# Patient Record
Sex: MALE | Race: WHITE | NOT HISPANIC OR LATINO | Employment: OTHER | URBAN - METROPOLITAN AREA
[De-identification: names, ages, dates, MRNs, and addresses within clinical notes are randomized per-mention and may not be internally consistent; named-entity substitution may affect disease eponyms.]

---

## 2017-02-06 ENCOUNTER — HOSPITAL ENCOUNTER (OUTPATIENT)
Dept: CT IMAGING | Facility: HOSPITAL | Age: 72
Discharge: HOME | End: 2017-02-06
Admitting: NURSE PRACTITIONER

## 2017-02-06 DIAGNOSIS — Z85.79 HISTORY OF PLASMACYTOMA: ICD-10-CM

## 2017-02-06 LAB — CREAT BLDA-MCNC: 1.6 MG/DL (ref 0.6–1.3)

## 2017-02-06 PROCEDURE — 82565 ASSAY OF CREATININE: CPT

## 2017-02-06 PROCEDURE — 0 IOPAMIDOL 61 % SOLUTION: Performed by: NURSE PRACTITIONER

## 2017-02-06 PROCEDURE — 71260 CT THORAX DX C+: CPT

## 2017-02-06 RX ADMIN — IOPAMIDOL 75 ML: 612 INJECTION, SOLUTION INTRAVENOUS at 11:45

## 2017-02-27 ENCOUNTER — OFFICE VISIT (OUTPATIENT)
Dept: SURGERY | Facility: CLINIC | Age: 72
End: 2017-02-27

## 2017-02-27 VITALS
WEIGHT: 259.4 LBS | HEIGHT: 71 IN | DIASTOLIC BLOOD PRESSURE: 70 MMHG | BODY MASS INDEX: 36.31 KG/M2 | HEART RATE: 64 BPM | OXYGEN SATURATION: 94 % | SYSTOLIC BLOOD PRESSURE: 142 MMHG

## 2017-02-27 DIAGNOSIS — R91.1 LUNG NODULE, SOLITARY: ICD-10-CM

## 2017-02-27 DIAGNOSIS — R06.2 WHEEZE: Primary | ICD-10-CM

## 2017-02-27 DIAGNOSIS — R05.9 COUGH: ICD-10-CM

## 2017-02-27 DIAGNOSIS — C90.30 PLASMACYTOMA (HCC): ICD-10-CM

## 2017-02-27 PROCEDURE — 99213 OFFICE O/P EST LOW 20 MIN: CPT | Performed by: THORACIC SURGERY (CARDIOTHORACIC VASCULAR SURGERY)

## 2017-02-27 RX ORDER — INFLUENZA A VIRUS A/SINGAPORE/GP1908/2015 IVR-180A (H1N1) ANTIGEN (PROPIOLACTONE INACTIVATED), INFLUENZA A VIRUS A/SINGAPORE/INFIMH-16-0019/2016 IVR-186 (H3N2) ANTIGEN (PROPIOLACTONE INACTIVATED) AND INFLUENZA B VIRUS B/MARYLAND/15/2016 ANTIGEN (PROPIOLACTONE INACTIVATED) 15; 15; 15 UG/.5ML; UG/.5ML; UG/.5ML
INJECTION, SUSPENSION INTRAMUSCULAR
Refills: 0 | COMMUNITY
Start: 2017-01-24 | End: 2017-03-24

## 2017-02-27 NOTE — PROGRESS NOTES
Subjective   Patient ID: Rafael Gomez is a 71 y.o. male who presents to the office today for a 3 month follow up visit ct chest with contrast.    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  He continues to have difficulty with shortness of breath on physical activity.  He recovers quickly with rest but he shortness of air is made worse with any type of activity of daily living.  He denies any complaints of fever, chills, cough, hemoptysis, pleuritic chest pain, night sweats, hoarseness, or unintentional weight loss. Underlying medical conditions including hypertension, arthritis and reflux remain stable.  He has no other somatic complaints or alleviating or exacerbating factors aside from those mentioned above.    The following portions of the patient's history were reviewed and updated as appropriate: allergies, current medications, past family history, past medical history, past social history, past surgical history and problem list.  Review of Systems   Constitution: Negative.   HENT: Negative.    Eyes: Negative.  Double vision: w/ exertion.   Cardiovascular: Negative.    Respiratory: Positive for cough (chronic non productive cough) and shortness of breath.    Endocrine: Negative.    Hematologic/Lymphatic: Negative.    Skin: Negative.    Musculoskeletal: Negative.    Gastrointestinal: Negative.    Genitourinary: Negative.    Neurological: Negative.    Psychiatric/Behavioral: Negative.      Patient Active Problem List   Diagnosis   • Lung nodule, solitary   • Cough   • Wheeze   • Lung mass   • Follow-up examination, following other surgery   • Renal mass   • Plasmacytoma   • Macrocytic anemia   • Leukopenia   • Monoclonal gammopathy   • Thrombocytopenia     Past Medical History   Diagnosis Date   • Acid reflux     • Arthritis    • Coronary artery disease    • ED (erectile dysfunction)    • Gout    • History of PAT (paroxysmal atrial tachycardia)    • Hypertension    • Neuropathy      HANDS AND FEET   • PONV (postoperative nausea and vomiting)    • PTSD (post-traumatic stress disorder)    • PVD (peripheral vascular disease)    • Stroke      Past Surgical History   Procedure Laterality Date   • Appendectomy     • Coronary artery bypass graft     • Tonsillectomy     • Total hip arthroplasty Right    • Carotid endarterectomy Bilateral    • Thoracoscopy Left 6/2/2016     Procedure: LEFT VIDEO ASSISTED THORACOTOMY W/ LEFT UPPER LOBE WEDGE RESECTION X 2; EXPLORATORY BRONCHOSCOPY; PAIN PUMP PLACEMENT X 2;  Surgeon: Larry Hernandez MD;  Location: [facility] OR;  Service:    • Replacement total knee Left 09/08/2015     Family History   Problem Relation Age of Onset   • Heart disease Mother    • Multiple myeloma Mother    • Heart disease Father    • Prostate cancer Father    • Leukemia Brother      CLL     Social History     Social History   • Marital status:      Spouse name: N/A   • Number of children: N/A   • Years of education: N/A     Occupational History   • Not on file.     Social History Main Topics   • Smoking status: Former Smoker     Packs/day: 1.00     Years: 30.00     Quit date: 1997   • Smokeless tobacco: Current User     Types: Chew      Comment: ONE HALF CAN DAILY    • Alcohol use 18.0 oz/week     30 Cans of beer per week   • Drug use: No   • Sexual activity: Defer     Other Topics Concern   • Not on file     Social History Narrative       Current Outpatient Prescriptions:   •  acetaminophen (TYLENOL) 325 MG tablet, Take 650 mg by mouth 2 (two) times a day. 1-2 tablets morning and afternoon, Disp: , Rfl:   •  AFLURIA injection, INJECT AS DIRECTED, Disp: , Rfl: 0  •  allopurinol (ZYLOPRIM) 300 MG tablet, Take 300 mg by mouth daily., Disp: , Rfl:   •  amLODIPine (NORVASC) 2.5 MG tablet, Take 2.5 mg by mouth  every morning., Disp: , Rfl:   •  aspirin 81 MG chewable tablet, Chew 81 mg 2 (two) times a day. HOLD PRIOR TO SURGERY, Disp: , Rfl:   •  atorvastatin (LIPITOR) 40 MG tablet, Take 40 mg by mouth daily., Disp: , Rfl:   •  cilostazol (PLETAL) 100 MG tablet, Take 100 mg by mouth 2 (two) times a day. 0.5 TAB DAILY, Disp: , Rfl:   •  citalopram (CeleXA) 20 MG tablet, Take 20 mg by mouth every evening., Disp: , Rfl:   •  gabapentin (NEURONTIN) 300 MG capsule, Take 300 mg by mouth 2 (two) times a day., Disp: , Rfl:   •  glucosamine sulfate 500 MG capsule capsule, Take 2 capsules by mouth daily., Disp: , Rfl:   •  hydrochlorothiazide (HYDRODIURIL) 25 MG tablet, Take 12.5 mg by mouth daily., Disp: , Rfl:   •  l-methylfolate-B6-B12 (METANX) 3-35-2 MG tablet tablet, Take 1 tablet by mouth 2 (two) times a day., Disp: , Rfl:   •  LORazepam (ATIVAN) 0.5 MG tablet, Take 0.5 mg by mouth as needed., Disp: , Rfl:   •  metoprolol tartrate (LOPRESSOR) 50 MG tablet, Take 50 mg by mouth 2 (two) times a day., Disp: , Rfl:   •  Omega-3 Fatty Acids (FISH OIL) 1000 MG capsule capsule, Take 1,000 mg by mouth daily with breakfast. HOLD PRIOR TO SURGERY, Disp: , Rfl:   •  omeprazole (PriLOSEC) 20 MG capsule, Take 20 mg by mouth every morning., Disp: , Rfl:   •  sildenafil (VIAGRA) 100 MG tablet, Take 100 mg by mouth as needed for erectile dysfunction., Disp: , Rfl:   •  temazepam (RESTORIL) 15 MG capsule, Take 15 mg by mouth at night as needed., Disp: , Rfl:   •  terazosin (HYTRIN) 2 MG capsule, Take 2 mg by mouth every evening. 2 TABS , Disp: , Rfl:   •  rivaroxaban (XARELTO) 20 MG tablet, Take 1 tablet by mouth daily., Disp: 30 tablet, Rfl: 2  No Known Allergies     Objective   Vitals:    02/27/17 1021   BP: 142/70   Pulse: 64   SpO2: 94%     Physical Exam   Constitutional: He is oriented to person, place, and time. Vital signs are normal. He appears well-developed and well-nourished. No distress.   HENT:   Head: Normocephalic and atraumatic.    Eyes: EOM are normal. Pupils are equal, round, and reactive to light. Right eye exhibits no discharge. Left eye exhibits no discharge.   Neck: Normal range of motion. Neck supple.   Cardiovascular: Normal rate, regular rhythm, normal heart sounds and intact distal pulses.    No murmur heard.  Pulmonary/Chest: Effort normal and breath sounds normal. He has no wheezes. He has no rhonchi. He has no rales. He exhibits no tenderness.   Abdominal: Soft. There is no tenderness.   Musculoskeletal: Normal range of motion. He exhibits no edema or tenderness.   Neurological: He is alert and oriented to person, place, and time. He has normal strength.   Skin: Skin is warm and dry. No rash noted. No cyanosis or erythema.   Psychiatric: He has a normal mood and affect. His behavior is normal.     Independent Review of Radiographic Studies:    I have independently reviewed his CAT scan images from February 6, 2017 [facility].  CT CHEST WITH CONTRAST      HISTORY: 71-year-old male with history of resected plasmacytoma, pleural  nodules      TECHNIQUE: Spiral axial CT imaging of the chest was performed at 3 mm  intervals throughout. Intravenous contrast was administered.      COMPARISON: Correlation is made with noncontrasted CT chest 09/08/2016  and a preoperative contrasted CT chest 05/25/2016.      FINDINGS: Heart and great vessels are stable. There has been prior CABG.  No new mass or adenopathy is identified within the mediastinum or at the  hilar levels. The chest wall appears unremarkable. No bone lesions are  apparent. Degenerative changes are present at the spine.      Lung windows demonstrate postoperative changes in the left upper lobe  consistent with wedge resection. A tiny pulmonary nodule within the left  upper lobe near the resection margin is unchanged, measuring  approximately 6 mm long axis. A nodular density along the anterior  pleural surface adjacent to the left upper lobe appears  stable,  measuring approximately 1.7 cm long axis but no more than 6 mm in  thickness. There is an additional small pulmonary nodule along the  anterior aspect of the inferior left lower lobe adjacent to the major  fissure. This lesion is seen on image 67 and measures no more than 7 mm  in diameter, stable compared with prior study. There is a stable tiny  subpleural nodule along the anterolateral right upper lobe on image 43.  No new suspicious pulmonary nodule or mass is identified within either  lung. There is no active infiltrate or effusion. The visualized portion  of the upper abdomen demonstrates an unremarkable contrasted appearance.  Small renal cortical cysts are noted.      IMPRESSION:  Postoperative imaging is stable compared with 09/08/2016. A  pleural-based nodular density along the anterior left upper lobe is  stable. Additional small pulmonary parenchymal nodules are unchanged.  Continued CT follow-up is recommended.      Assessment/Plan   Assessment:  Stable postoperative course.  Stable pulmonary nodules.    Plan:  Plan for continued follow up and surveillance of his pulmonary nodules with a follow-up CAT scan in 3 months.  Stable pulmonary nodules consistent with known history of plasmacytoma. Continue current medical management as tolerated.  Increase physical activity as tolerated. Should you have any questions or concerns regarding your patient's care, please do not hesitate to contact me.  Sincerely, Larry Hernandez M.D.  There are no diagnoses linked to this encounter.

## 2017-03-17 ENCOUNTER — LAB (OUTPATIENT)
Dept: LAB | Facility: HOSPITAL | Age: 72
End: 2017-03-17

## 2017-03-17 DIAGNOSIS — D47.2 MONOCLONAL GAMMOPATHY: ICD-10-CM

## 2017-03-17 DIAGNOSIS — C90.30 PLASMACYTOMA (HCC): ICD-10-CM

## 2017-03-17 DIAGNOSIS — I82.403 DVT OF LOWER EXTREMITY, BILATERAL (HCC): ICD-10-CM

## 2017-03-17 DIAGNOSIS — D69.6 THROMBOCYTOPENIA (HCC): ICD-10-CM

## 2017-03-17 DIAGNOSIS — D53.9 MACROCYTIC ANEMIA: ICD-10-CM

## 2017-03-17 LAB
ALBUMIN SERPL-MCNC: 4.2 G/DL (ref 3.5–5.2)
ALBUMIN/GLOB SERPL: 1.5 G/DL (ref 1.1–2.4)
ALP SERPL-CCNC: 76 U/L (ref 38–116)
ALT SERPL W P-5'-P-CCNC: 20 U/L (ref 0–41)
ANION GAP SERPL CALCULATED.3IONS-SCNC: 13.2 MMOL/L
AST SERPL-CCNC: 22 U/L (ref 0–40)
BASOPHILS # BLD AUTO: 0.05 10*3/MM3 (ref 0–0.1)
BASOPHILS NFR BLD AUTO: 1 % (ref 0–1.1)
BILIRUB SERPL-MCNC: 0.6 MG/DL (ref 0.1–1.2)
BUN BLD-MCNC: 21 MG/DL (ref 6–20)
BUN/CREAT SERPL: 14 (ref 7.3–30)
CALCIUM SPEC-SCNC: 9.7 MG/DL (ref 8.5–10.2)
CHLORIDE SERPL-SCNC: 101 MMOL/L (ref 98–107)
CO2 SERPL-SCNC: 25.8 MMOL/L (ref 22–29)
CREAT BLD-MCNC: 1.5 MG/DL (ref 0.7–1.3)
DEPRECATED RDW RBC AUTO: 49.6 FL (ref 37–49)
EOSINOPHIL # BLD AUTO: 0.17 10*3/MM3 (ref 0–0.36)
EOSINOPHIL NFR BLD AUTO: 3.4 % (ref 1–5)
ERYTHROCYTE [DISTWIDTH] IN BLOOD BY AUTOMATED COUNT: 14.1 % (ref 11.7–14.5)
GFR SERPL CREATININE-BSD FRML MDRD: 46 ML/MIN/1.73
GLOBULIN UR ELPH-MCNC: 2.8 GM/DL (ref 1.8–3.5)
GLUCOSE BLD-MCNC: 112 MG/DL (ref 74–124)
HCT VFR BLD AUTO: 34.2 % (ref 40–49)
HGB BLD-MCNC: 11.9 G/DL (ref 13.5–16.5)
IMM GRANULOCYTES # BLD: 0.01 10*3/MM3 (ref 0–0.03)
IMM GRANULOCYTES NFR BLD: 0.2 % (ref 0–0.5)
LYMPHOCYTES # BLD AUTO: 1.02 10*3/MM3 (ref 1–3.5)
LYMPHOCYTES NFR BLD AUTO: 20.6 % (ref 20–49)
MCH RBC QN AUTO: 34.1 PG (ref 27–33)
MCHC RBC AUTO-ENTMCNC: 34.8 G/DL (ref 32–35)
MCV RBC AUTO: 98 FL (ref 83–97)
MONOCYTES # BLD AUTO: 0.57 10*3/MM3 (ref 0.25–0.8)
MONOCYTES NFR BLD AUTO: 11.5 % (ref 4–12)
NEUTROPHILS # BLD AUTO: 3.12 10*3/MM3 (ref 1.5–7)
NEUTROPHILS NFR BLD AUTO: 63.3 % (ref 39–75)
NRBC BLD MANUAL-RTO: 0 /100 WBC (ref 0–0)
PLATELET # BLD AUTO: 135 10*3/MM3 (ref 150–375)
PMV BLD AUTO: 9.5 FL (ref 8.9–12.1)
POTASSIUM BLD-SCNC: 4.4 MMOL/L (ref 3.5–4.7)
PROT SERPL-MCNC: 7 G/DL (ref 6.3–8)
RBC # BLD AUTO: 3.49 10*6/MM3 (ref 4.3–5.5)
SODIUM BLD-SCNC: 140 MMOL/L (ref 134–145)
WBC NRBC COR # BLD: 4.94 10*3/MM3 (ref 4–10)

## 2017-03-17 PROCEDURE — 36415 COLL VENOUS BLD VENIPUNCTURE: CPT | Performed by: INTERNAL MEDICINE

## 2017-03-17 PROCEDURE — 80053 COMPREHEN METABOLIC PANEL: CPT | Performed by: INTERNAL MEDICINE

## 2017-03-17 PROCEDURE — 85025 COMPLETE CBC W/AUTO DIFF WBC: CPT | Performed by: INTERNAL MEDICINE

## 2017-03-19 LAB
KAPPA LC SERPL-MCNC: 32.54 MG/L (ref 3.3–19.4)
KAPPA LC/LAMBDA SER: 1.25 {RATIO} (ref 0.26–1.65)
LAMBDA LC FREE SERPL-MCNC: 26.03 MG/L (ref 5.71–26.3)

## 2017-03-20 LAB
ALBUMIN SERPL-MCNC: 3.8 G/DL (ref 2.9–4.4)
ALBUMIN/GLOB SERPL: 1.4 {RATIO} (ref 0.7–1.7)
ALPHA1 GLOB FLD ELPH-MCNC: 0.2 G/DL (ref 0–0.4)
ALPHA2 GLOB SERPL ELPH-MCNC: 0.7 G/DL (ref 0.4–1)
B-GLOBULIN SERPL ELPH-MCNC: 1 G/DL (ref 0.7–1.3)
GAMMA GLOB SERPL ELPH-MCNC: 0.8 G/DL (ref 0.4–1.8)
GLOBULIN SER CALC-MCNC: 2.7 G/DL (ref 2.2–3.9)
IGA SERPL-MCNC: 170 MG/DL (ref 61–437)
IGG SERPL-MCNC: 738 MG/DL (ref 700–1600)
IGM SERPL-MCNC: 318 MG/DL (ref 20–172)
Lab: ABNORMAL
M-SPIKE: 0.1 G/DL
PROT PATTERN SERPL IFE-IMP: ABNORMAL
PROT SERPL-MCNC: 6.5 G/DL (ref 6–8.5)

## 2017-03-24 ENCOUNTER — APPOINTMENT (OUTPATIENT)
Dept: LAB | Facility: HOSPITAL | Age: 72
End: 2017-03-24

## 2017-03-24 ENCOUNTER — APPOINTMENT (OUTPATIENT)
Dept: ONCOLOGY | Facility: CLINIC | Age: 72
End: 2017-03-24

## 2017-03-24 ENCOUNTER — OFFICE VISIT (OUTPATIENT)
Dept: ONCOLOGY | Facility: CLINIC | Age: 72
End: 2017-03-24

## 2017-03-24 VITALS
DIASTOLIC BLOOD PRESSURE: 68 MMHG | HEIGHT: 68 IN | BODY MASS INDEX: 39.95 KG/M2 | TEMPERATURE: 98.9 F | WEIGHT: 263.6 LBS | HEART RATE: 69 BPM | RESPIRATION RATE: 16 BRPM | SYSTOLIC BLOOD PRESSURE: 126 MMHG | OXYGEN SATURATION: 97 %

## 2017-03-24 DIAGNOSIS — D47.2 MONOCLONAL GAMMOPATHY: Primary | ICD-10-CM

## 2017-03-24 PROCEDURE — 99213 OFFICE O/P EST LOW 20 MIN: CPT | Performed by: INTERNAL MEDICINE

## 2017-03-24 PROCEDURE — G0463 HOSPITAL OUTPT CLINIC VISIT: HCPCS | Performed by: INTERNAL MEDICINE

## 2017-03-24 RX ORDER — ASPIRIN 81 MG/1
TABLET ORAL
COMMUNITY
Start: 2017-03-13 | End: 2017-03-24

## 2017-03-24 RX ORDER — TIOTROPIUM BROMIDE INHALATION SPRAY 3.12 UG/1
2 SPRAY, METERED RESPIRATORY (INHALATION)
COMMUNITY
Start: 2017-02-02 | End: 2021-02-08 | Stop reason: SDDI

## 2017-03-24 RX ORDER — TELMISARTAN 80 MG/1
80 TABLET ORAL DAILY
COMMUNITY
Start: 2017-01-30 | End: 2021-09-07

## 2017-03-24 NOTE — PROGRESS NOTES
[facility] GROUP OUTPATIENT FOLLOW UP CLINIC VISIT    REASON FOR FOLLOW-UP:    1. Plasmacytoma involving the left upper lobe  2.  Macrocytic anemia  3.  Mild leukopenia    HISTORY OF PRESENT ILLNESS:  Rafael Gomez is a 71 y.o. male who returns today for follow up of the above issues.  He continues to do about the same.  He does have worsening low back pain and hasn't MRI scheduled in the next couple of weeks.  He does tell me that he received a prescription for Xarelto and is not sure why he received this.  I do not see any reason why he should be anticoagulated.  He did see Dr. Hernandez and had a CT scan performed that showed a stable lung nodule.    HEMATOLOGIC HISTORY:  The patient had complained of some dyspnea on exertion. He has some left knee pain following a left knee replacement. He eventually was referred for further care to Dr. Aba Freeman with vascular surgery. He was found to have evidence of peripheral vascular disease and is scheduled for a procedure in her future. He was also found to have a complex irregular cystic mass measuring 5.7 cm in the right kidney. He has been referred to urology for this. There was a 12 mm nodule in the lingula. He was referred to Dr. Larry Hernandez for this.  He had a CT scan of the chest with contrast on 5/25/2016. This showed 3 pleural-based nodules at the periphery of the left upper lobe. One measured 15 mm and the other measured 16 mm. The other was 6 mm. There is also a noncalcified 6 mm nodule.  He had a wedge resection on 6/2/2016. Two samples were sent to pathology. One showed plasma cells with evidence for amyloid. Further testing at [facility] showed IgG kappa restriction. The other wedge resection showed tiny nonnecrotizing granulomas.    Serum protein electrophoresis showed a 0.1 g M spike with immunofixation showing IgM lambda monoclonality.  Serum free light chain ratio was normal.  Ferritin 226.  Iron profile normal.  Vitamin B12 greater  "than 2000.    Serum protein levels in March 2017 are very stable with 0.1 g M spike.  He will follow-up in one year.  He continues to follow-up with Dr. Hernandez for CT scans.    PAST MEDICAL, SURGICAL, FAMILY, AND SOCIAL HISTORIES were reviewed with the patient and in the electronic medical record, and were updated if indicated.    ALLERGIES:  No Known Allergies    MEDICATIONS:  The medication list has been reviewed with the patient by the medical assistant, and the list has been updated in the electronic medical record, which I reviewed.  Medication dosages and frequencies were confirmed to be accurate.    REVIEW OF SYSTEMS:  PAIN:  See Vital Signs below.  GENERAL:  See history of present illness  SKIN:  No rashes or non-healing lesions.  HEME/LYMPH:  No abnormal bleeding.  No palpable lymphadenopathy.  EYES:  No vision changes or diplopia.  ENT:  No sore throat or difficulty swallowing.  RESPIRATORY:  See history of present illness  CARDIOVASCULAR:  No chest pain, palpitations, orthopnea, or dyspnea on exertion.  GASTROINTESTINAL:  No abdominal pain, nausea, vomiting, constipation, diarrhea, melena, or hematochezia.  GENITOURINARY:  No dysuria or hematuria.  MUSCULOSKELETAL:  Worsening low back pain though he does have chronic low back pain.  NEUROLOGIC:  No dizziness, loss of consciousness, or seizures.  PSYCHIATRIC:  No depression, anxiety, or mood changes.    Vitals:    03/24/17 1117   BP: 126/68   Pulse: 69   Resp: 16   Temp: 98.9 °F (37.2 °C)   TempSrc: Oral   SpO2: 97%   Weight: 263 lb 9.6 oz (120 kg)   Height: 68.31\" (173.5 cm)  Comment: new height   PainSc: 7  Comment: back and knee pain       PHYSICAL EXAMINATION:  GENERAL:  Obese.  Well-developed well-nourished male; awake, alert and oriented, in no acute distress.    SKIN:  Warm and dry, without rashes, purpura, or petechiae.  HEAD:  Normocephalic, atraumatic.  EYES:  Pupils equal, round and reactive to light.  Extraocular movements intact.  Conjunctivae " normal.  EARS:  Hearing intact.  NOSE:  Septum midline.  No excoriations or nasal discharge.  MOUTH:  No stomatitis or ulcers.  Lips are normal.  THROAT:  Oropharynx without lesions or exudates.  NECK:  Supple with good range of motion; no thyromegaly or masses; no JVD or bruits.  LYMPHATICS:  No cervical, supraclavicular, axillary, or inguinal lymphadenopathy.  CHEST:  Lungs are clear to auscultation bilaterally.  No wheezes, rales, or rhonchi.  Tenderness over the left anterior chest.  HEART:  Regular rate; normal rhythm.  No murmurs, gallops or rubs.  ABDOMEN:  Soft, non-tender, non-distended.  Normal active bowel sounds.  No organomegaly.  EXTREMITIES:  No clubbing, cyanosis, or edema.  NEUROLOGICAL:  No focal neurologic deficits.    DIAGNOSTIC DATA:  Lab Results   Component Value Date    WBC 4.94 03/17/2017    HGB 11.9 (L) 03/17/2017    HCT 34.2 (L) 03/17/2017    MCV 98.0 (H) 03/17/2017     (L) 03/17/2017     Serum protein electrophoresis continues to show a 0.1 g M spike with immunofixation showing IgM lambda monoclonality.    Imaging: none reviewed    ASSESSMENT:  This is a 71 y.o. male with:  1.  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  He had a wedge resection.  2 tiny pulmonary nodules in the left upper lobe remain and are being followed by Dr. Hernandez.  2.  IgM monoclonal gammopathy: Tiny 0.1 g M spike.  We will follow labs in 12 months.  Labs are stable.  3.  Macrocytic anemia: He has mild chronic kidney disease.  This could potentially be secondary to alcohol use as well.  4.  Thrombocytopenia.:  This is mild and we will continue to monitor this.  Stable.  5.  Right renal mass which is chronic.  He is followed by urology.  6.  Claudication with peripheral vascular disease followed by Dr. Freeman.    7.  Worsening low back pain with an MRI scheduled.    PLAN:  1.  He'll continue to follow-up with Dr. Hernandez regarding his history of pulmonary  plasmacytoma.  2.  I will see him back in 12 months for follow-up with labs done one week prior.

## 2017-05-18 ENCOUNTER — OFFICE VISIT (OUTPATIENT)
Dept: ORTHOPEDIC SURGERY | Facility: CLINIC | Age: 72
End: 2017-05-18

## 2017-05-18 VITALS — BODY MASS INDEX: 41.22 KG/M2 | HEIGHT: 68 IN | TEMPERATURE: 98.3 F | WEIGHT: 272 LBS

## 2017-05-18 DIAGNOSIS — M25.552 HIP PAIN, LEFT: Primary | ICD-10-CM

## 2017-05-18 PROCEDURE — 99203 OFFICE O/P NEW LOW 30 MIN: CPT | Performed by: ORTHOPAEDIC SURGERY

## 2017-05-18 PROCEDURE — 73502 X-RAY EXAM HIP UNI 2-3 VIEWS: CPT | Performed by: ORTHOPAEDIC SURGERY

## 2017-05-18 RX ORDER — GABAPENTIN 600 MG/1
TABLET ORAL
COMMUNITY
Start: 2017-05-16 | End: 2018-08-27

## 2017-05-18 RX ORDER — PRAMIPEXOLE DIHYDROCHLORIDE 0.25 MG/1
0.25 TABLET ORAL 3 TIMES DAILY
COMMUNITY
Start: 2017-05-02 | End: 2021-09-07

## 2017-05-23 ENCOUNTER — HOSPITAL ENCOUNTER (OUTPATIENT)
Dept: CT IMAGING | Facility: HOSPITAL | Age: 72
Discharge: HOME | End: 2017-05-23
Attending: THORACIC SURGERY (CARDIOTHORACIC VASCULAR SURGERY) | Admitting: THORACIC SURGERY (CARDIOTHORACIC VASCULAR SURGERY)

## 2017-05-23 DIAGNOSIS — R05.9 COUGH: ICD-10-CM

## 2017-05-23 DIAGNOSIS — C90.30 PLASMACYTOMA (HCC): ICD-10-CM

## 2017-05-23 DIAGNOSIS — R06.2 WHEEZE: ICD-10-CM

## 2017-05-23 DIAGNOSIS — R91.1 LUNG NODULE, SOLITARY: ICD-10-CM

## 2017-05-23 PROCEDURE — 71250 CT THORAX DX C-: CPT

## 2017-05-31 ENCOUNTER — OFFICE VISIT (OUTPATIENT)
Dept: SURGERY | Facility: CLINIC | Age: 72
End: 2017-05-31

## 2017-05-31 VITALS
WEIGHT: 273 LBS | OXYGEN SATURATION: 94 % | DIASTOLIC BLOOD PRESSURE: 60 MMHG | HEIGHT: 71 IN | HEART RATE: 88 BPM | BODY MASS INDEX: 38.22 KG/M2 | SYSTOLIC BLOOD PRESSURE: 142 MMHG | RESPIRATION RATE: 18 BRPM

## 2017-05-31 DIAGNOSIS — R91.8 PULMONARY NODULES/LESIONS, MULTIPLE: Primary | ICD-10-CM

## 2017-05-31 PROCEDURE — 99213 OFFICE O/P EST LOW 20 MIN: CPT | Performed by: NURSE PRACTITIONER

## 2017-06-01 ENCOUNTER — OFFICE VISIT (OUTPATIENT)
Dept: ONCOLOGY | Facility: CLINIC | Age: 72
End: 2017-06-01

## 2017-06-01 ENCOUNTER — LAB (OUTPATIENT)
Dept: LAB | Facility: HOSPITAL | Age: 72
End: 2017-06-01

## 2017-06-01 VITALS
BODY MASS INDEX: 40.8 KG/M2 | WEIGHT: 269.2 LBS | HEIGHT: 68 IN | HEART RATE: 72 BPM | DIASTOLIC BLOOD PRESSURE: 86 MMHG | OXYGEN SATURATION: 96 % | SYSTOLIC BLOOD PRESSURE: 146 MMHG | TEMPERATURE: 98.3 F | RESPIRATION RATE: 16 BRPM

## 2017-06-01 DIAGNOSIS — R91.8 MULTIPLE LUNG NODULES ON CT: ICD-10-CM

## 2017-06-01 DIAGNOSIS — D47.2 MONOCLONAL GAMMOPATHY: Primary | ICD-10-CM

## 2017-06-01 DIAGNOSIS — D69.6 THROMBOCYTOPENIA (HCC): ICD-10-CM

## 2017-06-01 DIAGNOSIS — R91.1 LUNG NODULE, SOLITARY: ICD-10-CM

## 2017-06-01 DIAGNOSIS — R91.8 LUNG MASS: Primary | ICD-10-CM

## 2017-06-01 DIAGNOSIS — N28.89 RENAL MASS: ICD-10-CM

## 2017-06-01 DIAGNOSIS — C90.30 PLASMACYTOMA (HCC): ICD-10-CM

## 2017-06-01 LAB
ALBUMIN SERPL-MCNC: 4.5 G/DL (ref 3.5–5.2)
ALBUMIN/GLOB SERPL: 1.6 G/DL (ref 1.1–2.4)
ALP SERPL-CCNC: 71 U/L (ref 38–116)
ALT SERPL W P-5'-P-CCNC: 21 U/L (ref 0–41)
ANION GAP SERPL CALCULATED.3IONS-SCNC: 14.3 MMOL/L
AST SERPL-CCNC: 22 U/L (ref 0–40)
BASOPHILS # BLD AUTO: 0.05 10*3/MM3 (ref 0–0.1)
BASOPHILS NFR BLD AUTO: 1 % (ref 0–1.1)
BILIRUB SERPL-MCNC: 0.7 MG/DL (ref 0.1–1.2)
BUN BLD-MCNC: 21 MG/DL (ref 6–20)
BUN/CREAT SERPL: 12.1 (ref 7.3–30)
CALCIUM SPEC-SCNC: 9.9 MG/DL (ref 8.5–10.2)
CHLORIDE SERPL-SCNC: 99 MMOL/L (ref 98–107)
CO2 SERPL-SCNC: 24.7 MMOL/L (ref 22–29)
CREAT BLD-MCNC: 1.73 MG/DL (ref 0.7–1.3)
DEPRECATED RDW RBC AUTO: 58.8 FL (ref 37–49)
EOSINOPHIL # BLD AUTO: 0.17 10*3/MM3 (ref 0–0.36)
EOSINOPHIL NFR BLD AUTO: 3.5 % (ref 1–5)
ERYTHROCYTE [DISTWIDTH] IN BLOOD BY AUTOMATED COUNT: 15.8 % (ref 11.7–14.5)
GFR SERPL CREATININE-BSD FRML MDRD: 39 ML/MIN/1.73
GLOBULIN UR ELPH-MCNC: 2.9 GM/DL (ref 1.8–3.5)
GLUCOSE BLD-MCNC: 110 MG/DL (ref 74–124)
HCT VFR BLD AUTO: 37.1 % (ref 40–49)
HGB BLD-MCNC: 12.9 G/DL (ref 13.5–16.5)
IMM GRANULOCYTES # BLD: 0.02 10*3/MM3 (ref 0–0.03)
IMM GRANULOCYTES NFR BLD: 0.4 % (ref 0–0.5)
LYMPHOCYTES # BLD AUTO: 1.04 10*3/MM3 (ref 1–3.5)
LYMPHOCYTES NFR BLD AUTO: 21.7 % (ref 20–49)
MCH RBC QN AUTO: 35.2 PG (ref 27–33)
MCHC RBC AUTO-ENTMCNC: 34.8 G/DL (ref 32–35)
MCV RBC AUTO: 101.4 FL (ref 83–97)
MONOCYTES # BLD AUTO: 0.7 10*3/MM3 (ref 0.25–0.8)
MONOCYTES NFR BLD AUTO: 14.6 % (ref 4–12)
NEUTROPHILS # BLD AUTO: 2.82 10*3/MM3 (ref 1.5–7)
NEUTROPHILS NFR BLD AUTO: 58.8 % (ref 39–75)
NRBC BLD MANUAL-RTO: 0 /100 WBC (ref 0–0)
PLATELET # BLD AUTO: 152 10*3/MM3 (ref 150–375)
PMV BLD AUTO: 10.9 FL (ref 8.9–12.1)
POTASSIUM BLD-SCNC: 4.7 MMOL/L (ref 3.5–4.7)
PROT SERPL-MCNC: 7.4 G/DL (ref 6.3–8)
RBC # BLD AUTO: 3.66 10*6/MM3 (ref 4.3–5.5)
SODIUM BLD-SCNC: 138 MMOL/L (ref 134–145)
WBC NRBC COR # BLD: 4.8 10*3/MM3 (ref 4–10)

## 2017-06-01 PROCEDURE — 80053 COMPREHEN METABOLIC PANEL: CPT | Performed by: INTERNAL MEDICINE

## 2017-06-01 PROCEDURE — 99214 OFFICE O/P EST MOD 30 MIN: CPT | Performed by: INTERNAL MEDICINE

## 2017-06-01 PROCEDURE — 85025 COMPLETE CBC W/AUTO DIFF WBC: CPT

## 2017-06-01 PROCEDURE — 36416 COLLJ CAPILLARY BLOOD SPEC: CPT

## 2017-06-01 PROCEDURE — 36415 COLL VENOUS BLD VENIPUNCTURE: CPT | Performed by: INTERNAL MEDICINE

## 2017-06-01 NOTE — PROGRESS NOTES
[facility] GROUP OUTPATIENT FOLLOW UP CLINIC VISIT    REASON FOR FOLLOW-UP:    1. Plasmacytoma involving the left upper lobe  2.  Macrocytic anemia  3.  Mild leukopenia  4.  Enlarging left pulmonary nodules    HISTORY OF PRESENT ILLNESS:  Rafael Gomez is a 72 y.o. male who returns today for follow up of the above issues.      I last saw him back in March.  At that time, I planned to see him back in one year with serum protein studies.  He had repeat CT imaging of his chest without contrast on 5/23/2017 and was seen in the thoracic surgery office yesterday and referred back here for enlarging left lung nodules.    He continues to have a mild occasionally productive cough.  He denies any chest pain.  He has some dyspnea on exertion and fatigue.  No fevers or chills.        HEMATOLOGIC HISTORY:  The patient had complained of some dyspnea on exertion. He has some left knee pain following a left knee replacement. He eventually was referred for further care to Dr. Aba Freeman with vascular surgery. He was found to have evidence of peripheral vascular disease and is scheduled for a procedure in her future. He was also found to have a complex irregular cystic mass measuring 5.7 cm in the right kidney. He has been referred to urology for this. There was a 12 mm nodule in the lingula. He was referred to Dr. Larry Hernandez for this.  He had a CT scan of the chest with contrast on 5/25/2016. This showed 3 pleural-based nodules at the periphery of the left upper lobe. One measured 15 mm and the other measured 16 mm. The other was 6 mm. There is also a noncalcified 6 mm nodule.  He had a wedge resection on 6/2/2016. Two samples were sent to pathology. One showed plasma cells with evidence for amyloid. Further testing at [facility] showed IgG kappa restriction. The other wedge resection showed tiny nonnecrotizing granulomas.    Serum protein electrophoresis showed a 0.1 g M spike with immunofixation showing  "IgM lambda monoclonality.  Serum free light chain ratio was normal.  Ferritin 226.  Iron profile normal.  Vitamin B12 greater than 2000.    Serum protein levels in March 2017 are very stable with 0.1 g M spike.  He will follow-up in one year.  He continues to follow-up with Dr. Hernandez for CT scans.    CT imaging on 5/23/2017 showed an increase in size of 2 lingular lesions now measuring 2.2 cm and 1.0 cm.  There is an 8 mm left lower lobe pulmonary nodule also slightly increased in size.  Several other tiny pulmonary nodules bilaterally which are stable.    PAST MEDICAL, SURGICAL, FAMILY, AND SOCIAL HISTORIES were reviewed with the patient and in the electronic medical record, and were updated if indicated.    ALLERGIES:  No Known Allergies    MEDICATIONS:  The medication list has been reviewed with the patient by the medical assistant, and the list has been updated in the electronic medical record, which I reviewed.  Medication dosages and frequencies were confirmed to be accurate.    REVIEW OF SYSTEMS:  PAIN:  See Vital Signs below.  GENERAL:  See history of present illness  SKIN:  No rashes or non-healing lesions.  HEME/LYMPH:  No abnormal bleeding.  No palpable lymphadenopathy.  EYES:  No vision changes or diplopia.  ENT:  No sore throat or difficulty swallowing.  RESPIRATORY:  See history of present illness  CARDIOVASCULAR:  No chest pain, palpitations, orthopnea, or dyspnea on exertion.  GASTROINTESTINAL:  No abdominal pain, nausea, vomiting, constipation, diarrhea, melena, or hematochezia.  GENITOURINARY:  No dysuria or hematuria.  MUSCULOSKELETAL:  Chronic back pain  NEUROLOGIC:  No dizziness, loss of consciousness, or seizures.  PSYCHIATRIC:  No depression, anxiety, or mood changes.    Vitals:    06/01/17 0759   BP: 146/86   Pulse: 72   Resp: 16   Temp: 98.3 °F (36.8 °C)   TempSrc: Oral   SpO2: 96%   Weight: 269 lb 3.2 oz (122 kg)   Height: 68.31\" (173.5 cm)   PainSc: 0-No pain  Comment: no pain today but " has experienced pain in the chest region       PHYSICAL EXAMINATION:  GENERAL:  Obese.  Well-developed well-nourished male; awake, alert and oriented, in no acute distress.    SKIN:  Warm and dry, without rashes, purpura, or petechiae.  HEAD:  Normocephalic, atraumatic.  EYES:  Pupils equal, round and reactive to light.  Extraocular movements intact.  Conjunctivae normal.  EARS:  Hearing intact.  NOSE:  Septum midline.  No excoriations or nasal discharge.  MOUTH:  No stomatitis or ulcers.  Lips are normal.  THROAT:  Oropharynx without lesions or exudates.  NECK:  Supple with good range of motion; no thyromegaly or masses; no JVD or bruits.  LYMPHATICS:  No cervical, supraclavicular, axillary, or inguinal lymphadenopathy.  CHEST:  Lungs are clear to auscultation bilaterally.  No wheezes, rales, or rhonchi.  Tenderness over the left anterior chest.  HEART:  Regular rate; normal rhythm.  No murmurs, gallops or rubs.  ABDOMEN:  Soft, non-tender, non-distended.  Normal active bowel sounds.  No organomegaly.  EXTREMITIES:  No clubbing, cyanosis, or edema.  NEUROLOGICAL:  No focal neurologic deficits.    DIAGNOSTIC DATA:  Lab Results   Component Value Date    WBC 4.80 06/01/2017    HGB 12.9 (L) 06/01/2017    HCT 37.1 (L) 06/01/2017    .4 (H) 06/01/2017     06/01/2017     Serum protein electrophoresis continues to show a 0.1 g M spike with immunofixation showing IgM lambda monoclonality.  Pending today.    Imaging: CT imaging of the chest by contrast from 5/23/2017 personally reviewed and reviewed with the patient and his wife today.  Increase in size of 2 lingular lesions now measuring 2.2 cm and 1.0 cm.  Several tiny pulmonary nodules bilaterally which are stable.  Slight increase in size of an 8 mm left lower lobe pulmonary nodule.    ASSESSMENT:  This is a 72 y.o. male with:  1.  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  He had a wedge resection.    David has been following a couple of other small pulmonary nodules on the left which has now increased in size.  His M spike back in March was stable.  We will repeat studies today.  I will obtain a PET scan.  He will likely need a biopsy of one of these nodules if possible.  If these are plasmacytoma, I might typically recommend radiation but there may be too many nodules to radiate at this point.  Further recommendations based on his PET scan and serum protein studies.  2.  IgM monoclonal gammopathy: Tiny 0.1 g M spike.  Repeat labs today to see if his M spike has increased.  A bone marrow aspiration and biopsy may be indicated as well.  3.  Macrocytic anemia: He has mild chronic kidney disease.  This could potentially be secondary to alcohol use as well.  4.  Thrombocytopenia.:  This is mild and we will continue to monitor this.  He is not thrombocytopenia today.  5.  Right renal mass which is chronic.  He is followed by urology.  6.  Claudication with peripheral vascular disease followed by Dr. Freeman.    7.  Low back pain with an MRI on 5/18/2017 showing degenerative changes at multiple levels    PLAN:  1.  PET scan  2.  Labs today including a CMP, serum protein electrophoresis, immunofixation, and serum free light chains  3.  I'll present his case at the lung conference in 2 weeks and see him back after that to make further plans regarding a potential biopsy if possible.

## 2017-06-02 LAB
ALBUMIN SERPL-MCNC: 4.2 G/DL (ref 2.9–4.4)
ALBUMIN/GLOB SERPL: 1.6 {RATIO} (ref 0.7–1.7)
ALPHA1 GLOB FLD ELPH-MCNC: 0.2 G/DL (ref 0–0.4)
ALPHA2 GLOB SERPL ELPH-MCNC: 0.7 G/DL (ref 0.4–1)
B-GLOBULIN SERPL ELPH-MCNC: 1 G/DL (ref 0.7–1.3)
GAMMA GLOB SERPL ELPH-MCNC: 0.8 G/DL (ref 0.4–1.8)
GLOBULIN SER CALC-MCNC: 2.7 G/DL (ref 2.2–3.9)
IGA SERPL-MCNC: 169 MG/DL (ref 61–437)
IGG SERPL-MCNC: 808 MG/DL (ref 700–1600)
IGM SERPL-MCNC: 329 MG/DL (ref 15–143)
INTERPRETATION SERPL IEP-IMP: ABNORMAL
KAPPA LC SERPL-MCNC: 33.31 MG/L (ref 3.3–19.4)
KAPPA LC/LAMBDA SER: 1.08 {RATIO} (ref 0.26–1.65)
LAMBDA LC FREE SERPL-MCNC: 30.74 MG/L (ref 5.71–26.3)
Lab: ABNORMAL
M-SPIKE: 0.2 G/DL
PROT SERPL-MCNC: 6.9 G/DL (ref 6–8.5)

## 2017-06-08 ENCOUNTER — HOSPITAL ENCOUNTER (OUTPATIENT)
Dept: PET IMAGING | Facility: HOSPITAL | Age: 72
Discharge: HOME | End: 2017-06-08
Attending: INTERNAL MEDICINE

## 2017-06-08 ENCOUNTER — HOSPITAL ENCOUNTER (OUTPATIENT)
Dept: PET IMAGING | Facility: HOSPITAL | Age: 72
Discharge: HOME | End: 2017-06-08
Attending: INTERNAL MEDICINE | Admitting: INTERNAL MEDICINE

## 2017-06-08 DIAGNOSIS — C90.30 PLASMACYTOMA (HCC): ICD-10-CM

## 2017-06-08 DIAGNOSIS — D47.2 MONOCLONAL GAMMOPATHY: ICD-10-CM

## 2017-06-08 DIAGNOSIS — R91.8 MULTIPLE LUNG NODULES ON CT: ICD-10-CM

## 2017-06-08 PROCEDURE — A9552 F18 FDG: HCPCS | Performed by: INTERNAL MEDICINE

## 2017-06-08 PROCEDURE — 0 FLUDEOXYGLUCOSE F18 SOLUTION: Performed by: INTERNAL MEDICINE

## 2017-06-08 PROCEDURE — 78815 PET IMAGE W/CT SKULL-THIGH: CPT

## 2017-06-08 RX ADMIN — FLUDEOXYGLUCOSE F18 1 DOSE: 300 INJECTION INTRAVENOUS at 07:29

## 2017-06-20 ENCOUNTER — APPOINTMENT (OUTPATIENT)
Dept: LAB | Facility: HOSPITAL | Age: 72
End: 2017-06-20

## 2017-06-20 ENCOUNTER — OFFICE VISIT (OUTPATIENT)
Dept: ONCOLOGY | Facility: CLINIC | Age: 72
End: 2017-06-20

## 2017-06-20 VITALS
OXYGEN SATURATION: 95 % | SYSTOLIC BLOOD PRESSURE: 112 MMHG | TEMPERATURE: 98.1 F | BODY MASS INDEX: 41.56 KG/M2 | DIASTOLIC BLOOD PRESSURE: 60 MMHG | HEART RATE: 80 BPM | RESPIRATION RATE: 18 BRPM | WEIGHT: 274.2 LBS | HEIGHT: 68 IN

## 2017-06-20 DIAGNOSIS — D47.2 MONOCLONAL GAMMOPATHY: ICD-10-CM

## 2017-06-20 DIAGNOSIS — R91.8 MULTIPLE LUNG NODULES ON CT: Primary | ICD-10-CM

## 2017-06-20 PROCEDURE — G0463 HOSPITAL OUTPT CLINIC VISIT: HCPCS | Performed by: INTERNAL MEDICINE

## 2017-06-20 PROCEDURE — 99214 OFFICE O/P EST MOD 30 MIN: CPT | Performed by: INTERNAL MEDICINE

## 2017-06-20 NOTE — PROGRESS NOTES
[facility] GROUP OUTPATIENT FOLLOW UP CLINIC VISIT    REASON FOR FOLLOW-UP:    1. Plasmacytoma involving the left upper lobe  2.  Macrocytic anemia  3.  Mild leukopenia  4.  Enlarging left pulmonary nodules    HISTORY OF PRESENT ILLNESS:  Rafael Gomez is a 72 y.o. male who returns today for follow up of the above issues to review his PET scan.  He continues to have some postnasal drip and congestion.  He is not really using any antihistamines regularly and is not using and nasal steroid regularly as well.  He denies any shortness of breath but does have a cough related to his postnasal drip.        HEMATOLOGIC HISTORY:  The patient had complained of some dyspnea on exertion. He has some left knee pain following a left knee replacement. He eventually was referred for further care to Dr. Aba Freeman with vascular surgery. He was found to have evidence of peripheral vascular disease and is scheduled for a procedure in her future. He was also found to have a complex irregular cystic mass measuring 5.7 cm in the right kidney. He has been referred to urology for this. There was a 12 mm nodule in the lingula. He was referred to Dr. Larry Hernandez for this.  He had a CT scan of the chest with contrast on 5/25/2016. This showed 3 pleural-based nodules at the periphery of the left upper lobe. One measured 15 mm and the other measured 16 mm. The other was 6 mm. There is also a noncalcified 6 mm nodule.  He had a wedge resection on 6/2/2016. Two samples were sent to pathology. One showed plasma cells with evidence for amyloid. Further testing at [facility] showed IgG kappa restriction. The other wedge resection showed tiny nonnecrotizing granulomas.    Serum protein electrophoresis showed a 0.1 g M spike with immunofixation showing IgM lambda monoclonality.  Serum free light chain ratio was normal.  Ferritin 226.  Iron profile normal.  Vitamin B12 greater than 2000.    Serum protein levels in March 2017  are very stable with 0.1 g M spike.  He will follow-up in one year.  He continues to follow-up with Dr. Hernandez for CT scans.    CT imaging on 5/23/2017 showed an increase in size of 2 lingular lesions now measuring 2.2 cm and 1.0 cm.  There is an 8 mm left lower lobe pulmonary nodule also slightly increased in size.  Several other tiny pulmonary nodules bilaterally which are stable.    His M spike increased to 0.2 g.    He had a PET scan on 6/8/2017 showing several hypermetabolic left lung pulmonary nodules with a 2.2 cm pleural-based left upper lobe nodule.    His case was presented at the multidisciplinary thoracic conference.  It was elected to follow him with serial CT scans with the biopsy if the lung nodules continued increase in size.  It was felt that it was too risky to attempt a biopsy at this point.    PAST MEDICAL, SURGICAL, FAMILY, AND SOCIAL HISTORIES were reviewed with the patient and in the electronic medical record, and were updated if indicated.    ALLERGIES:  No Known Allergies    MEDICATIONS:  The medication list has been reviewed with the patient by the medical assistant, and the list has been updated in the electronic medical record, which I reviewed.  Medication dosages and frequencies were confirmed to be accurate.    REVIEW OF SYSTEMS:  PAIN:  See Vital Signs below.  GENERAL:  See history of present illness  SKIN:  No rashes or non-healing lesions.  HEME/LYMPH:  No abnormal bleeding.  No palpable lymphadenopathy.  EYES:  No vision changes or diplopia.  ENT:  No sore throat or difficulty swallowing.  See history of present illness  RESPIRATORY:  See history of present illness  CARDIOVASCULAR:  No chest pain, palpitations, orthopnea, or dyspnea on exertion.  GASTROINTESTINAL:  No abdominal pain, nausea, vomiting, constipation, diarrhea, melena, or hematochezia.  GENITOURINARY:  No dysuria or hematuria.  MUSCULOSKELETAL:  Chronic back pain  NEUROLOGIC:  No dizziness, loss of consciousness, or  "seizures.  PSYCHIATRIC:  No depression, anxiety, or mood changes.    Vitals:    06/20/17 1505   BP: 112/60   Pulse: 80   Resp: 18   Temp: 98.1 °F (36.7 °C)   TempSrc: Oral   SpO2: 95%   Weight: 274 lb 3.2 oz (124 kg)   Height: 68.31\" (173.5 cm)   PainSc: 5  Comment: rt hip, rt leg, chest       PHYSICAL EXAMINATION:  GENERAL:  Obese.  Well-developed well-nourished male; awake, alert and oriented, in no acute distress.    SKIN:  Warm and dry, without rashes, purpura, or petechiae.  HEAD:  Normocephalic, atraumatic.  EYES:  Pupils equal, round and reactive to light.  Extraocular movements intact.  Conjunctivae normal.  EARS:  Hearing intact.  NOSE:  Septum midline.  No excoriations or nasal discharge.  MOUTH:  No stomatitis or ulcers.  Lips are normal.  THROAT:  Oropharynx without lesions or exudates.  NECK:  Supple with good range of motion; no thyromegaly or masses; no JVD or bruits.  LYMPHATICS:  No cervical, supraclavicular, axillary, or inguinal lymphadenopathy.  CHEST:  Lungs are clear to auscultation bilaterally.  No wheezes, rales, or rhonchi.  Tenderness over the left anterior chest.  HEART:  Regular rate; normal rhythm.  No murmurs, gallops or rubs.  ABDOMEN:  Soft, non-tender, non-distended.  Normal active bowel sounds.  No organomegaly.  EXTREMITIES:  No clubbing, cyanosis, or edema.  NEUROLOGICAL:  No focal neurologic deficits.    DIAGNOSTIC DATA:  Lab Results   Component Value Date    WBC 4.80 06/01/2017    HGB 12.9 (L) 06/01/2017    HCT 37.1 (L) 06/01/2017    .4 (H) 06/01/2017     06/01/2017     0.2 g M spike    PET scan images from 6/8/2017 were personally reviewed.  Hypermetabolic 2.27 m pleural-based left upper lobe lung nodule with several other smaller hypermetabolic areas as well.    ASSESSMENT:  This is a 72 y.o. male with:  1.  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  He had a wedge resection.  Dr. Hernandez has been following a " couple of other small pulmonary nodules on the left which has now increased in size.  His M spike back in March was stable.  His M spike continues to be stable.  The PET scan did not show anything we didn't expect from the CT scan.  The left upper lobe lung nodule is too risky to biopsy base of its location.  His case was presented at the multidisciplinary thoracic conference and recommendations included to follow him with serial CT scans with biopsy if it appears to be safer.  2.  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  We will continue to monitor.  3.  Macrocytic anemia: He has mild chronic kidney disease.  This could potentially be secondary to alcohol use as well.  4.  Thrombocytopenia.:  This is mild and we will continue to monitor this.  5.  Right renal mass which is chronic.  He is followed by urology.  6.  Claudication with peripheral vascular disease followed by Dr. Freeman.    7.  Low back pain with an MRI on 5/18/2017 showing degenerative changes at multiple levels  8.  Nasal congestion with postnasal drip and cough: I recommended daily antihistamines and a nasal steroid.    PLAN:  1.  I'll see him back in 3 months for follow-up with a CT scan of the chest with contrast and labs including serum protein studies done one week prior.

## 2017-09-11 ENCOUNTER — LAB (OUTPATIENT)
Dept: LAB | Facility: HOSPITAL | Age: 72
End: 2017-09-11

## 2017-09-11 ENCOUNTER — HOSPITAL ENCOUNTER (OUTPATIENT)
Dept: PET IMAGING | Facility: HOSPITAL | Age: 72
Discharge: HOME | End: 2017-09-11
Attending: INTERNAL MEDICINE | Admitting: INTERNAL MEDICINE

## 2017-09-11 DIAGNOSIS — R91.8 MULTIPLE LUNG NODULES ON CT: ICD-10-CM

## 2017-09-11 DIAGNOSIS — D47.2 MONOCLONAL GAMMOPATHY: ICD-10-CM

## 2017-09-11 LAB
ALBUMIN SERPL-MCNC: 4.1 G/DL (ref 3.5–5.2)
ALBUMIN/GLOB SERPL: 1.5 G/DL (ref 1.1–2.4)
ALP SERPL-CCNC: 66 U/L (ref 38–116)
ALT SERPL W P-5'-P-CCNC: 20 U/L (ref 0–41)
ANION GAP SERPL CALCULATED.3IONS-SCNC: 13.5 MMOL/L
AST SERPL-CCNC: 25 U/L (ref 0–40)
BASOPHILS # BLD AUTO: 0.03 10*3/MM3 (ref 0–0.1)
BASOPHILS NFR BLD AUTO: 0.6 % (ref 0–1.1)
BILIRUB SERPL-MCNC: 0.7 MG/DL (ref 0.1–1.2)
BUN BLD-MCNC: 25 MG/DL (ref 6–20)
BUN/CREAT SERPL: 17.9 (ref 7.3–30)
CALCIUM SPEC-SCNC: 9.5 MG/DL (ref 8.5–10.2)
CHLORIDE SERPL-SCNC: 101 MMOL/L (ref 98–107)
CO2 SERPL-SCNC: 26.5 MMOL/L (ref 22–29)
CREAT BLD-MCNC: 1.4 MG/DL (ref 0.7–1.3)
CREAT BLDA-MCNC: 1.5 MG/DL (ref 0.6–1.3)
DEPRECATED RDW RBC AUTO: 55.1 FL (ref 37–49)
EOSINOPHIL # BLD AUTO: 0.14 10*3/MM3 (ref 0–0.36)
EOSINOPHIL NFR BLD AUTO: 2.8 % (ref 1–5)
ERYTHROCYTE [DISTWIDTH] IN BLOOD BY AUTOMATED COUNT: 15 % (ref 11.7–14.5)
GFR SERPL CREATININE-BSD FRML MDRD: 50 ML/MIN/1.73
GLOBULIN UR ELPH-MCNC: 2.8 GM/DL (ref 1.8–3.5)
GLUCOSE BLD-MCNC: 71 MG/DL (ref 74–124)
HCT VFR BLD AUTO: 39.3 % (ref 40–49)
HGB BLD-MCNC: 12.7 G/DL (ref 13.5–16.5)
IMM GRANULOCYTES # BLD: 0.02 10*3/MM3 (ref 0–0.03)
IMM GRANULOCYTES NFR BLD: 0.4 % (ref 0–0.5)
LYMPHOCYTES # BLD AUTO: 0.89 10*3/MM3 (ref 1–3.5)
LYMPHOCYTES NFR BLD AUTO: 17.7 % (ref 20–49)
MCH RBC QN AUTO: 32.9 PG (ref 27–33)
MCHC RBC AUTO-ENTMCNC: 32.3 G/DL (ref 32–35)
MCV RBC AUTO: 101.8 FL (ref 83–97)
MONOCYTES # BLD AUTO: 0.49 10*3/MM3 (ref 0.25–0.8)
MONOCYTES NFR BLD AUTO: 9.8 % (ref 4–12)
NEUTROPHILS # BLD AUTO: 3.45 10*3/MM3 (ref 1.5–7)
NEUTROPHILS NFR BLD AUTO: 68.7 % (ref 39–75)
NRBC BLD MANUAL-RTO: 0 /100 WBC (ref 0–0)
PLATELET # BLD AUTO: 149 10*3/MM3 (ref 150–375)
PMV BLD AUTO: 11.1 FL (ref 8.9–12.1)
POTASSIUM BLD-SCNC: 4.7 MMOL/L (ref 3.5–4.7)
PROT SERPL-MCNC: 6.9 G/DL (ref 6.3–8)
RBC # BLD AUTO: 3.86 10*6/MM3 (ref 4.3–5.5)
SODIUM BLD-SCNC: 141 MMOL/L (ref 134–145)
WBC NRBC COR # BLD: 5.02 10*3/MM3 (ref 4–10)

## 2017-09-11 PROCEDURE — 36415 COLL VENOUS BLD VENIPUNCTURE: CPT | Performed by: INTERNAL MEDICINE

## 2017-09-11 PROCEDURE — 71260 CT THORAX DX C+: CPT

## 2017-09-11 PROCEDURE — 85025 COMPLETE CBC W/AUTO DIFF WBC: CPT | Performed by: INTERNAL MEDICINE

## 2017-09-11 PROCEDURE — 82565 ASSAY OF CREATININE: CPT

## 2017-09-11 PROCEDURE — 80053 COMPREHEN METABOLIC PANEL: CPT | Performed by: INTERNAL MEDICINE

## 2017-09-11 PROCEDURE — 0 IOPAMIDOL 61 % SOLUTION: Performed by: INTERNAL MEDICINE

## 2017-09-11 RX ADMIN — IOPAMIDOL 75 ML: 612 INJECTION, SOLUTION INTRAVENOUS at 08:46

## 2017-09-12 LAB
ALBUMIN SERPL-MCNC: 3.6 G/DL (ref 2.9–4.4)
ALBUMIN/GLOB SERPL: 1.2 {RATIO} (ref 0.7–1.7)
ALPHA1 GLOB FLD ELPH-MCNC: 0.2 G/DL (ref 0–0.4)
ALPHA2 GLOB SERPL ELPH-MCNC: 0.7 G/DL (ref 0.4–1)
B-GLOBULIN SERPL ELPH-MCNC: 0.9 G/DL (ref 0.7–1.3)
GAMMA GLOB SERPL ELPH-MCNC: 1 G/DL (ref 0.4–1.8)
GLOBULIN SER CALC-MCNC: 2.9 G/DL (ref 2.2–3.9)
IGA SERPL-MCNC: 181 MG/DL (ref 61–437)
IGG SERPL-MCNC: 818 MG/DL (ref 700–1600)
IGM SERPL-MCNC: 305 MG/DL (ref 15–143)
Lab: ABNORMAL
M-SPIKE: 0.1 G/DL
PROT PATTERN SERPL IFE-IMP: ABNORMAL
PROT SERPL-MCNC: 6.5 G/DL (ref 6–8.5)

## 2017-09-18 ENCOUNTER — OFFICE VISIT (OUTPATIENT)
Dept: ONCOLOGY | Facility: CLINIC | Age: 72
End: 2017-09-18

## 2017-09-18 ENCOUNTER — APPOINTMENT (OUTPATIENT)
Dept: LAB | Facility: HOSPITAL | Age: 72
End: 2017-09-18

## 2017-09-18 ENCOUNTER — OFFICE VISIT (OUTPATIENT)
Dept: SURGERY | Facility: CLINIC | Age: 72
End: 2017-09-18

## 2017-09-18 VITALS
DIASTOLIC BLOOD PRESSURE: 64 MMHG | SYSTOLIC BLOOD PRESSURE: 118 MMHG | WEIGHT: 273.4 LBS | RESPIRATION RATE: 18 BRPM | HEART RATE: 66 BPM | TEMPERATURE: 98.1 F | OXYGEN SATURATION: 94 % | HEIGHT: 68 IN | BODY MASS INDEX: 41.43 KG/M2

## 2017-09-18 VITALS
HEIGHT: 68 IN | WEIGHT: 274.4 LBS | SYSTOLIC BLOOD PRESSURE: 134 MMHG | HEART RATE: 77 BPM | OXYGEN SATURATION: 94 % | BODY MASS INDEX: 41.59 KG/M2 | DIASTOLIC BLOOD PRESSURE: 70 MMHG

## 2017-09-18 DIAGNOSIS — R91.1 LUNG NODULE, SOLITARY: ICD-10-CM

## 2017-09-18 DIAGNOSIS — R05.9 COUGH: ICD-10-CM

## 2017-09-18 DIAGNOSIS — D47.2 MONOCLONAL GAMMOPATHY: ICD-10-CM

## 2017-09-18 DIAGNOSIS — D47.2 MONOCLONAL GAMMOPATHY: Primary | ICD-10-CM

## 2017-09-18 DIAGNOSIS — R91.8 MULTIPLE LUNG NODULES ON CT: Primary | ICD-10-CM

## 2017-09-18 LAB — SPECIMEN STATUS: NORMAL

## 2017-09-18 PROCEDURE — 99213 OFFICE O/P EST LOW 20 MIN: CPT | Performed by: THORACIC SURGERY (CARDIOTHORACIC VASCULAR SURGERY)

## 2017-09-18 PROCEDURE — 99214 OFFICE O/P EST MOD 30 MIN: CPT | Performed by: INTERNAL MEDICINE

## 2017-09-18 PROCEDURE — G0463 HOSPITAL OUTPT CLINIC VISIT: HCPCS | Performed by: INTERNAL MEDICINE

## 2017-09-18 RX ORDER — ALBUTEROL SULFATE 90 UG/1
2 AEROSOL, METERED RESPIRATORY (INHALATION) EVERY 4 HOURS PRN
COMMUNITY

## 2017-09-18 NOTE — PROGRESS NOTES
Subjective   Patient ID: Rafael Gomez is a 72 y.o. male is here today for follow-up after his diagnosis of pulmonary plasmacytoma of the left lung..    History of Present Illness  Dear Colleague,  Rafael Gomez was seen in our office today for continued follow up and surveillance for left upper lobe pulmonary plasmacytoma and several smaller pulmonary nodules.  He continues to suffer from bouts of anxiety associated with shortness of breath.  His shortness of breath is made worse with physical activity such as climbing stairs or walking on an incline.  Activities of daily living are also associated with his shortness of breath.  He has a cough which is nonproductive usually worse in the morning and improves throughout the day.  It increases with taking a deep breath.  He reports no alleviating or aggravating factors associated with this.  He denies any complaints of fever, chills, hemoptysis, pleuritic chest pain, night sweats, hoarseness, or unintentional weight loss. Underlying medical conditions including hypertension, arthritis and coronary artery disease remain stable.  He has a small ulcer located in the anterior aspect of his right shin that has been present for 6 months.  He denies any recent bout or trauma to this area.  He has no other somatic complaints or alleviating or exacerbating factors aside from those mentioned above.    The following portions of the patient's history were reviewed and updated as appropriate: allergies, current medications, past family history, past medical history, past social history, past surgical history and problem list.  Review of Systems   Constitution: Negative.   HENT: Negative.    Eyes: Negative.    Cardiovascular: Negative.    Respiratory: Positive for cough and shortness of breath.    Endocrine: Negative.    Hematologic/Lymphatic: Negative.    Skin: Negative.    Musculoskeletal: Negative.    Gastrointestinal: Positive for dysphagia.        Pt states is Due to  anxiety    Genitourinary: Negative.    Neurological: Negative.    Psychiatric/Behavioral: Negative.      Patient Active Problem List   Diagnosis   • Lung nodule, solitary   • Cough   • Wheeze   • Multiple lung nodules on CT   • Follow-up examination, following other surgery   • Renal mass   • Plasmacytoma   • Macrocytic anemia   • Leukopenia   • Monoclonal gammopathy   • Thrombocytopenia     Past Medical History:   Diagnosis Date   • Acid reflux    • Anxiety    • Arthritis    • Coronary artery disease    • ED (erectile dysfunction)    • Gout    • History of PAT (paroxysmal atrial tachycardia)    • Hypertension    • Neuropathy     HANDS AND FEET   • PONV (postoperative nausea and vomiting)    • PTSD (post-traumatic stress disorder)    • PVD (peripheral vascular disease)    • Stroke      Past Surgical History:   Procedure Laterality Date   • APPENDECTOMY     • CAROTID ENDARTERECTOMY Bilateral    • CORONARY ARTERY BYPASS GRAFT     • REPLACEMENT TOTAL KNEE Left 09/08/2015   • THORACOSCOPY Left 6/2/2016    Procedure: LEFT VIDEO ASSISTED THORACOTOMY W/ LEFT UPPER LOBE WEDGE RESECTION X 2; EXPLORATORY BRONCHOSCOPY; PAIN PUMP PLACEMENT X 2;  Surgeon: Larry Hernandez MD;  Location: [facility] OR;  Service:    • TONSILLECTOMY     • TOTAL HIP ARTHROPLASTY Right      Family History   Problem Relation Age of Onset   • Heart disease Mother    • Multiple myeloma Mother    • Heart disease Father    • Prostate cancer Father    • Leukemia Brother      CLL     Social History     Social History   • Marital status:      Spouse name: Liliya   • Number of children: N/A   • Years of education: High school     Occupational History   • 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 1-2 tablets morning and afternoon, Disp: , Rfl:   •  albuterol (PROVENTIL HFA;VENTOLIN HFA) 108 (90 Base) MCG/ACT inhaler, Inhale 2 puffs Every 4 (Four) Hours As Needed for Wheezing., Disp: , Rfl:   •  allopurinol (ZYLOPRIM) 300 MG tablet, Take 300 mg by mouth daily., Disp: , Rfl:   •  amLODIPine (NORVASC) 2.5 MG tablet, Take 2.5 mg by mouth every morning., Disp: , Rfl:   •  aspirin 81 MG chewable tablet, Chew 81 mg 2 (two) times a day. HOLD PRIOR TO SURGERY, Disp: , Rfl:   •  atorvastatin (LIPITOR) 40 MG tablet, Take 40 mg by mouth daily., Disp: , Rfl:   •  cilostazol (PLETAL) 100 MG tablet, Take 100 mg by mouth 2 (two) times a day. 0.5 TAB DAILY, Disp: , Rfl:   •  citalopram (CeleXA) 20 MG tablet, Take 20 mg by mouth every evening., Disp: , Rfl:   •  gabapentin (NEURONTIN) 600 MG tablet, , Disp: , Rfl:   •  glucosamine sulfate 500 MG capsule capsule, Take 2 capsules by mouth daily., Disp: , Rfl:   •  hydrochlorothiazide (HYDRODIURIL) 25 MG tablet, Take 12.5 mg by mouth daily., Disp: , Rfl:   •  l-methylfolate-B6-B12 (METANX) 3-35-2 MG tablet tablet, Take 1 tablet by mouth 2 (two) times a day., Disp: , Rfl:   •  LORazepam (ATIVAN) 0.5 MG tablet, Take 0.5 mg by mouth as needed., Disp: , Rfl:   •  metoprolol tartrate (LOPRESSOR) 50 MG tablet, Take 50 mg by mouth 2 (two) times a day., Disp: , Rfl:   •  MICARDIS 80 MG tablet, , Disp: , Rfl:   •  MIRAPEX 0.25 MG tablet, , Disp: , Rfl:   •  Omega-3 Fatty Acids (FISH OIL) 1000 MG capsule capsule, Take 1,000 mg by mouth daily with breakfast. HOLD PRIOR TO SURGERY, Disp: , Rfl:   •  omeprazole (PriLOSEC) 20 MG capsule, Take 20 mg by mouth every morning., Disp: , Rfl:   •  SEREVENT DISKUS 50 MCG/DOSE diskus inhaler, ,  Disp: , Rfl:   •  sildenafil (VIAGRA) 100 MG tablet, Take 100 mg by mouth as needed for erectile dysfunction., Disp: , Rfl:   •  SPIRIVA RESPIMAT 2.5 MCG/ACT aerosol solution, , Disp: , Rfl:   •  temazepam (RESTORIL) 15 MG capsule, Take 15 mg by mouth at night as needed., Disp: , Rfl:   •  terazosin (HYTRIN) 2 MG capsule, Take 2 mg by mouth every evening. 2 TABS , Disp: , Rfl:   No Known Allergies     Objective   Vitals:    09/18/17 0902   BP: 134/70   Pulse: 77   SpO2: 94%     Physical Exam   Constitutional: He is oriented to person, place, and time. Vital signs are normal. He appears well-developed.   HENT:   Head: Normocephalic and atraumatic.   Eyes: EOM are normal. Pupils are equal, round, and reactive to light.   Neck: Normal range of motion. Neck supple.   Cardiovascular: Normal rate, regular rhythm, normal heart sounds and intact distal pulses.    No murmur heard.  Pulmonary/Chest: Effort normal. He has decreased breath sounds in the right upper field, the right middle field, the left upper field and the left middle field. He has no wheezes. He has no rhonchi. He has no rales. He exhibits no tenderness.   Abdominal: Soft. There is no tenderness.   Musculoskeletal: Normal range of motion. He exhibits no edema or tenderness.   Neurological: He is alert and oriented to person, place, and time. He has normal strength.   Skin: Skin is warm and dry. No rash noted. No cyanosis or erythema.   Small 8 mm ulcerated area of the anterior and medial right shin.   Psychiatric: He has a normal mood and affect. His behavior is normal.     Independent Review of Radiographic Studies:    CT CHEST WITH IV CONTRAST      HISTORY: IGG Kappa plasmacytoma with multiple lung nodules. Follow-up  exam.      TECHNIQUE:  Radiation dose reduction techniques were utilized, including  automated exposure control and exposure modulation based on body size.   CT chest with intravenous contrast.      COMPARISON: PET CT 06/08/2017, CT chest  without contrast 05/23/2017,  02/06/2017, 09/08/2016, 05/25/2016.      FINDINGS: Pleural-based anterior left upper lobe nodule measures 21 x 8  mm which is without change. A 10 mm left upper lobe nodule is without  change. An 8 mm and 7 mm left lower lobe nodules are without change.  There is also a 6 mm pleural-based posteromedial left lower lobe nodule  without change. A 5 mm nodule along the minor fissure is stable. Trace  pleural fluid layers dependently. No new nodules have developed. There  is a 14 mm right hilar lymph node which is mildly enlarged. Mediastinal  left david enlargement is evident and there is no axillary david  enlargement. Imaging through the upper abdomen is within normal limits.  There has been previous median sternotomy.      IMPRESSION:  Overall, stable findings compared to previous PET CT  06/08/2017. Pulmonary nodules are stable including a 21 mm pleural-based  nodule anterior left upper lobe that was previously FDG avid. Mildly  enlarged right hilar lymph node is without definite change when compared  to previous noncontrast exams. Trace pleural fluid layers dependently.  No new nodule or new abnormality. Continued surveillance recommended.      This report was finalized on 9/13/2017 10:27 AM by Dr. Amado Francis MD.      Assessment/Plan   Assessment:  Stable pulmonary nodules within the left lung as well as right hilar lymph nodes.  No new lung nodules, effusions or masses.  Plasmacytoma of the left lung.  COPD.    Plan:  Our plan is for continued surveillance with repeat CAT scan of the chest in 3 months.  He is currently being followed by the oncology and pulmonary services.  I have encouraged him to follow-up with his primary care doctor regarding the small ulcerated area of his right anterior medial shin.  I will update you after his next visit and make further recommendations if necessary.  Should you have any questions or concerns regarding your patient's care, please do not  hesitate to contact me.  Sincerely, Larry Hernandez M.D.  There are no diagnoses linked to this encounter.

## 2017-09-18 NOTE — PROGRESS NOTES
[facility] GROUP OUTPATIENT FOLLOW UP CLINIC VISIT    REASON FOR FOLLOW-UP:    1. Plasmacytoma involving the left upper lobe  2.  Macrocytic anemia  3.  Mild leukopenia  4.  Enlarging left pulmonary nodules    HISTORY OF PRESENT ILLNESS:  Rafael Gomez is a 72 y.o. male who returns today for follow up of the above issues to review CT and laboratory findings.    He is overall doing about the same.  He gained quite a bit of weight.  He he is not very active.  He does note dyspnea on exertion with minimal activity but the same time recently walked 3 miles with his great-grandchildren.  He feels a little bit unsteady on his feet.  He has a small skin ulcer on the distal right lower extremity.  He complains of some mild erythema on his neck that is not pruritic.      HEMATOLOGIC HISTORY:  The patient had complained of some dyspnea on exertion. He has some left knee pain following a left knee replacement. He eventually was referred for further care to Dr. Aba Freeman with vascular surgery. He was found to have evidence of peripheral vascular disease and is scheduled for a procedure in her future. He was also found to have a complex irregular cystic mass measuring 5.7 cm in the right kidney. He has been referred to urology for this. There was a 12 mm nodule in the lingula. He was referred to Dr. Larry Hernandez for this.  He had a CT scan of the chest with contrast on 5/25/2016. This showed 3 pleural-based nodules at the periphery of the left upper lobe. One measured 15 mm and the other measured 16 mm. The other was 6 mm. There is also a noncalcified 6 mm nodule.  He had a wedge resection on 6/2/2016. Two samples were sent to pathology. One showed plasma cells with evidence for amyloid. Further testing at [facility] showed IgG kappa restriction. The other wedge resection showed tiny nonnecrotizing granulomas.    Serum protein electrophoresis showed a 0.1 g M spike with immunofixation showing IgM lambda  monoclonality.  Serum free light chain ratio was normal.  Ferritin 226.  Iron profile normal.  Vitamin B12 greater than 2000.    Serum protein levels in March 2017 are very stable with 0.1 g M spike.  He will follow-up in one year.  He continues to follow-up with Dr. Hernandez for CT scans.    CT imaging on 5/23/2017 showed an increase in size of 2 lingular lesions now measuring 2.2 cm and 1.0 cm.  There is an 8 mm left lower lobe pulmonary nodule also slightly increased in size.  Several other tiny pulmonary nodules bilaterally which are stable.    His M spike increased to 0.2 g.    He had a PET scan on 6/8/2017 showing several hypermetabolic left lung pulmonary nodules with a 2.2 cm pleural-based left upper lobe nodule.    His case was presented at the multidisciplinary thoracic conference.  It was elected to follow him with serial CT scans with the biopsy if the lung nodules continued increase in size.  It was felt that it was too risky to attempt a biopsy at this point.    Repeat CT imaging on 9/11/2017 shows stable findings.  M spike 0.1 g.  Dr. Hernandez plans for repeat CT imaging in 3 months.  Repeat labs in 6 months.    PAST MEDICAL, SURGICAL, FAMILY, AND SOCIAL HISTORIES were reviewed with the patient and in the electronic medical record, and were updated if indicated.    ALLERGIES:  No Known Allergies    MEDICATIONS:  The medication list has been reviewed with the patient by the medical assistant, and the list has been updated in the electronic medical record, which I reviewed.  Medication dosages and frequencies were confirmed to be accurate.    REVIEW OF SYSTEMS:  PAIN:  See Vital Signs below.  GENERAL:  See history of present illness  SKIN:  See history of present illness  HEME/LYMPH:  No abnormal bleeding.  No palpable lymphadenopathy.  EYES:  No vision changes or diplopia.  ENT:  No sore throat or difficulty swallowing.  See history of present illness  RESPIRATORY:  See history of present  "illness  CARDIOVASCULAR:  No chest pain, palpitations, orthopnea, or dyspnea on exertion.  GASTROINTESTINAL:  No abdominal pain, nausea, vomiting, constipation, diarrhea, melena, or hematochezia.  GENITOURINARY:  No dysuria or hematuria.  MUSCULOSKELETAL:  Chronic back pain  NEUROLOGIC:  No dizziness, loss of consciousness, or seizures.  PSYCHIATRIC:  No depression, anxiety, or mood changes.    Vitals:    09/18/17 1007   BP: 118/64   Pulse: 66   Resp: 18   Temp: 98.1 °F (36.7 °C)   TempSrc: Oral   SpO2: 94%   Weight: 273 lb 6.4 oz (124 kg)   Height: 68.31\" (173.5 cm)   PainSc: 0-No pain       PHYSICAL EXAMINATION:  GENERAL:  Obese.  Well-developed well-nourished male; awake, alert and oriented, in no acute distress.    SKIN:  There is a small erythematous patch on his neck measuring about 2 cm.  There is small ulcer on the distal right lower extremity.  HEAD:  Normocephalic, atraumatic.  EYES:  Pupils equal, round and reactive to light.  Extraocular movements intact.  Conjunctivae normal.  EARS:  Hearing intact.  NOSE:  Septum midline.  No excoriations or nasal discharge.  MOUTH:  No stomatitis or ulcers.  Lips are normal.  THROAT:  Oropharynx without lesions or exudates.  NECK:  Supple with good range of motion; no thyromegaly or masses; no JVD or bruits.  LYMPHATICS:  No cervical, supraclavicular, axillary, or inguinal lymphadenopathy.  CHEST:  Lungs are clear to auscultation bilaterally.  No wheezes, rales, or rhonchi.  Tenderness over the left anterior chest.  HEART:  Regular rate; normal rhythm.  No murmurs, gallops or rubs.  ABDOMEN:  Soft, non-tender, non-distended.  Normal active bowel sounds.  No organomegaly.  EXTREMITIES:  No clubbing, cyanosis, or edema.  NEUROLOGICAL:  No focal neurologic deficits.    DIAGNOSTIC DATA:  Lab Results   Component Value Date    WBC 5.02 09/11/2017    HGB 12.7 (L) 09/11/2017    HCT 39.3 (L) 09/11/2017    .8 (H) 09/11/2017     (L) 09/11/2017     0.1 g M " spike    CT chest from 9/11/2017 images personally reviewed.  Stable findings.    ASSESSMENT:  This is a 72 y.o. male with:  1.  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  He had a wedge resection.  Dr. Hernandez has been following a couple of other small pulmonary nodules on the left which has now increased in size.  His M spike back in March was stable.  His M spike continues to be stable.  The PET scan did not show anything we didn't expect from the CT scan.  The left upper lobe lung nodule is too risky to biopsy base of its location.  His case was presented at the multidisciplinary thoracic conference and recommendations included to follow him with serial CT scans with biopsy if it appears to be safer.  CT imaging on 9/11/2017 is stable.  Dr. Hernandez plans to repeat imaging in 3 months.  2.  IgM monoclonal gammopathy: 0.1 g M spike at this point.  We will continue to monitor with plans for six-month follow-up at this point..  3.  Macrocytic anemia: He has mild chronic kidney disease.  This could potentially be secondary to alcohol use as well.  4.  Thrombocytopenia.:  This is mild and we will continue to monitor this.  5.  Right renal mass which is chronic.  He is followed by urology.  6.  Claudication with peripheral vascular disease followed by Dr. Freeman.    7.  Low back pain with an MRI on 5/18/2017 showing degenerative changes at multiple levels  8.  He will follow up with primary care regarding his skin changes    PLAN:  1.  I will see him back in 6 months for follow-up with serum protein studies done one week prior.  2.  He will see Dr. Hernandez in 3 months after CT imaging  3.  Follow-up with primary care regarding his skin changes and other symptoms/concerns

## 2017-09-19 LAB
KAPPA LC SERPL-MCNC: 28.5 MG/L (ref 3.3–19.4)
KAPPA LC/LAMBDA SER: 0.82 {RATIO} (ref 0.26–1.65)
LAMBDA LC FREE SERPL-MCNC: 34.6 MG/L (ref 5.7–26.3)

## 2017-12-18 ENCOUNTER — HOSPITAL ENCOUNTER (OUTPATIENT)
Dept: CT IMAGING | Facility: HOSPITAL | Age: 72
Discharge: HOME | End: 2017-12-18
Attending: THORACIC SURGERY (CARDIOTHORACIC VASCULAR SURGERY) | Admitting: THORACIC SURGERY (CARDIOTHORACIC VASCULAR SURGERY)

## 2017-12-18 DIAGNOSIS — R91.1 LUNG NODULE, SOLITARY: ICD-10-CM

## 2017-12-18 DIAGNOSIS — R05.9 COUGH: ICD-10-CM

## 2017-12-18 DIAGNOSIS — R91.8 MULTIPLE LUNG NODULES ON CT: ICD-10-CM

## 2017-12-18 DIAGNOSIS — D47.2 MONOCLONAL GAMMOPATHY: ICD-10-CM

## 2017-12-18 LAB — CREAT BLDA-MCNC: 1.8 MG/DL (ref 0.6–1.3)

## 2017-12-18 PROCEDURE — 82565 ASSAY OF CREATININE: CPT

## 2017-12-18 PROCEDURE — 0 IOPAMIDOL 61 % SOLUTION: Performed by: THORACIC SURGERY (CARDIOTHORACIC VASCULAR SURGERY)

## 2017-12-18 PROCEDURE — 71260 CT THORAX DX C+: CPT

## 2017-12-18 RX ADMIN — IOPAMIDOL 75 ML: 612 INJECTION, SOLUTION INTRAVENOUS at 11:43

## 2018-01-24 ENCOUNTER — OFFICE VISIT (OUTPATIENT)
Dept: SURGERY | Facility: CLINIC | Age: 73
End: 2018-01-24

## 2018-01-24 VITALS
HEART RATE: 75 BPM | WEIGHT: 265 LBS | OXYGEN SATURATION: 94 % | DIASTOLIC BLOOD PRESSURE: 76 MMHG | HEIGHT: 68 IN | BODY MASS INDEX: 40.16 KG/M2 | SYSTOLIC BLOOD PRESSURE: 131 MMHG

## 2018-01-24 DIAGNOSIS — R91.8 PULMONARY NODULES/LESIONS, MULTIPLE: Primary | ICD-10-CM

## 2018-01-24 PROCEDURE — 99213 OFFICE O/P EST LOW 20 MIN: CPT | Performed by: NURSE PRACTITIONER

## 2018-01-24 RX ORDER — GUAIFENESIN 600 MG/1
600 TABLET, EXTENDED RELEASE ORAL EVERY 12 HOURS SCHEDULED
Qty: 180 TABLET | Refills: 1 | Status: SHIPPED | OUTPATIENT
Start: 2018-01-24 | End: 2018-04-24

## 2018-01-24 NOTE — PROGRESS NOTES
Patient ID: Rafael Gomez is a 72 y.o. male is here today for follow-up.    Subjective     Chief Complaint   Patient presents with   • Follow-up     f/u with ct chest       History of Present Illness    Rafael Gomez presents to the office today for continued follow-up and surveillance concerning prior history of left upper lobe pulmonary plasmacytoma and several smaller pulmonary nodules. Since he was last seen, he complains of continued fatigue, chest congestion, shortness of breath.  His cough is nonproductive.  These symptoms have been chronic for some time.  He denies any complaints of fever, chills, hemoptysis, pleuritic chest pain, night sweats, hoarseness, or unintentional weight loss.      Patient Active Problem List   Diagnosis   • Lung nodule, solitary   • Cough   • Wheeze   • Multiple lung nodules on CT   • Follow-up examination, following other surgery   • Renal mass   • Plasmacytoma   • Macrocytic anemia   • Leukopenia   • Monoclonal gammopathy   • Thrombocytopenia     Past Medical History:   Diagnosis Date   • Acid reflux    • Anxiety    • Arthritis    • Coronary artery disease    • ED (erectile dysfunction)    • Gout    • History of PAT (paroxysmal atrial tachycardia)    • Hypertension    • Neuropathy     HANDS AND FEET   • PONV (postoperative nausea and vomiting)    • PTSD (post-traumatic stress disorder)    • PVD (peripheral vascular disease)    • Stroke      Past Surgical History:   Procedure Laterality Date   • APPENDECTOMY     • CAROTID ENDARTERECTOMY Bilateral    • CORONARY ARTERY BYPASS GRAFT     • REPLACEMENT TOTAL KNEE Left 09/08/2015   • THORACOSCOPY Left 6/2/2016    Procedure: LEFT VIDEO ASSISTED THORACOTOMY W/ LEFT UPPER LOBE WEDGE RESECTION X 2; EXPLORATORY BRONCHOSCOPY; PAIN PUMP PLACEMENT X 2;  Surgeon: Larry Hernandez MD;  Location: [facility];  Service:    • TONSILLECTOMY     • TOTAL HIP ARTHROPLASTY Right      Family History   Problem Relation Age of Onset   • Heart  disease Mother    • Multiple myeloma Mother    • Heart disease Father    • Prostate cancer Father    • Leukemia Brother      CLL     Social History     Social History   • Marital status:      Spouse name: Liliya   • Number of children: N/A   • Years of education: High school     Occupational History   • 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 1-2 tablets morning and afternoon, Disp: , Rfl:   •  albuterol (PROVENTIL HFA;VENTOLIN HFA) 108 (90 Base) MCG/ACT inhaler, Inhale 2 puffs Every 4 (Four) Hours As Needed for Wheezing., Disp: , Rfl:   •  allopurinol (ZYLOPRIM) 300 MG tablet, Take 300 mg by mouth daily., Disp: , Rfl:   •  amLODIPine (NORVASC) 2.5 MG tablet, Take 2.5 mg by mouth every morning., Disp: , Rfl:   •  aspirin 81 MG chewable tablet, Chew 81 mg 2 (two) times a day. HOLD PRIOR TO SURGERY, Disp: , Rfl:   •  atorvastatin (LIPITOR) 40 MG tablet, Take 40 mg by mouth daily., Disp: , Rfl:   •  cilostazol (PLETAL) 100 MG tablet, Take 100 mg by mouth 2 (two) times a day. 0.5 TAB DAILY, Disp: , Rfl:   •  citalopram (CeleXA) 20 MG tablet, Take 20 mg by mouth every evening., Disp: , Rfl:   •  gabapentin (NEURONTIN) 600 MG tablet, , Disp: , Rfl:   •  glucosamine sulfate 500 MG capsule capsule, Take 2 capsules by mouth daily., Disp: , Rfl:   •  hydrochlorothiazide (HYDRODIURIL) 25 MG tablet, Take 12.5 mg by mouth daily., Disp: , Rfl:   •  l-methylfolate-B6-B12 (METANX) 3-35-2 MG tablet tablet, Take 1  tablet by mouth 2 (two) times a day., Disp: , Rfl:   •  LORazepam (ATIVAN) 0.5 MG tablet, Take 0.5 mg by mouth as needed., Disp: , Rfl:   •  metoprolol tartrate (LOPRESSOR) 50 MG tablet, Take 50 mg by mouth 2 (two) times a day., Disp: , Rfl:   •  MICARDIS 80 MG tablet, , Disp: , Rfl:   •  MIRAPEX 0.25 MG tablet, , Disp: , Rfl:   •  Omega-3 Fatty Acids (FISH OIL) 1000 MG capsule capsule, Take 1,000 mg by mouth daily with breakfast. HOLD PRIOR TO SURGERY, Disp: , Rfl:   •  omeprazole (PriLOSEC) 20 MG capsule, Take 20 mg by mouth every morning., Disp: , Rfl:   •  SEREVENT DISKUS 50 MCG/DOSE diskus inhaler, , Disp: , Rfl:   •  sildenafil (VIAGRA) 100 MG tablet, Take 100 mg by mouth as needed for erectile dysfunction., Disp: , Rfl:   •  SPIRIVA RESPIMAT 2.5 MCG/ACT aerosol solution, , Disp: , Rfl:   •  temazepam (RESTORIL) 15 MG capsule, Take 15 mg by mouth at night as needed., Disp: , Rfl:   •  terazosin (HYTRIN) 2 MG capsule, Take 2 mg by mouth every evening. 2 TABS , Disp: , Rfl:   •  guaiFENesin (MUCINEX) 600 MG 12 hr tablet, Take 1 tablet by mouth Every 12 (Twelve) Hours for 90 days., Disp: 180 tablet, Rfl: 1  No Known Allergies      Review of Systems   Constitution: Positive for weakness and malaise/fatigue.   HENT: Negative.    Eyes: Negative.    Cardiovascular: Negative.    Respiratory: Positive for cough and shortness of breath.         Chest congestion   Endocrine: Negative.    Hematologic/Lymphatic: Negative.    Skin: Negative.    Musculoskeletal: Negative.    Gastrointestinal: Negative.    Genitourinary: Negative.    Psychiatric/Behavioral: Negative.        Vitals:    01/24/18 1328   BP: 131/76   Pulse: 75   SpO2: 94%       Objective     Physical Exam   Constitutional: He is oriented to person, place, and time. Vital signs are normal. He appears well-developed.   Obese, fatigued   HENT:   Head: Normocephalic and atraumatic.   Neck: Normal range of motion. Neck supple.   Cardiovascular: Normal rate, regular  rhythm, normal heart sounds and intact distal pulses.    No murmur heard.  Pulmonary/Chest: Effort normal. He has decreased breath sounds. He has no wheezes. He has no rhonchi (coarse rhonchi in upper lobes). He has no rales. He exhibits no tenderness.   Abdominal: Soft. There is no tenderness.   Musculoskeletal: Normal range of motion. He exhibits no edema or tenderness.   Neurological: He is alert and oriented to person, place, and time.   Skin: Skin is warm and dry. No rash noted. No cyanosis or erythema.   Psychiatric: He has a normal mood and affect. His behavior is normal.       Review of Radiographic Studies:    Study Result   CT CHEST WITH CONTRAST      CLINICAL INFORMATION: History of resected left upper lobe plasmacytoma,  follow-up pulmonary nodules.      COMPARISON:  09/11/2017 and PET/CT 06/08/2017.      FINDINGS: There are again demonstrated stable pulmonary parenchymal and  subpleural nodules, the largest of these is located along the periphery  of the left upper lobe measuring 2 .1 x 0.8 cm. No pleural effusion,  acute airspace disease or new pulmonary nodule has developed. There is  scarring demonstrated along a left apical suture line. Stable cardiac  enlargement.  No pericardial or esophageal abnormality. No mediastinal  or hilar mass/adenopathy. Caliber of the thoracic aorta is normal. There  are median sternotomy wires in position.      CONCLUSION: Stable CT scan of the chest similar to 09/11/2017. Unchanged  pulmonary, parenchymal and subpleural nodules with special attention to  the largest along the left upper lobe which remains 2.1 x 0.8 cm.      Radiation dose reduction techniques were utilized, including automated  exposure control and exposure modulation based on body size.      This report was finalized on 12/19/2017 3:36 PM by Dr. Fabricio Green MD.         Assessment/Plan   Diagnoses and all orders for this visit:    Pulmonary nodules/lesions, multiple  -     CT Chest With Contrast;  Future    Other orders  -     guaiFENesin (MUCINEX) 600 MG 12 hr tablet; Take 1 tablet by mouth Every 12 (Twelve) Hours for 90 days.      SUMMARY  Rafael Gomez has been doing well since he was last seen.  He denies any new complaints or concerns.  His CT scan of chest remains stable.  There is no evidence of new nodules, effusion, infiltrate.  The left upper lobe nodule remains the same size at 2.1 x 0.8 cm.  I have ordered Mucinex for him to take twice a day due to his increased chest congestion and nonproductive cough to see if that helps.  Instructed to follow-up with his PCP if his symptoms do not improve.  He is also being followed by Dr. Heredia with oncology due to his history of plasmacytoma.  We will continue surveillance concerning his lung nodules with a repeat CT scan of chest with contrast in 4 months and follow-up in the office.  In the meantime, instructed to contact us sooner for any problems or concerns.        Meg Ruiz, APRN  01/26/18  3:41 PM

## 2018-02-26 ENCOUNTER — LAB (OUTPATIENT)
Dept: LAB | Facility: HOSPITAL | Age: 73
End: 2018-02-26

## 2018-02-26 DIAGNOSIS — D47.2 MONOCLONAL GAMMOPATHY: ICD-10-CM

## 2018-02-26 LAB
ALBUMIN SERPL-MCNC: 4 G/DL (ref 3.5–5.2)
ALBUMIN/GLOB SERPL: 1.3 G/DL (ref 1.1–2.4)
ALP SERPL-CCNC: 69 U/L (ref 38–116)
ALT SERPL W P-5'-P-CCNC: 27 U/L (ref 0–41)
ANION GAP SERPL CALCULATED.3IONS-SCNC: 11.5 MMOL/L
AST SERPL-CCNC: 32 U/L (ref 0–40)
BASOPHILS # BLD AUTO: 0.05 10*3/MM3 (ref 0–0.1)
BASOPHILS NFR BLD AUTO: 0.9 % (ref 0–1.1)
BILIRUB SERPL-MCNC: 1 MG/DL (ref 0.1–1.2)
BUN BLD-MCNC: 19 MG/DL (ref 6–20)
BUN/CREAT SERPL: 13.9 (ref 7.3–30)
CALCIUM SPEC-SCNC: 9.4 MG/DL (ref 8.5–10.2)
CHLORIDE SERPL-SCNC: 99 MMOL/L (ref 98–107)
CO2 SERPL-SCNC: 25.5 MMOL/L (ref 22–29)
CREAT BLD-MCNC: 1.37 MG/DL (ref 0.7–1.3)
DEPRECATED RDW RBC AUTO: 62.9 FL (ref 37–49)
EOSINOPHIL # BLD AUTO: 0.17 10*3/MM3 (ref 0–0.36)
EOSINOPHIL NFR BLD AUTO: 3.1 % (ref 1–5)
ERYTHROCYTE [DISTWIDTH] IN BLOOD BY AUTOMATED COUNT: 17.5 % (ref 11.7–14.5)
GFR SERPL CREATININE-BSD FRML MDRD: 51 ML/MIN/1.73
GLOBULIN UR ELPH-MCNC: 3.1 GM/DL (ref 1.8–3.5)
GLUCOSE BLD-MCNC: 95 MG/DL (ref 74–124)
HCT VFR BLD AUTO: 40.1 % (ref 40–49)
HGB BLD-MCNC: 13.6 G/DL (ref 13.5–16.5)
IMM GRANULOCYTES # BLD: 0.03 10*3/MM3 (ref 0–0.03)
IMM GRANULOCYTES NFR BLD: 0.5 % (ref 0–0.5)
LYMPHOCYTES # BLD AUTO: 1.08 10*3/MM3 (ref 1–3.5)
LYMPHOCYTES NFR BLD AUTO: 19.5 % (ref 20–49)
MCH RBC QN AUTO: 33.3 PG (ref 27–33)
MCHC RBC AUTO-ENTMCNC: 33.9 G/DL (ref 32–35)
MCV RBC AUTO: 98 FL (ref 83–97)
MONOCYTES # BLD AUTO: 0.7 10*3/MM3 (ref 0.25–0.8)
MONOCYTES NFR BLD AUTO: 12.6 % (ref 4–12)
NEUTROPHILS # BLD AUTO: 3.52 10*3/MM3 (ref 1.5–7)
NEUTROPHILS NFR BLD AUTO: 63.4 % (ref 39–75)
NRBC BLD MANUAL-RTO: 0 /100 WBC (ref 0–0)
PLATELET # BLD AUTO: 144 10*3/MM3 (ref 150–375)
PMV BLD AUTO: 10.7 FL (ref 8.9–12.1)
POTASSIUM BLD-SCNC: 4.9 MMOL/L (ref 3.5–4.7)
PROT SERPL-MCNC: 7.1 G/DL (ref 6.3–8)
RBC # BLD AUTO: 4.09 10*6/MM3 (ref 4.3–5.5)
SODIUM BLD-SCNC: 136 MMOL/L (ref 134–145)
WBC NRBC COR # BLD: 5.55 10*3/MM3 (ref 4–10)

## 2018-02-26 PROCEDURE — 80053 COMPREHEN METABOLIC PANEL: CPT

## 2018-02-26 PROCEDURE — 36415 COLL VENOUS BLD VENIPUNCTURE: CPT | Performed by: INTERNAL MEDICINE

## 2018-02-26 PROCEDURE — 85025 COMPLETE CBC W/AUTO DIFF WBC: CPT | Performed by: INTERNAL MEDICINE

## 2018-02-27 LAB
ALBUMIN SERPL-MCNC: 3.7 G/DL (ref 2.9–4.4)
ALBUMIN/GLOB SERPL: 1.3 {RATIO} (ref 0.7–1.7)
ALPHA1 GLOB FLD ELPH-MCNC: 0.2 G/DL (ref 0–0.4)
ALPHA2 GLOB SERPL ELPH-MCNC: 0.7 G/DL (ref 0.4–1)
B-GLOBULIN SERPL ELPH-MCNC: 0.9 G/DL (ref 0.7–1.3)
GAMMA GLOB SERPL ELPH-MCNC: 1.1 G/DL (ref 0.4–1.8)
GLOBULIN SER CALC-MCNC: 2.9 G/DL (ref 2.2–3.9)
IGA SERPL-MCNC: 198 MG/DL (ref 61–437)
IGG SERPL-MCNC: 792 MG/DL (ref 700–1600)
IGM SERPL-MCNC: 325 MG/DL (ref 15–143)
INTERPRETATION SERPL IEP-IMP: ABNORMAL
KAPPA LC SERPL-MCNC: 28.4 MG/L (ref 3.3–19.4)
KAPPA LC/LAMBDA SER: 0.84 {RATIO} (ref 0.26–1.65)
LAMBDA LC FREE SERPL-MCNC: 33.7 MG/L (ref 5.7–26.3)
Lab: ABNORMAL
M-SPIKE: ABNORMAL G/DL
PROT SERPL-MCNC: 6.6 G/DL (ref 6–8.5)

## 2018-03-11 NOTE — PROGRESS NOTES
[facility] GROUP OUTPATIENT FOLLOW UP CLINIC VISIT    REASON FOR FOLLOW-UP:    1. Plasmacytoma involving the left upper lobe  2.  Macrocytic anemia  3.  Mild leukopenia  4.  Left lung pulmonary nodules, stable on recent imaging.    HISTORY OF PRESENT ILLNESS:  Rafael Gomez is a 72 y.o. male who returns today for follow up of the above issues.      He states that he was diagnosed with COPD and is being treated with inhalers and oxygen with some improvement in his symptoms.  Otherwise no new issues today.      HEMATOLOGIC HISTORY:  The patient had complained of some dyspnea on exertion. He has some left knee pain following a left knee replacement. He eventually was referred for further care to Dr. Aba Freeman with vascular surgery. He was found to have evidence of peripheral vascular disease and is scheduled for a procedure in her future. He was also found to have a complex irregular cystic mass measuring 5.7 cm in the right kidney. He has been referred to urology for this. There was a 12 mm nodule in the lingula. He was referred to Dr. Larry Hernandez for this.  He had a CT scan of the chest with contrast on 5/25/2016. This showed 3 pleural-based nodules at the periphery of the left upper lobe. One measured 15 mm and the other measured 16 mm. The other was 6 mm. There is also a noncalcified 6 mm nodule.  He had a wedge resection on 6/2/2016. Two samples were sent to pathology. One showed plasma cells with evidence for amyloid. Further testing at [facility] showed IgG kappa restriction. The other wedge resection showed tiny nonnecrotizing granulomas.    Serum protein electrophoresis showed a 0.1 g M spike with immunofixation showing IgM lambda monoclonality.  Serum free light chain ratio was normal.  Ferritin 226.  Iron profile normal.  Vitamin B12 greater than 2000.    Serum protein levels in March 2017 are very stable with 0.1 g M spike.  He will follow-up in one year.  He continues to follow-up  with Dr. Hernandez for CT scans.    CT imaging on 5/23/2017 showed an increase in size of 2 lingular lesions now measuring 2.2 cm and 1.0 cm.  There is an 8 mm left lower lobe pulmonary nodule also slightly increased in size.  Several other tiny pulmonary nodules bilaterally which are stable.    His M spike increased to 0.2 g.    He had a PET scan on 6/8/2017 showing several hypermetabolic left lung pulmonary nodules with a 2.2 cm pleural-based left upper lobe nodule.    His case was presented at the multidisciplinary thoracic conference.  It was elected to follow him with serial CT scans with the biopsy if the lung nodules continued increase in size.  It was felt that it was too risky to attempt a biopsy at this point.    Repeat CT imaging on 9/11/2017 shows stable findings.  M spike 0.1 g.  Dr. Hernandez plans for repeat CT imaging in 3 months.  Repeat labs in 6 months.    PAST MEDICAL, SURGICAL, FAMILY, AND SOCIAL HISTORIES were reviewed with the patient and in the electronic medical record, and were updated if indicated.    ALLERGIES:  No Known Allergies    MEDICATIONS:  The medication list has been reviewed with the patient by the medical assistant, and the list has been updated in the electronic medical record, which I reviewed.  Medication dosages and frequencies were confirmed to be accurate.    REVIEW OF SYSTEMS:  PAIN:  See Vital Signs below.  GENERAL:  See history of present illness  SKIN:  Did not complain of rash today  HEME/LYMPH:  No abnormal bleeding.  No palpable lymphadenopathy.  EYES:  No vision changes or diplopia.  ENT:  No sore throat or difficulty swallowing.  See history of present illness  RESPIRATORY:  See history of present illness  CARDIOVASCULAR:  No chest pain, palpitations, orthopnea, or dyspnea on exertion.  GASTROINTESTINAL:  No abdominal pain, nausea, vomiting, constipation, diarrhea, melena, or hematochezia.  GENITOURINARY:  No dysuria or hematuria.  MUSCULOSKELETAL:  Chronic back  "pain  NEUROLOGIC:  No dizziness, loss of consciousness, or seizures.  PSYCHIATRIC:  No depression, anxiety, or mood changes.    Vitals:    03/12/18 1009   BP: 118/62   Pulse: 66   Resp: 16   Temp: 98.3 °F (36.8 °C)   TempSrc: Oral   SpO2: 94%   Weight: 124 kg (272 lb 6.4 oz)   Height: 173.5 cm (68.31\")   PainSc: 5  Comment: CHEST PAIN AND COUGH       PHYSICAL EXAMINATION:  GENERAL:  Obese.  Well-developed well-nourished male; awake, alert and oriented, in no acute distress.    HEAD:  Normocephalic, atraumatic.  EARS:  Hearing intact.  NOSE:  Septum midline.  No excoriations or nasal discharge.  MOUTH:  No stomatitis or ulcers.  Lips are normal.  THROAT:  Oropharynx without lesions or exudates.  CHEST:  Lungs are clear to auscultation bilaterally.  No wheezes, rales, or rhonchi.  Tenderness over the left anterior chest.  HEART:  Regular rate; normal rhythm.  No murmurs, gallops or rubs.  EXTREMITIES:  No clubbing, cyanosis, or edema.  NEUROLOGICAL:  No focal neurologic deficits.    DIAGNOSTIC DATA:  Lab Results   Component Value Date    WBC 5.55 02/26/2018    HGB 13.6 02/26/2018    HCT 40.1 02/26/2018    MCV 98.0 (H) 02/26/2018     (L) 02/26/2018     Labs from 2/26/2018:  Immunofixation IgM lambda monoclonal protein with a monomer.  Serum protein electrophoresis with a 0.2 g IgM lambda M spike with a second conformal protein unable to be quantitated.  Serum free light chain K/L ratio normal at 0.4.    IgM 325, IgA 193, IgG 792    IMAGING:  CT images from 12/18/2017: Stable.  Unchanged pulmonary nodules.  Followed by thoracic surgery.    ASSESSMENT:  This is a 72 y.o. male with:  1.  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  He had a wedge resection.  Dr. Hernandez was  following a couple of other small pulmonary nodules on the left which initially increased in size.  His M spike continues to be stable.  The PET scan did not show anything we didn't expect from " the CT scan.  The left upper lobe lung nodule is too risky to biopsy base of its location.  His case was presented at the multidisciplinary thoracic conference and recommendations included to follow him with serial CT scans with biopsy if it appears to be safer.  CT imaging on 12/18/2017 is stable.  Thoracic surgery has ordered repeat CT imaging in 4 months.  2.  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  We will continue to monitor with plans for six-month follow-up..  3.  Macrocytic anemia: He has mild chronic kidney disease.  This could potentially be secondary to alcohol use as well.  Resolved and hgb normal.  MCV slightly elevated.    4.  Thrombocytopenia.:  This is mild and we will continue to monitor this.  5.  Right renal mass which is chronic.  He is followed by urology.  6.  Claudication with peripheral vascular disease followed by Dr. Freeman.    7.  Low back pain with an MRI on 5/18/2017 showing degenerative changes at multiple levels  8.  COPD: He follows with Dr. Poole now and is on oxygen and inhalers.    PLAN:  1.  I will see him back in 6 months for follow-up with serum protein studies done one week prior.  2.  He will continue to follow up with thoracic surgery

## 2018-03-12 ENCOUNTER — OFFICE VISIT (OUTPATIENT)
Dept: ONCOLOGY | Facility: CLINIC | Age: 73
End: 2018-03-12

## 2018-03-12 ENCOUNTER — APPOINTMENT (OUTPATIENT)
Dept: LAB | Facility: HOSPITAL | Age: 73
End: 2018-03-12

## 2018-03-12 VITALS
HEIGHT: 68 IN | DIASTOLIC BLOOD PRESSURE: 62 MMHG | RESPIRATION RATE: 16 BRPM | WEIGHT: 272.4 LBS | SYSTOLIC BLOOD PRESSURE: 118 MMHG | HEART RATE: 66 BPM | BODY MASS INDEX: 41.28 KG/M2 | TEMPERATURE: 98.3 F | OXYGEN SATURATION: 94 %

## 2018-03-12 DIAGNOSIS — C90.30 PLASMACYTOMA (HCC): Primary | ICD-10-CM

## 2018-03-12 PROCEDURE — 99213 OFFICE O/P EST LOW 20 MIN: CPT | Performed by: INTERNAL MEDICINE

## 2018-03-12 PROCEDURE — G0463 HOSPITAL OUTPT CLINIC VISIT: HCPCS | Performed by: INTERNAL MEDICINE

## 2018-03-12 RX ORDER — TIOTROPIUM BROMIDE AND OLODATEROL 3.124; 2.736 UG/1; UG/1
SPRAY, METERED RESPIRATORY (INHALATION)
Refills: 3 | COMMUNITY
Start: 2018-03-07 | End: 2021-02-08 | Stop reason: SDDI

## 2018-05-25 ENCOUNTER — HOSPITAL ENCOUNTER (OUTPATIENT)
Dept: CT IMAGING | Facility: HOSPITAL | Age: 73
Discharge: HOME | End: 2018-05-25
Admitting: NURSE PRACTITIONER

## 2018-05-25 DIAGNOSIS — R91.8 PULMONARY NODULES/LESIONS, MULTIPLE: ICD-10-CM

## 2018-05-25 LAB — CREAT BLDA-MCNC: 1.7 MG/DL (ref 0.6–1.3)

## 2018-05-25 PROCEDURE — 25010000002 IOPAMIDOL 61 % SOLUTION: Performed by: NURSE PRACTITIONER

## 2018-05-25 PROCEDURE — 71260 CT THORAX DX C+: CPT

## 2018-05-25 PROCEDURE — 82565 ASSAY OF CREATININE: CPT

## 2018-05-25 RX ADMIN — IOPAMIDOL 75 ML: 612 INJECTION, SOLUTION INTRAVENOUS at 13:09

## 2018-06-05 ENCOUNTER — OFFICE VISIT CONVERTED (OUTPATIENT)
Dept: CARDIOLOGY | Facility: CLINIC | Age: 73
End: 2018-06-05
Attending: NURSE PRACTITIONER

## 2018-06-06 ENCOUNTER — TRANSCRIBE ORDERS (OUTPATIENT)
Dept: ADMINISTRATIVE | Facility: HOSPITAL | Age: 73
End: 2018-06-06

## 2018-06-06 DIAGNOSIS — R91.8 LUNG NODULES: Primary | ICD-10-CM

## 2018-06-20 DIAGNOSIS — R91.1 LUNG NODULE: Primary | ICD-10-CM

## 2018-08-13 DIAGNOSIS — R06.02 SHORTNESS OF BREATH: Primary | ICD-10-CM

## 2018-08-20 ENCOUNTER — LAB (OUTPATIENT)
Dept: LAB | Facility: HOSPITAL | Age: 73
End: 2018-08-20

## 2018-08-20 DIAGNOSIS — C90.30 PLASMACYTOMA (HCC): ICD-10-CM

## 2018-08-20 LAB
ALBUMIN SERPL-MCNC: 4.2 G/DL (ref 3.5–5.2)
ALBUMIN/GLOB SERPL: 1.4 G/DL (ref 1.1–2.4)
ALP SERPL-CCNC: 73 U/L (ref 38–116)
ALT SERPL W P-5'-P-CCNC: 27 U/L (ref 0–41)
ANION GAP SERPL CALCULATED.3IONS-SCNC: 10.7 MMOL/L
AST SERPL-CCNC: 25 U/L (ref 0–40)
BASOPHILS # BLD AUTO: 0.04 10*3/MM3 (ref 0–0.1)
BASOPHILS NFR BLD AUTO: 0.6 % (ref 0–1.1)
BILIRUB SERPL-MCNC: 1.1 MG/DL (ref 0.1–1.2)
BUN BLD-MCNC: 18 MG/DL (ref 6–20)
BUN/CREAT SERPL: 11.5 (ref 7.3–30)
CALCIUM SPEC-SCNC: 9.9 MG/DL (ref 8.5–10.2)
CHLORIDE SERPL-SCNC: 99 MMOL/L (ref 98–107)
CO2 SERPL-SCNC: 27.3 MMOL/L (ref 22–29)
CREAT BLD-MCNC: 1.56 MG/DL (ref 0.7–1.3)
DEPRECATED RDW RBC AUTO: 55.2 FL (ref 37–49)
EOSINOPHIL # BLD AUTO: 0.19 10*3/MM3 (ref 0–0.36)
EOSINOPHIL NFR BLD AUTO: 2.6 % (ref 1–5)
ERYTHROCYTE [DISTWIDTH] IN BLOOD BY AUTOMATED COUNT: 14.9 % (ref 11.7–14.5)
GFR SERPL CREATININE-BSD FRML MDRD: 44 ML/MIN/1.73
GLOBULIN UR ELPH-MCNC: 3 GM/DL (ref 1.8–3.5)
GLUCOSE BLD-MCNC: 94 MG/DL (ref 74–124)
HCT VFR BLD AUTO: 43.6 % (ref 40–49)
HGB BLD-MCNC: 14.4 G/DL (ref 13.5–16.5)
IMM GRANULOCYTES # BLD: 0.05 10*3/MM3 (ref 0–0.03)
IMM GRANULOCYTES NFR BLD: 0.7 % (ref 0–0.5)
LYMPHOCYTES # BLD AUTO: 1.04 10*3/MM3 (ref 1–3.5)
LYMPHOCYTES NFR BLD AUTO: 14.3 % (ref 20–49)
MCH RBC QN AUTO: 33.1 PG (ref 27–33)
MCHC RBC AUTO-ENTMCNC: 33 G/DL (ref 32–35)
MCV RBC AUTO: 100.2 FL (ref 83–97)
MONOCYTES # BLD AUTO: 0.81 10*3/MM3 (ref 0.25–0.8)
MONOCYTES NFR BLD AUTO: 11.2 % (ref 4–12)
NEUTROPHILS # BLD AUTO: 5.12 10*3/MM3 (ref 1.5–7)
NEUTROPHILS NFR BLD AUTO: 70.6 % (ref 39–75)
NRBC BLD MANUAL-RTO: 0.3 /100 WBC (ref 0–0)
PLATELET # BLD AUTO: 141 10*3/MM3 (ref 150–375)
PMV BLD AUTO: 11 FL (ref 8.9–12.1)
POTASSIUM BLD-SCNC: 4.8 MMOL/L (ref 3.5–4.7)
PROT SERPL-MCNC: 7.2 G/DL (ref 6.3–8)
RBC # BLD AUTO: 4.35 10*6/MM3 (ref 4.3–5.5)
SODIUM BLD-SCNC: 137 MMOL/L (ref 134–145)
WBC NRBC COR # BLD: 7.25 10*3/MM3 (ref 4–10)

## 2018-08-20 PROCEDURE — 36415 COLL VENOUS BLD VENIPUNCTURE: CPT | Performed by: INTERNAL MEDICINE

## 2018-08-20 PROCEDURE — 80053 COMPREHEN METABOLIC PANEL: CPT | Performed by: INTERNAL MEDICINE

## 2018-08-20 PROCEDURE — 85025 COMPLETE CBC W/AUTO DIFF WBC: CPT | Performed by: INTERNAL MEDICINE

## 2018-08-21 LAB
ALBUMIN SERPL-MCNC: 3.6 G/DL (ref 2.9–4.4)
ALBUMIN/GLOB SERPL: 1.1 {RATIO} (ref 0.7–1.7)
ALPHA1 GLOB FLD ELPH-MCNC: 0.3 G/DL (ref 0–0.4)
ALPHA2 GLOB SERPL ELPH-MCNC: 0.8 G/DL (ref 0.4–1)
B-GLOBULIN SERPL ELPH-MCNC: 1.2 G/DL (ref 0.7–1.3)
GAMMA GLOB SERPL ELPH-MCNC: 1 G/DL (ref 0.4–1.8)
GLOBULIN SER CALC-MCNC: 3.3 G/DL (ref 2.2–3.9)
IGA SERPL-MCNC: 208 MG/DL (ref 61–437)
IGG SERPL-MCNC: 946 MG/DL (ref 700–1600)
IGM SERPL-MCNC: 348 MG/DL (ref 15–143)
INTERPRETATION SERPL IEP-IMP: ABNORMAL
KAPPA LC SERPL-MCNC: 29.2 MG/L (ref 3.3–19.4)
KAPPA LC/LAMBDA SER: 0.82 {RATIO} (ref 0.26–1.65)
LAMBDA LC FREE SERPL-MCNC: 35.7 MG/L (ref 5.7–26.3)
Lab: ABNORMAL
M-SPIKE: 0.2 G/DL
PROT SERPL-MCNC: 6.9 G/DL (ref 6–8.5)

## 2018-08-22 ENCOUNTER — HOSPITAL ENCOUNTER (OUTPATIENT)
Dept: GENERAL RADIOLOGY | Facility: HOSPITAL | Age: 73
Discharge: HOME | End: 2018-08-22
Attending: THORACIC SURGERY (CARDIOTHORACIC VASCULAR SURGERY)

## 2018-08-22 ENCOUNTER — HOSPITAL ENCOUNTER (OUTPATIENT)
Dept: CT IMAGING | Facility: HOSPITAL | Age: 73
Discharge: HOME | End: 2018-08-22
Attending: THORACIC SURGERY (CARDIOTHORACIC VASCULAR SURGERY) | Admitting: THORACIC SURGERY (CARDIOTHORACIC VASCULAR SURGERY)

## 2018-08-22 DIAGNOSIS — R91.1 LUNG NODULE: ICD-10-CM

## 2018-08-22 DIAGNOSIS — R06.02 SHORTNESS OF BREATH: ICD-10-CM

## 2018-08-22 PROCEDURE — 71046 X-RAY EXAM CHEST 2 VIEWS: CPT

## 2018-08-22 PROCEDURE — 71250 CT THORAX DX C-: CPT

## 2018-08-27 ENCOUNTER — OFFICE VISIT (OUTPATIENT)
Dept: SURGERY | Facility: CLINIC | Age: 73
End: 2018-08-27

## 2018-08-27 ENCOUNTER — APPOINTMENT (OUTPATIENT)
Dept: LAB | Facility: HOSPITAL | Age: 73
End: 2018-08-27

## 2018-08-27 ENCOUNTER — OFFICE VISIT (OUTPATIENT)
Dept: ONCOLOGY | Facility: CLINIC | Age: 73
End: 2018-08-27

## 2018-08-27 VITALS
OXYGEN SATURATION: 92 % | WEIGHT: 268.6 LBS | HEART RATE: 78 BPM | SYSTOLIC BLOOD PRESSURE: 140 MMHG | BODY MASS INDEX: 40.71 KG/M2 | DIASTOLIC BLOOD PRESSURE: 74 MMHG | RESPIRATION RATE: 20 BRPM | HEIGHT: 68 IN | TEMPERATURE: 98 F

## 2018-08-27 VITALS
OXYGEN SATURATION: 98 % | DIASTOLIC BLOOD PRESSURE: 66 MMHG | BODY MASS INDEX: 40.62 KG/M2 | WEIGHT: 268 LBS | HEIGHT: 68 IN | HEART RATE: 86 BPM | SYSTOLIC BLOOD PRESSURE: 137 MMHG

## 2018-08-27 DIAGNOSIS — C90.30 PLASMACYTOMA (HCC): ICD-10-CM

## 2018-08-27 DIAGNOSIS — R91.8 MULTIPLE LUNG NODULES ON CT: Primary | ICD-10-CM

## 2018-08-27 DIAGNOSIS — D47.2 MONOCLONAL GAMMOPATHY: Primary | ICD-10-CM

## 2018-08-27 PROCEDURE — 99214 OFFICE O/P EST MOD 30 MIN: CPT | Performed by: THORACIC SURGERY (CARDIOTHORACIC VASCULAR SURGERY)

## 2018-08-27 PROCEDURE — G0463 HOSPITAL OUTPT CLINIC VISIT: HCPCS | Performed by: INTERNAL MEDICINE

## 2018-08-27 PROCEDURE — 99214 OFFICE O/P EST MOD 30 MIN: CPT | Performed by: INTERNAL MEDICINE

## 2018-08-27 NOTE — PROGRESS NOTES
[facility] GROUP OUTPATIENT FOLLOW UP CLINIC VISIT    REASON FOR FOLLOW-UP:    1. Plasmacytoma involving the left upper lobe  2.  Macrocytic anemia  3.  Mild leukopenia  4.  Left lung pulmonary nodules, stable on recent imaging.    HISTORY OF PRESENT ILLNESS:  Rafael Gomez is a 73 y.o. male who returns today for follow up of the above issues.      He requires oxygen continuously for COPD.  He reports dyspnea on exertion and chronic fatigue.  He is using CBD oil for neuropathy with improvement.  He reports some soft tissue swelling in the left lateral chest wall and has been bothering him and he is going to see thoracic surgery for this this afternoon.      HEMATOLOGIC HISTORY:  The patient had complained of some dyspnea on exertion. He has some left knee pain following a left knee replacement. He eventually was referred for further care to Dr. Aba Freeman with vascular surgery. He was found to have evidence of peripheral vascular disease and is scheduled for a procedure in her future. He was also found to have a complex irregular cystic mass measuring 5.7 cm in the right kidney. He has been referred to urology for this. There was a 12 mm nodule in the lingula. He was referred to Dr. Larry Hernandez for this.  He had a CT scan of the chest with contrast on 5/25/2016. This showed 3 pleural-based nodules at the periphery of the left upper lobe. One measured 15 mm and the other measured 16 mm. The other was 6 mm. There is also a noncalcified 6 mm nodule.  He had a wedge resection on 6/2/2016. Two samples were sent to pathology. One showed plasma cells with evidence for amyloid. Further testing at [facility] showed IgG kappa restriction. The other wedge resection showed tiny nonnecrotizing granulomas.    Serum protein electrophoresis showed a 0.1 g M spike with immunofixation showing IgM lambda monoclonality.  Serum free light chain ratio was normal.  Ferritin 226.  Iron profile normal.  Vitamin B12  greater than 2000.    Serum protein levels in March 2017 are very stable with 0.1 g M spike.  He will follow-up in one year.  He continues to follow-up with Dr. Hernandez for CT scans.    CT imaging on 5/23/2017 showed an increase in size of 2 lingular lesions now measuring 2.2 cm and 1.0 cm.  There is an 8 mm left lower lobe pulmonary nodule also slightly increased in size.  Several other tiny pulmonary nodules bilaterally which are stable.    His M spike increased to 0.2 g.    He had a PET scan on 6/8/2017 showing several hypermetabolic left lung pulmonary nodules with a 2.2 cm pleural-based left upper lobe nodule.    His case was presented at the multidisciplinary thoracic conference.  It was elected to follow him with serial CT scans with the biopsy if the lung nodules continued increase in size.  It was felt that it was too risky to attempt a biopsy at this point.    Repeat CT imaging on 9/11/2017 shows stable findings.  M spike 0.1 g.      CT imaging on 8/20/2018 with a slight increase in size a couple of pulmonary nodules.  M spike is 0.2 g.  Follow-up with thoracic surgery for imaging and we will see him back in 6 months for follow-up with labs in 1 week prior.      PAST MEDICAL, SURGICAL, FAMILY, AND SOCIAL HISTORIES were reviewed with the patient and in the electronic medical record, and were updated if indicated.    ALLERGIES:  No Known Allergies    MEDICATIONS:  The medication list has been reviewed with the patient by the medical assistant, and the list has been updated in the electronic medical record, which I reviewed.  Medication dosages and frequencies were confirmed to be accurate.    REVIEW OF SYSTEMS:  PAIN:  See Vital Signs below.  GENERAL:  Chronic fatigue  SKIN:  Did not complain of rash today  HEME/LYMPH:  No abnormal bleeding.  No palpable lymphadenopathy.  EYES:  No vision changes or diplopia.  ENT:  No sore throat or difficulty swallowing.  See history of present illness  RESPIRATORY:  Chronic  "shortness of breath and dyspnea on exertion  CARDIOVASCULAR:  No chest pain, palpitations, orthopnea  GASTROINTESTINAL:  No abdominal pain, nausea, vomiting, constipation, diarrhea, melena, or hematochezia.  GENITOURINARY:  No dysuria or hematuria.  MUSCULOSKELETAL:  Chronic back pain  NEUROLOGIC:  No dizziness, loss of consciousness, or seizures.  PSYCHIATRIC:  No depression, anxiety, or mood changes.    Vitals:    08/27/18 0931   BP: 140/74   Pulse: 78   Resp: 20   Temp: 98 °F (36.7 °C)   TempSrc: Oral   SpO2: 92%   Weight: 122 kg (268 lb 9.6 oz)   Height: 173.5 cm (68.31\")   PainSc: 6  Comment: arthritis pain       PHYSICAL EXAMINATION:  GENERAL:  Obese.  Well-developed well-nourished male; awake, alert and oriented, in no acute distress.    HEAD:  Normocephalic, atraumatic.  EARS:  Hearing intact.  NOSE:  Septum midline.  No excoriations or nasal discharge.  MOUTH:  No stomatitis or ulcers.  Lips are normal.  THROAT:  Oropharynx without lesions or exudates.  CHEST:  Lungs are clear to auscultation bilaterally.  No wheezes, rales, or rhonchi.  There is some soft tissue swelling in the left lateral chest in the axillary line  HEART:  Regular rate; normal rhythm.  No murmurs, gallops or rubs.  EXTREMITIES:  No clubbing, cyanosis, or edema.  NEUROLOGICAL:  No focal neurologic deficits.    DIAGNOSTIC DATA:  Results for orders placed or performed in visit on 08/20/18   BUCK,PE and FLC, Serum   Result Value Ref Range    IgG 946 700 - 1600 mg/dL    IgA 208 61 - 437 mg/dL    IgM 348 (H) 15 - 143 mg/dL    Total Protein 6.9 6.0 - 8.5 g/dL    Albumin 3.6 2.9 - 4.4 g/dL    Alpha-1-Globulin 0.3 0.0 - 0.4 g/dL    Alpha-2-Globulin 0.8 0.4 - 1.0 g/dL    Beta Globulin 1.2 0.7 - 1.3 g/dL    Gamma Globulin 1.0 0.4 - 1.8 g/dL    M-Gonzalez 0.2 (H) Not Observed g/dL    Globulin 3.3 2.2 - 3.9 g/dL    A/G Ratio 1.1 0.7 - 1.7    Immunofixation Reflex, Serum Comment     Please note Comment     Free Light Chain, Kappa 29.2 (H) 3.3 - 19.4 mg/L "    Free Lambda Light Chains 35.7 (H) 5.7 - 26.3 mg/L    Kappa/Lambda Ratio 0.82 0.26 - 1.65   Comprehensive Metabolic Panel   Result Value Ref Range    Glucose 94 74 - 124 mg/dL    BUN 18 6 - 20 mg/dL    Creatinine 1.56 (H) 0.70 - 1.30 mg/dL    Sodium 137 134 - 145 mmol/L    Potassium 4.8 (H) 3.5 - 4.7 mmol/L    Chloride 99 98 - 107 mmol/L    CO2 27.3 22.0 - 29.0 mmol/L    Calcium 9.9 8.5 - 10.2 mg/dL    Total Protein 7.2 6.3 - 8.0 g/dL    Albumin 4.20 3.50 - 5.20 g/dL    ALT (SGPT) 27 0 - 41 U/L    AST (SGOT) 25 0 - 40 U/L    Alkaline Phosphatase 73 38 - 116 U/L    Total Bilirubin 1.1 0.1 - 1.2 mg/dL    eGFR Non African Amer 44 (L) >60 mL/min/1.73    Globulin 3.0 1.8 - 3.5 gm/dL    A/G Ratio 1.4 1.1 - 2.4 g/dL    BUN/Creatinine Ratio 11.5 7.3 - 30.0    Anion Gap 10.7 mmol/L   CBC Auto Differential   Result Value Ref Range    WBC 7.25 4.00 - 10.00 10*3/mm3    RBC 4.35 4.30 - 5.50 10*6/mm3    Hemoglobin 14.4 13.5 - 16.5 g/dL    Hematocrit 43.6 40.0 - 49.0 %    .2 (H) 83.0 - 97.0 fL    MCH 33.1 (H) 27.0 - 33.0 pg    MCHC 33.0 32.0 - 35.0 g/dL    RDW 14.9 (H) 11.7 - 14.5 %    RDW-SD 55.2 (H) 37.0 - 49.0 fl    MPV 11.0 8.9 - 12.1 fL    Platelets 141 (L) 150 - 375 10*3/mm3    Neutrophil % 70.6 39.0 - 75.0 %    Lymphocyte % 14.3 (L) 20.0 - 49.0 %    Monocyte % 11.2 4.0 - 12.0 %    Eosinophil % 2.6 1.0 - 5.0 %    Basophil % 0.6 0.0 - 1.1 %    Immature Grans % 0.7 (H) 0.0 - 0.5 %    Neutrophils, Absolute 5.12 1.50 - 7.00 10*3/mm3    Lymphocytes, Absolute 1.04 1.00 - 3.50 10*3/mm3    Monocytes, Absolute 0.81 (H) 0.25 - 0.80 10*3/mm3    Eosinophils, Absolute 0.19 0.00 - 0.36 10*3/mm3    Basophils, Absolute 0.04 0.00 - 0.10 10*3/mm3    Immature Grans, Absolute 0.05 (H) 0.00 - 0.03 10*3/mm3    nRBC 0.3 (H) 0.0 - 0.0 /100 WBC     Labs from 2/26/2018:  Immunofixation IgM lambda monoclonal protein with a monomer.  Serum protein electrophoresis with a 0.2 g IgM lambda M spike with a second conformal protein unable to be  quantitated.  Serum free light chain K/L ratio normal at 0.4.    IgM 325, IgA 193, IgG 792    Labs from 8/20/2018  IgG 946, IgA 208, IgM 348  0.2 g M spike  Serum free light chain K/L ratio normal at 0.82    IMAGING:  CT images from 12/18/2017: Stable.  Unchanged pulmonary nodules.  Followed by thoracic surgery.    CT images from 8/22/2018 personally reviewed:    Multiple bilateral noncalcified pulmonary nodules, more  numerous on the left than the right that are unchanged since the most  recent chest CT dated 5/25/2018, but demonstrate slight, but definite  increase in size when compared to more remote CTs dating back to  9/11/2017. No new pulmonary nodules are identified.      ASSESSMENT:  This is a 73 y.o. male with:  1.  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  He had a wedge resection.  Dr. Hernandez was  following a couple of other small pulmonary nodules on the left which initially increased in size.  His M spike continues to be stable.  The PET scan did not show anything we didn't expect from the CT scan.  The left upper lobe lung nodule is too risky to biopsy base of its location.  His case was presented at the multidisciplinary thoracic conference and recommendations included to follow him with serial CT scans with biopsy if it appears to be safer.  CT imaging on 12/18/2017 was stable.  CT imaging on 5/25/2018 showed that one of the pleural based nodules and enlarged slightly measuring 2.3 cm.  CT imaging from 8/22/2018 shows some slight increase in size of the pulmonary nodules with the largest measuring now 2.0 cm 3 other tiny noncalcified nodules in the right lung are stable, only showing a slight increase since 2017.  I did reiterate today that I think most of his respiratory symptoms are secondary to his COPD and not from the small pulmonary nodules.  If any do grow significantly in size, we could consider radiation.  He would likely not be a  surgical candidate considering his oxygen dependence.  2.  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  We will continue to monitor with plans for six-month follow-up.  3.  Macrocytic anemia: He has mild chronic kidney disease.  This could potentially be secondary to alcohol use as well.  Resolved and hgb normal.  MCV slightly elevated.    4.  Thrombocytopenia.:  This is mild and we will continue to monitor this.  5.  Right renal mass which is chronic.  He is followed by urology.  6.  Claudication with peripheral vascular disease followed by Dr. Freeman.    7.  Low back pain with an MRI on 5/18/2017 showing degenerative changes at multiple levels  8.  COPD: He follows with Dr. Poole now and is on oxygen and inhalers.    PLAN:  1.  I will see him back in 6 months for follow-up with serum protein studies done one week prior.  2.  He will continue to follow up with thoracic surgery for imaging and for the soft tissue swelling in the left lateral chest, and he will see Dr. Peña for the first time later today.

## 2018-08-27 NOTE — PROGRESS NOTES
Subjective   Patient ID: Rafael Gomez is a 73 y.o. male is here today for follow-up.    History of Present Illness  Dear Colleague,  Rafael Gomez was seen in our office today for continued follow up and surveillance  postoperative visit after surgery for p.  He denies any complaints of fever, chills, cough, hemoptysis, pleuritic chest pain, shortness of air, dyspnea with exertion, night sweats, hoarseness, or unintentional weight loss. Underlying medical conditions including COPD remain stable.  He has no other somatic complaints or alleviating or exacerbating factors aside from those mentioned above.    The following portions of the patient's history were reviewed and updated as appropriate: allergies, current medications, past family history, past medical history, past social history, past surgical history and problem list.  Review of Systems   Constitution: Positive for malaise/fatigue.   HENT: Negative.    Eyes: Negative.    Cardiovascular: Negative.    Respiratory: Positive for cough, shortness of breath and wheezing.    Endocrine: Negative.    Hematologic/Lymphatic: Negative.    Skin: Negative.    Musculoskeletal: Negative.    Gastrointestinal: Negative.    Genitourinary: Negative.    Neurological: Positive for excessive daytime sleepiness.   Psychiatric/Behavioral: Negative.    Allergic/Immunologic: Negative.      Patient Active Problem List   Diagnosis   • Lung nodule, solitary   • Cough   • Wheeze   • Multiple lung nodules on CT   • Follow-up examination, following other surgery   • Renal mass   • Plasmacytoma (CMS/HCC)   • Macrocytic anemia   • Leukopenia   • Monoclonal gammopathy   • Thrombocytopenia (CMS/HCC)     Past Medical History:   Diagnosis Date   • Acid reflux    • Anxiety    • Arthritis    • Coronary artery disease    • ED (erectile dysfunction)    • Gout    • History of PAT (paroxysmal atrial tachycardia)    • Hypertension    • Neuropathy     HANDS AND FEET   • PONV (postoperative nausea  and vomiting)    • PTSD (post-traumatic stress disorder)    • PVD (peripheral vascular disease) (CMS/HCC)    • Stroke (CMS/HCC)      Past Surgical History:   Procedure Laterality Date   • APPENDECTOMY     • CAROTID ENDARTERECTOMY Bilateral    • CORONARY ARTERY BYPASS GRAFT     • REPLACEMENT TOTAL KNEE Left 09/08/2015   • THORACOSCOPY Left 6/2/2016    Procedure: LEFT VIDEO ASSISTED THORACOTOMY W/ LEFT UPPER LOBE WEDGE RESECTION X 2; EXPLORATORY BRONCHOSCOPY; PAIN PUMP PLACEMENT X 2;  Surgeon: Larry Hernandez MD;  Location: [facility] MAIN OR;  Service:    • TONSILLECTOMY     • TOTAL HIP ARTHROPLASTY Right      Family History   Problem Relation Age of Onset   • Heart disease Mother    • Multiple myeloma Mother    • Heart disease Father    • Prostate cancer Father    • Leukemia Brother         CLL     Social History     Social History   • Marital status:      Spouse name: Liliya   • Number of children: N/A   • Years of education: High school     Occupational History   • [employer]     Social History Main Topics   • Smoking status: Former Smoker     Packs/day: 1.00     Years: 30.00     Types: Cigarettes     Quit date: 1997   • Smokeless tobacco: Current User     Types: Chew      Comment: ONE HALF CAN DAILY    • Alcohol use 18.0 oz/week     30 Cans of beer per week   • Drug use: No   • Sexual activity: Defer     Other Topics Concern   • Not on file     Social History Narrative   • No narrative on file       Current Outpatient Prescriptions:   •  acetaminophen (TYLENOL) 325 MG tablet, Take 650 mg by mouth 2 (two) times a day. 1-2 tablets morning and afternoon, Disp: , Rfl:   •  albuterol (PROVENTIL HFA;VENTOLIN HFA) 108 (90 Base) MCG/ACT inhaler, Inhale 2 puffs Every 4 (Four) Hours As Needed for Wheezing., Disp: , Rfl:   •  allopurinol (ZYLOPRIM) 300 MG tablet, Take 300 mg by mouth daily., Disp: , Rfl:   •  amLODIPine (NORVASC) 2.5 MG tablet, Take 2.5 mg by mouth every morning., Disp: , Rfl:   •   aspirin 81 MG chewable tablet, Chew 81 mg 2 (two) times a day. HOLD PRIOR TO SURGERY, Disp: , Rfl:   •  atorvastatin (LIPITOR) 40 MG tablet, Take 40 mg by mouth daily., Disp: , Rfl:   •  CBD oil (cannabidiol) capsule, Take  by mouth., Disp: , Rfl:   •  cilostazol (PLETAL) 100 MG tablet, Take 100 mg by mouth As Needed. 0.5 TAB DAILY, Disp: , Rfl:   •  citalopram (CeleXA) 20 MG tablet, Take 20 mg by mouth every evening., Disp: , Rfl:   •  glucosamine sulfate 500 MG capsule capsule, Take 2 capsules by mouth daily., Disp: , Rfl:   •  hydrochlorothiazide (HYDRODIURIL) 25 MG tablet, Take 12.5 mg by mouth daily., Disp: , Rfl:   •  l-methylfolate-B6-B12 (METANX) 3-35-2 MG tablet tablet, Take 1 tablet by mouth 2 (two) times a day., Disp: , Rfl:   •  LORazepam (ATIVAN) 0.5 MG tablet, Take 0.5 mg by mouth as needed., Disp: , Rfl:   •  metoprolol tartrate (LOPRESSOR) 50 MG tablet, Take 50 mg by mouth 2 (two) times a day., Disp: , Rfl:   •  MICARDIS 80 MG tablet, , Disp: , Rfl:   •  MIRAPEX 0.25 MG tablet, , Disp: , Rfl:   •  Omega-3 Fatty Acids (FISH OIL) 1000 MG capsule capsule, Take 1,000 mg by mouth daily with breakfast. HOLD PRIOR TO SURGERY, Disp: , Rfl:   •  omeprazole (PriLOSEC) 20 MG capsule, Take 20 mg by mouth every morning., Disp: , Rfl:   •  SEREVENT DISKUS 50 MCG/DOSE diskus inhaler, , Disp: , Rfl:   •  sildenafil (VIAGRA) 100 MG tablet, Take 100 mg by mouth as needed for erectile dysfunction., Disp: , Rfl:   •  SPIRIVA RESPIMAT 2.5 MCG/ACT aerosol solution, , Disp: , Rfl:   •  STIOLTO RESPIMAT 2.5-2.5 MCG/ACT aerosol solution inhaler, INHALE TWO PUFFS BY MOUTH EVERY DAY, Disp: , Rfl: 3  •  temazepam (RESTORIL) 15 MG capsule, Take 15 mg by mouth at night as needed., Disp: , Rfl:   •  terazosin (HYTRIN) 2 MG capsule, Take 2 mg by mouth every evening. 2 TABS , Disp: , Rfl:   No Known Allergies     Objective   Vitals:    08/27/18 1339   BP: 137/66   Pulse: 86   SpO2: 98%     Physical Exam   Constitutional: He is  oriented to person, place, and time. He appears well-developed and well-nourished.   HENT:   Head: Normocephalic and atraumatic.   Nose: Nose normal.   Mouth/Throat: Oropharynx is clear and moist.   Eyes: Pupils are equal, round, and reactive to light. Conjunctivae and EOM are normal.   Neck: Normal range of motion. Neck supple.   Cardiovascular: Normal rate, regular rhythm, normal heart sounds and intact distal pulses.    Pulmonary/Chest: Effort normal and breath sounds normal.   Left axilla incision, well healed, with herniation of the lung with coughing.   Abdominal: Soft. Bowel sounds are normal.   Musculoskeletal: Normal range of motion.   Neurological: He is alert and oriented to person, place, and time.   Skin: Skin is warm and dry. Capillary refill takes less than 2 seconds.   Psychiatric: He has a normal mood and affect. His behavior is normal. Judgment and thought content normal.   Nursing note and vitals reviewed.    Independent Review of Radiographic Studies:    I have indepently reviewed the images from the CT 8/22/18 there are multiple nodules, the largest is 2cm and none of these have significantly grown in size.    Assessment/Plan   Mr. Gomez is a very pleasant 72 yo gentleman with plasmacytoma and pulmonary nodules that are stable.  He also is concerned about his chest wall hernia post op.  There is nothing to do at this point regarding the hernia and it should not cause him any trouble.     We will plan to see him in 6 months for continued nodule surveillance.   He also would like to be seen at the [facility] and is trying to get into their clinic for plasmacytoma and agent orange exposure, if he is unable to do so within the next few weeks, we will make a referral to the pulmonary clinic at the [facility].     I have also counseled Mr. Gomez on weight loss and offered him pulmonary rehab referral for his worsening shortness of breath.    Diagnoses and all orders for this visit:    Multiple lung  nodules on CT    Plasmacytoma (CMS/HCC)  -     CT Chest Without Contrast; Future  -     Ambulatory Referral to Pulmonary Rehab

## 2018-08-29 ENCOUNTER — TRANSCRIBE ORDERS (OUTPATIENT)
Dept: ADMINISTRATIVE | Facility: HOSPITAL | Age: 73
End: 2018-08-29

## 2018-09-04 DIAGNOSIS — C90.30 PLASMACYTOMA (HCC): Primary | ICD-10-CM

## 2018-09-06 ENCOUNTER — HOSPITAL ENCOUNTER (OUTPATIENT)
Dept: RESPIRATORY THERAPY | Facility: HOSPITAL | Age: 73
Discharge: HOME | End: 2018-09-06
Attending: THORACIC SURGERY (CARDIOTHORACIC VASCULAR SURGERY) | Admitting: THORACIC SURGERY (CARDIOTHORACIC VASCULAR SURGERY)

## 2018-09-06 DIAGNOSIS — C90.30 PLASMACYTOMA (HCC): ICD-10-CM

## 2018-09-06 LAB
ARTERIAL PATENCY WRIST A: POSITIVE
ATMOSPHERIC PRESS: 756 MMHG
BASE EXCESS BLDA CALC-SCNC: 1.5 MMOL/L (ref 0–2)
BDY SITE: ABNORMAL
GAS FLOW AIRWAY: 2 LPM
HCO3 BLDA-SCNC: 26.2 MMOL/L (ref 22–28)
MODALITY: ABNORMAL
PCO2 BLDA: 40.6 MM HG (ref 35–45)
PH BLDA: 7.42 PH UNITS (ref 7.35–7.45)
PO2 BLDA: 59 MM HG (ref 80–100)
SAO2 % BLDCOA: 90.5 % (ref 92–99)
SET MECH RESP RATE: 20

## 2018-09-06 PROCEDURE — 94726 PLETHYSMOGRAPHY LUNG VOLUMES: CPT

## 2018-09-06 PROCEDURE — 94729 DIFFUSING CAPACITY: CPT

## 2018-09-06 PROCEDURE — 36600 WITHDRAWAL OF ARTERIAL BLOOD: CPT

## 2018-09-06 PROCEDURE — 82803 BLOOD GASES ANY COMBINATION: CPT

## 2018-09-06 PROCEDURE — 94060 EVALUATION OF WHEEZING: CPT

## 2018-09-06 RX ORDER — ALBUTEROL SULFATE 2.5 MG/3ML
2.5 SOLUTION RESPIRATORY (INHALATION) ONCE
Status: COMPLETED | OUTPATIENT
Start: 2018-09-06 | End: 2018-09-06

## 2018-09-06 RX ADMIN — ALBUTEROL SULFATE 2.5 MG: 2.5 SOLUTION RESPIRATORY (INHALATION) at 16:18

## 2019-01-14 ENCOUNTER — HOSPITAL ENCOUNTER (OUTPATIENT)
Dept: URGENT CARE | Facility: CLINIC | Age: 74
Discharge: HOME | End: 2019-01-14

## 2019-01-16 ENCOUNTER — HOSPITAL ENCOUNTER (OUTPATIENT)
Dept: PULMONOLOGY | Facility: CLINIC | Age: 74
Setting detail: RECURRING SERIES
Discharge: HOME | End: 2019-04-16
Attending: NURSE PRACTITIONER

## 2019-02-04 ENCOUNTER — LAB (OUTPATIENT)
Dept: LAB | Facility: HOSPITAL | Age: 74
End: 2019-02-04

## 2019-02-04 DIAGNOSIS — D47.2 MONOCLONAL GAMMOPATHY: ICD-10-CM

## 2019-02-04 DIAGNOSIS — C90.30 PLASMACYTOMA (HCC): ICD-10-CM

## 2019-02-04 LAB
ALBUMIN SERPL-MCNC: 3.9 G/DL (ref 3.5–5.2)
ALBUMIN/GLOB SERPL: 1.5 G/DL (ref 1.1–2.4)
ALP SERPL-CCNC: 68 U/L (ref 38–116)
ALT SERPL W P-5'-P-CCNC: 26 U/L (ref 0–41)
ANION GAP SERPL CALCULATED.3IONS-SCNC: 11 MMOL/L
AST SERPL-CCNC: 27 U/L (ref 0–40)
BASOPHILS # BLD AUTO: 0.04 10*3/MM3 (ref 0–0.1)
BASOPHILS NFR BLD AUTO: 0.8 % (ref 0–1.1)
BILIRUB SERPL-MCNC: 0.9 MG/DL (ref 0.1–1.2)
BUN BLD-MCNC: 29 MG/DL (ref 6–20)
BUN/CREAT SERPL: 18.7 (ref 7.3–30)
CALCIUM SPEC-SCNC: 9.4 MG/DL (ref 8.5–10.2)
CHLORIDE SERPL-SCNC: 102 MMOL/L (ref 98–107)
CO2 SERPL-SCNC: 23 MMOL/L (ref 22–29)
CREAT BLD-MCNC: 1.55 MG/DL (ref 0.7–1.3)
DEPRECATED RDW RBC AUTO: 57.5 FL (ref 37–49)
EOSINOPHIL # BLD AUTO: 0.08 10*3/MM3 (ref 0–0.36)
EOSINOPHIL NFR BLD AUTO: 1.5 % (ref 1–5)
ERYTHROCYTE [DISTWIDTH] IN BLOOD BY AUTOMATED COUNT: 15.6 % (ref 11.7–14.5)
GFR SERPL CREATININE-BSD FRML MDRD: 44 ML/MIN/1.73
GLOBULIN UR ELPH-MCNC: 2.6 GM/DL (ref 1.8–3.5)
GLUCOSE BLD-MCNC: 125 MG/DL (ref 74–124)
HCT VFR BLD AUTO: 40.5 % (ref 40–49)
HGB BLD-MCNC: 14 G/DL (ref 13.5–16.5)
IMM GRANULOCYTES # BLD AUTO: 0.03 10*3/MM3 (ref 0–0.03)
IMM GRANULOCYTES NFR BLD AUTO: 0.6 % (ref 0–0.5)
LYMPHOCYTES # BLD AUTO: 0.66 10*3/MM3 (ref 1–3.5)
LYMPHOCYTES NFR BLD AUTO: 12.5 % (ref 20–49)
MCH RBC QN AUTO: 35.3 PG (ref 27–33)
MCHC RBC AUTO-ENTMCNC: 34.6 G/DL (ref 32–35)
MCV RBC AUTO: 102 FL (ref 83–97)
MONOCYTES # BLD AUTO: 0.57 10*3/MM3 (ref 0.25–0.8)
MONOCYTES NFR BLD AUTO: 10.8 % (ref 4–12)
NEUTROPHILS # BLD AUTO: 3.9 10*3/MM3 (ref 1.5–7)
NEUTROPHILS NFR BLD AUTO: 73.8 % (ref 39–75)
NRBC BLD AUTO-RTO: 0 /100 WBC (ref 0–0)
PLATELET # BLD AUTO: 107 10*3/MM3 (ref 150–375)
PMV BLD AUTO: 10.7 FL (ref 8.9–12.1)
POTASSIUM BLD-SCNC: 4.6 MMOL/L (ref 3.5–4.7)
PROT SERPL-MCNC: 6.5 G/DL (ref 6.3–8)
RBC # BLD AUTO: 3.97 10*6/MM3 (ref 4.3–5.5)
SODIUM BLD-SCNC: 136 MMOL/L (ref 134–145)
WBC NRBC COR # BLD: 5.28 10*3/MM3 (ref 4–10)

## 2019-02-04 PROCEDURE — 85025 COMPLETE CBC W/AUTO DIFF WBC: CPT | Performed by: INTERNAL MEDICINE

## 2019-02-04 PROCEDURE — 80053 COMPREHEN METABOLIC PANEL: CPT

## 2019-02-04 PROCEDURE — 36415 COLL VENOUS BLD VENIPUNCTURE: CPT | Performed by: INTERNAL MEDICINE

## 2019-02-05 LAB
ALBUMIN SERPL-MCNC: 3.4 G/DL (ref 2.9–4.4)
ALBUMIN/GLOB SERPL: 1.3 {RATIO} (ref 0.7–1.7)
ALPHA1 GLOB FLD ELPH-MCNC: 0.2 G/DL (ref 0–0.4)
ALPHA2 GLOB SERPL ELPH-MCNC: 0.8 G/DL (ref 0.4–1)
B-GLOBULIN SERPL ELPH-MCNC: 0.8 G/DL (ref 0.7–1.3)
GAMMA GLOB SERPL ELPH-MCNC: 1 G/DL (ref 0.4–1.8)
GLOBULIN SER CALC-MCNC: 2.8 G/DL (ref 2.2–3.9)
IGA SERPL-MCNC: 166 MG/DL (ref 61–437)
IGG SERPL-MCNC: 715 MG/DL (ref 700–1600)
IGM SERPL-MCNC: 307 MG/DL (ref 15–143)
INTERPRETATION SERPL IEP-IMP: ABNORMAL
KAPPA LC SERPL-MCNC: 24 MG/L (ref 3.3–19.4)
KAPPA LC/LAMBDA SER: 0.97 {RATIO} (ref 0.26–1.65)
LAMBDA LC FREE SERPL-MCNC: 24.8 MG/L (ref 5.7–26.3)
Lab: ABNORMAL
M-SPIKE: 0.2 G/DL
PROT SERPL-MCNC: 6.2 G/DL (ref 6–8.5)

## 2019-02-11 ENCOUNTER — APPOINTMENT (OUTPATIENT)
Dept: LAB | Facility: HOSPITAL | Age: 74
End: 2019-02-11

## 2019-02-11 ENCOUNTER — OFFICE VISIT (OUTPATIENT)
Dept: ONCOLOGY | Facility: CLINIC | Age: 74
End: 2019-02-11

## 2019-02-11 VITALS
OXYGEN SATURATION: 95 % | HEIGHT: 68 IN | BODY MASS INDEX: 40.15 KG/M2 | TEMPERATURE: 98.4 F | DIASTOLIC BLOOD PRESSURE: 58 MMHG | SYSTOLIC BLOOD PRESSURE: 108 MMHG | RESPIRATION RATE: 18 BRPM | HEART RATE: 86 BPM | WEIGHT: 264.9 LBS

## 2019-02-11 DIAGNOSIS — D47.2 MONOCLONAL GAMMOPATHY: Primary | ICD-10-CM

## 2019-02-11 PROCEDURE — 99215 OFFICE O/P EST HI 40 MIN: CPT | Performed by: INTERNAL MEDICINE

## 2019-02-11 PROCEDURE — G0463 HOSPITAL OUTPT CLINIC VISIT: HCPCS | Performed by: INTERNAL MEDICINE

## 2019-02-11 RX ORDER — BENZONATATE 100 MG/1
CAPSULE ORAL
COMMUNITY
Start: 2019-01-24 | End: 2019-02-11

## 2019-02-11 RX ORDER — METOPROLOL SUCCINATE 50 MG/1
50 TABLET, EXTENDED RELEASE ORAL DAILY
Status: ON HOLD | COMMUNITY
Start: 2019-01-11 | End: 2022-06-30 | Stop reason: SDUPTHER

## 2019-02-11 RX ORDER — SILDENAFIL CITRATE 25 MG
25 TABLET ORAL AS NEEDED
COMMUNITY
Start: 2018-11-29 | End: 2021-02-08 | Stop reason: DRUGHIGH

## 2019-02-11 RX ORDER — AZITHROMYCIN 250 MG/1
TABLET, FILM COATED ORAL
COMMUNITY
Start: 2019-01-24 | End: 2019-02-11

## 2019-02-11 RX ORDER — TAMSULOSIN HYDROCHLORIDE 0.4 MG/1
1 CAPSULE ORAL DAILY
COMMUNITY
Start: 2019-01-11 | End: 2019-09-09

## 2019-02-11 RX ORDER — ASPIRIN 81 MG/1
81 TABLET ORAL DAILY
COMMUNITY
Start: 2019-01-05 | End: 2019-06-04 | Stop reason: SDUPTHER

## 2019-02-11 RX ORDER — PREDNISONE 20 MG/1
TABLET ORAL
COMMUNITY
Start: 2019-01-24 | End: 2019-02-11

## 2019-02-11 NOTE — PROGRESS NOTES
[facility] OUTPATIENT FOLLOW UP CLINIC VISIT    REASON FOR FOLLOW-UP:    1. Plasmacytoma involving the left upper lobe  2.  Macrocytic anemia  3.  Mild leukopenia  4.  Left lung pulmonary nodules, stable on recent imaging.    HISTORY OF PRESENT ILLNESS:  Rafael Gomez is a 73 y.o. male who returns today for follow up of the above issues.     He continues to complain of fatigue and dyspnea on exertion.  He is sleeping a lot.  He wears CPAP at night.  He is supposed to wear oxygen continuously but does not wear it when he leaves the house.      He was seen at the [facility] here to see if he can get service-connected for plasmacytoma/amyloidosis.  [facility] [place] [facility] and saw Dr. Reed there.  They have requested his pathology for review.    He complains of peripheral neuropathy in his hands and feet.  This has been worsening.  He reports some numbness and tingling as well as occasional pain.  This has been ongoing for months or even more but I think this is the first time he has complained about it to me.      HEMATOLOGIC HISTORY:  The patient had complained of some dyspnea on exertion. He has some left knee pain following a left knee replacement. He eventually was referred for further care to Dr. Aba Freeman with vascular surgery. He was found to have evidence of peripheral vascular disease and is scheduled for a procedure in her future. He was also found to have a complex irregular cystic mass measuring 5.7 cm in the right kidney. He has been referred to urology for this. There was a 12 mm nodule in the lingula. He was referred to Dr. Larry Hernandez for this.  He had a CT scan of the chest with contrast on 5/25/2016. This showed 3 pleural-based nodules at the periphery of the left upper lobe. One measured 15 mm and the other measured 16 mm. The other was 6 mm. There is also a noncalcified 6 mm nodule.  He had a wedge resection on 6/2/2016. Two samples were sent  to pathology. One showed plasma cells with evidence for amyloid. Further testing at [facility] showed IgG kappa restriction. The other wedge resection showed tiny nonnecrotizing granulomas.    Serum protein electrophoresis showed a 0.1 g M spike with immunofixation showing IgM lambda monoclonality.  Serum free light chain ratio was normal.  Ferritin 226.  Iron profile normal.  Vitamin B12 greater than 2000.    Serum protein levels in March 2017 are very stable with 0.1 g M spike.  He will follow-up in one year.  He continues to follow-up with Dr. Hernandez for CT scans.    CT imaging on 5/23/2017 showed an increase in size of 2 lingular lesions now measuring 2.2 cm and 1.0 cm.  There is an 8 mm left lower lobe pulmonary nodule also slightly increased in size.  Several other tiny pulmonary nodules bilaterally which are stable.    His M spike increased to 0.2 g.    He had a PET scan on 6/8/2017 showing several hypermetabolic left lung pulmonary nodules with a 2.2 cm pleural-based left upper lobe nodule.    His case was presented at the multidisciplinary thoracic conference.  It was elected to follow him with serial CT scans with the biopsy if the lung nodules continued increase in size.  It was felt that it was too risky to attempt a biopsy at this point.    Repeat CT imaging on 9/11/2017 shows stable findings.  M spike 0.1 g.      CT imaging on 8/20/2018 with a slight increase in size a couple of pulmonary nodules.  M spike is 0.2 g.  Follow-up with thoracic surgery for imaging and we will see him back in 6 months for follow-up with labs in 1 week prior.    PET scan done at [place] on 10/23/2018 with similar findings.      PAST MEDICAL, SURGICAL, FAMILY, AND SOCIAL HISTORIES were reviewed with the patient and in the electronic medical record, and were updated if indicated.    ALLERGIES:  No Known Allergies    MEDICATIONS:  The medication list has been reviewed with the patient by the medical assistant, and the  "list has been updated in the electronic medical record, which I reviewed.  Medication dosages and frequencies were confirmed to be accurate.    REVIEW OF SYSTEMS:  PAIN:  See Vital Signs below.  GENERAL:  Chronic fatigue  SKIN:  Did not complain of rash today  HEME/LYMPH:  No abnormal bleeding.  No palpable lymphadenopathy.  EYES:  No vision changes or diplopia.  ENT:  No sore throat or difficulty swallowing.  See history of present illness  RESPIRATORY:  Chronic shortness of breath and dyspnea on exertion  CARDIOVASCULAR:  No chest pain, palpitations, orthopnea  GASTROINTESTINAL:  No abdominal pain, nausea, vomiting, constipation, diarrhea, melena, or hematochezia.  GENITOURINARY:  No dysuria or hematuria.  MUSCULOSKELETAL:  Chronic back pain  NEUROLOGIC:  No dizziness, loss of consciousness, or seizures.  Numbness and tingling in his fingers and toes as well as occasional painful peripheral neuropathy.  PSYCHIATRIC:  No depression, anxiety, or mood changes.    Vitals:    02/11/19 0943   BP: 108/58   Pulse: 86   Resp: 18   Temp: 98.4 °F (36.9 °C)   TempSrc: Oral   SpO2: 95%   Weight: 120 kg (264 lb 14.4 oz)   Height: 173.5 cm (68.31\")   PainSc: 0-No pain  Comment: monolo gammpathy       PHYSICAL EXAMINATION:  GENERAL:  Obese.  Well-developed well-nourished male; awake, alert and oriented, in no acute distress.    HEAD:  Normocephalic, atraumatic.  EARS:  Hearing intact.  NOSE:  Septum midline.  No excoriations or nasal discharge.  MOUTH:  No stomatitis or ulcers.  Lips are normal.  THROAT:  Oropharynx without lesions or exudates.  CHEST:  Lungs are clear to auscultation bilaterally.  No wheezes, rales, or rhonchi.  There is some soft tissue swelling in the left lateral chest in the axillary line  HEART:  Regular rate; normal rhythm.  No murmurs, gallops or rubs.  EXTREMITIES:  No clubbing, cyanosis, or edema.  NEUROLOGICAL:  Decreased sensation to light touch in his hands.      DIAGNOSTIC DATA:  Results for orders " placed or performed in visit on 02/04/19   BUCK,PE and FLC, Serum   Result Value Ref Range    IgG 715 700 - 1600 mg/dL    IgA 166 61 - 437 mg/dL    IgM 307 (H) 15 - 143 mg/dL    Total Protein 6.2 6.0 - 8.5 g/dL    Albumin 3.4 2.9 - 4.4 g/dL    Alpha-1-Globulin 0.2 0.0 - 0.4 g/dL    Alpha-2-Globulin 0.8 0.4 - 1.0 g/dL    Beta Globulin 0.8 0.7 - 1.3 g/dL    Gamma Globulin 1.0 0.4 - 1.8 g/dL    M-Gonzalez 0.2 (H) Not Observed g/dL    Globulin 2.8 2.2 - 3.9 g/dL    A/G Ratio 1.3 0.7 - 1.7    Immunofixation Reflex, Serum Comment     Please note Comment     Free Light Chain, [place] 24.0 (H) 3.3 - 19.4 mg/L    Free Lambda Light Chains 24.8 5.7 - 26.3 mg/L    Kappa/Lambda Ratio 0.97 0.26 - 1.65   Comprehensive Metabolic Panel   Result Value Ref Range    Glucose 125 (H) 74 - 124 mg/dL    BUN 29 (H) 6 - 20 mg/dL    Creatinine 1.55 (H) 0.70 - 1.30 mg/dL    Sodium 136 134 - 145 mmol/L    Potassium 4.6 3.5 - 4.7 mmol/L    Chloride 102 98 - 107 mmol/L    CO2 23.0 22.0 - 29.0 mmol/L    Calcium 9.4 8.5 - 10.2 mg/dL    Total Protein 6.5 6.3 - 8.0 g/dL    Albumin 3.90 3.50 - 5.20 g/dL    ALT (SGPT) 26 0 - 41 U/L    AST (SGOT) 27 0 - 40 U/L    Alkaline Phosphatase 68 38 - 116 U/L    Total Bilirubin 0.9 0.1 - 1.2 mg/dL    eGFR Non African Amer 44 (L) >60 mL/min/1.73    Globulin 2.6 1.8 - 3.5 gm/dL    A/G Ratio 1.5 1.1 - 2.4 g/dL    BUN/Creatinine Ratio 18.7 7.3 - 30.0    Anion Gap 11.0 mmol/L   CBC Auto Differential   Result Value Ref Range    WBC 5.28 4.00 - 10.00 10*3/mm3    RBC 3.97 (L) 4.30 - 5.50 10*6/mm3    Hemoglobin 14.0 13.5 - 16.5 g/dL    Hematocrit 40.5 40.0 - 49.0 %    .0 (H) 83.0 - 97.0 fL    MCH 35.3 (H) 27.0 - 33.0 pg    MCHC 34.6 32.0 - 35.0 g/dL    RDW 15.6 (H) 11.7 - 14.5 %    RDW-SD 57.5 (H) 37.0 - 49.0 fl    MPV 10.7 8.9 - 12.1 fL    Platelets 107 (L) 150 - 375 10*3/mm3    Neutrophil % 73.8 39.0 - 75.0 %    Lymphocyte % 12.5 (L) 20.0 - 49.0 %    Monocyte % 10.8 4.0 - 12.0 %    Eosinophil % 1.5 1.0 - 5.0 %     Basophil % 0.8 0.0 - 1.1 %    Immature Grans % 0.6 (H) 0.0 - 0.5 %    Neutrophils, Absolute 3.90 1.50 - 7.00 10*3/mm3    Lymphocytes, Absolute 0.66 (L) 1.00 - 3.50 10*3/mm3    Monocytes, Absolute 0.57 0.25 - 0.80 10*3/mm3    Eosinophils, Absolute 0.08 0.00 - 0.36 10*3/mm3    Basophils, Absolute 0.04 0.00 - 0.10 10*3/mm3    Immature Grans, Absolute 0.03 0.00 - 0.03 10*3/mm3    nRBC 0.0 0.0 - 0.0 /100 WBC     Labs from 2/26/2018:  Immunofixation IgM lambda monoclonal protein with a monomer.  Serum protein electrophoresis with a 0.2 g IgM lambda M spike with a second conformal protein unable to be quantitated.  Serum free light chain K/L ratio normal at 0.4.    IgM 325, IgA 193, IgG 792    Labs from 8/20/2018  IgG 946, IgA 208, IgM 348  0.2 g M spike  Serum free light chain K/L ratio normal at 0.82    IMAGING:  CT images from 12/18/2017: Stable.  Unchanged pulmonary nodules.  Followed by thoracic surgery.    CT images from 8/22/2018:    Multiple bilateral noncalcified pulmonary nodules, more  numerous on the left than the right that are unchanged since the most  recent chest CT dated 5/25/2018, but demonstrate slight, but definite  increase in size when compared to more remote CTs dating back to  9/11/2017. No new pulmonary nodules are identified.    PET scan performed at [place] on 10/23/2018: This was compared to imaging from 6/8/2017.    CHEST: No hypermetabolic thoracic lymphadenopathy is identified. There is a suture line and scarring superiorly at the left upper lobe. Medially in the left upper lobe there is a pleural-based solid pulmonary nodule which measures 1.6 cm and previously   measured 8 mm. The nodule exhibits low level tracer uptake with a maximum SUV of 1.5. Mediastinal background activity for reference is 0.7. Several areas of nodular pleural thickening are noted at the left upper lobe which have increased from the prior   examination. The largest is anteriorly in the left upper lobe on image #55 and  measures 2.0 cm. Maximum SUV is 1.6. There is a pleural-based nodule at the left lung base on image #66 which measures 1.3 cm and has a maximum SUV of 0.9. Pleural-based   nodule medially in the left lower lobe adjacent to the descending thoracic aorta measures 1.5 cm and previously measured 7 mm. The maximum SUV is 1.2. Low level tracer uptake is noted in the right hilum with maximum SUV of 1.3.      ABDOMEN AND PELVIS: There is normal tracer activity in the solid abdominal organs. There is a focus of asymmetric increased tracer uptake at the cecum adjacent to the ileocecal valve. No definite mass is seen on the CT images. The maximum SUV is 2.6.   There is similar increased tracer uptake at this location on the prior examination.. No hypermetabolic mesenteric or retroperitoneal lymphadenopathy is seen.    In the pelvis, there is normal tracer uptake without hypermetabolic mass or lymphadenopathy.    SKELETON: No focal increased skeletal uptake is seen.            ASSESSMENT:  This is a 73 y.o. male with:  1.  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  He had a wedge resection.  Dr. Hernandez was  following a couple of other small pulmonary nodules on the left which initially increased in size.  His M spike continues to be stable.  The PET scan did not show anything we didn't expect from the CT scan.  The left upper lobe lung nodule is too risky to biopsy base of its location.  His case was presented at the multidisciplinary thoracic conference and recommendations included to follow him with serial CT scans with biopsy if it appears to be safer.  CT imaging on 12/18/2017 was stable.  CT imaging on 5/25/2018 showed that one of the pleural based nodules and enlarged slightly measuring 2.3 cm.  CT imaging from 8/22/2018 shows some slight increase in size of the pulmonary nodules with the largest measuring now 2.0 cm 3 other tiny noncalcified nodules in the right lung  are stable, only showing a slight increase since 2017.  I did reiterate today that I think most of his respiratory symptoms are secondary to his COPD and not from the small pulmonary nodules.  If any do grow significantly in size, we could consider radiation.  He would likely not be a surgical candidate considering his oxygen dependence.  There is some question as to how much of this is amyloid.  There is some question as to whether this is primary or reactive.  [facility] [place] [facility] Reed, and they have requested his pathology for review.  2.  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  We will continue to monitor with plans for three-month follow-up.  3.  Macrocytic anemia: He has mild chronic kidney disease.  This could potentially be secondary to alcohol use as well.  Resolved and hgb normal.  MCV slightly elevated.    4.  Thrombocytopenia.:  This is mild and we will continue to monitor this.  5.  Right renal mass which is chronic.  He is followed by urology.  6.  Claudication with peripheral vascular disease followed by Dr. Freeman.    7.  Low back pain with an MRI on 5/18/2017 showing degenerative changes at multiple levels  8.  COPD: He follows with Dr. Poole now and is on oxygen and inhalers.  9.  Peripheral neuropathy: This is the first time that I have noted this.  This does raise the question as to whether he could have amyloidosis or systemically.  I will speak with Dr. Reed at [facility].  Question the need for a sural nerve bipsy.      PLAN:  1.  I will see him back in 3 months for follow-up with labs including serum protein studies done one week prior.  2.  I will communicate with Dr. Reed at [facility].

## 2019-02-26 ENCOUNTER — HOSPITAL ENCOUNTER (OUTPATIENT)
Dept: CT IMAGING | Facility: HOSPITAL | Age: 74
Discharge: HOME | End: 2019-02-26
Attending: THORACIC SURGERY (CARDIOTHORACIC VASCULAR SURGERY) | Admitting: THORACIC SURGERY (CARDIOTHORACIC VASCULAR SURGERY)

## 2019-02-26 DIAGNOSIS — C90.30 PLASMACYTOMA (HCC): ICD-10-CM

## 2019-02-26 PROCEDURE — 71250 CT THORAX DX C-: CPT

## 2019-03-04 ENCOUNTER — OFFICE VISIT (OUTPATIENT)
Dept: SURGERY | Facility: CLINIC | Age: 74
End: 2019-03-04

## 2019-03-04 VITALS
SYSTOLIC BLOOD PRESSURE: 120 MMHG | OXYGEN SATURATION: 97 % | HEART RATE: 89 BPM | WEIGHT: 266 LBS | BODY MASS INDEX: 40.32 KG/M2 | HEIGHT: 68 IN | DIASTOLIC BLOOD PRESSURE: 62 MMHG

## 2019-03-04 DIAGNOSIS — R06.02 SHORTNESS OF BREATH: ICD-10-CM

## 2019-03-04 DIAGNOSIS — R91.8 MULTIPLE LUNG NODULES ON CT: ICD-10-CM

## 2019-03-04 DIAGNOSIS — C90.30 PLASMACYTOMA (HCC): Primary | ICD-10-CM

## 2019-03-04 PROCEDURE — 99213 OFFICE O/P EST LOW 20 MIN: CPT | Performed by: THORACIC SURGERY (CARDIOTHORACIC VASCULAR SURGERY)

## 2019-03-04 RX ORDER — CETIRIZINE HYDROCHLORIDE 10 MG/1
10 TABLET ORAL DAILY
COMMUNITY
Start: 2019-02-25 | End: 2022-04-20 | Stop reason: SDUPTHER

## 2019-03-04 NOTE — PROGRESS NOTES
Subjective   Patient ID: Rafael Gomez is a 73 y.o. male is here today for follow-up.    History of Present Illness  Dear Colleague,  Rafael Gomez was seen in our office today for continued follow up and surveillance status post wedge resection of lung nodules for diagnosis of plasmacytoma.  He was seen and evaluated at the [facility] in [place] and has undergone bone marrow biopsy and been referred to specialist at [place] to discuss treatment for plasmacytoma/amyloidosis.  He is overall doing well without any new complaints although he continues to have significant shortness of breath and dyspnea on exertion.  The following portions of the patient's history were reviewed and updated as appropriate: allergies, current medications, past family history, past medical history, past social history, past surgical history and problem list.  Review of Systems   Constitution: Negative.   HENT: Negative.    Eyes: Negative.    Cardiovascular: Negative.    Respiratory: Positive for cough, shortness of breath and wheezing.    Endocrine: Negative.    Hematologic/Lymphatic: Negative.    Skin: Negative.    Musculoskeletal: Negative.    Gastrointestinal: Negative.    Genitourinary: Negative.    Neurological: Negative.    Psychiatric/Behavioral: Negative.    Allergic/Immunologic: Negative.      Patient Active Problem List   Diagnosis   • Lung nodule, solitary   • Cough   • Wheeze   • Multiple lung nodules on CT   • Follow-up examination, following other surgery   • Renal mass   • Plasmacytoma (CMS/HCC)   • Macrocytic anemia   • Leukopenia   • Monoclonal gammopathy   • Thrombocytopenia (CMS/HCC)     Past Medical History:   Diagnosis Date   • Acid reflux    • Anxiety    • Arthritis    • Coronary artery disease    • ED (erectile dysfunction)    • Gout    • History of PAT (paroxysmal atrial tachycardia)    • Hypertension    • Neuropathy     HANDS AND FEET   • PONV (postoperative nausea and vomiting)    • PTSD  (post-traumatic stress disorder)    • PVD (peripheral vascular disease) (CMS/HCC)    • Stroke (CMS/HCC)      Past Surgical History:   Procedure Laterality Date   • APPENDECTOMY     • CAROTID ENDARTERECTOMY Bilateral    • CORONARY ARTERY BYPASS GRAFT     • REPLACEMENT TOTAL KNEE Left 2015   • THORACOSCOPY Left 2016    Procedure: LEFT VIDEO ASSISTED THORACOTOMY W/ LEFT UPPER LOBE WEDGE RESECTION X 2; EXPLORATORY BRONCHOSCOPY; PAIN PUMP PLACEMENT X 2;  Surgeon: Larry Hernandez MD;  Location: [facility] OR;  Service:    • TONSILLECTOMY     • TOTAL HIP ARTHROPLASTY Right      Family History   Problem Relation Age of Onset   • Heart disease Mother    • Multiple myeloma Mother    • Heart disease Father    • Prostate cancer Father    • Leukemia Brother         CLL     Social History     Socioeconomic History   • Marital status:      Spouse name: Liliya   • Number of children: Not on file   • Years of education: High school   • Highest education level: Not on file   Social Needs   • Financial resource strain: Not on file   • Food insecurity - worry: Not on file   • Food insecurity - inability: Not on file   • Transportation needs - medical: Not on file   • Transportation needs - non-medical: Not on file   Occupational History   • 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[employer]     Employer: RETIRED   Tobacco Use   • Smoking status: Former Smoker     Packs/day: 1.00     Years: 30.00     Pack years: 30.00     Types: Cigarettes     Last attempt to quit:      Years since quittin.1   • Smokeless tobacco: Current User     Types: Chew   • Tobacco comment: ONE HALF CAN DAILY    Substance and Sexual Activity   • Alcohol use: Yes     Alcohol/week: 18.0 oz     Types: 30 Cans of beer per week   • Drug use: No   • Sexual activity: Defer   Other Topics Concern   • Not on file   Social History Narrative   • Not on file       Current Outpatient Medications:   •  acetaminophen (TYLENOL) 325 MG tablet, Take 650 mg by mouth 2  (two) times a day. 1-2 tablets morning and afternoon, Disp: , Rfl:   •  albuterol (PROVENTIL HFA;VENTOLIN HFA) 108 (90 Base) MCG/ACT inhaler, Inhale 2 puffs Every 4 (Four) Hours As Needed for Wheezing., Disp: , Rfl:   •  allopurinol (ZYLOPRIM) 300 MG tablet, Take 300 mg by mouth daily., Disp: , Rfl:   •  amLODIPine (NORVASC) 2.5 MG tablet, Take 2.5 mg by mouth every morning., Disp: , Rfl:   •  aspirin 81 MG chewable tablet, Chew 81 mg 2 (two) times a day. HOLD PRIOR TO SURGERY, Disp: , Rfl:   •  aspirin 81 MG EC tablet, , Disp: , Rfl:   •  atorvastatin (LIPITOR) 40 MG tablet, Take 40 mg by mouth daily., Disp: , Rfl:   •  CBD oil (cannabidiol) capsule, Take  by mouth., Disp: , Rfl:   •  cetirizine (zyrTEC) 10 MG tablet, , Disp: , Rfl:   •  citalopram (CeleXA) 20 MG tablet, Take 20 mg by mouth every evening., Disp: , Rfl:   •  glucosamine sulfate 500 MG capsule capsule, Take 2 capsules by mouth daily., Disp: , Rfl:   •  hydrochlorothiazide (HYDRODIURIL) 25 MG tablet, Take 12.5 mg by mouth daily., Disp: , Rfl:   •  LORazepam (ATIVAN) 0.5 MG tablet, Take 0.5 mg by mouth as needed., Disp: , Rfl:   •  metoprolol succinate XL (TOPROL-XL) 50 MG 24 hr tablet, , Disp: , Rfl:   •  metoprolol tartrate (LOPRESSOR) 50 MG tablet, Take 50 mg by mouth 2 (two) times a day., Disp: , Rfl:   •  MICARDIS 80 MG tablet, , Disp: , Rfl:   •  MIRAPEX 0.25 MG tablet, , Disp: , Rfl:   •  Omega-3 Fatty Acids (FISH OIL) 1000 MG capsule capsule, Take 1,000 mg by mouth daily with breakfast. HOLD PRIOR TO SURGERY, Disp: , Rfl:   •  omeprazole (PriLOSEC) 20 MG capsule, Take 20 mg by mouth every morning., Disp: , Rfl:   •  SEREVENT DISKUS 50 MCG/DOSE diskus inhaler, , Disp: , Rfl:   •  sildenafil (VIAGRA) 100 MG tablet, Take 100 mg by mouth as needed for erectile dysfunction., Disp: , Rfl:   •  SPIRIVA RESPIMAT 2.5 MCG/ACT aerosol solution, , Disp: , Rfl:   •  STIOLTO RESPIMAT 2.5-2.5 MCG/ACT aerosol solution inhaler, INHALE TWO PUFFS BY MOUTH EVERY  DAY, Disp: , Rfl: 3  •  tamsulosin (FLOMAX) 0.4 MG capsule 24 hr capsule, , Disp: , Rfl:   •  temazepam (RESTORIL) 15 MG capsule, Take 15 mg by mouth at night as needed., Disp: , Rfl:   •  terazosin (HYTRIN) 2 MG capsule, Take 2 mg by mouth every evening. 2 TABS , Disp: , Rfl:   •  VIAGRA 25 MG tablet, , Disp: , Rfl:   •  cilostazol (PLETAL) 100 MG tablet, Take 100 mg by mouth As Needed. 0.5 TAB DAILY, Disp: , Rfl:   •  l-methylfolate-B6-B12 (METANX) 3-35-2 MG tablet tablet, Take 1 tablet by mouth 2 (two) times a day., Disp: , Rfl:   No Known Allergies     Objective   Vitals:    03/04/19 1312   BP: 120/62   Pulse: 89   SpO2: 97%     Physical Exam   Constitutional: He is oriented to person, place, and time. He appears well-developed and well-nourished.   HENT:   Head: Normocephalic and atraumatic.   Nose: Nose normal.   Mouth/Throat: Oropharynx is clear and moist.   Eyes: Conjunctivae and EOM are normal. Pupils are equal, round, and reactive to light.   Neck: Normal range of motion. Neck supple.   Cardiovascular: Normal rate, regular rhythm, normal heart sounds and intact distal pulses.   Pulmonary/Chest: Effort normal and breath sounds normal.   Abdominal: Soft. Bowel sounds are normal.   Musculoskeletal: Normal range of motion.   Neurological: He is alert and oriented to person, place, and time.   Skin: Skin is warm and dry. Capillary refill takes less than 2 seconds.   Psychiatric: He has a normal mood and affect. His behavior is normal. Judgment and thought content normal.   Nursing note and vitals reviewed.    Independent Review of Radiographic Studies:    I have independently reviewed the CT scan performed on 2/26/2019 which demonstrates stable left upper lobe nodules measuring 1.8 and 2 cm.  There are no new nodules are no changes since his prior scan.  There is no pleural effusion, no pneumothorax, no mediastinal lymphadenopathy.  Assessment/Plan   Mr. luna is a very pleasant 73-year-old gentleman with  lung nodules which have been diagnosed as consistent with a plasmacytoma.  He is currently undergoing workup for this in conjunction with the  and Dr. Heredia.  The lesions in his lungs are unchanged and given his history even if these were to change he would not be a candidate for a lung surgery secondary to his severe COPD.  I will plan to follow him with a CT chest in 6 months to verify stability of these nodules and await treatment recommendations from the [facility] and Dr. Heredia.    Diagnoses and all orders for this visit:    Plasmacytoma (CMS/HCC)    Multiple lung nodules on CT    Shortness of breath    Other orders  -     cetirizine (zyrTEC) 10 MG tablet;

## 2019-03-19 ENCOUNTER — TELEPHONE (OUTPATIENT)
Dept: ONCOLOGY | Facility: CLINIC | Age: 74
End: 2019-03-19

## 2019-03-19 NOTE — TELEPHONE ENCOUNTER
----- Message from Rafael Heredia MD sent at 3/19/2019 11:12 AM EDT -----  Regarding: f/u in the next couple of weeks  I'd like to see him in the next couple of weeks before spring break if possible.  I have a couple of small openings it looks like in the next two weeks.  CBC, CMP, SPEP/BUCK/SFLC at f/u.  1 unit is fine.  I left him a message on his voicemail today as he didn't answer.  Please speak to him or his wife and confirm an appt if he wants to come in sooner than he was previously scheduled.  Thanks!  REMBERTO

## 2019-03-22 ENCOUNTER — LAB (OUTPATIENT)
Dept: OTHER | Facility: HOSPITAL | Age: 74
End: 2019-03-22

## 2019-03-22 ENCOUNTER — OFFICE VISIT (OUTPATIENT)
Dept: ONCOLOGY | Facility: CLINIC | Age: 74
End: 2019-03-22

## 2019-03-22 ENCOUNTER — EDUCATION (OUTPATIENT)
Dept: ONCOLOGY | Facility: HOSPITAL | Age: 74
End: 2019-03-22

## 2019-03-22 VITALS
RESPIRATION RATE: 16 BRPM | OXYGEN SATURATION: 95 % | DIASTOLIC BLOOD PRESSURE: 65 MMHG | TEMPERATURE: 98.3 F | SYSTOLIC BLOOD PRESSURE: 110 MMHG | HEART RATE: 79 BPM | HEIGHT: 68 IN | BODY MASS INDEX: 39.07 KG/M2 | WEIGHT: 257.8 LBS

## 2019-03-22 DIAGNOSIS — D47.2 MONOCLONAL GAMMOPATHY: ICD-10-CM

## 2019-03-22 DIAGNOSIS — E85.81 AL AMYLOIDOSIS (HCC): Primary | ICD-10-CM

## 2019-03-22 LAB
ALBUMIN SERPL-MCNC: 4.4 G/DL (ref 3.5–5.2)
ALBUMIN/GLOB SERPL: 1.5 G/DL
ALP SERPL-CCNC: 69 U/L (ref 39–117)
ALT SERPL W P-5'-P-CCNC: 40 U/L (ref 1–41)
ANION GAP SERPL CALCULATED.3IONS-SCNC: 11.7 MMOL/L
AST SERPL-CCNC: 40 U/L (ref 1–40)
BASOPHILS # BLD AUTO: 0.04 10*3/MM3 (ref 0–0.2)
BASOPHILS NFR BLD AUTO: 0.8 % (ref 0–1.5)
BILIRUB SERPL-MCNC: 0.9 MG/DL (ref 0.1–1.2)
BUN BLD-MCNC: 40 MG/DL (ref 8–23)
BUN/CREAT SERPL: 20.3 (ref 7–25)
CALCIUM SPEC-SCNC: 10 MG/DL (ref 8.6–10.5)
CHLORIDE SERPL-SCNC: 104 MMOL/L (ref 98–107)
CO2 SERPL-SCNC: 23.3 MMOL/L (ref 22–29)
CREAT BLD-MCNC: 1.97 MG/DL (ref 0.76–1.27)
DEPRECATED RDW RBC AUTO: 58 FL (ref 37–54)
EOSINOPHIL # BLD AUTO: 0.13 10*3/MM3 (ref 0–0.4)
EOSINOPHIL NFR BLD AUTO: 2.7 % (ref 0.3–6.2)
ERYTHROCYTE [DISTWIDTH] IN BLOOD BY AUTOMATED COUNT: 15.4 % (ref 12.3–15.4)
GFR SERPL CREATININE-BSD FRML MDRD: 34 ML/MIN/1.73
GLOBULIN UR ELPH-MCNC: 3 GM/DL
GLUCOSE BLD-MCNC: 100 MG/DL (ref 65–99)
HCT VFR BLD AUTO: 41.4 % (ref 37.5–51)
HGB BLD-MCNC: 14.5 G/DL (ref 13–17.7)
IMM GRANULOCYTES # BLD AUTO: 0.03 10*3/MM3 (ref 0–0.05)
IMM GRANULOCYTES NFR BLD AUTO: 0.6 % (ref 0–0.5)
LYMPHOCYTES # BLD AUTO: 1.13 10*3/MM3 (ref 0.7–3.1)
LYMPHOCYTES NFR BLD AUTO: 23.7 % (ref 19.6–45.3)
MCH RBC QN AUTO: 35.5 PG (ref 26.6–33)
MCHC RBC AUTO-ENTMCNC: 35 G/DL (ref 31.5–35.7)
MCV RBC AUTO: 101.5 FL (ref 79–97)
MONOCYTES # BLD AUTO: 0.5 10*3/MM3 (ref 0.1–0.9)
MONOCYTES NFR BLD AUTO: 10.5 % (ref 5–12)
NEUTROPHILS # BLD AUTO: 2.93 10*3/MM3 (ref 1.4–7)
NEUTROPHILS NFR BLD AUTO: 61.7 % (ref 42.7–76)
NRBC BLD AUTO-RTO: 0 /100 WBC (ref 0–0)
PLATELET # BLD AUTO: 140 10*3/MM3 (ref 140–450)
PMV BLD AUTO: 10.4 FL (ref 6–12)
POTASSIUM BLD-SCNC: 5.6 MMOL/L (ref 3.5–5.2)
PROT SERPL-MCNC: 7.4 G/DL (ref 6–8.5)
RBC # BLD AUTO: 4.08 10*6/MM3 (ref 4.14–5.8)
SODIUM BLD-SCNC: 139 MMOL/L (ref 136–145)
WBC NRBC COR # BLD: 4.76 10*3/MM3 (ref 3.4–10.8)

## 2019-03-22 PROCEDURE — 36415 COLL VENOUS BLD VENIPUNCTURE: CPT

## 2019-03-22 PROCEDURE — 86334 IMMUNOFIX E-PHORESIS SERUM: CPT | Performed by: INTERNAL MEDICINE

## 2019-03-22 PROCEDURE — 82784 ASSAY IGA/IGD/IGG/IGM EACH: CPT | Performed by: INTERNAL MEDICINE

## 2019-03-22 PROCEDURE — 83883 ASSAY NEPHELOMETRY NOT SPEC: CPT | Performed by: INTERNAL MEDICINE

## 2019-03-22 PROCEDURE — 80053 COMPREHEN METABOLIC PANEL: CPT | Performed by: INTERNAL MEDICINE

## 2019-03-22 PROCEDURE — 99215 OFFICE O/P EST HI 40 MIN: CPT | Performed by: INTERNAL MEDICINE

## 2019-03-22 PROCEDURE — 84165 PROTEIN E-PHORESIS SERUM: CPT | Performed by: INTERNAL MEDICINE

## 2019-03-22 PROCEDURE — 85025 COMPLETE CBC W/AUTO DIFF WBC: CPT | Performed by: INTERNAL MEDICINE

## 2019-03-22 RX ORDER — DOXYCYCLINE HYCLATE 100 MG/1
100 CAPSULE ORAL 2 TIMES DAILY
COMMUNITY
End: 2021-08-09

## 2019-03-22 NOTE — PROGRESS NOTES
Lab called a potassium level of 5.6 on Mr. Gomez.  I reviewed with Dr. Heredia.  Pt was at the scheduling desk and Dr. Heredia ask that I educate patient on Potassium rich foods and what to avoid and to also make sure he is not on a potassium supplement.    I reviewed potassium rich foods and what he should avoid.  I also gave him a print out of this.  Gave patient a copy of his labs and explained s/s of high potassium, what to watch for and when to call our office.  He v/u.  Pt is not currently taking a potassium supplement.  He states that he and his wife recently started a keto diet and he thinks this could be what is causing the high level.

## 2019-03-22 NOTE — PROGRESS NOTES
[facility] GROUP OUTPATIENT FOLLOW UP CLINIC VISIT    REASON FOR FOLLOW-UP:    1. 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  Macrocytic anemia  3.  Mild leukopenia  4.  Left lung pulmonary nodules, stable on recent imaging.    HISTORY OF PRESENT ILLNESS:  Rafael Gomez is a 73 y.o. male who returns today for follow up of the above issues.     [facility] to see if he can get service-connected for plasmacytoma/amyloidosis.  [facility] [place] [facility] and saw Dr. Reed there.  They have requested his pathology for review.    He complains of peripheral neuropathy in his hands and feet.  This has been worsening.  He reports some numbness and tingling as well as occasional pain.      He was discussed further in [place] after review of his pathology.  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  A fat pad biopsy and cardiac MRI were recommended.  These are [facility] in the near future.    He is completing pulmonary rehabilitation and his shortness of breath is improving although he remains very fatigued and sleeps a lot after his therapy.      HEMATOLOGIC HISTORY:  The patient had complained of some dyspnea on exertion. He has some left knee pain following a left knee replacement. He eventually was referred for further care to Dr. Aba Freeman with vascular surgery. He was found to have evidence of peripheral vascular disease and is scheduled for a procedure in her future. He was also found to have a complex irregular cystic mass measuring 5.7 cm in the right kidney. He has been referred to urology for this. There was a 12 mm nodule in the lingula. He was referred to Dr. Larry Hernandez for this.  He had a CT scan of the chest with contrast on 5/25/2016. This showed 3 pleural-based nodules at the periphery of the left upper lobe. One measured 15 mm and the other measured 16 mm. The other was 6  mm. There is also a noncalcified 6 mm nodule.  He had a wedge resection on 6/2/2016. Two samples were sent to pathology. One showed plasma cells with evidence for amyloid. Further testing at [facility] showed IgG kappa restriction. The other wedge resection showed tiny nonnecrotizing granulomas.    Serum protein electrophoresis showed a 0.1 g M spike with immunofixation showing IgM lambda monoclonality.  Serum free light chain ratio was normal.  Ferritin 226.  Iron profile normal.  Vitamin B12 greater than 2000.    Serum protein levels in March 2017 are very stable with 0.1 g M spike.  He will follow-up in one year.  He continues to follow-up with Dr. Hernandez for CT scans.    CT imaging on 5/23/2017 showed an increase in size of 2 lingular lesions now measuring 2.2 cm and 1.0 cm.  There is an 8 mm left lower lobe pulmonary nodule also slightly increased in size.  Several other tiny pulmonary nodules bilaterally which are stable.    His M spike increased to 0.2 g.    He had a PET scan on 6/8/2017 showing several hypermetabolic left lung pulmonary nodules with a 2.2 cm pleural-based left upper lobe nodule.    His case was presented at the multidisciplinary thoracic conference.  It was elected to follow him with serial CT scans with the biopsy if the lung nodules continued increase in size.  It was felt that it was too risky to attempt a biopsy at this point.    Repeat CT imaging on 9/11/2017 shows stable findings.  M spike 0.1 g.      CT imaging on 8/20/2018 with a slight increase in size a couple of pulmonary nodules.  M spike is 0.2 g.  Follow-up with thoracic surgery for imaging and we will see him back in 6 months for follow-up with labs in 1 week prior.    PET scan done at [place] on 10/23/2018 with similar findings.      PAST MEDICAL, SURGICAL, FAMILY, AND SOCIAL HISTORIES were reviewed with the patient and in the electronic medical record, and were updated if indicated.    ALLERGIES:  No Known  "Allergies    MEDICATIONS:  The medication list has been reviewed with the patient by the medical assistant, and the list has been updated in the electronic medical record, which I reviewed.  Medication dosages and frequencies were confirmed to be accurate.    REVIEW OF SYSTEMS:  PAIN:  See Vital Signs below.  GENERAL:  Chronic fatigue  SKIN:  Did not complain of rash today  HEME/LYMPH:  No abnormal bleeding.  No palpable lymphadenopathy.  EYES:  No vision changes or diplopia.  ENT:  No sore throat or difficulty swallowing.  See history of present illness  RESPIRATORY:  Chronic shortness of breath and dyspnea on exertion  CARDIOVASCULAR:  No chest pain, palpitations, orthopnea  GASTROINTESTINAL:  No abdominal pain, nausea, vomiting, constipation, diarrhea, melena, or hematochezia.  GENITOURINARY:  No dysuria or hematuria.  MUSCULOSKELETAL:  Chronic back pain  NEUROLOGIC:  No dizziness, loss of consciousness, or seizures.  Numbness and tingling in his fingers and toes as well as occasional painful peripheral neuropathy.  PSYCHIATRIC:  No depression, anxiety, or mood changes.    Vitals:    03/22/19 1055   BP: 110/65   Pulse: 79   Resp: 16   Temp: 98.3 °F (36.8 °C)   TempSrc: Oral   SpO2: 95%   Weight: 117 kg (257 lb 12.8 oz)   Height: 173.5 cm (68.31\")   PainSc: 0-No pain  Comment: monoclonal gammopathy       PHYSICAL EXAMINATION:  GENERAL:  Obese.  Well-developed well-nourished male; awake, alert and oriented, in no acute distress.    HEAD:  Normocephalic, atraumatic.  EARS:  Hearing intact.  NOSE:  Septum midline.  No excoriations or nasal discharge.  MOUTH:  No stomatitis or ulcers.  Lips are normal.  THROAT:  Oropharynx without lesions or exudates.  CHEST:  Lungs are clear to auscultation bilaterally.  No wheezes, rales, or rhonchi.  There is some soft tissue swelling in the left lateral chest in the axillary line  HEART:  Regular rate; normal rhythm.  Grade II/VI systolic murmur.    EXTREMITIES:  No clubbing, " cyanosis, or edema.  NEUROLOGICAL:  Decreased sensation to light touch in his hands.      DIAGNOSTIC DATA:  Results for orders placed or performed in visit on 03/22/19   CBC Auto Differential   Result Value Ref Range    WBC 4.76 3.40 - 10.80 10*3/mm3    RBC 4.08 (L) 4.14 - 5.80 10*6/mm3    Hemoglobin 14.5 13.0 - 17.7 g/dL    Hematocrit 41.4 37.5 - 51.0 %    .5 (H) 79.0 - 97.0 fL    MCH 35.5 (H) 26.6 - 33.0 pg    MCHC 35.0 31.5 - 35.7 g/dL    RDW 15.4 12.3 - 15.4 %    RDW-SD 58.0 (H) 37.0 - 54.0 fl    MPV 10.4 6.0 - 12.0 fL    Platelets 140 140 - 450 10*3/mm3    Neutrophil % 61.7 42.7 - 76.0 %    Lymphocyte % 23.7 19.6 - 45.3 %    Monocyte % 10.5 5.0 - 12.0 %    Eosinophil % 2.7 0.3 - 6.2 %    Basophil % 0.8 0.0 - 1.5 %    Immature Grans % 0.6 (H) 0.0 - 0.5 %    Neutrophils, Absolute 2.93 1.40 - 7.00 10*3/mm3    Lymphocytes, Absolute 1.13 0.70 - 3.10 10*3/mm3    Monocytes, Absolute 0.50 0.10 - 0.90 10*3/mm3    Eosinophils, Absolute 0.13 0.00 - 0.40 10*3/mm3    Basophils, Absolute 0.04 0.00 - 0.20 10*3/mm3    Immature Grans, Absolute 0.03 0.00 - 0.05 10*3/mm3    nRBC 0.0 0.0 - 0.0 /100 WBC     Labs from 2/26/2018:  Immunofixation IgM lambda monoclonal protein with a monomer.  Serum protein electrophoresis with a 0.2 g IgM lambda M spike with a second conformal protein unable to be quantitated.  Serum free light chain K/L ratio normal at 0.4.    IgM 325, IgA 193, IgG 792    Labs from 8/20/2018  IgG 946, IgA 208, IgM 348  0.2 g M spike  Serum free light chain K/L ratio normal at 0.82    IMAGING:  CT images from 12/18/2017: Stable.  Unchanged pulmonary nodules.  Followed by thoracic surgery.    CT images from 8/22/2018:    Multiple bilateral noncalcified pulmonary nodules, more  numerous on the left than the right that are unchanged since the most  recent chest CT dated 5/25/2018, but demonstrate slight, but definite  increase in size when compared to more remote CTs dating back to  9/11/2017. No new pulmonary  nodules are identified.    PET scan performed at [place] on 10/23/2018: This was compared to imaging from 6/8/2017.    CHEST: No hypermetabolic thoracic lymphadenopathy is identified. There is a suture line and scarring superiorly at the left upper lobe. Medially in the left upper lobe there is a pleural-based solid pulmonary nodule which measures 1.6 cm and previously   measured 8 mm. The nodule exhibits low level tracer uptake with a maximum SUV of 1.5. Mediastinal background activity for reference is 0.7. Several areas of nodular pleural thickening are noted at the left upper lobe which have increased from the prior   examination. The largest is anteriorly in the left upper lobe on image #55 and measures 2.0 cm. Maximum SUV is 1.6. There is a pleural-based nodule at the left lung base on image #66 which measures 1.3 cm and has a maximum SUV of 0.9. Pleural-based   nodule medially in the left lower lobe adjacent to the descending thoracic aorta measures 1.5 cm and previously measured 7 mm. The maximum SUV is 1.2. Low level tracer uptake is noted in the right hilum with maximum SUV of 1.3.      ABDOMEN AND PELVIS: There is normal tracer activity in the solid abdominal organs. There is a focus of asymmetric increased tracer uptake at the cecum adjacent to the ileocecal valve. No definite mass is seen on the CT images. The maximum SUV is 2.6.   There is similar increased tracer uptake at this location on the prior examination.. No hypermetabolic mesenteric or retroperitoneal lymphadenopathy is seen.    In the pelvis, there is normal tracer uptake without hypermetabolic mass or lymphadenopathy.    SKELETON: No focal increased skeletal uptake is seen.            ASSESSMENT:  This is a 73 y.o. male with:  1.  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  He had a wedge resection.  Dr. Hernandez was  following a couple of other small pulmonary nodules on the left which  initially increased in size.  His M spike continues to be stable.  The PET scan did not show anything we didn't expect from the CT scan.  The left upper lobe lung nodule is too risky to biopsy base of its location.  His case was presented at the multidisciplinary thoracic conference and recommendations included to follow him with serial CT scans with biopsy if it appears to be safer.  CT imaging on 12/18/2017 was stable.  CT imaging on 5/25/2018 showed that one of the pleural based nodules and enlarged slightly measuring 2.3 cm.  CT imaging from 8/22/2018 shows some slight increase in size of the pulmonary nodules with the largest measuring now 2.0 cm 3 other tiny noncalcified nodules in the right lung are stable, only showing a slight increase since 2017.  I did reiterate today that I think most of his respiratory symptoms are secondary to his COPD and not from the small pulmonary nodules.  If any do grow significantly in size, we could consider radiation.  He would likely not be a surgical candidate considering his oxygen dependence.  There is some question as to how much of this is amyloid.  There is some question as to whether this is primary or reactive.  [facility] [place] by [facility]. Reed, and they requested his pathology for review.  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    They have recommended treatment with FAM (inhaled fluticasone, azithromycin 250 mg on Mondays Wednesdays and Fridays, montelukast daily) plus doxycycline 100 mg twice daily for his lungs.  This is going to be [facility] through the [facility].  They have also recommended a fat pad biopsy and cardiac MRI.  These are planned according to him and his wife through the [facility] in the next few weeks.    Ultimately, [place] has recommended treatment with Velcade, Cytoxan, and dexamethasone (CyBorD).  I spoke with him and his wife regarding this treatment plan today.  We will not plan to initiate this until his evaluation  with a fat pad biopsy and cardiac MRI is complete.      2.  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  We will continue to monitor and we repeated labs today.  3.  Macrocytic anemia: He has mild chronic kidney disease.  This could potentially be secondary to alcohol use as well.  Resolved and hgb normal.  MCV slightly elevated.    4.  Thrombocytopenia.:  This is mild and we will continue to monitor this.  5.  Right renal mass which is chronic.  He is followed by urology.  6.  Claudication with peripheral vascular disease followed by Dr. Freeman.    7.  Low back pain with an MRI on 5/18/2017 showing degenerative changes at multiple levels  8.  COPD: He follows with Dr. Poole now and is on oxygen and inhalers.  9.  Peripheral neuropathy: He told me about this for the first time at his last visit.  Fat pad biopsy currently pending.    PLAN:  1.  At this point he will initiate FAM plus doxycycline through the [facility]  2.  He will have a cardiac MRI and fat pad biopsy also through the [facility] in the next few weeks  3.  I will see him back after that to review the results and likely to make plans to initiate therapy with CyBorD at that time.     4.  I will continue to communicate with Dr. Reed at [facility].

## 2019-03-25 LAB
ALBUMIN SERPL-MCNC: 3.7 G/DL (ref 2.9–4.4)
ALBUMIN/GLOB SERPL: 1.2 {RATIO} (ref 0.7–1.7)
ALPHA1 GLOB FLD ELPH-MCNC: 0.2 G/DL (ref 0–0.4)
ALPHA2 GLOB SERPL ELPH-MCNC: 0.7 G/DL (ref 0.4–1)
B-GLOBULIN SERPL ELPH-MCNC: 0.9 G/DL (ref 0.7–1.3)
GAMMA GLOB SERPL ELPH-MCNC: 1.3 G/DL (ref 0.4–1.8)
GLOBULIN SER CALC-MCNC: 3.2 G/DL (ref 2.2–3.9)
IGA SERPL-MCNC: 198 MG/DL (ref 61–437)
IGG SERPL-MCNC: 796 MG/DL (ref 700–1600)
IGM SERPL-MCNC: 325 MG/DL (ref 15–143)
INTERPRETATION SERPL IEP-IMP: ABNORMAL
KAPPA LC SERPL-MCNC: 36.1 MG/L (ref 3.3–19.4)
KAPPA LC/LAMBDA SER: 1 {RATIO} (ref 0.26–1.65)
LAMBDA LC FREE SERPL-MCNC: 36 MG/L (ref 5.7–26.3)
Lab: ABNORMAL
M-SPIKE: 0.2 G/DL
PROT SERPL-MCNC: 6.9 G/DL (ref 6–8.5)

## 2019-04-29 ENCOUNTER — LAB (OUTPATIENT)
Dept: LAB | Facility: HOSPITAL | Age: 74
End: 2019-04-29

## 2019-04-29 ENCOUNTER — OFFICE VISIT (OUTPATIENT)
Dept: ONCOLOGY | Facility: CLINIC | Age: 74
End: 2019-04-29

## 2019-04-29 VITALS
SYSTOLIC BLOOD PRESSURE: 129 MMHG | BODY MASS INDEX: 36.7 KG/M2 | HEIGHT: 69 IN | TEMPERATURE: 97.9 F | HEART RATE: 84 BPM | DIASTOLIC BLOOD PRESSURE: 79 MMHG | RESPIRATION RATE: 16 BRPM | WEIGHT: 247.8 LBS | OXYGEN SATURATION: 97 %

## 2019-04-29 DIAGNOSIS — E85.81 AL AMYLOIDOSIS (HCC): ICD-10-CM

## 2019-04-29 DIAGNOSIS — D47.2 MONOCLONAL GAMMOPATHY: Primary | ICD-10-CM

## 2019-04-29 LAB
BASOPHILS # BLD AUTO: 0.04 10*3/MM3 (ref 0–0.2)
BASOPHILS NFR BLD AUTO: 0.8 % (ref 0–1.5)
DEPRECATED RDW RBC AUTO: 51.8 FL (ref 37–54)
EOSINOPHIL # BLD AUTO: 0.13 10*3/MM3 (ref 0–0.4)
EOSINOPHIL NFR BLD AUTO: 2.7 % (ref 0.3–6.2)
ERYTHROCYTE [DISTWIDTH] IN BLOOD BY AUTOMATED COUNT: 14.2 % (ref 12.3–15.4)
HCT VFR BLD AUTO: 40.3 % (ref 37.5–51)
HGB BLD-MCNC: 14.4 G/DL (ref 13–17.7)
IMM GRANULOCYTES # BLD AUTO: 0.07 10*3/MM3 (ref 0–0.05)
IMM GRANULOCYTES NFR BLD AUTO: 1.5 % (ref 0–0.5)
LYMPHOCYTES # BLD AUTO: 1.21 10*3/MM3 (ref 0.7–3.1)
LYMPHOCYTES NFR BLD AUTO: 25.4 % (ref 19.6–45.3)
MCH RBC QN AUTO: 35.7 PG (ref 26.6–33)
MCHC RBC AUTO-ENTMCNC: 35.7 G/DL (ref 31.5–35.7)
MCV RBC AUTO: 100 FL (ref 79–97)
MONOCYTES # BLD AUTO: 0.61 10*3/MM3 (ref 0.1–0.9)
MONOCYTES NFR BLD AUTO: 12.8 % (ref 5–12)
NEUTROPHILS # BLD AUTO: 2.7 10*3/MM3 (ref 1.7–7)
NEUTROPHILS NFR BLD AUTO: 56.8 % (ref 42.7–76)
NRBC BLD AUTO-RTO: 0 /100 WBC (ref 0–0.2)
PLATELET # BLD AUTO: 126 10*3/MM3 (ref 140–450)
PMV BLD AUTO: 11.4 FL (ref 6–12)
RBC # BLD AUTO: 4.03 10*6/MM3 (ref 4.14–5.8)
WBC NRBC COR # BLD: 4.76 10*3/MM3 (ref 3.4–10.8)

## 2019-04-29 PROCEDURE — 36415 COLL VENOUS BLD VENIPUNCTURE: CPT | Performed by: INTERNAL MEDICINE

## 2019-04-29 PROCEDURE — 85025 COMPLETE CBC W/AUTO DIFF WBC: CPT | Performed by: INTERNAL MEDICINE

## 2019-04-29 PROCEDURE — 99215 OFFICE O/P EST HI 40 MIN: CPT | Performed by: INTERNAL MEDICINE

## 2019-04-29 RX ORDER — MONTELUKAST SODIUM 10 MG/1
10 TABLET ORAL NIGHTLY
Status: ON HOLD | COMMUNITY
Start: 2019-03-18 | End: 2022-06-12

## 2019-04-29 NOTE — PROGRESS NOTES
[facility] GROUP OUTPATIENT FOLLOW UP CLINIC VISIT    REASON FOR FOLLOW-UP:    1. 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  Macrocytic anemia  3.  Mild leukopenia  4.  Left lung pulmonary nodules, stable on recent imaging.    HISTORY OF PRESENT ILLNESS:  Rafael Gomez is a 73 y.o. male who returns today for follow up of the above issues.     [facility] can get [facility]-connected for plasmacytoma/amyloidosis.  [facility] [place] [facility] and saw Dr. Reed there.  They have requested his pathology for review.    He complains of peripheral neuropathy in his hands and feet.  This has been worsening.  He reports some numbness and tingling as well as occasional pain.      He was discussed further in [place] after review of his pathology.  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  A fat pad biopsy and cardiac MRI were recommended.  Treatment with CyBorD was recommended.      Respiratory status has improved after participating in pulmonary rehabilitation.    [facility] that showed no evidence for amyloidosis.    He [facility] an [facility] the [facility] that was concerning for cervical myelopathy rather than a neuropathic process consistent with amyloidosis.    He did not have a fat pad biopsy.      HEMATOLOGIC HISTORY:  The patient had complained of some dyspnea on exertion. He has some left knee pain following a left knee replacement. He eventually was referred for further care to Dr. Aba Freeman with vascular surgery. He was found to have evidence of peripheral vascular disease and is scheduled for a procedure in her future. He was also found to have a complex irregular cystic mass measuring 5.7 cm in the right kidney. He has been referred to urology for this. There was a 12 mm nodule in the lingula. He was referred to Dr. Larry Hernandez for this.  He had a CT scan of the chest with contrast  on 5/25/2016. This showed 3 pleural-based nodules at the periphery of the left upper lobe. One measured 15 mm and the other measured 16 mm. The other was 6 mm. There is also a noncalcified 6 mm nodule.  He had a wedge resection on 6/2/2016. Two samples were sent to pathology. One showed plasma cells with evidence for amyloid. Further testing at [facility] showed IgG kappa restriction. The other wedge resection showed tiny nonnecrotizing granulomas.    Serum protein electrophoresis showed a 0.1 g M spike with immunofixation showing IgM lambda monoclonality.  Serum free light chain ratio was normal.  Ferritin 226.  Iron profile normal.  Vitamin B12 greater than 2000.    Serum protein levels in March 2017 are very stable with 0.1 g M spike.  He will follow-up in one year.  He continues to follow-up with Dr. Hernandez for CT scans.    CT imaging on 5/23/2017 showed an increase in size of 2 lingular lesions now measuring 2.2 cm and 1.0 cm.  There is an 8 mm left lower lobe pulmonary nodule also slightly increased in size.  Several other tiny pulmonary nodules bilaterally which are stable.    His M spike increased to 0.2 g.    He had a PET scan on 6/8/2017 showing several hypermetabolic left lung pulmonary nodules with a 2.2 cm pleural-based left upper lobe nodule.    His case was presented at the multidisciplinary thoracic conference.  It was elected to follow him with serial CT scans with the biopsy if the lung nodules continued increase in size.  It was felt that it was too risky to attempt a biopsy at this point.    Repeat CT imaging on 9/11/2017 shows stable findings.  M spike 0.1 g.      CT imaging on 8/20/2018 with a slight increase in size a couple of pulmonary nodules.  M spike is 0.2 g.  Follow-up with thoracic surgery for imaging and we will see him back in 6 months for follow-up with labs in 1 week prior.    PET scan done at [place] on 10/23/2018 with similar findings.    [facility] [place]  [facility] and saw Dr. Reed there.  They have requested his pathology for review.    He complains of peripheral neuropathy in his hands and feet.  This has been worsening.  He reports some numbness and tingling as well as occasional pain.      He was discussed further in [place] after review of his pathology.  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  A fat pad biopsy and cardiac MRI were recommended.  Treatment with CyBorD was recommended.      Respiratory status has improved after participating in pulmonary rehabilitation.    [facility] an [facility] that showed no evidence for amyloidosis.    He had an EMG at the [facility] that was concerning for cervical myelopathy rather than a neuropathic process consistent with amyloidosis.    He did not have a fat pad biopsy.        PAST MEDICAL, SURGICAL, FAMILY, AND SOCIAL HISTORIES were reviewed with the patient and in the electronic medical record, and were updated if indicated.    ALLERGIES:  No Known Allergies    MEDICATIONS:  The medication list has been reviewed with the patient by the medical assistant, and the list has been updated in the electronic medical record, which I reviewed.  Medication dosages and frequencies were confirmed to be accurate.    REVIEW OF SYSTEMS:  PAIN:  See Vital Signs below.  GENERAL:  Chronic fatigue  SKIN:  Did not complain of rash today  HEME/LYMPH:  No abnormal bleeding.  No palpable lymphadenopathy.  EYES:  No vision changes or diplopia.  ENT:  No sore throat or difficulty swallowing.  See history of present illness  RESPIRATORY:  Chronic shortness of breath and dyspnea on exertion  CARDIOVASCULAR:  No chest pain, palpitations, orthopnea  GASTROINTESTINAL:  No abdominal pain, nausea, vomiting, constipation, diarrhea, melena, or hematochezia.  GENITOURINARY:  No dysuria or hematuria.  MUSCULOSKELETAL:  Chronic back pain  NEUROLOGIC:  No dizziness, loss of consciousness, or seizures.  Numbness and tingling in his fingers and toes as  "well as occasional painful peripheral neuropathy.  PSYCHIATRIC:  No depression, anxiety, or mood changes.    Vitals:    04/29/19 1455   BP: 129/79   Pulse: 84   Resp: 16   Temp: 97.9 °F (36.6 °C)   TempSrc: Oral   SpO2: 97%   Weight: 112 kg (247 lb 12.8 oz)   Height: 175 cm (68.9\")   PainSc: 0-No pain  Comment: monoclonal gammopathy       PHYSICAL EXAMINATION:  GENERAL:  Obese.  Well-developed well-nourished male; awake, alert and oriented, in no acute distress.    HEAD:  Normocephalic, atraumatic.  EARS:  Hearing intact.  NOSE:  Septum midline.  No excoriations or nasal discharge.  MOUTH:  No stomatitis or ulcers.  Lips are normal.  THROAT:  Oropharynx without lesions or exudates.  CHEST:  Lungs are clear to auscultation bilaterally.  No wheezes, rales, or rhonchi.  There is some soft tissue swelling in the left lateral chest in the axillary line  HEART:  Regular rate; normal rhythm.  Grade II/VI systolic murmur.    EXTREMITIES:  No clubbing, cyanosis, or edema.  NEUROLOGICAL:  Decreased sensation to light touch in his hands.      DIAGNOSTIC DATA:  Results for orders placed or performed in visit on 04/29/19   CBC Auto Differential   Result Value Ref Range    WBC 4.76 3.40 - 10.80 10*3/mm3    RBC 4.03 (L) 4.14 - 5.80 10*6/mm3    Hemoglobin 14.4 13.0 - 17.7 g/dL    Hematocrit 40.3 37.5 - 51.0 %    .0 (H) 79.0 - 97.0 fL    MCH 35.7 (H) 26.6 - 33.0 pg    MCHC 35.7 31.5 - 35.7 g/dL    RDW 14.2 12.3 - 15.4 %    RDW-SD 51.8 37.0 - 54.0 fl    MPV 11.4 6.0 - 12.0 fL    Platelets 126 (L) 140 - 450 10*3/mm3    Neutrophil % 56.8 42.7 - 76.0 %    Lymphocyte % 25.4 19.6 - 45.3 %    Monocyte % 12.8 (H) 5.0 - 12.0 %    Eosinophil % 2.7 0.3 - 6.2 %    Basophil % 0.8 0.0 - 1.5 %    Immature Grans % 1.5 (H) 0.0 - 0.5 %    Neutrophils, Absolute 2.70 1.70 - 7.00 10*3/mm3    Lymphocytes, Absolute 1.21 0.70 - 3.10 10*3/mm3    Monocytes, Absolute 0.61 0.10 - 0.90 10*3/mm3    Eosinophils, Absolute 0.13 0.00 - 0.40 10*3/mm3    " Basophils, Absolute 0.04 0.00 - 0.20 10*3/mm3    Immature Grans, Absolute 0.07 (H) 0.00 - 0.05 10*3/mm3    nRBC 0.0 0.0 - 0.2 /100 WBC     Labs from 2/26/2018:  Immunofixation IgM lambda monoclonal protein with a monomer.  Serum protein electrophoresis with a 0.2 g IgM lambda M spike with a second conformal protein unable to be quantitated.  Serum free light chain K/L ratio normal at 0.4.    IgM 325, IgA 193, IgG 792    Labs from 8/20/2018  IgG 946, IgA 208, IgM 348  0.2 g M spike  Serum free light chain K/L ratio normal at 0.82    IMAGING:  CT images from 12/18/2017: Stable.  Unchanged pulmonary nodules.  Followed by thoracic surgery.    CT images from 8/22/2018:    Multiple bilateral noncalcified pulmonary nodules, more  numerous on the left than the right that are unchanged since the most  recent chest CT dated 5/25/2018, but demonstrate slight, but definite  increase in size when compared to more remote CTs dating back to  9/11/2017. No new pulmonary nodules are identified.    PET scan performed at [place] on 10/23/2018: This was compared to imaging from 6/8/2017.    CHEST: No hypermetabolic thoracic lymphadenopathy is identified. There is a suture line and scarring superiorly at the left upper lobe. Medially in the left upper lobe there is a pleural-based solid pulmonary nodule which measures 1.6 cm and previously   measured 8 mm. The nodule exhibits low level tracer uptake with a maximum SUV of 1.5. Mediastinal background activity for reference is 0.7. Several areas of nodular pleural thickening are noted at the left upper lobe which have increased from the prior   examination. The largest is anteriorly in the left upper lobe on image #55 and measures 2.0 cm. Maximum SUV is 1.6. There is a pleural-based nodule at the left lung base on image #66 which measures 1.3 cm and has a maximum SUV of 0.9. Pleural-based   nodule medially in the left lower lobe adjacent to the descending thoracic aorta measures 1.5 cm and  previously measured 7 mm. The maximum SUV is 1.2. Low level tracer uptake is noted in the right hilum with maximum SUV of 1.3.      ABDOMEN AND PELVIS: There is normal tracer activity in the solid abdominal organs. There is a focus of asymmetric increased tracer uptake at the cecum adjacent to the ileocecal valve. No definite mass is seen on the CT images. The maximum SUV is 2.6.   There is similar increased tracer uptake at this location on the prior examination.. No hypermetabolic mesenteric or retroperitoneal lymphadenopathy is seen.    In the pelvis, there is normal tracer uptake without hypermetabolic mass or lymphadenopathy.    SKELETON: No focal increased skeletal uptake is seen.            ASSESSMENT:  This is a 73 y.o. male with:  1.  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  He had a wedge resection.  Dr. Hernandez was  following a couple of other small pulmonary nodules on the left which initially increased in size.  His M spike continues to be stable.  The PET scan did not show anything we didn't expect from the CT scan.  The left upper lobe lung nodule is too risky to biopsy base of its location.  His case was presented at the multidisciplinary thoracic conference and recommendations included to follow him with serial CT scans with biopsy if it appears to be safer.  CT imaging on 12/18/2017 was stable.  CT imaging on 5/25/2018 showed that one of the pleural based nodules and enlarged slightly measuring 2.3 cm.  CT imaging from 8/22/2018 shows some slight increase in size of the pulmonary nodules with the largest measuring now 2.0 cm 3 other tiny noncalcified nodules in the right lung are stable, only showing a slight increase since 2017.  I did reiterate today that I think most of his respiratory symptoms are secondary to his COPD and not from the small pulmonary nodules.  If any do grow significantly in size, we could consider radiation.  He would  likely not be a surgical candidate considering his oxygen dependence.  There is some question as to how much of this is amyloid.  There is some question as to whether this is primary or reactive.  [facility] [place] [facility] Reed, and they requested his pathology for review.  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    They recommended treatment with FAM (inhaled fluticasone, azithromycin 250 mg on Mondays Wednesdays and Fridays, montelukast daily) plus doxycycline 100 mg twice daily for his lungs.  He did initiate this through the [facility] in February 2019.    A cardiac MRI was performed at the [facility].  This was not consistent with involvement by amyloidosis.  He had an EMG also at the [facility] that was concerning for the possibility of cervical myelopathy rather than a process consistent with amyloidosis.  He had an MRI of the cervical spine at the [facility] but he did not bring this result with him today.    He did not have a fat pad biopsy    It has been recommended from [facility] that he initiate therapy with Velcade, Cytoxan, and dexamethasone (CyBorD).  I spoke with him and his wife regarding this treatment plan again today.      I will communicate with Dr. Reed to make sure she has been able to review the MRI and EMG and to see how strongly she feels he needs a fat pad biopsy.        2.  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  We will continue to monitor.  Stable.  3.  Macrocytic anemia: He has mild chronic kidney disease.  This could potentially be secondary to alcohol use as well.  Resolved and hgb normal.  MCV slightly elevated.    4.  Thrombocytopenia.:  This is mild and we will continue to monitor this.  5.  Right renal mass which is chronic.  He is followed by urology.  6.  Claudication with peripheral vascular disease followed by Dr. Freeman.    7.  Low back pain with an MRI on 5/18/2017 showing degenerative changes at multiple levels  8.  COPD: He follows with Dr. Poole now  and is on oxygen and inhalers.  9.  Peripheral neuropathy: EMG completed.  MRI cervical spine complete.  He will bring us the results.    PLAN:  1.  Continue FAM plus doxycycline through the [facility]  2.  He and his wife will bring the cervical spine MRI result.  3.  I will communicate with Dr. Reed at [place] and the [facility]  4.  Consider starting therapy with CyBorD depending on the current opinion from [facility] following review of his most recent imaging and EMG results.  5.  I will follow-up with him by phone in the next few days

## 2019-05-07 ENCOUNTER — TELEPHONE (OUTPATIENT)
Dept: ONCOLOGY | Facility: CLINIC | Age: 74
End: 2019-05-07

## 2019-05-07 NOTE — TELEPHONE ENCOUNTER
Discussed with Dr. Reed.  Fat pad bx recommended.  Discussed by phone with patient's wife.  She will try to get this done at the [facility].  Dr. Reed was going to try to reach out to [facility] here.  If not, we can do here at [facility].  MRI cervical spine results reviewed.  They are going to discuss this with the [facility] doctor next week at his appointment to make sure nothing needs to be done about this surgically.

## 2019-05-14 ENCOUNTER — TELEPHONE (OUTPATIENT)
Dept: ONCOLOGY | Facility: HOSPITAL | Age: 74
End: 2019-05-14

## 2019-05-14 DIAGNOSIS — D47.2 MONOCLONAL GAMMOPATHY: ICD-10-CM

## 2019-05-14 DIAGNOSIS — E85.81 AL AMYLOIDOSIS (HCC): Primary | ICD-10-CM

## 2019-05-14 NOTE — TELEPHONE ENCOUNTER
----- Message from Beverly Mayo sent at 5/14/2019  3:29 PM EDT -----  [phone]  [facility] left [facility] here [facility] [place]  And we need to resolve the biopsy issue and [facility] said Dr. Heredia would do it.

## 2019-05-14 NOTE — PROGRESS NOTES
Referral placed for general surgery per VO Dr Heredia for pt to have a fat pad biopsy.  Pt did not have a surgical MD preference  Message to appt desk to arrange

## 2019-05-14 NOTE — TELEPHONE ENCOUNTER
PT'S WIFE CALLING. [facility] TO [facility] AND THEY WERE TOLD THAT THEY COULD NOT PERFORM FAT PAD BX THAT WAS NEEDED. WIFE WANTS US TO PLACE ORDERS AND PT HAVE DONE AT [facility]. PER DR. CASAS PT NEEDS TO BE REFERRED TO A GENERAL SURGEON 1ST. PT DOESN'T HAVE ANYONE IN PARTICULAR THEY WANT, THEY SAID PICK WHOEVER. SKYE TO PLACE ORDERS FOR REFERRAL. WIFE AWARE.

## 2019-06-04 ENCOUNTER — TELEPHONE (OUTPATIENT)
Dept: SURGERY | Facility: CLINIC | Age: 74
End: 2019-06-04

## 2019-06-04 ENCOUNTER — OFFICE VISIT (OUTPATIENT)
Dept: SURGERY | Facility: CLINIC | Age: 74
End: 2019-06-04

## 2019-06-04 VITALS — WEIGHT: 246 LBS | HEART RATE: 83 BPM | BODY MASS INDEX: 33.32 KG/M2 | OXYGEN SATURATION: 98 % | HEIGHT: 72 IN

## 2019-06-04 DIAGNOSIS — K42.9 UMBILICAL HERNIA WITHOUT OBSTRUCTION AND WITHOUT GANGRENE: ICD-10-CM

## 2019-06-04 DIAGNOSIS — E85.9 AMYLOIDOSIS, UNSPECIFIED TYPE (HCC): Primary | ICD-10-CM

## 2019-06-04 DIAGNOSIS — C90.30 PLASMACYTOMA (HCC): ICD-10-CM

## 2019-06-04 PROCEDURE — 88304 TISSUE EXAM BY PATHOLOGIST: CPT | Performed by: SURGERY

## 2019-06-04 PROCEDURE — 11106 INCAL BX SKN SINGLE LES: CPT | Performed by: SURGERY

## 2019-06-04 PROCEDURE — 88313 SPECIAL STAINS GROUP 2: CPT | Performed by: SURGERY

## 2019-06-04 PROCEDURE — 12031 INTMD RPR S/A/T/EXT 2.5 CM/<: CPT | Performed by: SURGERY

## 2019-06-04 NOTE — PROGRESS NOTES
General Surgery  Initial Office Visit    CC: Amyloidosis, need for fat pad biopsy    HPI: The patient is a pleasant 74 y.o. year-old gentleman who presents today for discussion of a possible fat pad biopsy.  He was diagnosed with plasmacytoma via a left lung wedge resection by Dr. Hernandez in 2016.  The tissue was sent off to the [facility] where stains were positive for amyloid.  He then underwent a CT-guided lung nodule biopsy in 2018 and the tissue also demonstrated amyloid deposition.  He also had a bone marrow biopsy recently that showed normal trilineage hematopoiesis.  He has been evaluated at the [facility] and it was recommended that he undergo fat pad biopsy to definitively determine what type of amyloidosis he has to help guide chemotherapy plans.  He was referred to see me to discuss this possible fat pad biopsy.    Past Medical History:   Hypertension  Hyperlipidemia  Peripheral vascular disease  Post manic stress disorder  Restless leg syndrome  Obstructive sleep apnea  History of stroke  GERD  Gout  Coronary artery disease  COPD  Depression  Anemia  Anxiety  Plasmacytoma with possible amyloidosis    Past Surgical History:   Left thoracoscopy with left upper lobe wedge resection x2 (2016, Dr. Hernandez)  Colonoscopy (1995 in [place])  Hiatal hernia repair  Carotid endarterectomy  Tonsillectomy  Appendectomy  Left knee replacement  Right hip replacement    Medications:   Tylenol 650 mg twice daily  Albuterol inhaler 2 puffs every 4 hours as needed for wheezing  Allopurinol 300 mg daily  Amlodipine 2.5 mg daily  Aspirin 81 mg twice daily  Atorvastatin 40 mg daily  CBD oil as needed for pain  Cetirizine 10 mg daily  Cilostazol 100 mg daily  Citalopram 20 mg daily  Glucosamine 1000 mg daily  Hydrochlorothiazide 12.5 mg daily  L-methylfolate-B6-B12 1 tablet twice daily  Lorazepam 0.5 mg as needed for anxiety  Metoprolol 50 mg twice daily  Omega-3 fatty acids 1000 mg daily  Omeprazole 20  mg daily  Pramipexole 0.25 mg daily  Salmeterol inhaler once daily  Viagra 100 mg as needed before sexual intercourse  Flomax 0.4 mg nightly  Telmisartan 80 mg daily  Temazepam 15 mg nightly as needed for sleep  Terra Zosyn 2 mg every evening  Spiriva inhaler as needed    Allergies: No known drug allergies    Family History: Brother with history of leukemia, father with history of prostate cancer, mother with history of multiple myeloma    Social History: , 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, quit smoking in 1997 after smoking 1 pack/day for about 20 years duration, drinks 4-5 beers daily    ROS:   Constitutional: Positive for fatigue; negative for fevers or chills  HENT: Positive for congestion, drooling, hearing loss, sneezing, and sore throat; negative for hearing loss or runny nose  Eyes: Negative for vision changes or scleral icterus  Respiratory: Positive for sleep apnea; negative for cough or shortness of breath  Cardiovascular: Positive for chest pain and leg swelling; negative for heart palpitations  Gastrointestinal: Positive for rectal bleeding, rectal pain, and reflux; negative for abdominal pain, nausea, vomiting, constipation, or melena  Genitourinary: Positive for increased urinary frequency; negative for hematuria or dysuria  Musculoskeletal: Positive for joint pain, back pain, gait problem, joint swelling, muscle pain, neck pain, and neck stiffness  Neurologic: Positive for dizziness, lightheadedness, tremors, and weakness; negative for seizures  Psychiatric: Positive for anxiety, sleep disturbance, and nervousness; negative for suicidal ideations or depression  All other systems reviewed and negative    Physical Exam:  Vitals:    06/04/19 0805   Pulse: 83   SpO2: 98%     General: No acute distress, well-nourished & well-developed  HEAD: normocephalic, atraumatic  EYES: normal conjunctiva, sclera anicteric  EARS: grossly normal hearing  NECK: supple, no thyromegaly  CARDIOVASCULAR: regular  rate and rhythm  RESPIRATORY: clear to auscultation bilaterally  GASTROINTESTINAL: soft, nontender, non-distended, upper midline rectus diastases noted with no fascial defect appreciated there, small fascial defect at the base of the umbilicus that is reducible consistent with a moderate size umbilical hernia  MUSCULOSKELETAL: normal gait and station. No gross extremity abnormalities  PSYCHIATRIC: oriented x3, normal mood and affect    Procedure Note:  Pre-Operative Diagnosis: Possible amyloidosis  Post-Operative Diagnosis: Same  Procedure performed: Excisional fat pad biopsy of the abdominal wall  Surgeon: Leonor Mehta MD  Assistant: None  Anesthesia: Local (1% Lidocaine with epinephrine)  Complications: None  EBL: Minimal  Specimens: Abdominal wall fat pad biopsy  Description of Procedure: The patient was brought to the minor procedure room and ming supine on the examining table.  His anterior abdominal wall was shaved, prepped, and draped in sterile fashion.  A surgical timeout was completed.  A location along the left upper quadrant abdominal wall was selected and anesthetized using 1% lidocaine with epinephrine.  A 1 cm x 1 cm cube was drawn and excised via an elliptical skin incision measuring 5 cm x 1.5 cm through the subcutaneous fat down to the muscular fascia to a depth of 2 cm.  This was passed off to pathology in formalin as an abdominal wall fat pad biopsy.  The subcutaneous wound was inspected and hemostasis achieved using electrocautery.  The incision was then closed primarily using interrupted 3-0 Vicryl deep dermal sutures, a running 4-0 Vicryl subcuticular stitch, and skin affix.      ASSESSMENT & PLAN  Mr. Gomez is a 74-year-old gentleman with possible amyloidosis who I performed a fat pad biopsy on today here in the office under local anesthesia.  He tolerated the procedure well and can follow-up with me as needed.  I will follow-up on the pathology results and forward them on to   Rafael Heredia.  If he is in need of a PowerPort for initiation of systemic chemotherapy, I would be happy to place this for him.  I also discussed his umbilical hernia and the possibility of fixing this later down the road.  He said that he will come back to speak with me in the future once he has tackled to the amyloidosis issue if he feels strong enough to undergo an umbilical hernia repair.    Leonor Mehta MD  General and Endoscopic Surgery  [facility]    [address], [address]  [place], [place], [address]  P: [phone]  F: [phone]

## 2019-06-04 NOTE — TELEPHONE ENCOUNTER
Patient's wife called the office with concern that the patient's incision is quite swollen s/p abd wall fat bx done today. The area around the incision is okay, no skin color change. Advised Liliya that since the procedure was about 3 hrs ago, it is likely the patient has developed a hematoma at the incision site. The patient takes 81 mg ASA BID so he will bleed a little more than the average person and the bx site was quite large so there is a larger cavity to fill with blood. Instructed the wife to have Mr. Gomez apply an ice pack to the area. Instructed her to apply a bandage if the incision begins to ooze out from underneath the Skin Affix. She will call back if the area continues to increase in size or if he develops any new symptoms.

## 2019-06-06 LAB
CYTO UR: NORMAL
LAB AP CASE REPORT: NORMAL
LAB AP CLINICAL INFORMATION: NORMAL
LAB AP SPECIAL STAINS: NORMAL
PATH REPORT.FINAL DX SPEC: NORMAL
PATH REPORT.GROSS SPEC: NORMAL

## 2019-06-20 ENCOUNTER — HOSPITAL ENCOUNTER (OUTPATIENT)
Dept: SURGERY | Facility: HOSPITAL | Age: 74
Setting detail: HOSPITAL OUTPATIENT SURGERY
Discharge: HOME | End: 2019-06-20
Attending: OPHTHALMOLOGY

## 2019-06-24 ENCOUNTER — OFFICE VISIT (OUTPATIENT)
Dept: ONCOLOGY | Facility: CLINIC | Age: 74
End: 2019-06-24

## 2019-06-24 ENCOUNTER — APPOINTMENT (OUTPATIENT)
Dept: LAB | Facility: HOSPITAL | Age: 74
End: 2019-06-24

## 2019-06-24 VITALS
TEMPERATURE: 98.4 F | BODY MASS INDEX: 35.89 KG/M2 | HEART RATE: 91 BPM | DIASTOLIC BLOOD PRESSURE: 72 MMHG | SYSTOLIC BLOOD PRESSURE: 132 MMHG | WEIGHT: 242.3 LBS | OXYGEN SATURATION: 95 % | RESPIRATION RATE: 16 BRPM | HEIGHT: 69 IN

## 2019-06-24 DIAGNOSIS — D47.2 MONOCLONAL GAMMOPATHY: Primary | ICD-10-CM

## 2019-06-24 DIAGNOSIS — E85.81 AL AMYLOIDOSIS (HCC): Primary | ICD-10-CM

## 2019-06-24 DIAGNOSIS — G95.9 CERVICAL MYELOPATHY (HCC): ICD-10-CM

## 2019-06-24 DIAGNOSIS — R06.09 DYSPNEA ON EXERTION: ICD-10-CM

## 2019-06-24 LAB
BASOPHILS # BLD AUTO: 0.06 10*3/MM3 (ref 0–0.2)
BASOPHILS NFR BLD AUTO: 0.9 % (ref 0–1.5)
DEPRECATED RDW RBC AUTO: 53 FL (ref 37–54)
EOSINOPHIL # BLD AUTO: 0.17 10*3/MM3 (ref 0–0.4)
EOSINOPHIL NFR BLD AUTO: 2.6 % (ref 0.3–6.2)
ERYTHROCYTE [DISTWIDTH] IN BLOOD BY AUTOMATED COUNT: 14.4 % (ref 12.3–15.4)
HCT VFR BLD AUTO: 36.5 % (ref 37.5–51)
HGB BLD-MCNC: 12.9 G/DL (ref 13–17.7)
IMM GRANULOCYTES # BLD AUTO: 0.04 10*3/MM3 (ref 0–0.05)
IMM GRANULOCYTES NFR BLD AUTO: 0.6 % (ref 0–0.5)
LYMPHOCYTES # BLD AUTO: 1.24 10*3/MM3 (ref 0.7–3.1)
LYMPHOCYTES NFR BLD AUTO: 19 % (ref 19.6–45.3)
MCH RBC QN AUTO: 35.6 PG (ref 26.6–33)
MCHC RBC AUTO-ENTMCNC: 35.3 G/DL (ref 31.5–35.7)
MCV RBC AUTO: 100.8 FL (ref 79–97)
MONOCYTES # BLD AUTO: 0.63 10*3/MM3 (ref 0.1–0.9)
MONOCYTES NFR BLD AUTO: 9.6 % (ref 5–12)
NEUTROPHILS # BLD AUTO: 4.4 10*3/MM3 (ref 1.7–7)
NEUTROPHILS NFR BLD AUTO: 67.3 % (ref 42.7–76)
NRBC BLD AUTO-RTO: 0.3 /100 WBC (ref 0–0.2)
PLATELET # BLD AUTO: 140 10*3/MM3 (ref 140–450)
PMV BLD AUTO: 10.5 FL (ref 6–12)
RBC # BLD AUTO: 3.62 10*6/MM3 (ref 4.14–5.8)
WBC NRBC COR # BLD: 6.54 10*3/MM3 (ref 3.4–10.8)

## 2019-06-24 PROCEDURE — 85025 COMPLETE CBC W/AUTO DIFF WBC: CPT | Performed by: INTERNAL MEDICINE

## 2019-06-24 PROCEDURE — 99215 OFFICE O/P EST HI 40 MIN: CPT | Performed by: INTERNAL MEDICINE

## 2019-06-24 PROCEDURE — 36416 COLLJ CAPILLARY BLOOD SPEC: CPT | Performed by: INTERNAL MEDICINE

## 2019-06-24 NOTE — PROGRESS NOTES
[facility] GROUP OUTPATIENT FOLLOW UP CLINIC VISIT    REASON FOR FOLLOW-UP:    1. 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  Macrocytic anemia  3.  Mild leukopenia  4.  Left lung pulmonary nodules, stable on follow up imaging.  5.  [facility] he can get service-connected for plasmacytoma/amyloidosis.  [facility] [place] [facility] further evaluation and saw Dr. Reed there. His case was discussed further in [place] after review of his pathology.  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  A fat pad biopsy and cardiac MRI were recommended.  Treatment with CyBorD was recommended.    6.  Peripheral neuropathy in his hands and feet.  [facility] that was concerning for cervical myelopathy rather than a neuropathic process consistent with amyloidosis.  MRI of the cervical spine performed [facility] the [facility] on 4/2/2019 shows multilevel degenerative change with moderate spinal canal stenosis at C4-C5 and C5-C6 and mild spinal canal stenosis at C6-C7.  Severe foraminal narrowing at C4-C5 and C5-C6 with moderate neuroforaminal narrowing at C6-C7.  7.  Cardiac MRI performed on 4/16/2019 at the [facility] with normal left ventricle wall thickness with right ventricular cavity enlargement.  No aneurysm.  No fibrosis or scarring to suggest an infiltrative process such as amyloidosis.  Left ventricular ejection fraction 47%.  8.  Negative fat pad biopsy on 6/4/2019      HISTORY OF PRESENT ILLNESS:  Rafael Gomez is a 74 y.o. male who returns today for follow up of the above issues.     [facility] and brings the images in today.  We have already received the report.  He continues to have dyspnea on exertion.  He remains profoundly fatigued.  He sleeps a lot.  He complains of numbness in his hands and feet.  He has numbness and tingling also down the left side of his neck that is positional.  I thought he was going  to see a neurosurgeon at the [facility] but he did not.    He did have a fat pad biopsy done by Dr. Mehta.  He had significant bruising on the abdomen following the procedure.  This continues to evolve.    HEMATOLOGIC HISTORY:  The patient had complained of some dyspnea on exertion. He has some left knee pain following a left knee replacement. He eventually was referred for further care to Dr. Aba Freeman with vascular surgery. He was found to have evidence of peripheral vascular disease and is scheduled for a procedure in her future. He was also found to have a complex irregular cystic mass measuring 5.7 cm in the right kidney. He has been referred to urology for this. There was a 12 mm nodule in the lingula. He was referred to Dr. Larry Hernandez for this.  He had a CT scan of the chest with contrast on 5/25/2016. This showed 3 pleural-based nodules at the periphery of the left upper lobe. One measured 15 mm and the other measured 16 mm. The other was 6 mm. There is also a noncalcified 6 mm nodule.  He had a wedge resection on 6/2/2016. Two samples were sent to pathology. One showed plasma cells with evidence for amyloid. Further testing at [facility] showed IgG kappa restriction. The other wedge resection showed tiny nonnecrotizing granulomas.    Serum protein electrophoresis showed a 0.1 g M spike with immunofixation showing IgM lambda monoclonality.  Serum free light chain ratio was normal.  Ferritin 226.  Iron profile normal.  Vitamin B12 greater than 2000.    Serum protein levels in March 2017 are very stable with 0.1 g M spike.  He will follow-up in one year.  He continues to follow-up with Dr. Hernandez for CT scans.    CT imaging on 5/23/2017 showed an increase in size of 2 lingular lesions now measuring 2.2 cm and 1.0 cm.  There is an 8 mm left lower lobe pulmonary nodule also slightly increased in size.  Several other tiny pulmonary nodules bilaterally which are stable.    His M spike increased to 0.2  g.    He had a PET scan on 6/8/2017 showing several hypermetabolic left lung pulmonary nodules with a 2.2 cm pleural-based left upper lobe nodule.    His case was presented at the multidisciplinary thoracic conference.  It was elected to follow him with serial CT scans with the biopsy if the lung nodules continued increase in size.  It was felt that it was too risky to attempt a biopsy at this point.    Repeat CT imaging on 9/11/2017 shows stable findings.  M spike 0.1 g.      CT imaging on 8/20/2018 with a slight increase in size a couple of pulmonary nodules.  M spike is 0.2 g.  Follow-up with thoracic surgery for imaging and we will see him back in 6 months for follow-up with labs in 1 week prior.    PET scan done at [place] on 10/23/2018 with similar findings.    [facility] in [place] [facility] and saw Dr. Reed there.  They have requested his pathology for review.    He complains of peripheral neuropathy in his hands and feet.  This has been worsening.  He reports some numbness and tingling as well as occasional pain.      He was discussed further in [place] after review of his pathology.  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  A fat pad biopsy and cardiac MRI were recommended.  Treatment with CyBorD was recommended.      Respiratory status has improved after participating in pulmonary rehabilitation.    He had an [facility] at the [facility] that showed no evidence for amyloidosis.    He had an EMG at the [facility] that was concerning for cervical myelopathy rather than a neuropathic process consistent with amyloidosis.    MRI of the cervical spine performed at the [facility] on 4/2/2019 shows multilevel degenerative change with moderate spinal canal stenosis at C4-C5 and C5-C6 and mild spinal canal stenosis at C6-C7.  Severe foraminal narrowing at C4-C5 and C5-C6 with moderate neuroforaminal narrowing at C6-C7.    Cardiac MRI performed on 4/16/2019 at the [facility] with normal left ventricle wall thickness with right  "ventricular cavity enlargement.  No aneurysm.  No fibrosis or scarring to suggest an infiltrative process such as amyloidosis.  Left ventricular ejection fraction 47%.    Negative fat pad biopsy on 6/4/2019    PAST MEDICAL, SURGICAL, FAMILY, AND SOCIAL HISTORIES were reviewed with the patient and in the electronic medical record, and were updated if indicated.    ALLERGIES:  No Known Allergies    MEDICATIONS:  The medication list has been reviewed with the patient by the medical assistant, and the list has been updated in the electronic medical record, which I reviewed.  Medication dosages and frequencies were confirmed to be accurate.    REVIEW OF SYSTEMS:  PAIN:  See Vital Signs below.  GENERAL:  Chronic fatigue  SKIN: Significant bruising on the abdomen, evolving.  HEME/LYMPH:  No abnormal bleeding.  No palpable lymphadenopathy.  EYES:  No vision changes or diplopia.  ENT:  No sore throat or difficulty swallowing.  See history of present illness  RESPIRATORY:  Chronic shortness of breath and dyspnea on exertion  CARDIOVASCULAR:  No chest pain, palpitations, orthopnea  GASTROINTESTINAL:  No abdominal pain, nausea, vomiting, constipation, diarrhea, melena, or hematochezia.  GENITOURINARY:  No dysuria or hematuria.  MUSCULOSKELETAL:  Chronic back pain  NEUROLOGIC:  No dizziness, loss of consciousness, or seizures.  Numbness and tingling in his fingers and toes as well as occasional painful peripheral neuropathy.  Tingling down the left side of his neck.  PSYCHIATRIC:  No depression, anxiety, or mood changes.    Vitals:    06/24/19 0742   BP: 132/72   Pulse: 91   Resp: 16   Temp: 98.4 °F (36.9 °C)   TempSrc: Oral   SpO2: 95%   Weight: 110 kg (242 lb 4.8 oz)   Height: 174.5 cm (68.7\")   PainSc: 5  Comment: monoclonal gammopathy, right shoulder       PHYSICAL EXAMINATION:  GENERAL:  Obese.  Well-developed well-nourished male; awake, alert and oriented, in no acute distress.    HEAD:  Normocephalic, atraumatic.  EARS:  " Hearing intact.  NOSE:  Septum midline.  No excoriations or nasal discharge.  MOUTH:  No stomatitis or ulcers.  Lips are normal.  THROAT:  Oropharynx without lesions or exudates.  CHEST:  Lungs are clear to auscultation bilaterally.  No wheezes, rales, or rhonchi.  There is some soft tissue swelling in the left lateral chest in the axillary line  HEART:  Regular rate; normal rhythm.  Grade II/VI systolic murmur.    Abdomen: Soft.  There is a large hematoma at the incision site from the fat pad biopsy.  There is ecchymosis surrounding this extending to the left flank.  EXTREMITIES:  No clubbing, cyanosis, or edema.  NEUROLOGICAL:  Decreased sensation to light touch in his hands.      DIAGNOSTIC DATA:  Results for orders placed or performed in visit on 06/24/19   CBC Auto Differential   Result Value Ref Range    WBC 6.54 3.40 - 10.80 10*3/mm3    RBC 3.62 (L) 4.14 - 5.80 10*6/mm3    Hemoglobin 12.9 (L) 13.0 - 17.7 g/dL    Hematocrit 36.5 (L) 37.5 - 51.0 %    .8 (H) 79.0 - 97.0 fL    MCH 35.6 (H) 26.6 - 33.0 pg    MCHC 35.3 31.5 - 35.7 g/dL    RDW 14.4 12.3 - 15.4 %    RDW-SD 53.0 37.0 - 54.0 fl    MPV 10.5 6.0 - 12.0 fL    Platelets 140 140 - 450 10*3/mm3    Neutrophil % 67.3 42.7 - 76.0 %    Lymphocyte % 19.0 (L) 19.6 - 45.3 %    Monocyte % 9.6 5.0 - 12.0 %    Eosinophil % 2.6 0.3 - 6.2 %    Basophil % 0.9 0.0 - 1.5 %    Immature Grans % 0.6 (H) 0.0 - 0.5 %    Neutrophils, Absolute 4.40 1.70 - 7.00 10*3/mm3    Lymphocytes, Absolute 1.24 0.70 - 3.10 10*3/mm3    Monocytes, Absolute 0.63 0.10 - 0.90 10*3/mm3    Eosinophils, Absolute 0.17 0.00 - 0.40 10*3/mm3    Basophils, Absolute 0.06 0.00 - 0.20 10*3/mm3    Immature Grans, Absolute 0.04 0.00 - 0.05 10*3/mm3    nRBC 0.3 (H) 0.0 - 0.2 /100 WBC     ASSESSMENT:  This is a 74 y.o. male with:  1.  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  He had a wedge resection.  Dr. Hernandez was  following a couple of other small  pulmonary nodules on the left which initially increased in size.  His M spike continues to be stable.  The PET scan did not show anything we didn't expect from the CT scan.  The left upper lobe lung nodule is too risky to biopsy base of its location.  His case was presented at the multidisciplinary thoracic conference and recommendations included to follow him with serial CT scans with biopsy if it appears to be safer.  CT imaging on 12/18/2017 was stable.  CT imaging on 5/25/2018 showed that one of the pleural based nodules and enlarged slightly measuring 2.3 cm.  CT imaging from 8/22/2018 shows some slight increase in size of the pulmonary nodules with the largest measuring now 2.0 cm 3 other tiny noncalcified nodules in the right lung are stable, only showing a slight increase since 2017.  I did reiterate today that I think most of his respiratory symptoms are secondary to his COPD and not from the small pulmonary nodules.  If any do grow significantly in size, we could consider radiation.  He would likely not be a surgical candidate considering his oxygen dependence.  There is some question as to how much of this is amyloid.  There is some question as to whether this is primary or reactive.  [facility] [place] by Dr. Reed, and they requested his pathology for review.  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    They recommended treatment with FAM (inhaled fluticasone, azithromycin 250 mg on Mondays Wednesdays and Fridays, montelukast daily) plus doxycycline 100 mg twice daily for his lungs.  He did initiate this through the [facility] in February 2019.    A cardiac MRI was performed at the [facility].  This was not consistent with involvement by amyloidosis.  He had an EMG also at the [facility] that was concerning for the possibility of cervical myelopathy rather than a process consistent with amyloidosis.  He had an MRI of the cervical spine at the [facility] but he did not bring this result with him  today.    He did have a negative fat pad biopsy on 6/4/2019.    It has been recommended from [facility] that he initiate therapy with Velcade, Cytoxan, and dexamethasone (CyBorD).  I spoke with him and his wife regarding this treatment plan again today.  I gave him some written information about the medication regimen.  We will proceed with an education session and we will work to obtain the oral medication for him.    2.  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  We will continue to monitor.  Repeat labs in the near future with cycle #1 of therapy.    3.  Macrocytic anemia: He has mild chronic kidney disease.  This could potentially be secondary to alcohol use as well.  This had resolved.  He continues to have a mild macrocytosis.  His anemia is worse I think because of bleeding following his fat pad biopsy.    4.  Thrombocytopenia.:  This is mild and we will continue to monitor this.  Platelets actually normal today.    5.  Right renal mass which is chronic.  He is followed by urology.    6.  Claudication with peripheral vascular disease followed by Dr. Freeman.      7.  Low back pain with an MRI on 5/18/2017 showing degenerative changes at multiple levels    8.  COPD: He follows with Dr. Poole now and is on oxygen and inhalers.    9.  Peripheral neuropathy: EMG completed.  MRI cervical spine complete.  There are some abnormalities in the cervical spine.  I thought he was going to see a [facility]surgeon at the [facility] but he did not.  I will refer him to Dr. Robins here at [facility].  We will make sure we get images scanned in as well as copy of the report.    PLAN:  1.  Continue FAM plus doxycycline through the [facility]. he does however state that he did not tolerate the azithromycin due to nausea.  2.  We will upload the MRI results to our imaging system.  3.  Refer to Dr. Robins with neurosurgery due to the abnormalities on the cervical spine MRI.  4.  I will communicate with Dr. Reed at [facility] and the [facility]  5.  At this  point we will plan to start therapy with CyBorD.  Cytoxan orally at 300 mg per metered squared weekly, rounding to 600 mg.  Velcade at 1.5 mg per metered squared weekly.  Dexamethasone 40 mg weekly.  Acyclovir and Bactrim for prophylaxis per the treatment protocol.  We will need to clarify which pharmacy to use for prescriptions.  He will have an education session.  We will obtain some baseline labs that day including serum protein studies, CMP, BNP, troponin, CK and CK-MB.  I did explain to him and his wife today that it is unclear to me if his symptoms will improve on this regimen and he certainly could experience more fatigue.  6.  We will schedule weekly Velcade injections with nurse practitioner appointments until he can follow-up with me in the next couple months.

## 2019-06-25 ENCOUNTER — DOCUMENTATION (OUTPATIENT)
Dept: ONCOLOGY | Facility: CLINIC | Age: 74
End: 2019-06-25

## 2019-06-25 RX ORDER — CYCLOPHOSPHAMIDE 50 MG/1
600 CAPSULE ORAL WEEKLY
Qty: 48 CAPSULE | Refills: 6 | Status: SHIPPED | OUTPATIENT
Start: 2019-06-25 | End: 2019-12-05

## 2019-06-25 NOTE — PROGRESS NOTES
Staff message rec from Dr Heredia about Cytoxan for pt. See below.    Rafael Heredia MD sent to [facility]             Planning to start CyBorD for him.  Cytoxan at 300 mg/m2.  Round to 600 mg please.  Thanks!  AJMALIK     Thanks!  AJH      Clarification rec from Dr Heredia that this is weekly with no breaks.     Pt has Medicare with [employer]. [facility] is able to obtain the medication. I have sent the rx to  [facility] for processing.

## 2019-06-28 ENCOUNTER — SPECIALTY PHARMACY (OUTPATIENT)
Dept: PHARMACY | Facility: HOSPITAL | Age: 74
End: 2019-06-28

## 2019-07-01 ENCOUNTER — LAB (OUTPATIENT)
Dept: LAB | Facility: HOSPITAL | Age: 74
End: 2019-07-01

## 2019-07-01 ENCOUNTER — OFFICE VISIT (OUTPATIENT)
Dept: ONCOLOGY | Facility: CLINIC | Age: 74
End: 2019-07-01

## 2019-07-01 VITALS — WEIGHT: 252 LBS | BODY MASS INDEX: 37.54 KG/M2

## 2019-07-01 DIAGNOSIS — R06.09 DYSPNEA ON EXERTION: ICD-10-CM

## 2019-07-01 DIAGNOSIS — E85.81 AL AMYLOIDOSIS (HCC): ICD-10-CM

## 2019-07-01 DIAGNOSIS — G95.9 CERVICAL MYELOPATHY (HCC): ICD-10-CM

## 2019-07-01 DIAGNOSIS — E85.81 AL AMYLOIDOSIS (HCC): Primary | ICD-10-CM

## 2019-07-01 LAB
ALBUMIN SERPL-MCNC: 3.9 G/DL (ref 3.5–5.2)
ALBUMIN/GLOB SERPL: 1.6 G/DL (ref 1.1–2.4)
ALP SERPL-CCNC: 61 U/L (ref 38–116)
ALT SERPL W P-5'-P-CCNC: 22 U/L (ref 0–41)
ANION GAP SERPL CALCULATED.3IONS-SCNC: 12.7 MMOL/L (ref 5–15)
AST SERPL-CCNC: 26 U/L (ref 0–40)
BASOPHILS # BLD AUTO: 0.04 10*3/MM3 (ref 0–0.2)
BASOPHILS NFR BLD AUTO: 0.8 % (ref 0–1.5)
BILIRUB SERPL-MCNC: 0.9 MG/DL (ref 0.2–1.2)
BUN BLD-MCNC: 26 MG/DL (ref 6–20)
BUN/CREAT SERPL: 17.8 (ref 7.3–30)
CALCIUM SPEC-SCNC: 9.1 MG/DL (ref 8.5–10.2)
CHLORIDE SERPL-SCNC: 103 MMOL/L (ref 98–107)
CK MB SERPL-CCNC: 4.37 NG/ML
CK SERPL-CCNC: 108 U/L (ref 20–200)
CO2 SERPL-SCNC: 21.3 MMOL/L (ref 22–29)
CREAT BLD-MCNC: 1.46 MG/DL (ref 0.7–1.3)
DEPRECATED RDW RBC AUTO: 58.2 FL (ref 37–54)
EOSINOPHIL # BLD AUTO: 0.09 10*3/MM3 (ref 0–0.4)
EOSINOPHIL NFR BLD AUTO: 1.8 % (ref 0.3–6.2)
ERYTHROCYTE [DISTWIDTH] IN BLOOD BY AUTOMATED COUNT: 15.7 % (ref 12.3–15.4)
GFR SERPL CREATININE-BSD FRML MDRD: 47 ML/MIN/1.73
GLOBULIN UR ELPH-MCNC: 2.5 GM/DL (ref 1.8–3.5)
GLUCOSE BLD-MCNC: 100 MG/DL (ref 74–124)
HCT VFR BLD AUTO: 32.4 % (ref 37.5–51)
HGB BLD-MCNC: 11.2 G/DL (ref 13–17.7)
IMM GRANULOCYTES # BLD AUTO: 0.04 10*3/MM3 (ref 0–0.05)
IMM GRANULOCYTES NFR BLD AUTO: 0.8 % (ref 0–0.5)
LYMPHOCYTES # BLD AUTO: 0.73 10*3/MM3 (ref 0.7–3.1)
LYMPHOCYTES NFR BLD AUTO: 14.6 % (ref 19.6–45.3)
MCH RBC QN AUTO: 35.9 PG (ref 26.6–33)
MCHC RBC AUTO-ENTMCNC: 34.6 G/DL (ref 31.5–35.7)
MCV RBC AUTO: 103.8 FL (ref 79–97)
MONOCYTES # BLD AUTO: 0.5 10*3/MM3 (ref 0.1–0.9)
MONOCYTES NFR BLD AUTO: 10 % (ref 5–12)
NEUTROPHILS # BLD AUTO: 3.6 10*3/MM3 (ref 1.7–7)
NEUTROPHILS NFR BLD AUTO: 72 % (ref 42.7–76)
NRBC BLD AUTO-RTO: 0 /100 WBC (ref 0–0.2)
NT-PROBNP SERPL-MCNC: 291.6 PG/ML (ref 5–900)
PLATELET # BLD AUTO: 123 10*3/MM3 (ref 140–450)
PMV BLD AUTO: 9.4 FL (ref 6–12)
POTASSIUM BLD-SCNC: 4.1 MMOL/L (ref 3.5–4.7)
PROT SERPL-MCNC: 6.4 G/DL (ref 6.3–8)
RBC # BLD AUTO: 3.12 10*6/MM3 (ref 4.14–5.8)
SODIUM BLD-SCNC: 137 MMOL/L (ref 134–145)
TROPONIN T SERPL-MCNC: 0.05 NG/ML (ref 0–0.03)
WBC NRBC COR # BLD: 5 10*3/MM3 (ref 3.4–10.8)

## 2019-07-01 PROCEDURE — 83880 ASSAY OF NATRIURETIC PEPTIDE: CPT | Performed by: INTERNAL MEDICINE

## 2019-07-01 PROCEDURE — 36415 COLL VENOUS BLD VENIPUNCTURE: CPT | Performed by: NURSE PRACTITIONER

## 2019-07-01 PROCEDURE — 99215 OFFICE O/P EST HI 40 MIN: CPT | Performed by: NURSE PRACTITIONER

## 2019-07-01 PROCEDURE — 84484 ASSAY OF TROPONIN QUANT: CPT | Performed by: INTERNAL MEDICINE

## 2019-07-01 PROCEDURE — 82553 CREATINE MB FRACTION: CPT | Performed by: INTERNAL MEDICINE

## 2019-07-01 PROCEDURE — 82550 ASSAY OF CK (CPK): CPT | Performed by: INTERNAL MEDICINE

## 2019-07-01 PROCEDURE — 80053 COMPREHEN METABOLIC PANEL: CPT | Performed by: NURSE PRACTITIONER

## 2019-07-01 PROCEDURE — 85025 COMPLETE CBC W/AUTO DIFF WBC: CPT | Performed by: NURSE PRACTITIONER

## 2019-07-01 RX ORDER — ONDANSETRON HYDROCHLORIDE 8 MG/1
8 TABLET, FILM COATED ORAL 3 TIMES DAILY PRN
Qty: 30 TABLET | Refills: 5 | Status: SHIPPED | OUTPATIENT
Start: 2019-07-01 | End: 2021-02-08 | Stop reason: SDDI

## 2019-07-01 RX ORDER — SULFAMETHOXAZOLE AND TRIMETHOPRIM 800; 160 MG/1; MG/1
1 TABLET ORAL 3 TIMES WEEKLY
Qty: 12 TABLET | Refills: 7 | Status: SHIPPED | OUTPATIENT
Start: 2019-07-01 | End: 2019-12-05

## 2019-07-01 RX ORDER — ACYCLOVIR 400 MG/1
400 TABLET ORAL 2 TIMES DAILY
Qty: 60 TABLET | Refills: 7 | Status: SHIPPED | OUTPATIENT
Start: 2019-07-01 | End: 2019-12-05

## 2019-07-01 RX ORDER — DEXAMETHASONE 4 MG/1
40 TABLET ORAL
Qty: 40 TABLET | Refills: 3 | Status: SHIPPED | OUTPATIENT
Start: 2019-07-01 | End: 2019-12-05

## 2019-07-01 NOTE — PROGRESS NOTES
Subjective     PATIENT NAME:  Rafael Gomez  YOB: 1945  PATIENTS AGE:  74 y.o.  PATIENTS SEX:  male  DATE OF SERVICE:  07/01/2019  PROVIDER:  EMILIO Kilgore      ____________________PATIENT EDUCATION____________________    PATIENT EDUCATION:  Today I met with the patient to discuss the chemotherapy regimen recommended for treatment of his disease.  The patient was given explanation of treatment premed side effects including office policy that prohibits patients to drive if sedating medications are administered, MD explanation given regarding benefits, side effects, toxicities and goals of treatment.  The patient received a Chemotherapy/Biotherapy Plan Summary including diagnosis and specific treatment plan.    SIDE EFFECTS:  Common side effects were discussed with the patient and significant other.  Discussion included hair loss/discoloration, anemia/fatigue, infection/chills/fever, appetite, bleeding risk/precautions, constipation, diarrhea, mouth sores, taste alteration, loss of appetite,nausea/vomiting, peripheral neuropathy, skin/nail changes, rash, muscle aches/weakness, photosensitivity, weight gain/loss, hearing loss, dizziness, menopausal symptoms, menstrrual irregularity, sterility, high blood pressure, heart damage, liver damage, lung damage, kidney damage, DVT/PE risk, fluid retention, pleural/pericardial effusion, somnolence, electrolyte/LFT imbalance, vein exercises and/or the possible need for vascular access/port placement.  The patient was advice that although uncommon, leakage of an infused medication from the vein or venous access device (port) may lead to skin breakdown and/or other tissue damage.  The patient was advised that he/she may have pain, bleeding, and/or bruising from the insertion of a needle in their vein or venous access device (port).  The patient was further advised that, in spite of proper technique, infection with redness and irritation may rarely occur  at the site where the needle was inserted.  The patient was advised that if complications occur, additional medical treatment is available.    Discussion also included side effects specific to drugs in the treatment plan, specifically Cytoxan, Dexamethasone, and Velcade.      A total of 50 minutes were spent with the patient, with 100% of time spent in education and counseling. Prescriptions were also sent in for trunk, Bactrim, and acyclovir.

## 2019-07-02 ENCOUNTER — DOCUMENTATION (OUTPATIENT)
Dept: ONCOLOGY | Facility: CLINIC | Age: 74
End: 2019-07-02

## 2019-07-02 LAB
ALBUMIN SERPL-MCNC: 3.4 G/DL (ref 2.9–4.4)
ALBUMIN/GLOB SERPL: 1.3 {RATIO} (ref 0.7–1.7)
ALPHA1 GLOB FLD ELPH-MCNC: 0.2 G/DL (ref 0–0.4)
ALPHA2 GLOB SERPL ELPH-MCNC: 0.6 G/DL (ref 0.4–1)
B-GLOBULIN SERPL ELPH-MCNC: 0.9 G/DL (ref 0.7–1.3)
GAMMA GLOB SERPL ELPH-MCNC: 0.9 G/DL (ref 0.4–1.8)
GLOBULIN SER CALC-MCNC: 2.7 G/DL (ref 2.2–3.9)
IGA SERPL-MCNC: 172 MG/DL (ref 61–437)
IGG SERPL-MCNC: 762 MG/DL (ref 700–1600)
IGM SERPL-MCNC: 262 MG/DL (ref 15–143)
INTERPRETATION SERPL IEP-IMP: ABNORMAL
KAPPA LC SERPL-MCNC: 27.5 MG/L (ref 3.3–19.4)
KAPPA LC/LAMBDA SER: 0.93 {RATIO} (ref 0.26–1.65)
LAMBDA LC FREE SERPL-MCNC: 29.6 MG/L (ref 5.7–26.3)
Lab: ABNORMAL
M-SPIKE: 0.2 G/DL
PROT SERPL-MCNC: 6.1 G/DL (ref 6–8.5)

## 2019-07-03 ENCOUNTER — TELEPHONE (OUTPATIENT)
Dept: NEUROSURGERY | Facility: CLINIC | Age: 74
End: 2019-07-03

## 2019-07-03 NOTE — TELEPHONE ENCOUNTER
Pt had an NP appt with [facility] for Cerv Myleo.    Pt showed up without his disc.    Pt will call us back at a later date to resched.

## 2019-07-08 ENCOUNTER — LAB (OUTPATIENT)
Dept: LAB | Facility: HOSPITAL | Age: 74
End: 2019-07-08

## 2019-07-08 ENCOUNTER — APPOINTMENT (OUTPATIENT)
Dept: ONCOLOGY | Facility: HOSPITAL | Age: 74
End: 2019-07-08

## 2019-07-08 ENCOUNTER — INFUSION (OUTPATIENT)
Dept: ONCOLOGY | Facility: HOSPITAL | Age: 74
End: 2019-07-08

## 2019-07-08 ENCOUNTER — OFFICE VISIT (OUTPATIENT)
Dept: ONCOLOGY | Facility: CLINIC | Age: 74
End: 2019-07-08

## 2019-07-08 VITALS
WEIGHT: 247 LBS | TEMPERATURE: 98.2 F | DIASTOLIC BLOOD PRESSURE: 71 MMHG | RESPIRATION RATE: 18 BRPM | SYSTOLIC BLOOD PRESSURE: 123 MMHG | OXYGEN SATURATION: 94 % | BODY MASS INDEX: 36.58 KG/M2 | HEIGHT: 69 IN | HEART RATE: 97 BPM

## 2019-07-08 DIAGNOSIS — E85.81 AL AMYLOIDOSIS (HCC): Primary | ICD-10-CM

## 2019-07-08 DIAGNOSIS — C90.30 PLASMACYTOMA (HCC): Primary | ICD-10-CM

## 2019-07-08 DIAGNOSIS — D47.2 MONOCLONAL GAMMOPATHY: ICD-10-CM

## 2019-07-08 DIAGNOSIS — G95.9 CERVICAL MYELOPATHY (HCC): ICD-10-CM

## 2019-07-08 DIAGNOSIS — L30.9 DERMATITIS: ICD-10-CM

## 2019-07-08 DIAGNOSIS — E85.81 AL AMYLOIDOSIS (HCC): ICD-10-CM

## 2019-07-08 LAB
BASOPHILS # BLD AUTO: 0.04 10*3/MM3 (ref 0–0.2)
BASOPHILS NFR BLD AUTO: 0.7 % (ref 0–1.5)
DEPRECATED RDW RBC AUTO: 57.7 FL (ref 37–54)
EOSINOPHIL # BLD AUTO: 0.12 10*3/MM3 (ref 0–0.4)
EOSINOPHIL NFR BLD AUTO: 2.2 % (ref 0.3–6.2)
ERYTHROCYTE [DISTWIDTH] IN BLOOD BY AUTOMATED COUNT: 15.3 % (ref 12.3–15.4)
HCT VFR BLD AUTO: 34.4 % (ref 37.5–51)
HGB BLD-MCNC: 12.1 G/DL (ref 13–17.7)
IMM GRANULOCYTES # BLD AUTO: 0.03 10*3/MM3 (ref 0–0.05)
IMM GRANULOCYTES NFR BLD AUTO: 0.6 % (ref 0–0.5)
LYMPHOCYTES # BLD AUTO: 1.13 10*3/MM3 (ref 0.7–3.1)
LYMPHOCYTES NFR BLD AUTO: 21.2 % (ref 19.6–45.3)
MCH RBC QN AUTO: 36.2 PG (ref 26.6–33)
MCHC RBC AUTO-ENTMCNC: 35.2 G/DL (ref 31.5–35.7)
MCV RBC AUTO: 103 FL (ref 79–97)
MONOCYTES # BLD AUTO: 0.62 10*3/MM3 (ref 0.1–0.9)
MONOCYTES NFR BLD AUTO: 11.6 % (ref 5–12)
NEUTROPHILS # BLD AUTO: 3.4 10*3/MM3 (ref 1.7–7)
NEUTROPHILS NFR BLD AUTO: 63.7 % (ref 42.7–76)
NRBC BLD AUTO-RTO: 0 /100 WBC (ref 0–0.2)
PLATELET # BLD AUTO: 125 10*3/MM3 (ref 140–450)
PMV BLD AUTO: 10.1 FL (ref 6–12)
RBC # BLD AUTO: 3.34 10*6/MM3 (ref 4.14–5.8)
WBC NRBC COR # BLD: 5.34 10*3/MM3 (ref 3.4–10.8)

## 2019-07-08 PROCEDURE — 36415 COLL VENOUS BLD VENIPUNCTURE: CPT | Performed by: NURSE PRACTITIONER

## 2019-07-08 PROCEDURE — 96401 CHEMO ANTI-NEOPL SQ/IM: CPT | Performed by: NURSE PRACTITIONER

## 2019-07-08 PROCEDURE — 85025 COMPLETE CBC W/AUTO DIFF WBC: CPT | Performed by: NURSE PRACTITIONER

## 2019-07-08 PROCEDURE — 99214 OFFICE O/P EST MOD 30 MIN: CPT | Performed by: NURSE PRACTITIONER

## 2019-07-08 PROCEDURE — 25010000002 BORTEZOMIB PER 0.1 MG: Performed by: INTERNAL MEDICINE

## 2019-07-08 RX ORDER — BORTEZOMIB 3.5 MG/1
1.5 INJECTION, POWDER, LYOPHILIZED, FOR SOLUTION INTRAVENOUS; SUBCUTANEOUS ONCE
Status: CANCELLED | OUTPATIENT
Start: 2019-07-08

## 2019-07-08 RX ORDER — BORTEZOMIB 3.5 MG/1
1.5 INJECTION, POWDER, LYOPHILIZED, FOR SOLUTION INTRAVENOUS; SUBCUTANEOUS ONCE
Status: COMPLETED | OUTPATIENT
Start: 2019-07-08 | End: 2019-07-08

## 2019-07-08 RX ORDER — BORTEZOMIB 3.5 MG/1
1.5 INJECTION, POWDER, LYOPHILIZED, FOR SOLUTION INTRAVENOUS; SUBCUTANEOUS ONCE
Status: CANCELLED | OUTPATIENT
Start: 2019-07-15

## 2019-07-08 RX ORDER — BORTEZOMIB 3.5 MG/1
1.5 INJECTION, POWDER, LYOPHILIZED, FOR SOLUTION INTRAVENOUS; SUBCUTANEOUS ONCE
Status: CANCELLED | OUTPATIENT
Start: 2019-07-22

## 2019-07-08 RX ORDER — BORTEZOMIB 3.5 MG/1
1.5 INJECTION, POWDER, LYOPHILIZED, FOR SOLUTION INTRAVENOUS; SUBCUTANEOUS ONCE
Status: CANCELLED | OUTPATIENT
Start: 2019-07-29

## 2019-07-08 RX ADMIN — BORTEZOMIB 3.3 MG: 3.5 INJECTION, POWDER, LYOPHILIZED, FOR SOLUTION INTRAVENOUS; SUBCUTANEOUS at 11:40

## 2019-07-08 NOTE — PROGRESS NOTES
[facility] GROUP OUTPATIENT FOLLOW UP CLINIC VISIT    REASON FOR FOLLOW-UP:    1. 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  Macrocytic anemia  3.  Mild leukopenia  4.  Left lung pulmonary nodules, stable on follow up imaging.  5.  [facility] see if he can get service-connected for plasmacytoma/amyloidosis.  [facility] [place] [facility] and saw Dr. Reed there. His case was discussed further in [place] after review of his pathology.  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  A fat pad biopsy and cardiac MRI were recommended.  Treatment with CyBorD was recommended.    6.  Peripheral neuropathy in his hands and feet.  [facility] that was concerning for cervical myelopathy rather than a neuropathic process consistent with amyloidosis.  [facility] on 4[facility]2/2019 shows multilevel degenerative change with moderate spinal canal stenosis at C4-C5 and C5-C6 and mild spinal canal stenosis at C6-C7.  Severe foraminal narrowing at C4-C5 and C5-C6 with moderate neuroforaminal narrowing at C6-C7.  7.  Cardiac MRI performed on 4/16/2019 at [facility] with normal left ventricle wall thickness with right ventricular cavity enlargement.  No aneurysm.  No fibrosis or scarring to suggest an infiltrative process such as amyloidosis.  Left ventricular ejection fraction 47%.  8.  Negative fat pad biopsy on 6/4/2019      HISTORY OF PRESENT ILLNESS:  Rafael Gomez is a 74 y.o. male with the above-mentioned history, who returns the office today in anticipation of beginning treatment for his amyloidosis with CyborD.  He is undergone formal chemotherapy education, and is willing to proceed.    He does continue to have numbness in his hands and feet, particularly his left side of his neck with position changes.  He is undergone MRI.  He was referred to Dr. Robins, neurosurgery though  the MRI images were not available for review, therefore no treatment recommendations were made.  His symptoms remain unchanged today.  We have discussed neuropathy symptoms can worsen with the initiation of Velcade and will be closely monitored.    His shortness of breath remains unchanged, chronically short of breath on exertion.  His cough is unchanged with clear sputum.  He denies chest pain.  He denies fevers or chills.  He does continue on fluticasone, azithromycin plus doxycycline which he tolerates reasonably well with the exception of nausea related to azithromycin.    HEMATOLOGIC HISTORY:  The patient had complained of some dyspnea on exertion. He has some left knee pain following a left knee replacement. He eventually was referred for further care to Dr. Aba Freeman with vascular surgery. He was found to have evidence of peripheral vascular disease and is scheduled for a procedure in her future. He was also found to have a complex irregular cystic mass measuring 5.7 cm in the right kidney. He has been referred to urology for this. There was a 12 mm nodule in the lingula. He was referred to Dr. Larry Hernandez for this.  He had a CT scan of the chest with contrast on 5/25/2016. This showed 3 pleural-based nodules at the periphery of the left upper lobe. One measured 15 mm and the other measured 16 mm. The other was 6 mm. There is also a noncalcified 6 mm nodule.  He had a wedge resection on 6/2/2016. Two samples were sent to pathology. One showed plasma cells with evidence for amyloid. Further testing at [facility] showed IgG kappa restriction. The other wedge resection showed tiny nonnecrotizing granulomas.    Serum protein electrophoresis showed a 0.1 g M spike with immunofixation showing IgM lambda monoclonality.  Serum free light chain ratio was normal.  Ferritin 226.  Iron profile normal.  Vitamin B12 greater than 2000.    Serum protein levels in March 2017 are very stable with 0.1 g M spike.   He will follow-up in one year.  He continues to follow-up with Dr. Hernandez for CT scans.    CT imaging on 5/23/2017 showed an increase in size of 2 lingular lesions now measuring 2.2 cm and 1.0 cm.  There is an 8 mm left lower lobe pulmonary nodule also slightly increased in size.  Several other tiny pulmonary nodules bilaterally which are stable.    His M spike increased to 0.2 g.    He had a PET scan on 6/8/2017 showing several hypermetabolic left lung pulmonary nodules with a 2.2 cm pleural-based left upper lobe nodule.    His case was presented at the multidisciplinary thoracic conference.  It was elected to follow him with serial CT scans with the biopsy if the lung nodules continued increase in size.  It was felt that it was too risky to attempt a biopsy at this point.    Repeat CT imaging on 9/11/2017 shows stable findings.  M spike 0.1 g.      CT imaging on 8/20/2018 with a slight increase in size a couple of pulmonary nodules.  M spike is 0.2 g.  Follow-up with thoracic surgery for imaging and we will see him back in 6 months for follow-up with labs in 1 week prior.    PET scan done at [place] on 10/23/2018 with similar findings.    [facility] [place] [facility] and saw Dr. Reed there.  They have requested his pathology for review.    He complains of peripheral neuropathy in his hands and feet.  This has been worsening.  He reports some numbness and tingling as well as occasional pain.      He was discussed further in [place] after review of his pathology.  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  A fat pad biopsy and cardiac MRI were recommended.  Treatment with CyBorD was recommended.      Respiratory status has improved after participating in pulmonary rehabilitation.    He [facility] an [facility] that showed no evidence for amyloidosis.    He had an [facility] that was concerning for cervical myelopathy rather than a neuropathic process consistent with amyloidosis.    MRI of  the [facility] spine [facility] on 4/2/2019 shows multilevel degenerative change with moderate spinal canal stenosis at C4-C5 and C5-C6 and mild spinal canal stenosis at C6-C7.  Severe foraminal narrowing at C4-C5 and C5-C6 with moderate neuroforaminal narrowing at C6-C7.    Cardiac MRI performed on 4/16/2019 at the [facility] with normal left ventricle wall thickness with right ventricular cavity enlargement.  No aneurysm.  No fibrosis or scarring to suggest an infiltrative process such as amyloidosis.  Left ventricular ejection fraction 47%.    Negative fat pad biopsy on 6/4/2019    PAST MEDICAL, SURGICAL, FAMILY, AND SOCIAL HISTORIES were reviewed with the patient and in the electronic medical record, and were updated if indicated.    ALLERGIES:  No Known Allergies    MEDICATIONS:  The medication list has been reviewed with the patient by the medical assistant, and the list has been updated in the electronic medical record, which I reviewed.  Medication dosages and frequencies were confirmed to be accurate.    I have reviewed the patient's medical history in detail and updated the computerized patient record.    Review of Systems   Constitutional: Positive for fatigue. Negative for appetite change, chills and fever.   HENT:   Negative for trouble swallowing.    Respiratory: Negative for cough and shortness of breath.    Cardiovascular: Negative for chest pain and leg swelling.   Gastrointestinal: Positive for diarrhea and nausea. Negative for abdominal distention, blood in stool and constipation.   Musculoskeletal: Positive for neck pain. Negative for arthralgias and myalgias.   Skin: Negative for rash.   Neurological: Positive for extremity weakness and numbness. Negative for dizziness.   Hematological: Does not bruise/bleed easily.   Review of systems 7/8/2019 unchanged from previous office visit except      Vitals:    07/08/19 1039   BP: 123/71   Pulse: 97   Resp: 18   Temp: 98.2 °F (36.8 °C)   SpO2: 94%  "  Weight: 112 kg (247 lb)   Height: 174.5 cm (68.7\")   PainSc:   5   PainLoc: Comment: left shoulder, back of neck.       PHYSICAL EXAMINATION:  GENERAL:  Obese.  Well-developed well-nourished male; awake, alert and oriented, in no acute distress.    HEAD:  Normocephalic, atraumatic.  EARS:  Hearing intact.  NOSE:  Septum midline.  No excoriations or nasal discharge.  MOUTH:  No stomatitis or ulcers.  Lips are normal.  THROAT:  Oropharynx without lesions or exudates.  CHEST:  Lungs are clear to auscultation bilaterally.  No wheezes, rales, or rhonchi.  There is some soft tissue swelling in the left lateral chest in the axillary line  HEART:  Regular rate; normal rhythm.  Grade II/VI systolic murmur.    ABDOMEN: Soft.  Nontender, bowel sounds present  EXTREMITIES:  No clubbing, cyanosis, or edema.  NEUROLOGICAL:  Decreased sensation to light touch in his hands.    Physical exam 7/8/2019 unchanged from previous office visit except as updated    DIAGNOSTIC DATA:  Results from last 7 days   Lab Units 07/08/19  1032   WBC 10*3/mm3 5.34   NEUTROS ABS 10*3/mm3 3.40   HEMOGLOBIN g/dL 12.1*   HEMATOCRIT % 34.4*   PLATELETS 10*3/mm3 125*                 ASSESSMENT:  This is a 74 y.o. male with:  1.  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  He had a wedge resection.  Dr. Hernandez was  following a couple of other small pulmonary nodules on the left which initially increased in size.  His M spike continues to be stable.  The PET scan did not show anything we didn't expect from the CT scan.  The left upper lobe lung nodule is too risky to biopsy base of its location.  His case was presented at the multidisciplinary thoracic conference and recommendations included to follow him with serial CT scans with biopsy if it appears to be safer.  CT imaging on 12/18/2017 was stable.  CT imaging on 5/25/2018 showed that one of the pleural based nodules and enlarged slightly measuring 2.3 " cm.  CT imaging from 8/22/2018 shows some slight increase in size of the pulmonary nodules with the largest measuring now 2.0 cm 3 other tiny noncalcified nodules in the right lung are stable, only showing a slight increase since 2017.  If any do grow significantly in size, we could consider radiation.  He would likely not be a surgical candidate considering his oxygen dependence.  There is some question as to how much of this is amyloid.  There is also some question as to whether this is primary or reactive.  [facility] [place] by Dr. Reed, and they requested his pathology for review.  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    They recommended treatment with FAM (inhaled fluticasone, azithromycin 250 mg on Mondays Wednesdays and Fridays, montelukast daily) plus doxycycline 100 mg twice daily for his lungs.  He did initiate this through the [facility] in February 2019.    A cardiac [facility] was performed at the [facility].  This was not consistent with involvement by amyloidosis.  He had an EMG also at the [facility] that was concerning for the possibility of cervical myelopathy rather than a process consistent with amyloidosis.     He did have a negative fat pad biopsy on 6/4/2019.    It has been recommended from [place] that he initiate therapy with Velcade, Cytoxan, and dexamethasone (CyBorD).  Treatment initiated 7/8/2019    2.  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  We did repeat labs 7/1/2019 to service a baseline, and spike was stable at 0.2.    3.  Macrocytic anemia: He has mild chronic kidney disease.  This could potentially be secondary to alcohol use as well.  He continues to have a mild macrocytosis.  His hemoglobin has improved today at 12.1    4.  Thrombocytopenia.:  This is mild and we will continue to monitor this.  Lids are stable today at 125,000    5.  Right renal mass which is chronic.  He is followed by urology.    6.  Claudication with peripheral vascular disease followed by   [facility].      7.  Low back pain with an MRI on 5/18/2017 showing degenerative changes at multiple levels    8.  COPD: He follows with Dr. Poole now and is on oxygen and inhalers.    9.  Peripheral neuropathy: EMG completed.  MRI cervical spine complete.  There are some abnormalities in the cervical spine.  He was referred to Dr. Robins, neurosurgery.  While the report is available from the MRI performed at [place], these images are not available.  The patient is going to take a disc back to Dr. Robins at which time images will be reviewed and the plan of care will be determined.    10.  Contact dermatitis.  Patient has a faint erythematous pruritic rash on his left shoulder.  This is not consistent with zoster.  He was instructed to begin hydrocortisone cream and we will continue to monitor.    PLAN:  1. Proceed today with cycle 1 day 1 CyBorD.  Cytoxan 600 mg weekly, dexamethasone 40 mg weekly and Velcade 1.5 mg/m2.  2. Continue FAM plus [facility].  3. Continue triamcinolone cream twice daily.  4. Zofran as needed for nausea.  5. Continue Bactrim and acyclovir for prophylaxis  6. Follow-up with Dr. Robins, neurosurgery as scheduled.  7. Return weekly for CBC, nurse practitioner evaluation and continued weekly Velcade.  8. MD follow-up with Dr. Heredia in 6 weeks with CBC, CMP.    Total of 25 minutes spent with the patient and his wife, 90% of the time spent in face-to-face counseling and education, discussing expected side effects of treatment plan, home medications.  Patient and his wife are agreeable to the plan.    Fauzia Roche, EMILIO  07/08/2019

## 2019-07-15 ENCOUNTER — APPOINTMENT (OUTPATIENT)
Dept: ONCOLOGY | Facility: HOSPITAL | Age: 74
End: 2019-07-15

## 2019-07-15 ENCOUNTER — INFUSION (OUTPATIENT)
Dept: ONCOLOGY | Facility: HOSPITAL | Age: 74
End: 2019-07-15

## 2019-07-15 ENCOUNTER — LAB (OUTPATIENT)
Dept: LAB | Facility: HOSPITAL | Age: 74
End: 2019-07-15

## 2019-07-15 ENCOUNTER — OFFICE VISIT (OUTPATIENT)
Dept: ONCOLOGY | Facility: CLINIC | Age: 74
End: 2019-07-15

## 2019-07-15 VITALS
RESPIRATION RATE: 20 BRPM | TEMPERATURE: 98.3 F | WEIGHT: 243.1 LBS | SYSTOLIC BLOOD PRESSURE: 102 MMHG | OXYGEN SATURATION: 94 % | DIASTOLIC BLOOD PRESSURE: 54 MMHG | HEIGHT: 69 IN | HEART RATE: 90 BPM | BODY MASS INDEX: 36.01 KG/M2

## 2019-07-15 DIAGNOSIS — E85.81 AL AMYLOIDOSIS (HCC): Primary | ICD-10-CM

## 2019-07-15 DIAGNOSIS — G95.9 CERVICAL MYELOPATHY (HCC): ICD-10-CM

## 2019-07-15 DIAGNOSIS — L73.9 FOLLICULITIS: ICD-10-CM

## 2019-07-15 DIAGNOSIS — D53.9 MACROCYTIC ANEMIA: ICD-10-CM

## 2019-07-15 DIAGNOSIS — E85.81 AL AMYLOIDOSIS (HCC): ICD-10-CM

## 2019-07-15 LAB
ALBUMIN SERPL-MCNC: 4.1 G/DL (ref 3.5–5.2)
ALBUMIN/GLOB SERPL: 1.7 G/DL (ref 1.1–2.4)
ALP SERPL-CCNC: 77 U/L (ref 38–116)
ALT SERPL W P-5'-P-CCNC: 30 U/L (ref 0–41)
ANION GAP SERPL CALCULATED.3IONS-SCNC: 10.7 MMOL/L (ref 5–15)
AST SERPL-CCNC: 25 U/L (ref 0–40)
BASOPHILS # BLD AUTO: 0.03 10*3/MM3 (ref 0–0.2)
BASOPHILS NFR BLD AUTO: 0.8 % (ref 0–1.5)
BILIRUB SERPL-MCNC: 0.8 MG/DL (ref 0.2–1.2)
BUN BLD-MCNC: 33 MG/DL (ref 6–20)
BUN/CREAT SERPL: 17.2 (ref 7.3–30)
CALCIUM SPEC-SCNC: 9 MG/DL (ref 8.5–10.2)
CHLORIDE SERPL-SCNC: 105 MMOL/L (ref 98–107)
CO2 SERPL-SCNC: 23.3 MMOL/L (ref 22–29)
CREAT BLD-MCNC: 1.92 MG/DL (ref 0.7–1.3)
DEPRECATED RDW RBC AUTO: 59 FL (ref 37–54)
EOSINOPHIL # BLD AUTO: 0.08 10*3/MM3 (ref 0–0.4)
EOSINOPHIL NFR BLD AUTO: 2.2 % (ref 0.3–6.2)
ERYTHROCYTE [DISTWIDTH] IN BLOOD BY AUTOMATED COUNT: 14.6 % (ref 12.3–15.4)
GFR SERPL CREATININE-BSD FRML MDRD: 35 ML/MIN/1.73
GLOBULIN UR ELPH-MCNC: 2.4 GM/DL (ref 1.8–3.5)
GLUCOSE BLD-MCNC: 116 MG/DL (ref 74–124)
HCT VFR BLD AUTO: 34.5 % (ref 37.5–51)
HGB BLD-MCNC: 11.6 G/DL (ref 13–17.7)
IMM GRANULOCYTES # BLD AUTO: 0.01 10*3/MM3 (ref 0–0.05)
IMM GRANULOCYTES NFR BLD AUTO: 0.3 % (ref 0–0.5)
LYMPHOCYTES # BLD AUTO: 0.84 10*3/MM3 (ref 0.7–3.1)
LYMPHOCYTES NFR BLD AUTO: 23.4 % (ref 19.6–45.3)
MCH RBC QN AUTO: 36.6 PG (ref 26.6–33)
MCHC RBC AUTO-ENTMCNC: 33.6 G/DL (ref 31.5–35.7)
MCV RBC AUTO: 108.8 FL (ref 79–97)
MONOCYTES # BLD AUTO: 0.39 10*3/MM3 (ref 0.1–0.9)
MONOCYTES NFR BLD AUTO: 10.9 % (ref 5–12)
NEUTROPHILS # BLD AUTO: 2.24 10*3/MM3 (ref 1.7–7)
NEUTROPHILS NFR BLD AUTO: 62.4 % (ref 42.7–76)
NRBC BLD AUTO-RTO: 0 /100 WBC (ref 0–0.2)
PLATELET # BLD AUTO: 107 10*3/MM3 (ref 140–450)
PMV BLD AUTO: 9.8 FL (ref 6–12)
POTASSIUM BLD-SCNC: 4.5 MMOL/L (ref 3.5–4.7)
PROT SERPL-MCNC: 6.5 G/DL (ref 6.3–8)
RBC # BLD AUTO: 3.17 10*6/MM3 (ref 4.14–5.8)
SODIUM BLD-SCNC: 139 MMOL/L (ref 134–145)
WBC NRBC COR # BLD: 3.59 10*3/MM3 (ref 3.4–10.8)

## 2019-07-15 PROCEDURE — 96401 CHEMO ANTI-NEOPL SQ/IM: CPT | Performed by: NURSE PRACTITIONER

## 2019-07-15 PROCEDURE — 80053 COMPREHEN METABOLIC PANEL: CPT | Performed by: INTERNAL MEDICINE

## 2019-07-15 PROCEDURE — 85025 COMPLETE CBC W/AUTO DIFF WBC: CPT | Performed by: INTERNAL MEDICINE

## 2019-07-15 PROCEDURE — 25010000002 BORTEZOMIB PER 0.1 MG: Performed by: INTERNAL MEDICINE

## 2019-07-15 PROCEDURE — 99214 OFFICE O/P EST MOD 30 MIN: CPT | Performed by: NURSE PRACTITIONER

## 2019-07-15 PROCEDURE — 36415 COLL VENOUS BLD VENIPUNCTURE: CPT | Performed by: INTERNAL MEDICINE

## 2019-07-15 RX ORDER — CITALOPRAM 40 MG/1
40 TABLET ORAL EVERY MORNING
COMMUNITY
Start: 2019-07-09

## 2019-07-15 RX ORDER — BORTEZOMIB 3.5 MG/1
1.5 INJECTION, POWDER, LYOPHILIZED, FOR SOLUTION INTRAVENOUS; SUBCUTANEOUS ONCE
Status: COMPLETED | OUTPATIENT
Start: 2019-07-15 | End: 2019-07-15

## 2019-07-15 RX ADMIN — BORTEZOMIB 3.3 MG: 3.5 INJECTION, POWDER, LYOPHILIZED, FOR SOLUTION INTRAVENOUS; SUBCUTANEOUS at 11:34

## 2019-07-15 NOTE — PROGRESS NOTES
[facility] GROUP OUTPATIENT FOLLOW UP CLINIC VISIT    REASON FOR FOLLOW-UP:    1. 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  Macrocytic anemia  3.  Mild leukopenia  4.  Left lung pulmonary nodules, stable on follow up imaging.  5.  [facility] to see if he can get service-connected for plasmacytoma/amyloidosis.  [facility] [place] [facility] and saw Dr. Reed there. His case was discussed further in [place] after review of his pathology.  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  A fat pad biopsy and cardiac MRI were recommended.  Treatment with CyBorD was recommended.    6.  Peripheral neuropathy in his hands and feet.  [facility] that was concerning for cervical myelopathy rather than a neuropathic process consistent with amyloidosis.  [facility] on 4[facility]/2019 shows multilevel degenerative change with moderate spinal canal stenosis at C4-C5 and C5-C6 and mild spinal canal stenosis at C6-C7.  Severe foraminal narrowing at C4-C5 and C5-C6 with moderate neuroforaminal narrowing at C6-C7.  7.  Cardiac [facility] performed on 4/16/2019 [facility] with normal left ventricle wall thickness with right ventricular cavity enlargement.  No aneurysm.  No fibrosis or scarring to suggest an infiltrative process such as amyloidosis.  Left ventricular ejection fraction 47%.  8.  Negative fat pad biopsy on 6/4/2019      HISTORY OF PRESENT ILLNESS:  Rafael Gomez is a 74 y.o. male with the above-mentioned history, who returns today in anticipation of cycle 1 day 8 treatment for his amyloidosis with CyBorD.  The patient reports today, thankfully, he tolerated his first week of treatment remarkably well.  He reports he felt better this past week than he has in some time.  He notes improvement in his energy.  His appetite and intake are adequate.  He denies nausea.  He does  continue to have some struggles with his daily dose of azithromycin though is taking Phenergan as needed which is controlling his symptoms.  He reports he is slightly constipated though not utilizing anything over-the-counter currently.  He does continue to have neuropathic symptoms related to his degenerative disc disease in his neck.  He is going to follow-up with neurosurgery, Dr. Robins for evaluation.   When seen 1 week ago, he did have a pruritic rash on his shoulder and upper back.  He was prescribed triamcinolone cream which he did not find beneficial.  He continues to have intermittent itching of this rash.    HEMATOLOGIC HISTORY:  The patient had complained of some dyspnea on exertion. He has some left knee pain following a left knee replacement. He eventually was referred for further care to Dr. Aba Freeman with vascular surgery. He was found to have evidence of peripheral vascular disease and is scheduled for a procedure in her future. He was also found to have a complex irregular cystic mass measuring 5.7 cm in the right kidney. He has been referred to urology for this. There was a 12 mm nodule in the lingula. He was referred to Dr. Larry Hernandez for this.  He had a CT scan of the chest with contrast on 5/25/2016. This showed 3 pleural-based nodules at the periphery of the left upper lobe. One measured 15 mm and the other measured 16 mm. The other was 6 mm. There is also a noncalcified 6 mm nodule.  He had a wedge resection on 6/2/2016. Two samples were sent to pathology. One showed plasma cells with evidence for amyloid. Further testing at [facility] showed IgG kappa restriction. The other wedge resection showed tiny nonnecrotizing granulomas.    Serum protein electrophoresis showed a 0.1 g M spike with immunofixation showing IgM lambda monoclonality.  Serum free light chain ratio was normal.  Ferritin 226.  Iron profile normal.  Vitamin B12 greater than 2000.    Serum protein levels in March  2017 are very stable with 0.1 g M spike.  He will follow-up in one year.  He continues to follow-up with Dr. Hernandez for CT scans.    CT imaging on 5/23/2017 showed an increase in size of 2 lingular lesions now measuring 2.2 cm and 1.0 cm.  There is an 8 mm left lower lobe pulmonary nodule also slightly increased in size.  Several other tiny pulmonary nodules bilaterally which are stable.    His M spike increased to 0.2 g.    He had a PET scan on 6/8/2017 showing several hypermetabolic left lung pulmonary nodules with a 2.2 cm pleural-based left upper lobe nodule.    His case was presented at the multidisciplinary thoracic conference.  It was elected to follow him with serial CT scans with the biopsy if the lung nodules continued increase in size.  It was felt that it was too risky to attempt a biopsy at this point.    Repeat CT imaging on 9/11/2017 shows stable findings.  M spike 0.1 g.      CT imaging on 8/20/2018 with a slight increase in size a couple of pulmonary nodules.  M spike is 0.2 g.  Follow-up with thoracic surgery for imaging and we will see him back in 6 months for follow-up with labs in 1 week prior.    PET scan done at [place] on 10/23/2018 with similar findings.    [facility] [place] [facility] and saw Dr. Reed there.  They have requested his pathology for review.    He complains of peripheral neuropathy in his hands and feet.  This has been worsening.  He reports some numbness and tingling as well as occasional pain.      He was discussed further in [place] after review of his pathology.  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  A fat pad biopsy and cardiac MRI were recommended.  Treatment with CyBorD was recommended.      Respiratory status has improved after participating in pulmonary rehabilitation.    [facility] that showed no evidence for amyloidosis.    He had an [facility] that was concerning for cervical myelopathy rather than a neuropathic process  consistent with amyloidosis.    MRI of the cervical spine performed at the [facility] on 4/2/2019 shows multilevel degenerative change with moderate spinal canal stenosis at C4-C5 and C5-C6 and mild spinal canal stenosis at C6-C7.  Severe foraminal narrowing at C4-C5 and C5-C6 with moderate neuroforaminal narrowing at C6-C7.    Cardiac MRI performed on 4/16/2019 at the [facility] with normal left ventricle wall thickness with right ventricular cavity enlargement.  No aneurysm.  No fibrosis or scarring to suggest an infiltrative process such as amyloidosis.  Left ventricular ejection fraction 47%.    Negative fat pad biopsy on 6/4/2019    PAST MEDICAL, SURGICAL, FAMILY, AND SOCIAL HISTORIES were reviewed with the patient and in the electronic medical record, and were updated if indicated.    ALLERGIES:  No Known Allergies    MEDICATIONS:  The medication list has been reviewed with the patient by the medical assistant, and the list has been updated in the electronic medical record, which I reviewed.  Medication dosages and frequencies were confirmed to be accurate.    I have reviewed the patient's medical history in detail and updated the computerized patient record.    Review of Systems   Constitutional: Positive for fatigue (much improved). Negative for appetite change, chills and fever.   HENT:   Negative for trouble swallowing.    Respiratory: Negative for cough and shortness of breath.    Cardiovascular: Negative for chest pain and leg swelling.   Gastrointestinal: Negative for abdominal distention, blood in stool, constipation, diarrhea and nausea.   Musculoskeletal: Positive for neck pain. Negative for arthralgias and myalgias.   Skin: Positive for rash.   Neurological: Positive for extremity weakness (improved) and numbness. Negative for dizziness.   Hematological: Does not bruise/bleed easily.   Review of systems 07/15/2019 unchanged from previous office visit except      Vitals:    07/15/19 1015   BP: 102/54   Pulse: 90  "  Resp: 20   Temp: 98.3 °F (36.8 °C)   TempSrc: Oral   SpO2: 94%   Weight: 110 kg (243 lb 1.6 oz)   Height: 174.5 cm (68.7\")   PainSc:   7   PainLoc: Shoulder  Comment: Bilat shoulder pain       PHYSICAL EXAMINATION:  GENERAL:  Obese.  Well-developed well-nourished male; awake, alert and oriented, in no acute distress.    SKIN: Faint erythematous rash on the upper back which does cross the midline, this is improved compared to 1 week ago, most consistent with a folliculitis.  HEAD:  Normocephalic, atraumatic.  EARS:  Hearing intact.  NOSE:  Septum midline.  No excoriations or nasal discharge.  MOUTH:  No stomatitis or ulcers.  Lips are normal.  THROAT:  Oropharynx without lesions or exudates.  CHEST:  Lungs are clear to auscultation bilaterally.  No wheezes, rales, or rhonchi.    HEART:  Regular rate; normal rhythm.  Grade II/VI systolic murmur.    ABDOMEN: Soft.  Nontender, bowel sounds present.   EXTREMITIES:  No clubbing, cyanosis, or edema.  NEUROLOGICAL:  Decreased sensation to light touch in his hands.    Physical exam 07/15/2019 unchanged from previous office visit except as updated    DIAGNOSTIC DATA:  Results from last 7 days   Lab Units 07/15/19  1026   WBC 10*3/mm3 3.59   NEUTROS ABS 10*3/mm3 2.24   HEMOGLOBIN g/dL 11.6*   HEMATOCRIT % 34.5*   PLATELETS 10*3/mm3 107*     Results from last 7 days   Lab Units 07/15/19  1029   SODIUM mmol/L 139   POTASSIUM mmol/L 4.5   CHLORIDE mmol/L 105   CO2 mmol/L 23.3   BUN mg/dL 33*   CREATININE mg/dL 1.92*   CALCIUM mg/dL 9.0   ALBUMIN g/dL 4.10   BILIRUBIN mg/dL 0.8   ALK PHOS U/L 77   ALT (SGPT) U/L 30   AST (SGOT) U/L 25   GLUCOSE mg/dL 116         ASSESSMENT:  This is a 74 y.o. male with:  1.  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  He had a wedge resection.  Dr. Hernandez was  following a couple of other small pulmonary nodules on the left which initially increased in size.  His M spike continues to be stable.  " The PET scan did not show anything we didn't expect from the CT scan.  The left upper lobe lung nodule is too risky to biopsy base of its location.  His case was presented at the multidisciplinary thoracic conference and recommendations included to follow him with serial CT scans with biopsy if it appears to be safer.  CT imaging on 12/18/2017 was stable.  CT imaging on 5/25/2018 showed that one of the pleural based nodules and enlarged slightly measuring 2.3 cm.  CT imaging from 8/22/2018 shows some slight increase in size of the pulmonary nodules with the largest measuring now 2.0 cm 3 other tiny noncalcified nodules in the right lung are stable, only showing a slight increase since 2017.  If any do grow significantly in size, we could consider radiation.  He would likely not be a surgical candidate considering his oxygen dependence.  There is some question as to how much of this is amyloid.  There is also some question as to whether this is primary or reactive.  [facility] [place] by Dr. Reed, and they requested his pathology for review.  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    They recommended treatment with FAM (inhaled fluticasone, azithromycin 250 mg on Mondays Wednesdays and Fridays, montelukast daily) plus doxycycline 100 mg twice daily for his lungs.  He did [facility] through [facility] in February 2019.    [facility].  This was not consistent with involvement by amyloidosis.  He had an EMG also at the [facility] that was concerning for the possibility of cervical myelopathy rather than a process consistent with amyloidosis.     He did have a negative fat pad biopsy on 6/4/2019.    It has been recommended from [place] that he initiate therapy with Velcade, Cytoxan, and dexamethasone (CyBorD).  Treatment initiated 7/8/2019    2.  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  We did repeat labs 7/1/2019 to service a baseline, and spike was stable at  0.2.    3.  Macrocytic anemia: He has mild chronic kidney disease.  This could potentially be secondary to alcohol use as well.  He continues to have a mild macrocytosis.  Hemoglobin is relatively stable today at 11.6.    4.  Thrombocytopenia.:  This is mild and we will continue to monitor this.  Platelets slightly decreased today at 107,000, though adequate to continue current treatment.    5.  Right renal mass which is chronic.  He is followed by urology.    6.  Claudication with peripheral vascular disease followed by Dr. Freeman.      7.  Low back pain with an MRI on 5/18/2017 showing degenerative changes at multiple levels    8.  COPD: He follows with Dr. Poole now and is on oxygen and inhalers.    9.  Peripheral neuropathy: EMG completed.  MRI cervical spine complete.  There are some abnormalities in the cervical spine.  He was referred to Dr. Robins, neurosurgery.  While the report is available from the MRI performed at [place], these images are not available.  The patient is going to take a disc back to Dr. Robins at which time images will be reviewed and the plan of care will be determined.    10.  Dermatitis/folliculitis.  Rash did not improve with triamcinolone.  The patient will begin utilizing an antibiotic ointment.  Rash is not consistent with zoster.  I suspect the burning he is experiencing is more related to his cervical spine stenosis rather than the rash.    11.  Chronic kidney disease.  Patient's creatinine is 1.92 today, slightly elevated compared to last week though this is around his baseline.  We discussed adequate hydration.    PLAN:  1. Proceed today with cycle 1 day 8 CyBorD.  Cytoxan 600 mg weekly, dexamethasone 40 mg weekly and Velcade 1.5 mg/m2.  2. Continue FAM plus [facility]xycycline [facility] of [facility]  3. Begin antibiotic cream to upper back.  4. Continue Bactrim and acyclovir for prophylaxis  5. Follow-up with Dr. Robins, neurosurgery as scheduled.  6. Return weekly for CBC, nurse  practitioner evaluation and continued weekly Velcade.  7. MD follow-up with Dr. Heredia in 5 weeks with CBC, CMP.    Today's plan was discussed and reviewed with Dr. Heredia who is in agreement.  He did also briefly look at the patient's upper back, skin rash.    Fauzia Roche, APRN  07/15/2019

## 2019-07-18 ENCOUNTER — HOSPITAL ENCOUNTER (OUTPATIENT)
Dept: SURGERY | Facility: HOSPITAL | Age: 74
Setting detail: HOSPITAL OUTPATIENT SURGERY
Discharge: HOME | End: 2019-07-18
Attending: OPHTHALMOLOGY

## 2019-07-19 NOTE — PROGRESS NOTES
[facility] GROUP OUTPATIENT FOLLOW UP CLINIC VISIT    REASON FOR FOLLOW-UP:    1. 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  Macrocytic anemia  3.  Mild leukopenia  4.  Left lung pulmonary nodules, stable on follow up imaging.  5.  [facility] to see if he can get service-connected for plasmacytoma/amyloidosis.  [facility] [place] [facility] and saw Dr. Reed there. His case was discussed further in [place] after review of his pathology.  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  A fat pad biopsy and cardiac MRI were recommended.  Treatment with CyBorD was recommended.    6.  Peripheral neuropathy in his hands and feet.  [facility] that was concerning for cervical myelopathy rather than a neuropathic process consistent with amyloidosis.  [facility] on [facility]/2019 shows multilevel degenerative change with moderate spinal canal stenosis at C4-C5 and C5-C6 and mild spinal canal stenosis at C6-C7.  Severe foraminal narrowing at C4-C5 and C5-C6 with moderate neuroforaminal narrowing at C6-C7.  7.  Cardiac [facility] performed [facility]/16/2019 [facility] with normal left ventricle wall thickness with right ventricular cavity enlargement.  No aneurysm.  No fibrosis or scarring to suggest an infiltrative process such as amyloidosis.  Left ventricular ejection fraction 47%.  8.  Negative fat pad biopsy on 6/4/2019      HISTORY OF PRESENT ILLNESS:  Rafael Gomez is a 74 y.o. male with the above-mentioned history, who returns today in anticipation of cycle 1 day 15 treatment for his amyloidosis with CyBorD.  He continues to tolerate treatment quite well.  He did have decreased energy and shortness of breath with exertion yesterday, though reports today it has improved.  He has been experiencing rhinorrhea and congestion with associated mild cough over the last 3 days.  He takes  daily Zyrtec but has not tried any other over-the-counter medications.  He denies any accompanying fevers or chills.  He is maintaining adequate nutrition and hydration.  He has struggled with mild constipation, though has not required any medication for management. Neuropathic symptoms persist attributable to his degenerative disc disease in his neck. He is following up with Neurosurgery, Dr. Robins     Over the last several weeks he has been struggling with a pruritic rash on his shoulder and back that was unresponsive to triamcinolone cream. Dr. Heredia assessed his rash last week and felt that it may be a folliculitis. He was prescribed an antibiotic cream last week and has noted significant reduction in the rash.    His labs are all sufficient for treatment today.    He has no other concerns.     HEMATOLOGIC HISTORY:  The patient had complained of some dyspnea on exertion. He has some left knee pain following a left knee replacement. He eventually was referred for further care to Dr. Aba Freeman with vascular surgery. He was found to have evidence of peripheral vascular disease and is scheduled for a procedure in her future. He was also found to have a complex irregular cystic mass measuring 5.7 cm in the right kidney. He has been referred to urology for this. There was a 12 mm nodule in the lingula. He was referred to Dr. Larry Hernandez for this.  He had a CT scan of the chest with contrast on 5/25/2016. This showed 3 pleural-based nodules at the periphery of the left upper lobe. One measured 15 mm and the other measured 16 mm. The other was 6 mm. There is also a noncalcified 6 mm nodule.  He had a wedge resection on 6/2/2016. Two samples were sent to pathology. One showed plasma cells with evidence for amyloid. Further testing at [facility] showed IgG kappa restriction. The other wedge resection showed tiny nonnecrotizing granulomas.    Serum protein electrophoresis showed a 0.1 g M spike with  immunofixation showing IgM lambda monoclonality.  Serum free light chain ratio was normal.  Ferritin 226.  Iron profile normal.  Vitamin B12 greater than 2000.    Serum protein levels in March 2017 are very stable with 0.1 g M spike.  He will follow-up in one year.  He continues to follow-up with Dr. Hernandez for CT scans.    CT imaging on 5/23/2017 showed an increase in size of 2 lingular lesions now measuring 2.2 cm and 1.0 cm.  There is an 8 mm left lower lobe pulmonary nodule also slightly increased in size.  Several other tiny pulmonary nodules bilaterally which are stable.    His M spike increased to 0.2 g.    He had a PET scan on 6/8/2017 showing several hypermetabolic left lung pulmonary nodules with a 2.2 cm pleural-based left upper lobe nodule.    His case was presented at the multidisciplinary thoracic conference.  It was elected to follow him with serial CT scans with the biopsy if the lung nodules continued increase in size.  It was felt that it was too risky to attempt a biopsy at this point.    Repeat CT imaging on 9/11/2017 shows stable findings.  M spike 0.1 g.      CT imaging on 8/20/2018 with a slight increase in size a couple of pulmonary nodules.  M spike is 0.2 g.  Follow-up with thoracic surgery for imaging and we will see him back in 6 months for follow-up with labs in 1 week prior.    PET scan done at [facility] on 10/23/2018 with similar findings.    [facility] [place] [facility] and saw Dr. Reed there.  They have requested his pathology for review.    He complains of peripheral neuropathy in his hands and feet.  This has been worsening.  He reports some numbness and tingling as well as occasional pain.      He was discussed further in [place] after review of his pathology.  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  A fat pad biopsy and cardiac MRI were recommended.  Treatment with CyBorD was recommended.      Respiratory status has improved after participating in  pulmonary rehabilitation.    [facility] that showed no evidence for amyloidosis.    He had an EMG at the [facility] that was concerning for cervical myelopathy rather than a neuropathic process consistent with amyloidosis.    MRI of the cervical spine performed at the [facility] on 4/2/2019 shows multilevel degenerative change with moderate spinal canal stenosis at C4-C5 and C5-C6 and mild spinal canal stenosis at C6-C7.  Severe foraminal narrowing at C4-C5 and C5-C6 with moderate neuroforaminal narrowing at C6-C7.    Cardiac MRI performed on 4/16/2019 at the [facility] with normal left ventricle wall thickness with right ventricular cavity enlargement.  No aneurysm.  No fibrosis or scarring to suggest an infiltrative process such as amyloidosis.  Left ventricular ejection fraction 47%.    Negative fat pad biopsy on 6/4/2019    PAST MEDICAL, SURGICAL, FAMILY, AND SOCIAL HISTORIES were reviewed with the patient and in the electronic medical record, and were updated if indicated.    ALLERGIES:  No Known Allergies    MEDICATIONS:  The medication list has been reviewed with the patient by the medical assistant, and the list has been updated in the electronic medical record, which I reviewed.  Medication dosages and frequencies were confirmed to be accurate.    I have reviewed the patient's medical history in detail and updated the computerized patient record.    Review of Systems   Constitutional: Positive for fatigue (much improved). Negative for appetite change, chills and fever.   HENT:   Negative for trouble swallowing.         Rhinorrhea and sinus pressure    Respiratory: Positive for cough and shortness of breath (with exertion ). Negative for wheezing.         See HPI   Cardiovascular: Negative for chest pain and leg swelling.   Gastrointestinal: Negative for abdominal distention, blood in stool, constipation, diarrhea and nausea.   Musculoskeletal: Positive for neck pain. Negative for arthralgias and myalgias.   Skin:  Positive for rash.        Improved, see HPI   Neurological: Positive for extremity weakness (improved) and numbness (stable, unchanged ). Negative for dizziness.   Hematological: Does not bruise/bleed easily.   Review of systems 07/22/2019 unchanged from previous office visit except      There were no vitals filed for this visit.    PHYSICAL EXAMINATION:  GENERAL:  Obese.  Well-developed well-nourished male; awake, alert and oriented, in no acute distress.    SKIN: Faint erythematous rash on the upper back which does cross the midline, Patient reports this is quite improved from last week,  most consistent with a folliculitis.  HEAD:  Normocephalic, atraumatic.  EARS:  Hearing intact.  NOSE:  Septum midline.  No excoriations or nasal discharge.  MOUTH:  No stomatitis or ulcers.  Lips are normal.  THROAT:  Oropharynx without lesions or exudates.  CHEST:  Lungs are clear to auscultation bilaterally.  No wheezes, rales, or rhonchi.    HEART:  Regular rate; normal rhythm.  Grade II/VI systolic murmur.    ABDOMEN: Soft.  Nontender, bowel sounds present.   EXTREMITIES:  No clubbing, cyanosis, or edema.  NEUROLOGICAL:  Decreased sensation to light touch in his hands.  This is unchanged  Physical exam 07/22/2019 unchanged from previous office visit except as updated    DIAGNOSTIC DATA:  Results from last 7 days   Lab Units 07/15/19  1026   WBC 10*3/mm3 3.59   NEUTROS ABS 10*3/mm3 2.24   HEMOGLOBIN g/dL 11.6*   HEMATOCRIT % 34.5*   PLATELETS 10*3/mm3 107*     Results from last 7 days   Lab Units 07/15/19  1029   SODIUM mmol/L 139   POTASSIUM mmol/L 4.5   CHLORIDE mmol/L 105   CO2 mmol/L 23.3   BUN mg/dL 33*   CREATININE mg/dL 1.92*   CALCIUM mg/dL 9.0   ALBUMIN g/dL 4.10   BILIRUBIN mg/dL 0.8   ALK PHOS U/L 77   ALT (SGPT) U/L 30   AST (SGOT) U/L 25   GLUCOSE mg/dL 116         ASSESSMENT:  This is a 74 y.o. male with:  1.  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  He had  a wedge resection.  Dr. Hernandez was  following a couple of other small pulmonary nodules on the left which initially increased in size.  His M spike continues to be stable.  The PET scan did not show anything we didn't expect from the CT scan.  The left upper lobe lung nodule is too risky to biopsy base of its location.  His case was presented at the multidisciplinary thoracic conference and recommendations included to follow him with serial CT scans with biopsy if it appears to be safer.  CT imaging on 12/18/2017 was stable.  CT imaging on 5/25/2018 showed that one of the pleural based nodules and enlarged slightly measuring 2.3 cm.  CT imaging from 8/22/2018 shows some slight increase in size of the pulmonary nodules with the largest measuring now 2.0 cm 3 other tiny noncalcified nodules in the right lung are stable, only showing a slight increase since 2017.  If any do grow significantly in size, we could consider radiation.  He would likely not be a surgical candidate considering his oxygen dependence.  There is some question as to how much of this is amyloid.  There is also some question as to whether this is primary or reactive.  [facility] [place] by Dr. Reed, and they requested his pathology for review.  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    They recommended treatment with FAM (inhaled fluticasone, azithromycin 250 mg on Mondays Wednesdays and Fridays, montelukast daily) plus doxycycline 100 mg twice daily for his lungs.  He did [facility] this through [facility] in February 2019.    [facility] at [facility].  This was not consistent with involvement by amyloidosis.  He had an EMG also at the [facility] that was concerning for the possibility of cervical myelopathy rather than a process consistent with amyloidosis.     He did have a negative fat pad biopsy on 6/4/2019.    It has been recommended from [facility] that he initiate therapy with Velcade, Cytoxan, and dexamethasone  ([facility]).  Treatment initiated 7/8/2019    -He comes to the office today for cycle 1, day 15 of therapy and continues to tolerate treatment reasonably well.  He is experiencing sinusitis type symptoms today with associated rhinorrhea and facial pressure.  He is only tried Zyrtec for relief.  He is consuming at least 80 ounces of water a day.  I am encouraged him to try Mucinex, as well as Delsym cough syrup.  This will be further detailed below    2.  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  We did repeat labs 7/1/2019 to service a baseline, and spike was stable at 0.2.    3.  Macrocytic anemia: He has mild chronic kidney disease.  This could potentially be secondary to alcohol use as well.  He continues to have a mild macrocytosis.  Hemoglobin is relatively stable today at 11.7    4.  Thrombocytopenia.:  This is mild and we will continue to monitor this.  Platelets today are stable at 106,000    5.  Right renal mass which is chronic.  He is followed by urology.    6.  Claudication with peripheral vascular disease followed by Dr. Freeman.      7.  Low back pain with an MRI on 5/18/2017 showing degenerative changes at multiple levels    8.  COPD: He follows with Dr. Poole now and is on oxygen and inhalers.  He has noted a slight worsening in his exertional dyspnea.  We will continue to monitor this closely.  Likely attributable to sinus congestion    9.  Peripheral neuropathy: EMG completed.  MRI cervical spine complete.  There are some abnormalities in the cervical spine.  He was referred to Dr. Robins, neurosurgery.  While the report is available from the MRI performed at [facility], these images are not available.  The patient is going to take a disc back to Dr. Robins at which time images will be reviewed and the plan of care will be determined.  They did not call to make this appointment yet and I have encouraged him to do so    10.  Dermatitis/folliculitis. Did not improve with Triamcinolone. He is now utilizing a  topical antibiotic cream which has resulted in significant reduction    11.  Chronic kidney disease.  Creatinine last week was slightly elevated from his baseline.  This was not rechecked today but we will ensure that stat BMP be rechecked next week.  I have encouraged adequate hydration today.    12.  Congestion and sinus pressure: As noted above, patient has been experiencing the symptoms over the last 3 days.  He has not taken anything over-the-counter for relief.  He denies any associated fevers or chills.  I have encouraged Delsym as well as Mucinex.  Should symptoms persist over the next 4 days, I have asked the patient to call our office back as he will likely require antimicrobial therapy for management    PLAN:  1. Proceed today with cycle 1 day 15 CyBorD.  Cytoxan 600 mg weekly, dexamethasone 40 mg weekly and Velcade 1.5 mg/m2.  2. Continue FAM plus [facility]xycycline [facility] of [facility].  3. Continue antibiotic cream to upper back.  4. Continue Bactrim and acyclovir for prophylaxis  5. Follow-up with Dr. Robins, neurosurgery.  Patient will call to get this scheduled  6. Over-the-counter medication management for sinusitis symptoms.  Patient will call in the next 4 days if symptoms persist  7. Return weekly for CBC, nurse practitioner evaluation and continued weekly Velcade.  8. MD follow-up with Dr. Heredia in 4 weeks with CBC, CMP.    Patient is on drug therapy requiring extensive monitoring.  We did more than just discussed expected side effects of treatment.  We discussed how to manage his upper respiratory tract symptoms.    Janet Cortez, EMILIO  07/22/2019

## 2019-07-22 ENCOUNTER — INFUSION (OUTPATIENT)
Dept: ONCOLOGY | Facility: HOSPITAL | Age: 74
End: 2019-07-22

## 2019-07-22 ENCOUNTER — LAB (OUTPATIENT)
Dept: LAB | Facility: HOSPITAL | Age: 74
End: 2019-07-22

## 2019-07-22 ENCOUNTER — OFFICE VISIT (OUTPATIENT)
Dept: ONCOLOGY | Facility: CLINIC | Age: 74
End: 2019-07-22

## 2019-07-22 VITALS
HEIGHT: 69 IN | OXYGEN SATURATION: 99 % | TEMPERATURE: 98.6 F | HEART RATE: 103 BPM | SYSTOLIC BLOOD PRESSURE: 129 MMHG | BODY MASS INDEX: 34.33 KG/M2 | DIASTOLIC BLOOD PRESSURE: 75 MMHG | RESPIRATION RATE: 16 BRPM | WEIGHT: 231.8 LBS

## 2019-07-22 DIAGNOSIS — J34.89 RHINORRHEA: ICD-10-CM

## 2019-07-22 DIAGNOSIS — E85.81 AL AMYLOIDOSIS (HCC): ICD-10-CM

## 2019-07-22 DIAGNOSIS — R05.9 COUGH: ICD-10-CM

## 2019-07-22 DIAGNOSIS — E85.81 AL AMYLOIDOSIS (HCC): Primary | ICD-10-CM

## 2019-07-22 DIAGNOSIS — C90.30 PLASMACYTOMA (HCC): Primary | ICD-10-CM

## 2019-07-22 LAB
BASOPHILS # BLD AUTO: 0.02 10*3/MM3 (ref 0–0.2)
BASOPHILS NFR BLD AUTO: 0.5 % (ref 0–1.5)
DEPRECATED RDW RBC AUTO: 54.4 FL (ref 37–54)
EOSINOPHIL # BLD AUTO: 0.07 10*3/MM3 (ref 0–0.4)
EOSINOPHIL NFR BLD AUTO: 1.9 % (ref 0.3–6.2)
ERYTHROCYTE [DISTWIDTH] IN BLOOD BY AUTOMATED COUNT: 14.2 % (ref 12.3–15.4)
HCT VFR BLD AUTO: 33.8 % (ref 37.5–51)
HGB BLD-MCNC: 11.7 G/DL (ref 13–17.7)
IMM GRANULOCYTES # BLD AUTO: 0.02 10*3/MM3 (ref 0–0.05)
IMM GRANULOCYTES NFR BLD AUTO: 0.5 % (ref 0–0.5)
LYMPHOCYTES # BLD AUTO: 0.91 10*3/MM3 (ref 0.7–3.1)
LYMPHOCYTES NFR BLD AUTO: 24.5 % (ref 19.6–45.3)
MCH RBC QN AUTO: 36.6 PG (ref 26.6–33)
MCHC RBC AUTO-ENTMCNC: 34.6 G/DL (ref 31.5–35.7)
MCV RBC AUTO: 105.6 FL (ref 79–97)
MONOCYTES # BLD AUTO: 0.55 10*3/MM3 (ref 0.1–0.9)
MONOCYTES NFR BLD AUTO: 14.8 % (ref 5–12)
NEUTROPHILS # BLD AUTO: 2.14 10*3/MM3 (ref 1.7–7)
NEUTROPHILS NFR BLD AUTO: 57.8 % (ref 42.7–76)
NRBC BLD AUTO-RTO: 0 /100 WBC (ref 0–0.2)
PLATELET # BLD AUTO: 106 10*3/MM3 (ref 140–450)
PMV BLD AUTO: 11.9 FL (ref 6–12)
RBC # BLD AUTO: 3.2 10*6/MM3 (ref 4.14–5.8)
WBC NRBC COR # BLD: 3.71 10*3/MM3 (ref 3.4–10.8)

## 2019-07-22 PROCEDURE — 99213 OFFICE O/P EST LOW 20 MIN: CPT | Performed by: NURSE PRACTITIONER

## 2019-07-22 PROCEDURE — 85025 COMPLETE CBC W/AUTO DIFF WBC: CPT | Performed by: INTERNAL MEDICINE

## 2019-07-22 PROCEDURE — 25010000002 BORTEZOMIB PER 0.1 MG: Performed by: INTERNAL MEDICINE

## 2019-07-22 PROCEDURE — 36415 COLL VENOUS BLD VENIPUNCTURE: CPT | Performed by: INTERNAL MEDICINE

## 2019-07-22 PROCEDURE — 96401 CHEMO ANTI-NEOPL SQ/IM: CPT | Performed by: NURSE PRACTITIONER

## 2019-07-22 RX ORDER — BORTEZOMIB 3.5 MG/1
1.5 INJECTION, POWDER, LYOPHILIZED, FOR SOLUTION INTRAVENOUS; SUBCUTANEOUS ONCE
Status: COMPLETED | OUTPATIENT
Start: 2019-07-22 | End: 2019-07-22

## 2019-07-22 RX ADMIN — BORTEZOMIB 3.3 MG: 3.5 INJECTION, POWDER, LYOPHILIZED, FOR SOLUTION INTRAVENOUS; SUBCUTANEOUS at 10:37

## 2019-07-29 ENCOUNTER — APPOINTMENT (OUTPATIENT)
Dept: ONCOLOGY | Facility: HOSPITAL | Age: 74
End: 2019-07-29

## 2019-07-29 ENCOUNTER — OFFICE VISIT (OUTPATIENT)
Dept: ONCOLOGY | Facility: CLINIC | Age: 74
End: 2019-07-29

## 2019-07-29 ENCOUNTER — LAB (OUTPATIENT)
Dept: LAB | Facility: HOSPITAL | Age: 74
End: 2019-07-29

## 2019-07-29 ENCOUNTER — INFUSION (OUTPATIENT)
Dept: ONCOLOGY | Facility: HOSPITAL | Age: 74
End: 2019-07-29

## 2019-07-29 VITALS
DIASTOLIC BLOOD PRESSURE: 77 MMHG | OXYGEN SATURATION: 96 % | TEMPERATURE: 97.9 F | WEIGHT: 253.8 LBS | SYSTOLIC BLOOD PRESSURE: 158 MMHG | BODY MASS INDEX: 37.59 KG/M2 | HEART RATE: 81 BPM | RESPIRATION RATE: 20 BRPM | HEIGHT: 69 IN

## 2019-07-29 DIAGNOSIS — R21 RASH: ICD-10-CM

## 2019-07-29 DIAGNOSIS — J06.9 UPPER RESPIRATORY TRACT INFECTION, UNSPECIFIED TYPE: ICD-10-CM

## 2019-07-29 DIAGNOSIS — R05.9 COUGH: ICD-10-CM

## 2019-07-29 DIAGNOSIS — E85.81 AL AMYLOIDOSIS (HCC): Primary | ICD-10-CM

## 2019-07-29 DIAGNOSIS — C90.30 PLASMACYTOMA (HCC): ICD-10-CM

## 2019-07-29 DIAGNOSIS — E85.81 AL AMYLOIDOSIS (HCC): ICD-10-CM

## 2019-07-29 DIAGNOSIS — D69.6 THROMBOCYTOPENIA (HCC): ICD-10-CM

## 2019-07-29 LAB
ANION GAP SERPL CALCULATED.3IONS-SCNC: 10.7 MMOL/L (ref 5–15)
BASOPHILS # BLD AUTO: 0.01 10*3/MM3 (ref 0–0.2)
BASOPHILS NFR BLD AUTO: 0.3 % (ref 0–1.5)
BUN BLD-MCNC: 24 MG/DL (ref 6–20)
BUN/CREAT SERPL: 18.3 (ref 7.3–30)
CALCIUM SPEC-SCNC: 9.3 MG/DL (ref 8.5–10.2)
CHLORIDE SERPL-SCNC: 106 MMOL/L (ref 98–107)
CO2 SERPL-SCNC: 24.3 MMOL/L (ref 22–29)
CREAT BLD-MCNC: 1.31 MG/DL (ref 0.7–1.3)
DEPRECATED RDW RBC AUTO: 57.1 FL (ref 37–54)
EOSINOPHIL # BLD AUTO: 0.07 10*3/MM3 (ref 0–0.4)
EOSINOPHIL NFR BLD AUTO: 2.4 % (ref 0.3–6.2)
ERYTHROCYTE [DISTWIDTH] IN BLOOD BY AUTOMATED COUNT: 14.8 % (ref 12.3–15.4)
GFR SERPL CREATININE-BSD FRML MDRD: 54 ML/MIN/1.73
GLUCOSE BLD-MCNC: 100 MG/DL (ref 74–124)
HCT VFR BLD AUTO: 30.7 % (ref 37.5–51)
HGB BLD-MCNC: 10.7 G/DL (ref 13–17.7)
IMM GRANULOCYTES # BLD AUTO: 0.02 10*3/MM3 (ref 0–0.05)
IMM GRANULOCYTES NFR BLD AUTO: 0.7 % (ref 0–0.5)
LYMPHOCYTES # BLD AUTO: 0.58 10*3/MM3 (ref 0.7–3.1)
LYMPHOCYTES NFR BLD AUTO: 19.5 % (ref 19.6–45.3)
MCH RBC QN AUTO: 37.5 PG (ref 26.6–33)
MCHC RBC AUTO-ENTMCNC: 34.9 G/DL (ref 31.5–35.7)
MCV RBC AUTO: 107.7 FL (ref 79–97)
MONOCYTES # BLD AUTO: 0.36 10*3/MM3 (ref 0.1–0.9)
MONOCYTES NFR BLD AUTO: 12.1 % (ref 5–12)
NEUTROPHILS # BLD AUTO: 1.93 10*3/MM3 (ref 1.7–7)
NEUTROPHILS NFR BLD AUTO: 65 % (ref 42.7–76)
NRBC BLD AUTO-RTO: 0 /100 WBC (ref 0–0.2)
PLATELET # BLD AUTO: 81 10*3/MM3 (ref 140–450)
PMV BLD AUTO: 11.9 FL (ref 6–12)
POTASSIUM BLD-SCNC: 4.1 MMOL/L (ref 3.5–4.7)
RBC # BLD AUTO: 2.85 10*6/MM3 (ref 4.14–5.8)
SODIUM BLD-SCNC: 141 MMOL/L (ref 134–145)
WBC NRBC COR # BLD: 2.97 10*3/MM3 (ref 3.4–10.8)

## 2019-07-29 PROCEDURE — 80048 BASIC METABOLIC PNL TOTAL CA: CPT | Performed by: NURSE PRACTITIONER

## 2019-07-29 PROCEDURE — 25010000002 BORTEZOMIB PER 0.1 MG: Performed by: INTERNAL MEDICINE

## 2019-07-29 PROCEDURE — 85025 COMPLETE CBC W/AUTO DIFF WBC: CPT | Performed by: NURSE PRACTITIONER

## 2019-07-29 PROCEDURE — 36415 COLL VENOUS BLD VENIPUNCTURE: CPT | Performed by: NURSE PRACTITIONER

## 2019-07-29 PROCEDURE — 99215 OFFICE O/P EST HI 40 MIN: CPT | Performed by: NURSE PRACTITIONER

## 2019-07-29 PROCEDURE — 96401 CHEMO ANTI-NEOPL SQ/IM: CPT | Performed by: NURSE PRACTITIONER

## 2019-07-29 RX ORDER — AMOXICILLIN AND CLAVULANATE POTASSIUM 875; 125 MG/1; MG/1
1 TABLET, FILM COATED ORAL 2 TIMES DAILY
Qty: 14 TABLET | Refills: 0 | Status: SHIPPED | OUTPATIENT
Start: 2019-07-29 | End: 2019-12-05

## 2019-07-29 RX ORDER — BORTEZOMIB 3.5 MG/1
1.5 INJECTION, POWDER, LYOPHILIZED, FOR SOLUTION INTRAVENOUS; SUBCUTANEOUS ONCE
Status: COMPLETED | OUTPATIENT
Start: 2019-07-29 | End: 2019-07-29

## 2019-07-29 RX ADMIN — BORTEZOMIB 3.3 MG: 3.5 INJECTION, POWDER, LYOPHILIZED, FOR SOLUTION INTRAVENOUS; SUBCUTANEOUS at 12:48

## 2019-07-29 NOTE — PROGRESS NOTES
[facility] GROUP OUTPATIENT FOLLOW UP CLINIC VISIT    REASON FOR FOLLOW-UP:    1. 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  Macrocytic anemia  3.  Mild leukopenia  4.  Left lung pulmonary nodules, stable on follow up imaging.  5.  [facility] see if he can get service-connected for plasmacytoma/amyloidosis.  [facility] [place] [facility] and saw Dr. Reed there. His case was discussed further in [place] after review of his pathology.  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  A fat pad biopsy and cardiac MRI were recommended.  Treatment with CyBorD was recommended.    6.  Peripheral neuropathy in his hands and feet.  [facility] that was concerning for cervical myelopathy rather than a neuropathic process consistent with amyloidosis.  [facility] on [facility]/2019 shows multilevel degenerative change with moderate spinal canal stenosis at C4-C5 and C5-C6 and mild spinal canal stenosis at C6-C7.  Severe foraminal narrowing at C4-C5 and C5-C6 with moderate neuroforaminal narrowing at C6-C7.  7.  Cardiac [facility] performed [facility]/16/2019 [facility] with normal left ventricle wall thickness with right ventricular cavity enlargement.  No aneurysm.  No fibrosis or scarring to suggest an infiltrative process such as amyloidosis.  Left ventricular ejection fraction 47%.  8.  Negative fat pad biopsy on 6/4/2019      HISTORY OF PRESENT ILLNESS:  Rafael Gomez is a 74 y.o. male with the above-mentioned history, who returns today in anticipation of cycle 1 day 22 treatment for his amyloidosis with CyBorD.  When I saw him last week he was beginning to experience side effects related to his treatment including progressive fatigue and exertional dyspnea.  He was also struggling with rhinorrhea and an associated mild cough for about 3 days.  I encouraged over-the-counter  medications and asked him to call if the symptoms did not improve.  Additionally, he has had a rash on his shoulders and back for the last several weeks.  The rash was unresponsive to triamcinolone and did seem to improve on antibiotic cream.    Unfortunately, as he returns today the rash has recurred with a few macular lesions, though the pruritus has been quite severe.  He is more fatigued and has been unable to sleep.  He does have Restoril on hand and required a dose last night.  Rhinorrhea and cough have persisted despite the use of Mucinex, Zyrtec, Delsym cough syrup.  He denies any associated fevers or chills.    Neuropathic symptoms also remain persistent and unchanged; attributable to his degenerative disc disease within his neck.  He has an appointment with Dr. Robins on Wednesday of this week.    His labs today have declined with a total white blood cell count of 2.97, ANC 1.93, hemoglobin of 10.7, and platelets of 81,000.  His creatinine has significantly improved from 1.92 2 weeks ago, to 1.31 today.  He has been eating and drinking regularly and consuming at least 80 ounces of water a day.    He has no other concerns.    HEMATOLOGIC HISTORY:  The patient had complained of some dyspnea on exertion. He has some left knee pain following a left knee replacement. He eventually was referred for further care to Dr. Aba Freeman with vascular surgery. He was found to have evidence of peripheral vascular disease and is scheduled for a procedure in her future. He was also found to have a complex irregular cystic mass measuring 5.7 cm in the right kidney. He has been referred to urology for this. There was a 12 mm nodule in the lingula. He was referred to Dr. Larry Hernandez for this.  He had a CT scan of the chest with contrast on 5/25/2016. This showed 3 pleural-based nodules at the periphery of the left upper lobe. One measured 15 mm and the other measured 16 mm. The other was 6 mm. There is also a noncalcified 6  mm nodule.  He had a wedge resection on 6/2/2016. Two samples were sent to pathology. One showed plasma cells with evidence for amyloid. Further testing at [facility] showed IgG kappa restriction. The other wedge resection showed tiny nonnecrotizing granulomas.    Serum protein electrophoresis showed a 0.1 g M spike with immunofixation showing IgM lambda monoclonality.  Serum free light chain ratio was normal.  Ferritin 226.  Iron profile normal.  Vitamin B12 greater than 2000.    Serum protein levels in March 2017 are very stable with 0.1 g M spike.  He will follow-up in one year.  He continues to follow-up with Dr. Hernandez for CT scans.    CT imaging on 5/23/2017 showed an increase in size of 2 lingular lesions now measuring 2.2 cm and 1.0 cm.  There is an 8 mm left lower lobe pulmonary nodule also slightly increased in size.  Several other tiny pulmonary nodules bilaterally which are stable.    His M spike increased to 0.2 g.    He had a PET scan on 6/8/2017 showing several hypermetabolic left lung pulmonary nodules with a 2.2 cm pleural-based left upper lobe nodule.    His case was presented at the multidisciplinary thoracic conference.  It was elected to follow him with serial CT scans with the biopsy if the lung nodules continued increase in size.  It was felt that it was too risky to attempt a biopsy at this point.    Repeat CT imaging on 9/11/2017 shows stable findings.  M spike 0.1 g.      CT imaging on 8/20/2018 with a slight increase in size a couple of pulmonary nodules.  M spike is 0.2 g.  Follow-up with thoracic surgery for imaging and we will see him back in 6 months for follow-up with labs in 1 week prior.    PET scan done at [place] on 10/23/2018 with similar findings.    [facility] [place] [facility] and saw Dr. Reed there.  They have requested his pathology for review.    He complains of peripheral neuropathy in his hands and feet.  This has been worsening.  He  reports some numbness and tingling as well as occasional pain.      He was discussed further in [place] after review of his pathology.  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  A fat pad biopsy and cardiac MRI were recommended.  Treatment with CyBorD was recommended.      Respiratory status has improved after participating in pulmonary rehabilitation.    He had an [facility] at the [facility] that showed no evidence for amyloidosis.    He had an [facility] at the [facility] that was concerning for cervical myelopathy rather than a neuropathic process consistent with amyloidosis.    MRI of the cervical spine performed at the [facility] on 4/2/2019 shows multilevel degenerative change with moderate spinal canal stenosis at C4-C5 and C5-C6 and mild spinal canal stenosis at C6-C7.  Severe foraminal narrowing at C4-C5 and C5-C6 with moderate neuroforaminal narrowing at C6-C7.    Cardiac MRI performed on 4/16/2019 at the [facility] with normal left ventricle wall thickness with right ventricular cavity enlargement.  No aneurysm.  No fibrosis or scarring to suggest an infiltrative process such as amyloidosis.  Left ventricular ejection fraction 47%.    Negative fat pad biopsy on 6/4/2019    PAST MEDICAL, SURGICAL, FAMILY, AND SOCIAL HISTORIES were reviewed with the patient and in the electronic medical record, and were updated if indicated.    ALLERGIES:  No Known Allergies    MEDICATIONS:  The medication list has been reviewed with the patient by the medical assistant, and the list has been updated in the electronic medical record, which I reviewed.  Medication dosages and frequencies were confirmed to be accurate.    I have reviewed the patient's medical history in detail and updated the computerized patient record.    Review of Systems   Constitutional: Positive for fatigue (progressive ). Negative for appetite change, chills and fever.   HENT:   Negative for trouble swallowing.         Rhinorrhea and sinus pressure    Respiratory: Positive for cough (unchanged,  "mild ) and shortness of breath (with exertion ). Negative for wheezing.         See HPI   Cardiovascular: Negative for chest pain and leg swelling.   Gastrointestinal: Negative for abdominal distention, blood in stool, constipation, diarrhea and nausea.   Musculoskeletal: Positive for neck pain (persistent ). Negative for arthralgias and myalgias.   Skin: Positive for rash.        progressive, see HPI   Neurological: Positive for extremity weakness (unchanged ) and numbness (stable, unchanged ). Negative for dizziness.   Hematological: Does not bruise/bleed easily.   Review of systems 07/29/2019 unchanged from previous office visit except      Vitals:    07/29/19 1034   BP: 158/77   Pulse: 81   Resp: 20   Temp: 97.9 °F (36.6 °C)   TempSrc: Oral   SpO2: 96%   Weight: 115 kg (253 lb 12.8 oz)   Height: 175.5 cm (69.09\")   PainSc:   7   PainLoc: Shoulder  Comment: Lt shoulder and neck pain       PHYSICAL EXAMINATION:  GENERAL:  Obese.  Well-developed well-nourished male; awake, alert and oriented, in no acute distress.    SKIN: Faint erythematous rash on the upper back which does cross the midline, discoloration actually appears improved this week, however is extremely pruritic   HEAD:  Normocephalic, atraumatic.  EARS:  Hearing intact.  NOSE:  Septum midline.  No excoriations or nasal discharge.  MOUTH:  No stomatitis or ulcers.  Lips are normal.  THROAT:  Oropharynx without lesions or exudates.  CHEST:  Lungs are clear to auscultation bilaterally.  No wheezes, rales, or rhonchi.    HEART:  Regular rate; normal rhythm.  Grade II/VI systolic murmur.    ABDOMEN: Soft.  Nontender, bowel sounds present.   EXTREMITIES: 1-2+ pitting edema.  Chronic, though patient reports that this is slightly worsened at this week  NEUROLOGICAL:  Decreased sensation to light touch in his hands.  This is unchanged  Physical exam 07/29/2019 unchanged from previous office visit except as updated    DIAGNOSTIC DATA:  Results from last 7 days "   Lab Units 07/29/19  1013   WBC 10*3/mm3 2.97*   NEUTROS ABS 10*3/mm3 1.93   HEMOGLOBIN g/dL 10.7*   HEMATOCRIT % 30.7*   PLATELETS 10*3/mm3 81*     Results from last 7 days   Lab Units 07/29/19  1013   SODIUM mmol/L 141   POTASSIUM mmol/L 4.1   CHLORIDE mmol/L 106   CO2 mmol/L 24.3   BUN mg/dL 24*   CREATININE mg/dL 1.31*   CALCIUM mg/dL 9.3   GLUCOSE mg/dL 100         ASSESSMENT:  This is a 74 y.o. male with:  1.  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  He had a wedge resection.  Dr. Hernandez was  following a couple of other small pulmonary nodules on the left which initially increased in size.  His M spike continues to be stable.  The PET scan did not show anything we didn't expect from the CT scan.  The left upper lobe lung nodule is too risky to biopsy base of its location.  His case was presented at the multidisciplinary thoracic conference and recommendations included to follow him with serial CT scans with biopsy if it appears to be safer.  CT imaging on 12/18/2017 was stable.  CT imaging on 5/25/2018 showed that one of the pleural based nodules and enlarged slightly measuring 2.3 cm.  CT imaging from 8/22/2018 shows some slight increase in size of the pulmonary nodules with the largest measuring now 2.0 cm 3 other tiny noncalcified nodules in the right lung are stable, only showing a slight increase since 2017.  If any do grow significantly in size, we could consider radiation.  He would likely not be a surgical candidate considering his oxygen dependence.  There is some question as to how much of this is amyloid.  There is also some question as to whether this is primary or reactive.  [facility] [place] by Dr. Reed, and they requested his pathology for review.  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    They recommended treatment with FAM (inhaled fluticasone, azithromycin 250 mg on Mondays Wednesdays and Fridays,  montelukast daily) plus doxycycline 100 mg twice daily for his lungs.  He did initiate this through [facility] in February 2019.    [facility] at [facility].  This was not consistent with involvement by amyloidosis.  He had an EMG also at the [facility] that was concerning for the possibility of cervical myelopathy rather than a process consistent with amyloidosis.     He did have a negative fat pad biopsy on 6/4/2019.    It has been recommended from [place] that he initiate therapy with Velcade, Cytoxan, and dexamethasone (CyBorD).  Treatment initiated 7/8/2019    -He comes to the office today for cycle 1, day 22 with worsening fatigue, recurrent pruritic rash, and persistent neuropathic symptoms.  He is also still struggling with sinus pressure, rhinorrhea, and mild cough which is been unrelieved by over-the-counter medications.     His labs have declined as noted above.  He is now slightly leukopenic and has thrombocytopenia with a platelet count of 80,000.  He denies any infectious symptoms or excess bleeding and bruising.    His labs are adequate for treatment today.  We will proceed as already scheduled.    2.  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  We did repeat labs 7/1/2019 to service a baseline, and spike was stable at 0.2.    3.  Macrocytic anemia: He has mild chronic kidney disease.  This could potentially be secondary to alcohol use as well.  He continues to have a mild macrocytosis.  Hemoglobin has declined to 10.7.  We will continue to monitor    4.  Thrombocytopenia.:  Platelets have declined to 81,000 in the absence of excess bleeding and bruising.  We will continue to monitor this closely    5.  Right renal mass which is chronic.  He is followed by urology.    6.  Claudication with peripheral vascular disease followed by Dr. Freeman.  Chronic bilateral lower extremity edema    7.  Low back pain with an MRI on 5/18/2017 showing degenerative changes at multiple levels    8.  COPD: He follows with   [facility] now and is on oxygen and inhalers.  He has noted slight progression of exertional dyspnea.  This could be a result of his COPD and decline in hemoglobin at 10.7 today.  We will continue to monitor this closely    9.  Peripheral neuropathy: EMG completed.  MRI cervical spine complete.  There are some abnormalities in the cervical spine.  He was referred to Dr. Robins, neurosurgery.  While the report is available from the MRI performed at [place], these images are not available.  The patient is going to take a disc back to Dr. Robins at which time images will be reviewed and the plan of care will be determined.  He is scheduled for an appointment on Wednesday, July 31, 2019    10.  Skin Rash. Did not improve with Triamcinolone.  He has been on a topical antibiotic cream which initially did help, though patient has noted progression of pruritus.  He is quite visibly uncomfortable with this today.  After further review with Dr. Campbell, and Dr. Heredia's absence; we will urgently refer the patient to dermatology as this could be a dermal manifestation of the patient's amyloidosis.  He will likely require skin biopsy.  In the interim, I have encouraged the patient to take over-the-counter antihistamines for relief of symptoms    11.  Chronic kidney disease.  2 weeks ago, patient's creatinine was increased from his baseline.  With stat BMP today, his creatinine has now significantly improved at 1.31    12.  Congestion and sinus pressure: As noted above, the patient's symptoms have persisted despite over-the-counter medication recommendations.  He has now had the symptoms for 10 days.  We will proceed with a 7-day course of Augmentin for relief    PLAN:  1. Proceed today with cycle 1 day 22 CyBorD.  Cytoxan 600 mg weekly, dexamethasone 40 mg weekly and Velcade 1.5 mg/m2.  2. Continue FAM plus [facility]xycycline [facility] of [facility]  3. Proceed with oral antihistamines for relief of pruritus of rash.  Additionally, we will  urgently refer the patient to dermatology for skin biopsy  4. Continue Bactrim and acyclovir for prophylaxis  5. Follow-up with Dr. Robins, neurosurgery.  Appointment is this week  6. 7-day course of Augmentin sent to patient's pharmacy for acute upper respiratory infection  7. Return in 1 week for CBC, nurse practitioner evaluation and continued weekly Velcade.  8. MD follow-up with Dr. Heredia in 2 weeks with CBC, CMP.    Patient is on drug therapy requiring extensive monitoring.  We did more than just discuss expected side effects of treatment.  We discussed management of upper respiratory tract symptoms.  Additionally, we have referred the patient to dermatology for further evaluation of his skin rash    -This was all discussed with Dr. Campbell, who is in agreement with this plan    Janet Cortez, EMILIO  07/22/2019

## 2019-07-31 ENCOUNTER — OFFICE VISIT (OUTPATIENT)
Dept: NEUROSURGERY | Facility: CLINIC | Age: 74
End: 2019-07-31

## 2019-07-31 VITALS
DIASTOLIC BLOOD PRESSURE: 76 MMHG | WEIGHT: 247 LBS | HEART RATE: 86 BPM | BODY MASS INDEX: 34.58 KG/M2 | HEIGHT: 71 IN | RESPIRATION RATE: 16 BRPM | SYSTOLIC BLOOD PRESSURE: 118 MMHG

## 2019-07-31 DIAGNOSIS — G95.9 CERVICAL MYELOPATHY (HCC): ICD-10-CM

## 2019-07-31 DIAGNOSIS — D69.6 THROMBOCYTOPENIA (HCC): ICD-10-CM

## 2019-07-31 DIAGNOSIS — C90.30 PLASMACYTOMA (HCC): ICD-10-CM

## 2019-07-31 DIAGNOSIS — M47.12 CERVICAL SPONDYLOSIS WITH MYELOPATHY: Primary | ICD-10-CM

## 2019-07-31 PROCEDURE — 99204 OFFICE O/P NEW MOD 45 MIN: CPT | Performed by: NEUROLOGICAL SURGERY

## 2019-07-31 NOTE — PROGRESS NOTES
Subjective   Patient ID: Rafael Gomez is a 74 y.o. male is being seen for consultation today at the request of Rafael Heredia MD for neck pain. Patient presents accompanied by his wife.     History of Present Illness [age]yo male with histor of LUE pain in the deltoid region.  He has some limited ROM in the LUE.  He has known amyloidosis and was being evaluated for sequelae of this as an etiolgy of his LE tingling and fingertip numbness.  He has had diminsihed sensation in both hands and feet.  He has trouble with fine motor tasking.  He reports these have been present for 3-4 years.  These issues are progressive.  His LUE shoulder region pain began about 4 months ago.  He has urinary urgency.  He denies luis incontinence.  No bowel issues.  He was medically retired for ulcerative colitis from the . He reports his left leg has more sensory issues than the right.  He has 5 years of progressing hand and foot diminished sensation.  His spread in the arm and leg began about 1 year ago.      The following portions of the patient's history were reviewed and updated as appropriate: allergies, current medications, past family history, past medical history, past social history, past surgical history and problem list.    Review of Systems   Constitutional: Negative for chills and fever.   HENT: Negative for trouble swallowing.    Eyes: Positive for visual disturbance (double vision, floaters).   Respiratory: Positive for shortness of breath and wheezing. Negative for cough.    Cardiovascular: Negative for chest pain and palpitations.   Gastrointestinal: Positive for nausea. Negative for abdominal pain and vomiting.   Genitourinary: Positive for enuresis. Negative for difficulty urinating.   Musculoskeletal: Positive for arthralgias (LUE pain), neck pain and neck stiffness.   Skin: Positive for rash.   Neurological: Positive for weakness and numbness (LUE, right hand).   Psychiatric/Behavioral: Positive for sleep  disturbance.       Objective   Physical Exam   Neurological:   Reflex Scores:       Tricep reflexes are 2+ on the right side and 3+ on the left side.       Bicep reflexes are 2+ on the right side and 3+ on the left side.       Brachioradialis reflexes are 2+ on the right side and 3+ on the left side.       Patellar reflexes are 2+ on the right side and 3+ on the left side.       Achilles reflexes are 2+ on the right side and 2+ on the left side.    Neurologic Exam     Motor Exam     Strength   Strength 5/5 except as noted.   Left deltoid: 4/5  Left triceps: 4/5  Left iliopsoas: 4/5    Sensory Exam   Right arm light touch: decreased from wrist  Left arm light touch: decreased from wrist  Right leg light touch: decreased from ankle  Left leg light touch: decreased from ankle  Right arm pinprick: decreased from wrist  Left arm pinprick: decreased from wrist  Right leg pinprick: decreased from ankle  Left leg pinprick: decreased from ankle    Gait, Coordination, and Reflexes     Reflexes   Right brachioradialis: 2+  Left brachioradialis: 3+  Right biceps: 2+  Left biceps: 3+  Right triceps: 2+  Left triceps: 3+  Right patellar: 2+  Left patellar: 3+  Right achilles: 2+  Left achilles: 2+  Right Flynn: absent  Left Flynn: absent  Right ankle clonus: absent  Left ankle clonus: absent     2+ pitting edema of LE  Plethoric neck.    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  Cord signal is normal.    Medical Decision Making:  He has [facility] competing interests: plasmacytoma/amyloidosis/myelopathy/cardiac issues (edema).  Patient likely has osspification of the posteior longitudinal ligament as etiology of his myelopathy.  His issues are progressive and to some degree there is overlap of issues (known peripheral neuropathy and now central etiology) so  proecidtion of surgery is difficult.  Best approach to be determined.  We will obtain a CT scan to better allow us to predict best approach.  Timing will be optimized based on discussion of chemo regimen with Dr. Heredia.  Rafael was seen today for neck pain.    Diagnoses and all orders for this visit:    Cervical spondylosis with myelopathy  -     CT Cervical Spine Without Contrast; Future    Thrombocytopenia (CMS/HCC)    Plasmacytoma (CMS/HCC)    Cervical myelopathy (CMS/HCC)      No Follow-up on file.

## 2019-08-01 DIAGNOSIS — E85.81 AL AMYLOIDOSIS (HCC): ICD-10-CM

## 2019-08-04 ENCOUNTER — HOSPITAL ENCOUNTER (OUTPATIENT)
Dept: CT IMAGING | Facility: HOSPITAL | Age: 74
Discharge: HOME | End: 2019-08-04
Admitting: NEUROLOGICAL SURGERY

## 2019-08-04 DIAGNOSIS — M47.12 CERVICAL SPONDYLOSIS WITH MYELOPATHY: ICD-10-CM

## 2019-08-04 PROCEDURE — 72125 CT NECK SPINE W/O DYE: CPT

## 2019-08-05 ENCOUNTER — OFFICE VISIT (OUTPATIENT)
Dept: ONCOLOGY | Facility: CLINIC | Age: 74
End: 2019-08-05

## 2019-08-05 ENCOUNTER — INFUSION (OUTPATIENT)
Dept: ONCOLOGY | Facility: HOSPITAL | Age: 74
End: 2019-08-05

## 2019-08-05 ENCOUNTER — APPOINTMENT (OUTPATIENT)
Dept: ONCOLOGY | Facility: HOSPITAL | Age: 74
End: 2019-08-05

## 2019-08-05 ENCOUNTER — LAB (OUTPATIENT)
Dept: LAB | Facility: HOSPITAL | Age: 74
End: 2019-08-05

## 2019-08-05 VITALS
BODY MASS INDEX: 34.61 KG/M2 | HEART RATE: 77 BPM | TEMPERATURE: 98.8 F | SYSTOLIC BLOOD PRESSURE: 145 MMHG | HEIGHT: 71 IN | OXYGEN SATURATION: 95 % | RESPIRATION RATE: 18 BRPM | DIASTOLIC BLOOD PRESSURE: 67 MMHG | WEIGHT: 247.2 LBS

## 2019-08-05 DIAGNOSIS — E85.81 AL AMYLOIDOSIS (HCC): Primary | ICD-10-CM

## 2019-08-05 DIAGNOSIS — D69.6 THROMBOCYTOPENIA (HCC): ICD-10-CM

## 2019-08-05 DIAGNOSIS — L30.9 DERMATITIS: ICD-10-CM

## 2019-08-05 DIAGNOSIS — E85.81 AL AMYLOIDOSIS (HCC): ICD-10-CM

## 2019-08-05 LAB
ALBUMIN SERPL-MCNC: 4.2 G/DL (ref 3.5–5.2)
ALBUMIN/GLOB SERPL: 1.8 G/DL (ref 1.1–2.4)
ALP SERPL-CCNC: 67 U/L (ref 38–116)
ALT SERPL W P-5'-P-CCNC: 34 U/L (ref 0–41)
ANION GAP SERPL CALCULATED.3IONS-SCNC: 9.9 MMOL/L (ref 5–15)
AST SERPL-CCNC: 31 U/L (ref 0–40)
BASOPHILS # BLD AUTO: 0.02 10*3/MM3 (ref 0–0.2)
BASOPHILS NFR BLD AUTO: 0.8 % (ref 0–1.5)
BILIRUB SERPL-MCNC: 1 MG/DL (ref 0.2–1.2)
BUN BLD-MCNC: 23 MG/DL (ref 6–20)
BUN/CREAT SERPL: 15.9 (ref 7.3–30)
CALCIUM SPEC-SCNC: 8.8 MG/DL (ref 8.5–10.2)
CHLORIDE SERPL-SCNC: 102 MMOL/L (ref 98–107)
CO2 SERPL-SCNC: 25.1 MMOL/L (ref 22–29)
CREAT BLD-MCNC: 1.45 MG/DL (ref 0.7–1.3)
DEPRECATED RDW RBC AUTO: 57 FL (ref 37–54)
EOSINOPHIL # BLD AUTO: 0.06 10*3/MM3 (ref 0–0.4)
EOSINOPHIL NFR BLD AUTO: 2.3 % (ref 0.3–6.2)
ERYTHROCYTE [DISTWIDTH] IN BLOOD BY AUTOMATED COUNT: 14.6 % (ref 12.3–15.4)
GFR SERPL CREATININE-BSD FRML MDRD: 48 ML/MIN/1.73
GLOBULIN UR ELPH-MCNC: 2.3 GM/DL (ref 1.8–3.5)
GLUCOSE BLD-MCNC: 102 MG/DL (ref 74–124)
HCT VFR BLD AUTO: 30.8 % (ref 37.5–51)
HGB BLD-MCNC: 10.6 G/DL (ref 13–17.7)
IMM GRANULOCYTES # BLD AUTO: 0.02 10*3/MM3 (ref 0–0.05)
IMM GRANULOCYTES NFR BLD AUTO: 0.8 % (ref 0–0.5)
LYMPHOCYTES # BLD AUTO: 0.4 10*3/MM3 (ref 0.7–3.1)
LYMPHOCYTES NFR BLD AUTO: 15.6 % (ref 19.6–45.3)
MCH RBC QN AUTO: 36.8 PG (ref 26.6–33)
MCHC RBC AUTO-ENTMCNC: 34.4 G/DL (ref 31.5–35.7)
MCV RBC AUTO: 106.9 FL (ref 79–97)
MONOCYTES # BLD AUTO: 0.39 10*3/MM3 (ref 0.1–0.9)
MONOCYTES NFR BLD AUTO: 15.2 % (ref 5–12)
NEUTROPHILS # BLD AUTO: 1.68 10*3/MM3 (ref 1.7–7)
NEUTROPHILS NFR BLD AUTO: 65.3 % (ref 42.7–76)
NRBC BLD AUTO-RTO: 0 /100 WBC (ref 0–0.2)
PLATELET # BLD AUTO: 73 10*3/MM3 (ref 140–450)
PMV BLD AUTO: 12 FL (ref 6–12)
POTASSIUM BLD-SCNC: 4.6 MMOL/L (ref 3.5–4.7)
PROT SERPL-MCNC: 6.5 G/DL (ref 6.3–8)
RBC # BLD AUTO: 2.88 10*6/MM3 (ref 4.14–5.8)
SODIUM BLD-SCNC: 137 MMOL/L (ref 134–145)
WBC NRBC COR # BLD: 2.57 10*3/MM3 (ref 3.4–10.8)

## 2019-08-05 PROCEDURE — 96401 CHEMO ANTI-NEOPL SQ/IM: CPT | Performed by: NURSE PRACTITIONER

## 2019-08-05 PROCEDURE — 25010000002 BORTEZOMIB PER 0.1 MG: Performed by: NURSE PRACTITIONER

## 2019-08-05 PROCEDURE — 99214 OFFICE O/P EST MOD 30 MIN: CPT | Performed by: NURSE PRACTITIONER

## 2019-08-05 PROCEDURE — 36415 COLL VENOUS BLD VENIPUNCTURE: CPT

## 2019-08-05 PROCEDURE — 85025 COMPLETE CBC W/AUTO DIFF WBC: CPT

## 2019-08-05 PROCEDURE — 80053 COMPREHEN METABOLIC PANEL: CPT

## 2019-08-05 RX ORDER — BORTEZOMIB 3.5 MG/1
1.5 INJECTION, POWDER, LYOPHILIZED, FOR SOLUTION INTRAVENOUS; SUBCUTANEOUS ONCE
Status: COMPLETED | OUTPATIENT
Start: 2019-08-05 | End: 2019-08-05

## 2019-08-05 RX ORDER — BORTEZOMIB 3.5 MG/1
1.5 INJECTION, POWDER, LYOPHILIZED, FOR SOLUTION INTRAVENOUS; SUBCUTANEOUS ONCE
Status: CANCELLED | OUTPATIENT
Start: 2019-08-05

## 2019-08-05 RX ADMIN — BORTEZOMIB 3.3 MG: 3.5 INJECTION, POWDER, LYOPHILIZED, FOR SOLUTION INTRAVENOUS; SUBCUTANEOUS at 11:53

## 2019-08-05 NOTE — PROGRESS NOTES
[facility] GROUP OUTPATIENT FOLLOW UP CLINIC VISIT    REASON FOR FOLLOW-UP:    1. 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  Macrocytic anemia  3.  Mild leukopenia  4.  Left lung pulmonary nodules, stable on follow up imaging.  5.  [facility] see if he can get service-connected for plasmacytoma/amyloidosis.  [facility] [place] [facility] evaluation and saw Dr. Reed there. His case was discussed further in [place] after review of his pathology.  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  A fat pad biopsy and cardiac MRI were recommended.  Treatment with CyBorD was recommended.    6.  Peripheral neuropathy in his hands and feet.  [facility] that was concerning for cervical myelopathy rather than a neuropathic process consistent with amyloidosis.  [facility] on [facility]/2019 shows multilevel degenerative change with moderate spinal canal stenosis at C4-C5 and C5-C6 and mild spinal canal stenosis at C6-C7.  Severe foraminal narrowing at C4-C5 and C5-C6 with moderate neuroforaminal narrowing at C6-C7.  7.  Cardiac [facility] performed [facility] 4/16/2019 [facility] with normal left ventricle wall thickness with right ventricular cavity enlargement.  No aneurysm.  No fibrosis or scarring to suggest an infiltrative process such as amyloidosis.  Left ventricular ejection fraction 47%.  8.  Negative fat pad biopsy on 6/4/2019    HISTORY OF PRESENT ILLNESS:  Rafael Gomez is a 74 y.o. male with the above-mentioned history, who returns the office today in anticipation of cycle 2-day 1 Velcade, Cytoxan, dexamethasone.  He continues to tolerate treatment well.  He did have sinus congestion and worsening fatigue when seen 1 week ago.  He completed a course of Augmentin with improvement in his symptoms.  He reports his energy is overall improved since the initiation of treatment.  He does  continue to have neuropathy which is being evaluated by Dr. Robins, neurosurgery.  He did undergo CT scan of his neck which will be reviewed with Dr. Robins later this month.  He does not feel his neuropathic symptoms have worsened with the initiation of treatment.   He has occasional nausea though this is controlled with home medications as needed.  He denies any changes in his bowel or bladder habits.  His appetite remains adequate.   In regards to his rash, he was referred to dermatology last week, though has not yet been seen.  The rash has essentially resolved.  He is utilizing Benadryl for itching.  He has no skin lesions.  He is noted to be thrombocytopenic today with a platelet count of 73,000.  He denies signs or symptoms of bleeding.  He denies bruising easily.  He is not on anticoagulation.    HEMATOLOGIC HISTORY:  The patient had complained of some dyspnea on exertion. He has some left knee pain following a left knee replacement. He eventually was referred for further care to Dr. Aba Freeman with vascular surgery. He was found to have evidence of peripheral vascular disease and is scheduled for a procedure in her future. He was also found to have a complex irregular cystic mass measuring 5.7 cm in the right kidney. He has been referred to urology for this. There was a 12 mm nodule in the lingula. He was referred to Dr. Larry Hernandez for this.  He had a CT scan of the chest with contrast on 5/25/2016. This showed 3 pleural-based nodules at the periphery of the left upper lobe. One measured 15 mm and the other measured 16 mm. The other was 6 mm. There is also a noncalcified 6 mm nodule.  He had a wedge resection on 6/2/2016. Two samples were sent to pathology. One showed plasma cells with evidence for amyloid. Further testing at [facility] showed IgG kappa restriction. The other wedge resection showed tiny nonnecrotizing granulomas.    Serum protein electrophoresis showed a 0.1 g M spike with  immunofixation showing IgM lambda monoclonality.  Serum free light chain ratio was normal.  Ferritin 226.  Iron profile normal.  Vitamin B12 greater than 2000.    Serum protein levels in March 2017 are very stable with 0.1 g M spike.  He will follow-up in one year.  He continues to follow-up with Dr. Hernandez for CT scans.    CT imaging on 5/23/2017 showed an increase in size of 2 lingular lesions now measuring 2.2 cm and 1.0 cm.  There is an 8 mm left lower lobe pulmonary nodule also slightly increased in size.  Several other tiny pulmonary nodules bilaterally which are stable.    His M spike increased to 0.2 g.    He had a PET scan on 6/8/2017 showing several hypermetabolic left lung pulmonary nodules with a 2.2 cm pleural-based left upper lobe nodule.    His case was presented at the multidisciplinary thoracic conference.  It was elected to follow him with serial CT scans with the biopsy if the lung nodules continued increase in size.  It was felt that it was too risky to attempt a biopsy at this point.    Repeat CT imaging on 9/11/2017 shows stable findings.  M spike 0.1 g.      CT imaging on 8/20/2018 with a slight increase in size a couple of pulmonary nodules.  M spike is 0.2 g.  Follow-up with thoracic surgery for imaging and we will see him back in 6 months for follow-up with labs in 1 week prior.    PET scan done at [place] on 10/23/2018 with similar findings.    [facility] [place] [facility] and saw Dr. Reed there.  They have requested his pathology for review.    He complains of peripheral neuropathy in his hands and feet.  This has been worsening.  He reports some numbness and tingling as well as occasional pain.      He was discussed further in [place] after review of his pathology.  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  A fat pad biopsy and cardiac MRI were recommended.  Treatment with CyBorD was recommended.      Respiratory status has improved after participating in  pulmonary rehabilitation.    He had an [facility] at the [facility] that showed no evidence for amyloidosis.    He had an [facility] at the [facility] that was concerning for cervical myelopathy rather than a neuropathic process consistent with amyloidosis.    MRI of the cervical spine performed at the [facility] on 4/2/2019 shows multilevel degenerative change with moderate spinal canal stenosis at C4-C5 and C5-C6 and mild spinal canal stenosis at C6-C7.  Severe foraminal narrowing at C4-C5 and C5-C6 with moderate neuroforaminal narrowing at C6-C7.    Cardiac MRI performed on 4/16/2019 at the [facility] with normal left ventricle wall thickness with right ventricular cavity enlargement.  No aneurysm.  No fibrosis or scarring to suggest an infiltrative process such as amyloidosis.  Left ventricular ejection fraction 47%.    Negative fat pad biopsy on 6/4/2019    PAST MEDICAL, SURGICAL, FAMILY, AND SOCIAL HISTORIES were reviewed with the patient and in the electronic medical record, and were updated if indicated.    ALLERGIES:  No Known Allergies    MEDICATIONS:  The medication list has been reviewed with the patient by the medical assistant, and the list has been updated in the electronic medical record, which I reviewed.  Medication dosages and frequencies were confirmed to be accurate.    I have reviewed the patient's medical history in detail and updated the computerized patient record.    Review of Systems   Constitutional: Positive for fatigue (unchanged). Negative for appetite change, chills and fever.   HENT:   Negative for trouble swallowing.    Respiratory: Negative for cough, shortness of breath and wheezing.    Cardiovascular: Negative for chest pain and leg swelling.   Gastrointestinal: Negative for abdominal distention, blood in stool, constipation, diarrhea and nausea.   Musculoskeletal: Positive for neck pain (unchanged). Negative for arthralgias and myalgias.   Skin: Positive for rash (improved, nearly resolved.).   Neurological: Positive for  "numbness (stable, chronic). Negative for dizziness and extremity weakness.   Hematological: Does not bruise/bleed easily.   Review of systems 08/05/2019 unchanged from previous office visit except      Vitals:    08/05/19 1104   BP: 145/67   Pulse: 77   Resp: 18   Temp: 98.8 °F (37.1 °C)   TempSrc: Oral   SpO2: 95%   Weight: 112 kg (247 lb 3.2 oz)   Height: 180.3 cm (70.98\")   PainSc: 0-No pain       PHYSICAL EXAMINATION:  GENERAL:  Obese.  Well-developed well-nourished male; awake, alert and oriented, in no acute distress.    SKIN: Near resolution of pruritic rash on the upper back with only one small scab remaining.  HEAD:  Normocephalic, atraumatic.  EARS:  Hearing intact.  NOSE:  Septum midline.  No excoriations or nasal discharge.  MOUTH:  No stomatitis or ulcers.  Lips are normal.  THROAT:  Oropharynx without lesions or exudates.  CHEST:  Lungs are clear to auscultation bilaterally.  No wheezes, rales, or rhonchi.    HEART:  Regular rate; normal rhythm.     ABDOMEN: Soft.  Nontender, bowel sounds present.   EXTREMITIES: 1+ pitting edema in the right lower extremity, left lower extremity without edema.  NEUROLOGICAL:  Decreased sensation to light touch in his hands.  This is unchanged  Physical exam 08/05/2019 unchanged from previous office visit except as updated    DIAGNOSTIC DATA:  Results from last 7 days   Lab Units 08/05/19  1031   WBC 10*3/mm3 2.57*   NEUTROS ABS 10*3/mm3 1.68*   HEMOGLOBIN g/dL 10.6*   HEMATOCRIT % 30.8*   PLATELETS 10*3/mm3 73*             ASSESSMENT:  This is a 74 y.o. male with:  1.  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  He had a wedge resection.  Dr. Hernandez was  following a couple of other small pulmonary nodules on the left which initially increased in size.  His M spike continues to be stable.  The PET scan did not show anything we didn't expect from the CT scan.  The left upper lobe lung nodule is too risky to biopsy base " of its location.  His case was presented at the multidisciplinary thoracic conference and recommendations included to follow him with serial CT scans with biopsy if it appears to be safer.  CT imaging on 12/18/2017 was stable.  CT imaging on 5/25/2018 showed that one of the pleural based nodules and enlarged slightly measuring 2.3 cm.  CT imaging from 8/22/2018 shows some slight increase in size of the pulmonary nodules with the largest measuring now 2.0 cm 3 other tiny noncalcified nodules in the right lung are stable, only showing a slight increase since 2017.  If any do grow significantly in size, we could consider radiation.  He would likely not be a surgical candidate considering his oxygen dependence.  There is some question as to how much of this is amyloid.  There is also some question as to whether this is primary or reactive.  [facility] [place] by Dr. Reed, and they requested his pathology for review.  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    They recommended treatment with FAM (inhaled fluticasone, azithromycin 250 mg on Mondays Wednesdays and Fridays, montelukast daily) plus doxycycline 100 mg twice daily for his lungs.  He did initiate this through [facility] in February 2019.    [facility] at [facility].  This was not consistent with involvement by amyloidosis.  He had an EMG also at the [facility] that was concerning for the possibility of cervical myelopathy rather than a process consistent with amyloidosis.     He did have a negative fat pad biopsy on 6/4/2019.    It has been recommended from [place] that he initiate therapy with Velcade, Cytoxan, and dexamethasone (CyBorD).  Treatment initiated 7/8/2019    Proceed today with cycle 2-day 1 without dose adjustments.  The patient's platelet count has declined to 73,000, we will continue to monitor.    2.  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  We did repeat labs 7/1/2019 to service a baseline, and spike was  stable at 0.2.    3.  Macrocytic anemia: He has mild chronic kidney disease.  This could potentially be secondary to alcohol use as well.  He continues to have a mild macrocytosis.  Hemoglobin has declined to 10.6.  We will continue to monitor    4.  Thrombocytopenia: Platelet count 73,000 today.  Without signs or symptoms of bleeding.  We will continue current dosing is reviewed with Dr. Nayak.  He is not on anticoagulation.    5.  Right renal mass which is chronic.  He is followed by urology.    6.  Claudication with peripheral vascular disease followed by Dr. Freeman.  Chronic bilateral lower extremity edema, this is unchanged.    7.  Low back pain with an MRI on 5/18/2017 showing degenerative changes at multiple levels    8.  COPD: He follows with Dr. Poole now and is on oxygen and inhalers.      9.  Peripheral neuropathy: EMG completed.  MRI cervical spine complete.  There are some abnormalities in the cervical spine.  He was referred to Dr. Rboins, neurosurgery.  While the report is available from the MRI performed at [place], these images are not available.  The patient was seen by Dr. Robins with CT of the neck performed.  His neuropathic symptoms remain unchanged with the initiation of Velcade.    10.  Skin Rash.  Previously treated with triamcinolone as well added antibiotic ointment.  The rash was felt to have worsened 1 week ago and the patient was referred to dermatology for possible biopsy.  Today, the rash has essentially resolved.  We will still asked the patient to see dermatology.  He will continue antihistamines for pruritus as needed    11.  Chronic kidney disease.  Creatinine is stable at 1.45 today.    12.  Congestion and sinus pressure: Symptoms resolved with completion of Augmentin    PLAN:  1. Proceed today with cycle 2 day 1 CyBorD.  Cytoxan 600 mg weekly, dexamethasone 40 mg weekly and Velcade 1.5 mg/m2.  2. Continue FAM plus [facility]xycycl[facility] of [facility].  3. Continue oral  antihistamines for pruritus.    4. Continue topical cortisone to rash.  5. Follow-up with dermatology as scheduled regarding rash.   6. MD follow-up with Dr. Heredia in 1 weeks with CBC, CMP, cycle 2-day 8 CyBorD.    Plan to proceed with treatment with platelet count of 73,000 was discussed reviewed with Dr. Nayak (#2) who is in agreement.    Fauzia Roche, APRN  08/05/2019

## 2019-08-07 DIAGNOSIS — E85.81 AL AMYLOIDOSIS (HCC): ICD-10-CM

## 2019-08-12 ENCOUNTER — OFFICE VISIT (OUTPATIENT)
Dept: ONCOLOGY | Facility: CLINIC | Age: 74
End: 2019-08-12

## 2019-08-12 ENCOUNTER — INFUSION (OUTPATIENT)
Dept: ONCOLOGY | Facility: HOSPITAL | Age: 74
End: 2019-08-12

## 2019-08-12 ENCOUNTER — LAB (OUTPATIENT)
Dept: LAB | Facility: HOSPITAL | Age: 74
End: 2019-08-12

## 2019-08-12 VITALS
HEART RATE: 75 BPM | RESPIRATION RATE: 16 BRPM | SYSTOLIC BLOOD PRESSURE: 149 MMHG | HEIGHT: 71 IN | WEIGHT: 249.3 LBS | OXYGEN SATURATION: 95 % | TEMPERATURE: 98 F | DIASTOLIC BLOOD PRESSURE: 70 MMHG | BODY MASS INDEX: 34.9 KG/M2

## 2019-08-12 DIAGNOSIS — E85.81 AL AMYLOIDOSIS (HCC): ICD-10-CM

## 2019-08-12 DIAGNOSIS — E85.81 AL AMYLOIDOSIS (HCC): Primary | ICD-10-CM

## 2019-08-12 LAB
BASOPHILS # BLD AUTO: 0.01 10*3/MM3 (ref 0–0.2)
BASOPHILS NFR BLD AUTO: 0.3 % (ref 0–1.5)
DEPRECATED RDW RBC AUTO: 54.3 FL (ref 37–54)
EOSINOPHIL # BLD AUTO: 0.11 10*3/MM3 (ref 0–0.4)
EOSINOPHIL NFR BLD AUTO: 3.8 % (ref 0.3–6.2)
ERYTHROCYTE [DISTWIDTH] IN BLOOD BY AUTOMATED COUNT: 14.2 % (ref 12.3–15.4)
HCT VFR BLD AUTO: 32.7 % (ref 37.5–51)
HGB BLD-MCNC: 11.5 G/DL (ref 13–17.7)
IMM GRANULOCYTES # BLD AUTO: 0.03 10*3/MM3 (ref 0–0.05)
IMM GRANULOCYTES NFR BLD AUTO: 1 % (ref 0–0.5)
LYMPHOCYTES # BLD AUTO: 0.67 10*3/MM3 (ref 0.7–3.1)
LYMPHOCYTES NFR BLD AUTO: 23.4 % (ref 19.6–45.3)
MCH RBC QN AUTO: 37.2 PG (ref 26.6–33)
MCHC RBC AUTO-ENTMCNC: 35.2 G/DL (ref 31.5–35.7)
MCV RBC AUTO: 105.8 FL (ref 79–97)
MONOCYTES # BLD AUTO: 0.51 10*3/MM3 (ref 0.1–0.9)
MONOCYTES NFR BLD AUTO: 17.8 % (ref 5–12)
NEUTROPHILS # BLD AUTO: 1.53 10*3/MM3 (ref 1.7–7)
NEUTROPHILS NFR BLD AUTO: 53.7 % (ref 42.7–76)
NRBC BLD AUTO-RTO: 0 /100 WBC (ref 0–0.2)
PLATELET # BLD AUTO: 79 10*3/MM3 (ref 140–450)
PMV BLD AUTO: 11.7 FL (ref 6–12)
RBC # BLD AUTO: 3.09 10*6/MM3 (ref 4.14–5.8)
WBC NRBC COR # BLD: 2.86 10*3/MM3 (ref 3.4–10.8)

## 2019-08-12 PROCEDURE — 99215 OFFICE O/P EST HI 40 MIN: CPT | Performed by: INTERNAL MEDICINE

## 2019-08-12 PROCEDURE — 96401 CHEMO ANTI-NEOPL SQ/IM: CPT | Performed by: INTERNAL MEDICINE

## 2019-08-12 PROCEDURE — 36416 COLLJ CAPILLARY BLOOD SPEC: CPT | Performed by: INTERNAL MEDICINE

## 2019-08-12 PROCEDURE — 25010000002 BORTEZOMIB PER 0.1 MG: Performed by: INTERNAL MEDICINE

## 2019-08-12 PROCEDURE — 85025 COMPLETE CBC W/AUTO DIFF WBC: CPT | Performed by: INTERNAL MEDICINE

## 2019-08-12 RX ORDER — BORTEZOMIB 3.5 MG/1
1.5 INJECTION, POWDER, LYOPHILIZED, FOR SOLUTION INTRAVENOUS; SUBCUTANEOUS ONCE
Status: CANCELLED | OUTPATIENT
Start: 2019-09-09

## 2019-08-12 RX ORDER — BORTEZOMIB 3.5 MG/1
1.5 INJECTION, POWDER, LYOPHILIZED, FOR SOLUTION INTRAVENOUS; SUBCUTANEOUS ONCE
Status: CANCELLED | OUTPATIENT
Start: 2019-08-26

## 2019-08-12 RX ORDER — BORTEZOMIB 3.5 MG/1
1.5 INJECTION, POWDER, LYOPHILIZED, FOR SOLUTION INTRAVENOUS; SUBCUTANEOUS ONCE
Status: CANCELLED | OUTPATIENT
Start: 2019-08-12

## 2019-08-12 RX ORDER — BORTEZOMIB 3.5 MG/1
1.5 INJECTION, POWDER, LYOPHILIZED, FOR SOLUTION INTRAVENOUS; SUBCUTANEOUS ONCE
Status: CANCELLED | OUTPATIENT
Start: 2019-09-03

## 2019-08-12 RX ORDER — BORTEZOMIB 3.5 MG/1
1.5 INJECTION, POWDER, LYOPHILIZED, FOR SOLUTION INTRAVENOUS; SUBCUTANEOUS ONCE
Status: CANCELLED | OUTPATIENT
Start: 2019-08-19

## 2019-08-12 RX ORDER — BORTEZOMIB 3.5 MG/1
1.5 INJECTION, POWDER, LYOPHILIZED, FOR SOLUTION INTRAVENOUS; SUBCUTANEOUS ONCE
Status: CANCELLED | OUTPATIENT
Start: 2019-09-16

## 2019-08-12 RX ORDER — BORTEZOMIB 3.5 MG/1
1.5 INJECTION, POWDER, LYOPHILIZED, FOR SOLUTION INTRAVENOUS; SUBCUTANEOUS ONCE
Status: COMPLETED | OUTPATIENT
Start: 2019-08-12 | End: 2019-08-12

## 2019-08-12 RX ORDER — BORTEZOMIB 3.5 MG/1
1.5 INJECTION, POWDER, LYOPHILIZED, FOR SOLUTION INTRAVENOUS; SUBCUTANEOUS ONCE
Status: CANCELLED | OUTPATIENT
Start: 2019-09-23

## 2019-08-12 RX ADMIN — BORTEZOMIB 3.3 MG: 3.5 INJECTION, POWDER, LYOPHILIZED, FOR SOLUTION INTRAVENOUS; SUBCUTANEOUS at 10:07

## 2019-08-12 NOTE — PROGRESS NOTES
[facility] OUTPATIENT FOLLOW UP CLINIC VISIT    REASON FOR FOLLOW-UP:    1. 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  Macrocytic anemia  3.  Mild leukopenia  4.  Left lung pulmonary nodules, stable on follow up imaging.  5.  [facility] to see if he can get service-connected for plasmacytoma/amyloidosis.  [facility] [place] [facility] and saw Dr. Reed there. His case was discussed further in [place] after review of his pathology.  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  A fat pad biopsy and cardiac MRI were recommended.  Treatment with CyBorD was recommended.    6.  Peripheral neuropathy in his hands and feet.  [facility] that was concerning for cervical myelopathy rather than a neuropathic process consistent with amyloidosis.  [facility] of [facility] on 4[facility]2/2019 shows multilevel degenerative change with moderate spinal canal stenosis at C4-C5 and C5-C6 and mild spinal canal stenosis at C6-C7.  Severe foraminal narrowing at C4-C5 and C5-C6 with moderate neuroforaminal narrowing at C6-C7.  7.  Cardiac [facility] performed on 4/16/2019 at [facility] with normal left ventricle wall thickness with right ventricular cavity enlargement.  No aneurysm.  No fibrosis or scarring to suggest an infiltrative process such as amyloidosis.  Left ventricular ejection fraction 47%.  8.  Negative fat pad biopsy on 6/4/2019    HISTORY OF PRESENT ILLNESS:  Rafael Gomez is a 74 y.o. male with the above-mentioned history, who returns the office today in anticipation of cycle 2-day 8 Velcade, Cytoxan, dexamethasone.     He tolerates therapy well.  He reports moderate fatigue but he is less fatigued than he was a few months ago.  He continues to have some neuropathic left shoulder pain.  He did see Dr. Robins with neurosurgery for this.  He did not complain of a rash today.  No bleeding.  No  fevers.    HEMATOLOGIC HISTORY:  The patient had complained of some dyspnea on exertion. He has some left knee pain following a left knee replacement. He eventually was referred for further care to Dr. Aba Freeman with vascular surgery. He was found to have evidence of peripheral vascular disease and is scheduled for a procedure in her future. He was also found to have a complex irregular cystic mass measuring 5.7 cm in the right kidney. He has been referred to urology for this. There was a 12 mm nodule in the lingula. He was referred to Dr. Larry Hernandez for this.  He had a CT scan of the chest with contrast on 5/25/2016. This showed 3 pleural-based nodules at the periphery of the left upper lobe. One measured 15 mm and the other measured 16 mm. The other was 6 mm. There is also a noncalcified 6 mm nodule.  He had a wedge resection on 6/2/2016. Two samples were sent to pathology. One showed plasma cells with evidence for amyloid. Further testing at [facility] [place] showed IgG kappa restriction. The other wedge resection showed tiny nonnecrotizing granulomas.    Serum protein electrophoresis showed a 0.1 g M spike with immunofixation showing IgM lambda monoclonality.  Serum free light chain ratio was normal.  Ferritin 226.  Iron profile normal.  Vitamin B12 greater than 2000.    Serum protein levels in March 2017 are very stable with 0.1 g M spike.  He will follow-up in one year.  He continues to follow-up with Dr. Hernandez for CT scans.    CT imaging on 5/23/2017 showed an increase in size of 2 lingular lesions now measuring 2.2 cm and 1.0 cm.  There is an 8 mm left lower lobe pulmonary nodule also slightly increased in size.  Several other tiny pulmonary nodules bilaterally which are stable.    His M spike increased to 0.2 g.    He had a PET scan on 6/8/2017 showing several hypermetabolic left lung pulmonary nodules with a 2.2 cm pleural-based left upper lobe nodule.    His case was presented at the  multidisciplinary thoracic conference.  It was elected to follow him with serial CT scans with the biopsy if the lung nodules continued increase in size.  It was felt that it was too risky to attempt a biopsy at this point.    Repeat CT imaging on 9/11/2017 shows stable findings.  M spike 0.1 g.      CT imaging on 8/20/2018 with a slight increase in size a couple of pulmonary nodules.  M spike is 0.2 g.  Follow-up with thoracic surgery for imaging and we will see him back in 6 months for follow-up with labs in 1 week prior.    PET scan done at [place] on 10/23/2018 with similar findings.    [facility] [place] [facility] and saw Dr. Reed there.  They have requested his pathology for review.    He complains of peripheral neuropathy in his hands and feet.  This has been worsening.  He reports some numbness and tingling as well as occasional pain.      He was discussed further in [place] after review of his pathology.  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  A fat pad biopsy and cardiac MRI were recommended.  Treatment with CyBorD was recommended.      Respiratory status has improved after participating in pulmonary rehabilitation.    He had an [facility] at [facility] that showed no evidence for amyloidosis.    He had an [facility] at [facility] that was concerning for cervical myelopathy rather than a neuropathic process consistent with amyloidosis.    MRI of the cervical spine performed at the [facility] on 4/2/2019 shows multilevel degenerative change with moderate spinal canal stenosis at C4-C5 and C5-C6 and mild spinal canal stenosis at C6-C7.  Severe foraminal narrowing at C4-C5 and C5-C6 with moderate neuroforaminal narrowing at C6-C7.    Cardiac MRI performed on 4/16/2019 at the [facility] with normal left ventricle wall thickness with right ventricular cavity enlargement.  No aneurysm.  No fibrosis or scarring to suggest an infiltrative process such as amyloidosis.  Left ventricular ejection fraction 47%.    Negative  "fat pad biopsy on 6/4/2019    PAST MEDICAL, SURGICAL, FAMILY, AND SOCIAL HISTORIES were reviewed with the patient and in the electronic medical record, and were updated if indicated.    ALLERGIES:  No Known Allergies    MEDICATIONS:  The medication list has been reviewed with the patient by the medical assistant, and the list has been updated in the electronic medical record, which I reviewed.  Medication dosages and frequencies were confirmed to be accurate.    I have reviewed the patient's medical history in detail and updated the computerized patient record.    Review of Systems   Constitutional: Positive for fatigue. Negative for appetite change, chills and fever.   HENT:   Negative for trouble swallowing.    Respiratory: Negative for cough, shortness of breath and wheezing.    Cardiovascular: Negative for chest pain and leg swelling.   Gastrointestinal: Negative for abdominal distention, blood in stool, constipation, diarrhea and nausea.   Musculoskeletal: Positive for neck pain (unchanged). Negative for arthralgias and myalgias.   Skin: Negative for rash.   Neurological: Positive for numbness (stable, chronic). Negative for dizziness and extremity weakness.        Left shoulder pain   Hematological: Does not bruise/bleed easily.   Review of systems unchanged from previous office visit except as noted      Vitals:    08/12/19 0926   BP: 149/70   Pulse: 75   Resp: 16   Temp: 98 °F (36.7 °C)   TempSrc: Oral   SpO2: 95%   Weight: 113 kg (249 lb 4.8 oz)   Height: 180.3 cm (70.98\")   PainSc: 6  Comment: Left shoulder       PHYSICAL EXAMINATION:  GENERAL:  Obese.  Well-developed well-nourished male; awake, alert and oriented, in no acute distress.    SKIN: No rash or nonhealing lesions  HEAD:  Normocephalic, atraumatic.  EARS:  Hearing intact.  NOSE:  Septum midline.  No excoriations or nasal discharge.  MOUTH:  No stomatitis or ulcers.  Lips are normal.  THROAT:  Oropharynx without lesions or exudates.  CHEST:  " Lungs are clear to auscultation bilaterally.  No wheezes, rales, or rhonchi.    HEART:  Regular rate; normal rhythm.     ABDOMEN: Soft.  Nontender, bowel sounds present.   EXTREMITIES: 1+ pitting edema in the right lower extremity, left lower extremity with trace edema    DIAGNOSTIC DATA:  Results from last 7 days   Lab Units 08/12/19  0834 08/05/19  1031   WBC 10*3/mm3 2.86* 2.57*   NEUTROS ABS 10*3/mm3 1.53* 1.68*   HEMOGLOBIN g/dL 11.5* 10.6*   HEMATOCRIT % 32.7* 30.8*   PLATELETS 10*3/mm3 79* 73*     Results from last 7 days   Lab Units 08/05/19  1031   SODIUM mmol/L 137   POTASSIUM mmol/L 4.6   CHLORIDE mmol/L 102   CO2 mmol/L 25.1   BUN mg/dL 23*   CREATININE mg/dL 1.45*   CALCIUM mg/dL 8.8   ALBUMIN g/dL 4.20   BILIRUBIN mg/dL 1.0   ALK PHOS U/L 67   ALT (SGPT) U/L 34   AST (SGOT) U/L 31   GLUCOSE mg/dL 102         ASSESSMENT:  This is a 74 y.o. male with:  1.  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  He had a wedge resection.  Dr. Hernandez was  following a couple of other small pulmonary nodules on the left which initially increased in size.  His M spike continues to be stable.  The PET scan did not show anything we didn't expect from the CT scan.  The left upper lobe lung nodule is too risky to biopsy base of its location.  His case was presented at the multidisciplinary thoracic conference and recommendations included to follow him with serial CT scans with biopsy if it appears to be safer.  CT imaging on 12/18/2017 was stable.  CT imaging on 5/25/2018 showed that one of the pleural based nodules and enlarged slightly measuring 2.3 cm.  CT imaging from 8/22/2018 shows some slight increase in size of the pulmonary nodules with the largest measuring now 2.0 cm 3 other tiny noncalcified nodules in the right lung are stable, only showing a slight increase since 2017.  If any do grow significantly in size, we could consider radiation.  He would likely not be  a surgical candidate considering his oxygen dependence.  There is some question as to how much of this is amyloid.  There is also some question as to whether this is primary or reactive.  [facility] [place] by Dr. Reed, and they requested his pathology for review.  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    They recommended treatment with FAM (inhaled fluticasone, azithromycin 250 mg on Mondays Wednesdays and Fridays, montelukast daily) plus doxycycline 100 mg twice daily for his lungs.  He did [facility] through [facility] in February 2019.    [facility] at [facility].  This was not consistent with involvement by amyloidosis.  He had an EMG also at the [facility] that was concerning for the possibility of cervical myelopathy rather than a process consistent with amyloidosis.     He did have a negative fat pad biopsy on 6/4/2019.    It has been recommended from [facility] that he initiate therapy with Velcade, Cytoxan, and dexamethasone (CyBorD).  Treatment initiated 7/8/2019    Proceed today with cycle 2-day 8 without dose adjustments.  Cytopenias are stable.    2.  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  We did repeat labs 7/1/2019 to service a baseline, and spike was stable at 0.2 gm.    3.  Macrocytic anemia: He has mild chronic kidney disease.  This could potentially be secondary to alcohol use as well.  He continues to have a mild macrocytosis.  Hemoglobin a little improved today.  Overall stable.    4.  Thrombocytopenia: Platelet count stable today.  Without signs or symptoms of bleeding.      5.  Right renal mass which is chronic.  He is followed by urology.    6.  Claudication with peripheral vascular disease followed by Dr. Freeman.  Chronic bilateral lower extremity edema, this is unchanged.    7.  Low back pain with an MRI on 5/18/2017 showing degenerative changes at multiple levels    8.  COPD: He follows with Dr. Poole now and is on oxygen and inhalers.      9.   Peripheral neuropathy: EMG completed.  MRI cervical spine complete.  There are some abnormalities in the cervical spine.  He was referred to Dr. Robins, neurosurgery.  While the report is available from the MRI performed at [facility], these images are not available.  The patient was seen by Dr. Robins with CT of the neck performed.  His neuropathic symptoms remain unchanged with the initiation of Velcade.    10.  Skin Rash.  Previously treated with triamcinolone as well added antibiotic ointment.  Seems to have resolved at this point.    11.  Chronic kidney disease.  Creatinine is stable at 1.45 today.    12.  Congestion and sinus pressure: Symptoms resolved with completion of Augmentin    PLAN:  1. Proceed today with cycle 2 day 8 CyBorD.  Cytoxan 600 mg weekly, dexamethasone 40 mg weekly and Velcade 1.5 mg/m2.  Continue therapy weekly as scheduled.  2. Continue FAM plus doxycycline [facility] of [facility].  3. Continue oral antihistamines for pruritus.    4. Continue topical cortisone to rash.  5. NP 2 weeks.  I will see him back in 4 weeks.    High risk medication requiring intensive monitoring.    Rafael Heredia MD

## 2019-08-19 ENCOUNTER — INFUSION (OUTPATIENT)
Dept: ONCOLOGY | Facility: HOSPITAL | Age: 74
End: 2019-08-19

## 2019-08-19 ENCOUNTER — LAB (OUTPATIENT)
Dept: LAB | Facility: HOSPITAL | Age: 74
End: 2019-08-19

## 2019-08-19 VITALS
BODY MASS INDEX: 34.6 KG/M2 | SYSTOLIC BLOOD PRESSURE: 117 MMHG | WEIGHT: 248 LBS | DIASTOLIC BLOOD PRESSURE: 66 MMHG | HEART RATE: 76 BPM

## 2019-08-19 DIAGNOSIS — E85.81 AL AMYLOIDOSIS (HCC): Primary | ICD-10-CM

## 2019-08-19 DIAGNOSIS — E85.81 AL AMYLOIDOSIS (HCC): ICD-10-CM

## 2019-08-19 LAB
BASOPHILS # BLD AUTO: 0.02 10*3/MM3 (ref 0–0.2)
BASOPHILS NFR BLD AUTO: 0.5 % (ref 0–1.5)
DEPRECATED RDW RBC AUTO: 54.5 FL (ref 37–54)
EOSINOPHIL # BLD AUTO: 0.02 10*3/MM3 (ref 0–0.4)
EOSINOPHIL NFR BLD AUTO: 0.5 % (ref 0.3–6.2)
ERYTHROCYTE [DISTWIDTH] IN BLOOD BY AUTOMATED COUNT: 14.1 % (ref 12.3–15.4)
HCT VFR BLD AUTO: 33.3 % (ref 37.5–51)
HGB BLD-MCNC: 11.5 G/DL (ref 13–17.7)
IMM GRANULOCYTES # BLD AUTO: 0.05 10*3/MM3 (ref 0–0.05)
IMM GRANULOCYTES NFR BLD AUTO: 1.1 % (ref 0–0.5)
LYMPHOCYTES # BLD AUTO: 0.61 10*3/MM3 (ref 0.7–3.1)
LYMPHOCYTES NFR BLD AUTO: 13.8 % (ref 19.6–45.3)
MCH RBC QN AUTO: 37.1 PG (ref 26.6–33)
MCHC RBC AUTO-ENTMCNC: 34.5 G/DL (ref 31.5–35.7)
MCV RBC AUTO: 107.4 FL (ref 79–97)
MONOCYTES # BLD AUTO: 0.24 10*3/MM3 (ref 0.1–0.9)
MONOCYTES NFR BLD AUTO: 5.4 % (ref 5–12)
NEUTROPHILS # BLD AUTO: 3.47 10*3/MM3 (ref 1.7–7)
NEUTROPHILS NFR BLD AUTO: 78.7 % (ref 42.7–76)
NRBC BLD AUTO-RTO: 0 /100 WBC (ref 0–0.2)
PLATELET # BLD AUTO: 87 10*3/MM3 (ref 140–450)
PMV BLD AUTO: 12.2 FL (ref 6–12)
RBC # BLD AUTO: 3.1 10*6/MM3 (ref 4.14–5.8)
WBC NRBC COR # BLD: 4.41 10*3/MM3 (ref 3.4–10.8)

## 2019-08-19 PROCEDURE — 36416 COLLJ CAPILLARY BLOOD SPEC: CPT | Performed by: INTERNAL MEDICINE

## 2019-08-19 PROCEDURE — 25010000002 BORTEZOMIB PER 0.1 MG: Performed by: INTERNAL MEDICINE

## 2019-08-19 PROCEDURE — 96401 CHEMO ANTI-NEOPL SQ/IM: CPT | Performed by: INTERNAL MEDICINE

## 2019-08-19 PROCEDURE — 85025 COMPLETE CBC W/AUTO DIFF WBC: CPT | Performed by: INTERNAL MEDICINE

## 2019-08-19 RX ORDER — BORTEZOMIB 3.5 MG/1
1.5 INJECTION, POWDER, LYOPHILIZED, FOR SOLUTION INTRAVENOUS; SUBCUTANEOUS ONCE
Status: COMPLETED | OUTPATIENT
Start: 2019-08-19 | End: 2019-08-19

## 2019-08-19 RX ADMIN — BORTEZOMIB 3.3 MG: 3.5 INJECTION, POWDER, LYOPHILIZED, FOR SOLUTION INTRAVENOUS; SUBCUTANEOUS at 12:57

## 2019-08-23 NOTE — PROGRESS NOTES
[facility] GROUP OUTPATIENT FOLLOW UP CLINIC VISIT    REASON FOR FOLLOW-UP:    1. 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  Macrocytic anemia  3.  Mild leukopenia  4.  Left lung pulmonary nodules, stable on follow up imaging.  5.  [facility] he can get service-connected for plasmacytoma/amyloidosis.  [facility] [place] [facility] and saw Dr. Reed there. His case was discussed further in [place] after review of his pathology.  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  A fat pad biopsy and cardiac MRI were recommended.  Treatment with CyBorD was recommended.    6.  Peripheral neuropathy in his hands and feet.  [facility] that was concerning for cervical myelopathy rather than a neuropathic process consistent with amyloidosis.  [facility] of the [facility] on 4[facility]2/2019 shows multilevel degenerative change with moderate spinal canal stenosis at C4-C5 and C5-C6 and mild spinal canal stenosis at C6-C7.  Severe foraminal narrowing at C4-C5 and C5-C6 with moderate neuroforaminal narrowing at C6-C7.  7.  Cardiac MRI performed on 4/16/2019 at the [facility] with normal left ventricle wall thickness with right ventricular cavity enlargement.  No aneurysm.  No fibrosis or scarring to suggest an infiltrative process such as amyloidosis.  Left ventricular ejection fraction 47%.  8.  Negative fat pad biopsy on 6/4/2019    HISTORY OF PRESENT ILLNESS:  Rafael Gomez is a 74 y.o. male with the above-mentioned history, who returns the office today in anticipation of cycle 2-day 22 Velcade, Cytoxan, dexamethasone.     He is tolerating treatment reasonably well with the exception of fatigue which is actually improved from a few months ago.  He is eating and drinking well.  Bowels and urination are regular.  He denies any new pain.  He denies any infectious symptoms including fevers or  chills.  No excess bleeding or bruising noted.    Rash that he was previously experiencing has since resolved, though he continues to struggle with pruritis. He was seen by Dr. Syed of Dermatology with a biopsy taken. He has also sent in an anti-itch cream for relief. He will follow up with him within the next few weeks.      Neuropathic, left shoulder pain persists.  He has been seen by Dr. Robins of neurosurgery regarding this issue. Additional CT imaging was obtained and patient will have scheduled follow up with their office for review hopefully next week.    He has no other concerns today.    HEMATOLOGIC HISTORY:  The patient had complained of some dyspnea on exertion. He has some left knee pain following a left knee replacement. He eventually was referred for further care to Dr. Aba Freeman with vascular surgery. He was found to have evidence of peripheral vascular disease and is scheduled for a procedure in her future. He was also found to have a complex irregular cystic mass measuring 5.7 cm in the right kidney. He has been referred to urology for this. There was a 12 mm nodule in the lingula. He was referred to Dr. Larry Hernandez for this.  He had a CT scan of the chest with contrast on 5/25/2016. This showed 3 pleural-based nodules at the periphery of the left upper lobe. One measured 15 mm and the other measured 16 mm. The other was 6 mm. There is also a noncalcified 6 mm nodule.  He had a wedge resection on 6/2/2016. Two samples were sent to pathology. One showed plasma cells with evidence for amyloid. Further testing at [facility] showed IgG kappa restriction. The other wedge resection showed tiny nonnecrotizing granulomas.    Serum protein electrophoresis showed a 0.1 g M spike with immunofixation showing IgM lambda monoclonality.  Serum free light chain ratio was normal.  Ferritin 226.  Iron profile normal.  Vitamin B12 greater than 2000.    Serum protein levels in March 2017 are very stable  with 0.1 g M spike.  He will follow-up in one year.  He continues to follow-up with Dr. Hernandez for CT scans.    CT imaging on 5/23/2017 showed an increase in size of 2 lingular lesions now measuring 2.2 cm and 1.0 cm.  There is an 8 mm left lower lobe pulmonary nodule also slightly increased in size.  Several other tiny pulmonary nodules bilaterally which are stable.    His M spike increased to 0.2 g.    He had a PET scan on 6/8/2017 showing several hypermetabolic left lung pulmonary nodules with a 2.2 cm pleural-based left upper lobe nodule.    His case was presented at the multidisciplinary thoracic conference.  It was elected to follow him with serial CT scans with the biopsy if the lung nodules continued increase in size.  It was felt that it was too risky to attempt a biopsy at this point.    Repeat CT imaging on 9/11/2017 shows stable findings.  M spike 0.1 g.      CT imaging on 8/20/2018 with a slight increase in size a couple of pulmonary nodules.  M spike is 0.2 g.  Follow-up with thoracic surgery for imaging and we will see him back in 6 months for follow-up with labs in 1 week prior.    PET scan done at [place] on 10/23/2018 with similar findings.    [facility] [place] [facility] and saw Dr. Reed there.  They have requested his pathology for review.    He complains of peripheral neuropathy in his hands and feet.  This has been worsening.  He reports some numbness and tingling as well as occasional pain.      He was discussed further in [place] after review of his pathology.  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  A fat pad biopsy and cardiac MRI were recommended.  Treatment with CyBorD was recommended.      Respiratory status has improved after participating in pulmonary rehabilitation.    He [facility] an [facility] at the [facility] that showed no evidence for amyloidosis.    He had an [facility] at the [facility] that was concerning for cervical myelopathy rather than a neuropathic process consistent with  amyloidosis.    MRI of the cervical spine performed at the [facility] on 4/2/2019 shows multilevel degenerative change with moderate spinal canal stenosis at C4-C5 and C5-C6 and mild spinal canal stenosis at C6-C7.  Severe foraminal narrowing at C4-C5 and C5-C6 with moderate neuroforaminal narrowing at C6-C7.    Cardiac MRI performed on 4/16/2019 at the [facility] with normal left ventricle wall thickness with right ventricular cavity enlargement.  No aneurysm.  No fibrosis or scarring to suggest an infiltrative process such as amyloidosis.  Left ventricular ejection fraction 47%.    Negative fat pad biopsy on 6/4/2019    PAST MEDICAL, SURGICAL, FAMILY, AND SOCIAL HISTORIES were reviewed with the patient and in the electronic medical record, and were updated if indicated.    ALLERGIES:  No Known Allergies    MEDICATIONS:  The medication list has been reviewed with the patient by the medical assistant, and the list has been updated in the electronic medical record, which I reviewed.  Medication dosages and frequencies were confirmed to be accurate.    I have reviewed the patient's medical history in detail and updated the computerized patient record.    Review of Systems   Constitutional: Positive for fatigue. Negative for appetite change, chills and fever.   HENT:   Negative for trouble swallowing.    Respiratory: Negative for cough, shortness of breath and wheezing.    Cardiovascular: Negative for chest pain and leg swelling.   Gastrointestinal: Negative for abdominal distention, blood in stool, constipation, diarrhea and nausea.   Musculoskeletal: Positive for neck pain (unchanged). Negative for arthralgias and myalgias.   Skin: Negative for rash.   Neurological: Positive for numbness (stable, chronic). Negative for dizziness and extremity weakness.        Left shoulder pain   Hematological: Does not bruise/bleed easily.   Review of systems 8/26/2019 unchanged from previous office visit except as noted      There were no vitals  filed for this visit.    PHYSICAL EXAMINATION:  GENERAL:  Obese.  Well-developed well-nourished male; awake, alert and oriented, in no acute distress.    SKIN: No rash or nonhealing lesions, pruritis of left chest extending to his upper back   HEAD:  Normocephalic, atraumatic.  EARS:  Hearing intact.  NOSE:  Septum midline.  No excoriations or nasal discharge.  MOUTH:  No stomatitis or ulcers.  Lips are normal.  THROAT:  Oropharynx without lesions or exudates.  CHEST:  Lungs are clear to auscultation bilaterally.  No wheezes, rales, or rhonchi.    HEART:  Regular rate; normal rhythm.     ABDOMEN: Soft.  Nontender, bowel sounds present.   EXTREMITIES: 1+ pitting edema in the right lower extremity, left lower extremity with trace edema (unchanged today)    DIAGNOSTIC DATA:  Results from last 7 days   Lab Units 08/19/19  1146   WBC 10*3/mm3 4.41   NEUTROS ABS 10*3/mm3 3.47   HEMOGLOBIN g/dL 11.5*   HEMATOCRIT % 33.3*   PLATELETS 10*3/mm3 87*             ASSESSMENT:  This is a 74 y.o. male with:  1.  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  He had a wedge resection.  Dr. Hernandez was  following a couple of other small pulmonary nodules on the left which initially increased in size.  His M spike continues to be stable.  The PET scan did not show anything we didn't expect from the CT scan.  The left upper lobe lung nodule is too risky to biopsy base of its location.  His case was presented at the multidisciplinary thoracic conference and recommendations included to follow him with serial CT scans with biopsy if it appears to be safer.  CT imaging on 12/18/2017 was stable.  CT imaging on 5/25/2018 showed that one of the pleural based nodules and enlarged slightly measuring 2.3 cm.  CT imaging from 8/22/2018 shows some slight increase in size of the pulmonary nodules with the largest measuring now 2.0 cm 3 other tiny noncalcified nodules in the right lung are stable, only  showing a slight increase since 2017.  If any do grow significantly in size, we could consider radiation.  He would likely not be a surgical candidate considering his oxygen dependence.  There is some question as to how much of this is amyloid.  There is also some question as to whether this is primary or reactive.  [facility] [place] by Dr. Reed, and they requested his pathology for review.  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    They recommended treatment with FAM (inhaled fluticasone, azithromycin 250 mg on Mondays Wednesdays and Fridays, montelukast daily) plus doxycycline 100 mg twice daily for his lungs.  He did [facility] this through [facility] in February 2019.    [facility] at [facility].  This was not consistent with involvement by amyloidosis.  He had an EMG also at the [facility] that was concerning for the possibility of cervical myelopathy rather than a process consistent with amyloidosis.     He did have a negative fat pad biopsy on 6/4/2019.    It has been recommended from [place] that he initiate therapy with Velcade, Cytoxan, and dexamethasone (CyBorD).  Treatment initiated 7/8/2019    Proceed today with cycle 2-day 22  without dose adjustments.  Cytopenias are stable.  He is tolerating treatment quite well thus far.    2.  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  We did repeat labs 7/1/2019 to service a baseline, and spike was stable at 0.2 gm.    3.  Macrocytic anemia: He has mild chronic kidney disease.  This could potentially be secondary to alcohol use as well.  He continues to have a mild macrocytosis.  Hemoglobin today is decreased to 10.2. This is lower than It has been. He denies any significant bleeding or recent alcohol use. We will monitor this closely.     4.  Thrombocytopenia: Platelet count today is 74,000.   No signs or symptoms of bleeding.    5.  Right renal mass which is chronic.  He is followed by urology.    6.  Claudication with peripheral  vascular disease followed by Dr. Freeman.  Chronic bilateral lower extremity edema, this is unchanged.    7.  Low back pain with an MRI on 5/18/2017 showing degenerative changes at multiple levels    8.  COPD: He follows with Dr. Poole now and is on oxygen and inhalers.      9.  Peripheral neuropathy: EMG completed.  MRI cervical spine complete.  There are some abnormalities in the cervical spine.  He was referred to Dr. Robins, neurosurgery.  While the report is available from the MRI performed at [facility], these images are not available.  The patient was seen by Dr. Robins with CT of the neck performed. He had additional imaging performed. Dr. Robins is no longer practicing with [facility] so the patient is working with his office to establish new care.     His neuropathic symptoms are stable and unchanged with the initiation of Velcade    10.  Skin Rash.  Previously treated with triamcinolone as well added antibiotic ointment.  Resolved. He is still experiencing pruritis. Recently seen by Dr. Syed of Dermatology. BX taken. Patient will follow up within the next month.     11.  Chronic kidney disease.  Most recent creatinine test obtained was stable on August 5, 2019.  Continue to monitor.    12.  Congestion and sinus pressure: Symptoms resolved with completion of Augmentin    PLAN:  1. Proceed today with cycle 2 day 22 CyBorD.  Cytoxan 600 mg weekly, dexamethasone 40 mg weekly and Velcade 1.5 mg/m2.  Continue therapy weekly as scheduled.  2. Continue FAM plus [facility]xycycline [facility] of [facility]  3. Continue oral antihistamines for pruritus.  Additional topical medication prescribed by Dr. Syed.  4. Continue topical cortisone to rash  5. MD visit in 2 weeks with Dr. Heredia in conjunction with cycle #3, day 8 of therapy  6. Follow-up with neurology for CT imaging review    High risk medication requiring intensive monitoring.    EMILIO Sanchez

## 2019-08-26 ENCOUNTER — OFFICE VISIT (OUTPATIENT)
Dept: ONCOLOGY | Facility: CLINIC | Age: 74
End: 2019-08-26

## 2019-08-26 ENCOUNTER — INFUSION (OUTPATIENT)
Dept: ONCOLOGY | Facility: HOSPITAL | Age: 74
End: 2019-08-26

## 2019-08-26 ENCOUNTER — LAB (OUTPATIENT)
Dept: LAB | Facility: HOSPITAL | Age: 74
End: 2019-08-26

## 2019-08-26 ENCOUNTER — HOSPITAL ENCOUNTER (OUTPATIENT)
Dept: CT IMAGING | Facility: HOSPITAL | Age: 74
Discharge: HOME | End: 2019-08-26
Admitting: THORACIC SURGERY (CARDIOTHORACIC VASCULAR SURGERY)

## 2019-08-26 VITALS
HEIGHT: 69 IN | DIASTOLIC BLOOD PRESSURE: 57 MMHG | HEART RATE: 90 BPM | RESPIRATION RATE: 16 BRPM | WEIGHT: 251.9 LBS | SYSTOLIC BLOOD PRESSURE: 97 MMHG | TEMPERATURE: 98.5 F | OXYGEN SATURATION: 94 % | BODY MASS INDEX: 37.31 KG/M2

## 2019-08-26 DIAGNOSIS — D53.9 MACROCYTIC ANEMIA: ICD-10-CM

## 2019-08-26 DIAGNOSIS — D69.6 THROMBOCYTOPENIA (HCC): Primary | ICD-10-CM

## 2019-08-26 DIAGNOSIS — C90.30 PLASMACYTOMA (HCC): ICD-10-CM

## 2019-08-26 DIAGNOSIS — R91.8 MULTIPLE LUNG NODULES ON CT: ICD-10-CM

## 2019-08-26 DIAGNOSIS — E85.81 AL AMYLOIDOSIS (HCC): Primary | ICD-10-CM

## 2019-08-26 DIAGNOSIS — E85.81 AL AMYLOIDOSIS (HCC): ICD-10-CM

## 2019-08-26 LAB
BASOPHILS # BLD AUTO: 0.01 10*3/MM3 (ref 0–0.2)
BASOPHILS NFR BLD AUTO: 0.3 % (ref 0–1.5)
DEPRECATED RDW RBC AUTO: 53.3 FL (ref 37–54)
EOSINOPHIL # BLD AUTO: 0.05 10*3/MM3 (ref 0–0.4)
EOSINOPHIL NFR BLD AUTO: 1.6 % (ref 0.3–6.2)
ERYTHROCYTE [DISTWIDTH] IN BLOOD BY AUTOMATED COUNT: 14.1 % (ref 12.3–15.4)
HCT VFR BLD AUTO: 29 % (ref 37.5–51)
HGB BLD-MCNC: 10.2 G/DL (ref 13–17.7)
IMM GRANULOCYTES # BLD AUTO: 0.14 10*3/MM3 (ref 0–0.05)
IMM GRANULOCYTES NFR BLD AUTO: 4.5 % (ref 0–0.5)
LYMPHOCYTES # BLD AUTO: 0.59 10*3/MM3 (ref 0.7–3.1)
LYMPHOCYTES NFR BLD AUTO: 19.1 % (ref 19.6–45.3)
MCH RBC QN AUTO: 37.5 PG (ref 26.6–33)
MCHC RBC AUTO-ENTMCNC: 35.2 G/DL (ref 31.5–35.7)
MCV RBC AUTO: 106.6 FL (ref 79–97)
MONOCYTES # BLD AUTO: 0.23 10*3/MM3 (ref 0.1–0.9)
MONOCYTES NFR BLD AUTO: 7.4 % (ref 5–12)
NEUTROPHILS # BLD AUTO: 2.07 10*3/MM3 (ref 1.7–7)
NEUTROPHILS NFR BLD AUTO: 67.1 % (ref 42.7–76)
NRBC BLD AUTO-RTO: 0 /100 WBC (ref 0–0.2)
PLATELET # BLD AUTO: 74 10*3/MM3 (ref 140–450)
PMV BLD AUTO: 10.9 FL (ref 6–12)
RBC # BLD AUTO: 2.72 10*6/MM3 (ref 4.14–5.8)
WBC NRBC COR # BLD: 3.09 10*3/MM3 (ref 3.4–10.8)

## 2019-08-26 PROCEDURE — 36415 COLL VENOUS BLD VENIPUNCTURE: CPT | Performed by: NURSE PRACTITIONER

## 2019-08-26 PROCEDURE — 25010000002 BORTEZOMIB PER 0.1 MG: Performed by: INTERNAL MEDICINE

## 2019-08-26 PROCEDURE — 85025 COMPLETE CBC W/AUTO DIFF WBC: CPT | Performed by: NURSE PRACTITIONER

## 2019-08-26 PROCEDURE — 99213 OFFICE O/P EST LOW 20 MIN: CPT | Performed by: NURSE PRACTITIONER

## 2019-08-26 PROCEDURE — 71250 CT THORAX DX C-: CPT

## 2019-08-26 PROCEDURE — 96401 CHEMO ANTI-NEOPL SQ/IM: CPT | Performed by: NURSE PRACTITIONER

## 2019-08-26 RX ORDER — BETAMETHASONE DIPROPIONATE 0.5 MG/G
CREAM TOPICAL
COMMUNITY
Start: 2019-08-23 | End: 2019-09-09

## 2019-08-26 RX ORDER — BORTEZOMIB 3.5 MG/1
1.5 INJECTION, POWDER, LYOPHILIZED, FOR SOLUTION INTRAVENOUS; SUBCUTANEOUS ONCE
Status: COMPLETED | OUTPATIENT
Start: 2019-08-26 | End: 2019-08-26

## 2019-08-26 RX ORDER — FINASTERIDE 5 MG/1
5 TABLET, FILM COATED ORAL DAILY
COMMUNITY
Start: 2019-08-01 | End: 2021-09-07

## 2019-08-26 RX ADMIN — BORTEZOMIB 3.3 MG: 3.5 INJECTION, POWDER, LYOPHILIZED, FOR SOLUTION INTRAVENOUS; SUBCUTANEOUS at 09:49

## 2019-08-28 ENCOUNTER — TELEPHONE (OUTPATIENT)
Dept: GENERAL RADIOLOGY | Facility: HOSPITAL | Age: 74
End: 2019-08-28

## 2019-08-28 ENCOUNTER — HOSPITAL ENCOUNTER (OUTPATIENT)
Dept: CARDIOLOGY | Facility: HOSPITAL | Age: 74
Discharge: HOME | End: 2019-08-28
Admitting: NURSE PRACTITIONER

## 2019-08-28 ENCOUNTER — TELEPHONE (OUTPATIENT)
Dept: ONCOLOGY | Facility: CLINIC | Age: 74
End: 2019-08-28

## 2019-08-28 DIAGNOSIS — M79.89 SWELLING OF LOWER EXTREMITY: ICD-10-CM

## 2019-08-28 DIAGNOSIS — E85.81 AL AMYLOIDOSIS (HCC): Primary | ICD-10-CM

## 2019-08-28 DIAGNOSIS — E85.81 AL AMYLOIDOSIS (HCC): ICD-10-CM

## 2019-08-28 LAB

## 2019-08-28 PROCEDURE — 93970 EXTREMITY STUDY: CPT

## 2019-08-28 NOTE — PROGRESS NOTES
Duplex venous scan of bilateral lower extremities re: bilateral lower extremity edema per in-basket message GERMAIN Cortez APRN

## 2019-08-28 NOTE — TELEPHONE ENCOUNTER
Pt. States his feet is more swollen than when he saw Janet on Monday.  States the rt is worse than the left.  Pt. Does have a hx of PAD.  States both of his legs are discolored. Denies heat or severe pain.  Is able to get his shoes on without problem.  Does express he is worried about a blood clot.  All d/w Janet Cortez np  Will set pt. Up to get a bilat. Doppler of lower ext today.  Pt. Needs to stay at facility until they call Janet the report.  understanding noted.  Message sent to the appt desk and to the informatics nurse.

## 2019-08-28 NOTE — TELEPHONE ENCOUNTER
----- Message from Genevieve Escobar sent at 8/28/2019  9:20 AM EDT -----  Contact: [phone]  Pt's wife is calling because his feet are swollen

## 2019-08-28 NOTE — TELEPHONE ENCOUNTER
----- Message from Lynette Lynn RN sent at 2019  9:40 AM EDT -----  Regarding: dopplar today  Contact: [phone]  -46--pt. With bilat lower ext edema.  Per JULIET Gonzales pt. Needs to go for a bilat. Venous doppler lower ext. Today.  Pt. To stay there until they call the report to Janet.  Thanks, Lynette

## 2019-09-03 ENCOUNTER — INFUSION (OUTPATIENT)
Dept: ONCOLOGY | Facility: HOSPITAL | Age: 74
End: 2019-09-03

## 2019-09-03 ENCOUNTER — HOSPITAL ENCOUNTER (OUTPATIENT)
Dept: GENERAL RADIOLOGY | Facility: HOSPITAL | Age: 74
Discharge: HOME | End: 2019-09-03
Admitting: INTERNAL MEDICINE

## 2019-09-03 ENCOUNTER — LAB (OUTPATIENT)
Dept: LAB | Facility: HOSPITAL | Age: 74
End: 2019-09-03

## 2019-09-03 VITALS
HEART RATE: 94 BPM | RESPIRATION RATE: 16 BRPM | SYSTOLIC BLOOD PRESSURE: 123 MMHG | OXYGEN SATURATION: 95 % | BODY MASS INDEX: 37.62 KG/M2 | TEMPERATURE: 99 F | DIASTOLIC BLOOD PRESSURE: 61 MMHG | WEIGHT: 254 LBS

## 2019-09-03 DIAGNOSIS — E85.81 AL AMYLOIDOSIS (HCC): ICD-10-CM

## 2019-09-03 DIAGNOSIS — M79.89 SWELLING OF LOWER EXTREMITY: ICD-10-CM

## 2019-09-03 DIAGNOSIS — M79.89 SWELLING OF LOWER EXTREMITY: Primary | ICD-10-CM

## 2019-09-03 DIAGNOSIS — E85.81 AL AMYLOIDOSIS (HCC): Primary | ICD-10-CM

## 2019-09-03 LAB
ALBUMIN SERPL-MCNC: 4.1 G/DL (ref 3.5–5.2)
ALBUMIN/GLOB SERPL: 2 G/DL (ref 1.1–2.4)
ALP SERPL-CCNC: 71 U/L (ref 38–116)
ALT SERPL W P-5'-P-CCNC: 24 U/L (ref 0–41)
ANION GAP SERPL CALCULATED.3IONS-SCNC: 11.9 MMOL/L (ref 5–15)
AST SERPL-CCNC: 22 U/L (ref 0–40)
B PARAPERT DNA SPEC QL NAA+PROBE: NOT DETECTED
B PERT DNA SPEC QL NAA+PROBE: NOT DETECTED
BASOPHILS # BLD AUTO: 0.01 10*3/MM3 (ref 0–0.2)
BASOPHILS NFR BLD AUTO: 0.3 % (ref 0–1.5)
BILIRUB SERPL-MCNC: 0.7 MG/DL (ref 0.2–1.2)
BUN BLD-MCNC: 35 MG/DL (ref 6–20)
BUN/CREAT SERPL: 21.2 (ref 7.3–30)
C PNEUM DNA NPH QL NAA+NON-PROBE: NOT DETECTED
CALCIUM SPEC-SCNC: 8.8 MG/DL (ref 8.5–10.2)
CHLORIDE SERPL-SCNC: 105 MMOL/L (ref 98–107)
CO2 SERPL-SCNC: 22.1 MMOL/L (ref 22–29)
CREAT BLD-MCNC: 1.65 MG/DL (ref 0.7–1.3)
DEPRECATED RDW RBC AUTO: 56.5 FL (ref 37–54)
EOSINOPHIL # BLD AUTO: 0.01 10*3/MM3 (ref 0–0.4)
EOSINOPHIL NFR BLD AUTO: 0.3 % (ref 0.3–6.2)
ERYTHROCYTE [DISTWIDTH] IN BLOOD BY AUTOMATED COUNT: 14.6 % (ref 12.3–15.4)
FLUAV H1 2009 PAND RNA NPH QL NAA+PROBE: NOT DETECTED
FLUAV H1 HA GENE NPH QL NAA+PROBE: NOT DETECTED
FLUAV H3 RNA NPH QL NAA+PROBE: NOT DETECTED
FLUAV SUBTYP SPEC NAA+PROBE: NOT DETECTED
FLUBV RNA ISLT QL NAA+PROBE: NOT DETECTED
GFR SERPL CREATININE-BSD FRML MDRD: 41 ML/MIN/1.73
GLOBULIN UR ELPH-MCNC: 2.1 GM/DL (ref 1.8–3.5)
GLUCOSE BLD-MCNC: 136 MG/DL (ref 74–124)
HADV DNA SPEC NAA+PROBE: NOT DETECTED
HCOV 229E RNA SPEC QL NAA+PROBE: NOT DETECTED
HCOV HKU1 RNA SPEC QL NAA+PROBE: NOT DETECTED
HCOV NL63 RNA SPEC QL NAA+PROBE: NOT DETECTED
HCOV OC43 RNA SPEC QL NAA+PROBE: NOT DETECTED
HCT VFR BLD AUTO: 29.9 % (ref 37.5–51)
HGB BLD-MCNC: 10.4 G/DL (ref 13–17.7)
HMPV RNA NPH QL NAA+NON-PROBE: NOT DETECTED
HPIV1 RNA SPEC QL NAA+PROBE: NOT DETECTED
HPIV2 RNA SPEC QL NAA+PROBE: NOT DETECTED
HPIV3 RNA NPH QL NAA+PROBE: NOT DETECTED
HPIV4 P GENE NPH QL NAA+PROBE: NOT DETECTED
IMM GRANULOCYTES # BLD AUTO: 0.02 10*3/MM3 (ref 0–0.05)
IMM GRANULOCYTES NFR BLD AUTO: 0.6 % (ref 0–0.5)
LYMPHOCYTES # BLD AUTO: 0.27 10*3/MM3 (ref 0.7–3.1)
LYMPHOCYTES NFR BLD AUTO: 7.7 % (ref 19.6–45.3)
M PNEUMO IGG SER IA-ACNC: NOT DETECTED
MCH RBC QN AUTO: 37 PG (ref 26.6–33)
MCHC RBC AUTO-ENTMCNC: 34.8 G/DL (ref 31.5–35.7)
MCV RBC AUTO: 106.4 FL (ref 79–97)
MONOCYTES # BLD AUTO: 0.09 10*3/MM3 (ref 0.1–0.9)
MONOCYTES NFR BLD AUTO: 2.6 % (ref 5–12)
NEUTROPHILS # BLD AUTO: 3.12 10*3/MM3 (ref 1.7–7)
NEUTROPHILS NFR BLD AUTO: 88.5 % (ref 42.7–76)
NRBC BLD AUTO-RTO: 0 /100 WBC (ref 0–0.2)
PLATELET # BLD AUTO: 83 10*3/MM3 (ref 140–450)
PMV BLD AUTO: 11.4 FL (ref 6–12)
POTASSIUM BLD-SCNC: 5.1 MMOL/L (ref 3.5–4.7)
PROT SERPL-MCNC: 6.2 G/DL (ref 6.3–8)
RBC # BLD AUTO: 2.81 10*6/MM3 (ref 4.14–5.8)
RHINOVIRUS RNA SPEC NAA+PROBE: DETECTED
RSV RNA NPH QL NAA+NON-PROBE: NOT DETECTED
SODIUM BLD-SCNC: 139 MMOL/L (ref 134–145)
WBC NRBC COR # BLD: 3.52 10*3/MM3 (ref 3.4–10.8)

## 2019-09-03 PROCEDURE — 25010000002 BORTEZOMIB PER 0.1 MG: Performed by: INTERNAL MEDICINE

## 2019-09-03 PROCEDURE — 85025 COMPLETE CBC W/AUTO DIFF WBC: CPT | Performed by: INTERNAL MEDICINE

## 2019-09-03 PROCEDURE — 73590 X-RAY EXAM OF LOWER LEG: CPT

## 2019-09-03 PROCEDURE — 80053 COMPREHEN METABOLIC PANEL: CPT | Performed by: INTERNAL MEDICINE

## 2019-09-03 PROCEDURE — 36415 COLL VENOUS BLD VENIPUNCTURE: CPT | Performed by: INTERNAL MEDICINE

## 2019-09-03 PROCEDURE — 0100U HC BIOFIRE FILMARRAY RESP PANEL 2: CPT | Performed by: INTERNAL MEDICINE

## 2019-09-03 PROCEDURE — 96401 CHEMO ANTI-NEOPL SQ/IM: CPT | Performed by: INTERNAL MEDICINE

## 2019-09-03 RX ORDER — BORTEZOMIB 3.5 MG/1
1.5 INJECTION, POWDER, LYOPHILIZED, FOR SOLUTION INTRAVENOUS; SUBCUTANEOUS ONCE
Status: COMPLETED | OUTPATIENT
Start: 2019-09-03 | End: 2019-09-03

## 2019-09-03 RX ADMIN — BORTEZOMIB 3.3 MG: 3.5 INJECTION, POWDER, LYOPHILIZED, FOR SOLUTION INTRAVENOUS; SUBCUTANEOUS at 14:43

## 2019-09-03 NOTE — PROGRESS NOTES
Pt with complaint of pain in his RLE, from his calf to his toes.  Pictures taken and shown to Dr. Heredia.  Reports discomfort in both legs, but right greater than left.  Support stockings on both.  Both lower extremities with edema, right greater than left.  Reviewed with Dr. Heredia.  X-ray of right lower extremity ordered.  Pt with complaint of upper respiratory congestion with a great deal of yellow sputum.  Harsh cough noted, with pt's face turning red.  Discussed with Dr. Heredia and prescription for Hycodan given to pt to fill at [facility] pharmacy.    [facility] pharmacy will not fill electronic prescriptions like Hycodan.  Velcade given as ordered at [facility] of abdomen.  Pt discharged with use of cane, ambulating.

## 2019-09-04 ENCOUNTER — TELEPHONE (OUTPATIENT)
Dept: ONCOLOGY | Facility: HOSPITAL | Age: 74
End: 2019-09-04

## 2019-09-04 ENCOUNTER — TELEPHONE (OUTPATIENT)
Dept: ONCOLOGY | Facility: CLINIC | Age: 74
End: 2019-09-04

## 2019-09-04 NOTE — TELEPHONE ENCOUNTER
Message forwarded to MA      ----- Message from Rafael Heredia MD sent at 9/3/2019  8:59 PM EDT -----  Please call the patient regarding his abnormal result.  Please let him know that he has a common respiratory virus.  Also let him know that the xray of his leg is normal.  Thanks, REMBERTO

## 2019-09-04 NOTE — TELEPHONE ENCOUNTER
----- Message from Alexa Marcos RN sent at 9/4/2019  7:58 AM EDT -----  Regarding: Dr. Heredia  Per Dr. Heredia    Please let him know that he has a common respiratory virus. Also let him know that the xray of his leg is normal.  Thanks, [facility]     Respiratory virus will go away on its own with time.  No need for antibiotic.  Can take over the counter meds, if he asks.

## 2019-09-05 ENCOUNTER — OFFICE VISIT (OUTPATIENT)
Dept: SURGERY | Facility: CLINIC | Age: 74
End: 2019-09-05

## 2019-09-05 VITALS
SYSTOLIC BLOOD PRESSURE: 141 MMHG | HEART RATE: 69 BPM | OXYGEN SATURATION: 98 % | WEIGHT: 247 LBS | DIASTOLIC BLOOD PRESSURE: 67 MMHG | HEIGHT: 69 IN | BODY MASS INDEX: 36.58 KG/M2

## 2019-09-05 DIAGNOSIS — C90.30 PLASMACYTOMA (HCC): ICD-10-CM

## 2019-09-05 DIAGNOSIS — R91.8 MULTIPLE LUNG NODULES ON CT: Primary | ICD-10-CM

## 2019-09-05 PROCEDURE — 99213 OFFICE O/P EST LOW 20 MIN: CPT | Performed by: NURSE PRACTITIONER

## 2019-09-05 NOTE — PROGRESS NOTES
Subjective   Patient ID: Rafael Gomez is a 74 y.o. male is here today for follow-up.    History of Present Illness  Dear Colleague,  Rafael Gomez was seen in our office today for continued follow up and surveillance after undergoing a wedge resection of lung nodules for diagnosis of plasmacytoma.  He was seen and evaluated at the [facility] in [place] and has undergone bone marrow biopsy and then been referred to a specialist at [place] to discuss treatment of his plasmacytoma/amyloidosis.  He is presently under the care of Dr. Rafael Heredia with a [facility] medical oncology group here at [facility].  He is here today to discuss the results of his most recent CT of the chest for surveillance of his pulmonary nodules.  He started a new treatment and is reporting to be doing fairly well overall although he continues to have some shortness of breath and intermittent cough.  He denies hemoptysis.   He denies any complaints of fever, chills, cough, hemoptysis, pleuritic chest pain, shortness of air, dyspnea with exertion, night sweats, hoarseness, or unintentional weight loss.     The following portions of the patient's history were reviewed and updated as appropriate: allergies, current medications, past family history, past medical history, past social history, past surgical history and problem list.  Review of Systems   Constitution: Negative.   HENT: Negative.    Eyes: Negative.    Cardiovascular: Negative.    Respiratory: Positive for cough and shortness of breath.    Endocrine: Negative.    Hematologic/Lymphatic: Negative.    Skin: Negative.    Musculoskeletal: Negative.    Gastrointestinal: Negative.    Genitourinary: Negative.    Neurological: Negative.    Psychiatric/Behavioral: Negative.    Allergic/Immunologic: Negative.      Patient Active Problem List   Diagnosis   • Lung nodule, solitary   • Cough   • Wheeze   • Multiple lung nodules on CT   • Follow-up examination, following other  surgery   • Renal mass   • Plasmacytoma (CMS/HCC)   • Macrocytic anemia   • Leukopenia   • Monoclonal gammopathy   • Thrombocytopenia (CMS/HCC)   • AL amyloidosis (CMS/HCC)   • Cervical myelopathy (CMS/HCC)     Past Medical History:   Diagnosis Date   • Anemia    • Anxiety    • Aortic aneurysm (CMS/HCC)    • Arthritis    • COPD (chronic obstructive pulmonary disease) (CMS/HCC)    • Coronary artery disease    • Depression    • ED (erectile dysfunction)    • GERD (gastroesophageal reflux disease)    • Gout    • History of PAT (paroxysmal atrial tachycardia)    • Hyperlipidemia    • Hypertension    • Leukopenia    • Macrocytic anemia    • Multiple renal cysts    • Myocardial infarction (CMS/HCC)    • Neuropathy     HANDS AND FEET   • PONV (postoperative nausea and vomiting)    • Poor circulation    • PTSD (post-traumatic stress disorder)    • PVD (peripheral vascular disease) (CMS/HCC)    • Rheumatoid arthritis (CMS/HCC)    • RLS (restless legs syndrome)    • Sleep apnea     uses CPAP   • Stroke (CMS/HCC)      Past Surgical History:   Procedure Laterality Date   • APPENDECTOMY N/A 2005   • CAROTID ENDARTERECTOMY Bilateral    • COLONOSCOPY N/A 1995    done in [facility]   • CORONARY ARTERY BYPASS GRAFT  1997   • HIATAL HERNIA REPAIR N/A    • REPLACEMENT TOTAL KNEE Left 09/08/2015   • THORACOSCOPY Left 6/2/2016    Procedure: LEFT VIDEO ASSISTED THORACOTOMY W/ LEFT UPPER LOBE WEDGE RESECTION X 2; EXPLORATORY BRONCHOSCOPY; PAIN PUMP PLACEMENT X 2;  Surgeon: Larry Hernandez MD;  [facility];  Service:    • TONSILLECTOMY Bilateral    • TOTAL HIP ARTHROPLASTY Right 1997     Family History   Problem Relation Age of Onset   • Heart disease Mother    • Multiple myeloma Mother    • Heart disease Father    • Cancer Father         Gallbladder Cancer   • Leukemia Brother         CLL   • Other Brother         Black Lung      Social History     Socioeconomic History   • Marital status:      Spouse name: Liliya   •  Number of children: Not on file   • Years of education: High school   • Highest education level: Not on file   Occupational History   • 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 1-2 tablets morning and afternoon, Disp: , Rfl:   •  acyclovir (ZOVIRAX) 400 MG tablet, Take 1 tablet by mouth 2 (Two) Times a Day., Disp: 60 tablet, Rfl: 7  •  albuterol (PROVENTIL HFA;VENTOLIN HFA) 108 (90 Base) MCG/ACT inhaler, Inhale 2 puffs Every 4 (Four) Hours As Needed for Wheezing., Disp: , Rfl:   •  allopurinol (ZYLOPRIM) 300 MG tablet, Take 300 mg by mouth daily., Disp: , Rfl:   •  amLODIPine (NORVASC) 2.5 MG tablet, Take 2.5 mg by mouth every morning., Disp: , Rfl:   •  amoxicillin-clavulanate (AUGMENTIN) 875-125 MG per tablet, Take 1 tablet by mouth 2 (Two) Times a Day., Disp: 14 tablet, Rfl: 0  •  aspirin 81 MG chewable tablet, Chew 81 mg 2 (Two) Times a Day., Disp: , Rfl:   •  atorvastatin (LIPITOR) 40 MG tablet, Take 40 mg by mouth daily., Disp: , Rfl:   •  betamethasone dipropionate (DIPROLENE) 0.05 % cream, , Disp: , Rfl:   •  CBD oil (cannabidiol) capsule, Take 1 each by mouth Daily., Disp: , Rfl:   •  cetirizine (zyrTEC) 10 MG tablet, Take 10 mg by mouth Daily., Disp: , Rfl:   •  cilostazol (PLETAL) 100 MG tablet, Take 100 mg by mouth As Needed. 0.5 TAB DAILY, Disp: , Rfl:   •  citalopram (CeleXA) 20 MG tablet, Take 20 mg by mouth every  evening., Disp: , Rfl:   •  citalopram (CeleXA) 40 MG tablet, , Disp: , Rfl:   •  cyclophosphamide 50 MG capsule, Take 12 capsules by mouth 1 (One) Time Per Week., Disp: 48 capsule, Rfl: 6  •  dexamethasone (DECADRON) 4 MG tablet, Take 10 tablets by mouth Daily With Breakfast. Take with food on Days 1, 8, 15 and 22., Disp: 40 tablet, Rfl: 3  •  doxycycline (VIBRAMYCIN) 100 MG capsule, Take 100 mg by mouth 2 (Two) Times a Day. Took last pill., Disp: , Rfl:   •  finasteride (PROSCAR) 5 MG tablet, , Disp: , Rfl:   •  glucosamine sulfate 500 MG capsule capsule, Take 2 capsules by mouth daily., Disp: , Rfl:   •  hydrochlorothiazide (HYDRODIURIL) 25 MG tablet, Take 12.5 mg by mouth daily., Disp: , Rfl:   •  HYDROcodone-homatropine (HYCODAN) 5-1.5 MG/5ML syrup, Take 5 mL by mouth Every 6 (Six) Hours As Needed for Cough., Disp: 120 mL, Rfl: 0  •  l-methylfolate-B6-B12 (METANX) 3-35-2 MG tablet tablet, Take 1 tablet by mouth 2 (two) times a day., Disp: , Rfl:   •  LORazepam (ATIVAN) 0.5 MG tablet, Take 0.5 mg by mouth as needed., Disp: , Rfl:   •  metoprolol succinate XL (TOPROL-XL) 50 MG 24 hr tablet, Take 50 mg by mouth Daily., Disp: , Rfl:   •  metoprolol tartrate (LOPRESSOR) 50 MG tablet, Take 50 mg by mouth 2 (two) times a day., Disp: , Rfl:   •  MICARDIS 80 MG tablet, Take 80 mg by mouth Daily., Disp: , Rfl:   •  MIRAPEX 0.25 MG tablet, Take 0.25 mg by mouth 3 (Three) Times a Day., Disp: , Rfl:   •  mometasone (ASMANEX TWISTHALER) inhaler 220 mcg/inhalation, Inhale 2 puffs Daily., Disp: , Rfl:   •  montelukast (SINGULAIR) 10 MG tablet, Take 10 mg by mouth Every Night., Disp: , Rfl:   •  Omega-3 Fatty Acids (FISH OIL) 1000 MG capsule capsule, Take 1,000 mg by mouth daily with breakfast. HOLD PRIOR TO SURGERY, Disp: , Rfl:   •  omeprazole (PriLOSEC) 20 MG capsule, Take 20 mg by mouth every morning., Disp: , Rfl:   •  ondansetron (ZOFRAN) 8 MG tablet, Take 1 tablet by mouth 3 (Three) Times a Day As Needed for Nausea or  Vomiting., Disp: 30 tablet, Rfl: 5  •  SEREVENT DISKUS 50 MCG/DOSE diskus inhaler, Inhale 1 puff 2 (Two) Times a Day., Disp: , Rfl:   •  sildenafil (VIAGRA) 100 MG tablet, Take 100 mg by mouth as needed for erectile dysfunction., Disp: , Rfl:   •  SPIRIVA RESPIMAT 2.5 MCG/ACT aerosol solution, Inhale 2 puffs Daily., Disp: , Rfl:   •  STIOLTO RESPIMAT 2.5-2.5 MCG/ACT aerosol solution inhaler, INHALE TWO PUFFS BY MOUTH EVERY DAY, Disp: , Rfl: 3  •  sulfamethoxazole-trimethoprim (BACTRIM DS,SEPTRA DS) 800-160 MG per tablet, Take 1 tablet by mouth 3 (Three) Times a Week. Take 1 tablet on Mondays, Wednesdays and Fridays., Disp: 12 tablet, Rfl: 7  •  tamsulosin (FLOMAX) 0.4 MG capsule 24 hr capsule, Take 1 capsule by mouth Daily., Disp: , Rfl:   •  temazepam (RESTORIL) 15 MG capsule, Take 15 mg by mouth at night as needed., Disp: , Rfl:   •  terazosin (HYTRIN) 2 MG capsule, Take 2 mg by mouth every evening. 2 TABS , Disp: , Rfl:   •  triamcinolone (KENALOG) 0.1 % ointment, Apply  topically to the appropriate area as directed 3 (Three) Times a Day., Disp: 30 g, Rfl: 2  •  VIAGRA 25 MG tablet, Take 25 mg by mouth As Needed., Disp: , Rfl:   No Known Allergies     Objective   Vitals:    09/05/19 1332   BP: 141/67   Pulse: 69   SpO2: 98%     Physical Exam   Constitutional: He is oriented to person, place, and time. He appears well-developed and well-nourished. No distress.   HENT:   Head: Normocephalic and atraumatic.   Eyes: Conjunctivae and EOM are normal. No scleral icterus.   Neck: Normal range of motion. Neck supple. No JVD present. No tracheal deviation present.   Cardiovascular: Normal rate, regular rhythm, normal heart sounds and intact distal pulses.   No murmur heard.  Pulmonary/Chest: Effort normal and breath sounds normal. No stridor. No respiratory distress. He has no wheezes. He has no rales. He exhibits no tenderness.   Abdominal: Soft. Bowel sounds are normal. He exhibits no mass.   Musculoskeletal: Normal  range of motion. He exhibits no edema.   Lymphadenopathy:     He has no cervical adenopathy.   Neurological: He is alert and oriented to person, place, and time.   Skin: Skin is warm and dry. Capillary refill takes less than 2 seconds. He is not diaphoretic.   Psychiatric: He has a normal mood and affect. His behavior is normal. Judgment and thought content normal.   Nursing note and vitals reviewed.    Independent Review of Radiographic Studies: I personally reviewed the CT of the chest performed without contrast on 8/26/2019.  Multiple pulmonary nodules are demonstrated and are overall stable in comparison to previous imaging.  There is no pleural effusion, pneumothorax or other acute pulmonary process.  Splenomegaly is present.    Assessment/Plan     Assessment: Mr. Gomez is stable today and reports some improvement in his shortness of air since and he underwent pulmonary rehab.  His CT of the chest once again demonstrates multiple pulmonary nodules which appear stable in comparison to previous imaging.    Plan: Continue follow-up with Dr. Heredia with medical oncology for treatment of his plasmacytoma/amyloidosis.  We recommend a follow-up CT of the chest to continue surveillance of his pulmonary nodules and we will set him up to see Dr. Peña in 6 months.    Thank you for allowing us to participate in the care of Rafael Gomez.  We will continue to keep you informed of his progress.    Diagnoses and all orders for this visit:    Multiple lung nodules on CT  -     CT Chest Without Contrast; Future    Plasmacytoma (CMS/HCC)  -     CT Chest Without Contrast; Future

## 2019-09-09 ENCOUNTER — LAB (OUTPATIENT)
Dept: LAB | Facility: HOSPITAL | Age: 74
End: 2019-09-09

## 2019-09-09 ENCOUNTER — OFFICE VISIT (OUTPATIENT)
Dept: ONCOLOGY | Facility: CLINIC | Age: 74
End: 2019-09-09

## 2019-09-09 ENCOUNTER — INFUSION (OUTPATIENT)
Dept: ONCOLOGY | Facility: HOSPITAL | Age: 74
End: 2019-09-09

## 2019-09-09 VITALS
HEIGHT: 69 IN | RESPIRATION RATE: 16 BRPM | SYSTOLIC BLOOD PRESSURE: 103 MMHG | WEIGHT: 246.6 LBS | HEART RATE: 78 BPM | TEMPERATURE: 98.5 F | DIASTOLIC BLOOD PRESSURE: 60 MMHG | OXYGEN SATURATION: 96 % | BODY MASS INDEX: 36.53 KG/M2

## 2019-09-09 DIAGNOSIS — E85.81 AL AMYLOIDOSIS (HCC): Primary | ICD-10-CM

## 2019-09-09 DIAGNOSIS — E85.81 AL AMYLOIDOSIS (HCC): ICD-10-CM

## 2019-09-09 LAB
BASOPHILS # BLD AUTO: 0.01 10*3/MM3 (ref 0–0.2)
BASOPHILS NFR BLD AUTO: 0.3 % (ref 0–1.5)
DEPRECATED RDW RBC AUTO: 52.7 FL (ref 37–54)
EOSINOPHIL # BLD AUTO: 0.08 10*3/MM3 (ref 0–0.4)
EOSINOPHIL NFR BLD AUTO: 2.6 % (ref 0.3–6.2)
ERYTHROCYTE [DISTWIDTH] IN BLOOD BY AUTOMATED COUNT: 14 % (ref 12.3–15.4)
HCT VFR BLD AUTO: 30.9 % (ref 37.5–51)
HGB BLD-MCNC: 10.8 G/DL (ref 13–17.7)
IMM GRANULOCYTES # BLD AUTO: 0.04 10*3/MM3 (ref 0–0.05)
IMM GRANULOCYTES NFR BLD AUTO: 1.3 % (ref 0–0.5)
LYMPHOCYTES # BLD AUTO: 0.69 10*3/MM3 (ref 0.7–3.1)
LYMPHOCYTES NFR BLD AUTO: 22.5 % (ref 19.6–45.3)
MCH RBC QN AUTO: 37 PG (ref 26.6–33)
MCHC RBC AUTO-ENTMCNC: 35 G/DL (ref 31.5–35.7)
MCV RBC AUTO: 105.8 FL (ref 79–97)
MONOCYTES # BLD AUTO: 0.41 10*3/MM3 (ref 0.1–0.9)
MONOCYTES NFR BLD AUTO: 13.4 % (ref 5–12)
NEUTROPHILS # BLD AUTO: 1.84 10*3/MM3 (ref 1.7–7)
NEUTROPHILS NFR BLD AUTO: 59.9 % (ref 42.7–76)
NRBC BLD AUTO-RTO: 0 /100 WBC (ref 0–0.2)
PLATELET # BLD AUTO: 74 10*3/MM3 (ref 140–450)
PMV BLD AUTO: 12.5 FL (ref 6–12)
RBC # BLD AUTO: 2.92 10*6/MM3 (ref 4.14–5.8)
WBC NRBC COR # BLD: 3.07 10*3/MM3 (ref 3.4–10.8)

## 2019-09-09 PROCEDURE — 96401 CHEMO ANTI-NEOPL SQ/IM: CPT | Performed by: INTERNAL MEDICINE

## 2019-09-09 PROCEDURE — 99215 OFFICE O/P EST HI 40 MIN: CPT | Performed by: INTERNAL MEDICINE

## 2019-09-09 PROCEDURE — 85025 COMPLETE CBC W/AUTO DIFF WBC: CPT | Performed by: INTERNAL MEDICINE

## 2019-09-09 PROCEDURE — 36415 COLL VENOUS BLD VENIPUNCTURE: CPT | Performed by: INTERNAL MEDICINE

## 2019-09-09 PROCEDURE — 25010000002 BORTEZOMIB PER 0.1 MG: Performed by: INTERNAL MEDICINE

## 2019-09-09 RX ORDER — BORTEZOMIB 3.5 MG/1
1.5 INJECTION, POWDER, LYOPHILIZED, FOR SOLUTION INTRAVENOUS; SUBCUTANEOUS ONCE
Status: COMPLETED | OUTPATIENT
Start: 2019-09-09 | End: 2019-09-09

## 2019-09-09 RX ORDER — BORTEZOMIB 3.5 MG/1
1.5 INJECTION, POWDER, LYOPHILIZED, FOR SOLUTION INTRAVENOUS; SUBCUTANEOUS ONCE
Status: CANCELLED | OUTPATIENT
Start: 2019-10-07

## 2019-09-09 RX ORDER — BORTEZOMIB 3.5 MG/1
1.5 INJECTION, POWDER, LYOPHILIZED, FOR SOLUTION INTRAVENOUS; SUBCUTANEOUS ONCE
Status: CANCELLED | OUTPATIENT
Start: 2019-10-28

## 2019-09-09 RX ORDER — BORTEZOMIB 3.5 MG/1
1.5 INJECTION, POWDER, LYOPHILIZED, FOR SOLUTION INTRAVENOUS; SUBCUTANEOUS ONCE
Status: CANCELLED | OUTPATIENT
Start: 2019-10-21

## 2019-09-09 RX ORDER — BORTEZOMIB 3.5 MG/1
1.5 INJECTION, POWDER, LYOPHILIZED, FOR SOLUTION INTRAVENOUS; SUBCUTANEOUS ONCE
Status: CANCELLED | OUTPATIENT
Start: 2019-10-14

## 2019-09-09 RX ADMIN — BORTEZOMIB 3.3 MG: 3.5 INJECTION, POWDER, LYOPHILIZED, FOR SOLUTION INTRAVENOUS; SUBCUTANEOUS at 09:47

## 2019-09-09 NOTE — PROGRESS NOTES
[facility] GROUP OUTPATIENT FOLLOW UP CLINIC VISIT    REASON FOR FOLLOW-UP:    1. 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  Macrocytic anemia  3.  Mild leukopenia  4.  Left lung pulmonary nodules, stable on follow up imaging.  5.  [facility] see if he can get service-connected for plasmacytoma/amyloidosis.  [facility] [place] [facility] and saw Dr. Reed there. His case was discussed further in [place] after review of his pathology.  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  A fat pad biopsy and cardiac MRI were recommended.  Treatment with CyBorD was recommended.    6.  Peripheral neuropathy in his hands and feet.  [facility] that was concerning for cervical myelopathy rather than a neuropathic process consistent with amyloidosis.  [facility] on [facility]/2019 shows multilevel degenerative change with moderate spinal canal stenosis at C4-C5 and C5-C6 and mild spinal canal stenosis at C6-C7.  Severe foraminal narrowing at C4-C5 and C5-C6 with moderate neuroforaminal narrowing at C6-C7.  7.  Cardiac [facility] performed [facility] 4/16/2019 [facility] with normal left ventricle wall thickness with right ventricular cavity enlargement.  No aneurysm.  No fibrosis or scarring to suggest an infiltrative process such as amyloidosis.  Left ventricular ejection fraction 47%.  8.  Negative fat pad biopsy on 6/4/2019    HISTORY OF PRESENT ILLNESS:  Rafael Gomez is a 74 y.o. male with the above-mentioned history, who returns the office today in anticipation of cycle #3 day 15 Velcade, Cytoxan, dexamethasone.    He has tolerated therapy well.  He does have a viral upper respiratory infection and last week we did document human rhinovirus/enterovirus on his respiratory viral panel.  He continues to have a cough but his symptoms are improving some.  He continues to complain of some  "right calf pain.  This is been ongoing for a couple of weeks.  He has bilateral lower extremity edema right greater than left which has overall improved significantly over the past couple weeks.  He did hear a \"pop\" a couple of weeks ago before his pain started.  We have previously documented a normal bilateral lower extremity venous duplex on 8/28/2019.  He is ambulatory on his right leg but continues to complain of pain around the right ankle.  He is wearing a right lower extremity zip compression stocking.    In addition he complains of some pruritus across the left shoulder.  He has a mild erythematous rash in this area.  No pain.  He has used some topical Benadryl and topical over-the-counter steroid for this without much improvement.  He was seen by Dr. Syed of Dermatology with a biopsy taken.  Of note, he was previously seen by Dr. Robins with neurosurgery for neuropathic left shoulder pain.  He will follow-up with a different neurologist/neurosurgeon in the near future.    HEMATOLOGIC HISTORY:  The patient had complained of some dyspnea on exertion. He has some left knee pain following a left knee replacement. He eventually was referred for further care to Dr. Aba Freeman with vascular surgery. He was found to have evidence of peripheral vascular disease and is scheduled for a procedure in her future. He was also found to have a complex irregular cystic mass measuring 5.7 cm in the right kidney. He has been referred to urology for this. There was a 12 mm nodule in the lingula. He was referred to Dr. Larry Hernandez for this.  He had a CT scan of the chest with contrast on 5/25/2016. This showed 3 pleural-based nodules at the periphery of the left upper lobe. One measured 15 mm and the other measured 16 mm. The other was 6 mm. There is also a noncalcified 6 mm nodule.  He had a wedge resection on 6/2/2016. Two samples were sent to pathology. One showed plasma cells with evidence for amyloid. Further testing at [facility] showed IgG kappa restriction. The other wedge resection showed tiny nonnecrotizing granulomas.    Serum protein electrophoresis showed a 0.1 g M spike with immunofixation showing IgM lambda monoclonality.  Serum free light chain ratio was normal.  Ferritin 226.  Iron profile normal.  Vitamin B12 greater than 2000.    Serum protein levels in March 2017 are very stable with 0.1 g M spike.  He will follow-up in one year.  He continues to follow-up with Dr. Hernandez for CT scans.    CT imaging on 5/23/2017 showed an increase in size of 2 lingular lesions now measuring 2.2 cm and 1.0 cm.  There is an 8 mm left lower lobe pulmonary nodule also slightly increased in size.  Several other tiny pulmonary nodules bilaterally which are stable.    His M spike increased to 0.2 g.    He had a PET scan on 6/8/2017 showing several hypermetabolic left lung pulmonary nodules with a 2.2 cm pleural-based left upper lobe nodule.    His case was presented at the multidisciplinary thoracic conference.  It was elected to follow him with serial CT scans with the biopsy if the lung nodules continued increase in size.  It was felt that it was too risky to attempt a biopsy at this point.    Repeat CT imaging on 9/11/2017 shows stable findings.  M spike 0.1 g.      CT imaging on 8/20/2018 with a slight increase in size a couple of pulmonary nodules.  M spike is 0.2 g.  Follow-up with thoracic surgery for imaging and we will see him back in 6 months for follow-up with labs in 1 week prior.    PET scan done at [facility] on 10/23/2018 with similar findings.    [facility] [place] [facility] and saw Dr. Reed there.  They have requested his pathology for review.    He complains of peripheral neuropathy in his hands and feet.  This has been worsening.  He reports some numbness and tingling as well as occasional pain.      He was discussed further in [place] after review of his pathology.  It is felt that he  has AL amyloidosis.  A fat pad biopsy and cardiac MRI were recommended.  Treatment with CyBorD was recommended.      Respiratory status has improved after participating in pulmonary rehabilitation.    He [facility] an [facility] at the [facility] that showed no evidence for amyloidosis.    He had an [facility]G at the [facility] that was concerning for cervical myelopathy rather than a neuropathic process consistent with amyloidosis.    MRI of the cervical spine performed at the [facility] on 4/2/2019 shows multilevel degenerative change with moderate spinal canal stenosis at C4-C5 and C5-C6 and mild spinal canal stenosis at C6-C7.  Severe foraminal narrowing at C4-C5 and C5-C6 with moderate neuroforaminal narrowing at C6-C7.    Cardiac MRI performed on 4/16/2019 at the [facility] with normal left ventricle wall thickness with right ventricular cavity enlargement.  No aneurysm.  No fibrosis or scarring to suggest an infiltrative process such as amyloidosis.  Left ventricular ejection fraction 47%.    Negative fat pad biopsy on 6/4/2019    PAST MEDICAL, SURGICAL, FAMILY, AND SOCIAL HISTORIES were reviewed with the patient and in the electronic medical record, and were updated if indicated.    ALLERGIES:  No Known Allergies    MEDICATIONS:  The medication list has been reviewed with the patient by the medical assistant, and the list has been updated in the electronic medical record, which I reviewed.  Medication dosages and frequencies were confirmed to be accurate.    I have reviewed the patient's medical history in detail and updated the computerized patient record.    Review of Systems   Constitutional: Positive for fatigue. Negative for appetite change, chills and fever.   HENT:   Negative for trouble swallowing.    Respiratory: Negative for cough, shortness of breath and wheezing.    Cardiovascular: Negative for chest pain and leg swelling.   Gastrointestinal: Negative for abdominal distention, blood in stool, constipation, diarrhea and nausea.   Musculoskeletal:  "Positive for neck pain (unchanged). Negative for arthralgias and myalgias.        Right lower leg and ankle pain and swelling   Skin: Positive for rash (Very mild small areas of erythema across the left shoulder.).   Neurological: Positive for numbness (stable, chronic). Negative for dizziness and extremity weakness.        Left shoulder pain   Hematological: Does not bruise/bleed easily.         Vitals:    09/09/19 0819   BP: 103/60   Pulse: 78   Resp: 16   Temp: 98.5 °F (36.9 °C)   TempSrc: Oral   SpO2: 96%   Weight: 112 kg (246 lb 9.6 oz)   Height: 175 cm (68.9\")   PainSc: 0-No pain  Comment: AL amyloidosis       PHYSICAL EXAMINATION:  GENERAL:  Obese.  Well-developed well-nourished male; awake, alert and oriented, in no acute distress.    SKIN: No rash or nonhealing lesions, pruritis of left chest extending to his upper back   HEAD:  Normocephalic, atraumatic.  EARS:  Hearing intact.  NOSE:  Septum midline.  No excoriations or nasal discharge.  MOUTH:  No stomatitis or ulcers.  Lips are normal.  THROAT:  Oropharynx without lesions or exudates.  CHEST: Scattered wheezes  HEART:  Regular rate; normal rhythm.     ABDOMEN: Soft.  Nontender, bowel sounds present.   EXTREMITIES: 1+ pitting edema in the right lower extremity, left lower extremity with trace edema.  Right lower extremity compression stocking.    DIAGNOSTIC DATA:  Results from last 7 days   Lab Units 09/09/19  0825 09/03/19  1301   WBC 10*3/mm3 3.07* 3.52   NEUTROS ABS 10*3/mm3 1.84 3.12   HEMOGLOBIN g/dL 10.8* 10.4*   HEMATOCRIT % 30.9* 29.9*   PLATELETS 10*3/mm3 74* 83*     Results from last 7 days   Lab Units 09/03/19  1301   SODIUM mmol/L 139   POTASSIUM mmol/L 5.1*   CHLORIDE mmol/L 105   CO2 mmol/L 22.1   BUN mg/dL 35*   CREATININE mg/dL 1.65*   CALCIUM mg/dL 8.8   ALBUMIN g/dL 4.10   BILIRUBIN mg/dL 0.7   ALK PHOS U/L 71   ALT (SGPT) U/L 24   AST (SGOT) U/L 22   GLUCOSE mg/dL 136*         ASSESSMENT:  This is a 74 y.o. male with:  1.  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  He had a wedge resection.  Dr. Hernandez was  following a couple of other small pulmonary nodules on the left which initially increased in size.  His M spike continues to be stable.  The PET scan did not show anything we didn't expect from the CT scan.  The left upper lobe lung nodule is too risky to biopsy base of its location.  His case was presented at the multidisciplinary thoracic conference and recommendations included to follow him with serial CT scans with biopsy if it appears to be safer.  CT imaging on 12/18/2017 was stable.  CT imaging on 5/25/2018 showed that one of the pleural based nodules and enlarged slightly measuring 2.3 cm.  CT imaging from 8/22/2018 shows some slight increase in size of the pulmonary nodules with the largest measuring now 2.0 cm 3 other tiny noncalcified nodules in the right lung are stable, only showing a slight increase since 2017.  If any do grow significantly in size, we could consider radiation.  He would likely not be a surgical candidate considering his oxygen dependence.  There is some question as to how much of this is amyloid.  There is also some question as to whether this is primary or reactive.  [facility] [place] by Dr[facility] Rede, and they requested his pathology for review.  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    They recommended treatment with FAM (inhaled fluticasone, azithromycin 250 mg on Mondays Wednesdays and Fridays, montelukast daily) plus doxycycline 100 mg twice daily for his lungs.  He did [facility] through [facility] in February 2019.    [facility].  This was not consistent with involvement by amyloidosis.  He had an EMG also at the [facility] that was concerning for the possibility of cervical myelopathy rather than a process consistent with amyloidosis.     He did have a negative fat pad biopsy on 6/4/2019.    It has been  recommended from [facility] that he initiate therapy with Velcade, Cytoxan, and dexamethasone (CyBorD).  Treatment initiated 7/8/2019    Proceed today with cycle #3 day 15  without dose adjustments.  Cytopenias are stable.  He is tolerating treatment quite well thus far.    2.  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  We did repeat labs 7/1/2019 to service a baseline, and spike was stable at 0.2 gm.      Repeat with cycle 4 day 1.    3.  Macrocytic anemia: He has mild chronic kidney disease.  This could potentially be secondary to alcohol use as well.  He continues to have a mild macrocytosis.  Hemoglobin today is decreased to 10.2. This is lower than It has been. He denies any significant bleeding or recent alcohol use. We will monitor this closely.     4.  Thrombocytopenia: Platelet count today is stable.   No signs or symptoms of bleeding.    5.  Right renal mass which is chronic.  He is followed by urology.    6.  Claudication with peripheral vascular disease followed by Dr. Freeman.  Chronic bilateral lower extremity edema, this is unchanged.    7.  Low back pain with an MRI on 5/18/2017 showing degenerative changes at multiple levels    8.  COPD: He follows with Dr. Poole now and is on oxygen and inhalers.      9.  Peripheral neuropathy: EMG completed.  MRI cervical spine complete.  There are some abnormalities in the cervical spine.  He was referred to Dr. Robins, neurosurgery.  While the report is available from the MRI performed at [facility], these images are not available.  The patient was seen by Dr. Robins with CT of the neck performed. He had additional imaging performed. Dr. Robins is no longer practicing with [facility] so the patient is working with his office to establish new care.     His neuropathic symptoms are stable and unchanged with the initiation of Velcade    10.  Skin Rash.  Previously treated with triamcinolone as well added antibiotic ointment.  Resolved. He is still experiencing  pruritis. Recently seen by Dr. Syed of Dermatology. BX taken. Patient will follow up within the next month.  Some pruritus on his left shoulder.  Rx for 2.5% hydrocortisone cream today.      11.  Chronic kidney disease.  Most recent creatinine test obtained was stable on August 5, 2019.  Continue to monitor.    12.  Viral URI    13.  Right leg pain: X-ray was normal.  Images personally reviewed.  Bilateral lower extremity venous duplex was normal.  He is ambulatory on his right leg and therefore I do not think he has an Achilles tendon rupture.  He could have strained muscle.  He continues to wear his compression stocking.  Hopefully will improve with time.  If not, he will need to see orthopedics.    PLAN:  1. Proceed today with cycle 3 day 15 CyBorD.  Cytoxan 600 mg weekly, dexamethasone 40 mg weekly and Velcade 1.5 mg/m2.  Continue therapy weekly as scheduled.  2. Continue FAM plus [facility]xycycline [facility] of [facility].  3. Continue oral antihistamines for pruritus.  Additional topical medication prescribed by Dr. Syed.  4. Continue topical cortisone to rash, prescribed 2-1/2% hydrocortisone today.  Rash not that impressive on his examination.  5. Continue to monitor his right leg pain.  Consider repeating his venous duplex if it does not improve.  Consider referral to orthopedics.  6. Weekly treatment.  NP in 2 weeks.  I will see him in 4 weeks.  Repeat serum protein studies with cycle number 4-day 1.    7. I will communicate with the [facility] in [place] to see if they plan to see him there any further.    High risk medication requiring intensive monitoring.    Rafael Heredia MD

## 2019-09-16 ENCOUNTER — LAB (OUTPATIENT)
Dept: LAB | Facility: HOSPITAL | Age: 74
End: 2019-09-16

## 2019-09-16 ENCOUNTER — INFUSION (OUTPATIENT)
Dept: ONCOLOGY | Facility: HOSPITAL | Age: 74
End: 2019-09-16

## 2019-09-16 VITALS
BODY MASS INDEX: 36.94 KG/M2 | HEART RATE: 82 BPM | SYSTOLIC BLOOD PRESSURE: 123 MMHG | WEIGHT: 249.4 LBS | RESPIRATION RATE: 18 BRPM | DIASTOLIC BLOOD PRESSURE: 56 MMHG

## 2019-09-16 DIAGNOSIS — E85.81 AL AMYLOIDOSIS (HCC): ICD-10-CM

## 2019-09-16 DIAGNOSIS — E85.81 AL AMYLOIDOSIS (HCC): Primary | ICD-10-CM

## 2019-09-16 LAB
BASOPHILS # BLD AUTO: 0.01 10*3/MM3 (ref 0–0.2)
BASOPHILS NFR BLD AUTO: 0.3 % (ref 0–1.5)
DEPRECATED RDW RBC AUTO: 54.6 FL (ref 37–54)
EOSINOPHIL # BLD AUTO: 0.01 10*3/MM3 (ref 0–0.4)
EOSINOPHIL NFR BLD AUTO: 0.3 % (ref 0.3–6.2)
ERYTHROCYTE [DISTWIDTH] IN BLOOD BY AUTOMATED COUNT: 14.4 % (ref 12.3–15.4)
HCT VFR BLD AUTO: 30.5 % (ref 37.5–51)
HGB BLD-MCNC: 10.7 G/DL (ref 13–17.7)
IMM GRANULOCYTES # BLD AUTO: 0.11 10*3/MM3 (ref 0–0.05)
IMM GRANULOCYTES NFR BLD AUTO: 3 % (ref 0–0.5)
LYMPHOCYTES # BLD AUTO: 0.54 10*3/MM3 (ref 0.7–3.1)
LYMPHOCYTES NFR BLD AUTO: 14.7 % (ref 19.6–45.3)
MCH RBC QN AUTO: 37 PG (ref 26.6–33)
MCHC RBC AUTO-ENTMCNC: 35.1 G/DL (ref 31.5–35.7)
MCV RBC AUTO: 105.5 FL (ref 79–97)
MONOCYTES # BLD AUTO: 0.19 10*3/MM3 (ref 0.1–0.9)
MONOCYTES NFR BLD AUTO: 5.2 % (ref 5–12)
NEUTROPHILS # BLD AUTO: 2.82 10*3/MM3 (ref 1.7–7)
NEUTROPHILS NFR BLD AUTO: 76.5 % (ref 42.7–76)
NRBC BLD AUTO-RTO: 0 /100 WBC (ref 0–0.2)
PLATELET # BLD AUTO: 72 10*3/MM3 (ref 140–450)
PMV BLD AUTO: 12.6 FL (ref 6–12)
RBC # BLD AUTO: 2.89 10*6/MM3 (ref 4.14–5.8)
WBC NRBC COR # BLD: 3.68 10*3/MM3 (ref 3.4–10.8)

## 2019-09-16 PROCEDURE — 25010000002 BORTEZOMIB PER 0.1 MG: Performed by: INTERNAL MEDICINE

## 2019-09-16 PROCEDURE — 85025 COMPLETE CBC W/AUTO DIFF WBC: CPT | Performed by: INTERNAL MEDICINE

## 2019-09-16 PROCEDURE — 96401 CHEMO ANTI-NEOPL SQ/IM: CPT | Performed by: INTERNAL MEDICINE

## 2019-09-16 PROCEDURE — 36415 COLL VENOUS BLD VENIPUNCTURE: CPT | Performed by: INTERNAL MEDICINE

## 2019-09-16 RX ORDER — BORTEZOMIB 3.5 MG/1
1.5 INJECTION, POWDER, LYOPHILIZED, FOR SOLUTION INTRAVENOUS; SUBCUTANEOUS ONCE
Status: COMPLETED | OUTPATIENT
Start: 2019-09-16 | End: 2019-09-16

## 2019-09-16 RX ADMIN — BORTEZOMIB 3.3 MG: 3.5 INJECTION, POWDER, LYOPHILIZED, FOR SOLUTION INTRAVENOUS; SUBCUTANEOUS at 14:01

## 2019-09-19 DIAGNOSIS — E85.81 AL AMYLOIDOSIS (HCC): Primary | ICD-10-CM

## 2019-09-22 NOTE — PROGRESS NOTES
"[facility] OUTPATIENT FOLLOW UP CLINIC VISIT    REASON FOR FOLLOW-UP:    1. 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  Macrocytic anemia  3.  Mild leukopenia  4.  Left lung pulmonary nodules, stable on follow up imaging.  5.  [facility] if he can get service-connected for plasmacytoma/amyloidosis.  [facility] [place] [facility] and saw Dr. Reed there. His case was discussed further in [place] after review of his pathology.  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  A fat pad biopsy and cardiac MRI were recommended.  Treatment with CyBorD was recommended.    6.  Peripheral neuropathy in his hands and feet.  [facility] that was concerning for cervical myelopathy rather than a neuropathic process consistent with amyloidosis.  [facility] on [facility]/2019 shows multilevel degenerative change with moderate spinal canal stenosis at C4-C5 and C5-C6 and mild spinal canal stenosis at C6-C7.  Severe foraminal narrowing at C4-C5 and C5-C6 with moderate neuroforaminal narrowing at C6-C7.  7.  Cardiac [facility] performed [facility]/16/2019 [facility] with normal left ventricle wall thickness with right ventricular cavity enlargement.  No aneurysm.  No fibrosis or scarring to suggest an infiltrative process such as amyloidosis.  Left ventricular ejection fraction 47%.  8.  Negative fat pad biopsy on 6/4/2019    HISTORY OF PRESENT ILLNESS:  Rafael Gomez is a 74 y.o. male with the above-mentioned history, who returns the office today in anticipation of cycle #3 day 22 Velcade, Cytoxan, dexamethasone.    He reports that he has more fatigued today and has had having ongoing issues with skin pruritus related to a very mild macular rash.  This will be further detailed below.  In addition, he feels \"off balance\" and actually fell through a first-floor window at his house " "yesterday. As a result, he has several abrasions on his left arm.  Of note, patient is hypotensive today with a blood pressure of 93/53.  He is on several blood pressure medications per primary care and has already taken them this morning.  He notes that the disorientation is typically following quick position changes, though does occur sometimes even at rest.  He denies any associated headaches or blurred vision.    At his last office visit with Dr. Heredia on September 9, 2019 he was struggling with a viral upper respiratory infection with rhinovirus.  He does continue with a lingering cough, though this has improved significantly over the last 3 days.    His greatest issue at that time was right calf pain.  This has been a chronic issue with associated bilateral lower extremity edema.  Patient admits that he felt a \"pop\" but denies any specific trauma to the area.  He did undergo a lower extremity Doppler on August 20, 2019 that was unremarkable.  He has been wearing compression stockings.  Thankfully, the pain does seem to have lessened and therefore the at this time we will not repeat another ultrasound.    Pruritus of his left shoulder persists, though patient is treating this with a topical cream provided by Dr. Syed of dermatology.  He is also utilizing hydrocortisone, 2-1/2% for further management. Very mild erythematous rash persists.    Left shoulder pain is stable.  He will be following up with a different neurologist within the next several weeks.     Labs today reveal worsening anemia with a hemoglobin of 9.8 and stable thrombocytopenia with a platelet count of 74,000.  His renal function is also increased slightly from his baseline at 1.81.  Patient drinks at least 64 ounces of water daily.  He is urinating regularly.    He has no other concerns.  .     HEMATOLOGIC HISTORY:  The patient had complained of some dyspnea on exertion. He has some left knee pain following a left knee replacement. He " eventually was referred for further care to Dr. Aba Freeman with vascular surgery. He was found to have evidence of peripheral vascular disease and is scheduled for a procedure in her future. He was also found to have a complex irregular cystic mass measuring 5.7 cm in the right kidney. He has been referred to urology for this. There was a 12 mm nodule in the lingula. He was referred to Dr. Larry Hernandez for this.  He had a CT scan of the chest with contrast on 5/25/2016. This showed 3 pleural-based nodules at the periphery of the left upper lobe. One measured 15 mm and the other measured 16 mm. The other was 6 mm. There is also a noncalcified 6 mm nodule.  He had a wedge resection on 6/2/2016. Two samples were sent to pathology. One showed plasma cells with evidence for amyloid. Further testing at [facility] showed IgG kappa restriction. The other wedge resection showed tiny nonnecrotizing granulomas.    Serum protein electrophoresis showed a 0.1 g M spike with immunofixation showing IgM lambda monoclonality.  Serum free light chain ratio was normal.  Ferritin 226.  Iron profile normal.  Vitamin B12 greater than 2000.    Serum protein levels in March 2017 are very stable with 0.1 g M spike.  He will follow-up in one year.  He continues to follow-up with Dr. Hernandez for CT scans.    CT imaging on 5/23/2017 showed an increase in size of 2 lingular lesions now measuring 2.2 cm and 1.0 cm.  There is an 8 mm left lower lobe pulmonary nodule also slightly increased in size.  Several other tiny pulmonary nodules bilaterally which are stable.    His M spike increased to 0.2 g.    He had a PET scan on 6/8/2017 showing several hypermetabolic left lung pulmonary nodules with a 2.2 cm pleural-based left upper lobe nodule.    His case was presented at the multidisciplinary thoracic conference.  It was elected to follow him with serial CT scans with the biopsy if the lung nodules continued increase in size.  It was felt  that it was too risky to attempt a biopsy at this point.    Repeat CT imaging on 9/11/2017 shows stable findings.  M spike 0.1 g.      CT imaging on 8/20/2018 with a slight increase in size a couple of pulmonary nodules.  M spike is 0.2 g.  Follow-up with thoracic surgery for imaging and we will see him back in 6 months for follow-up with labs in 1 week prior.    PET scan done at [place] on 10/23/2018 with similar findings.    [facility] [place] [facility] and saw Dr. Reed there.  They have requested his pathology for review.    He complains of peripheral neuropathy in his hands and feet.  This has been worsening.  He reports some numbness and tingling as well as occasional pain.      He was discussed further in [place] after review of his pathology.  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  A fat pad biopsy and cardiac MRI were recommended.  Treatment with CyBorD was recommended.      Respiratory status has improved after participating in pulmonary rehabilitation.    He had an [facility] at the [facility] that showed no evidence for amyloidosis.    He had an EMG at the [facility] that was concerning for cervical myelopathy rather than a neuropathic process consistent with amyloidosis.    MRI of the cervical spine performed at the [facility] on 4/2/2019 shows multilevel degenerative change with moderate spinal canal stenosis at C4-C5 and C5-C6 and mild spinal canal stenosis at C6-C7.  Severe foraminal narrowing at C4-C5 and C5-C6 with moderate neuroforaminal narrowing at C6-C7.    Cardiac MRI performed on 4/16/2019 at the [facility] with normal left ventricle wall thickness with right ventricular cavity enlargement.  No aneurysm.  No fibrosis or scarring to suggest an infiltrative process such as amyloidosis.  Left ventricular ejection fraction 47%.    Negative fat pad biopsy on 6/4/2019    PAST MEDICAL, SURGICAL, FAMILY, AND SOCIAL HISTORIES were reviewed with the patient and in the electronic medical record, and were updated  if indicated.    ALLERGIES:  No Known Allergies    MEDICATIONS:  The medication list has been reviewed with the patient by the medical assistant, and the list has been updated in the electronic medical record, which I reviewed.  Medication dosages and frequencies were confirmed to be accurate.    I have reviewed the patient's medical history in detail and updated the computerized patient record.    Review of Systems   Constitutional: Positive for fatigue. Negative for appetite change, chills and fever.   HENT:   Negative for trouble swallowing.    Respiratory: Negative for cough, shortness of breath and wheezing.    Cardiovascular: Negative for chest pain and leg swelling.   Gastrointestinal: Negative for abdominal distention, blood in stool, constipation, diarrhea and nausea.   Musculoskeletal: Positive for neck pain (unchanged). Negative for arthralgias and myalgias.        Right lower leg and ankle pain and swelling   Skin: Positive for rash (Very mild small areas of erythema across the left shoulder.).   Neurological: Positive for numbness (stable, chronic). Negative for dizziness, extremity weakness and headaches.        Left shoulder pain,   Imbalance, see HPI    Hematological: Does not bruise/bleed easily.         There were no vitals filed for this visit.    PHYSICAL EXAMINATION:  GENERAL:  Obese.  Well-developed well-nourished male; awake, alert and oriented, in no acute distress.    SKIN: No rash or nonhealing lesions, pruritis of left chest extending to his upper back, very mild erythema in this area with no apparent lesions   HEAD:  Normocephalic, atraumatic.  EARS:  Hearing intact.  NOSE:  Septum midline.  No excoriations or nasal discharge.  MOUTH:  No stomatitis or ulcers.  Lips are normal.  THROAT:  Oropharynx without lesions or exudates.  CHEST: Scattered wheezes, improved today  HEART:  Regular rate; normal rhythm.     ABDOMEN: Soft.  Nontender, bowel sounds present.   EXTREMITIES: 1+ pitting edema  in the right lower extremity, left lower extremity with trace edema.  Improved today, trace right lower extremity edema, bilateral compression stockings in place     DIAGNOSTIC DATA:  Results from last 7 days   Lab Units 09/16/19  1328   WBC 10*3/mm3 3.68   NEUTROS ABS 10*3/mm3 2.82   HEMOGLOBIN g/dL 10.7*   HEMATOCRIT % 30.5*   PLATELETS 10*3/mm3 72*             ASSESSMENT:  This is a 74 y.o. male with:  1.  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  He had a wedge resection.  Dr. Hernandez was  following a couple of other small pulmonary nodules on the left which initially increased in size.  His M spike continues to be stable.  The PET scan did not show anything we didn't expect from the CT scan.  The left upper lobe lung nodule is too risky to biopsy base of its location.  His case was presented at the multidisciplinary thoracic conference and recommendations included to follow him with serial CT scans with biopsy if it appears to be safer.  CT imaging on 12/18/2017 was stable.  CT imaging on 5/25/2018 showed that one of the pleural based nodules and enlarged slightly measuring 2.3 cm.  CT imaging from 8/22/2018 shows some slight increase in size of the pulmonary nodules with the largest measuring now 2.0 cm 3 other tiny noncalcified nodules in the right lung are stable, only showing a slight increase since 2017.  If any do grow significantly in size, we could consider radiation.  He would likely not be a surgical candidate considering his oxygen dependence.  There is some question as to how much of this is amyloid.  There is also some question as to whether this is primary or reactive.  [facility] [place] by [facility]. Reed, and they requested his pathology for review.  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    They recommended treatment with FAM (inhaled fluticasone, azithromycin 250 mg on Mondays Wednesdays and Fridays,  montelukast daily) plus doxycycline 100 mg twice daily for his lungs.  He did [facility] this through [facility] in February 2019.    [facility] cardiac [facility] at [facility].  This was not consistent with involvement by amyloidosis.  He had an EMG also at the [facility] that was concerning for the possibility of cervical myelopathy rather than a process consistent with amyloidosis.     He did have a negative fat pad biopsy on 6/4/2019.    It has been recommended from [place] that he initiate therapy with Velcade, Cytoxan, and dexamethasone (CyBorD).  Treatment initiated 7/8/2019    As noted above, patient is progressively fatigued and is struggling with constant pruritus despite several skin creams for management.  Additionally, he is more anemic and is having issues with orthostatic hypotension.  This actually resulted in a fall with several abrasions.  After review with Dr. Heredia, we will give the patient a 2-week break from all chemotherapy including his Cytoxan and Velcade.  We will administer 1 L of IV fluids for renal insufficiency and orthostasis.  We will also obtain anemia labs today.    The patient is scheduled to see Dr. Heredia in 2 weeks.  Have asked him to call with any exacerbation of the above-mentioned symptoms prior to his next office visit.  He has verbalized understanding.    2.  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  We did repeat labs 7/1/2019 to service a baseline, and spike was stable at 0.2 gm.      Repeat with cycle 4 day 1.    3.  Macrocytic anemia: He has mild chronic kidney disease.  This could potentially be secondary to alcohol use as well.  He continues to have a mild macrocytosis.      Hemoglobin worsened today at 9.1.  He denies any significant bleeding issues or change in his stools.  We will complete an anemia work-up today.    4.  Thrombocytopenia: Platelet count today is stable.   No signs or symptoms of bleeding.    5.  Right renal mass which is chronic.  He is followed by urology.    6.   Claudication with peripheral vascular disease followed by Dr. Freeman.  Chronic bilateral lower extremity edema, this is unchanged. He will see Dr. Freeman later this week     7.  Low back pain with an MRI on 5/18/2017 showing degenerative changes at multiple levels    8.  COPD: He follows with Dr. Poole now and is on oxygen and inhalers.      9.  Peripheral neuropathy: EMG completed.  MRI cervical spine complete.  There are some abnormalities in the cervical spine.  He was referred to Dr. Robins, neurosurgery.  While the report is available from the MRI performed at [facility], these images are not available.  The patient was seen by Dr. Robins with CT of the neck performed. He had additional imaging performed. Dr. Robins is no longer practicing with [facility], [place], operated by [facility] so the patient is working with his office to establish new care.     His neuropathic symptoms are stable and unchanged with the initiation of Velcade    He will follow up with a new neurologist within the next 2 weeks     10.  Skin Rash.  Previously treated with triamcinolone as well added antibiotic ointment.  Resolved. He is still experiencing pruritis. Recently seen by Dr. Syed of Dermatology. BX taken. Patient will follow up within the next month.  Some pruritus on his left shoulder.  Rx for 2.5% hydrocortisone cream given last week with little relief. Velcade could be contributing to this? We will have the patient hold Velcade for the next 2 weeks to see if there is improvement. I have encouraged him to follow up with Dr. Syed.     11.  Chronic kidney disease. Please see above. Slightly worse at 1.81. 1 liter of IV fluids today     12.  Viral URI. SX improving     13.  Right leg pain: X-ray was normal.  Images personally reviewed.  Bilateral lower extremity venous duplex was normal.  He is ambulatory on his right leg and therefore Dr. Heredia did not feel that this was an Achilles tendon rupture.  He could have strained muscle. Pain much improved today.     14.  Orthostatic hypotension.  Patient admits that he feels slightly disoriented upon quick position changes, though admits that this does occur at times at rest.  Blood pressure today was 92/53.  I have encouraged him to get up slowly from a seated position and have asked him to follow-up with his primary care provider regarding adjustment of his blood pressure meds.  We will administer 1 L of normal saline today.    PLAN:  1. Patient will hold all chemotherapy over the next 2 weeks  2. Continue FAM plus [facility]cycl[facility]  3. Continue oral antihistamines as well as topical creams for pruritus.  He is to follow-up with Dr. Syed over the next week.  4. Continue to monitor his right leg pain, though improved today.  Should pain worsen again we will consider a referral to orthopedics and may also repeat a lower extremity Doppler  5. Patient is to follow-up with primary care regarding adjustment of his blood pressure medications  6. 1 L of normal saline administered today in the office  7. Anemia labs will be obtained today  8. MD visit with Dr. Heredia in 2 weeks  9. I have asked the patient to call with any progression of his symptoms prior to his next office visit    High risk medication requiring intensive monitoring.  Several new issues were addressed today.  Treatment was also withheld    EMILIO Sanchez

## 2019-09-23 ENCOUNTER — INFUSION (OUTPATIENT)
Dept: ONCOLOGY | Facility: HOSPITAL | Age: 74
End: 2019-09-23

## 2019-09-23 ENCOUNTER — OFFICE VISIT (OUTPATIENT)
Dept: ONCOLOGY | Facility: CLINIC | Age: 74
End: 2019-09-23

## 2019-09-23 ENCOUNTER — LAB (OUTPATIENT)
Dept: LAB | Facility: HOSPITAL | Age: 74
End: 2019-09-23

## 2019-09-23 ENCOUNTER — TELEPHONE (OUTPATIENT)
Dept: NEUROSURGERY | Facility: CLINIC | Age: 74
End: 2019-09-23

## 2019-09-23 VITALS
SYSTOLIC BLOOD PRESSURE: 92 MMHG | HEART RATE: 82 BPM | BODY MASS INDEX: 37.31 KG/M2 | DIASTOLIC BLOOD PRESSURE: 53 MMHG | TEMPERATURE: 98.2 F | WEIGHT: 251.9 LBS | OXYGEN SATURATION: 96 % | RESPIRATION RATE: 14 BRPM | HEIGHT: 69 IN

## 2019-09-23 VITALS — SYSTOLIC BLOOD PRESSURE: 120 MMHG | DIASTOLIC BLOOD PRESSURE: 67 MMHG

## 2019-09-23 DIAGNOSIS — E85.81 AL AMYLOIDOSIS (HCC): ICD-10-CM

## 2019-09-23 DIAGNOSIS — D53.9 MACROCYTIC ANEMIA: ICD-10-CM

## 2019-09-23 DIAGNOSIS — E85.81 AL AMYLOIDOSIS (HCC): Primary | ICD-10-CM

## 2019-09-23 DIAGNOSIS — C90.30 PLASMACYTOMA (HCC): ICD-10-CM

## 2019-09-23 LAB
ALBUMIN SERPL-MCNC: 3.9 G/DL (ref 3.5–5.2)
ALBUMIN/GLOB SERPL: 1.9 G/DL (ref 1.1–2.4)
ALP SERPL-CCNC: 91 U/L (ref 38–116)
ALT SERPL W P-5'-P-CCNC: 30 U/L (ref 0–41)
ANION GAP SERPL CALCULATED.3IONS-SCNC: 12.9 MMOL/L (ref 5–15)
AST SERPL-CCNC: 23 U/L (ref 0–40)
BASOPHILS # BLD AUTO: 0.01 10*3/MM3 (ref 0–0.2)
BASOPHILS NFR BLD AUTO: 0.3 % (ref 0–1.5)
BILIRUB SERPL-MCNC: 0.5 MG/DL (ref 0.2–1.2)
BUN BLD-MCNC: 36 MG/DL (ref 6–20)
BUN/CREAT SERPL: 19.9 (ref 7.3–30)
CALCIUM SPEC-SCNC: 9.4 MG/DL (ref 8.5–10.2)
CHLORIDE SERPL-SCNC: 105 MMOL/L (ref 98–107)
CO2 SERPL-SCNC: 22.1 MMOL/L (ref 22–29)
CREAT BLD-MCNC: 1.81 MG/DL (ref 0.7–1.3)
DEPRECATED RDW RBC AUTO: 55.8 FL (ref 37–54)
EOSINOPHIL # BLD AUTO: 0.02 10*3/MM3 (ref 0–0.4)
EOSINOPHIL NFR BLD AUTO: 0.5 % (ref 0.3–6.2)
ERYTHROCYTE [DISTWIDTH] IN BLOOD BY AUTOMATED COUNT: 14.6 % (ref 12.3–15.4)
FERRITIN SERPL-MCNC: 537.5 NG/ML (ref 30–400)
FOLATE SERPL-MCNC: >20 NG/ML (ref 4.78–24.2)
GFR SERPL CREATININE-BSD FRML MDRD: 37 ML/MIN/1.73
GLOBULIN UR ELPH-MCNC: 2.1 GM/DL (ref 1.8–3.5)
GLUCOSE BLD-MCNC: 88 MG/DL (ref 74–124)
HCT VFR BLD AUTO: 28.8 % (ref 37.5–51)
HGB BLD-MCNC: 9.8 G/DL (ref 13–17.7)
HGB RETIC QN AUTO: 42.2 PG (ref 29.8–36.1)
IMM GRANULOCYTES # BLD AUTO: 0.03 10*3/MM3 (ref 0–0.05)
IMM GRANULOCYTES NFR BLD AUTO: 0.8 % (ref 0–0.5)
IMM RETICS NFR: 20.5 % (ref 3–15.8)
IRON 24H UR-MRATE: 93 MCG/DL (ref 59–158)
IRON SATN MFR SERPL: 32 % (ref 14–48)
LYMPHOCYTES # BLD AUTO: 0.54 10*3/MM3 (ref 0.7–3.1)
LYMPHOCYTES NFR BLD AUTO: 14.4 % (ref 19.6–45.3)
MCH RBC QN AUTO: 36.7 PG (ref 26.6–33)
MCHC RBC AUTO-ENTMCNC: 34 G/DL (ref 31.5–35.7)
MCV RBC AUTO: 107.9 FL (ref 79–97)
MONOCYTES # BLD AUTO: 0.2 10*3/MM3 (ref 0.1–0.9)
MONOCYTES NFR BLD AUTO: 5.3 % (ref 5–12)
NEUTROPHILS # BLD AUTO: 2.95 10*3/MM3 (ref 1.7–7)
NEUTROPHILS NFR BLD AUTO: 78.7 % (ref 42.7–76)
NRBC BLD AUTO-RTO: 0 /100 WBC (ref 0–0.2)
PLATELET # BLD AUTO: 74 10*3/MM3 (ref 140–450)
PMV BLD AUTO: 11.2 FL (ref 6–12)
POTASSIUM BLD-SCNC: 4.7 MMOL/L (ref 3.5–4.7)
PROT SERPL-MCNC: 6 G/DL (ref 6.3–8)
RBC # BLD AUTO: 2.67 10*6/MM3 (ref 4.14–5.8)
RETICS/RBC NFR AUTO: 2.39 % (ref 0.7–1.9)
SODIUM BLD-SCNC: 140 MMOL/L (ref 134–145)
TIBC SERPL-MCNC: 288 MCG/DL (ref 249–505)
TRANSFERRIN SERPL-MCNC: 206 MG/DL (ref 200–360)
VIT B12 BLD-MCNC: 1248 PG/ML (ref 211–946)
WBC NRBC COR # BLD: 3.75 10*3/MM3 (ref 3.4–10.8)

## 2019-09-23 PROCEDURE — 82728 ASSAY OF FERRITIN: CPT | Performed by: NURSE PRACTITIONER

## 2019-09-23 PROCEDURE — 82746 ASSAY OF FOLIC ACID SERUM: CPT | Performed by: NURSE PRACTITIONER

## 2019-09-23 PROCEDURE — 36415 COLL VENOUS BLD VENIPUNCTURE: CPT | Performed by: INTERNAL MEDICINE

## 2019-09-23 PROCEDURE — 85025 COMPLETE CBC W/AUTO DIFF WBC: CPT

## 2019-09-23 PROCEDURE — 84466 ASSAY OF TRANSFERRIN: CPT | Performed by: NURSE PRACTITIONER

## 2019-09-23 PROCEDURE — 96360 HYDRATION IV INFUSION INIT: CPT | Performed by: NURSE PRACTITIONER

## 2019-09-23 PROCEDURE — 85046 RETICYTE/HGB CONCENTRATE: CPT | Performed by: NURSE PRACTITIONER

## 2019-09-23 PROCEDURE — 80053 COMPREHEN METABOLIC PANEL: CPT

## 2019-09-23 PROCEDURE — 82607 VITAMIN B-12: CPT | Performed by: NURSE PRACTITIONER

## 2019-09-23 PROCEDURE — 83540 ASSAY OF IRON: CPT | Performed by: NURSE PRACTITIONER

## 2019-09-23 PROCEDURE — 99215 OFFICE O/P EST HI 40 MIN: CPT | Performed by: NURSE PRACTITIONER

## 2019-09-23 RX ORDER — BORTEZOMIB 3.5 MG/1
1.5 INJECTION, POWDER, LYOPHILIZED, FOR SOLUTION INTRAVENOUS; SUBCUTANEOUS ONCE
Status: DISCONTINUED | OUTPATIENT
Start: 2019-09-23 | End: 2019-09-23 | Stop reason: HOSPADM

## 2019-09-23 RX ADMIN — SODIUM CHLORIDE 1000 ML: 900 INJECTION, SOLUTION INTRAVENOUS at 10:52

## 2019-09-25 ENCOUNTER — SPECIALTY PHARMACY (OUTPATIENT)
Dept: PHARMACY | Facility: HOSPITAL | Age: 74
End: 2019-09-25

## 2019-09-26 DIAGNOSIS — E85.81 AL AMYLOIDOSIS (HCC): ICD-10-CM

## 2019-09-30 ENCOUNTER — APPOINTMENT (OUTPATIENT)
Dept: ONCOLOGY | Facility: HOSPITAL | Age: 74
End: 2019-09-30

## 2019-09-30 ENCOUNTER — APPOINTMENT (OUTPATIENT)
Dept: LAB | Facility: HOSPITAL | Age: 74
End: 2019-09-30

## 2019-10-03 DIAGNOSIS — E85.81 AL AMYLOIDOSIS (HCC): Primary | ICD-10-CM

## 2019-10-04 NOTE — PROGRESS NOTES
Subjective   History of Present Illness: Rafael Gomez is a 74 y.o. male is here today for follow-up. He was last seen by Dr. Robins on 07/31/19 for neck pain. Dr. Robins had ordered a C-spine CT but he never received results from CT. Today, Mr. Gomez reports neck pain that radiates into the left arm with N/T in bilateral hands. He reports that he has hard time with his gait.     Neck Pain    The current episode started more than 1 year ago. The problem occurs constantly. The problem has been gradually worsening. The pain is present in the left side. The quality of the pain is described as aching, burning, cramping, shooting and stabbing. The pain is at a severity of 10/10. The pain is severe. The symptoms are aggravated by position, twisting and bending (lifting). Associated symptoms include leg pain, numbness, tingling and weakness. Pertinent negatives include no chest pain, headaches, pain with swallowing, trouble swallowing or visual change. Treatments tried: CBD oil. The treatment provided no relief.       The following portions of the patient's history were reviewed and updated as appropriate: allergies, current medications, past family history, past medical history, past social history, past surgical history and problem list.    Review of Systems   HENT: Negative.  Negative for trouble swallowing.    Eyes: Negative.    Respiratory: Negative.  Negative for chest tightness and shortness of breath.    Cardiovascular: Negative.  Negative for chest pain.   Gastrointestinal: Negative.    Endocrine: Negative.    Genitourinary: Negative.    Musculoskeletal: Positive for neck pain.   Skin: Negative.    Allergic/Immunologic: Negative.    Neurological: Positive for tingling, weakness and numbness. Negative for headaches.   Hematological: Negative.    Psychiatric/Behavioral: Negative.    All other systems reviewed and are negative.      Objective     Vitals:    10/08/19 1237   BP: 120/65   Pulse: 89   Weight: 114 kg  "(252 lb)   Height: 175 cm (68.9\")     Body mass index is 37.32 kg/m².      Physical Exam  Neurologic Exam     Physical Exam:    CONSTITUTIONAL: This 74 year old right handed  male appears well developed, well-nourished and in no acute distress.    HEAD & FACE: the head and face are symmetric, normocephalic and atraumatic.    EYES: Inspection of the conjunctivae and lids reveals no swelling, erythema or discharge.  Pupils are round, equal and reactive to light and there is no scleral icterus.    EARS, NOSE, MOUTH & THROAT: On inspection, the ears and nose are within normal limits.    NECK: the neck is supple and symmetric. The trachea is midline with no masses.    PULMONARY: Respiratory effort is normal with no increased work of breathing or signs of respiratory distress.    CARDIOVASCULAR: Pedal pulses are +1/4 bilaterally. Examination of the extremities shows no edema or varicosities.  1+ pitting edema both lower extremities    LYMPHATIC: There is no palpable lymphadenopathy of the neck.    MUSCULOSKELETAL: Gait and station are within normal limits. The spine has normal alignment and range of motion,    SKIN: The skin is warm, dry and intact    NEUROLOGIC:   Cranial Nerves 2-12 intact  Motor strength is decreased in the left arm throughout 4/5. Muscle bulk and tone are normal.  Sensory is decreased in both lower extremities below the ankle and both hands.  Reflexes on the right side demonstrates 2/4 Triceps Reflex, 2/4 Biceps Reflex, 1/4 Brachioradialis Reflex, 1/4 Knee Jerk Reflex, 1/4 Ankle Jerk Reflex and no ankle clonus on the right.   Reflexes on the left side demonstrates 2/4 Triceps Reflex, 2/4 Biceps Reflex, 1/4 Brachioradialis Reflex, 2/4 Knee Jerk Reflex, 1/4 Ankle Jerk Reflex and no ankle clonus on the left.  Superficial/Primitive Reflexes: primitive reflexes were absent.  Caren's, Babinski and clonus are all negative  No coordination deficit observed.  Radicular testing showed a negative " Phi (ANGIE) test and negative straight leg raise.  Cortical function is intact and without deficits. Speech is normal.    PSYCHIATRIC: oriented to person, place and time. Patient's mood and affect are normal.      Assessment/Plan   Independent Review of Radiographic Studies:     MRI of the cervical spine done in April 2019 at but I believe is a [facility] [place] facility feels degenerative change throughout the cervical spine most notably between C4 and C7.  There is moderate stenosis at C4-C5 due to disc osteophyte complex, there is mild to moderate spinal canal stenosis and severe bilateral neuroforaminal stenosis at C5-C6, at C6-C7 there is only mild spinal stenosis and moderate bilateral neuroforaminal stenosis.     I personally reviewed the images from the following studies.    CT scan of the cervical spine done on August 4, 2019 reveals no evidence of ossification of the posterior longitudinal ligament.  He does have fairly advanced degenerative change involving the C4-C7 disc spaces with essential fusion of those discs and anterior and posterior disc osteophyte complexes.  The MRI makes it better assessment of the spinal canal diameter and appearance of the cervical cord.    Medical Decision Making:      Certainly has cervical stenosis but I think the CAT scan reveals that he is fused from C4-C7 which would suggest that decompression and fusion may not offer significant relief particularly since his primary complaint is neck pain.  Dr. Robins was worried about ossification of the posterior longitudinal ligament (OPLL) but the CT prove that to be not present.  His reflexes are symmetrical to my exam today in both upper extremities he has no abnormal reflexes such as Flynn's Babinski or clonus.  Think at this point he needs to be treated for that his primary complaint of neck pain and back pain with interventional pain management.  He has significant comorbidities which would likely preclude surgery including  bilateral carotid endarterectomies creating scar tissue in the anterior neck and issues with retracting the carotids for cervical surgery.    His left shoulder appears to be a primary shoulder issue and I instructed him to ask his primary physician to get an orthopedic opinion about his left shoulder.    Rafael was seen today for follow-up.    Diagnoses and all orders for this visit:    Neck pain  -     Ambulatory Referral to Pain Management    Chronic midline low back pain without sciatica  -     Ambulatory Referral to Pain Management    Cervical spondylosis without myelopathy  -     Ambulatory Referral to Pain Management      Return if symptoms worsen or fail to improve.           Walter Aranda MD FACS [facility]  Neurological Surgery

## 2019-10-07 ENCOUNTER — OFFICE VISIT (OUTPATIENT)
Dept: ONCOLOGY | Facility: CLINIC | Age: 74
End: 2019-10-07

## 2019-10-07 ENCOUNTER — APPOINTMENT (OUTPATIENT)
Dept: ONCOLOGY | Facility: HOSPITAL | Age: 74
End: 2019-10-07

## 2019-10-07 ENCOUNTER — RESULTS ENCOUNTER (OUTPATIENT)
Dept: ONCOLOGY | Facility: CLINIC | Age: 74
End: 2019-10-07

## 2019-10-07 ENCOUNTER — LAB (OUTPATIENT)
Dept: LAB | Facility: HOSPITAL | Age: 74
End: 2019-10-07

## 2019-10-07 VITALS
TEMPERATURE: 98.1 F | OXYGEN SATURATION: 97 % | DIASTOLIC BLOOD PRESSURE: 71 MMHG | RESPIRATION RATE: 16 BRPM | HEART RATE: 72 BPM | WEIGHT: 252.2 LBS | HEIGHT: 69 IN | SYSTOLIC BLOOD PRESSURE: 136 MMHG | BODY MASS INDEX: 37.36 KG/M2

## 2019-10-07 DIAGNOSIS — E85.81 AL AMYLOIDOSIS (HCC): ICD-10-CM

## 2019-10-07 DIAGNOSIS — D47.2 MONOCLONAL GAMMOPATHY: Primary | ICD-10-CM

## 2019-10-07 LAB
ALBUMIN SERPL-MCNC: 3.9 G/DL (ref 3.5–5.2)
ALBUMIN/GLOB SERPL: 1.7 G/DL (ref 1.1–2.4)
ALP SERPL-CCNC: 78 U/L (ref 38–116)
ALT SERPL W P-5'-P-CCNC: 24 U/L (ref 0–41)
ANION GAP SERPL CALCULATED.3IONS-SCNC: 11.5 MMOL/L (ref 5–15)
AST SERPL-CCNC: 25 U/L (ref 0–40)
BASOPHILS # BLD AUTO: 0.02 10*3/MM3 (ref 0–0.2)
BASOPHILS NFR BLD AUTO: 0.5 % (ref 0–1.5)
BILIRUB SERPL-MCNC: 0.7 MG/DL (ref 0.2–1.2)
BUN BLD-MCNC: 26 MG/DL (ref 6–20)
BUN/CREAT SERPL: 15.5 (ref 7.3–30)
CALCIUM SPEC-SCNC: 8.8 MG/DL (ref 8.5–10.2)
CHLORIDE SERPL-SCNC: 102 MMOL/L (ref 98–107)
CO2 SERPL-SCNC: 24.5 MMOL/L (ref 22–29)
CREAT BLD-MCNC: 1.68 MG/DL (ref 0.7–1.3)
DEPRECATED RDW RBC AUTO: 60.7 FL (ref 37–54)
EOSINOPHIL # BLD AUTO: 0.05 10*3/MM3 (ref 0–0.4)
EOSINOPHIL NFR BLD AUTO: 1.3 % (ref 0.3–6.2)
ERYTHROCYTE [DISTWIDTH] IN BLOOD BY AUTOMATED COUNT: 15.3 % (ref 12.3–15.4)
GFR SERPL CREATININE-BSD FRML MDRD: 40 ML/MIN/1.73
GLOBULIN UR ELPH-MCNC: 2.3 GM/DL (ref 1.8–3.5)
GLUCOSE BLD-MCNC: 121 MG/DL (ref 74–124)
HCT VFR BLD AUTO: 30.7 % (ref 37.5–51)
HGB BLD-MCNC: 10.3 G/DL (ref 13–17.7)
IMM GRANULOCYTES # BLD AUTO: 0.02 10*3/MM3 (ref 0–0.05)
IMM GRANULOCYTES NFR BLD AUTO: 0.5 % (ref 0–0.5)
LYMPHOCYTES # BLD AUTO: 1.1 10*3/MM3 (ref 0.7–3.1)
LYMPHOCYTES NFR BLD AUTO: 28.1 % (ref 19.6–45.3)
MCH RBC QN AUTO: 36.5 PG (ref 26.6–33)
MCHC RBC AUTO-ENTMCNC: 33.6 G/DL (ref 31.5–35.7)
MCV RBC AUTO: 108.9 FL (ref 79–97)
MONOCYTES # BLD AUTO: 0.45 10*3/MM3 (ref 0.1–0.9)
MONOCYTES NFR BLD AUTO: 11.5 % (ref 5–12)
NEUTROPHILS # BLD AUTO: 2.28 10*3/MM3 (ref 1.7–7)
NEUTROPHILS NFR BLD AUTO: 58.1 % (ref 42.7–76)
NRBC BLD AUTO-RTO: 0 /100 WBC (ref 0–0.2)
PLATELET # BLD AUTO: 103 10*3/MM3 (ref 140–450)
PMV BLD AUTO: 10.1 FL (ref 6–12)
POTASSIUM BLD-SCNC: 4.2 MMOL/L (ref 3.5–4.7)
PROT SERPL-MCNC: 6.2 G/DL (ref 6.3–8)
RBC # BLD AUTO: 2.82 10*6/MM3 (ref 4.14–5.8)
SODIUM BLD-SCNC: 138 MMOL/L (ref 134–145)
WBC NRBC COR # BLD: 3.92 10*3/MM3 (ref 3.4–10.8)

## 2019-10-07 PROCEDURE — 99215 OFFICE O/P EST HI 40 MIN: CPT | Performed by: INTERNAL MEDICINE

## 2019-10-07 PROCEDURE — 36415 COLL VENOUS BLD VENIPUNCTURE: CPT

## 2019-10-07 PROCEDURE — 85025 COMPLETE CBC W/AUTO DIFF WBC: CPT

## 2019-10-07 PROCEDURE — 80053 COMPREHEN METABOLIC PANEL: CPT

## 2019-10-07 PROCEDURE — G0463 HOSPITAL OUTPT CLINIC VISIT: HCPCS | Performed by: INTERNAL MEDICINE

## 2019-10-07 NOTE — PROGRESS NOTES
[facility] GROUP OUTPATIENT FOLLOW UP CLINIC VISIT    REASON FOR FOLLOW-UP:    1. 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  Macrocytic anemia  3.  Mild leukopenia  4.  Left lung pulmonary nodules, stable on follow up imaging.  5.  [facility] see if he can get service-connected for plasmacytoma/amyloidosis.  [facility] [place] [facility] and saw Dr. Reed there. His case was discussed further in [place] after review of his pathology.  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  A fat pad biopsy and cardiac MRI were recommended.  Treatment with CyBorD was recommended.    6.  Peripheral neuropathy in his hands and feet.  [facility] that was concerning for cervical myelopathy rather than a neuropathic process consistent with amyloidosis.  [facility] of [facility] on 4[facility]/2019 shows multilevel degenerative change with moderate spinal canal stenosis at C4-C5 and C5-C6 and mild spinal canal stenosis at C6-C7.  Severe foraminal narrowing at C4-C5 and C5-C6 with moderate neuroforaminal narrowing at C6-C7.  7.  Cardiac [facility] performed on 4/16/2019 [facility] with normal left ventricle wall thickness with right ventricular cavity enlargement.  No aneurysm.  No fibrosis or scarring to suggest an infiltrative process such as amyloidosis.  Left ventricular ejection fraction 47%.  8.  Negative fat pad biopsy on 6/4/2019  9.  It was recommended from [facility] that he initiate therapy with Velcade, Cytoxan, and dexamethasone (CyBorD).  Treatment initiated 7/8/2019  10.  He received therapy through 9/16/2019.  As of 9/23/2019, treatment was held for cytopenias, fatigue, orthostasis.    HISTORY OF PRESENT ILLNESS:  Rafael Gomez is a 74 y.o. male with the above-mentioned history, who returns the office today for scheduled follow-up.  He was last seen on 9/23/2019.  At  that time, he was experiencing more fatigue as well as orthostasis, rash, worsening anemia.  Treatment was held.    He continues to have dyspnea on exertion.  He continues to have neuropathic pain in the left shoulder and complains of pruritus in this area as well.  Minimal rash.  He continues topical therapy which is not really helping.  His legs particularly the right leg is looking much better and he continues to wear compression stocking.  Overall, his breathing especially at night is better than it was before starting treatment several months ago.      HEMATOLOGIC HISTORY:  The patient had complained of some dyspnea on exertion. He has some left knee pain following a left knee replacement. He eventually was referred for further care to Dr. Aba Freeman with vascular surgery. He was found to have evidence of peripheral vascular disease and is scheduled for a procedure in her future. He was also found to have a complex irregular cystic mass measuring 5.7 cm in the right kidney. He has been referred to urology for this. There was a 12 mm nodule in the lingula. He was referred to Dr. Larry Hernandez for this.  He had a CT scan of the chest with contrast on 5/25/2016. This showed 3 pleural-based nodules at the periphery of the left upper lobe. One measured 15 mm and the other measured 16 mm. The other was 6 mm. There is also a noncalcified 6 mm nodule.  He had a wedge resection on 6/2/2016. Two samples were sent to pathology. One showed plasma cells with evidence for amyloid. Further testing at [facility] showed IgG kappa restriction. The other wedge resection showed tiny nonnecrotizing granulomas.    Serum protein electrophoresis showed a 0.1 g M spike with immunofixation showing IgM lambda monoclonality.  Serum free light chain ratio was normal.  Ferritin 226.  Iron profile normal.  Vitamin B12 greater than 2000.    Serum protein levels in March 2017 are very stable with 0.1 g M spike.  He will follow-up  in one year.  He continues to follow-up with Dr. Hernandez for CT scans.    CT imaging on 5/23/2017 showed an increase in size of 2 lingular lesions now measuring 2.2 cm and 1.0 cm.  There is an 8 mm left lower lobe pulmonary nodule also slightly increased in size.  Several other tiny pulmonary nodules bilaterally which are stable.    His M spike increased to 0.2 g.    He had a PET scan on 6/8/2017 showing several hypermetabolic left lung pulmonary nodules with a 2.2 cm pleural-based left upper lobe nodule.    His case was presented at the multidisciplinary thoracic conference.  It was elected to follow him with serial CT scans with the biopsy if the lung nodules continued increase in size.  It was felt that it was too risky to attempt a biopsy at this point.    Repeat CT imaging on 9/11/2017 shows stable findings.  M spike 0.1 g.      CT imaging on 8/20/2018 with a slight increase in size a couple of pulmonary nodules.  M spike is 0.2 g.  Follow-up with thoracic surgery for imaging and we will see him back in 6 months for follow-up with labs in 1 week prior.    PET scan done at [facility] on 10/23/2018 with similar findings.    [facility] [place] [facility] and saw Dr. Reed there.  They have requested his pathology for review.    He complains of peripheral neuropathy in his hands and feet.  This has been worsening.  He reports some numbness and tingling as well as occasional pain.      He was discussed further in [place] after review of his pathology.  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  A fat pad biopsy and cardiac MRI were recommended.  Treatment with CyBorD was recommended.      Respiratory status has improved after participating in pulmonary rehabilitation.    He [facility] an [facility] that showed no evidence for amyloidosis.    He had an [facility] at the [facility] that was concerning for cervical myelopathy rather than a neuropathic process consistent with amyloidosis.    MRI of the cervical spine  performed at the [facility] on 4/2/2019 shows multilevel degenerative change with moderate spinal canal stenosis at C4-C5 and C5-C6 and mild spinal canal stenosis at C6-C7.  Severe foraminal narrowing at C4-C5 and C5-C6 with moderate neuroforaminal narrowing at C6-C7.    Cardiac MRI performed on 4/16/2019 at the [facility] with normal left ventricle wall thickness with right ventricular cavity enlargement.  No aneurysm.  No fibrosis or scarring to suggest an infiltrative process such as amyloidosis.  Left ventricular ejection fraction 47%.    Negative fat pad biopsy on 6/4/2019    PAST MEDICAL, SURGICAL, FAMILY, AND SOCIAL HISTORIES were reviewed with the patient and in the electronic medical record, and were updated if indicated.    ALLERGIES:  No Known Allergies    MEDICATIONS:  The medication list has been reviewed with the patient by the medical assistant, and the list has been updated in the electronic medical record, which I reviewed.  Medication dosages and frequencies were confirmed to be accurate.    I have reviewed the patient's medical history in detail and updated the computerized patient record.    Review of Systems   Constitutional: Positive for fatigue. Negative for appetite change, chills and fever.   HENT:   Negative for trouble swallowing.    Respiratory: Negative for cough, shortness of breath and wheezing.    Cardiovascular: Negative for chest pain and leg swelling.   Gastrointestinal: Negative for abdominal distention, blood in stool, constipation, diarrhea and nausea.   Musculoskeletal: Positive for neck pain (unchanged). Negative for arthralgias and myalgias.        Right lower leg and ankle pain and swelling   Skin: Positive for rash (Very mild small areas of erythema across the left shoulder.).   Neurological: Positive for numbness (stable, chronic). Negative for dizziness, extremity weakness and headaches.        Left shoulder pain,   Imbalance, see HPI    Hematological: Does not bruise/bleed easily.  "  Psychiatric/Behavioral: Negative for confusion.         Vitals:    10/07/19 1314   BP: 136/71   Pulse: 72   Resp: 16   Temp: 98.1 °F (36.7 °C)   TempSrc: Oral   SpO2: 97%   Weight: 114 kg (252 lb 3.2 oz)   Height: 175 cm (68.9\")   PainSc: 5  Comment: AL amylodiosis   PainLoc: Comment: all over       PHYSICAL EXAMINATION:  GENERAL:  Obese.  Well-developed well-nourished male; awake, alert and oriented, in no acute distress.    SKIN: No rash or nonhealing lesions  HEAD:  Normocephalic, atraumatic.  EARS:  Hearing intact.  NOSE:  Septum midline.  No excoriations or nasal discharge.  MOUTH:  No stomatitis or ulcers.  Lips are normal.  THROAT:  Oropharynx without lesions or exudates.  CHEST: Scattered wheezes, improved today  HEART:  Regular rate; normal rhythm.     EXTREMITIES: Compression stockings in place.  No clubbing cyanosis or edema.    DIAGNOSTIC DATA:  Results from last 7 days   Lab Units 10/07/19  1301   WBC 10*3/mm3 3.92   NEUTROS ABS 10*3/mm3 2.28   HEMOGLOBIN g/dL 10.3*   HEMATOCRIT % 30.7*   PLATELETS 10*3/mm3 103*     Results from last 7 days   Lab Units 10/07/19  1301   SODIUM mmol/L 138   POTASSIUM mmol/L 4.2   CHLORIDE mmol/L 102   CO2 mmol/L 24.5   BUN mg/dL 26*   CREATININE mg/dL 1.68*   CALCIUM mg/dL 8.8   ALBUMIN g/dL 3.90   BILIRUBIN mg/dL 0.7   ALK PHOS U/L 78   ALT (SGPT) U/L 24   AST (SGOT) U/L 25   GLUCOSE mg/dL 121         ASSESSMENT:  This is a 74 y.o. male with:  1.  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  He had a wedge resection.  Dr. Hernandez was  following a couple of other small pulmonary nodules on the left which initially increased in size.  His M spike continues to be stable.  The PET scan did not show anything we didn't expect from the CT scan.  The left upper lobe lung nodule is too risky to biopsy base of its location.  His case was presented at the multidisciplinary thoracic conference and recommendations included to follow " him with serial CT scans with biopsy if it appears to be safer.  CT imaging on 12/18/2017 was stable.  CT imaging on 5/25/2018 showed that one of the pleural based nodules and enlarged slightly measuring 2.3 cm.  CT imaging from 8/22/2018 shows some slight increase in size of the pulmonary nodules with the largest measuring now 2.0 cm 3 other tiny noncalcified nodules in the right lung are stable, only showing a slight increase since 2017.  If any do grow significantly in size, we could consider radiation.  He would likely not be a surgical candidate considering his oxygen dependence.  There is some question as to how much of this is amyloid.  There is also some question as to whether this is primary or reactive.  [facility] [place] by Dr. Reed, and they requested his pathology for review.  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    They recommended treatment with FAM (inhaled fluticasone, azithromycin 250 mg on Mondays Wednesdays and Fridays, montelukast daily) plus doxycycline 100 mg twice daily for his lungs.  He did [facility] through [facility] in February 2019.    [facility] at [facility].  This was not consistent with involvement by amyloidosis.  He had an EMG also at the [facility] that was concerning for the possibility of cervical myelopathy rather than a process consistent with amyloidosis.     He did have a negative fat pad biopsy on 6/4/2019.    It has been recommended from [place] that he initiate therapy with Velcade, Cytoxan, and dexamethasone (CyBorD).  Treatment initiated 7/8/2019    As noted above, as of 9/23/2019 he was  progressively fatigued and is struggling with constant pruritus despite several skin creams for management.  Additionally, he was more anemic and is having issues with orthostatic hypotension.  This actually resulted in a fall with several abrasions.  He had acute kidney injury.  He was given IV fluids.  An anemia evaluation showed no evidence  for iron, vitamin B12, or folic acid deficiency.  Treatment was held.     Today his blood pressure is better.  He continues to have dyspnea on exertion.  We will plan to hold all therapy at this time considering the adverse effects of therapy.  I also discussed his case with Dr. Roberson at [facility], [place], operated by [facility].    2.  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  We did repeat labs 7/1/2019 to service a baseline, and spike was stable at 0.2 gm.      Repeat studies today.    3.  Macrocytic anemia: Hemoglobin worsened to 9.8 as of 9/23/2019.  No evidence for iron, vitamin B12, or folic acid deficiency.  Hemoglobin is improving.    4.  Thrombocytopenia: Platelet count today is improved.    5.  Right renal mass which is chronic.  He is followed by urology.    6.  Claudication with peripheral vascular disease followed by Dr. Freeman.  Chronic bilateral lower extremity edema, this is unchanged. He recently followed up with Dr. Freeman who said everything looks good to him apparently.    7.  Low back pain with an MRI on 5/18/2017 showing degenerative changes at multiple levels    8.  COPD: He follows with Dr. Poole now and is on oxygen and inhalers.  Overall, breathing is better at night on his CPAP.    9.  Peripheral neuropathy: EMG completed.  MRI cervical spine complete.  There are some abnormalities in the cervical spine.  He was referred to Dr. Robins, neurosurgery.  While the report is available from the MRI performed at [facility], these images are not available.  The patient was seen by Dr. Robins with CT of the neck performed. He had additional imaging performed.  He will follow-up with Dr. Aranda tomorrow.      His neuropathic symptoms are stable and unchanged with the initiation of Velcade    10.  Skin Rash.  Sensation of pruritus across the left shoulder with minimal rash.  I wonder if this is a neurologic manifestation of his cervical spine disease.  He continues topical therapy and will follow up with Dr. Syed.    11.   Chronic kidney disease.  Creatinine worsened a couple of weeks ago but today has improved.    12.  Viral URI. SX improving     13.  Right leg pain: X-ray was normal.  Images personally reviewed.  Bilateral lower extremity venous duplex was normal.  He is ambulatory on his right leg and therefore Dr. Heredia did not feel that this was an Achilles tendon rupture.  He could have strained muscle. Pain much improved     14. Orthostatic hypotension.  Improved    PLAN:  1. Hold Velcade and Cytoxan at this time due to adverse effects of therapy.  2. Follow-up pending serum protein studies from today  3. Continue FAM plus doxycycline [facility] of the [facility]. he has been holding this but I encouraged him to resume it for anti-inflammatory properties for his lungs.  4. Continue oral antihistamines as well as topical creams for pruritus.  He is to follow-up with Dr. Syed.  5. He will see neurosurgery tomorrow to determine what can be done regarding his left shoulder pain  6. CT imaging of his chest without contrast in early December and I will see him back after that for follow-up.  7. I have asked the patient to call with any progression of his symptoms prior to his next office visit      Rafael Heredia MD

## 2019-10-08 ENCOUNTER — OFFICE VISIT (OUTPATIENT)
Dept: NEUROSURGERY | Facility: CLINIC | Age: 74
End: 2019-10-08

## 2019-10-08 VITALS
HEART RATE: 89 BPM | DIASTOLIC BLOOD PRESSURE: 65 MMHG | HEIGHT: 69 IN | WEIGHT: 252 LBS | BODY MASS INDEX: 37.33 KG/M2 | SYSTOLIC BLOOD PRESSURE: 120 MMHG

## 2019-10-08 DIAGNOSIS — M47.812 CERVICAL SPONDYLOSIS WITHOUT MYELOPATHY: ICD-10-CM

## 2019-10-08 DIAGNOSIS — M54.50 CHRONIC MIDLINE LOW BACK PAIN WITHOUT SCIATICA: ICD-10-CM

## 2019-10-08 DIAGNOSIS — M54.2 NECK PAIN: Primary | ICD-10-CM

## 2019-10-08 DIAGNOSIS — G89.29 CHRONIC MIDLINE LOW BACK PAIN WITHOUT SCIATICA: ICD-10-CM

## 2019-10-08 PROBLEM — G95.9 CERVICAL MYELOPATHY (HCC): Status: RESOLVED | Noted: 2019-06-24 | Resolved: 2019-10-08

## 2019-10-08 LAB
ALBUMIN SERPL-MCNC: 3.5 G/DL (ref 2.9–4.4)
ALBUMIN/GLOB SERPL: 1.5 {RATIO} (ref 0.7–1.7)
ALPHA1 GLOB FLD ELPH-MCNC: 0.2 G/DL (ref 0–0.4)
ALPHA2 GLOB SERPL ELPH-MCNC: 0.7 G/DL (ref 0.4–1)
B-GLOBULIN SERPL ELPH-MCNC: 1 G/DL (ref 0.7–1.3)
GAMMA GLOB SERPL ELPH-MCNC: 0.5 G/DL (ref 0.4–1.8)
GLOBULIN SER CALC-MCNC: 2.4 G/DL (ref 2.2–3.9)
IGA SERPL-MCNC: 79 MG/DL (ref 61–437)
IGG SERPL-MCNC: 520 MG/DL (ref 700–1600)
IGM SERPL-MCNC: 134 MG/DL (ref 15–143)
INTERPRETATION SERPL IEP-IMP: ABNORMAL
KAPPA LC SERPL-MCNC: 23 MG/L (ref 3.3–19.4)
KAPPA LC/LAMBDA SER: 1.22 {RATIO} (ref 0.26–1.65)
LAMBDA LC FREE SERPL-MCNC: 18.9 MG/L (ref 5.7–26.3)
Lab: ABNORMAL
M-SPIKE: ABNORMAL G/DL
PROT SERPL-MCNC: 5.9 G/DL (ref 6–8.5)

## 2019-10-08 PROCEDURE — 99213 OFFICE O/P EST LOW 20 MIN: CPT | Performed by: NEUROLOGICAL SURGERY

## 2019-10-30 ENCOUNTER — APPOINTMENT (OUTPATIENT)
Dept: PHARMACY | Facility: HOSPITAL | Age: 74
End: 2019-10-30

## 2019-12-02 ENCOUNTER — OFFICE VISIT CONVERTED (OUTPATIENT)
Dept: ORTHOPEDIC SURGERY | Facility: CLINIC | Age: 74
End: 2019-12-02
Attending: ORTHOPAEDIC SURGERY

## 2019-12-02 ENCOUNTER — HOSPITAL ENCOUNTER (OUTPATIENT)
Dept: PET IMAGING | Facility: HOSPITAL | Age: 74
Discharge: HOME | End: 2019-12-02
Admitting: INTERNAL MEDICINE

## 2019-12-02 DIAGNOSIS — E85.81 AL AMYLOIDOSIS (HCC): ICD-10-CM

## 2019-12-02 DIAGNOSIS — D47.2 MONOCLONAL GAMMOPATHY: ICD-10-CM

## 2019-12-02 PROCEDURE — 71250 CT THORAX DX C-: CPT

## 2019-12-05 ENCOUNTER — LAB (OUTPATIENT)
Dept: LAB | Facility: HOSPITAL | Age: 74
End: 2019-12-05

## 2019-12-05 ENCOUNTER — OFFICE VISIT (OUTPATIENT)
Dept: ONCOLOGY | Facility: CLINIC | Age: 74
End: 2019-12-05

## 2019-12-05 VITALS
DIASTOLIC BLOOD PRESSURE: 62 MMHG | HEIGHT: 69 IN | RESPIRATION RATE: 16 BRPM | HEART RATE: 93 BPM | WEIGHT: 256 LBS | TEMPERATURE: 98.1 F | BODY MASS INDEX: 37.92 KG/M2 | SYSTOLIC BLOOD PRESSURE: 108 MMHG | OXYGEN SATURATION: 94 %

## 2019-12-05 DIAGNOSIS — C90.20 EXTRAMEDULLARY PLASMACYTOMA, UNSPECIFIED WHETHER REMISSION ACHIEVED (HCC): ICD-10-CM

## 2019-12-05 DIAGNOSIS — E85.81 AL AMYLOIDOSIS (HCC): Primary | ICD-10-CM

## 2019-12-05 DIAGNOSIS — E85.81 AL AMYLOIDOSIS (HCC): ICD-10-CM

## 2019-12-05 DIAGNOSIS — D47.2 MONOCLONAL GAMMOPATHY: ICD-10-CM

## 2019-12-05 LAB
ALBUMIN SERPL-MCNC: 4.2 G/DL (ref 3.5–5.2)
ALBUMIN/GLOB SERPL: 1.8 G/DL (ref 1.1–2.4)
ALP SERPL-CCNC: 70 U/L (ref 38–116)
ALT SERPL W P-5'-P-CCNC: 18 U/L (ref 0–41)
ANION GAP SERPL CALCULATED.3IONS-SCNC: 12.4 MMOL/L (ref 5–15)
AST SERPL-CCNC: 23 U/L (ref 0–40)
BASOPHILS # BLD AUTO: 0.05 10*3/MM3 (ref 0–0.2)
BASOPHILS NFR BLD AUTO: 0.9 % (ref 0–1.5)
BILIRUB SERPL-MCNC: 0.7 MG/DL (ref 0.2–1.2)
BUN BLD-MCNC: 26 MG/DL (ref 6–20)
BUN/CREAT SERPL: 15.2 (ref 7.3–30)
CALCIUM SPEC-SCNC: 9.5 MG/DL (ref 8.5–10.2)
CHLORIDE SERPL-SCNC: 100 MMOL/L (ref 98–107)
CO2 SERPL-SCNC: 25.6 MMOL/L (ref 22–29)
CREAT BLD-MCNC: 1.71 MG/DL (ref 0.7–1.3)
DEPRECATED RDW RBC AUTO: 48.5 FL (ref 37–54)
EOSINOPHIL # BLD AUTO: 0.17 10*3/MM3 (ref 0–0.4)
EOSINOPHIL NFR BLD AUTO: 3 % (ref 0.3–6.2)
ERYTHROCYTE [DISTWIDTH] IN BLOOD BY AUTOMATED COUNT: 13.4 % (ref 12.3–15.4)
GFR SERPL CREATININE-BSD FRML MDRD: 39 ML/MIN/1.73
GLOBULIN UR ELPH-MCNC: 2.4 GM/DL (ref 1.8–3.5)
GLUCOSE BLD-MCNC: 111 MG/DL (ref 74–124)
HCT VFR BLD AUTO: 38.1 % (ref 37.5–51)
HGB BLD-MCNC: 13.3 G/DL (ref 13–17.7)
IMM GRANULOCYTES # BLD AUTO: 0.02 10*3/MM3 (ref 0–0.05)
IMM GRANULOCYTES NFR BLD AUTO: 0.4 % (ref 0–0.5)
LYMPHOCYTES # BLD AUTO: 1.17 10*3/MM3 (ref 0.7–3.1)
LYMPHOCYTES NFR BLD AUTO: 20.8 % (ref 19.6–45.3)
MCH RBC QN AUTO: 34.6 PG (ref 26.6–33)
MCHC RBC AUTO-ENTMCNC: 34.9 G/DL (ref 31.5–35.7)
MCV RBC AUTO: 99.2 FL (ref 79–97)
MONOCYTES # BLD AUTO: 0.63 10*3/MM3 (ref 0.1–0.9)
MONOCYTES NFR BLD AUTO: 11.2 % (ref 5–12)
NEUTROPHILS # BLD AUTO: 3.59 10*3/MM3 (ref 1.7–7)
NEUTROPHILS NFR BLD AUTO: 63.7 % (ref 42.7–76)
NRBC BLD AUTO-RTO: 0 /100 WBC (ref 0–0.2)
PLATELET # BLD AUTO: 136 10*3/MM3 (ref 140–450)
PMV BLD AUTO: 10.1 FL (ref 6–12)
POTASSIUM BLD-SCNC: 3.9 MMOL/L (ref 3.5–4.7)
PROT SERPL-MCNC: 6.6 G/DL (ref 6.3–8)
RBC # BLD AUTO: 3.84 10*6/MM3 (ref 4.14–5.8)
SODIUM BLD-SCNC: 138 MMOL/L (ref 134–145)
WBC NRBC COR # BLD: 5.63 10*3/MM3 (ref 3.4–10.8)

## 2019-12-05 PROCEDURE — 80053 COMPREHEN METABOLIC PANEL: CPT

## 2019-12-05 PROCEDURE — 85025 COMPLETE CBC W/AUTO DIFF WBC: CPT

## 2019-12-05 PROCEDURE — 36415 COLL VENOUS BLD VENIPUNCTURE: CPT

## 2019-12-05 PROCEDURE — 99215 OFFICE O/P EST HI 40 MIN: CPT | Performed by: INTERNAL MEDICINE

## 2019-12-05 RX ORDER — TRAMADOL HYDROCHLORIDE 50 MG/1
TABLET ORAL EVERY 12 HOURS
COMMUNITY
End: 2021-02-08 | Stop reason: SDDI

## 2019-12-05 RX ORDER — BENZONATATE 100 MG/1
CAPSULE ORAL
COMMUNITY
End: 2021-02-08

## 2019-12-05 RX ORDER — PANTOPRAZOLE SODIUM 20 MG/1
20 TABLET, DELAYED RELEASE ORAL DAILY
COMMUNITY
Start: 2019-10-31 | End: 2021-09-07

## 2019-12-05 RX ORDER — INFLUENZA A VIRUS A/MICHIGAN/45/2015 X-275 (H1N1) ANTIGEN (FORMALDEHYDE INACTIVATED), INFLUENZA A VIRUS A/SINGAPORE/INFIMH-16-0019/2016 IVR-186 (H3N2) ANTIGEN (FORMALDEHYDE INACTIVATED), AND INFLUENZA B VIRUS B/MARYLAND/15/2016 BX-69A (A B/COLORADO/6/2017-LIKE VIRUS) ANTIGEN (FORMALDEHYDE INACTIVATED) 60; 60; 60 UG/.5ML; UG/.5ML; UG/.5ML
INJECTION, SUSPENSION INTRAMUSCULAR SEE ADMIN INSTRUCTIONS
Refills: 0 | COMMUNITY
Start: 2019-10-18 | End: 2021-09-07

## 2019-12-05 RX ORDER — HYDROCODONE BITARTRATE AND ACETAMINOPHEN 5; 325 MG/1; MG/1
TABLET ORAL
COMMUNITY
Start: 2019-11-12 | End: 2021-02-08 | Stop reason: SDUPTHER

## 2019-12-05 RX ORDER — ASPIRIN 81 MG/1
TABLET, COATED ORAL
COMMUNITY
Start: 2019-10-31 | End: 2021-02-08 | Stop reason: SDUPTHER

## 2019-12-05 RX ORDER — HYDROCODONE BITARTRATE AND ACETAMINOPHEN 5; 325 MG/1; MG/1
TABLET ORAL EVERY 12 HOURS
COMMUNITY
End: 2021-02-08 | Stop reason: SDDI

## 2019-12-05 NOTE — PROGRESS NOTES
[facility] GROUP OUTPATIENT FOLLOW UP CLINIC VISIT    REASON FOR FOLLOW-UP:    1. 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  Macrocytic anemia  3.  Mild leukopenia  4.  Left lung pulmonary nodules, stable on follow up imaging.  5.  [facility] see if he can get service-connected for plasmacytoma/amyloidosis.  [facility] [place] [facility] and saw Dr. Reed there. His case was discussed further in [place] after review of his pathology.  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  A fat pad biopsy and cardiac MRI were recommended.  Treatment with CyBorD was recommended.    6.  Peripheral neuropathy in his hands and feet.  [facility] that was concerning for cervical myelopathy rather than a neuropathic process consistent with amyloidosis.  [facility] of [facility] on 4[facility]/2019 shows multilevel degenerative change with moderate spinal canal stenosis at C4-C5 and C5-C6 and mild spinal canal stenosis at C6-C7.  Severe foraminal narrowing at C4-C5 and C5-C6 with moderate neuroforaminal narrowing at C6-C7.  7.  Cardiac [facility] performed on 4/16/2019 [facility] with normal left ventricle wall thickness with right ventricular cavity enlargement.  No aneurysm.  No fibrosis or scarring to suggest an infiltrative process such as amyloidosis.  Left ventricular ejection fraction 47%.  8.  Negative fat pad biopsy on 6/4/2019  9.  It was recommended from [facility], [place], operated by [facility] that he initiate therapy with Velcade, Cytoxan, and dexamethasone (CyBorD).  Treatment initiated 7/8/2019  10.  He received therapy through 9/16/2019.  As of 9/23/2019, treatment was held for cytopenias, fatigue, orthostasis.    HISTORY OF PRESENT ILLNESS:  Rafael Gomez is a 74 y.o. male with the above-mentioned history, who returns the office today for scheduled follow-up.      He was last seen on 9/23/2019.  At  that time, he was experiencing more fatigue as well as orthostasis, rash, worsening anemia.  Treatment was held.    He remains fatigued.  Left shoulder pain and decreasing mobility persists.  He saw neurosurgery and then saw orthopedic surgery with an MRI of the left shoulder planned for next week and he follows up with orthopedic surgery the week after.  He reports ongoing fatigue.  He reports an ongoing erythematous rash on the left lower extremity with pruritus.  He is using a moisturizer with some improvement.  No new respiratory symptoms.  No chest pain.  He does report numbness in his feet.  As a result of this, he has some falls.  He also has no longer driving due to inability to make quick decisions behind the wheel.    HEMATOLOGIC HISTORY:  The patient had complained of some dyspnea on exertion. He has some left knee pain following a left knee replacement. He eventually was referred for further care to Dr. Aba Freeman with vascular surgery. He was found to have evidence of peripheral vascular disease and is scheduled for a procedure in her future. He was also found to have a complex irregular cystic mass measuring 5.7 cm in the right kidney. He has been referred to urology for this. There was a 12 mm nodule in the lingula. He was referred to Dr. Larry eHrnandez for this.  He had a CT scan of the chest with contrast on 5/25/2016. This showed 3 pleural-based nodules at the periphery of the left upper lobe. One measured 15 mm and the other measured 16 mm. The other was 6 mm. There is also a noncalcified 6 mm nodule.  He had a wedge resection on 6/2/2016. Two samples were sent to pathology. One showed plasma cells with evidence for amyloid. Further testing at [facility] showed IgG kappa restriction. The other wedge resection showed tiny nonnecrotizing granulomas.    Serum protein electrophoresis showed a 0.1 g M spike with immunofixation showing IgM lambda monoclonality.  Serum free light chain ratio  was normal.  Ferritin 226.  Iron profile normal.  Vitamin B12 greater than 2000.    Serum protein levels in March 2017 are very stable with 0.1 g M spike.  He will follow-up in one year.  He continues to follow-up with Dr. Hernandez for CT scans.    CT imaging on 5/23/2017 showed an increase in size of 2 lingular lesions now measuring 2.2 cm and 1.0 cm.  There is an 8 mm left lower lobe pulmonary nodule also slightly increased in size.  Several other tiny pulmonary nodules bilaterally which are stable.    His M spike increased to 0.2 g.    He had a PET scan on 6/8/2017 showing several hypermetabolic left lung pulmonary nodules with a 2.2 cm pleural-based left upper lobe nodule.    His case was presented at the multidisciplinary thoracic conference.  It was elected to follow him with serial CT scans with the biopsy if the lung nodules continued increase in size.  It was felt that it was too risky to attempt a biopsy at this point.    Repeat CT imaging on 9/11/2017 shows stable findings.  M spike 0.1 g.      CT imaging on 8/20/2018 with a slight increase in size a couple of pulmonary nodules.  M spike is 0.2 g.  Follow-up with thoracic surgery for imaging and we will see him back in 6 months for follow-up with labs in 1 week prior.    PET scan done at [place] on 10/23/2018 with similar findings.    [facility] [place] [facility] and saw Dr. Reed there.  They have requested his pathology for review.    He complains of peripheral neuropathy in his hands and feet.  This has been worsening.  He reports some numbness and tingling as well as occasional pain.      He was discussed further in [place] after review of his pathology.  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  A fat pad biopsy and cardiac MRI were recommended.  Treatment with CyBorD was recommended.      Respiratory status has improved after participating in pulmonary rehabilitation.    He had an [facility] at the [facility] that showed no evidence for  amyloidosis.    He had an [facility] at the [facility] that was concerning for cervical myelopathy rather than a neuropathic process consistent with amyloidosis.    MRI of the cervical spine performed at the [facility] on 4/2/2019 shows multilevel degenerative change with moderate spinal canal stenosis at C4-C5 and C5-C6 and mild spinal canal stenosis at C6-C7.  Severe foraminal narrowing at C4-C5 and C5-C6 with moderate neuroforaminal narrowing at C6-C7.    Cardiac MRI performed on 4/16/2019 at the [facility] with normal left ventricle wall thickness with right ventricular cavity enlargement.  No aneurysm.  No fibrosis or scarring to suggest an infiltrative process such as amyloidosis.  Left ventricular ejection fraction 47%.    Negative fat pad biopsy on 6/4/2019    PAST MEDICAL, SURGICAL, FAMILY, AND SOCIAL HISTORIES were reviewed with the patient and in the electronic medical record, and were updated if indicated.    ALLERGIES:  No Known Allergies    MEDICATIONS:  The medication list has been reviewed with the patient by the medical assistant, and the list has been updated in the electronic medical record, which I reviewed.  Medication dosages and frequencies were confirmed to be accurate.    I have reviewed the patient's medical history in detail and updated the computerized patient record.    Review of Systems   Constitutional: Positive for fatigue. Negative for appetite change, chills and fever.   HENT:   Negative for trouble swallowing.    Respiratory: Negative for cough, shortness of breath and wheezing.    Cardiovascular: Negative for chest pain and leg swelling.   Gastrointestinal: Negative for abdominal distention, blood in stool, constipation, diarrhea and nausea.   Musculoskeletal: Positive for neck pain (unchanged). Negative for arthralgias and myalgias.        Right lower leg and ankle pain and swelling   Skin: Positive for rash (Very mild small areas of erythema across the left shoulder.).   Neurological: Positive for numbness  "(stable, chronic). Negative for dizziness, extremity weakness and headaches.        Left shoulder pain,   Imbalance, see HPI    Hematological: Does not bruise/bleed easily.   Psychiatric/Behavioral: Negative for confusion.         Vitals:    12/05/19 1059   BP: 108/62   Pulse: 93   Resp: 16   Temp: 98.1 °F (36.7 °C)   TempSrc: Oral   SpO2: 94%   Weight: 116 kg (256 lb)   Height: 175 cm (68.9\")   PainSc: 8  Comment: monoclonal gammopathy   PainLoc: Generalized       PHYSICAL EXAMINATION:  GENERAL:  Obese.  Well-developed well-nourished male; awake, alert and oriented, in no acute distress.    SKIN: No rash or nonhealing lesions  HEAD:  Normocephalic, atraumatic.  EARS:  Hearing intact.  NOSE:  Septum midline.  No excoriations or nasal discharge.  MOUTH:  No stomatitis or ulcers.  Lips are normal.  THROAT:  Oropharynx without lesions or exudates.  CHEST: Scattered wheezes, improved today  HEART:  Regular rate; normal rhythm.     EXTREMITIES: Compression stockings in place.  No clubbing cyanosis or edema.    DIAGNOSTIC DATA:        Results from last 7 days   Lab Units 12/05/19  1041   WBC 10*3/mm3 5.63   NEUTROS ABS 10*3/mm3 3.59   HEMOGLOBIN g/dL 13.3   HEMATOCRIT % 38.1   PLATELETS 10*3/mm3 136*               Imaging: CT images of the chest from 12/2/2019 images personally reviewed.  No significant change and no evidence for progression.    IMPRESSION:  Stable examination. Pulmonary nodules are stable and there  is no new abnormality.    ASSESSMENT:  This is a 74 y.o. male with:  1.  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  He had a wedge resection.  Dr. Hernandez was  following a couple of other small pulmonary nodules on the left which initially increased in size.  His M spike continues to be stable.  The PET scan did not show anything we didn't expect from the CT scan.  The left upper lobe lung nodule is too risky to biopsy base of its location.  His case was " presented at the multidisciplinary thoracic conference and recommendations included to follow him with serial CT scans with biopsy if it appears to be safer.  CT imaging on 12/18/2017 was stable.  CT imaging on 5/25/2018 showed that one of the pleural based nodules and enlarged slightly measuring 2.3 cm.  CT imaging from 8/22/2018 shows some slight increase in size of the pulmonary nodules with the largest measuring now 2.0 cm 3 other tiny noncalcified nodules in the right lung are stable, only showing a slight increase since 2017.  If any do grow significantly in size, we could consider radiation.  He would likely not be a surgical candidate considering his oxygen dependence.  There is some question as to how much of this is amyloid.  There is also some question as to whether this is primary or reactive.  [facility] [place] by Dr. Reed, and they requested his pathology for review.  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    They recommended treatment with FAM (inhaled fluticasone, azithromycin 250 mg on Mondays Wednesdays and Fridays, montelukast daily) plus doxycycline 100 mg twice daily for his lungs.  He did initiate this through [facility] in February 2019.    A cardiac [facility] performed at [facility].  This was not consistent with involvement by amyloidosis.  He had an EMG also at the [facility] that was concerning for the possibility of cervical myelopathy rather than a process consistent with amyloidosis.     He did have a negative fat pad biopsy on 6/4/2019.    It was recommended from [facility] that he initiate therapy with Velcade, Cytoxan, and dexamethasone (CyBorD).  Treatment initiated 7/8/2019    As of 9/23/2019 he was  progressively fatigued and struggling with constant pruritus despite several skin creams for management.  Additionally, he was more anemic and is having issues with orthostatic hypotension.  This actually resulted in a fall with several abrasions.  He had acute kidney  injury.  He was given IV fluids.  An anemia evaluation showed no evidence for iron, vitamin B12, or folic acid deficiency.  Treatment was held.     I also discussed his case with Dr. Roberson at [facility], [place], operated by [facility].  No further therapy suggested.    CT imaging of the chest from 12/2/2019 stable.    2.  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  We did repeat labs 7/1/2019 to service a baseline, and spike was stable at 0.2 gm.      Repeat studies on 10/7/2019 showed an M spike that was too small to measure.  With a serum free kappa light chain of 23.0, lambda 18.9, ratio 1.22.    3.  Macrocytic anemia: Hemoglobin worsened to 9.8 as of 9/23/2019.  No evidence for iron, vitamin B12, or folic acid deficiency.  Hemoglobin is improving.    4.  Thrombocytopenia: Platelet count today is improved.    5.  Right renal mass which is chronic.  He is followed by urology.    6.  Claudication with peripheral vascular disease followed by Dr. Freeman.  Chronic bilateral lower extremity edema, this is unchanged. He recently followed up with Dr. Freeman who said everything looks good to him apparently.    7.  Low back pain with an MRI on 5/18/2017 showing degenerative changes at multiple levels    8.  COPD: He follows with Dr. Poole now and is on oxygen and inhalers.  Overall, breathing is better at night on his CPAP.    9.  Peripheral neuropathy and left shoulder pain: EMG completed.  MRI cervical spine complete.  There are some abnormalities in the cervical spine.  He was referred to Dr. Robins, neurosurgery.  While the report is available from the MRI performed at [place], these images are not available.  The patient was seen by Dr. Robins with CT of the neck performed. He had additional imaging performed.  He followed up with Dr. Aranda with further evaluation of his shoulder recommended as it was suspected that the pain is not neuropathic.  He saw orthopedics with an MRI of the left shoulder planned.    10.  Skin Rash.  Sensation of  pruritus across the left shoulder with minimal rash.  I wonder if this is a neurologic manifestation of his cervical spine disease.  He continues topical therapy and will follow up with Dr. Syed.  He now has some pruritus on the left lower extremity.    11.  Chronic kidney disease.  Creatinine pending from today    12.  Orthostatic hypotension improved    13.  Deconditioning: I advised increasing his activity as much as possible, obviously as safely as possible as well    PLAN:  1. Continue FAM plus doxycycline [facility] of the [facility].  2. Continue oral antihistamines as well as topical creams for pruritus.  He is to follow-up with Dr. Syed.  3. Follow-up in 3 months with a CBC CMP and serum protein studies  4. Plan to repeat CT imaging of the chest in 6 months  5. No additional treatment of amyloidosis at this time  6. I have asked the patient to call with any progression of his symptoms prior to his next office visit    Rafael Heredia MD

## 2019-12-11 ENCOUNTER — HOSPITAL ENCOUNTER (OUTPATIENT)
Dept: MRI IMAGING | Facility: HOSPITAL | Age: 74
Discharge: HOME | End: 2019-12-11
Attending: ORTHOPAEDIC SURGERY

## 2019-12-18 ENCOUNTER — OFFICE VISIT CONVERTED (OUTPATIENT)
Dept: ORTHOPEDIC SURGERY | Facility: CLINIC | Age: 74
End: 2019-12-18
Attending: PHYSICIAN ASSISTANT

## 2020-01-09 ENCOUNTER — HOSPITAL ENCOUNTER (OUTPATIENT)
Dept: PERIOP | Facility: HOSPITAL | Age: 75
Setting detail: HOSPITAL OUTPATIENT SURGERY
Discharge: HOME | End: 2020-01-09
Attending: ORTHOPAEDIC SURGERY

## 2020-01-22 ENCOUNTER — TELEPHONE (OUTPATIENT)
Dept: ONCOLOGY | Facility: CLINIC | Age: 75
End: 2020-01-22

## 2020-01-22 ENCOUNTER — CONVERSION ENCOUNTER (OUTPATIENT)
Dept: ORTHOPEDIC SURGERY | Facility: CLINIC | Age: 75
End: 2020-01-22

## 2020-01-22 ENCOUNTER — OFFICE VISIT CONVERTED (OUTPATIENT)
Dept: ORTHOPEDIC SURGERY | Facility: CLINIC | Age: 75
End: 2020-01-22
Attending: PHYSICIAN ASSISTANT

## 2020-01-22 NOTE — TELEPHONE ENCOUNTER
Attempted to return call to [facility] to assess what is going on with pt that he would need a sooner apt.  No answer at [facility] and no voicemail.  Attempted to call patient, with no answer.

## 2020-01-22 NOTE — TELEPHONE ENCOUNTER
Patient's wife Liliya Gomez called. She saw where they missed a call from Dr Heredia's office. Please give her a call back.    Liliya Patricia phone # [phone].

## 2020-01-23 ENCOUNTER — TELEPHONE (OUTPATIENT)
Dept: ONCOLOGY | Facility: CLINIC | Age: 75
End: 2020-01-23

## 2020-01-23 NOTE — TELEPHONE ENCOUNTER
Spoke with him and his wife.  [facility] MD is concerned about the lung nodules and apparently wants a biopsy.  We have already requested a repeat CT which will be done in late February, and I will see him back in early March.  Will plant to keep this as scheduled and then talk to Dr. Peña.

## 2020-02-17 ENCOUNTER — HOSPITAL ENCOUNTER (OUTPATIENT)
Dept: OTHER | Facility: HOSPITAL | Age: 75
Setting detail: RECURRING SERIES
Discharge: HOME | End: 2020-04-20
Attending: ORTHOPAEDIC SURGERY

## 2020-02-19 ENCOUNTER — OFFICE VISIT CONVERTED (OUTPATIENT)
Dept: ORTHOPEDIC SURGERY | Facility: CLINIC | Age: 75
End: 2020-02-19
Attending: PHYSICIAN ASSISTANT

## 2020-02-19 ENCOUNTER — CONVERSION ENCOUNTER (OUTPATIENT)
Dept: ORTHOPEDIC SURGERY | Facility: CLINIC | Age: 75
End: 2020-02-19

## 2020-02-26 ENCOUNTER — HOSPITAL ENCOUNTER (OUTPATIENT)
Dept: CT IMAGING | Facility: HOSPITAL | Age: 75
Discharge: HOME | End: 2020-02-26
Admitting: NURSE PRACTITIONER

## 2020-02-26 ENCOUNTER — OFFICE VISIT CONVERTED (OUTPATIENT)
Dept: ORTHOPEDIC SURGERY | Facility: CLINIC | Age: 75
End: 2020-02-26
Attending: PHYSICIAN ASSISTANT

## 2020-02-26 ENCOUNTER — CONVERSION ENCOUNTER (OUTPATIENT)
Dept: ORTHOPEDIC SURGERY | Facility: CLINIC | Age: 75
End: 2020-02-26

## 2020-02-26 DIAGNOSIS — R91.8 MULTIPLE LUNG NODULES ON CT: ICD-10-CM

## 2020-02-26 DIAGNOSIS — C90.30 PLASMACYTOMA (HCC): ICD-10-CM

## 2020-02-26 PROCEDURE — 71250 CT THORAX DX C-: CPT

## 2020-03-02 ENCOUNTER — LAB (OUTPATIENT)
Dept: LAB | Facility: HOSPITAL | Age: 75
End: 2020-03-02

## 2020-03-02 ENCOUNTER — OFFICE VISIT (OUTPATIENT)
Dept: ONCOLOGY | Facility: CLINIC | Age: 75
End: 2020-03-02

## 2020-03-02 VITALS
HEART RATE: 90 BPM | HEIGHT: 69 IN | WEIGHT: 245.5 LBS | RESPIRATION RATE: 16 BRPM | SYSTOLIC BLOOD PRESSURE: 150 MMHG | DIASTOLIC BLOOD PRESSURE: 66 MMHG | OXYGEN SATURATION: 95 % | BODY MASS INDEX: 36.36 KG/M2 | TEMPERATURE: 98 F

## 2020-03-02 DIAGNOSIS — E85.81 AL AMYLOIDOSIS (HCC): Primary | ICD-10-CM

## 2020-03-02 DIAGNOSIS — E85.81 AL AMYLOIDOSIS (HCC): ICD-10-CM

## 2020-03-02 DIAGNOSIS — C90.20 EXTRAMEDULLARY PLASMACYTOMA, UNSPECIFIED WHETHER REMISSION ACHIEVED (HCC): ICD-10-CM

## 2020-03-02 DIAGNOSIS — D47.2 MONOCLONAL GAMMOPATHY: ICD-10-CM

## 2020-03-02 LAB
ALBUMIN SERPL-MCNC: 3.9 G/DL (ref 3.5–5.2)
ALBUMIN/GLOB SERPL: 1.7 G/DL (ref 1.1–2.4)
ALP SERPL-CCNC: 122 U/L (ref 38–116)
ALT SERPL W P-5'-P-CCNC: 32 U/L (ref 0–41)
ANION GAP SERPL CALCULATED.3IONS-SCNC: 12.7 MMOL/L (ref 5–15)
AST SERPL-CCNC: 24 U/L (ref 0–40)
BASOPHILS # BLD AUTO: 0.04 10*3/MM3 (ref 0–0.2)
BASOPHILS NFR BLD AUTO: 0.7 % (ref 0–1.5)
BILIRUB SERPL-MCNC: 0.5 MG/DL (ref 0.2–1.2)
BUN BLD-MCNC: 29 MG/DL (ref 6–20)
BUN/CREAT SERPL: 23 (ref 7.3–30)
CALCIUM SPEC-SCNC: 9.6 MG/DL (ref 8.5–10.2)
CHLORIDE SERPL-SCNC: 100 MMOL/L (ref 98–107)
CO2 SERPL-SCNC: 27.3 MMOL/L (ref 22–29)
CREAT BLD-MCNC: 1.26 MG/DL (ref 0.7–1.3)
DEPRECATED RDW RBC AUTO: 54.7 FL (ref 37–54)
EOSINOPHIL # BLD AUTO: 0.13 10*3/MM3 (ref 0–0.4)
EOSINOPHIL NFR BLD AUTO: 2.2 % (ref 0.3–6.2)
ERYTHROCYTE [DISTWIDTH] IN BLOOD BY AUTOMATED COUNT: 15.7 % (ref 12.3–15.4)
GFR SERPL CREATININE-BSD FRML MDRD: 56 ML/MIN/1.73
GLOBULIN UR ELPH-MCNC: 2.3 GM/DL (ref 1.8–3.5)
GLUCOSE BLD-MCNC: 91 MG/DL (ref 74–124)
HCT VFR BLD AUTO: 34.1 % (ref 37.5–51)
HGB BLD-MCNC: 11.5 G/DL (ref 13–17.7)
IMM GRANULOCYTES # BLD AUTO: 0.16 10*3/MM3 (ref 0–0.05)
IMM GRANULOCYTES NFR BLD AUTO: 2.7 % (ref 0–0.5)
LYMPHOCYTES # BLD AUTO: 1.22 10*3/MM3 (ref 0.7–3.1)
LYMPHOCYTES NFR BLD AUTO: 20.3 % (ref 19.6–45.3)
MCH RBC QN AUTO: 32.3 PG (ref 26.6–33)
MCHC RBC AUTO-ENTMCNC: 33.7 G/DL (ref 31.5–35.7)
MCV RBC AUTO: 95.8 FL (ref 79–97)
MONOCYTES # BLD AUTO: 0.49 10*3/MM3 (ref 0.1–0.9)
MONOCYTES NFR BLD AUTO: 8.1 % (ref 5–12)
NEUTROPHILS # BLD AUTO: 3.98 10*3/MM3 (ref 1.7–7)
NEUTROPHILS NFR BLD AUTO: 66 % (ref 42.7–76)
NRBC BLD AUTO-RTO: 0 /100 WBC (ref 0–0.2)
PLATELET # BLD AUTO: 162 10*3/MM3 (ref 140–450)
PMV BLD AUTO: 9.7 FL (ref 6–12)
POTASSIUM BLD-SCNC: 3.4 MMOL/L (ref 3.5–4.7)
PROT SERPL-MCNC: 6.2 G/DL (ref 6.3–8)
RBC # BLD AUTO: 3.56 10*6/MM3 (ref 4.14–5.8)
SODIUM BLD-SCNC: 140 MMOL/L (ref 134–145)
WBC NRBC COR # BLD: 6.02 10*3/MM3 (ref 3.4–10.8)

## 2020-03-02 PROCEDURE — 80053 COMPREHEN METABOLIC PANEL: CPT

## 2020-03-02 PROCEDURE — 36415 COLL VENOUS BLD VENIPUNCTURE: CPT

## 2020-03-02 PROCEDURE — 85025 COMPLETE CBC W/AUTO DIFF WBC: CPT

## 2020-03-02 PROCEDURE — 99214 OFFICE O/P EST MOD 30 MIN: CPT | Performed by: INTERNAL MEDICINE

## 2020-03-02 RX ORDER — KETOCONAZOLE 20 MG/ML
SHAMPOO TOPICAL
COMMUNITY
Start: 2020-02-03 | End: 2021-02-08 | Stop reason: SDDI

## 2020-03-02 RX ORDER — BETAMETHASONE DIPROPIONATE 0.5 MG/G
CREAM TOPICAL
COMMUNITY
Start: 2020-02-03 | End: 2021-02-08 | Stop reason: SDDI

## 2020-03-02 RX ORDER — CLOTRIMAZOLE 1 %
CREAM (GRAM) TOPICAL
COMMUNITY
Start: 2020-02-03 | End: 2021-02-08 | Stop reason: SDDI

## 2020-03-02 RX ORDER — CEPHALEXIN 500 MG/1
CAPSULE ORAL
COMMUNITY
Start: 2020-02-20 | End: 2020-08-24

## 2020-03-02 NOTE — PROGRESS NOTES
[facility] GROUP OUTPATIENT FOLLOW UP CLINIC VISIT    REASON FOR FOLLOW-UP:    1. 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  Macrocytic anemia  3.  Mild leukopenia  4.  Left lung pulmonary nodules, stable on follow up imaging.  5.  [facility] to see if he can get service-connected for plasmacytoma/amyloidosis.  [facility] [place] [facility] and saw Dr. Reed there. His case was discussed further in [place] after review of his pathology.  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  A fat pad biopsy and cardiac MRI were recommended.  Treatment with CyBorD was recommended.    6.  Peripheral neuropathy in his hands and feet.  [facility] that was concerning for cervical myelopathy rather than a neuropathic process consistent with amyloidosis.  [facility] of [facility] on 4[facility]2/2019 shows multilevel degenerative change with moderate spinal canal stenosis at C4-C5 and C5-C6 and mild spinal canal stenosis at C6-C7.  Severe foraminal narrowing at C4-C5 and C5-C6 with moderate neuroforaminal narrowing at C6-C7.  7.  Cardiac [facility] performed on 4/16/2019 [facility] with normal left ventricle wall thickness with right ventricular cavity enlargement.  No aneurysm.  No fibrosis or scarring to suggest an infiltrative process such as amyloidosis.  Left ventricular ejection fraction 47%.  8.  Negative fat pad biopsy on 6/4/2019  9.  It was recommended from [facility] that he initiate therapy with Velcade, Cytoxan, and dexamethasone (CyBorD).  Treatment initiated 7/8/2019  10.  He received therapy through 9/16/2019.  As of 9/23/2019, treatment was held for cytopenias, fatigue, orthostasis.    HISTORY OF PRESENT ILLNESS:  Rafael Gomez is a 74 y.o. male with the above-mentioned history, who returns the office today for scheduled follow-up.      He had an orthopedic procedure on  his left shoulder with improvement in his left shoulder pain.  He continues to have decreased range of motion.  However, his neck pain does persist.  He was referred to pain management with injections and perhaps radiofrequency ablation planned.  He did not complain of any new respiratory symptoms today.  Fatigue persists.  His wife was not with him today as she is on jury duty.      HEMATOLOGIC HISTORY:  The patient had complained of some dyspnea on exertion. He has some left knee pain following a left knee replacement. He eventually was referred for further care to Dr. Aba Freeman with vascular surgery. He was found to have evidence of peripheral vascular disease and is scheduled for a procedure in her future. He was also found to have a complex irregular cystic mass measuring 5.7 cm in the right kidney. He has been referred to urology for this. There was a 12 mm nodule in the lingula. He was referred to Dr. Larry Hernandez for this.  He had a CT scan of the chest with contrast on 5/25/2016. This showed 3 pleural-based nodules at the periphery of the left upper lobe. One measured 15 mm and the other measured 16 mm. The other was 6 mm. There is also a noncalcified 6 mm nodule.  He had a wedge resection on 6/2/2016. Two samples were sent to pathology. One showed plasma cells with evidence for amyloid. Further testing at [facility] showed IgG kappa restriction. The other wedge resection showed tiny nonnecrotizing granulomas.    Serum protein electrophoresis showed a 0.1 g M spike with immunofixation showing IgM lambda monoclonality.  Serum free light chain ratio was normal.  Ferritin 226.  Iron profile normal.  Vitamin B12 greater than 2000.    Serum protein levels in March 2017 are very stable with 0.1 g M spike.  He will follow-up in one year.  He continues to follow-up with Dr. Hernandez for CT scans.    CT imaging on 5/23/2017 showed an increase in size of 2 lingular lesions now measuring 2.2 cm and 1.0 cm.   There is an 8 mm left lower lobe pulmonary nodule also slightly increased in size.  Several other tiny pulmonary nodules bilaterally which are stable.    His M spike increased to 0.2 g.    He had a PET scan on 6/8/2017 showing several hypermetabolic left lung pulmonary nodules with a 2.2 cm pleural-based left upper lobe nodule.    His case was presented at the multidisciplinary thoracic conference.  It was elected to follow him with serial CT scans with the biopsy if the lung nodules continued increase in size.  It was felt that it was too risky to attempt a biopsy at this point.    Repeat CT imaging on 9/11/2017 shows stable findings.  M spike 0.1 g.      CT imaging on 8/20/2018 with a slight increase in size a couple of pulmonary nodules.  M spike is 0.2 g.  Follow-up with thoracic surgery for imaging and we will see him back in 6 months for follow-up with labs in 1 week prior.    PET scan done at [place] on 10/23/2018 with similar findings.    [facility] [place] [facility] and saw Dr. Reed there.  They have requested his pathology for review.    He complains of peripheral neuropathy in his hands and feet.  This has been worsening.  He reports some numbness and tingling as well as occasional pain.      He was discussed further in [place] after review of his pathology.  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  A fat pad biopsy and cardiac MRI were recommended.  Treatment with CyBorD was recommended.      Respiratory status has improved after participating in pulmonary rehabilitation.    He had an [facility] at the [facility] that showed no evidence for amyloidosis.    He had an [facility] that was concerning for cervical myelopathy rather than a neuropathic process consistent with amyloidosis.    MRI of the cervical spine performed [facility] on 4/2/2019 shows multilevel degenerative change with moderate spinal canal stenosis at C4-C5 and C5-C6 and mild spinal canal stenosis at C6-C7.  Severe  "foraminal narrowing at C4-C5 and C5-C6 with moderate neuroforaminal narrowing at C6-C7.    Cardiac MRI performed on 4/16/2019 at the [facility] with normal left ventricle wall thickness with right ventricular cavity enlargement.  No aneurysm.  No fibrosis or scarring to suggest an infiltrative process such as amyloidosis.  Left ventricular ejection fraction 47%.    Negative fat pad biopsy on 6/4/2019    PAST MEDICAL, SURGICAL, FAMILY, AND SOCIAL HISTORIES were reviewed with the patient and in the electronic medical record, and were updated if indicated.    ALLERGIES:  No Known Allergies    MEDICATIONS:  The medication list has been reviewed with the patient by the medical assistant, and the list has been updated in the electronic medical record, which I reviewed.  Medication dosages and frequencies were confirmed to be accurate.    I have reviewed the patient's medical history in detail and updated the computerized patient record.    Review of Systems   Constitutional: Positive for fatigue.   Musculoskeletal: Positive for neck pain.        Left shoulder pain improved though decreased range of motion persist following surgery         Vitals:    03/02/20 0818   BP: 150/66   Pulse: 90   Resp: 16   Temp: 98 °F (36.7 °C)   TempSrc: Oral   SpO2: 95%   Weight: 111 kg (245 lb 8 oz)   Height: 175 cm (68.9\")   PainSc: 0-No pain  Comment: AL amyloidosis       PHYSICAL EXAMINATION:  GENERAL:  Obese.  Well-developed well-nourished male; awake, alert and oriented, in no acute distress.    SKIN: No rash or nonhealing lesions  HEAD:  Normocephalic, atraumatic.  EARS:  Hearing intact.  NOSE:  Septum midline.  No excoriations or nasal discharge.  MOUTH:  No stomatitis or ulcers.  Lips are normal.  THROAT:  Oropharynx without lesions or exudates.  CHEST: Clear to auscultation bilaterally today, improved  HEART:  Regular rate; normal rhythm.     EXTREMITIES: Compression stockings in place.  No clubbing cyanosis or edema.    DIAGNOSTIC " DATA:  Results for orders placed or performed in visit on 03/02/20   CBC Auto Differential   Result Value Ref Range    WBC 6.02 3.40 - 10.80 10*3/mm3    RBC 3.56 (L) 4.14 - 5.80 10*6/mm3    Hemoglobin 11.5 (L) 13.0 - 17.7 g/dL    Hematocrit 34.1 (L) 37.5 - 51.0 %    MCV 95.8 79.0 - 97.0 fL    MCH 32.3 26.6 - 33.0 pg    MCHC 33.7 31.5 - 35.7 g/dL    RDW 15.7 (H) 12.3 - 15.4 %    RDW-SD 54.7 (H) 37.0 - 54.0 fl    MPV 9.7 6.0 - 12.0 fL    Platelets 162 140 - 450 10*3/mm3    Neutrophil % 66.0 42.7 - 76.0 %    Lymphocyte % 20.3 19.6 - 45.3 %    Monocyte % 8.1 5.0 - 12.0 %    Eosinophil % 2.2 0.3 - 6.2 %    Basophil % 0.7 0.0 - 1.5 %    Immature Grans % 2.7 (H) 0.0 - 0.5 %    Neutrophils, Absolute 3.98 1.70 - 7.00 10*3/mm3    Lymphocytes, Absolute 1.22 0.70 - 3.10 10*3/mm3    Monocytes, Absolute 0.49 0.10 - 0.90 10*3/mm3    Eosinophils, Absolute 0.13 0.00 - 0.40 10*3/mm3    Basophils, Absolute 0.04 0.00 - 0.20 10*3/mm3    Immature Grans, Absolute 0.16 (H) 0.00 - 0.05 10*3/mm3    nRBC 0.0 0.0 - 0.2 /100 WBC           Results from last 7 days   Lab Units 03/02/20  0813   WBC 10*3/mm3 6.02   NEUTROS ABS 10*3/mm3 3.98   HEMOGLOBIN g/dL 11.5*   HEMATOCRIT % 34.1*   PLATELETS 10*3/mm3 162               Imaging: CT images of the chest from 2/26/2020 images personally reviewed.  No significant change and no evidence for progression.    ASSESSMENT:  This is a 74 y.o. male with:  1.  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  He had a wedge resection.  Dr. Hernandez was  following a couple of other small pulmonary nodules on the left which initially increased in size.  His M spike continues to be stable.  The PET scan did not show anything we didn't expect from the CT scan.  The left upper lobe lung nodule is too risky to biopsy base of its location.  His case was presented at the multidisciplinary thoracic conference and recommendations included to follow him with serial CT scans  with biopsy if it appears to be safer.  CT imaging on 12/18/2017 was stable.  CT imaging on 5/25/2018 showed that one of the pleural based nodules and enlarged slightly measuring 2.3 cm.  CT imaging from 8/22/2018 shows some slight increase in size of the pulmonary nodules with the largest measuring now 2.0 cm 3 other tiny noncalcified nodules in the right lung are stable, only showing a slight increase since 2017.  If any do grow significantly in size, we could consider radiation.  He would likely not be a surgical candidate considering his oxygen dependence.  There is some question as to how much of this is amyloid.  There is also some question as to whether this is primary or reactive.  [facility] [place] [facility] Reed, and they requested his pathology for review.  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    They recommended treatment with FAM (inhaled fluticasone, azithromycin 250 mg on Mondays Wednesdays and Fridays, montelukast daily) plus doxycycline 100 mg twice daily for his lungs.  He did [facility] in February 2019.    [facility].  This was not consistent with involvement by amyloidosis.  He had an [facility]G also [facility] the [facility] that was concerning for the possibility of cervical myelopathy rather than a process consistent with amyloidosis.     He did have a negative fat pad biopsy on 6/4/2019.    It was recommended from [facility] that he initiate therapy with Velcade, Cytoxan, and dexamethasone (CyBorD).  Treatment initiated 7/8/2019    As of 9/23/2019 he was  progressively fatigued and struggling with constant pruritus despite several skin creams for management.  Additionally, he was more anemic and is having issues with orthostatic hypotension.  This actually resulted in a fall with several abrasions.  He had acute kidney injury.  He was given IV fluids.  An anemia evaluation showed no evidence for iron, vitamin B12, or folic acid deficiency.   Treatment was held.     I also discussed his case with Dr. Roberson at [facility].  No further therapy suggested.    CT imaging of the chest from 12/2/2019 stable.    CT imaging 2/26/2020 stable    2.  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  We did repeat labs 7/1/2019 to service a baseline, and spike was stable at 0.2 gm.      Repeat studies on 10/7/2019 showed an M spike that was too small to measure.  With a serum free kappa light chain of 23.0, lambda 18.9, ratio 1.22.    Repeat studies today.    3.  Macrocytic anemia: Hemoglobin worsened to 9.8 as of 9/23/2019.  No evidence for iron, vitamin B12, or folic acid deficiency.  Hemoglobin is reasonably stable at 11.5 today.    4.  Thrombocytopenia: Platelet count today is normal.    5.  Right renal mass which is chronic.  He is followed by urology.    6.  Claudication with peripheral vascular disease followed by Dr. Freeman.  Chronic bilateral lower extremity edema, this is unchanged. He recently followed up with Dr. Freeman who said everything looks good to him apparently.    7.  Low back pain with an MRI on 5/18/2017 showing degenerative changes at multiple levels    8.  COPD: He follows with Dr. Poole now and is on oxygen and inhalers.  Overall, breathing is better at night on his CPAP.    9.  Peripheral neuropathy and left shoulder pain: EMG completed.  MRI cervical spine complete.  There are some abnormalities in the cervical spine.  He was referred to Dr. Robins, neurosurgery.  While the report is available from the MRI performed at [place], these images are not available.  The patient was seen by Dr. Robins with CT of the neck performed. He had additional imaging performed.  He followed up with Dr. Aranda with further evaluation of his shoulder recommended as it was suspected that the pain is not neuropathic.  He saw orthopedics and ultimately had a left shoulder procedure done in January 2019 with improvement in his pain.  He continues to have neck  pain with some injections and perhaps RFA planned.    10.  Chronic kidney disease.  Creatinine pending from today    11.  Orthostatic hypotension improved    12.  Deconditioning: I advised increasing his activity as much as possible, obviously as safely as possible as well    PLAN:  1. Continue FAM plus [facility]xycycline [facility]  2. Continue oral antihistamines as well as topical creams for pruritus.  He is to follow-up with Dr. Syed.  3. No additional treatment of amyloidosis at this time  4. CT chest without contrast and labs including a CBC CMP and serum protein studies done in 6 months and I will see him back 1 week after that for follow-up.  Follow-up pending labs from today.    I wrote a letter for him today stating no hematologic contraindications for injections and RFA for his neck pain.    Rafael Heredia MD

## 2020-03-03 LAB
ALBUMIN SERPL-MCNC: 3.5 G/DL (ref 2.9–4.4)
ALBUMIN/GLOB SERPL: 1.6 {RATIO} (ref 0.7–1.7)
ALPHA1 GLOB FLD ELPH-MCNC: 0.3 G/DL (ref 0–0.4)
ALPHA2 GLOB SERPL ELPH-MCNC: 0.7 G/DL (ref 0.4–1)
B-GLOBULIN SERPL ELPH-MCNC: 0.6 G/DL (ref 0.7–1.3)
GAMMA GLOB SERPL ELPH-MCNC: 0.7 G/DL (ref 0.4–1.8)
GLOBULIN SER CALC-MCNC: 2.3 G/DL (ref 2.2–3.9)
IGA SERPL-MCNC: 130 MG/DL (ref 61–437)
IGG SERPL-MCNC: 580 MG/DL (ref 700–1600)
IGM SERPL-MCNC: 224 MG/DL (ref 15–143)
INTERPRETATION SERPL IEP-IMP: ABNORMAL
KAPPA LC SERPL-MCNC: 17.4 MG/L (ref 3.3–19.4)
KAPPA LC/LAMBDA SER: 1.09 {RATIO} (ref 0.26–1.65)
LAMBDA LC FREE SERPL-MCNC: 16 MG/L (ref 5.7–26.3)
Lab: ABNORMAL
M-SPIKE: 0.1 G/DL
PROT SERPL-MCNC: 5.8 G/DL (ref 6–8.5)

## 2020-03-04 ENCOUNTER — TELEPHONE (OUTPATIENT)
Dept: ONCOLOGY | Facility: CLINIC | Age: 75
End: 2020-03-04

## 2020-03-05 ENCOUNTER — OFFICE VISIT (OUTPATIENT)
Dept: SURGERY | Facility: CLINIC | Age: 75
End: 2020-03-05

## 2020-03-05 VITALS
HEIGHT: 69 IN | OXYGEN SATURATION: 99 % | SYSTOLIC BLOOD PRESSURE: 128 MMHG | DIASTOLIC BLOOD PRESSURE: 74 MMHG | WEIGHT: 245 LBS | HEART RATE: 87 BPM | BODY MASS INDEX: 36.29 KG/M2

## 2020-03-05 DIAGNOSIS — C90.20 EXTRAMEDULLARY PLASMACYTOMA, UNSPECIFIED WHETHER REMISSION ACHIEVED (HCC): ICD-10-CM

## 2020-03-05 DIAGNOSIS — R91.8 MULTIPLE LUNG NODULES ON CT: Primary | ICD-10-CM

## 2020-03-05 PROCEDURE — 99213 OFFICE O/P EST LOW 20 MIN: CPT | Performed by: THORACIC SURGERY (CARDIOTHORACIC VASCULAR SURGERY)

## 2020-03-05 NOTE — PROGRESS NOTES
Subjective   Patient ID: Rafael Gomez is a 74 y.o. male is here today for follow-up.    History of Present Illness  Dear Colleague,  Rafael Gomez was seen in our office today for continued follow up and surveillance for bilateral lung nodules and plasmacytoma.  He denies any complaints of fever, chills, cough, hemoptysis, pleuritic chest pain, shortness of air, dyspnea with exertion, night sweats, hoarseness, or unintentional weight loss. Underlying medical conditions including hypertension remain stable.  He has no other somatic complaints or alleviating or exacerbating factors aside from those mentioned above.  Mr. gomez presents today to discuss his recent CT scan.  He is doing well.  He states that after shoulder surgery his shoulder is feeling much better.  The following portions of the patient's history were reviewed and updated as appropriate: allergies, current medications, past family history, past medical history, past social history, past surgical history and problem list.  Review of Systems   Constitution: Negative.   HENT: Negative.    Eyes: Negative.    Cardiovascular: Negative.    Respiratory: Negative.    Endocrine: Negative.    Hematologic/Lymphatic: Negative.    Skin: Negative.    Musculoskeletal: Negative.    Gastrointestinal: Negative.    Genitourinary: Negative.    Neurological: Negative.    Psychiatric/Behavioral: Negative.    Allergic/Immunologic: Negative.      Patient Active Problem List   Diagnosis   • Lung nodule, solitary   • Cough   • Wheeze   • Multiple lung nodules on CT   • Follow-up examination, following other surgery   • Renal mass   • Plasmacytoma (CMS/HCC)   • Macrocytic anemia   • Leukopenia   • Monoclonal gammopathy   • Thrombocytopenia (CMS/HCC)   • AL amyloidosis (CMS/HCC)   • Neck pain   • Chronic low back pain   • Cervical spondylosis without myelopathy     Past Medical History:   Diagnosis Date   • Anemia    • Anxiety    • Aortic aneurysm (CMS/HCC)    • Arthritis     • COPD (chronic obstructive pulmonary disease) (CMS/HCC)    • Coronary artery disease    • Depression    • ED (erectile dysfunction)    • GERD (gastroesophageal reflux disease)    • Gout    • History of PAT (paroxysmal atrial tachycardia)    • Hyperlipidemia    • Hypertension    • Leukopenia    • Macrocytic anemia    • Multiple renal cysts    • Myocardial infarction (CMS/HCC)    • Neuropathy     HANDS AND FEET   • PONV (postoperative nausea and vomiting)    • Poor circulation    • PTSD (post-traumatic stress disorder)    • PVD (peripheral vascular disease) (CMS/HCC)    • Rheumatoid arthritis (CMS/HCC)    • RLS (restless legs syndrome)    • Sleep apnea     uses CPAP   • Stroke (CMS/HCC)      Past Surgical History:   Procedure Laterality Date   • APPENDECTOMY N/A 2005   • CAROTID ENDARTERECTOMY Bilateral    • COLONOSCOPY N/A 1995    done in [facility]   • CORONARY ARTERY BYPASS GRAFT  1997   • HIATAL HERNIA REPAIR N/A    • REPLACEMENT TOTAL KNEE Left 09/08/2015   • THORACOSCOPY Left 6/2/2016    Procedure: LEFT VIDEO ASSISTED THORACOTOMY W/ LEFT UPPER LOBE WEDGE RESECTION X 2; EXPLORATORY BRONCHOSCOPY; PAIN PUMP PLACEMENT X 2;  Surgeon: Larry Hernandez MD;  Location: [facility] OR;  Service:    • TONSILLECTOMY Bilateral    • TOTAL HIP ARTHROPLASTY Right 1997     Family History   Problem Relation Age of Onset   • Heart disease Mother    • Multiple myeloma Mother    • Heart disease Father    • Cancer Father         Gallbladder Cancer   • Leukemia Brother         CLL   • Other Brother         Black Lung      Social History     Socioeconomic History   • Marital status:      Spouse name: Liliya   • Number of children: Not on file   • Years of education: High school   • Highest education level: Not on file   Occupational History   • 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[employer]     Employer: RETIRED   Tobacco Use   • Smoking status: Former Smoker     Packs/day: 1.00     Years: 30.00     Pack years: 30.00     Types:  Cigarettes     Last attempt to quit:      Years since quittin.1   • Smokeless tobacco: Current User     Types: Chew   • Tobacco comment: ONE HALF CAN DAILY    Substance and Sexual Activity   • Alcohol use: Yes     Alcohol/week: 28.0 - 35.0 standard drinks     Types: 28 - 35 Cans of beer per week   • Drug use: No   • Sexual activity: Defer       Current Outpatient Medications:   •  albuterol (PROVENTIL HFA;VENTOLIN HFA) 108 (90 Base) MCG/ACT inhaler, Inhale 2 puffs Every 4 (Four) Hours As Needed for Wheezing., Disp: , Rfl:   •  allopurinol (ZYLOPRIM) 300 MG tablet, Take 300 mg by mouth daily., Disp: , Rfl:   •  amLODIPine (NORVASC) 2.5 MG tablet, Take 2.5 mg by mouth every morning., Disp: , Rfl:   •  aspirin 81 MG chewable tablet, Chew 81 mg 2 (Two) Times a Day., Disp: , Rfl:   •  atorvastatin (LIPITOR) 40 MG tablet, Take 40 mg by mouth daily., Disp: , Rfl:   •  CBD oil (cannabidiol) capsule, Take 1 each by mouth Daily., Disp: , Rfl:   •  cetirizine (zyrTEC) 10 MG tablet, Take 10 mg by mouth Daily., Disp: , Rfl:   •  cilostazol (PLETAL) 100 MG tablet, Take 100 mg by mouth As Needed. 0.5 TAB DAILY, Disp: , Rfl:   •  citalopram (CeleXA) 40 MG tablet, , Disp: , Rfl:   •  doxycycline (VIBRAMYCIN) 100 MG capsule, Take 100 mg by mouth 2 (Two) Times a Day. Took last pill., Disp: , Rfl:   •  finasteride (PROSCAR) 5 MG tablet, , Disp: , Rfl:   •  metoprolol succinate XL (TOPROL-XL) 50 MG 24 hr tablet, Take 50 mg by mouth Daily., Disp: , Rfl:   •  MICARDIS 80 MG tablet, Take 80 mg by mouth Daily., Disp: , Rfl:   •  mometasone (ASMANEX TWISTHALER) inhaler 220 mcg/inhalation, Inhale 2 puffs Daily., Disp: , Rfl:   •  montelukast (SINGULAIR) 10 MG tablet, Take 10 mg by mouth Every Night., Disp: , Rfl:   •  Omega-3 Fatty Acids (FISH OIL) 1000 MG capsule capsule, Take 1,000 mg by mouth daily with breakfast. HOLD PRIOR TO SURGERY, Disp: , Rfl:   •  pantoprazole (PROTONIX) 20 MG EC tablet, , Disp: , Rfl:   •  SEREVENT DISKUS  50 MCG/DOSE diskus inhaler, Inhale 1 puff 2 (Two) Times a Day., Disp: , Rfl:   •  SPIRIVA RESPIMAT 2.5 MCG/ACT aerosol solution, Inhale 2 puffs Daily., Disp: , Rfl:   •  STIOLTO RESPIMAT 2.5-2.5 MCG/ACT aerosol solution inhaler, INHALE TWO PUFFS BY MOUTH EVERY DAY, Disp: , Rfl: 3  •  temazepam (RESTORIL) 15 MG capsule, Take 15 mg by mouth at night as needed., Disp: , Rfl:   •  terazosin (HYTRIN) 2 MG capsule, Take 2 mg by mouth every evening. 2 TABS , Disp: , Rfl:   •  acetaminophen (TYLENOL) 325 MG tablet, Take 650 mg by mouth 2 (two) times a day. 1-2 tablets morning and afternoon, Disp: , Rfl:   •  ASPIRIN LOW DOSE 81 MG EC tablet, , Disp: , Rfl:   •  benzonatate (TESSALON) 100 MG capsule, benzonatate 100 mg capsule, Disp: , Rfl:   •  betamethasone dipropionate (DIPROLENE) 0.05 % cream, , Disp: , Rfl:   •  cephalexin (KEFLEX) 500 MG capsule, , Disp: , Rfl:   •  clotrimazole (LOTRIMIN) 1 % cream, , Disp: , Rfl:   •  FLUZONE HIGH-DOSE 0.5 ML suspension prefilled syringe injection, See Admin Instructions. inject as directed, Disp: , Rfl: 0  •  glucosamine sulfate 500 MG capsule capsule, Take 2 capsules by mouth daily., Disp: , Rfl:   •  hydrochlorothiazide (HYDRODIURIL) 25 MG tablet, Take 12.5 mg by mouth daily., Disp: , Rfl:   •  HYDROcodone-acetaminophen (NORCO) 5-325 MG per tablet, Every 12 (Twelve) Hours., Disp: , Rfl:   •  HYDROcodone-acetaminophen (NORCO) 5-325 MG per tablet, , Disp: , Rfl:   •  HYDROcodone-homatropine (HYCODAN) 5-1.5 MG/5ML syrup, Take 5 mL by mouth Every 6 (Six) Hours As Needed for Cough., Disp: 120 mL, Rfl: 0  •  hydrocortisone 2.5 % cream, Apply  topically to the appropriate area as directed 2 (Two) Times a Day., Disp: 20 g, Rfl: 1  •  ketoconazole (NIZORAL) 2 % shampoo, , Disp: , Rfl:   •  l-methylfolate-B6-B12 (METANX) 3-35-2 MG tablet tablet, Take 1 tablet by mouth 2 (two) times a day., Disp: , Rfl:   •  LORazepam (ATIVAN) 0.5 MG tablet, Take 0.5 mg by mouth as needed., Disp: , Rfl:   •   MIRAPEX 0.25 MG tablet, Take 0.25 mg by mouth 3 (Three) Times a Day., Disp: , Rfl:   •  omeprazole (PriLOSEC) 20 MG capsule, Take 20 mg by mouth every morning., Disp: , Rfl:   •  ondansetron (ZOFRAN) 8 MG tablet, Take 1 tablet by mouth 3 (Three) Times a Day As Needed for Nausea or Vomiting., Disp: 30 tablet, Rfl: 5  •  sildenafil (VIAGRA) 100 MG tablet, Take 100 mg by mouth as needed for erectile dysfunction., Disp: , Rfl:   •  traMADol (ULTRAM) 50 MG tablet, Every 12 (Twelve) Hours., Disp: , Rfl:   •  VIAGRA 25 MG tablet, Take 25 mg by mouth As Needed., Disp: , Rfl:   No Known Allergies     Objective   Vitals:    03/05/20 1013   BP: 128/74   Pulse: 87   SpO2: 99%     Physical Exam   Constitutional: He is oriented to person, place, and time. He appears well-developed and well-nourished.   HENT:   Head: Normocephalic and atraumatic.   Nose: Nose normal.   Mouth/Throat: Oropharynx is clear and moist.   Eyes: Pupils are equal, round, and reactive to light. Conjunctivae and EOM are normal.   Neck: Normal range of motion. Neck supple.   Cardiovascular: Normal rate, regular rhythm, normal heart sounds and intact distal pulses.   Pulmonary/Chest: Effort normal and breath sounds normal.   Abdominal: Soft. Bowel sounds are normal.   Musculoskeletal: Normal range of motion.   Neurological: He is alert and oriented to person, place, and time.   Skin: Skin is warm and dry. Capillary refill takes less than 2 seconds.   Psychiatric: He has a normal mood and affect. His behavior is normal. Judgment and thought content normal.   Nursing note and vitals reviewed.    Independent Review of Radiographic Studies:    I have independently reviewed the CT of the chest performed on 2/26/2020 which demonstrates bilateral noncalcified pulmonary nodules the largest of which measures 2 cm in the left upper lobe.  These nodules are unchanged in size since his prior imaging in December 2019.  There is no significant mediastinal or hilar  lymphadenopathy.  There is no pleural or pericardial effusions.  Assessment/Plan   Mr. luna is a pleasant 74-year-old gentleman with multiple pulmonary nodules which are unchanged on his most recent CT of the chest.  He will need continued monitoring of these nodules with a CT of the chest in 6 months given the size which  falls outside of Fleischner guidelines.  He does have a history of plasmacytoma/amyloidosis on biopsy of these nodules in the past.  I will plan to see him in 6 months with a CT of the chest.    Diagnoses and all orders for this visit:    Multiple lung nodules on CT  -     CT Chest Without Contrast; Future    Extramedullary plasmacytoma, unspecified whether remission achieved (CMS/HCC)

## 2020-03-13 ENCOUNTER — HOSPITAL ENCOUNTER (OUTPATIENT)
Dept: OTHER | Facility: HOSPITAL | Age: 75
Discharge: HOME | End: 2020-03-13
Attending: NURSE PRACTITIONER

## 2020-03-30 ENCOUNTER — TELEMEDICINE CONVERTED (OUTPATIENT)
Dept: ORTHOPEDIC SURGERY | Facility: CLINIC | Age: 75
End: 2020-03-30
Attending: PHYSICIAN ASSISTANT

## 2020-04-10 ENCOUNTER — CONVERSION ENCOUNTER (OUTPATIENT)
Dept: ORTHOPEDIC SURGERY | Facility: CLINIC | Age: 75
End: 2020-04-10

## 2020-04-10 ENCOUNTER — OFFICE VISIT CONVERTED (OUTPATIENT)
Dept: ORTHOPEDIC SURGERY | Facility: CLINIC | Age: 75
End: 2020-04-10
Attending: PHYSICIAN ASSISTANT

## 2020-05-11 ENCOUNTER — OFFICE VISIT CONVERTED (OUTPATIENT)
Dept: ORTHOPEDIC SURGERY | Facility: CLINIC | Age: 75
End: 2020-05-11
Attending: PHYSICIAN ASSISTANT

## 2020-05-11 ENCOUNTER — CONVERSION ENCOUNTER (OUTPATIENT)
Dept: ORTHOPEDIC SURGERY | Facility: CLINIC | Age: 75
End: 2020-05-11

## 2020-08-17 ENCOUNTER — HOSPITAL ENCOUNTER (OUTPATIENT)
Dept: PET IMAGING | Facility: HOSPITAL | Age: 75
Discharge: HOME | End: 2020-08-17
Admitting: INTERNAL MEDICINE

## 2020-08-17 ENCOUNTER — LAB (OUTPATIENT)
Dept: LAB | Facility: HOSPITAL | Age: 75
End: 2020-08-17

## 2020-08-17 DIAGNOSIS — D47.2 MONOCLONAL GAMMOPATHY: ICD-10-CM

## 2020-08-17 DIAGNOSIS — E85.81 AL AMYLOIDOSIS (HCC): ICD-10-CM

## 2020-08-17 LAB
ALBUMIN SERPL-MCNC: 4.4 G/DL (ref 3.5–5.2)
ALBUMIN/GLOB SERPL: 2 G/DL (ref 1.1–2.4)
ALP SERPL-CCNC: 76 U/L (ref 38–116)
ALT SERPL W P-5'-P-CCNC: 19 U/L (ref 0–41)
ANION GAP SERPL CALCULATED.3IONS-SCNC: 13.6 MMOL/L (ref 5–15)
AST SERPL-CCNC: 24 U/L (ref 0–40)
BASOPHILS # BLD AUTO: 0.03 10*3/MM3 (ref 0–0.2)
BASOPHILS NFR BLD AUTO: 0.9 % (ref 0–1.5)
BILIRUB SERPL-MCNC: 0.5 MG/DL (ref 0.2–1.2)
BUN SERPL-MCNC: 48 MG/DL (ref 6–20)
BUN/CREAT SERPL: 27.9 (ref 7.3–30)
CALCIUM SPEC-SCNC: 9.7 MG/DL (ref 8.5–10.2)
CHLORIDE SERPL-SCNC: 101 MMOL/L (ref 98–107)
CO2 SERPL-SCNC: 19.4 MMOL/L (ref 22–29)
CREAT SERPL-MCNC: 1.72 MG/DL (ref 0.7–1.3)
DEPRECATED RDW RBC AUTO: 59.5 FL (ref 37–54)
EOSINOPHIL # BLD AUTO: 0.12 10*3/MM3 (ref 0–0.4)
EOSINOPHIL NFR BLD AUTO: 3.6 % (ref 0.3–6.2)
ERYTHROCYTE [DISTWIDTH] IN BLOOD BY AUTOMATED COUNT: 15.1 % (ref 12.3–15.4)
GFR SERPL CREATININE-BSD FRML MDRD: 39 ML/MIN/1.73
GLOBULIN UR ELPH-MCNC: 2.2 GM/DL (ref 1.8–3.5)
GLUCOSE SERPL-MCNC: 112 MG/DL (ref 74–124)
HCT VFR BLD AUTO: 32.5 % (ref 37.5–51)
HGB BLD-MCNC: 11.1 G/DL (ref 13–17.7)
IMM GRANULOCYTES # BLD AUTO: 0.01 10*3/MM3 (ref 0–0.05)
IMM GRANULOCYTES NFR BLD AUTO: 0.3 % (ref 0–0.5)
LYMPHOCYTES # BLD AUTO: 0.96 10*3/MM3 (ref 0.7–3.1)
LYMPHOCYTES NFR BLD AUTO: 28.4 % (ref 19.6–45.3)
MCH RBC QN AUTO: 36.5 PG (ref 26.6–33)
MCHC RBC AUTO-ENTMCNC: 34.2 G/DL (ref 31.5–35.7)
MCV RBC AUTO: 106.9 FL (ref 79–97)
MONOCYTES # BLD AUTO: 0.29 10*3/MM3 (ref 0.1–0.9)
MONOCYTES NFR BLD AUTO: 8.6 % (ref 5–12)
NEUTROPHILS NFR BLD AUTO: 1.97 10*3/MM3 (ref 1.7–7)
NEUTROPHILS NFR BLD AUTO: 58.2 % (ref 42.7–76)
NRBC BLD AUTO-RTO: 0 /100 WBC (ref 0–0.2)
PLATELET # BLD AUTO: 144 10*3/MM3 (ref 140–450)
PMV BLD AUTO: 11.4 FL (ref 6–12)
POTASSIUM SERPL-SCNC: 4.9 MMOL/L (ref 3.5–4.7)
PROT SERPL-MCNC: 6.6 G/DL (ref 6.3–8)
RBC # BLD AUTO: 3.04 10*6/MM3 (ref 4.14–5.8)
SODIUM SERPL-SCNC: 134 MMOL/L (ref 134–145)
WBC # BLD AUTO: 3.38 10*3/MM3 (ref 3.4–10.8)

## 2020-08-17 PROCEDURE — 71250 CT THORAX DX C-: CPT

## 2020-08-17 PROCEDURE — 85025 COMPLETE CBC W/AUTO DIFF WBC: CPT

## 2020-08-17 PROCEDURE — 80053 COMPREHEN METABOLIC PANEL: CPT

## 2020-08-17 PROCEDURE — 36415 COLL VENOUS BLD VENIPUNCTURE: CPT

## 2020-08-18 LAB
ALBUMIN SERPL-MCNC: 3.9 G/DL (ref 2.9–4.4)
ALBUMIN/GLOB SERPL: 1.7 {RATIO} (ref 0.7–1.7)
ALPHA1 GLOB FLD ELPH-MCNC: 0.2 G/DL (ref 0–0.4)
ALPHA2 GLOB SERPL ELPH-MCNC: 0.7 G/DL (ref 0.4–1)
B-GLOBULIN SERPL ELPH-MCNC: 0.9 G/DL (ref 0.7–1.3)
GAMMA GLOB SERPL ELPH-MCNC: 0.6 G/DL (ref 0.4–1.8)
GLOBULIN SER CALC-MCNC: 2.3 G/DL (ref 2.2–3.9)
IGA SERPL-MCNC: 117 MG/DL (ref 61–437)
IGG SERPL-MCNC: 572 MG/DL (ref 603–1613)
IGM SERPL-MCNC: 286 MG/DL (ref 15–143)
INTERPRETATION SERPL IEP-IMP: ABNORMAL
KAPPA LC SERPL-MCNC: 27.2 MG/L (ref 3.3–19.4)
KAPPA LC/LAMBDA SER: 1.13 {RATIO} (ref 0.26–1.65)
LAMBDA LC FREE SERPL-MCNC: 24.1 MG/L (ref 5.7–26.3)
Lab: ABNORMAL
M-SPIKE: 0.1 G/DL
PROT SERPL-MCNC: 6.2 G/DL (ref 6–8.5)

## 2020-08-24 ENCOUNTER — OFFICE VISIT (OUTPATIENT)
Dept: ONCOLOGY | Facility: CLINIC | Age: 75
End: 2020-08-24

## 2020-08-24 ENCOUNTER — APPOINTMENT (OUTPATIENT)
Dept: LAB | Facility: HOSPITAL | Age: 75
End: 2020-08-24

## 2020-08-24 VITALS
HEART RATE: 78 BPM | BODY MASS INDEX: 37.84 KG/M2 | RESPIRATION RATE: 16 BRPM | HEIGHT: 69 IN | SYSTOLIC BLOOD PRESSURE: 113 MMHG | DIASTOLIC BLOOD PRESSURE: 67 MMHG | OXYGEN SATURATION: 97 % | TEMPERATURE: 97.7 F | WEIGHT: 255.5 LBS

## 2020-08-24 DIAGNOSIS — D47.2 MONOCLONAL GAMMOPATHY: ICD-10-CM

## 2020-08-24 DIAGNOSIS — E85.81 AL AMYLOIDOSIS (HCC): Primary | ICD-10-CM

## 2020-08-24 DIAGNOSIS — R91.8 MULTIPLE LUNG NODULES ON CT: ICD-10-CM

## 2020-08-24 PROCEDURE — 99214 OFFICE O/P EST MOD 30 MIN: CPT | Performed by: INTERNAL MEDICINE

## 2020-08-24 NOTE — PROGRESS NOTES
[facility] GROUP OUTPATIENT FOLLOW UP CLINIC VISIT    REASON FOR FOLLOW-UP:    1. 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  Macrocytic anemia  3.  Mild leukopenia  4.  Left lung pulmonary nodules, stable on follow up imaging.  5.  [facility] see if he can get service-connected for plasmacytoma/amyloidosis.  [facility] [place] [facility] and saw Dr. Reed there. His case was discussed further in [place] after review of his pathology.  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  A fat pad biopsy and cardiac MRI were recommended.  Treatment with CyBorD was recommended.    6.  Peripheral neuropathy in his hands and feet.  [facility] that was concerning for cervical myelopathy rather than a neuropathic process consistent with amyloidosis.  [facility] of the [facility] on 4[facility]2/2019 shows multilevel degenerative change with moderate spinal canal stenosis at C4-C5 and C5-C6 and mild spinal canal stenosis at C6-C7.  Severe foraminal narrowing at C4-C5 and C5-C6 with moderate neuroforaminal narrowing at C6-C7.  7.  Cardiac MRI performed on 4/16/2019 at the [facility] with normal left ventricle wall thickness with right ventricular cavity enlargement.  No aneurysm.  No fibrosis or scarring to suggest an infiltrative process such as amyloidosis.  Left ventricular ejection fraction 47%.  8.  Negative fat pad biopsy on 6/4/2019  9.  It was recommended from [facility] that he initiate therapy with Velcade, Cytoxan, and dexamethasone (CyBorD).  Treatment initiated 7/8/2019  10.  He received therapy through 9/16/2019.  As of 9/23/2019, treatment was held for cytopenias, fatigue, orthostasis.    HISTORY OF PRESENT ILLNESS:  Rafael Gomze is a 75 y.o. male with the above-mentioned history, who returns the office today for scheduled follow-up.      He continues to complain of low  back pain and numbness in his feet.  He states that he has some small erythematous skin changes on his legs that do not heal very readily.  He and his wife note ongoing fatigue.  He sleeps about 7 hours per night.  He then sleeps another 4 to 5 hours on and off during the day.  He believes that he sleeps well at night.  He does have sleep apnea which is being treated.  No fevers or chills.  He states that he continues the FAM plus doxycycline regimen that has [facility].  He does note ongoing dyspnea on exertion which is stable.      HEMATOLOGIC HISTORY:  The patient had complained of some dyspnea on exertion. He has some left knee pain following a left knee replacement. He eventually was referred for further care to Dr. Aba Freeman with vascular surgery. He was found to have evidence of peripheral vascular disease and is scheduled for a procedure in her future. He was also found to have a complex irregular cystic mass measuring 5.7 cm in the right kidney. He has been referred to urology for this. There was a 12 mm nodule in the lingula. He was referred to Dr. Larry Hernandez for this.  He had a CT scan of the chest with contrast on 5/25/2016. This showed 3 pleural-based nodules at the periphery of the left upper lobe. One measured 15 mm and the other measured 16 mm. The other was 6 mm. There is also a noncalcified 6 mm nodule.  He had a wedge resection on 6/2/2016. Two samples were sent to pathology. One showed plasma cells with evidence for amyloid. Further testing at [facility] showed IgG kappa restriction. The other wedge resection showed tiny nonnecrotizing granulomas.    Serum protein electrophoresis showed a 0.1 g M spike with immunofixation showing IgM lambda monoclonality.  Serum free light chain ratio was normal.  Ferritin 226.  Iron profile normal.  Vitamin B12 greater than 2000.    Serum protein levels in March 2017 are very stable with 0.1 g M spike.  He will follow-up in one  year.  He continues to follow-up with Dr. Hernandez for CT scans.    CT imaging on 5/23/2017 showed an increase in size of 2 lingular lesions now measuring 2.2 cm and 1.0 cm.  There is an 8 mm left lower lobe pulmonary nodule also slightly increased in size.  Several other tiny pulmonary nodules bilaterally which are stable.    His M spike increased to 0.2 g.    He had a PET scan on 6/8/2017 showing several hypermetabolic left lung pulmonary nodules with a 2.2 cm pleural-based left upper lobe nodule.    His case was presented at the multidisciplinary thoracic conference.  It was elected to follow him with serial CT scans with the biopsy if the lung nodules continued increase in size.  It was felt that it was too risky to attempt a biopsy at this point.    Repeat CT imaging on 9/11/2017 shows stable findings.  M spike 0.1 g.      CT imaging on 8/20/2018 with a slight increase in size a couple of pulmonary nodules.  M spike is 0.2 g.  Follow-up with thoracic surgery for imaging and we will see him back in 6 months for follow-up with labs in 1 week prior.    PET scan done at [place] on 10/23/2018 with similar findings.    [facility] [place] [facility] and saw Dr. Reed there.  They have requested his pathology for review.    He complains of peripheral neuropathy in his hands and feet.  This has been worsening.  He reports some numbness and tingling as well as occasional pain.      He was discussed further in [place] after review of his pathology.  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  A fat pad biopsy and cardiac MRI were recommended.  Treatment with CyBorD was recommended.      Respiratory status has improved after participating in pulmonary rehabilitation.    He had an [facility] at the [facility] that showed no evidence for amyloidosis.    He had an [facility] at the [facility] that was concerning for cervical myelopathy rather than a neuropathic process consistent with amyloidosis.    MRI of the cervical spine  "performed at the [facility] on 4/2/2019 shows multilevel degenerative change with moderate spinal canal stenosis at C4-C5 and C5-C6 and mild spinal canal stenosis at C6-C7.  Severe foraminal narrowing at C4-C5 and C5-C6 with moderate neuroforaminal narrowing at C6-C7.    Cardiac MRI performed on 4/16/2019 at the [facility] with normal left ventricle wall thickness with right ventricular cavity enlargement.  No aneurysm.  No fibrosis or scarring to suggest an infiltrative process such as amyloidosis.  Left ventricular ejection fraction 47%.    Negative fat pad biopsy on 6/4/2019    PAST MEDICAL, SURGICAL, FAMILY, AND SOCIAL HISTORIES were reviewed with the patient and in the electronic medical record, and were updated if indicated.    ALLERGIES:  No Known Allergies    MEDICATIONS:  The medication list has been reviewed with the patient by the medical assistant, and the list has been updated in the electronic medical record, which I reviewed.  Medication dosages and frequencies were confirmed to be accurate.    I have reviewed the patient's medical history in detail and updated the computerized patient record.    Review of Systems   Constitutional: Positive for fatigue.   Respiratory: Positive for cough and shortness of breath.    Musculoskeletal: Positive for back pain and neck pain.        Left shoulder pain improved though decreased range of motion persist following surgery   Neurological: Positive for dizziness (Vertigo) and numbness.   All other systems reviewed and are negative.        Vitals:    08/24/20 0948   BP: 113/67   Pulse: 78   Resp: 16   Temp: 97.7 °F (36.5 °C)   TempSrc: Temporal   SpO2: 97%   Weight: 116 kg (255 lb 8 oz)   Height: 175 cm (68.9\")   PainSc: 7  Comment: AL amyloidosis   PainLoc: Back       PHYSICAL EXAMINATION:  GENERAL:  Obese.  Well-developed well-nourished male; awake, alert and oriented, in no acute distress.  Wearing a mask.  SKIN: A couple of small erythematous lesions on the distal left lower " extremity  HEAD:  Normocephalic, atraumatic.  EARS:  Hearing intact.  CHEST: Clear to auscultation bilaterally today  HEART:  Regular rate; normal rhythm.     EXTREMITIES:  No clubbing cyanosis or edema.    DIAGNOSTIC DATA:  Results for orders placed or performed in visit on 08/17/20   Comprehensive Metabolic Panel   Result Value Ref Range    Glucose 112 74 - 124 mg/dL    BUN 48 (H) 6 - 20 mg/dL    Creatinine 1.72 (H) 0.70 - 1.30 mg/dL    Sodium 134 134 - 145 mmol/L    Potassium 4.9 (H) 3.5 - 4.7 mmol/L    Chloride 101 98 - 107 mmol/L    CO2 19.4 (L) 22.0 - 29.0 mmol/L    Calcium 9.7 8.5 - 10.2 mg/dL    Total Protein 6.6 6.3 - 8.0 g/dL    Albumin 4.40 3.50 - 5.20 g/dL    ALT (SGPT) 19 0 - 41 U/L    AST (SGOT) 24 0 - 40 U/L    Alkaline Phosphatase 76 38 - 116 U/L    Total Bilirubin 0.5 0.2 - 1.2 mg/dL    eGFR Non African Amer 39 (L) >60 mL/min/1.73    Globulin 2.2 1.8 - 3.5 gm/dL    A/G Ratio 2.0 1.1 - 2.4 g/dL    BUN/Creatinine Ratio 27.9 7.3 - 30.0    Anion Gap 13.6 5.0 - 15.0 mmol/L   BUCK,PE and FLC, Serum   Result Value Ref Range    IgG 572 (L) 603 - 1613 mg/dL    IgA 117 61 - 437 mg/dL    IgM 286 (H) 15 - 143 mg/dL    Total Protein 6.2 6.0 - 8.5 g/dL    Albumin 3.9 2.9 - 4.4 g/dL    Alpha-1-Globulin 0.2 0.0 - 0.4 g/dL    Alpha-2-Globulin 0.7 0.4 - 1.0 g/dL    Beta Globulin 0.9 0.7 - 1.3 g/dL    Gamma Globulin 0.6 0.4 - 1.8 g/dL    M-Gonzalez 0.1 (H) Not Observed g/dL    Globulin 2.3 2.2 - 3.9 g/dL    A/G Ratio 1.7 0.7 - 1.7    Immunofixation Reflex, Serum Comment     Please note Comment     Free Light Chain, Kappa 27.2 (H) 3.3 - 19.4 mg/L    Free Lambda Light Chains 24.1 5.7 - 26.3 mg/L    Kappa/Lambda Ratio 1.13 0.26 - 1.65   CBC Auto Differential   Result Value Ref Range    WBC 3.38 (L) 3.40 - 10.80 10*3/mm3    RBC 3.04 (L) 4.14 - 5.80 10*6/mm3    Hemoglobin 11.1 (L) 13.0 - 17.7 g/dL    Hematocrit 32.5 (L) 37.5 - 51.0 %    .9 (H) 79.0 - 97.0 fL    MCH 36.5 (H) 26.6 - 33.0 pg    MCHC 34.2 31.5 - 35.7 g/dL     RDW 15.1 12.3 - 15.4 %    RDW-SD 59.5 (H) 37.0 - 54.0 fl    MPV 11.4 6.0 - 12.0 fL    Platelets 144 140 - 450 10*3/mm3    Neutrophil % 58.2 42.7 - 76.0 %    Lymphocyte % 28.4 19.6 - 45.3 %    Monocyte % 8.6 5.0 - 12.0 %    Eosinophil % 3.6 0.3 - 6.2 %    Basophil % 0.9 0.0 - 1.5 %    Immature Grans % 0.3 0.0 - 0.5 %    Neutrophils, Absolute 1.97 1.70 - 7.00 10*3/mm3    Lymphocytes, Absolute 0.96 0.70 - 3.10 10*3/mm3    Monocytes, Absolute 0.29 0.10 - 0.90 10*3/mm3    Eosinophils, Absolute 0.12 0.00 - 0.40 10*3/mm3    Basophils, Absolute 0.03 0.00 - 0.20 10*3/mm3    Immature Grans, Absolute 0.01 0.00 - 0.05 10*3/mm3    nRBC 0.0 0.0 - 0.2 /100 WBC           Results from last 7 days   Lab Units 08/17/20  1045   WBC 10*3/mm3 3.38*   NEUTROS ABS 10*3/mm3 1.97   HEMOGLOBIN g/dL 11.1*   HEMATOCRIT % 32.5*   PLATELETS 10*3/mm3 144     Results from last 7 days   Lab Units 08/17/20  1235 08/17/20  1045   SODIUM mmol/L  --  134   POTASSIUM mmol/L  --  4.9*   CHLORIDE mmol/L  --  101   CO2 mmol/L  --  19.4*   BUN mg/dL  --  48*   CREATININE mg/dL  --  1.72*   CALCIUM mg/dL  --  9.7   ALBUMIN g/dL 3.9 4.40   BILIRUBIN mg/dL  --  0.5   ALK PHOS U/L  --  76   ALT (SGPT) U/L  --  19   AST (SGOT) U/L  --  24   GLUCOSE mg/dL  --  112           Imaging: CT images of the chest from 2/26/2020 images personally reviewed.  No significant change and no evidence for progression.    ASSESSMENT:  This is a 75 y.o. male with:  1.  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  He had a wedge resection.  Dr. Hernandez was  following a couple of other small pulmonary nodules on the left which initially increased in size.  His M spike continues to be stable.  The PET scan did not show anything we didn't expect from the CT scan.  The left upper lobe lung nodule is too risky to biopsy base of its location.  His case was presented at the multidisciplinary thoracic conference and recommendations  included to follow him with serial CT scans with biopsy if it appears to be safer.  CT imaging on 12/18/2017 was stable.  CT imaging on 5/25/2018 showed that one of the pleural based nodules and enlarged slightly measuring 2.3 cm.  CT imaging from 8/22/2018 shows some slight increase in size of the pulmonary nodules with the largest measuring now 2.0 cm 3 other tiny noncalcified nodules in the right lung are stable, only showing a slight increase since 2017.  If any do grow significantly in size, we could consider radiation.  He would likely not be a surgical candidate considering his oxygen dependence.  There is some question as to how much of this is amyloid.  There is also some question as to whether this is primary or reactive.  [facility] [place] [facility] Reed, and they requested his pathology for review.  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    They recommended treatment with FAM (inhaled fluticasone, azithromycin 250 mg on Mondays Wednesdays and Fridays, montelukast daily) plus doxycycline 100 mg twice daily for his lungs.  [facility] did [facility] in February 2019.    He does state that he continues this.    [facility].  This was not consistent with involvement by amyloidosis.  He had an EMG also at the [facility] that was concerning for the possibility of cervical myelopathy rather than a process consistent with amyloidosis.     He did have a negative fat pad biopsy on 6/4/2019.    It was recommended from [place] that he initiate therapy with Velcade, Cytoxan, and dexamethasone (CyBorD).  Treatment initiated 7/8/2019    As of 9/23/2019 he was  progressively fatigued and struggling with constant pruritus despite several skin creams for management.  Additionally, he was more anemic and is having issues with orthostatic hypotension.  This actually resulted in a fall with several abrasions.  He had acute kidney injury.  He was given IV fluids.  An  anemia evaluation showed no evidence for iron, vitamin B12, or folic acid deficiency.  Treatment was held.     I also discussed his case with Dr. Roberson at [facility].  No further therapy suggested.    CT imaging of the chest from 12/2/2019 stable.    CT imaging 2/26/2020 stable    2.  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  We did repeat labs 7/1/2019 to service a baseline, and spike was stable at 0.2 gm.      Repeat studies on 10/7/2019 showed an M spike that was too small to measure.  With a serum free kappa light chain of 23.0, lambda 18.9, ratio 1.22.    Repeat studies 8/17/2020 stable.    3.  Macrocytic anemia: Hemoglobin worsened to 9.8 as of 9/23/2019.  No evidence for iron, vitamin B12, or folic acid deficiency.  Hemoglobin is reasonably stable at 11.1 today.    4.  Thrombocytopenia: Platelet count today is normal.    5.  Right renal mass which is chronic.  He is followed by urology.    6.  Claudication with peripheral vascular disease followed by Dr. Freeman.  Chronic bilateral lower extremity edema, this is unchanged. He followed up with Dr. Freeman who said everything looks good to him apparently.    7.  Low back pain with an MRI on 5/18/2017 showing degenerative changes at multiple levels he states that a fusion has been recommended but he has been reluctant to proceed.    8.  COPD: He follows with Dr. Poole now and is on oxygen and inhalers.  Overall, breathing is better at night on his CPAP.    9.  Peripheral neuropathy and left shoulder pain: EMG completed.  MRI cervical spine complete.  There are some abnormalities in the cervical spine.  He was referred to Dr. Robins, neurosurgery.  While the report is available from the MRI performed at [place], these images are not available.  The patient was seen by Dr. Robins with CT of the neck performed. He had additional imaging performed.  He followed up with Dr. Aranda with further evaluation of his shoulder recommended as it was suspected that  the pain is not neuropathic.  He saw orthopedics and ultimately had a left shoulder procedure done in January 2019 with improvement in his pain.  He continues to have neck pain with some injections and perhaps RFA planned.    10.  Chronic kidney disease.  Creatinine 1.72 as of 8/17/2020     11.  Orthostatic hypotension improved    12.  Deconditioning: I advised increasing his activity as much as possible, obviously as safely as possible as well.  Not necessarily discussed today.    13.  Fatigue and tiredness: He sleeps about 7 hours per night and then also sleeps several more hours during the day.  He should follow-up with primary care regarding this.  He has sleep apnea but does feel like he sleeps well at night.    PLAN:  1. Continue FAM plus doxycycline [facility].  2. Continue oral antihistamines as well as topical creams for pruritus.  He is to follow-up with Dr. Seyd.  3. No additional treatment of amyloidosis at this time  4. Follow-up in 6 months with labs done 1 week prior.  Repeat CT imaging in 1 year.    Discussed with the patient in the room today and the patient's wife who was on the phone.    Rafael Heredia MD

## 2020-08-31 ENCOUNTER — HOSPITAL ENCOUNTER (OUTPATIENT)
Dept: PULMONOLOGY | Facility: CLINIC | Age: 75
Setting detail: RECURRING SERIES
Discharge: HOME | End: 2020-11-19
Attending: INTERNAL MEDICINE

## 2020-08-31 ENCOUNTER — OFFICE VISIT (OUTPATIENT)
Dept: SURGERY | Facility: CLINIC | Age: 75
End: 2020-08-31

## 2020-08-31 DIAGNOSIS — R91.8 MULTIPLE LUNG NODULES ON CT: Primary | ICD-10-CM

## 2020-08-31 DIAGNOSIS — C90.20 EXTRAMEDULLARY PLASMACYTOMA, UNSPECIFIED WHETHER REMISSION ACHIEVED (HCC): ICD-10-CM

## 2020-08-31 PROCEDURE — 99442 PR PHYS/QHP TELEPHONE EVALUATION 11-20 MIN: CPT | Performed by: THORACIC SURGERY (CARDIOTHORACIC VASCULAR SURGERY)

## 2021-02-01 ENCOUNTER — LAB (OUTPATIENT)
Dept: LAB | Facility: HOSPITAL | Age: 76
End: 2021-02-01

## 2021-02-01 DIAGNOSIS — E85.81 AL AMYLOIDOSIS (HCC): ICD-10-CM

## 2021-02-01 LAB
ALBUMIN SERPL-MCNC: 4.1 G/DL (ref 3.5–5.2)
ALBUMIN/GLOB SERPL: 1.6 G/DL (ref 1.1–2.4)
ALP SERPL-CCNC: 82 U/L (ref 38–116)
ALT SERPL W P-5'-P-CCNC: 35 U/L (ref 0–41)
ANION GAP SERPL CALCULATED.3IONS-SCNC: 8.1 MMOL/L (ref 5–15)
AST SERPL-CCNC: 30 U/L (ref 0–40)
BASOPHILS # BLD AUTO: 0.02 10*3/MM3 (ref 0–0.2)
BASOPHILS NFR BLD AUTO: 0.5 % (ref 0–1.5)
BILIRUB SERPL-MCNC: 0.7 MG/DL (ref 0.2–1.2)
BUN SERPL-MCNC: 38 MG/DL (ref 6–20)
BUN/CREAT SERPL: 21.1 (ref 7.3–30)
CALCIUM SPEC-SCNC: 10.1 MG/DL (ref 8.5–10.2)
CHLORIDE SERPL-SCNC: 105 MMOL/L (ref 98–107)
CO2 SERPL-SCNC: 23.9 MMOL/L (ref 22–29)
CREAT SERPL-MCNC: 1.8 MG/DL (ref 0.7–1.3)
DEPRECATED RDW RBC AUTO: 59.3 FL (ref 37–54)
EOSINOPHIL # BLD AUTO: 0.1 10*3/MM3 (ref 0–0.4)
EOSINOPHIL NFR BLD AUTO: 2.3 % (ref 0.3–6.2)
ERYTHROCYTE [DISTWIDTH] IN BLOOD BY AUTOMATED COUNT: 15.9 % (ref 12.3–15.4)
GFR SERPL CREATININE-BSD FRML MDRD: 37 ML/MIN/1.73
GLOBULIN UR ELPH-MCNC: 2.5 GM/DL (ref 1.8–3.5)
GLUCOSE SERPL-MCNC: 106 MG/DL (ref 74–124)
HCT VFR BLD AUTO: 31 % (ref 37.5–51)
HGB BLD-MCNC: 10.6 G/DL (ref 13–17.7)
IMM GRANULOCYTES # BLD AUTO: 0.07 10*3/MM3 (ref 0–0.05)
IMM GRANULOCYTES NFR BLD AUTO: 1.6 % (ref 0–0.5)
LYMPHOCYTES # BLD AUTO: 1 10*3/MM3 (ref 0.7–3.1)
LYMPHOCYTES NFR BLD AUTO: 23.4 % (ref 19.6–45.3)
MCH RBC QN AUTO: 35.5 PG (ref 26.6–33)
MCHC RBC AUTO-ENTMCNC: 34.2 G/DL (ref 31.5–35.7)
MCV RBC AUTO: 103.7 FL (ref 79–97)
MONOCYTES # BLD AUTO: 0.5 10*3/MM3 (ref 0.1–0.9)
MONOCYTES NFR BLD AUTO: 11.7 % (ref 5–12)
NEUTROPHILS NFR BLD AUTO: 2.59 10*3/MM3 (ref 1.7–7)
NEUTROPHILS NFR BLD AUTO: 60.5 % (ref 42.7–76)
NRBC BLD AUTO-RTO: 0 /100 WBC (ref 0–0.2)
PLATELET # BLD AUTO: 194 10*3/MM3 (ref 140–450)
PMV BLD AUTO: 10.1 FL (ref 6–12)
POTASSIUM SERPL-SCNC: 5.3 MMOL/L (ref 3.5–4.7)
PROT SERPL-MCNC: 6.6 G/DL (ref 6.3–8)
RBC # BLD AUTO: 2.99 10*6/MM3 (ref 4.14–5.8)
SODIUM SERPL-SCNC: 137 MMOL/L (ref 134–145)
WBC # BLD AUTO: 4.28 10*3/MM3 (ref 3.4–10.8)

## 2021-02-01 PROCEDURE — 36415 COLL VENOUS BLD VENIPUNCTURE: CPT

## 2021-02-01 PROCEDURE — 85025 COMPLETE CBC W/AUTO DIFF WBC: CPT

## 2021-02-01 PROCEDURE — 80053 COMPREHEN METABOLIC PANEL: CPT

## 2021-02-02 LAB
ALBUMIN SERPL ELPH-MCNC: 3.5 G/DL (ref 2.9–4.4)
ALBUMIN/GLOB SERPL: 1.3 {RATIO} (ref 0.7–1.7)
ALPHA1 GLOB SERPL ELPH-MCNC: 0.3 G/DL (ref 0–0.4)
ALPHA2 GLOB SERPL ELPH-MCNC: 0.7 G/DL (ref 0.4–1)
B-GLOBULIN SERPL ELPH-MCNC: 1.1 G/DL (ref 0.7–1.3)
GAMMA GLOB SERPL ELPH-MCNC: 0.7 G/DL (ref 0.4–1.8)
GLOBULIN SER-MCNC: 2.8 G/DL (ref 2.2–3.9)
IGA SERPL-MCNC: 135 MG/DL (ref 61–437)
IGG SERPL-MCNC: 615 MG/DL (ref 603–1613)
IGM SERPL-MCNC: 312 MG/DL (ref 15–143)
INTERPRETATION SERPL IEP-IMP: ABNORMAL
KAPPA LC FREE SER-MCNC: 31.2 MG/L (ref 3.3–19.4)
KAPPA LC FREE/LAMBDA FREE SER: 1.11 {RATIO} (ref 0.26–1.65)
LABORATORY COMMENT REPORT: ABNORMAL
LAMBDA LC FREE SERPL-MCNC: 28.1 MG/L (ref 5.7–26.3)
M PROTEIN SERPL ELPH-MCNC: 0.1 G/DL
PROT SERPL-MCNC: 6.3 G/DL (ref 6–8.5)

## 2021-02-07 NOTE — PROGRESS NOTES
[facility] GROUP OUTPATIENT FOLLOW UP CLINIC VISIT    REASON FOR FOLLOW-UP:    1. 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  Macrocytic anemia  3.  Mild leukopenia  4.  Left lung pulmonary nodules, stable on follow up imaging.  5.  [facility] see if he can get service-connected for plasmacytoma/amyloidosis.  [facility] [place] [facility] and saw Dr. Reed there. His case was discussed further in [place] after review of his pathology.  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  A fat pad biopsy and cardiac MRI were recommended.  Treatment with CyBorD was recommended.    6.  Peripheral neuropathy in his hands and feet.  [facility] that was concerning for cervical myelopathy rather than a neuropathic process consistent with amyloidosis.  [facility] of [facility] on 4[facility]/2019 shows multilevel degenerative change with moderate spinal canal stenosis at C4-C5 and C5-C6 and mild spinal canal stenosis at C6-C7.  Severe foraminal narrowing at C4-C5 and C5-C6 with moderate neuroforaminal narrowing at C6-C7.  7.  Cardiac [facility] with normal left ventricle wall thickness with right ventricular cavity enlargement.  No aneurysm.  No fibrosis or scarring to suggest an infiltrative process such as amyloidosis.  Left ventricular ejection fraction 47%.  8.  Negative fat pad biopsy on 6/4/2019  9.  It was recommended from [facility] that he initiate therapy with Velcade, Cytoxan, and dexamethasone (CyBorD).  Treatment initiated 7/8/2019  10.  He received therapy through 9/16/2019.  As of 9/23/2019, treatment was held for cytopenias, fatigue, orthostasis.    HISTORY OF PRESENT ILLNESS:  Rafael Gomez is a 75 y.o. male with the above-mentioned history, who returns the office today for scheduled follow-up.      He continues to have fatigue and  "dyspnea on exertion.  He complains of a couple of patches of skin that are very dry and pruritic.  He is using a variety of topical agents.  No new respiratory symptoms.  No fevers or chills currently.  He did get his first COVID-19 vaccine a couple of weeks ago and states that he developed fevers as high as 105 after this.  He was in the emergency department with a negative chest x-ray.  He took acetaminophen for several days with improvement.      Review of Systems   Constitutional: Positive for fatigue.   Respiratory: Positive for shortness of breath. Negative for cough.    Musculoskeletal: Positive for back pain and neck pain.        Left shoulder pain improved though decreased range of motion persist following surgery   Skin: Positive for itching and rash.   Neurological: Positive for dizziness (Vertigo) and numbness (Chronic).   All other systems reviewed and are negative.        Vitals:    02/08/21 1003   BP: 109/55   Pulse: 76   Resp: 16   Temp: 98.2 °F (36.8 °C)   TempSrc: Temporal   SpO2: 94%   Weight: 119 kg (262 lb)   Height: 175 cm (68.9\")   PainSc: 0-No pain  Comment: AL amyloidosis       PHYSICAL EXAMINATION:  GENERAL:  Obese.  Well-developed well-nourished male; awake, alert and oriented, in no acute distress.  Wearing a mask.  SKIN: Some dry patches of skin on his anterior chest and a small excoriated area on posterior left shoulder.  HEAD:  Normocephalic, atraumatic.  EARS:  Hearing intact.  CHEST: Clear to auscultation bilaterally today  HEART:  Regular rate; normal rhythm.     EXTREMITIES:  No clubbing cyanosis or edema.    DIAGNOSTIC DATA:  BUCK,PE and FLC, Serum (02/01/2021 10:54)  Comprehensive Metabolic Panel (02/01/2021 10:54)  CBC & Differential (02/01/2021 10:54)    ASSESSMENT:  This is a 75 y.o. 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    · He had a wedge resection.    · Dr. Hernandez was  following a couple of other small " pulmonary nodules on the left which initially increased in size.  His M spike continues to be stable.  The PET scan did not show anything we didn't expect from the CT scan.  The left upper lobe lung nodule is too risky to biopsy base of its location.    · His case was presented at the multidisciplinary thoracic conference and recommendations included to follow him with serial CT scans with biopsy if it appears to be safer.    · CT imaging on 12/18/2017 was stable.    · CT imaging on 5/25/2018 showed that one of the pleural based nodules and enlarged slightly measuring 2.3 cm.    · CT imaging from 8/22/2018 shows some slight increase in size of the pulmonary nodules with the largest measuring now 2.0 cm 3 other tiny noncalcified nodules in the right lung are stable, only showing a slight increase since 2017.  If any do grow significantly in size, we could consider radiation.  He would likely not be a surgical candidate considering his oxygen dependence.  There is some question as to how much of this is amyloid.  There is also some question as to whether this is primary or reactive.    · [facility] [place] by Dr. Reed, and they requested his pathology for review.  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  · It was recommended treatment with FAM (inhaled fluticasone, azithromycin 250 mg on Mondays Wednesdays and Fridays, montelukast daily) plus doxycycline 100 mg twice daily for his lungs.  He did [facility] through [facility] in February 2019.  · [facility].  This was not consistent with involvement by amyloidosis.  He had an [facility]G also [facility] the [facility] that was concerning for the possibility of cervical myelopathy rather than a process consistent with amyloidosis.   · He did have a negative fat pad biopsy on 6/4/2019.  · It was recommended from [place] that he initiate therapy with Velcade, Cytoxan, and dexamethasone (CyBorD).  Treatment initiated 7/8/2019  · As of  9/23/2019 he was  progressively fatigued and struggling with constant pruritus despite several skin creams for management.  Additionally, he was more anemic and is having issues with orthostatic hypotension.  This actually resulted in a fall with several abrasions.  He had acute kidney injury.  He was given IV fluids.  An anemia evaluation showed no evidence for iron, vitamin B12, or folic acid deficiency.    · Treatment was held.   · I also discussed his case with Dr. Roberson at [facility].  No further therapy suggested.  · CT imaging of the chest from 12/2/2019 stable.  · CT imaging 2/26/2020 stable  · He has CT imaging scheduled at [facility] by Dr. 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    · We did repeat labs 7/1/2019 to service a baseline, and spike was stable at 0.2 gm.    · Repeat studies on 10/7/2019 showed an M spike that was too small to measure.  With a serum free kappa light chain of 23.0, lambda 18.9, ratio 1.22.  · Repeat studies 8/17/2020 stable.  · M spike 0.1 gm on 2/1/2020    *Macrocytic anemia: Hemoglobin worsened to 9.8 as of 9/23/2019.  No evidence for iron, vitamin B12, or folic acid deficiency.  Hemoglobin is reasonably stable at 11.1 today.    *History of thrombocytopenia: Platelet count normalized at 194,000.    *Right renal mass which is chronic.  He is followed by urology.    *Claudication with peripheral vascular disease followed by Dr. Freeman.  Chronic bilateral lower extremity edema, this is unchanged. He followed up with Dr. Freeman who said everything looks good to him apparently.    *Low back pain with an MRI on 5/18/2017 showing degenerative changes at multiple levels he states that a fusion has been recommended but he has been reluctant to proceed.    *COPD: He follows with Dr. Poole now and is on oxygen and inhalers.  Overall, breathing is better at night on his CPAP.    *Peripheral neuropathy and left shoulder  pain: EMG completed.  MRI cervical spine complete.  There are some abnormalities in the cervical spine.  He was referred to Dr. Robins, neurosurgery.  While the report is available from the MRI performed at [place], these images are not available.  The patient was seen by Dr. Robins with CT of the neck performed. He had additional imaging performed.  He followed up with Dr. Aranda with further evaluation of his shoulder recommended as it was suspected that the pain is not neuropathic.  He saw orthopedics and ultimately had a left shoulder procedure done in January 2019 with improvement in his pain.  He continues to have neck pain with some injections and perhaps RFA planned.    *Chronic kidney disease.  Creatinine 1.80 as of 2/1/2021    *Orthostatic hypotension improved    *Fatigue and tiredness: Likely related to his pulmonary issues    *Macrocytic anemia: Hemoglobin 10.6.  Continue monitoring.  Chronic kidney disease likely contributing.  Chronic disease otherwise likely contributing.  Vitamin B12 and folic acid were normal back on 9/23/2019.    *Skin changes: He reports skin changes on his face consistent with seborrheic dermatitis.  Some other lesions on his chest and left shoulder could be related as well.  He has used some topical agents without improvement.  I suggested a good moisturizer like Aquaphor and also a topical steroid.  I have also prescribed a 2% ketoconazole shampoo as he does state that he used this before with improvement.  His symptoms could be consistent with seborrheic dermatitis.  Otherwise, follow-up with primary care and/or dermatology.    *Fevers after his COVID-19 vaccine: 105 would be very high but I did reassure him that this most likely represents a good immune system response to the vaccine.  He does plan to get his second one.  He will use acetaminophen as needed.  He is unable to use NSAIDs due to chronic kidney disease.    PLAN:  1. CT imaging next month as ordered by Dr. Peña  he will see her after that for follow-up  2. Continue supportive care  3. Prescription for 2% ketoconazole shampoo given to him today  4. Follow-up in 6 months with labs done 1 week prior.    I did spend 40 minutes in this visit today reviewing his record, speaking with him and his wife who was on the phone, placing orders, and documenting the encounter.    Rafael Heredia MD

## 2021-02-08 ENCOUNTER — APPOINTMENT (OUTPATIENT)
Dept: LAB | Facility: HOSPITAL | Age: 76
End: 2021-02-08

## 2021-02-08 ENCOUNTER — OFFICE VISIT (OUTPATIENT)
Dept: ONCOLOGY | Facility: CLINIC | Age: 76
End: 2021-02-08

## 2021-02-08 VITALS
RESPIRATION RATE: 16 BRPM | BODY MASS INDEX: 38.8 KG/M2 | HEIGHT: 69 IN | SYSTOLIC BLOOD PRESSURE: 109 MMHG | WEIGHT: 262 LBS | HEART RATE: 76 BPM | TEMPERATURE: 98.2 F | OXYGEN SATURATION: 94 % | DIASTOLIC BLOOD PRESSURE: 55 MMHG

## 2021-02-08 DIAGNOSIS — D47.2 MONOCLONAL GAMMOPATHY: Primary | ICD-10-CM

## 2021-02-08 PROCEDURE — 99215 OFFICE O/P EST HI 40 MIN: CPT | Performed by: INTERNAL MEDICINE

## 2021-02-08 RX ORDER — KETOCONAZOLE 20 MG/ML
SHAMPOO TOPICAL 2 TIMES WEEKLY
Qty: 120 ML | Refills: 2 | Status: ON HOLD | OUTPATIENT
Start: 2021-02-08 | End: 2022-06-12

## 2021-02-08 RX ORDER — OFLOXACIN 3 MG/ML
SOLUTION/ DROPS OPHTHALMIC
COMMUNITY
Start: 2020-12-18 | End: 2021-02-08 | Stop reason: SDDI

## 2021-02-08 RX ORDER — PREDNISOLONE ACETATE 10 MG/ML
SUSPENSION/ DROPS OPHTHALMIC
COMMUNITY
Start: 2020-12-18 | End: 2021-02-08 | Stop reason: SDDI

## 2021-03-08 ENCOUNTER — HOSPITAL ENCOUNTER (OUTPATIENT)
Dept: CT IMAGING | Facility: HOSPITAL | Age: 76
Discharge: HOME | End: 2021-03-08
Attending: THORACIC SURGERY (CARDIOTHORACIC VASCULAR SURGERY)

## 2021-03-15 ENCOUNTER — OFFICE VISIT (OUTPATIENT)
Dept: SURGERY | Facility: CLINIC | Age: 76
End: 2021-03-15

## 2021-03-15 VITALS
HEART RATE: 71 BPM | BODY MASS INDEX: 37.51 KG/M2 | OXYGEN SATURATION: 98 % | SYSTOLIC BLOOD PRESSURE: 152 MMHG | HEIGHT: 70 IN | WEIGHT: 262 LBS | DIASTOLIC BLOOD PRESSURE: 60 MMHG

## 2021-03-15 DIAGNOSIS — C90.20 EXTRAMEDULLARY PLASMACYTOMA, UNSPECIFIED WHETHER REMISSION ACHIEVED (HCC): ICD-10-CM

## 2021-03-15 DIAGNOSIS — R91.8 MULTIPLE LUNG NODULES ON CT: Primary | ICD-10-CM

## 2021-03-15 PROCEDURE — 99213 OFFICE O/P EST LOW 20 MIN: CPT | Performed by: THORACIC SURGERY (CARDIOTHORACIC VASCULAR SURGERY)

## 2021-03-28 NOTE — PROGRESS NOTES
"Chief Complaint  Plasmacytoma, lung nodules  Subjective          Rafael Gomez presents to [facility] THORACIC SURGERY  History of Present Illness  Patricia is a pleasant 75-year-old gentleman that I have been following for multiple pulmonary nodules in the setting of a plasmacytoma.  He presents today with a CT scan in follow-up.  He has no complaints except for itching and a skin lesion on his left shoulder.  His shortness of breath is stable.  He does report some weight gain over the past year.  Objective   Vital Signs:   /60 (BP Location: Right arm, Patient Position: Sitting, Cuff Size: Adult)   Pulse 71   Ht 177.8 cm (70\")   Wt 119 kg (262 lb)   SpO2 98%   BMI 37.59 kg/m²     Physical Exam  Vitals and nursing note reviewed.   Constitutional:       Appearance: He is well-developed.   HENT:      Head: Normocephalic and atraumatic.      Nose: Nose normal.   Eyes:      Conjunctiva/sclera: Conjunctivae normal.      Pupils: Pupils are equal, round, and reactive to light.   Cardiovascular:      Rate and Rhythm: Normal rate and regular rhythm.      Heart sounds: Normal heart sounds.   Pulmonary:      Effort: Pulmonary effort is normal.      Breath sounds: Normal breath sounds.          Comments: Excoriations/rash on his left shoulder  Abdominal:      General: Bowel sounds are normal.      Palpations: Abdomen is soft.   Musculoskeletal:         General: Normal range of motion.      Cervical back: Normal range of motion and neck supple.   Skin:     General: Skin is warm and dry.      Capillary Refill: Capillary refill takes less than 2 seconds.   Neurological:      Mental Status: He is alert and oriented to person, place, and time.   Psychiatric:         Behavior: Behavior normal.         Thought Content: Thought content normal.         Judgment: Judgment normal.        Result Review :       Data reviewed: Radiologic studies :     I have independently reviewed the CT of the chest performed " on 3/8/2021 which demonstrates multiple bilateral pulmonary nodules that are stable in size with the largest measuring 2.3 cm in the left upper lobe.  There are multiple 1 cm nodules located throughout the lung fields that are all unchanged in size.  There is no mediastinal or hilar lymphadenopathy.  There are no pleural or pericardial effusions.     Assessment and Plan      Mr. Gomez is a pleasant 75-year-old gentleman with a history of plasmacytoma and multiple pulmonary nodules.  He presents today in follow-up and all of these nodules are unchanged in size on his recent CT scan on 3/8/2021.  He will need continued surveillance for these nodules and I will plan to see him in 6 months with a CT of the chest at my [place] office.  He is also complaining of his a rash on his left shoulder that he cannot get relief from.  We will plan referral to dermatology.  Diagnoses and all orders for this visit:    1. Multiple lung nodules on CT (Primary)    2. Extramedullary plasmacytoma, unspecified whether remission achieved (CMS/HCC)        Follow Up   No follow-ups on file.  Patient was given instructions and counseling regarding his condition or for health maintenance advice. Please see specific information pulled into the AVS if appropriate.

## 2021-04-02 ENCOUNTER — TRANSCRIBE ORDERS (OUTPATIENT)
Dept: SLEEP MEDICINE | Facility: HOSPITAL | Age: 76
End: 2021-04-02

## 2021-04-02 DIAGNOSIS — Z01.818 OTHER SPECIFIED PRE-OPERATIVE EXAMINATION: Primary | ICD-10-CM

## 2021-04-05 ENCOUNTER — TRANSCRIBE ORDERS (OUTPATIENT)
Dept: SLEEP MEDICINE | Facility: HOSPITAL | Age: 76
End: 2021-04-05

## 2021-04-05 DIAGNOSIS — J44.9 CHRONIC OBSTRUCTIVE PULMONARY DISEASE, UNSPECIFIED COPD TYPE (HCC): ICD-10-CM

## 2021-04-05 DIAGNOSIS — R09.02 HYPOXIA: ICD-10-CM

## 2021-04-05 DIAGNOSIS — R06.00 DYSPNEA, UNSPECIFIED TYPE: ICD-10-CM

## 2021-04-05 DIAGNOSIS — Z99.89 OSA ON CPAP: Primary | ICD-10-CM

## 2021-04-05 DIAGNOSIS — G47.33 OSA ON CPAP: Primary | ICD-10-CM

## 2021-04-10 ENCOUNTER — LAB (OUTPATIENT)
Dept: LAB | Facility: HOSPITAL | Age: 76
End: 2021-04-10

## 2021-04-10 DIAGNOSIS — Z01.818 OTHER SPECIFIED PRE-OPERATIVE EXAMINATION: ICD-10-CM

## 2021-04-10 PROCEDURE — C9803 HOPD COVID-19 SPEC COLLECT: HCPCS

## 2021-04-10 PROCEDURE — U0005 INFEC AGEN DETEC AMPLI PROBE: HCPCS

## 2021-04-10 PROCEDURE — U0004 COV-19 TEST NON-CDC HGH THRU: HCPCS

## 2021-04-12 LAB — SARS-COV-2 RNA RESP QL NAA+PROBE: NOT DETECTED

## 2021-04-13 ENCOUNTER — HOSPITAL ENCOUNTER (OUTPATIENT)
Dept: SLEEP MEDICINE | Facility: HOSPITAL | Age: 76
Discharge: HOME | End: 2021-04-13
Admitting: INTERNAL MEDICINE

## 2021-04-13 DIAGNOSIS — G47.33 OSA ON CPAP: ICD-10-CM

## 2021-04-13 DIAGNOSIS — R06.00 DYSPNEA, UNSPECIFIED TYPE: ICD-10-CM

## 2021-04-13 DIAGNOSIS — Z99.89 OSA ON CPAP: ICD-10-CM

## 2021-04-13 DIAGNOSIS — J44.9 CHRONIC OBSTRUCTIVE PULMONARY DISEASE, UNSPECIFIED COPD TYPE (HCC): ICD-10-CM

## 2021-04-13 DIAGNOSIS — R09.02 HYPOXIA: ICD-10-CM

## 2021-04-13 PROCEDURE — 95811 POLYSOM 6/>YRS CPAP 4/> PARM: CPT

## 2021-04-26 ENCOUNTER — TELEPHONE (OUTPATIENT)
Dept: SLEEP MEDICINE | Facility: HOSPITAL | Age: 76
End: 2021-04-26

## 2021-05-12 NOTE — PROGRESS NOTES
Progress Note      Patient Name: Rafael Gomez   Patient ID: [id number]   Sex: Male   YOB: 1945    Primary Care Provider: Claude Hair MD   Referring Provider: Claude Hair MD    Visit Date: April 10, 2020    Provider: Marifer Macias PA-C   Location: [facility]   Location Address: [address] [place] [address]  [id number]   Location Phone: [phone]          Chief Complaint  · Left shoulder pain      History Of Present Illness  Rafael Gomez is a 74 year old /White male who presents today to [facility].      He is s/p left arthroscopic SAD/DC, biceps tenotomy and mini open RCR 1/9/20 by Dr. Francois. He called in a few days ago with complaint of erythema about his anterior portal incision. Keflex was called in. He reports reaction of fever, rigors each time he took a dose. He took three doses between Wed/Thursday. He denies sick contacts, [facility]. He has not had fever today without dose of Keflex. He denies deep shoulder pain. He states erythema has improved.            Past Medical History  Aftercare following left knee joint replacement surgery; Anemia, Unspecified; Arthritis; Bladder Disorder; Bowel Disease; Chest pain; Cholecystitis; Chronic Obstructive Pulmonary Disease; Heart Disease; Hyperlipemia; Hypertension; Knee osteoarthritis; Limb Swelling; Lung disease; Rectal bleeding; Reflux; Shortness of Breath; Stomach Disorder; Stroke; Total Knee         Past Surgical History  Appendectomy; Artificial Joints/Limbs; Colonoscopy; Coronary artery bypass graft; heart surgery; Hip Replacement; Joint Surgery; Metal implants; Tonsillectomy         Medication List  Aspir-81 81 mg oral tablet,delayed release (/EC); atorvastatin 40 mg oral tablet; citalopram 20 mg oral tablet; Fish Oil 720-1,200 mg oral capsule; gabapentin 300 mg oral capsule; glucosamine & chondroit sul.Na 750-600 mg oral tablet; hydrochlorothiazide 25 mg oral tablet; Keflex 500 mg oral  "capsule; lisinopril 40 mg oral tablet; lorazepam 0.5 mg oral tablet; Metanx (algal oil) 3 mg-35 mg-2 mg -90.314 mg oral capsule; metoprolol succinate 50 mg oral tablet extended release 24 hr; Norco 7.5-325 mg oral tablet; omeprazole 20 mg oral capsule,delayed release(DR/EC); Restoril 15 mg oral capsule; sildenafil 20 mg oral tablet; terazosin 2 mg oral capsule; Vitamin B-12 500 mcg oral tablet         Allergy List  NO KNOWN DRUG ALLERGIES; NO KNOWN DRUG ALLERGIES       Allergies Reconciled  Family Medical History  Heart Disease; Cancer, Unspecified; Diabetes, unspecified type; Cardiovascular Disease; Family history of certain chronic disabling diseases; arthritis; Family history of Arthritis         Social History  *Denies Tobacco Use; Alcohol Use (Current some day); Claustophobic (Unknown); lives with spouse; .; Recreational Drug Use (Never); Retired.; Tobacco (Former)         Review of Systems  · Constitutional  o Denies  o : fever, chills, weight loss  · Cardiovascular  o Denies  o : chest pain, shortness of breath  · Gastrointestinal  o Denies  o : liver disease, heartburn, nausea, blood in stools  · Genitourinary  o Denies  o : painful urination, blood in urine  · Integument  o Denies  o : rash, itching  · Neurologic  o Denies  o : headache, weakness, loss of consciousness  · Musculoskeletal  o Admits  o : painful, swollen joints  · Psychiatric  o Denies  o : drug/alcohol addiction, anxiety, depression      Vitals  Date Time BP Position Site L\R Cuff Size HR RR TEMP (F) WT  HT  BMI kg/m2 BSA m2 O2 Sat        04/10/2020 02:00 PM         239lbs 0oz 5'  9\" 35.29 2.3           Physical Examination  · Constitutional  o Appearance  o : well developed, well-nourished, no obvious deformities present  · Head and Face  o Head  o :   § Inspection  § : normocephalic  o Face  o :   § Inspection  § : no facial lesions  · Eyes  o Conjunctivae  o : conjunctivae normal  o Sclerae  o : sclerae white  · Ears, Nose, Mouth " and Throat  o Ears  o :   § External Ears  § : appearance within normal limits  § Hearing  § : intact  o Nose  o :   § External Nose  § : appearance normal  · Neck  o Inspection/Palpation  o : normal appearance  o Range of Motion  o : full range of motion  · Respiratory  o Respiratory Effort  o : breathing unlabored  o Inspection of Chest  o : normal appearance  o Auscultation of Lungs  o : no audible wheezing or rales  · Cardiovascular  o Heart  o : regular rate  · Gastrointestinal  o Abdominal Examination  o : soft and non-tender  · Skin and Subcutaneous Tissue  o General Inspection  o : intact, no rashes  · Psychiatric  o General  o : Alert and oriented x3  o Judgement and Insight  o : judgment and insight intact  o Mood and Affect  o : mood normal, affect appropriate  · Left Shoulder  o Inspection  o : 2 cm surrounding erythema anterior portal incision. No warmth. Incisions are well healed. No drainage. ROM is decreased.          Assessment  · Left shoulder pain, unspecified chronicity     719.41/M25.512  · History of repair of left rotator cuff     V15.29/Z98.890  · Skin infection     686.9/L08.9      Plan  · Medications  o Medications have been Reconciled  o Transition of Care or Provider Policy  · Instructions  o Reviewed the patient's Past Medical, Social, and Family history as well as the ROS at today's visit, no changes.  o Call or return if worsening symptoms.  o Change antibiotics to Bactrim ds. Follow up 4 weeks. Monitor wound and call with any changes. Continue HEP.   o Electronically Identified Patient Education Materials Provided Electronically            Electronically Signed by: Marifer Macias PA-C -Author on April 10, 2020 02:19:10 PM

## 2021-05-12 NOTE — PROGRESS NOTES
"   Quick Note      Patient Name: Rafael Gomez   Patient ID: [id number]   Sex: Male   YOB: 1945    Primary Care Provider: Claude Hair MD   Referring Provider: Claude Hair MD    Visit Date: March 30, 2020    Provider: Marifer Macias PA-C   Location: [facility]   Location Address: [address] [place] [address]  [id number]   Location Phone: [phone]          History Of Present Illness  TELEHEALTH VISIT  Chief Complaint: Follow up left shoulder pain   Rafael Gomez is a 74 year old /White male who is presenting for evaluation via telehealth visit. Verbal consent obtained before beginning visit.   Provider spent 6 minutes with the patient during telehealth visit.   The following staff were present during this visit: Marifer Macias PA-C   Past Medical History/Overview of Patient Symptoms     He is s/p left arthroscopic SAD/DC, biceps tenotomy and mini open RCR 1/9/20 by Dr. Francois. He is doing well. He states mild pain and stiffness until he gets going. His motion is improving. He complains of weakness. He has been doing HEP. He can reach behind his back and overhead equally both arms. He denies any wound complications.     A: Aftercare for left shoulder SAD/DC/RCR/biceps tenotomy, Left shoulder pain  P: He will gradually increase weight, avoid loaded overhead motions and jerky motions. Follow up PRN.       Vitals  Date Time BP Position Site L\R Cuff Size HR RR TEMP (F) WT  HT  BMI kg/m2 BSA m2 O2 Sat HC       03/30/2020 08:43 AM         241lbs 16oz 5'  9\" 35.74 2.31               Assessment    Problems Reconciled  Plan  · Medications  o Medications have been Reconciled  o Transition of Care or Provider Policy  · Instructions  o Plan Of Care:             Electronically Signed by: Marifer Macias PA-C -Author on March 30, 2020 12:07:50 PM  "

## 2021-05-13 NOTE — PROGRESS NOTES
Progress Note      Patient Name: Rafael Gomez   Patient ID: [id number]   Sex: Male   YOB: 1945    Primary Care Provider: Claude Hair MD   Referring Provider: Claude Hair MD    Visit Date: May 11, 2020    Provider: Marifer Macias PA-C   Location: [facility]   Location Address: [address] [place] [address]  [id number]   Location Phone: [phone]          Chief Complaint  · Follow up left shoulder       History Of Present Illness  Rafael Gomez is a 74 year old /White male who presents today to [facility].      He is s/p left arthroscopic SAD/DC/mini open RCR 1/9/20 by Dr. Francois. He is doing well. He has minimal pain and made excellent progress with ROM.       Past Medical History  Aftercare following left knee joint replacement surgery; Anemia, Unspecified; Arthritis; Bladder Disorder; Bowel Disease; Chest pain; Cholecystitis; Chronic Obstructive Pulmonary Disease; Heart Disease; Hyperlipemia; Hypertension; Knee osteoarthritis; Limb Swelling; Lung disease; Rectal bleeding; Reflux; Shortness of Breath; Stomach Disorder; Stroke; Total Knee         Past Surgical History  Appendectomy; Artificial Joints/Limbs; Colonoscopy; Coronary artery bypass graft; heart surgery; Hip Replacement; Joint Surgery; Metal implants; Tonsillectomy         Medication List  Aspir-81 81 mg oral tablet,delayed release (DR/EC); atorvastatin 40 mg oral tablet; Bactrim -160 mg oral tablet; citalopram 20 mg oral tablet; Fish Oil 720-1,200 mg oral capsule; gabapentin 300 mg oral capsule; glucosamine & chondroit sul.Na 750-600 mg oral tablet; hydrochlorothiazide 25 mg oral tablet; Keflex 500 mg oral capsule; lisinopril 40 mg oral tablet; lorazepam 0.5 mg oral tablet; Metanx (algal oil) 3 mg-35 mg-2 mg -90.314 mg oral capsule; metoprolol succinate 50 mg oral tablet extended release 24 hr; Norco 7.5-325 mg oral tablet; omeprazole 20 mg oral capsule,delayed release(DR/EC); Restoril 15 mg oral  "capsule; sildenafil 20 mg oral tablet; terazosin 2 mg oral capsule; Vitamin B-12 500 mcg oral tablet         Allergy List  NO KNOWN DRUG ALLERGIES; NO KNOWN DRUG ALLERGIES       Allergies Reconciled  Family Medical History  Heart Disease; Cancer, Unspecified; Diabetes, unspecified type; Cardiovascular disease; Family history of certain chronic disabling diseases; arthritis; Family history of Arthritis         Social History  *Denies Tobacco Use; Alcohol Use (Current some day); Claustophobic (Unknown); lives with spouse; .; Recreational Drug Use (Never); Retired.; Tobacco (Former)         Review of Systems  · Constitutional  o Denies  o : fever, chills, weight loss  · Cardiovascular  o Denies  o : chest pain, shortness of breath  · Gastrointestinal  o Denies  o : liver disease, heartburn, nausea, blood in stools  · Genitourinary  o Denies  o : painful urination, blood in urine  · Integument  o Denies  o : rash, itching  · Neurologic  o Denies  o : headache, weakness, loss of consciousness  · Musculoskeletal  o Admits  o : painful, swollen joints  · Psychiatric  o Denies  o : drug/alcohol addiction, anxiety, depression      Vitals  Date Time BP Position Site L\R Cuff Size HR RR TEMP (F) WT  HT  BMI kg/m2 BSA m2 O2 Sat        05/11/2020 02:07 PM      81 - R   240lbs 0oz 5'  10\" 34.44 2.32 97 %          Physical Examination  · Constitutional  o Appearance  o : well developed, well-nourished, no obvious deformities present  · Head and Face  o Head  o :   § Inspection  § : normocephalic  o Face  o :   § Inspection  § : no facial lesions  · Eyes  o Conjunctivae  o : conjunctivae normal  o Sclerae  o : sclerae white  · Ears, Nose, Mouth and Throat  o Ears  o :   § External Ears  § : appearance within normal limits  § Hearing  § : intact  o Nose  o :   § External Nose  § : appearance normal  · Neck  o Inspection/Palpation  o : normal appearance  o Range of Motion  o : full range of " motion  · Respiratory  o Respiratory Effort  o : breathing unlabored  o Inspection of Chest  o : normal appearance  o Auscultation of Lungs  o : no audible wheezing or rales  · Cardiovascular  o Heart  o : regular rate  · Gastrointestinal  o Abdominal Examination  o : soft and non-tender  · Skin and Subcutaneous Tissue  o General Inspection  o : intact, no rashes  · Psychiatric  o General  o : Alert and oriented x3  o Judgement and Insight  o : judgment and insight intact  o Mood and Affect  o : mood normal, affect appropriate  · Left Shoulder  o Inspection  o : Well healed incisions. No erythema or streaking. Near full ROM. Strength 4/5. Neurovasculalry intact.           Assessment  · Pain: Shoulder     719.41/M25.519  · History of repair of left rotator cuff     V15.29/Z98.890      Plan  · Medications  o Medications have been Reconciled  o Transition of Care or Provider Policy  · Instructions  o Reviewed the patient's Past Medical, Social, and Family history as well as the ROS at today's visit, no changes.  o Call or return if worsening symptoms.  o Discussed long term precautions. Increase activity gradually and continue HEP for strengthening. Follow up as needed.  o Electronically Identified Patient Education Materials Provided Electronically            Electronically Signed by: Marifer Macias PA-C -Author on May 11, 2020 03:05:42 PM

## 2021-05-15 VITALS — WEIGHT: 245 LBS | HEART RATE: 80 BPM | BODY MASS INDEX: 35.07 KG/M2 | OXYGEN SATURATION: 97 % | HEIGHT: 70 IN

## 2021-05-15 VITALS — WEIGHT: 245 LBS | BODY MASS INDEX: 35.07 KG/M2 | HEART RATE: 94 BPM | HEIGHT: 70 IN | OXYGEN SATURATION: 97 %

## 2021-05-15 VITALS — BODY MASS INDEX: 36.51 KG/M2 | OXYGEN SATURATION: 93 % | HEART RATE: 110 BPM | WEIGHT: 255 LBS | HEIGHT: 70 IN

## 2021-05-15 VITALS — BODY MASS INDEX: 37.4 KG/M2 | HEART RATE: 92 BPM | HEIGHT: 70 IN | OXYGEN SATURATION: 94 % | WEIGHT: 261.25 LBS

## 2021-05-15 VITALS — BODY MASS INDEX: 34.36 KG/M2 | OXYGEN SATURATION: 97 % | WEIGHT: 240 LBS | HEART RATE: 81 BPM | HEIGHT: 70 IN

## 2021-05-15 VITALS — BODY MASS INDEX: 35.84 KG/M2 | HEIGHT: 69 IN | WEIGHT: 242 LBS

## 2021-05-15 VITALS — HEART RATE: 98 BPM | OXYGEN SATURATION: 97 % | HEIGHT: 70 IN | BODY MASS INDEX: 36.51 KG/M2 | WEIGHT: 255 LBS

## 2021-05-15 VITALS — HEIGHT: 69 IN | WEIGHT: 239 LBS | BODY MASS INDEX: 35.4 KG/M2

## 2021-05-16 VITALS
WEIGHT: 265 LBS | HEIGHT: 69 IN | BODY MASS INDEX: 39.25 KG/M2 | HEART RATE: 70 BPM | DIASTOLIC BLOOD PRESSURE: 88 MMHG | SYSTOLIC BLOOD PRESSURE: 146 MMHG

## 2021-05-22 ENCOUNTER — TRANSCRIBE ORDERS (OUTPATIENT)
Dept: SURGERY | Facility: CLINIC | Age: 76
End: 2021-05-22

## 2021-05-22 DIAGNOSIS — R91.1 LUNG NODULE: Primary | ICD-10-CM

## 2021-08-02 ENCOUNTER — LAB (OUTPATIENT)
Dept: LAB | Facility: HOSPITAL | Age: 76
End: 2021-08-02

## 2021-08-02 ENCOUNTER — APPOINTMENT (OUTPATIENT)
Dept: LAB | Facility: HOSPITAL | Age: 76
End: 2021-08-02

## 2021-08-02 DIAGNOSIS — D47.2 MONOCLONAL GAMMOPATHY: ICD-10-CM

## 2021-08-02 LAB
ALBUMIN SERPL-MCNC: 4.2 G/DL (ref 3.5–5.2)
ALBUMIN/GLOB SERPL: 1.5 G/DL (ref 1.1–2.4)
ALP SERPL-CCNC: 91 U/L (ref 38–116)
ALT SERPL W P-5'-P-CCNC: 14 U/L (ref 0–41)
ANION GAP SERPL CALCULATED.3IONS-SCNC: 10.8 MMOL/L (ref 5–15)
AST SERPL-CCNC: 18 U/L (ref 0–40)
BASOPHILS # BLD AUTO: 0.03 10*3/MM3 (ref 0–0.2)
BASOPHILS NFR BLD AUTO: 0.7 % (ref 0–1.5)
BILIRUB SERPL-MCNC: 0.6 MG/DL (ref 0.2–1.2)
BUN SERPL-MCNC: 57 MG/DL (ref 6–20)
BUN/CREAT SERPL: 27.1 (ref 7.3–30)
CALCIUM SPEC-SCNC: 9.9 MG/DL (ref 8.5–10.2)
CHLORIDE SERPL-SCNC: 103 MMOL/L (ref 98–107)
CO2 SERPL-SCNC: 23.2 MMOL/L (ref 22–29)
CREAT SERPL-MCNC: 2.1 MG/DL (ref 0.7–1.3)
DEPRECATED RDW RBC AUTO: 59.8 FL (ref 37–54)
EOSINOPHIL # BLD AUTO: 0.21 10*3/MM3 (ref 0–0.4)
EOSINOPHIL NFR BLD AUTO: 5.1 % (ref 0.3–6.2)
ERYTHROCYTE [DISTWIDTH] IN BLOOD BY AUTOMATED COUNT: 17 % (ref 12.3–15.4)
GFR SERPL CREATININE-BSD FRML MDRD: 31 ML/MIN/1.73
GLOBULIN UR ELPH-MCNC: 2.8 GM/DL (ref 1.8–3.5)
GLUCOSE SERPL-MCNC: 100 MG/DL (ref 74–124)
HCT VFR BLD AUTO: 32.2 % (ref 37.5–51)
HGB BLD-MCNC: 10.6 G/DL (ref 13–17.7)
IMM GRANULOCYTES # BLD AUTO: 0.01 10*3/MM3 (ref 0–0.05)
IMM GRANULOCYTES NFR BLD AUTO: 0.2 % (ref 0–0.5)
LYMPHOCYTES # BLD AUTO: 0.77 10*3/MM3 (ref 0.7–3.1)
LYMPHOCYTES NFR BLD AUTO: 18.8 % (ref 19.6–45.3)
MCH RBC QN AUTO: 32.2 PG (ref 26.6–33)
MCHC RBC AUTO-ENTMCNC: 32.9 G/DL (ref 31.5–35.7)
MCV RBC AUTO: 97.9 FL (ref 79–97)
MONOCYTES # BLD AUTO: 0.38 10*3/MM3 (ref 0.1–0.9)
MONOCYTES NFR BLD AUTO: 9.3 % (ref 5–12)
NEUTROPHILS NFR BLD AUTO: 2.7 10*3/MM3 (ref 1.7–7)
NEUTROPHILS NFR BLD AUTO: 65.9 % (ref 42.7–76)
NRBC BLD AUTO-RTO: 0 /100 WBC (ref 0–0.2)
PLATELET # BLD AUTO: 118 10*3/MM3 (ref 140–450)
PMV BLD AUTO: 10.1 FL (ref 6–12)
POTASSIUM SERPL-SCNC: 4.6 MMOL/L (ref 3.5–4.7)
PROT SERPL-MCNC: 7 G/DL (ref 6.3–8)
RBC # BLD AUTO: 3.29 10*6/MM3 (ref 4.14–5.8)
SODIUM SERPL-SCNC: 137 MMOL/L (ref 134–145)
WBC # BLD AUTO: 4.1 10*3/MM3 (ref 3.4–10.8)

## 2021-08-02 PROCEDURE — 36415 COLL VENOUS BLD VENIPUNCTURE: CPT

## 2021-08-02 PROCEDURE — 80053 COMPREHEN METABOLIC PANEL: CPT

## 2021-08-02 PROCEDURE — 85025 COMPLETE CBC W/AUTO DIFF WBC: CPT

## 2021-08-03 LAB
ALBUMIN SERPL ELPH-MCNC: 3.9 G/DL (ref 2.9–4.4)
ALBUMIN/GLOB SERPL: 1.4 {RATIO} (ref 0.7–1.7)
ALPHA1 GLOB SERPL ELPH-MCNC: 0.2 G/DL (ref 0–0.4)
ALPHA2 GLOB SERPL ELPH-MCNC: 0.8 G/DL (ref 0.4–1)
B-GLOBULIN SERPL ELPH-MCNC: 1 G/DL (ref 0.7–1.3)
GAMMA GLOB SERPL ELPH-MCNC: 0.8 G/DL (ref 0.4–1.8)
GLOBULIN SER-MCNC: 2.8 G/DL (ref 2.2–3.9)
IGA SERPL-MCNC: 149 MG/DL (ref 61–437)
IGG SERPL-MCNC: 693 MG/DL (ref 603–1613)
IGM SERPL-MCNC: 417 MG/DL (ref 15–143)
INTERPRETATION SERPL IEP-IMP: ABNORMAL
KAPPA LC FREE SER-MCNC: 49.5 MG/L (ref 3.3–19.4)
KAPPA LC FREE/LAMBDA FREE SER: 1.4 {RATIO} (ref 0.26–1.65)
LABORATORY COMMENT REPORT: ABNORMAL
LAMBDA LC FREE SERPL-MCNC: 35.3 MG/L (ref 5.7–26.3)
M PROTEIN SERPL ELPH-MCNC: 0.3 G/DL
PROT SERPL-MCNC: 6.7 G/DL (ref 6–8.5)

## 2021-08-08 NOTE — PROGRESS NOTES
[facility] GROUP OUTPATIENT FOLLOW UP CLINIC VISIT    REASON FOR FOLLOW-UP:    1. 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  Macrocytic anemia  3.  Mild leukopenia  4.  Pulmonary nodules  5.  [facility] can get service-connected for plasmacytoma/amyloidosis.  [facility] [place] [facility] and saw Dr. Reed there. His case was discussed further in [place] after review of his pathology.  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  A fat pad biopsy and cardiac MRI were recommended.  Treatment with CyBorD was recommended.    6.  Peripheral neuropathy in his hands and feet.  [facility] that was concerning for cervical myelopathy rather than a neuropathic process consistent with amyloidosis.  MRI of the cervical spine [facility] on 4/2/2019 shows multilevel degenerative change with moderate spinal canal stenosis at C4-C5 and C5-C6 and mild spinal canal stenosis at C6-C7.  Severe foraminal narrowing at C4-C5 and C5-C6 with moderate neuroforaminal narrowing at C6-C7.  7.  Cardiac MRI performed on 4/16/2019 at the [facility] with normal left ventricle wall thickness with right ventricular cavity enlargement.  No aneurysm.  No fibrosis or scarring to suggest an infiltrative process such as amyloidosis.  Left ventricular ejection fraction 47%.  8.  Negative fat pad biopsy on 6/4/2019  9.  It was recommended from [place] that he initiate therapy with Velcade, Cytoxan, and dexamethasone (CyBorD).  Treatment initiated 7/8/2019  10.  He received therapy through 9/16/2019.  As of 9/23/2019, treatment was held for cytopenias, fatigue, orthostasis.    HISTORY OF PRESENT ILLNESS:  Rafael Gomez is a 76 y.o. male with the above-mentioned history, who returns the office today for scheduled follow-up.      Numerous ongoing issues.  Persistent shortness of breath, dyspnea on  "exertion, cough productive of white sputum.  He has been on Lasix and diuresed nicely with improvement in lower extremity edema.  However, he remains fatigued.  He is having some left hip pain for the past couple of weeks.  He has chronic diarrhea.  He has chronic increased urinary frequency likely from the diuretics.    He was [facility] and was told that his life expectancy is measured on the order of months.  This has been distressing for him and his wife.  The pulmonologist apparently told him that he has amyloid in his heart, kidneys, and lungs.        ROS:  As per the HPI    Vitals:    08/09/21 1046   BP: 102/45   Pulse: 66   Resp: 16   Temp: 97.7 °F (36.5 °C)   TempSrc: Temporal   SpO2: 95%   Weight: 117 kg (258 lb 9.6 oz)   Height: 175 cm (68.9\")   PainSc: 7  Comment: monoclonal gammopathy   PainLoc: Comment: left hip       PHYSICAL EXAMINATION:  General:  No acute distress, awake, alert and oriented obese.  Walks with a cane.  Not wearing oxygen currently.  Skin:  Warm and dry, no visible rash  HEENT:  Normocephalic/atraumatic.  Wearing a facemask.  Chest:  Normal respiratory effort  Extremities:  No visible clubbing, cyanosis, or edema  Neuro/psych:  Grossly non-focal.  Normal mood and affect.        DIAGNOSTIC DATA:  BUCK,PE and FLC, Serum (08/02/2021 10:12)  Comprehensive Metabolic Panel (08/02/2021 10:12)  CBC & Differential (08/02/2021 10:12)    ASSESSMENT:  This is a 76 y.o. 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    · He had a wedge resection.    · Dr. Hernandez was  following a couple of other small pulmonary nodules on the left which initially increased in size.  His M spike continues to be stable.  The PET scan did not show anything we didn't expect from the CT scan.  The left upper lobe lung nodule is too risky to biopsy base of its location.    · His case was presented at the multidisciplinary thoracic " conference and recommendations included to follow him with serial CT scans with biopsy if it appears to be safer.    · CT imaging on 12/18/2017 was stable.    · CT imaging on 5/25/2018 showed that one of the pleural based nodules and enlarged slightly measuring 2.3 cm.    · CT imaging from 8/22/2018 shows some slight increase in size of the pulmonary nodules with the largest measuring now 2.0 cm 3 other tiny noncalcified nodules in the right lung are stable, only showing a slight increase since 2017.  If any do grow significantly in size, we could consider radiation.  He would likely not be a surgical candidate considering his oxygen dependence.  There is some question as to how much of this is amyloid.  There is also some question as to whether this is primary or reactive.    · [facility] [place] by Dr. Reed, and they requested his pathology for review.  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  · It was recommended treatment with FAM (inhaled fluticasone, azithromycin 250 mg on Mondays Wednesdays and Fridays, montelukast daily) plus doxycycline 100 mg twice daily for his lungs.  He did initiate this through [facility] in February 2019.  · [facility] cardiac [facility] at [facility].  This was not consistent with involvement by amyloidosis.  He had an EMG also at the [facility] that was concerning for the possibility of cervical myelopathy rather than a process consistent with amyloidosis.   · He did have a negative fat pad biopsy on 6/4/2019.  · It was recommended from [place] that he initiate therapy with Velcade, Cytoxan, and dexamethasone (CyBorD).  Treatment initiated 7/8/2019  · As of 9/23/2019 he was  progressively fatigued and struggling with constant pruritus despite several skin creams for management.  Additionally, he was more anemic and is having issues with orthostatic hypotension.  This actually resulted in a fall with several abrasions.  He had acute kidney injury.  He was given IV  fluids.  An anemia evaluation showed no evidence for iron, vitamin B12, or folic acid deficiency.    · Treatment was held.   · I also discussed his case with Dr. Roberson at [facility].  No further therapy suggested.  · CT imaging of the chest from 12/2/2019 stable.  · CT imaging 2/26/2020 stable  · CT imaging 3/8/2021 with multiple bilateral pulmonary nodules measuring up to 2.3 cm, unchanged. He follows up with Dr. Peña with thoracic surgery    *IgM monoclonal gammopathy:   · We did repeat labs 7/1/2019 to service a baseline, and spike was stable at 0.2 gm.    · Repeat studies on 10/7/2019 showed an M spike that was too small to measure.  With a serum free kappa light chain of 23.0, lambda 18.9, ratio 1.22.  · Repeat studies 8/17/2020 stable.  · M spike 0.1 gm on 2/1/2020  · M spike 0.3 gm on 8/2/2021    *Macrocytic anemia: Hemoglobin worsened to 9.8 as of 9/23/2019.  No evidence for iron, vitamin B12, or folic acid deficiency.    · Hemoglobin stable at 10.6.    *Thrombocytopenia  · Platelets stable at 118,000    *Right renal mass which is chronic.  He is followed by urology.    *Claudication with peripheral vascular disease followed by Dr. Freeman.  Chronic bilateral lower extremity edema, this is unchanged. He followed up with Dr. Freeman who said everything looks good to him apparently.    *Low back pain with an MRI on 5/18/2017 showing degenerative changes at multiple levels he states that a fusion has been recommended but he has been reluctant to proceed.    *COPD: He follows with Dr. Poole now and is on oxygen and inhalers.  Overall, breathing is better at night on his CPAP.    *Peripheral neuropathy and left shoulder pain: EMG completed.  MRI cervical spine complete.  There are some abnormalities in the cervical spine.  He was referred to Dr. Robins, neurosurgery.  While the report is available from the MRI performed at [place], these images are not available.  The patient was seen by Dr. Robins with CT  of the neck performed. He had additional imaging performed.  He followed up with Dr. Aranda with further evaluation of his shoulder recommended as it was suspected that the pain is not neuropathic.  He saw orthopedics and ultimately had a left shoulder procedure done in January 2019 with improvement in his pain.  He continues to have neck pain with some injections and perhaps RFA planned.    *Chronic kidney disease.  Creatinine is increased to 2.1.    *Orthostatic hypotension improved    *Fatigue and tiredness: Likely related to his pulmonary issues    *We have not found evidence for amyloidosis on cardiac MRI.  In addition, his serum free light chain kappa lambda ratio is normal therefore I am not necessarily suspicious for significant amyloid involvement of his kidneys.  Nonetheless, he does have numerous chronic medical conditions and his life may be limited as a result of these issues.  I discussed this again with him and his wife today.    PLAN:  1. Follow up with Dr. Peña with CT imaging as scheduled  2. [facility] as [facility].  3. I do not see any reason to treat amyloidosis at this time given the available data  4. Follow-up in 6 months with labs       Rafael Heredia MD

## 2021-08-09 ENCOUNTER — OFFICE VISIT (OUTPATIENT)
Dept: ONCOLOGY | Facility: CLINIC | Age: 76
End: 2021-08-09

## 2021-08-09 ENCOUNTER — APPOINTMENT (OUTPATIENT)
Dept: LAB | Facility: HOSPITAL | Age: 76
End: 2021-08-09

## 2021-08-09 VITALS
BODY MASS INDEX: 38.3 KG/M2 | OXYGEN SATURATION: 95 % | SYSTOLIC BLOOD PRESSURE: 102 MMHG | HEIGHT: 69 IN | WEIGHT: 258.6 LBS | DIASTOLIC BLOOD PRESSURE: 45 MMHG | TEMPERATURE: 97.7 F | HEART RATE: 66 BPM | RESPIRATION RATE: 16 BRPM

## 2021-08-09 DIAGNOSIS — C90.20 EXTRAMEDULLARY PLASMACYTOMA, UNSPECIFIED WHETHER REMISSION ACHIEVED (HCC): Primary | ICD-10-CM

## 2021-08-09 DIAGNOSIS — D47.2 MONOCLONAL GAMMOPATHY: ICD-10-CM

## 2021-08-09 DIAGNOSIS — D69.6 THROMBOCYTOPENIA (HCC): ICD-10-CM

## 2021-08-09 PROCEDURE — 99214 OFFICE O/P EST MOD 30 MIN: CPT | Performed by: INTERNAL MEDICINE

## 2021-08-09 RX ORDER — CEPHALEXIN 500 MG/1
CAPSULE ORAL
COMMUNITY
End: 2021-08-09

## 2021-08-09 RX ORDER — SILDENAFIL CITRATE 20 MG/1
TABLET ORAL
COMMUNITY
End: 2021-11-10

## 2021-08-09 RX ORDER — OMEPRAZOLE 20 MG/1
CAPSULE, DELAYED RELEASE ORAL
COMMUNITY
End: 2021-09-07

## 2021-08-09 RX ORDER — AMOXICILLIN AND CLAVULANATE POTASSIUM 875; 125 MG/1; MG/1
TABLET, FILM COATED ORAL
COMMUNITY
Start: 2021-05-18 | End: 2021-09-07

## 2021-08-09 RX ORDER — LISINOPRIL 40 MG/1
TABLET ORAL
COMMUNITY
End: 2021-09-07

## 2021-08-09 RX ORDER — GABAPENTIN 300 MG/1
CAPSULE ORAL
COMMUNITY
End: 2021-08-09 | Stop reason: SDDI

## 2021-08-09 RX ORDER — MAGNESIUM CARB/ALUMINUM HYDROX 105-160MG
TABLET,CHEWABLE ORAL
COMMUNITY
End: 2021-08-09 | Stop reason: SDDI

## 2021-08-09 RX ORDER — KETOCONAZOLE 20 MG/G
CREAM TOPICAL
COMMUNITY
Start: 2021-08-02 | End: 2021-11-10

## 2021-08-11 ENCOUNTER — PREP FOR SURGERY (OUTPATIENT)
Dept: OTHER | Facility: HOSPITAL | Age: 76
End: 2021-08-11

## 2021-08-11 ENCOUNTER — OFFICE VISIT (OUTPATIENT)
Dept: ORTHOPEDIC SURGERY | Facility: CLINIC | Age: 76
End: 2021-08-11

## 2021-08-11 VITALS — WEIGHT: 252.4 LBS | HEIGHT: 69 IN | BODY MASS INDEX: 37.38 KG/M2 | OXYGEN SATURATION: 92 % | HEART RATE: 78 BPM

## 2021-08-11 DIAGNOSIS — M25.552 LEFT HIP PAIN: Primary | ICD-10-CM

## 2021-08-11 PROBLEM — M16.12 PRIMARY OSTEOARTHRITIS OF ONE HIP, LEFT: Status: ACTIVE | Noted: 2021-08-11

## 2021-08-11 PROCEDURE — 99213 OFFICE O/P EST LOW 20 MIN: CPT | Performed by: ORTHOPAEDIC SURGERY

## 2021-08-11 RX ORDER — TRANEXAMIC ACID 10 MG/ML
1000 INJECTION, SOLUTION INTRAVENOUS ONCE
Status: CANCELLED | OUTPATIENT
Start: 2021-08-11 | End: 2021-08-11

## 2021-08-11 RX ORDER — CEFAZOLIN SODIUM IN 0.9 % NACL 3 G/100 ML
3 INTRAVENOUS SOLUTION, PIGGYBACK (ML) INTRAVENOUS ONCE
Status: CANCELLED | OUTPATIENT
Start: 2021-08-11 | End: 2021-08-11

## 2021-08-11 RX ORDER — CEFAZOLIN SODIUM 2 G/100ML
2 INJECTION, SOLUTION INTRAVENOUS ONCE
Status: CANCELLED | OUTPATIENT
Start: 2021-08-11 | End: 2021-08-11

## 2021-08-11 NOTE — PROGRESS NOTES
"Chief Complaint  Pain and Initial Evaluation of the Left Hip     Subjective      Rafael Gomez presents to [facility] for an evaluation of left hip. Patient is using a cane for ambulation assistance. Patient states pain on the lateral aspect of his hip that radiates into his groin.  Patient has a history of a right total hip replacement performed by Dr. Contreras.    No Known Allergies     Social History     Socioeconomic History   • Marital status:      Spouse name: Liliya   • Number of children: Not on file   • Years of education: High school   • Highest education level: Not on file   Tobacco Use   • Smoking status: Former Smoker     Packs/day: 1.00     Years: 30.00     Pack years: 30.00     Types: Cigarettes     Quit date:      Years since quittin.6   • Smokeless tobacco: Current User     Types: Chew   • Tobacco comment: ONE HALF CAN DAILY    Substance and Sexual Activity   • Alcohol use: Yes     Alcohol/week: 28.0 - 35.0 standard drinks     Types: 28 - 35 Cans of beer per week   • Drug use: No   • Sexual activity: Defer        Review of Systems     Objective   Vital Signs:   Pulse 78   Ht 175 cm (68.9\")   Wt 114 kg (252 lb 6.4 oz)   SpO2 92%   BMI 37.38 kg/m²       Physical Exam  Constitutional:       Appearance: Normal appearance. He is well-developed and normal weight.   HENT:      Head: Normocephalic.      Right Ear: Hearing and external ear normal.      Left Ear: Hearing and external ear normal.      Nose: Nose normal.   Eyes:      Conjunctiva/sclera: Conjunctivae normal.   Cardiovascular:      Rate and Rhythm: Normal rate.   Pulmonary:      Effort: Pulmonary effort is normal.      Breath sounds: No wheezing or rales.   Abdominal:      Palpations: Abdomen is soft.      Tenderness: There is no abdominal tenderness.   Musculoskeletal:      Cervical back: Normal range of motion.   Skin:     Findings: No rash.   Neurological:      Mental Status: He is alert and " oriented to person, place, and time.   Psychiatric:         Mood and Affect: Mood and affect normal.         Judgment: Judgment normal.       Ortho Exam      LEFT HIP: Good tone of hip flexors, hip extensors, hip adductor, hip abductors. Good strength to hamstrings, quadriceps, dorsiflexors and plantar flexors. Ambulation assistance with a cane. Skin intact. Dorsal Pedal Pulse 2+, posterior tibialis pulse 2+. Calf supple, non-tender. No swelling or skin discoloration. Tender hip and pelvic muscles. 30 ER. 15 degrees of IR. Groin pain with hip range of motion. Antalgic gait.        Procedures        Imaging Results (Most Recent)     Procedure Component Value Units Date/Time    XR Hip With or Without Pelvis 2 - 3 View Left [[id number]] Resulted: 08/11/21 1450     Updated: 08/11/21 1454           Result Review :       X-Ray Report:  Left hip(s) X-Ray  Indication: Evaluation of left hip  AP and Lateral view(s)  Findings: Advanced changes of the left hip. No fracture or dislocation.   Prior studies available for comparison: no            Assessment and Plan     DX: Left hip osteoarthritis     Discussed treatment plans and diagnosis with the patient. Discussed operative vs non-operative measures. Patient wishes to proceed with a left total hip replacement.     Educated on risk of elevated BMI related to procedure.  Discussed options for weight loss/decreasing BMI prior to procedure including dietician consult, weight loss options and exercise program., Discussed surgery., Risks/benefits discussed with patient including, but not limited to: infection, bleeding, neurovascular damage, malunion, nonunion, aesthetic deformity, need for further surgery, and death., Discussed with patient the implant type being used during surgery and patient understands and desires to proceed., Surgery pamphlet given. and Call or return if worsening symptoms.    Follow Up     Post-operatively.       Patient was given instructions and counseling  regarding his condition or for health maintenance advice. Please see specific information pulled into the AVS if appropriate.     Scribed for Man Francois MD by Chio Francois.  08/11/21   15:02 EDT    I have personally performed the services described in this document as scribed by the above individual and it is both accurate and complete. Man Francois MD 08/11/21

## 2021-09-07 ENCOUNTER — PRE-ADMISSION TESTING (OUTPATIENT)
Dept: PREADMISSION TESTING | Facility: HOSPITAL | Age: 76
End: 2021-09-07

## 2021-09-07 VITALS
HEART RATE: 66 BPM | HEIGHT: 68 IN | BODY MASS INDEX: 40.09 KG/M2 | RESPIRATION RATE: 20 BRPM | TEMPERATURE: 97.6 F | OXYGEN SATURATION: 92 % | WEIGHT: 264.55 LBS

## 2021-09-07 DIAGNOSIS — M25.552 LEFT HIP PAIN: ICD-10-CM

## 2021-09-07 DIAGNOSIS — Z01.818 ENCOUNTER FOR PREADMISSION TESTING: Primary | ICD-10-CM

## 2021-09-07 RX ORDER — PRAMIPEXOLE DIHYDROCHLORIDE 0.5 MG/1
0.5 TABLET ORAL NIGHTLY
COMMUNITY

## 2021-09-07 RX ORDER — FUROSEMIDE 40 MG/1
40 TABLET ORAL 2 TIMES DAILY
COMMUNITY
End: 2022-06-30 | Stop reason: HOSPADM

## 2021-09-07 RX ORDER — ALBUTEROL SULFATE 90 UG/1
2 AEROSOL, METERED RESPIRATORY (INHALATION) EVERY 4 HOURS PRN
COMMUNITY
End: 2021-11-10

## 2021-09-07 RX ORDER — MULTIPLE VITAMINS W/ MINERALS TAB 9MG-400MCG
1 TAB ORAL DAILY
Status: ON HOLD | COMMUNITY
End: 2022-06-12

## 2021-09-07 RX ORDER — ALBUTEROL SULFATE 2.5 MG/3ML
2.5 SOLUTION RESPIRATORY (INHALATION) EVERY 4 HOURS PRN
COMMUNITY

## 2021-09-07 RX ORDER — TELMISARTAN 80 MG/1
80 TABLET ORAL DAILY
COMMUNITY
End: 2021-12-03 | Stop reason: HOSPADM

## 2021-09-07 RX ORDER — BENZONATATE 100 MG/1
100 CAPSULE ORAL
COMMUNITY
End: 2021-11-10

## 2021-09-07 ASSESSMENT — HOOS JR
HOOS JR SCORE: 36.363
HOOS JR SCORE: 17

## 2021-09-10 DIAGNOSIS — R91.8 MULTIPLE LUNG NODULES ON CT: Primary | ICD-10-CM

## 2021-09-14 ENCOUNTER — HOSPITAL ENCOUNTER (OUTPATIENT)
Dept: CT IMAGING | Facility: HOSPITAL | Age: 76
Discharge: HOME | End: 2021-09-14
Admitting: NURSE PRACTITIONER

## 2021-09-14 DIAGNOSIS — R91.8 MULTIPLE LUNG NODULES ON CT: ICD-10-CM

## 2021-09-14 PROCEDURE — 71250 CT THORAX DX C-: CPT

## 2021-09-28 ENCOUNTER — OFFICE VISIT (OUTPATIENT)
Dept: OTHER | Facility: HOSPITAL | Age: 76
End: 2021-09-28

## 2021-09-28 VITALS
BODY MASS INDEX: 38.51 KG/M2 | SYSTOLIC BLOOD PRESSURE: 132 MMHG | HEART RATE: 71 BPM | DIASTOLIC BLOOD PRESSURE: 78 MMHG | WEIGHT: 260 LBS | OXYGEN SATURATION: 97 % | HEIGHT: 69 IN

## 2021-09-28 DIAGNOSIS — R91.8 MULTIPLE LUNG NODULES ON CT: Primary | ICD-10-CM

## 2021-09-28 DIAGNOSIS — C90.21 EXTRAMEDULLARY PLASMACYTOMA IN REMISSION (HCC): ICD-10-CM

## 2021-09-28 PROCEDURE — 99213 OFFICE O/P EST LOW 20 MIN: CPT | Performed by: THORACIC SURGERY (CARDIOTHORACIC VASCULAR SURGERY)

## 2021-09-28 RX ORDER — PANTOPRAZOLE SODIUM 20 MG/1
20 TABLET, DELAYED RELEASE ORAL DAILY
COMMUNITY
Start: 2021-09-19

## 2021-09-28 RX ORDER — ASPIRIN 81 MG/1
TABLET, COATED ORAL
COMMUNITY
Start: 2021-09-19 | End: 2021-11-10

## 2021-09-28 RX ORDER — FINASTERIDE 5 MG/1
5 TABLET, FILM COATED ORAL DAILY
COMMUNITY
Start: 2021-09-19

## 2021-09-28 RX ORDER — CHLORAL HYDRATE 500 MG
CAPSULE ORAL
COMMUNITY
Start: 2021-09-19 | End: 2021-11-18 | Stop reason: HOSPADM

## 2021-09-28 NOTE — PROGRESS NOTES
"Chief Complaint  Multiple lung nodules  Subjective          Rafael Gomez presents to [facility] in follow-up.  History of Present Illness  Mr. Gomez is a very pleasant 76-year-old gentleman with amyloidosis and multiple lung nodules.  These nodules have been stable for quite some time.  He presents today in follow-up with a CT of the chest for surveillance.  He continues to complain of worsening shortness of breath and cough.  He is currently on trilogy, rescue albuterol, Singulair and Mucinex.  He is followed by Dr[facility] Otis Poole [facility]    He was recently told by [facility] that he has very short time to live secondary to his amyloidosis.  Objective   Vital Signs:   /78   Pulse 71   Ht 175.3 cm (69\")   Wt 118 kg (260 lb)   SpO2 97%   BMI 38.40 kg/m²     Physical Exam  Vitals and nursing note reviewed.   Constitutional:       Appearance: He is well-developed.   HENT:      Head: Normocephalic and atraumatic.      Nose: Nose normal.   Eyes:      Conjunctiva/sclera: Conjunctivae normal.   Pulmonary:      Effort: Pulmonary effort is normal.   Musculoskeletal:         General: Normal range of motion.      Cervical back: Normal range of motion and neck supple.   Skin:     General: Skin is warm and dry.   Neurological:      Mental Status: He is alert and oriented to person, place, and time.   Psychiatric:         Behavior: Behavior normal.         Thought Content: Thought content normal.         Judgment: Judgment normal.        Result Review :       Data reviewed: Radiologic studies :     I have independently reviewed the CT of the chest performed on September 14, 2021 which demonstrates multiple bilateral nodules that are all stable in size.  The largest measures 2.2 cm is in the left upper lobe.  There are no new nodules.  There is no mediastinal or hilar lymphadenopathy.  There is no pleural pericardial effusion.   "   Assessment and Plan      Mr. Gomez is a pleasant 76-year-old gentleman with multiple lung nodules consistent with plasmacytoma.  These have been stable since at least 2018.  We will plan continued surveillance with a CT of the chest in 6 months.    Given his worsening pulmonary symptoms, I have advised him to follow-up with pulmonology to discuss medication changes.  Diagnoses and all orders for this visit:    1. Multiple lung nodules on CT (Primary)  -     CT Chest Without Contrast; Future    2. 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 This time includes time spent by me in the following activities:preparing for the visit, reviewing tests, obtaining and/or reviewing a separately obtained history, performing a medically appropriate examination and/or evaluation , counseling and educating the patient/family/caregiver, ordering medications, tests, or procedures, referring and communicating with other health care professionals , documenting information in the medical record and independently interpreting results and communicating that information with the patient/family/caregiver  Follow Up   No follow-ups on file.  Patient was given instructions and counseling regarding his condition or for health maintenance advice. Please see specific information pulled into the AVS if appropriate.

## 2021-09-28 NOTE — PROGRESS NOTES
Chief Complaint  No chief complaint on file.    Subjective     {Problem List  Visit Diagnosis   Encounters  Notes  Medications  Labs  Result Review Imaging  Media :23}     Rafael Gomez presents to [facility]  History of Present Illness    Objective   Vital Signs:   There were no vitals taken for this visit.    Physical Exam   Result Review :{Labs  Result Review  Imaging  Med Tab  Media  Procedures :23}   {The following data was reviewed by (Optional):75982}  {Ambulatory Labs (Optional):78576}  {Data reviewed (Optional):11881:::1}          Assessment and Plan {CC Problem List  Visit Diagnosis   ROS  Review (Popup)  [facility] Maintenance  Quality  BestPractice  Medications  SmartSets  SnapShot Encounters  Media :23}   There are no diagnoses linked to this encounter.  {Time Spent (Optional):64215}  Follow Up {Instructions Charge Capture  Follow-up Communications :23}  No follow-ups on file.  Patient was given instructions and counseling regarding his condition or for health maintenance advice. Please see specific information pulled into the AVS if appropriate.

## 2021-09-29 DIAGNOSIS — M16.12 PRIMARY OSTEOARTHRITIS OF ONE HIP, LEFT: Primary | ICD-10-CM

## 2021-10-18 ENCOUNTER — HOSPITAL ENCOUNTER (EMERGENCY)
Facility: HOSPITAL | Age: 76
Discharge: HOME | End: 2021-10-18
Attending: EMERGENCY MEDICINE | Admitting: EMERGENCY MEDICINE

## 2021-10-18 ENCOUNTER — APPOINTMENT (OUTPATIENT)
Dept: GENERAL RADIOLOGY | Facility: HOSPITAL | Age: 76
End: 2021-10-18

## 2021-10-18 ENCOUNTER — APPOINTMENT (OUTPATIENT)
Dept: CT IMAGING | Facility: HOSPITAL | Age: 76
End: 2021-10-18

## 2021-10-18 VITALS
DIASTOLIC BLOOD PRESSURE: 71 MMHG | HEART RATE: 83 BPM | OXYGEN SATURATION: 94 % | SYSTOLIC BLOOD PRESSURE: 140 MMHG | RESPIRATION RATE: 20 BRPM | WEIGHT: 261.02 LBS | TEMPERATURE: 98.3 F | HEIGHT: 68 IN | BODY MASS INDEX: 39.56 KG/M2

## 2021-10-18 DIAGNOSIS — J20.9 ACUTE BRONCHITIS, UNSPECIFIED ORGANISM: Primary | ICD-10-CM

## 2021-10-18 LAB
ALBUMIN SERPL-MCNC: 4.1 G/DL (ref 3.5–5.2)
ALBUMIN/GLOB SERPL: 1.6 G/DL
ALP SERPL-CCNC: 81 U/L (ref 39–117)
ALT SERPL W P-5'-P-CCNC: 15 U/L (ref 1–41)
ANION GAP SERPL CALCULATED.3IONS-SCNC: 12.8 MMOL/L (ref 5–15)
ARTERIAL PATENCY WRIST A: POSITIVE
AST SERPL-CCNC: 18 U/L (ref 1–40)
BASE EXCESS BLDA CALC-SCNC: -8.6 MMOL/L (ref -2–2)
BASOPHILS # BLD AUTO: 0.02 10*3/MM3 (ref 0–0.2)
BASOPHILS NFR BLD AUTO: 0.4 % (ref 0–1.5)
BDY SITE: ABNORMAL
BILIRUB SERPL-MCNC: 0.6 MG/DL (ref 0–1.2)
BUN SERPL-MCNC: 61 MG/DL (ref 8–23)
BUN/CREAT SERPL: 23.1 (ref 7–25)
CA-I BLDA-SCNC: 1.16 MMOL/L (ref 1.13–1.32)
CALCIUM SPEC-SCNC: 9 MG/DL (ref 8.6–10.5)
CHLORIDE BLDA-SCNC: 108 MMOL/L (ref 98–106)
CHLORIDE SERPL-SCNC: 104 MMOL/L (ref 98–107)
CO2 SERPL-SCNC: 19.2 MMOL/L (ref 22–29)
COHGB MFR BLD: 0.1 % (ref 0–1.5)
CREAT SERPL-MCNC: 2.64 MG/DL (ref 0.76–1.27)
DEPRECATED RDW RBC AUTO: 59.9 FL (ref 37–54)
EOSINOPHIL # BLD AUTO: 0.11 10*3/MM3 (ref 0–0.4)
EOSINOPHIL NFR BLD AUTO: 2.3 % (ref 0.3–6.2)
ERYTHROCYTE [DISTWIDTH] IN BLOOD BY AUTOMATED COUNT: 17.1 % (ref 12.3–15.4)
FHHB: 6 % (ref 0–5)
GAS FLOW AIRWAY: ABNORMAL L/MIN
GFR SERPL CREATININE-BSD FRML MDRD: 24 ML/MIN/1.73
GLOBULIN UR ELPH-MCNC: 2.5 GM/DL
GLUCOSE BLDA-MCNC: 181 MMOL/L (ref 70–99)
GLUCOSE SERPL-MCNC: 116 MG/DL (ref 65–99)
HCO3 BLDA-SCNC: 17 MMOL/L (ref 22–26)
HCT VFR BLD AUTO: 28.3 % (ref 37.5–51)
HGB BLD-MCNC: 10 G/DL (ref 13–17.7)
HGB BLDA-MCNC: 10.7 G/DL (ref 13.8–16.4)
HOLD SPECIMEN: NORMAL
HOLD SPECIMEN: NORMAL
IMM GRANULOCYTES # BLD AUTO: 0.03 10*3/MM3 (ref 0–0.05)
IMM GRANULOCYTES NFR BLD AUTO: 0.6 % (ref 0–0.5)
INHALED O2 CONCENTRATION: 21 %
LACTATE BLDA-SCNC: 1.39 MMOL/L (ref 0.5–2)
LYMPHOCYTES # BLD AUTO: 0.72 10*3/MM3 (ref 0.7–3.1)
LYMPHOCYTES NFR BLD AUTO: 14.8 % (ref 19.6–45.3)
MCH RBC QN AUTO: 34.2 PG (ref 26.6–33)
MCHC RBC AUTO-ENTMCNC: 35.3 G/DL (ref 31.5–35.7)
MCV RBC AUTO: 96.9 FL (ref 79–97)
METHGB BLD QL: 0.1 % (ref 0–1.5)
MODALITY: ABNORMAL
MONOCYTES # BLD AUTO: 0.38 10*3/MM3 (ref 0.1–0.9)
MONOCYTES NFR BLD AUTO: 7.8 % (ref 5–12)
NEUTROPHILS NFR BLD AUTO: 3.61 10*3/MM3 (ref 1.7–7)
NEUTROPHILS NFR BLD AUTO: 74.1 % (ref 42.7–76)
NOTE: ABNORMAL
NRBC BLD AUTO-RTO: 0 /100 WBC (ref 0–0.2)
NT-PROBNP SERPL-MCNC: 321.7 PG/ML (ref 0–1800)
OXYHGB MFR BLDV: 93.8 % (ref 94–99)
PCO2 BLDA: 35.2 MM HG (ref 35–45)
PH BLDA: 7.3 PH UNITS (ref 7.35–7.45)
PLATELET # BLD AUTO: 121 10*3/MM3 (ref 140–450)
PMV BLD AUTO: 12.3 FL (ref 6–12)
PO2 BLD: 360 MM[HG] (ref 0–500)
PO2 BLDA: 75.7 MM HG (ref 80–100)
POTASSIUM BLDA-SCNC: 4.58 MMOL/L (ref 3.5–5)
POTASSIUM SERPL-SCNC: 5.2 MMOL/L (ref 3.5–5.2)
PROT SERPL-MCNC: 6.6 G/DL (ref 6–8.5)
RBC # BLD AUTO: 2.92 10*6/MM3 (ref 4.14–5.8)
SAO2 % BLDCOA: 94 % (ref 95–99)
SARS-COV-2 N GENE RESP QL NAA+PROBE: NOT DETECTED
SODIUM BLDA-SCNC: 132.2 MMOL/L (ref 136–146)
SODIUM SERPL-SCNC: 136 MMOL/L (ref 136–145)
TROPONIN T SERPL-MCNC: 0.01 NG/ML (ref 0–0.03)
WBC # BLD AUTO: 4.87 10*3/MM3 (ref 3.4–10.8)
WHOLE BLOOD HOLD SPECIMEN: NORMAL
WHOLE BLOOD HOLD SPECIMEN: NORMAL

## 2021-10-18 PROCEDURE — 99284 EMERGENCY DEPT VISIT MOD MDM: CPT

## 2021-10-18 PROCEDURE — 84484 ASSAY OF TROPONIN QUANT: CPT | Performed by: EMERGENCY MEDICINE

## 2021-10-18 PROCEDURE — 87635 SARS-COV-2 COVID-19 AMP PRB: CPT | Performed by: EMERGENCY MEDICINE

## 2021-10-18 PROCEDURE — 83880 ASSAY OF NATRIURETIC PEPTIDE: CPT | Performed by: EMERGENCY MEDICINE

## 2021-10-18 PROCEDURE — 25010000002 CEFTRIAXONE PER 250 MG: Performed by: EMERGENCY MEDICINE

## 2021-10-18 PROCEDURE — 25010000002 METHYLPREDNISOLONE PER 125 MG: Performed by: EMERGENCY MEDICINE

## 2021-10-18 PROCEDURE — 93005 ELECTROCARDIOGRAM TRACING: CPT | Performed by: EMERGENCY MEDICINE

## 2021-10-18 PROCEDURE — 71045 X-RAY EXAM CHEST 1 VIEW: CPT

## 2021-10-18 PROCEDURE — 85025 COMPLETE CBC W/AUTO DIFF WBC: CPT | Performed by: EMERGENCY MEDICINE

## 2021-10-18 PROCEDURE — 25010000002 AZITHROMYCIN PER 500 MG: Performed by: EMERGENCY MEDICINE

## 2021-10-18 PROCEDURE — 71250 CT THORAX DX C-: CPT

## 2021-10-18 PROCEDURE — 96367 TX/PROPH/DG ADDL SEQ IV INF: CPT

## 2021-10-18 PROCEDURE — 96365 THER/PROPH/DIAG IV INF INIT: CPT

## 2021-10-18 PROCEDURE — 82375 ASSAY CARBOXYHB QUANT: CPT | Performed by: EMERGENCY MEDICINE

## 2021-10-18 PROCEDURE — 36415 COLL VENOUS BLD VENIPUNCTURE: CPT | Performed by: EMERGENCY MEDICINE

## 2021-10-18 PROCEDURE — 80053 COMPREHEN METABOLIC PANEL: CPT | Performed by: EMERGENCY MEDICINE

## 2021-10-18 PROCEDURE — 96375 TX/PRO/DX INJ NEW DRUG ADDON: CPT

## 2021-10-18 PROCEDURE — 83050 HGB METHEMOGLOBIN QUAN: CPT | Performed by: EMERGENCY MEDICINE

## 2021-10-18 PROCEDURE — 96368 THER/DIAG CONCURRENT INF: CPT

## 2021-10-18 PROCEDURE — 82805 BLOOD GASES W/O2 SATURATION: CPT | Performed by: EMERGENCY MEDICINE

## 2021-10-18 PROCEDURE — 36600 WITHDRAWAL OF ARTERIAL BLOOD: CPT | Performed by: EMERGENCY MEDICINE

## 2021-10-18 RX ORDER — PREDNISONE 20 MG/1
TABLET ORAL
Qty: 15 TABLET | Refills: 0 | Status: SHIPPED | OUTPATIENT
Start: 2021-10-18 | End: 2021-11-10

## 2021-10-18 RX ORDER — CEFTRIAXONE SODIUM 1 G/50ML
1 INJECTION, SOLUTION INTRAVENOUS ONCE
Status: COMPLETED | OUTPATIENT
Start: 2021-10-18 | End: 2021-10-18

## 2021-10-18 RX ORDER — AZITHROMYCIN 250 MG/1
TABLET, FILM COATED ORAL
Qty: 6 TABLET | Refills: 0 | Status: SHIPPED | OUTPATIENT
Start: 2021-10-18 | End: 2021-11-10

## 2021-10-18 RX ORDER — METHYLPREDNISOLONE SODIUM SUCCINATE 125 MG/2ML
125 INJECTION, POWDER, LYOPHILIZED, FOR SOLUTION INTRAMUSCULAR; INTRAVENOUS ONCE
Status: COMPLETED | OUTPATIENT
Start: 2021-10-18 | End: 2021-10-18

## 2021-10-18 RX ORDER — SODIUM CHLORIDE 0.9 % (FLUSH) 0.9 %
10 SYRINGE (ML) INJECTION AS NEEDED
Status: DISCONTINUED | OUTPATIENT
Start: 2021-10-18 | End: 2021-10-19 | Stop reason: HOSPADM

## 2021-10-18 RX ADMIN — METHYLPREDNISOLONE SODIUM SUCCINATE 125 MG: 125 INJECTION, POWDER, FOR SOLUTION INTRAMUSCULAR; INTRAVENOUS at 19:42

## 2021-10-18 RX ADMIN — AZITHROMYCIN MONOHYDRATE 500 MG: 500 INJECTION, POWDER, LYOPHILIZED, FOR SOLUTION INTRAVENOUS at 20:08

## 2021-10-18 RX ADMIN — CEFTRIAXONE SODIUM 1 G: 1 INJECTION, SOLUTION INTRAVENOUS at 19:44

## 2021-10-18 NOTE — ED PROVIDER NOTES
Time: 7:23 PM EDT  Arrived by: private car; accompanied by spouse   Chief Complaint: HEMOPTYSIS   History provided by: pt  History is limited by: N/A     History of Present Illness:  Patient is a 76 y.o. year old male that presents to the emergency department with HEMOPTYSIS. This started about 4-5 days ago and is still present and unchanged. It is moderate in severity. Nothing improves or worsens symptoms.     Pt reports SOB and diarrhea. No fever or emesis. He does note CP that is chronic and unchanged.     Pt reports he has hx of amyloidosis from [place]. Pt does have home O2 to use at night. He is vaccinated for COVID-19 and he received his booster shot two weeks ago.     History provided by:  Patient    Recently seen: Has recently been seen by pulmonologist.     Patient Care Team  Primary Care Provider: Claude Hair MD 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   • Anemia     no current issues   • Anxiety    • Aortic aneurysm (HCC)    • Arthritis     OA OF LEFT HIP   • COPD (chronic obstructive pulmonary disease) (HCC)     inhalers, neb tx   • Coronary artery disease    • Depression    • ED (erectile dysfunction)    • GERD (gastroesophageal reflux disease)    • Gout    • History of PAT (paroxysmal atrial tachycardia)    • Hyperlipidemia    • Hypertension    • Leukopenia     no current  issues   • Macrocytic anemia    • Multiple renal cysts     no current issues   • Myocardial infarction (HCC)    • Neuropathy     HANDS AND FEET   • PONV (postoperative nausea and vomiting)    • Poor circulation    • PTSD (post-traumatic stress disorder)    • PVD (peripheral vascular disease) (HCC)    • Rheumatoid arthritis (HCC)    • RLS (restless legs syndrome)     • Seasonal allergies    • Sleep apnea     uses CPAP   • Stroke (HCC)     prior to diallo carotid endarterectomy, no residual issues     Past Surgical History:   Procedure Laterality Date   • APPENDECTOMY N/A 2005   • CAROTID ENDARTERECTOMY Bilateral 2007   • COLONOSCOPY N/A 1995    done in [facility]   • CORONARY ARTERY BYPASS GRAFT  1997    4 vessel, no  c/o cp or soa, follows w/ [facility] JHAVERI   • HIATAL HERNIA REPAIR N/A    • REPLACEMENT TOTAL KNEE Left 09/08/2015   • ROTATOR CUFF REPAIR Right    • SKIN CANCER EXCISION      back   • THORACOSCOPY Left 6/2/2016    Procedure: LEFT VIDEO ASSISTED THORACOTOMY W/ LEFT UPPER LOBE WEDGE RESECTION X 2; EXPLORATORY BRONCHOSCOPY; PAIN PUMP PLACEMENT X 2;  Surgeon: Larry Hernandez MD;  Location: [facility] OR;  Service:    • TONSILLECTOMY Bilateral    • TOTAL HIP ARTHROPLASTY Right 1997     Family History   Problem Relation Age of Onset   • Heart disease Mother    • Multiple myeloma Mother    • Heart disease Father    • Cancer Father         Gallbladder Cancer   • Leukemia Brother         CLL   • Other Brother         Black Lung        Home Medications:  Prior to Admission medications    Medication Sig Start Date End Date Taking? Authorizing Provider   albuterol (PROVENTIL HFA;VENTOLIN HFA) 108 (90 Base) MCG/ACT inhaler Inhale 2 puffs Every 4 (Four) Hours As Needed for Wheezing.    ProviderKrissy MD   albuterol (PROVENTIL) (2.5 MG/3ML) 0.083% nebulizer solution Take 2.5 mg by nebulization Every 4 (Four) Hours As Needed for Wheezing or Shortness of Air.    ProviderKrissy MD   albuterol sulfate  (90 Base) MCG/ACT inhaler Inhale 2 puffs Every 4 (Four) Hours As Needed for Wheezing.    Krissy Dubon MD   allopurinol (ZYLOPRIM) 300 MG tablet Take 300 mg by mouth daily.    Krissy Dubon MD   aspirin 81 MG chewable tablet Chew 81 mg 2 (Two) Times a Day. Last dose 5 days prior to procedure as instructed by cardiologistAURORA    Provider,  MD Krissy   Aspirin Low Dose 81 MG EC tablet  9/19/21   Krissy Dubon MD   atorvastatin (LIPITOR) 40 MG tablet Take 40 mg by mouth Every Night.    Krissy Dubon MD   benzonatate (TESSALON) 100 MG capsule Take 100 mg by mouth.    Krissy Dubon MD   cetirizine (zyrTEC) 10 MG tablet Take 10 mg by mouth Daily. 2/25/19   Krissy Dubon MD   cilostazol (PLETAL) 100 MG tablet Take 100 mg by mouth 2 (Two) Times a Day.    Krissy Dubon MD   citalopram (CeleXA) 40 MG tablet Take 40 mg by mouth Every Morning. 7/9/19   Krissy Dubon MD   finasteride (PROSCAR) 5 MG tablet  9/19/21   Krissy Dubon MD   FISH OIL-VITAMIN D PO Fish Oil 720-1,200 mg oral capsule take 1 capsule by oral route 2 times a day   Active    Krissy Dubon MD   furosemide (LASIX) 40 MG tablet Take 40 mg by mouth Daily.    Krissy Dubon MD   hydrochlorothiazide (HYDRODIURIL) 25 MG tablet Take 12.5 mg by mouth daily.    Krissy Dubon MD   ketoconazole (NIZORAL) 2 % cream  8/2/21   Krissy Dubon MD   ketoconazole (NIZORAL) 2 % shampoo Apply  topically to the appropriate area as directed 2 (Two) Times a Week. 2/8/21   Rafael Heredia MD   metoprolol succinate XL (TOPROL-XL) 50 MG 24 hr tablet Take 50 mg by mouth Daily. 1/11/19   Krissy Dubon MD   montelukast (SINGULAIR) 10 MG tablet Take 10 mg by mouth Every Night. 3/18/19   Krissy Dubon MD   multivitamin with minerals tablet tablet Take  by mouth Daily.    Krissy Dubon MD   Omega-3 Fatty Acids (fish oil) 1000 MG capsule capsule  9/19/21   Krissy Dubon MD   pantoprazole (PROTONIX) 20 MG EC tablet  9/19/21   Krissy Dubon MD   pramipexole (MIRAPEX) 1 MG tablet Take 1 mg by mouth Every Night.    Krissy Dubon MD   SEREVENT DISKUS 50 MCG/DOSE diskus inhaler Inhale 1 puff 2 (Two) Times a Day. 2/2/17   Krissy Dubon MD   sildenafil (REVATIO) 20 MG tablet sildenafil 20 mg  "oral tablet take 1 tablet (20 mg) by oral route 3 times per day   Active    Krissy Dubon MD   sildenafil (VIAGRA) 100 MG tablet Take 100 mg by mouth as needed for erectile dysfunction.    Krissy Dubon MD   telmisartan (MICARDIS) 80 MG tablet Take 80 mg by mouth Daily.    Krissy Dubon MD   terazosin (HYTRIN) 2 MG capsule Take 2 mg by mouth every evening. 2 TABS     Krissy Dubon MD Trelegy Ellipta 200-62.5-25 MCG/INH inhaler Inhale 1 puff Daily. 21   Krissy Dubon MD        Social History:   Social History     Tobacco Use   • Smoking status: Former Smoker     Packs/day: 1.00     Years: 30.00     Pack years: 30.00     Types: Cigarettes     Quit date:      Years since quittin.8   • Smokeless tobacco: Current User     Types: Chew   • Tobacco comment: ONE HALF CAN DAILY    Substance Use Topics   • Alcohol use: Yes     Alcohol/week: 28.0 - 35.0 standard drinks     Types: 28 - 35 Cans of beer per week   • Drug use: No     Recent travel: no     Review of Systems:  Review of Systems   Constitutional: Negative for chills, diaphoresis and fever.   HENT: Negative for ear discharge and nosebleeds.    Eyes: Negative for photophobia.   Respiratory: Positive for cough (hemoptysis) and shortness of breath.    Cardiovascular: Positive for chest pain (chronic and unchanged).   Gastrointestinal: Positive for diarrhea. Negative for nausea and vomiting.   Genitourinary: Negative for dysuria.   Musculoskeletal: Negative for back pain and neck pain.   Skin: Negative for rash.   Neurological: Negative for headaches.        Physical Exam:  /71   Pulse 83   Temp 98.3 °F (36.8 °C) (Oral)   Resp 20   Ht 172.7 cm (68\")   Wt 118 kg (261 lb 0.4 oz)   SpO2 94%   BMI 39.69 kg/m²     Physical Exam  Vitals and nursing note reviewed.   Constitutional:       General: He is in acute distress (mild respiratory distress).      Appearance: Normal appearance.   HENT:      Head: " Normocephalic and atraumatic.      Nose: Nose normal.   Eyes:      General: No scleral icterus.  Cardiovascular:      Rate and Rhythm: Normal rate and regular rhythm.      Heart sounds: Normal heart sounds.   Pulmonary:      Effort: Respiratory distress (mild) present.      Breath sounds: Wheezing (mild bilateral) and rhonchi (mild bilateral) present.   Abdominal:      Palpations: Abdomen is soft.      Tenderness: There is no abdominal tenderness.   Musculoskeletal:         General: Normal range of motion.      Cervical back: Neck supple.      Right lower leg: No edema.      Left lower leg: No edema.   Skin:     General: Skin is warm and dry.   Neurological:      General: No focal deficit present.      Mental Status: He is alert and oriented to person, place, and time.      Sensory: No sensory deficit.      Motor: No weakness.                Medications in the Emergency Department:  Medications   cefTRIAXone (ROCEPHIN) IVPB 1 g (0 g Intravenous Stopped 10/18/21 2222)   AZITHROMYCIN 500 MG/250 ML 0.9% NS IVPB (vial-mate) (0 mg Intravenous Stopped 10/18/21 2222)   methylPREDNISolone sodium succinate (SOLU-Medrol) injection 125 mg (125 mg Intravenous Given 10/18/21 1942)        Labs  Lab Results (last 24 hours)     Procedure Component Value Units Date/Time    COVID-19,CEPHEID/KAZ/BDMAX,COR/SONJA/PAD/DEBORAH IN-HOUSE(OR EMERGENT/ADD-ON),NP SWAB IN TRANSPORT MEDIA 3-4 HR TAT, RT-PCR - Swab, Nasopharynx [[id number]]  (Normal) Collected: 10/18/21 1648    Specimen: Swab from Nasopharynx Updated: 10/18/21 2059     COVID19 Not Detected    Narrative:      Fact sheet for providers: https://www.fda.gov/media/176182/download     Fact sheet for patients: https://www.fda.gov/media/997888/download  Presumptive Positive: additional testing may be indicated if it is necessary to differentiate between SARS-CoV-2 and other Sarbecovirus, for epidemiological purposes, or clinical management.    CBC & Differential [[id number]]  (Abnormal)  Collected: 10/18/21 1657    Specimen: Blood Updated: 10/18/21 1718    Narrative:      The following orders were created for panel order CBC & Differential.  Procedure                               Abnormality         Status                     ---------                               -----------         ------                     CBC Auto Differential[[id number]]        Abnormal            Final result                 Please view results for these tests on the individual orders.    Comprehensive Metabolic Panel [[id number]]  (Abnormal) Collected: 10/18/21 1657    Specimen: Blood Updated: 10/18/21 1739     Glucose 116 mg/dL      BUN 61 mg/dL      Creatinine 2.64 mg/dL      Sodium 136 mmol/L      Potassium 5.2 mmol/L      Chloride 104 mmol/L      CO2 19.2 mmol/L      Calcium 9.0 mg/dL      Total Protein 6.6 g/dL      Albumin 4.10 g/dL      ALT (SGPT) 15 U/L      AST (SGOT) 18 U/L      Alkaline Phosphatase 81 U/L      Total Bilirubin 0.6 mg/dL      eGFR Non African Amer 24 mL/min/1.73      Globulin 2.5 gm/dL      A/G Ratio 1.6 g/dL      BUN/Creatinine Ratio 23.1     Anion Gap 12.8 mmol/L     Narrative:      GFR Normal >60  Chronic Kidney Disease <60  Kidney Failure <15      BNP [[id number]]  (Normal) Collected: 10/18/21 1657    Specimen: Blood Updated: 10/18/21 1737     proBNP 321.7 pg/mL     Narrative:      Among patients with dyspnea, NT-proBNP is highly sensitive for the detection of acute congestive heart failure. In addition NT-proBNP of <300 pg/ml effectively rules out acute congestive heart failure with 99% negative predictive value.    Results may be falsely decreased if patient taking Biotin.      Troponin [[id number]]  (Normal) Collected: 10/18/21 1657    Specimen: Blood Updated: 10/18/21 1738     Troponin T 0.014 ng/mL     Narrative:      Troponin T Reference Range:  <= 0.03 ng/mL-   Negative for AMI  >0.03 ng/mL-     Abnormal for myocardial necrosis.  Clinicians would have to utilize clinical acumen, EKG,  Troponin and serial changes to determine if it is an Acute Myocardial Infarction or myocardial injury due to an underlying chronic condition.       Results may be falsely decreased if patient taking Biotin.      CBC Auto Differential [[id number]]  (Abnormal) Collected: 10/18/21 1657    Specimen: Blood Updated: 10/18/21 1718     WBC 4.87 10*3/mm3      RBC 2.92 10*6/mm3      Hemoglobin 10.0 g/dL      Hematocrit 28.3 %      MCV 96.9 fL      MCH 34.2 pg      MCHC 35.3 g/dL      RDW 17.1 %      RDW-SD 59.9 fl      MPV 12.3 fL      Platelets 121 10*3/mm3      Neutrophil % 74.1 %      Lymphocyte % 14.8 %      Monocyte % 7.8 %      Eosinophil % 2.3 %      Basophil % 0.4 %      Immature Grans % 0.6 %      Neutrophils, Absolute 3.61 10*3/mm3      Lymphocytes, Absolute 0.72 10*3/mm3      Monocytes, Absolute 0.38 10*3/mm3      Eosinophils, Absolute 0.11 10*3/mm3      Basophils, Absolute 0.02 10*3/mm3      Immature Grans, Absolute 0.03 10*3/mm3      nRBC 0.0 /100 WBC     ABG with Co-Ox and Electrolytes [[id number]]  (Abnormal) Collected: 10/18/21 2001    Specimen: Arterial Blood from Arm, Right Updated: 10/18/21 2010     pH, Arterial 7.301 pH units      pCO2, Arterial 35.2 mm Hg      pO2, Arterial 75.7 mm Hg      HCO3, Arterial 17.0 mmol/L      Base Excess, Arterial -8.6 mmol/L      O2 Saturation, Arterial 94.0 %      Hemoglobin, Blood Gas 10.7 g/dL      Carboxyhemoglobin 0.1 %      Methemoglobin 0.10 %      Oxyhemoglobin 93.8 %      FHHB 6.0 %      Serafin's Test Positive     Note --     Site Arterial: right radial     Modality Room Air     FIO2 21 %      Flow Rate --     Sodium, Arterial 132.2 mmol/L      Potassium, Arterial 4.58 mmol/L      Ionized Calcium, Arterial 1.16 mmol/L      Chloride, Arterial 108 mmol/L      Glucose, Arterial 181 mmol/L      Lactate, Arterial 1.39 mmol/L      PO2/FIO2 360           Imaging:  CT Chest Without Contrast Diagnostic    Result Date: 10/18/2021  PROCEDURE: CT CHEST WO CONTRAST DIAGNOSTIC   COMPARISON: [facility], CT, CT CHEST WO CONTRAST DIAGNOSTIC, 9/14/2021, 11:50.  INDICATIONS: hemoptysis  TECHNIQUE: CT images were created without the administration of contrast material.   PROTOCOL:   Standard imaging protocol performed    RADIATION:   DLP: 762.8 mGy*cm   Automated exposure control was utilized to minimize radiation dose.  FINDINGS:  Multiple bilateral pulmonary nodules are unchanged.  A nodule in the anterior right upper lobe measures 10 mm (image 22).  A nodule in the medial left upper lobe measures 2.1 cm.  No new or enlarging pulmonary nodule.  No dense consolidation.  Cardiomegaly.  Previous CABG.  No adenopathy in the chest.  No acute findings in the included upper abdomen.  Partially imaged bilateral renal cysts.  No aggressive appearing bone change.   CONCLUSION: Multiple bilateral pulmonary nodules are not significantly changed.  No acute findings are seen in the chest.     AP STEINER MD       Electronically Signed and Approved By: AP STEINER MD on 10/18/2021 at 20:57             XR Chest 1 View    Result Date: 10/18/2021  PROCEDURE: XR CHEST 1 VW  COMPARISON: [facility], CT, CT CHEST WO CONTRAST DIAGNOSTIC, 9/14/2021, 11:50.  [facility], CR, CHEST AP/PA 1 VIEW, 1/23/2021, 2:00.  INDICATIONS: SOA Triage Protocol  FINDINGS:  Status post median sternotomy and CABG.  The heart is enlarged.  Mediastinal contour appears grossly normal.  No suspicious focal consolidation, pleural effusion, or pneumothorax is seen.  There appear to be postsurgical changes in the left lung apex.  There are prominent interstitial markings, increased compared to the prior radiographs.  Multiple pulmonary nodules are better seen on prior chest CT.  CONCLUSION:  1. Prominent interstitial markings appear increased compared to prior radiographs and could indicate edema or other infiltrate. 2. Pulmonary nodules are better appreciated on recent prior chest CT. 3. Cardiomegaly.        LG FISCHER MD       Electronically Signed and Approved By: LG FISCHER MD on 10/18/2021 at 20:38               Procedures:  Procedures    Progress                            Medical Decision Making:  MDM  Number of Diagnoses or Management Options  Acute bronchitis, unspecified organism  Diagnosis management comments: I discussed the patient's history and physical with Dr Das who is the pulmonologist on call.  He suggested repeating non-contrast CT chest.  This was performed and is unchanged.  He suggests starting the patient on Zithromax and steroids and continuing prior respiratory treatments.  The patient and his wife are amenable to this plan and they would like to follow up with Dr Lara instead of their pulmonologist in [place].       Amount and/or Complexity of Data Reviewed  Clinical lab tests: reviewed  Tests in the radiology section of CPT®: reviewed  Tests in the medicine section of CPT®: reviewed  Decide to obtain previous medical records or to obtain history from someone other than the patient: yes  Obtain history from someone other than the patient: yes  Review and summarize past medical records: yes  Discuss the patient with other providers: yes  Independent visualization of images, tracings, or specimens: yes    Patient Progress  Patient progress: improved       Final diagnoses:   Acute bronchitis, unspecified organism        Disposition:  ED Disposition     ED Disposition Condition Comment    Discharge Stable           This medical record created using voice recognition software and may contain unintended errors.    Documentation assistance provided by Savannah Ibrahim acting as scribe for Casa Javier DO. Information recorded by the scribe was done at my direction and has been verified and validated by me.        Savannah Ibrahim  10/18/21 1930       Casa Javier DO  10/19/21 3380

## 2021-10-19 NOTE — DISCHARGE INSTRUCTIONS
Continue current medications.  Take antibiotic and prednisone as directed.  Drink plenty of fluids.  Return if symptoms worsen or persist or if you start to cough up large amounts of blood.  Follow-up with your doctor and with Dr. Lara for further evaluation and treatment.  
individual instruction/verbal instruction/written material

## 2021-10-21 LAB — QT INTERVAL: 411 MS

## 2021-11-10 ENCOUNTER — PRE-ADMISSION TESTING (OUTPATIENT)
Dept: PREADMISSION TESTING | Facility: HOSPITAL | Age: 76
End: 2021-11-10

## 2021-11-10 VITALS
WEIGHT: 268.74 LBS | OXYGEN SATURATION: 91 % | HEART RATE: 66 BPM | HEIGHT: 68 IN | RESPIRATION RATE: 20 BRPM | DIASTOLIC BLOOD PRESSURE: 62 MMHG | BODY MASS INDEX: 40.73 KG/M2 | SYSTOLIC BLOOD PRESSURE: 130 MMHG | TEMPERATURE: 97 F

## 2021-11-10 DIAGNOSIS — M25.552 LEFT HIP PAIN: ICD-10-CM

## 2021-11-10 LAB
ABO GROUP BLD: NORMAL
ALBUMIN SERPL-MCNC: 4 G/DL (ref 3.5–5.2)
ALBUMIN/GLOB SERPL: 1.7 G/DL
ALP SERPL-CCNC: 72 U/L (ref 39–117)
ALT SERPL W P-5'-P-CCNC: 15 U/L (ref 1–41)
ANION GAP SERPL CALCULATED.3IONS-SCNC: 11.2 MMOL/L (ref 5–15)
AST SERPL-CCNC: 18 U/L (ref 1–40)
BASOPHILS # BLD AUTO: 0.02 10*3/MM3 (ref 0–0.2)
BASOPHILS NFR BLD AUTO: 0.5 % (ref 0–1.5)
BILIRUB SERPL-MCNC: 0.6 MG/DL (ref 0–1.2)
BUN SERPL-MCNC: 49 MG/DL (ref 8–23)
BUN/CREAT SERPL: 22.1 (ref 7–25)
CALCIUM SPEC-SCNC: 8.9 MG/DL (ref 8.6–10.5)
CHLORIDE SERPL-SCNC: 105 MMOL/L (ref 98–107)
CO2 SERPL-SCNC: 19.8 MMOL/L (ref 22–29)
CREAT SERPL-MCNC: 2.22 MG/DL (ref 0.76–1.27)
DEPRECATED RDW RBC AUTO: 61.7 FL (ref 37–54)
EOSINOPHIL # BLD AUTO: 0.08 10*3/MM3 (ref 0–0.4)
EOSINOPHIL NFR BLD AUTO: 2 % (ref 0.3–6.2)
ERYTHROCYTE [DISTWIDTH] IN BLOOD BY AUTOMATED COUNT: 17.8 % (ref 12.3–15.4)
GFR SERPL CREATININE-BSD FRML MDRD: 29 ML/MIN/1.73
GLOBULIN UR ELPH-MCNC: 2.4 GM/DL
GLUCOSE SERPL-MCNC: 106 MG/DL (ref 65–99)
HBA1C MFR BLD: 6.27 % (ref 4.8–5.6)
HCT VFR BLD AUTO: 26.7 % (ref 37.5–51)
HGB BLD-MCNC: 9.2 G/DL (ref 13–17.7)
IMM GRANULOCYTES # BLD AUTO: 0.02 10*3/MM3 (ref 0–0.05)
IMM GRANULOCYTES NFR BLD AUTO: 0.5 % (ref 0–0.5)
INR PPP: 1.05 (ref 2–3)
LYMPHOCYTES # BLD AUTO: 0.69 10*3/MM3 (ref 0.7–3.1)
LYMPHOCYTES NFR BLD AUTO: 17.4 % (ref 19.6–45.3)
MCH RBC QN AUTO: 34.5 PG (ref 26.6–33)
MCHC RBC AUTO-ENTMCNC: 34.5 G/DL (ref 31.5–35.7)
MCV RBC AUTO: 100 FL (ref 79–97)
MONOCYTES # BLD AUTO: 0.33 10*3/MM3 (ref 0.1–0.9)
MONOCYTES NFR BLD AUTO: 8.3 % (ref 5–12)
NEUTROPHILS NFR BLD AUTO: 2.83 10*3/MM3 (ref 1.7–7)
NEUTROPHILS NFR BLD AUTO: 71.3 % (ref 42.7–76)
NRBC BLD AUTO-RTO: 0 /100 WBC (ref 0–0.2)
PLATELET # BLD AUTO: 129 10*3/MM3 (ref 140–450)
PMV BLD AUTO: 10 FL (ref 6–12)
POTASSIUM SERPL-SCNC: 4.9 MMOL/L (ref 3.5–5.2)
PROT SERPL-MCNC: 6.4 G/DL (ref 6–8.5)
PROTHROMBIN TIME: 10.9 SECONDS (ref 9.4–12)
RBC # BLD AUTO: 2.67 10*6/MM3 (ref 4.14–5.8)
RH BLD: POSITIVE
SODIUM SERPL-SCNC: 136 MMOL/L (ref 136–145)
WBC # BLD AUTO: 3.97 10*3/MM3 (ref 3.4–10.8)

## 2021-11-10 PROCEDURE — 85610 PROTHROMBIN TIME: CPT | Performed by: ORTHOPAEDIC SURGERY

## 2021-11-10 PROCEDURE — 80053 COMPREHEN METABOLIC PANEL: CPT | Performed by: ORTHOPAEDIC SURGERY

## 2021-11-10 PROCEDURE — 85025 COMPLETE CBC W/AUTO DIFF WBC: CPT | Performed by: ORTHOPAEDIC SURGERY

## 2021-11-10 PROCEDURE — 86901 BLOOD TYPING SEROLOGIC RH(D): CPT

## 2021-11-10 PROCEDURE — 86900 BLOOD TYPING SEROLOGIC ABO: CPT

## 2021-11-10 PROCEDURE — 83036 HEMOGLOBIN GLYCOSYLATED A1C: CPT | Performed by: ORTHOPAEDIC SURGERY

## 2021-11-10 PROCEDURE — 36415 COLL VENOUS BLD VENIPUNCTURE: CPT

## 2021-11-10 RX ORDER — HYDROCHLOROTHIAZIDE 25 MG/1
25 TABLET ORAL DAILY
Status: ON HOLD | COMMUNITY
End: 2022-06-12

## 2021-11-10 ASSESSMENT — HOOS JR
HOOS JR SCORE: 16
HOOS JR SCORE: 39.902

## 2021-11-10 NOTE — DISCHARGE INSTRUCTIONS
IMPORTANT INSTRUCTIONS - PRE-ADMISSION TESTING  1. DO NOT EAT OR CHEW anything after midnight the night before your procedure.    2. You may have CLEAR liquids up to _3_____ hours prior to ARRIVAL time.   3. Take the following medications the morning of your procedure with JUST A SIP OF WATER:  __USE INHALERS AND BRING WITH YOU, USE NEBULIZER, ALLOPURINOL, METOPROLOL, ALLOPURINOL, CETIRIZINE, CITALOPRAM, FINASTERIDE_____________________________________________________________________________________________________________________________________________________________________________________    4. DO NOT BRING your medications to the hospital with you, UNLESS something has changed since your PRE-Admission Testing appointment.  5. STARTING TODAY Hold all vitamins, supplements, and NSAIDS (Non- steroidal anti-inflammatory meds) for one week prior to surgery (you MAY take Tylenol or Acetaminophen).  6. If you are diabetic, check your blood sugar the morning of your procedure. If it is less than 70 or if you are feeling symptomatic, call the following number for further instructions: [phone]  7. Use your inhalers/nebulizers as usual, the morning of your procedure. BRING YOUR INHALERS with you.   8. Bring your CPAP or BIPAP to hospital, ONLY IF YOU WILL BE SPENDING THE NIGHT.   9. Make sure you have a ride home and have someone who will stay with you the day of your procedure after you go home.  10. If you have any questions, please call your Pre-Admission Testing NurseJAYDA________________ at [phone]__.   11. Per anesthesia request, do not smoke for 24 hours before your procedure or as instructed by your surgeon.    BATHING INSTRUCTIONS GIVEN. NO JEWELRY DAY OF PROCEDURE.   REFER TO TOTAL JOINT BOOK AND REVIEW EXERCISES  WILL CALL DAY BEFORE PROCEDURE AND GIVE OFFICIAL ARRIVAL TIME  DRINK 20 OZ GATORADE NO RED 3 HOURS PRIOR TO PROCEDURE   NO DIP 24 HOURS PRIOR TO PROCEDURE  BRING INHALERS AND  CPAP WITH YOU DAY OF PROCEDURE

## 2021-11-10 NOTE — NURSING NOTE
MESSAGE SENT TO HANK AT [facility] REGARDING PT NEEDED DIRECTION OF WHEN TO STOP PLETAL. HAD MEDICATION DIRECTIVE FOR THE ASA. ADVISED HER THAT DR VELA WAS THE PRESCRIBER ON THE PRESCRIPTION LABEL AND THAT MRS DAMON WOULD BE WHO SHE WOULD NEED TO CONTACT TO TELL WHEN TO STOP PLETAL.

## 2021-11-10 NOTE — NURSING NOTE
[facility]ARDING HGB OF 9.2. AND   ADVISED HAD PUT IN FOR PTRPT AND PT CAN GET TYPE AND SCREEN DAY OF PROCEDURE

## 2021-11-12 ENCOUNTER — ANESTHESIA EVENT (OUTPATIENT)
Dept: PERIOP | Facility: HOSPITAL | Age: 76
End: 2021-11-12

## 2021-11-12 ENCOUNTER — TELEPHONE (OUTPATIENT)
Dept: ORTHOPEDIC SURGERY | Facility: CLINIC | Age: 76
End: 2021-11-12

## 2021-11-16 ENCOUNTER — HOSPITAL ENCOUNTER (INPATIENT)
Facility: HOSPITAL | Age: 76
LOS: 16 days | Discharge: HOME HEALTH | End: 2021-12-03
Attending: ORTHOPAEDIC SURGERY | Admitting: INTERNAL MEDICINE

## 2021-11-16 ENCOUNTER — ANESTHESIA (OUTPATIENT)
Dept: PERIOP | Facility: HOSPITAL | Age: 76
End: 2021-11-16

## 2021-11-16 ENCOUNTER — APPOINTMENT (OUTPATIENT)
Dept: GENERAL RADIOLOGY | Facility: HOSPITAL | Age: 76
End: 2021-11-16

## 2021-11-16 DIAGNOSIS — R26.2 DIFFICULTY IN WALKING: ICD-10-CM

## 2021-11-16 DIAGNOSIS — M25.552 LEFT HIP PAIN: Primary | ICD-10-CM

## 2021-11-16 DIAGNOSIS — Z78.9 DECREASED ACTIVITIES OF DAILY LIVING (ADL): ICD-10-CM

## 2021-11-16 PROBLEM — I95.9 HYPOTENSION: Status: ACTIVE | Noted: 2021-11-16

## 2021-11-16 PROBLEM — N18.30 CKD (CHRONIC KIDNEY DISEASE) STAGE 3, GFR 30-59 ML/MIN: Chronic | Status: ACTIVE | Noted: 2021-11-16

## 2021-11-16 PROBLEM — D62 ACUTE BLOOD LOSS ANEMIA: Status: ACTIVE | Noted: 2021-11-16

## 2021-11-16 LAB
ABO GROUP BLD: NORMAL
BLD GP AB SCN SERPL QL: NEGATIVE
HCT VFR BLD AUTO: 26.6 % (ref 37.5–51)
HCT VFR BLD AUTO: 26.9 % (ref 37.5–51)
HGB BLD-MCNC: 8.3 G/DL (ref 13–17.7)
HGB BLD-MCNC: 8.7 G/DL (ref 13–17.7)
QT INTERVAL: 432 MS
RH BLD: POSITIVE
T&S EXPIRATION DATE: NORMAL

## 2021-11-16 PROCEDURE — 97165 OT EVAL LOW COMPLEX 30 MIN: CPT

## 2021-11-16 PROCEDURE — 63710000001 FAMOTIDINE 20 MG TABLET: Performed by: ORTHOPAEDIC SURGERY

## 2021-11-16 PROCEDURE — A9270 NON-COVERED ITEM OR SERVICE: HCPCS | Performed by: ORTHOPAEDIC SURGERY

## 2021-11-16 PROCEDURE — 76000 FLUOROSCOPY <1 HR PHYS/QHP: CPT

## 2021-11-16 PROCEDURE — 94799 UNLISTED PULMONARY SVC/PX: CPT

## 2021-11-16 PROCEDURE — 25010000002 ROPIVACAINE PER 1 MG

## 2021-11-16 PROCEDURE — 63710000001 GABAPENTIN 300 MG CAPSULE: Performed by: ANESTHESIOLOGY

## 2021-11-16 PROCEDURE — G0378 HOSPITAL OBSERVATION PER HR: HCPCS

## 2021-11-16 PROCEDURE — 93010 ELECTROCARDIOGRAM REPORT: CPT | Performed by: INTERNAL MEDICINE

## 2021-11-16 PROCEDURE — 25010000002 KETOROLAC TROMETHAMINE PER 15 MG

## 2021-11-16 PROCEDURE — 63710000001 ACETAMINOPHEN 500 MG TABLET: Performed by: ANESTHESIOLOGY

## 2021-11-16 PROCEDURE — 0SRB04A REPLACEMENT OF LEFT HIP JOINT WITH CERAMIC ON POLYETHYLENE SYNTHETIC SUBSTITUTE, UNCEMENTED, OPEN APPROACH: ICD-10-PCS | Performed by: ORTHOPAEDIC SURGERY

## 2021-11-16 PROCEDURE — 27130 TOTAL HIP ARTHROPLASTY: CPT | Performed by: ORTHOPAEDIC SURGERY

## 2021-11-16 PROCEDURE — 97110 THERAPEUTIC EXERCISES: CPT

## 2021-11-16 PROCEDURE — 97535 SELF CARE MNGMENT TRAINING: CPT

## 2021-11-16 PROCEDURE — 86923 COMPATIBILITY TEST ELECTRIC: CPT

## 2021-11-16 PROCEDURE — A9270 NON-COVERED ITEM OR SERVICE: HCPCS | Performed by: ANESTHESIOLOGY

## 2021-11-16 PROCEDURE — C1776 JOINT DEVICE (IMPLANTABLE): HCPCS | Performed by: ORTHOPAEDIC SURGERY

## 2021-11-16 PROCEDURE — 86901 BLOOD TYPING SEROLOGIC RH(D): CPT | Performed by: ORTHOPAEDIC SURGERY

## 2021-11-16 PROCEDURE — 85018 HEMOGLOBIN: CPT | Performed by: INTERNAL MEDICINE

## 2021-11-16 PROCEDURE — 25010000002 EPINEPHRINE 1 MG/ML SOLUTION

## 2021-11-16 PROCEDURE — 85014 HEMATOCRIT: CPT | Performed by: INTERNAL MEDICINE

## 2021-11-16 PROCEDURE — 25010000002 PROPOFOL 10 MG/ML EMULSION: Performed by: NURSE ANESTHETIST, CERTIFIED REGISTERED

## 2021-11-16 PROCEDURE — 25010000002 MIDAZOLAM PER 1MG: Performed by: ANESTHESIOLOGY

## 2021-11-16 PROCEDURE — 25010000002 MORPHINE (PF) 10 MG/ML SOLUTION

## 2021-11-16 PROCEDURE — 97161 PT EVAL LOW COMPLEX 20 MIN: CPT

## 2021-11-16 PROCEDURE — 86850 RBC ANTIBODY SCREEN: CPT | Performed by: ORTHOPAEDIC SURGERY

## 2021-11-16 PROCEDURE — 97116 GAIT TRAINING THERAPY: CPT

## 2021-11-16 PROCEDURE — 25010000002 CEFAZOLIN PER 500 MG: Performed by: ORTHOPAEDIC SURGERY

## 2021-11-16 PROCEDURE — 85018 HEMOGLOBIN: CPT | Performed by: ORTHOPAEDIC SURGERY

## 2021-11-16 PROCEDURE — 73502 X-RAY EXAM HIP UNI 2-3 VIEWS: CPT

## 2021-11-16 PROCEDURE — 93005 ELECTROCARDIOGRAM TRACING: CPT | Performed by: ORTHOPAEDIC SURGERY

## 2021-11-16 PROCEDURE — 63710000001 ACETAMINOPHEN 500 MG TABLET: Performed by: ORTHOPAEDIC SURGERY

## 2021-11-16 PROCEDURE — 25010000002 PHENYLEPHRINE 10 MG/ML SOLUTION: Performed by: NURSE ANESTHETIST, CERTIFIED REGISTERED

## 2021-11-16 PROCEDURE — 0 CEFAZOLIN IN DEXTROSE 2-4 GM/100ML-% SOLUTION: Performed by: ORTHOPAEDIC SURGERY

## 2021-11-16 PROCEDURE — 94640 AIRWAY INHALATION TREATMENT: CPT

## 2021-11-16 PROCEDURE — 63710000001 CELECOXIB 100 MG CAPSULE: Performed by: ANESTHESIOLOGY

## 2021-11-16 PROCEDURE — 86900 BLOOD TYPING SEROLOGIC ABO: CPT | Performed by: ORTHOPAEDIC SURGERY

## 2021-11-16 PROCEDURE — 85014 HEMATOCRIT: CPT | Performed by: ORTHOPAEDIC SURGERY

## 2021-11-16 PROCEDURE — 25010000002 KETOROLAC TROMETHAMINE PER 15 MG: Performed by: ORTHOPAEDIC SURGERY

## 2021-11-16 DEVICE — LINER ACET G7 VIVACIT/E NTRL HXPE SZF 36MM: Type: IMPLANTABLE DEVICE | Site: HIP | Status: FUNCTIONAL

## 2021-11-16 DEVICE — SHLL ACET G7 PPS LTD/HL TI SZF 56MM: Type: IMPLANTABLE DEVICE | Site: HIP | Status: FUNCTIONAL

## 2021-11-16 DEVICE — STEM FEM/HIP AVENIRCOMPLETE COLAR STFF HA SZ5: Type: IMPLANTABLE DEVICE | Site: HIP | Status: FUNCTIONAL

## 2021-11-16 DEVICE — BIOLOX® DELTA, CERAMIC FEMORAL HEAD, L, Ø 36/+3.5, TAPER 12/14
Type: IMPLANTABLE DEVICE | Site: HIP | Status: FUNCTIONAL
Brand: BIOLOX® DELTA

## 2021-11-16 DEVICE — TOTAL HIP PRIMARY: Type: IMPLANTABLE DEVICE | Site: HIP | Status: FUNCTIONAL

## 2021-11-16 DEVICE — SCRW ACET CORT TRILOGY S/TAP 6.5X30: Type: IMPLANTABLE DEVICE | Site: HIP | Status: FUNCTIONAL

## 2021-11-16 RX ORDER — CITALOPRAM 20 MG/1
40 TABLET ORAL EVERY MORNING
Status: DISCONTINUED | OUTPATIENT
Start: 2021-11-17 | End: 2021-11-25

## 2021-11-16 RX ORDER — FINASTERIDE 5 MG/1
5 TABLET, FILM COATED ORAL DAILY
Status: DISCONTINUED | OUTPATIENT
Start: 2021-11-17 | End: 2021-12-03 | Stop reason: HOSPADM

## 2021-11-16 RX ORDER — ONDANSETRON 4 MG/1
4 TABLET, FILM COATED ORAL EVERY 6 HOURS PRN
Status: DISCONTINUED | OUTPATIENT
Start: 2021-11-16 | End: 2021-11-25

## 2021-11-16 RX ORDER — SODIUM CHLORIDE 0.9 % (FLUSH) 0.9 %
10 SYRINGE (ML) INJECTION AS NEEDED
Status: DISCONTINUED | OUTPATIENT
Start: 2021-11-16 | End: 2021-12-03 | Stop reason: HOSPADM

## 2021-11-16 RX ORDER — IPRATROPIUM BROMIDE AND ALBUTEROL SULFATE 2.5; .5 MG/3ML; MG/3ML
3 SOLUTION RESPIRATORY (INHALATION)
Status: DISCONTINUED | OUTPATIENT
Start: 2021-11-16 | End: 2021-12-02

## 2021-11-16 RX ORDER — SODIUM CHLORIDE 0.9 % (FLUSH) 0.9 %
10 SYRINGE (ML) INJECTION AS NEEDED
Status: DISCONTINUED | OUTPATIENT
Start: 2021-11-16 | End: 2021-11-16 | Stop reason: HOSPADM

## 2021-11-16 RX ORDER — MIDAZOLAM HYDROCHLORIDE 2 MG/2ML
2 INJECTION, SOLUTION INTRAMUSCULAR; INTRAVENOUS ONCE
Status: COMPLETED | OUTPATIENT
Start: 2021-11-16 | End: 2021-11-16

## 2021-11-16 RX ORDER — PHENYLEPHRINE HYDROCHLORIDE 10 MG/ML
INJECTION INTRAVENOUS AS NEEDED
Status: DISCONTINUED | OUTPATIENT
Start: 2021-11-16 | End: 2021-11-16 | Stop reason: SURG

## 2021-11-16 RX ORDER — OXYCODONE HYDROCHLORIDE AND ACETAMINOPHEN 5; 325 MG/1; MG/1
2 TABLET ORAL EVERY 4 HOURS PRN
Status: DISCONTINUED | OUTPATIENT
Start: 2021-11-16 | End: 2021-11-25

## 2021-11-16 RX ORDER — METOPROLOL SUCCINATE 25 MG/1
25 TABLET, EXTENDED RELEASE ORAL DAILY
Status: DISCONTINUED | OUTPATIENT
Start: 2021-11-16 | End: 2021-11-16

## 2021-11-16 RX ORDER — SODIUM CHLORIDE, SODIUM LACTATE, POTASSIUM CHLORIDE, CALCIUM CHLORIDE 600; 310; 30; 20 MG/100ML; MG/100ML; MG/100ML; MG/100ML
100 INJECTION, SOLUTION INTRAVENOUS CONTINUOUS
Status: DISCONTINUED | OUTPATIENT
Start: 2021-11-16 | End: 2021-11-18

## 2021-11-16 RX ORDER — HYDROCODONE BITARTRATE AND ACETAMINOPHEN 7.5; 325 MG/1; MG/1
1 TABLET ORAL EVERY 4 HOURS PRN
Qty: 45 TABLET | Refills: 0 | Status: SHIPPED | OUTPATIENT
Start: 2021-11-16 | End: 2022-04-20 | Stop reason: SDDI

## 2021-11-16 RX ORDER — AMOXICILLIN 250 MG
2 CAPSULE ORAL 2 TIMES DAILY PRN
Status: DISCONTINUED | OUTPATIENT
Start: 2021-11-16 | End: 2021-11-22 | Stop reason: ALTCHOICE

## 2021-11-16 RX ORDER — CEFAZOLIN SODIUM IN 0.9 % NACL 3 G/100 ML
3 INTRAVENOUS SOLUTION, PIGGYBACK (ML) INTRAVENOUS ONCE
Status: COMPLETED | OUTPATIENT
Start: 2021-11-16 | End: 2021-11-16

## 2021-11-16 RX ORDER — FAMOTIDINE 20 MG/1
40 TABLET, FILM COATED ORAL DAILY
Status: DISCONTINUED | OUTPATIENT
Start: 2021-11-16 | End: 2021-11-19

## 2021-11-16 RX ORDER — CEFAZOLIN SODIUM 2 G/100ML
2 INJECTION, SOLUTION INTRAVENOUS EVERY 8 HOURS
Status: DISCONTINUED | OUTPATIENT
Start: 2021-11-16 | End: 2021-11-16

## 2021-11-16 RX ORDER — CEFAZOLIN SODIUM 2 G/100ML
2 INJECTION, SOLUTION INTRAVENOUS EVERY 8 HOURS
Status: COMPLETED | OUTPATIENT
Start: 2021-11-16 | End: 2021-11-16

## 2021-11-16 RX ORDER — ONDANSETRON 2 MG/ML
4 INJECTION INTRAMUSCULAR; INTRAVENOUS ONCE AS NEEDED
Status: DISCONTINUED | OUTPATIENT
Start: 2021-11-16 | End: 2021-11-16 | Stop reason: HOSPADM

## 2021-11-16 RX ORDER — PROMETHAZINE HYDROCHLORIDE 12.5 MG/1
12.5 SUPPOSITORY RECTAL EVERY 6 HOURS PRN
Status: DISCONTINUED | OUTPATIENT
Start: 2021-11-16 | End: 2021-12-03 | Stop reason: HOSPADM

## 2021-11-16 RX ORDER — CEFAZOLIN SODIUM 2 G/100ML
2 INJECTION, SOLUTION INTRAVENOUS ONCE
Status: DISCONTINUED | OUTPATIENT
Start: 2021-11-16 | End: 2021-11-16

## 2021-11-16 RX ORDER — KETOROLAC TROMETHAMINE 15 MG/ML
15 INJECTION, SOLUTION INTRAMUSCULAR; INTRAVENOUS EVERY 6 HOURS
Status: COMPLETED | OUTPATIENT
Start: 2021-11-16 | End: 2021-11-17

## 2021-11-16 RX ORDER — ASPIRIN 325 MG
325 TABLET, DELAYED RELEASE (ENTERIC COATED) ORAL DAILY
Qty: 21 TABLET | Refills: 0 | Status: SHIPPED | OUTPATIENT
Start: 2021-11-16 | End: 2022-05-03 | Stop reason: HOSPADM

## 2021-11-16 RX ORDER — BUPIVACAINE HYDROCHLORIDE 7.5 MG/ML
INJECTION, SOLUTION EPIDURAL; RETROBULBAR
Status: COMPLETED | OUTPATIENT
Start: 2021-11-16 | End: 2021-11-16

## 2021-11-16 RX ORDER — MAGNESIUM HYDROXIDE 1200 MG/15ML
LIQUID ORAL AS NEEDED
Status: DISCONTINUED | OUTPATIENT
Start: 2021-11-16 | End: 2021-11-16 | Stop reason: HOSPADM

## 2021-11-16 RX ORDER — ACETAMINOPHEN 500 MG
1000 TABLET ORAL ONCE
Status: COMPLETED | OUTPATIENT
Start: 2021-11-16 | End: 2021-11-16

## 2021-11-16 RX ORDER — METOPROLOL SUCCINATE 25 MG/1
25 TABLET, EXTENDED RELEASE ORAL DAILY
Status: DISCONTINUED | OUTPATIENT
Start: 2021-11-17 | End: 2021-12-03 | Stop reason: HOSPADM

## 2021-11-16 RX ORDER — EPHEDRINE SULFATE 50 MG/ML
INJECTION, SOLUTION INTRAVENOUS AS NEEDED
Status: DISCONTINUED | OUTPATIENT
Start: 2021-11-16 | End: 2021-11-16 | Stop reason: SURG

## 2021-11-16 RX ORDER — ACETAMINOPHEN 500 MG
1000 TABLET ORAL EVERY 8 HOURS
Status: DISPENSED | OUTPATIENT
Start: 2021-11-16 | End: 2021-11-18

## 2021-11-16 RX ORDER — CELECOXIB 100 MG/1
200 CAPSULE ORAL ONCE
Status: COMPLETED | OUTPATIENT
Start: 2021-11-16 | End: 2021-11-16

## 2021-11-16 RX ORDER — OXYCODONE HYDROCHLORIDE 5 MG/1
5 TABLET ORAL
Status: DISCONTINUED | OUTPATIENT
Start: 2021-11-16 | End: 2021-11-16 | Stop reason: HOSPADM

## 2021-11-16 RX ORDER — GABAPENTIN 300 MG/1
600 CAPSULE ORAL ONCE
Status: COMPLETED | OUTPATIENT
Start: 2021-11-16 | End: 2021-11-16

## 2021-11-16 RX ORDER — OXYCODONE HYDROCHLORIDE AND ACETAMINOPHEN 5; 325 MG/1; MG/1
1 TABLET ORAL EVERY 4 HOURS PRN
Status: DISCONTINUED | OUTPATIENT
Start: 2021-11-16 | End: 2021-11-25

## 2021-11-16 RX ORDER — PROMETHAZINE HYDROCHLORIDE 12.5 MG/1
12.5 TABLET ORAL EVERY 6 HOURS PRN
Status: DISCONTINUED | OUTPATIENT
Start: 2021-11-16 | End: 2021-11-22 | Stop reason: ALTCHOICE

## 2021-11-16 RX ORDER — SODIUM CHLORIDE, SODIUM LACTATE, POTASSIUM CHLORIDE, CALCIUM CHLORIDE 600; 310; 30; 20 MG/100ML; MG/100ML; MG/100ML; MG/100ML
9 INJECTION, SOLUTION INTRAVENOUS CONTINUOUS PRN
Status: DISCONTINUED | OUTPATIENT
Start: 2021-11-16 | End: 2021-11-16 | Stop reason: HOSPADM

## 2021-11-16 RX ORDER — SODIUM CHLORIDE 0.9 % (FLUSH) 0.9 %
3 SYRINGE (ML) INJECTION EVERY 12 HOURS SCHEDULED
Status: DISCONTINUED | OUTPATIENT
Start: 2021-11-16 | End: 2021-12-03 | Stop reason: HOSPADM

## 2021-11-16 RX ORDER — FINASTERIDE 5 MG/1
5 TABLET, FILM COATED ORAL DAILY
Status: DISCONTINUED | OUTPATIENT
Start: 2021-11-16 | End: 2021-11-16

## 2021-11-16 RX ORDER — ONDANSETRON 2 MG/ML
4 INJECTION INTRAMUSCULAR; INTRAVENOUS EVERY 6 HOURS PRN
Status: DISCONTINUED | OUTPATIENT
Start: 2021-11-16 | End: 2021-11-25

## 2021-11-16 RX ORDER — ACETAMINOPHEN 325 MG/1
325 TABLET ORAL EVERY 4 HOURS PRN
Status: DISCONTINUED | OUTPATIENT
Start: 2021-11-16 | End: 2021-11-25

## 2021-11-16 RX ORDER — PROMETHAZINE HYDROCHLORIDE 12.5 MG/1
25 TABLET ORAL ONCE AS NEEDED
Status: DISCONTINUED | OUTPATIENT
Start: 2021-11-16 | End: 2021-11-16 | Stop reason: HOSPADM

## 2021-11-16 RX ORDER — PROMETHAZINE HYDROCHLORIDE 25 MG/1
25 SUPPOSITORY RECTAL ONCE AS NEEDED
Status: DISCONTINUED | OUTPATIENT
Start: 2021-11-16 | End: 2021-11-16 | Stop reason: HOSPADM

## 2021-11-16 RX ORDER — MEPERIDINE HYDROCHLORIDE 25 MG/ML
12.5 INJECTION INTRAMUSCULAR; INTRAVENOUS; SUBCUTANEOUS
Status: DISCONTINUED | OUTPATIENT
Start: 2021-11-16 | End: 2021-11-16 | Stop reason: HOSPADM

## 2021-11-16 RX ADMIN — ACETAMINOPHEN 1000 MG: 500 TABLET ORAL at 15:36

## 2021-11-16 RX ADMIN — PHENYLEPHRINE HYDROCHLORIDE 200 MCG: 10 INJECTION INTRAVENOUS at 07:54

## 2021-11-16 RX ADMIN — Medication 3 G: at 07:09

## 2021-11-16 RX ADMIN — EPHEDRINE SULFATE 20 MG: 50 INJECTION INTRAVENOUS at 07:52

## 2021-11-16 RX ADMIN — MIDAZOLAM HYDROCHLORIDE 2 MG: 1 INJECTION, SOLUTION INTRAMUSCULAR; INTRAVENOUS at 06:58

## 2021-11-16 RX ADMIN — ACETAMINOPHEN 1000 MG: 500 TABLET ORAL at 06:58

## 2021-11-16 RX ADMIN — EPHEDRINE SULFATE 10 MG: 50 INJECTION INTRAVENOUS at 07:35

## 2021-11-16 RX ADMIN — IPRATROPIUM BROMIDE AND ALBUTEROL SULFATE 3 ML: 2.5; .5 SOLUTION RESPIRATORY (INHALATION) at 21:02

## 2021-11-16 RX ADMIN — PHENYLEPHRINE HYDROCHLORIDE 100 MCG: 10 INJECTION INTRAVENOUS at 07:42

## 2021-11-16 RX ADMIN — BUPIVACAINE HYDROCHLORIDE 1.6 ML: 7.5 INJECTION, SOLUTION EPIDURAL; RETROBULBAR at 07:32

## 2021-11-16 RX ADMIN — FAMOTIDINE 40 MG: 20 TABLET, FILM COATED ORAL at 11:54

## 2021-11-16 RX ADMIN — KETOROLAC TROMETHAMINE 15 MG: 15 INJECTION, SOLUTION INTRAMUSCULAR; INTRAVENOUS at 17:42

## 2021-11-16 RX ADMIN — PHENYLEPHRINE HYDROCHLORIDE 100 MCG: 10 INJECTION INTRAVENOUS at 08:02

## 2021-11-16 RX ADMIN — SODIUM CHLORIDE, POTASSIUM CHLORIDE, SODIUM LACTATE AND CALCIUM CHLORIDE 9 ML/HR: 600; 310; 30; 20 INJECTION, SOLUTION INTRAVENOUS at 06:54

## 2021-11-16 RX ADMIN — CEFAZOLIN SODIUM 2 G: 2 INJECTION, SOLUTION INTRAVENOUS at 15:36

## 2021-11-16 RX ADMIN — PROPOFOL 150 MCG/KG/MIN: 10 INJECTION, EMULSION INTRAVENOUS at 07:18

## 2021-11-16 RX ADMIN — PHENYLEPHRINE HYDROCHLORIDE 100 MCG: 10 INJECTION INTRAVENOUS at 08:15

## 2021-11-16 RX ADMIN — SODIUM CHLORIDE, POTASSIUM CHLORIDE, SODIUM LACTATE AND CALCIUM CHLORIDE 100 ML/HR: 600; 310; 30; 20 INJECTION, SOLUTION INTRAVENOUS at 11:54

## 2021-11-16 RX ADMIN — KETOROLAC TROMETHAMINE 15 MG: 15 INJECTION, SOLUTION INTRAMUSCULAR; INTRAVENOUS at 23:11

## 2021-11-16 RX ADMIN — CELECOXIB 200 MG: 100 CAPSULE ORAL at 06:57

## 2021-11-16 RX ADMIN — GABAPENTIN 600 MG: 300 CAPSULE ORAL at 06:57

## 2021-11-16 RX ADMIN — KETOROLAC TROMETHAMINE 15 MG: 15 INJECTION, SOLUTION INTRAMUSCULAR; INTRAVENOUS at 11:54

## 2021-11-16 RX ADMIN — SODIUM CHLORIDE 500 ML: 9 INJECTION, SOLUTION INTRAVENOUS at 14:43

## 2021-11-16 RX ADMIN — PHENYLEPHRINE HYDROCHLORIDE 200 MCG: 10 INJECTION INTRAVENOUS at 08:07

## 2021-11-16 RX ADMIN — PHENYLEPHRINE HYDROCHLORIDE 100 MCG: 10 INJECTION INTRAVENOUS at 08:22

## 2021-11-16 RX ADMIN — ACETAMINOPHEN 1000 MG: 500 TABLET ORAL at 23:11

## 2021-11-16 RX ADMIN — CEFAZOLIN SODIUM 2 G: 2 INJECTION, SOLUTION INTRAVENOUS at 23:11

## 2021-11-16 NOTE — ANESTHESIA PROCEDURE NOTES
Spinal Block      Patient reassessed immediately prior to procedure    Patient location during procedure: OR  Start Time: 11/16/2021 7:15 AM  Stop Time: 11/16/2021 7:20 AM  Performed By  CRNA: Jai Blake CRNA  Preanesthetic Checklist  Completed: patient identified, IV checked, site marked, risks and benefits discussed, surgical consent, monitors and equipment checked, pre-op evaluation and timeout performed  Spinal Block Prep:  Patient Position:sitting  Sterile Tech:gloves, mask, sterile barriers and cap  Prep:Chloraprep  Patient Monitoring:blood pressure monitoring, continuous pulse oximetry and EKG  Spinal Block Procedure  Approach:midline  Guidance:landmark technique and palpation technique  Location:L3-L4  Needle Type:Fletcher  Needle Gauge:25 G  Placement of Spinal needle event:cerebrospinal fluid aspirated  Paresthesia: no  Fluid Appearance:clear  Medications: bupivacaine PF (MARCAINE) injection 0.75%, 1.6 mL   Post Assessment  Patient Tolerance:patient tolerated the procedure well with no apparent complications  Complications no

## 2021-11-16 NOTE — ANESTHESIA PREPROCEDURE EVALUATION
Anesthesia Evaluation     Patient summary reviewed and Nursing notes reviewed   no history of anesthetic complications:  NPO Solid Status: > 8 hours  NPO Liquid Status: > 2 hours           Airway   Mallampati: II  TM distance: >3 FB  Neck ROM: full  No difficulty expected  Dental    (+) partials    Pulmonary    (+) COPD, sleep apnea, decreased breath sounds,   Cardiovascular - normal exam  Exercise tolerance: good (4-7 METS)    Rhythm: regular    (+) hypertension, past MI , CAD, CABG, PVD, hyperlipidemia,       Neuro/Psych  (+) CVA, psychiatric history,     GI/Hepatic/Renal/Endo    (+)  GERD,  renal disease,     Musculoskeletal     (+) neck pain,   Abdominal    Substance History - negative use     OB/GYN negative ob/gyn ROS         Other   arthritis,    history of cancer                    Anesthesia Plan    ASA 3     spinal       Anesthetic plan, all risks, benefits, and alternatives have been provided, discussed and informed consent has been obtained with: patient.    Plan discussed with CRNA.

## 2021-11-16 NOTE — ANESTHESIA POSTPROCEDURE EVALUATION
Patient: Rafael Gomez    Procedure Summary     Date: 11/16/21 Room / Location: [facility] OR 03 / [facility] MAIN OR    Anesthesia Start: 0709 Anesthesia Stop: 0843    Procedure: LEFT TOTAL HIP ARTHROPLASTY (Left Hip) Diagnosis:       Left hip pain      (Left hip pain [M25.552])    Surgeons: Man Francois MD Provider: Isidro Ansari MD    Anesthesia Type: spinal ASA Status: 3          Anesthesia Type: spinal    Vitals  Vitals Value Taken Time   BP     Temp     Pulse 84 11/16/21 0859   Resp     SpO2 99 % 11/16/21 0859   Vitals shown include unvalidated device data.        Post Anesthesia Care and Evaluation    Patient location during evaluation: bedside  Patient participation: complete - patient participated  Level of consciousness: awake  Pain management: adequate  Airway patency: patent  Anesthetic complications: No anesthetic complications  PONV Status: none  Cardiovascular status: acceptable  Respiratory status: acceptable  Hydration status: acceptable    Comments: An Anesthesiologist personally participated in the most demanding procedures (including induction and emergence if applicable) in the anesthesia plan, monitored the course of anesthesia administration at frequent intervals and remained physically present and available for immediate diagnosis and treatment of emergencies.

## 2021-11-17 ENCOUNTER — APPOINTMENT (OUTPATIENT)
Dept: GENERAL RADIOLOGY | Facility: HOSPITAL | Age: 76
End: 2021-11-17

## 2021-11-17 LAB
ALBUMIN SERPL-MCNC: 3.7 G/DL (ref 3.5–5.2)
ANION GAP SERPL CALCULATED.3IONS-SCNC: 11.4 MMOL/L (ref 5–15)
ANION GAP SERPL CALCULATED.3IONS-SCNC: 9.3 MMOL/L (ref 5–15)
ARTERIAL PATENCY WRIST A: POSITIVE
BASE EXCESS BLDA CALC-SCNC: -7.4 MMOL/L (ref -2–2)
BDY SITE: ABNORMAL
BUN SERPL-MCNC: 49 MG/DL (ref 8–23)
BUN SERPL-MCNC: 57 MG/DL (ref 8–23)
BUN/CREAT SERPL: 16.7 (ref 7–25)
BUN/CREAT SERPL: 17.7 (ref 7–25)
CALCIUM SPEC-SCNC: 7.8 MG/DL (ref 8.6–10.5)
CALCIUM SPEC-SCNC: 7.9 MG/DL (ref 8.6–10.5)
CHLORIDE SERPL-SCNC: 104 MMOL/L (ref 98–107)
CHLORIDE SERPL-SCNC: 105 MMOL/L (ref 98–107)
CO2 SERPL-SCNC: 18.6 MMOL/L (ref 22–29)
CO2 SERPL-SCNC: 19.7 MMOL/L (ref 22–29)
COHGB MFR BLD: 0.3 % (ref 0–1.5)
CREAT SERPL-MCNC: 2.77 MG/DL (ref 0.76–1.27)
CREAT SERPL-MCNC: 3.41 MG/DL (ref 0.76–1.27)
D-LACTATE SERPL-SCNC: 1.6 MMOL/L (ref 0.5–2)
FHHB: 5.8 % (ref 0–5)
GAS FLOW AIRWAY: 2 LPM
GFR SERPL CREATININE-BSD FRML MDRD: 18 ML/MIN/1.73
GFR SERPL CREATININE-BSD FRML MDRD: 22 ML/MIN/1.73
GLUCOSE SERPL-MCNC: 103 MG/DL (ref 65–99)
GLUCOSE SERPL-MCNC: 98 MG/DL (ref 65–99)
HCO3 BLDA-SCNC: 17.8 MMOL/L (ref 22–26)
HCT VFR BLD AUTO: 22.7 % (ref 37.5–51)
HCT VFR BLD AUTO: 23.1 % (ref 37.5–51)
HGB BLD-MCNC: 7.6 G/DL (ref 13–17.7)
HGB BLD-MCNC: 7.8 G/DL (ref 13–17.7)
HGB BLDA-MCNC: 8.5 G/DL (ref 13.8–16.4)
INHALED O2 CONCENTRATION: 28 %
LACTATE BLDA-SCNC: ABNORMAL MMOL/L
METHGB BLD QL: 0.1 % (ref 0–1.5)
MODALITY: ABNORMAL
OXYHGB MFR BLDV: 93.8 % (ref 94–99)
PCO2 BLDA: 34.9 MM HG (ref 35–45)
PH BLDA: 7.33 PH UNITS (ref 7.35–7.45)
PHOSPHATE SERPL-MCNC: 5.3 MG/DL (ref 2.5–4.5)
PO2 BLD: 283 MM[HG] (ref 0–500)
PO2 BLDA: 79.2 MM HG (ref 80–100)
POTASSIUM SERPL-SCNC: 5.3 MMOL/L (ref 3.5–5.2)
POTASSIUM SERPL-SCNC: 5.7 MMOL/L (ref 3.5–5.2)
SAO2 % BLDCOA: 94.2 % (ref 95–99)
SODIUM SERPL-SCNC: 134 MMOL/L (ref 136–145)
SODIUM SERPL-SCNC: 134 MMOL/L (ref 136–145)
URATE SERPL-MCNC: 4.9 MG/DL (ref 3.4–7)
WHOLE BLOOD HOLD SPECIMEN: NORMAL

## 2021-11-17 PROCEDURE — P9016 RBC LEUKOCYTES REDUCED: HCPCS

## 2021-11-17 PROCEDURE — 80048 BASIC METABOLIC PNL TOTAL CA: CPT | Performed by: ORTHOPAEDIC SURGERY

## 2021-11-17 PROCEDURE — 71045 X-RAY EXAM CHEST 1 VIEW: CPT

## 2021-11-17 PROCEDURE — 80069 RENAL FUNCTION PANEL: CPT | Performed by: INTERNAL MEDICINE

## 2021-11-17 PROCEDURE — 36430 TRANSFUSION BLD/BLD COMPNT: CPT

## 2021-11-17 PROCEDURE — 84550 ASSAY OF BLOOD/URIC ACID: CPT | Performed by: INTERNAL MEDICINE

## 2021-11-17 PROCEDURE — 97110 THERAPEUTIC EXERCISES: CPT

## 2021-11-17 PROCEDURE — 85018 HEMOGLOBIN: CPT | Performed by: INTERNAL MEDICINE

## 2021-11-17 PROCEDURE — 36600 WITHDRAWAL OF ARTERIAL BLOOD: CPT | Performed by: INTERNAL MEDICINE

## 2021-11-17 PROCEDURE — 94799 UNLISTED PULMONARY SVC/PX: CPT

## 2021-11-17 PROCEDURE — 85014 HEMATOCRIT: CPT | Performed by: ORTHOPAEDIC SURGERY

## 2021-11-17 PROCEDURE — 86900 BLOOD TYPING SEROLOGIC ABO: CPT

## 2021-11-17 PROCEDURE — 25010000002 KETOROLAC TROMETHAMINE PER 15 MG: Performed by: ORTHOPAEDIC SURGERY

## 2021-11-17 PROCEDURE — 83050 HGB METHEMOGLOBIN QUAN: CPT | Performed by: INTERNAL MEDICINE

## 2021-11-17 PROCEDURE — 85018 HEMOGLOBIN: CPT | Performed by: ORTHOPAEDIC SURGERY

## 2021-11-17 PROCEDURE — 82375 ASSAY CARBOXYHB QUANT: CPT | Performed by: INTERNAL MEDICINE

## 2021-11-17 PROCEDURE — 83605 ASSAY OF LACTIC ACID: CPT | Performed by: INTERNAL MEDICINE

## 2021-11-17 PROCEDURE — P9047 ALBUMIN (HUMAN), 25%, 50ML: HCPCS | Performed by: INTERNAL MEDICINE

## 2021-11-17 PROCEDURE — 25010000002 ALBUMIN HUMAN 25% PER 50 ML: Performed by: INTERNAL MEDICINE

## 2021-11-17 PROCEDURE — 25010000002 ONDANSETRON PER 1 MG: Performed by: ORTHOPAEDIC SURGERY

## 2021-11-17 PROCEDURE — 85014 HEMATOCRIT: CPT | Performed by: INTERNAL MEDICINE

## 2021-11-17 PROCEDURE — 82805 BLOOD GASES W/O2 SATURATION: CPT | Performed by: INTERNAL MEDICINE

## 2021-11-17 PROCEDURE — 87635 SARS-COV-2 COVID-19 AMP PRB: CPT | Performed by: INTERNAL MEDICINE

## 2021-11-17 RX ORDER — ALBUMIN (HUMAN) 12.5 G/50ML
25 SOLUTION INTRAVENOUS ONCE
Status: COMPLETED | OUTPATIENT
Start: 2021-11-17 | End: 2021-11-17

## 2021-11-17 RX ORDER — PRAMIPEXOLE DIHYDROCHLORIDE 1 MG/1
1 TABLET ORAL NIGHTLY
Status: DISCONTINUED | OUTPATIENT
Start: 2021-11-17 | End: 2021-11-20

## 2021-11-17 RX ADMIN — APIXABAN 2.5 MG: 2.5 TABLET, FILM COATED ORAL at 08:54

## 2021-11-17 RX ADMIN — IPRATROPIUM BROMIDE AND ALBUTEROL SULFATE 3 ML: 2.5; .5 SOLUTION RESPIRATORY (INHALATION) at 12:37

## 2021-11-17 RX ADMIN — ACETAMINOPHEN 1000 MG: 500 TABLET ORAL at 06:42

## 2021-11-17 RX ADMIN — SODIUM CHLORIDE, POTASSIUM CHLORIDE, SODIUM LACTATE AND CALCIUM CHLORIDE 1000 ML: 600; 310; 30; 20 INJECTION, SOLUTION INTRAVENOUS at 16:40

## 2021-11-17 RX ADMIN — ONDANSETRON 4 MG: 2 INJECTION INTRAMUSCULAR; INTRAVENOUS at 17:47

## 2021-11-17 RX ADMIN — FAMOTIDINE 40 MG: 20 TABLET, FILM COATED ORAL at 08:54

## 2021-11-17 RX ADMIN — IPRATROPIUM BROMIDE AND ALBUTEROL SULFATE 3 ML: 2.5; .5 SOLUTION RESPIRATORY (INHALATION) at 00:20

## 2021-11-17 RX ADMIN — KETOROLAC TROMETHAMINE 15 MG: 15 INJECTION, SOLUTION INTRAMUSCULAR; INTRAVENOUS at 05:37

## 2021-11-17 RX ADMIN — IPRATROPIUM BROMIDE AND ALBUTEROL SULFATE 3 ML: 2.5; .5 SOLUTION RESPIRATORY (INHALATION) at 06:27

## 2021-11-17 RX ADMIN — IPRATROPIUM BROMIDE AND ALBUTEROL SULFATE 3 ML: 2.5; .5 SOLUTION RESPIRATORY (INHALATION) at 04:41

## 2021-11-17 RX ADMIN — CITALOPRAM HYDROBROMIDE 40 MG: 20 TABLET ORAL at 06:42

## 2021-11-17 RX ADMIN — ONDANSETRON 4 MG: 2 INJECTION INTRAMUSCULAR; INTRAVENOUS at 03:15

## 2021-11-17 RX ADMIN — ACETAMINOPHEN 325 MG: 325 TABLET ORAL at 20:52

## 2021-11-17 RX ADMIN — PRAMIPEXOLE DIHYDROCHLORIDE 1 MG: 1 TABLET ORAL at 20:45

## 2021-11-17 RX ADMIN — APIXABAN 2.5 MG: 2.5 TABLET, FILM COATED ORAL at 20:45

## 2021-11-17 RX ADMIN — IPRATROPIUM BROMIDE AND ALBUTEROL SULFATE 3 ML: 2.5; .5 SOLUTION RESPIRATORY (INHALATION) at 19:42

## 2021-11-17 RX ADMIN — ACETAMINOPHEN 1000 MG: 500 TABLET ORAL at 15:29

## 2021-11-17 RX ADMIN — IPRATROPIUM BROMIDE AND ALBUTEROL SULFATE 3 ML: 2.5; .5 SOLUTION RESPIRATORY (INHALATION) at 23:19

## 2021-11-17 RX ADMIN — PRAMIPEXOLE DIHYDROCHLORIDE 1 MG: 1 TABLET ORAL at 12:53

## 2021-11-17 RX ADMIN — ALBUMIN HUMAN 25 G: 0.25 SOLUTION INTRAVENOUS at 17:56

## 2021-11-17 RX ADMIN — FINASTERIDE 5 MG: 5 TABLET, FILM COATED ORAL at 08:55

## 2021-11-17 RX ADMIN — SODIUM CHLORIDE, PRESERVATIVE FREE 3 ML: 5 INJECTION INTRAVENOUS at 08:56

## 2021-11-18 PROBLEM — N18.9 ACUTE-ON-CHRONIC KIDNEY INJURY (HCC): Status: ACTIVE | Noted: 2021-11-18

## 2021-11-18 PROBLEM — N17.9 ACUTE-ON-CHRONIC KIDNEY INJURY (HCC): Status: ACTIVE | Noted: 2021-11-18

## 2021-11-18 LAB
ALBUMIN SERPL-MCNC: 3.5 G/DL (ref 3.5–5.2)
ANION GAP SERPL CALCULATED.3IONS-SCNC: 12 MMOL/L (ref 5–15)
ANISOCYTOSIS BLD QL: NORMAL
BASOPHILS # BLD AUTO: 0.02 10*3/MM3 (ref 0–0.2)
BASOPHILS NFR BLD AUTO: 0.5 % (ref 0–1.5)
BUN SERPL-MCNC: 64 MG/DL (ref 8–23)
BUN/CREAT SERPL: 16.5 (ref 7–25)
CALCIUM SPEC-SCNC: 7.6 MG/DL (ref 8.6–10.5)
CHLORIDE SERPL-SCNC: 103 MMOL/L (ref 98–107)
CO2 SERPL-SCNC: 18 MMOL/L (ref 22–29)
CREAT SERPL-MCNC: 3.89 MG/DL (ref 0.76–1.27)
D-LACTATE SERPL-SCNC: 1.5 MMOL/L (ref 0.5–2)
DEPRECATED RDW RBC AUTO: 65.1 FL (ref 37–54)
EOSINOPHIL # BLD AUTO: 0.03 10*3/MM3 (ref 0–0.4)
EOSINOPHIL NFR BLD AUTO: 0.7 % (ref 0.3–6.2)
ERYTHROCYTE [DISTWIDTH] IN BLOOD BY AUTOMATED COUNT: 18.5 % (ref 12.3–15.4)
GFR SERPL CREATININE-BSD FRML MDRD: 15 ML/MIN/1.73
GLUCOSE BLDC GLUCOMTR-MCNC: 86 MG/DL (ref 70–99)
GLUCOSE SERPL-MCNC: 94 MG/DL (ref 65–99)
HCT VFR BLD AUTO: 23.7 % (ref 37.5–51)
HGB BLD-MCNC: 8 G/DL (ref 13–17.7)
IMM GRANULOCYTES # BLD AUTO: 0.03 10*3/MM3 (ref 0–0.05)
IMM GRANULOCYTES NFR BLD AUTO: 0.7 % (ref 0–0.5)
LYMPHOCYTES # BLD AUTO: 0.43 10*3/MM3 (ref 0.7–3.1)
LYMPHOCYTES NFR BLD AUTO: 9.8 % (ref 19.6–45.3)
MACROCYTES BLD QL SMEAR: NORMAL
MAGNESIUM SERPL-MCNC: 1.4 MG/DL (ref 1.6–2.4)
MCH RBC QN AUTO: 33.3 PG (ref 26.6–33)
MCHC RBC AUTO-ENTMCNC: 33.8 G/DL (ref 31.5–35.7)
MCV RBC AUTO: 98.8 FL (ref 79–97)
MONOCYTES # BLD AUTO: 0.46 10*3/MM3 (ref 0.1–0.9)
MONOCYTES NFR BLD AUTO: 10.5 % (ref 5–12)
NEUTROPHILS NFR BLD AUTO: 3.42 10*3/MM3 (ref 1.7–7)
NEUTROPHILS NFR BLD AUTO: 77.8 % (ref 42.7–76)
NRBC BLD AUTO-RTO: 0.5 /100 WBC (ref 0–0.2)
OVALOCYTES BLD QL SMEAR: NORMAL
PHOSPHATE SERPL-MCNC: 5.2 MG/DL (ref 2.5–4.5)
PLATELET # BLD AUTO: 92 10*3/MM3 (ref 140–450)
PMV BLD AUTO: 11.5 FL (ref 6–12)
POIKILOCYTOSIS BLD QL SMEAR: NORMAL
POTASSIUM SERPL-SCNC: 5.6 MMOL/L (ref 3.5–5.2)
PROCALCITONIN SERPL-MCNC: 1.82 NG/ML (ref 0–0.25)
RBC # BLD AUTO: 2.4 10*6/MM3 (ref 4.14–5.8)
SARS-COV-2 N GENE RESP QL NAA+PROBE: NOT DETECTED
SMALL PLATELETS BLD QL SMEAR: NORMAL
SODIUM SERPL-SCNC: 133 MMOL/L (ref 136–145)
WBC MORPH BLD: NORMAL
WBC NRBC COR # BLD: 4.39 10*3/MM3 (ref 3.4–10.8)

## 2021-11-18 PROCEDURE — 80069 RENAL FUNCTION PANEL: CPT | Performed by: INTERNAL MEDICINE

## 2021-11-18 PROCEDURE — 82962 GLUCOSE BLOOD TEST: CPT

## 2021-11-18 PROCEDURE — 94760 N-INVAS EAR/PLS OXIMETRY 1: CPT

## 2021-11-18 PROCEDURE — 85025 COMPLETE CBC W/AUTO DIFF WBC: CPT | Performed by: INTERNAL MEDICINE

## 2021-11-18 PROCEDURE — 94660 CPAP INITIATION&MGMT: CPT

## 2021-11-18 PROCEDURE — 25010000002 VANCOMYCIN 5 G RECONSTITUTED SOLUTION: Performed by: INTERNAL MEDICINE

## 2021-11-18 PROCEDURE — 25010000002 CEFEPIME PER 500 MG: Performed by: INTERNAL MEDICINE

## 2021-11-18 PROCEDURE — 94799 UNLISTED PULMONARY SVC/PX: CPT

## 2021-11-18 PROCEDURE — 84145 PROCALCITONIN (PCT): CPT | Performed by: INTERNAL MEDICINE

## 2021-11-18 PROCEDURE — 83735 ASSAY OF MAGNESIUM: CPT | Performed by: INTERNAL MEDICINE

## 2021-11-18 PROCEDURE — 87040 BLOOD CULTURE FOR BACTERIA: CPT | Performed by: INTERNAL MEDICINE

## 2021-11-18 PROCEDURE — P9047 ALBUMIN (HUMAN), 25%, 50ML: HCPCS | Performed by: INTERNAL MEDICINE

## 2021-11-18 PROCEDURE — 25010000002 ALBUMIN HUMAN 25% PER 50 ML: Performed by: INTERNAL MEDICINE

## 2021-11-18 PROCEDURE — 85007 BL SMEAR W/DIFF WBC COUNT: CPT | Performed by: INTERNAL MEDICINE

## 2021-11-18 PROCEDURE — 94761 N-INVAS EAR/PLS OXIMETRY MLT: CPT

## 2021-11-18 PROCEDURE — 83605 ASSAY OF LACTIC ACID: CPT | Performed by: INTERNAL MEDICINE

## 2021-11-18 RX ORDER — SODIUM CHLORIDE 9 MG/ML
50 INJECTION, SOLUTION INTRAVENOUS CONTINUOUS
Status: ACTIVE | OUTPATIENT
Start: 2021-11-18 | End: 2021-11-20

## 2021-11-18 RX ORDER — CEFEPIME 1 G/50ML
2 INJECTION, SOLUTION INTRAVENOUS ONCE
Status: COMPLETED | OUTPATIENT
Start: 2021-11-18 | End: 2021-11-18

## 2021-11-18 RX ORDER — ALBUMIN (HUMAN) 12.5 G/50ML
25 SOLUTION INTRAVENOUS ONCE
Status: COMPLETED | OUTPATIENT
Start: 2021-11-18 | End: 2021-11-18

## 2021-11-18 RX ORDER — CEFEPIME 1 G/50ML
2 INJECTION, SOLUTION INTRAVENOUS EVERY 24 HOURS
Status: COMPLETED | OUTPATIENT
Start: 2021-11-19 | End: 2021-11-25

## 2021-11-18 RX ORDER — SODIUM POLYSTYRENE SULFONATE 15 G/60ML
15 SUSPENSION ORAL; RECTAL ONCE
Status: COMPLETED | OUTPATIENT
Start: 2021-11-18 | End: 2021-11-18

## 2021-11-18 RX ORDER — SODIUM BICARBONATE 650 MG/1
650 TABLET ORAL 4 TIMES DAILY
Status: DISCONTINUED | OUTPATIENT
Start: 2021-11-18 | End: 2021-11-19

## 2021-11-18 RX ADMIN — CITALOPRAM HYDROBROMIDE 40 MG: 20 TABLET ORAL at 06:21

## 2021-11-18 RX ADMIN — MIDODRINE HYDROCHLORIDE 15 MG: 10 TABLET ORAL at 18:05

## 2021-11-18 RX ADMIN — SODIUM BICARBONATE 650 MG: 650 TABLET ORAL at 18:05

## 2021-11-18 RX ADMIN — SODIUM CHLORIDE 100 ML/HR: 9 INJECTION, SOLUTION INTRAVENOUS at 09:33

## 2021-11-18 RX ADMIN — FINASTERIDE 5 MG: 5 TABLET, FILM COATED ORAL at 09:11

## 2021-11-18 RX ADMIN — SODIUM POLYSTYRENE SULFONATE 15 G: 15 SUSPENSION ORAL; RECTAL at 09:11

## 2021-11-18 RX ADMIN — SODIUM BICARBONATE 650 MG: 650 TABLET ORAL at 20:16

## 2021-11-18 RX ADMIN — APIXABAN 2.5 MG: 2.5 TABLET, FILM COATED ORAL at 09:12

## 2021-11-18 RX ADMIN — PRAMIPEXOLE DIHYDROCHLORIDE 1 MG: 1 TABLET ORAL at 20:16

## 2021-11-18 RX ADMIN — SODIUM BICARBONATE 650 MG: 650 TABLET ORAL at 12:52

## 2021-11-18 RX ADMIN — SODIUM CHLORIDE 1000 ML: 9 INJECTION, SOLUTION INTRAVENOUS at 16:46

## 2021-11-18 RX ADMIN — APIXABAN 2.5 MG: 2.5 TABLET, FILM COATED ORAL at 20:16

## 2021-11-18 RX ADMIN — OXYCODONE HYDROCHLORIDE AND ACETAMINOPHEN 1 TABLET: 5; 325 TABLET ORAL at 09:25

## 2021-11-18 RX ADMIN — FAMOTIDINE 40 MG: 20 TABLET, FILM COATED ORAL at 09:11

## 2021-11-18 RX ADMIN — VANCOMYCIN HYDROCHLORIDE 2000 MG: 5 INJECTION, POWDER, LYOPHILIZED, FOR SOLUTION INTRAVENOUS at 16:43

## 2021-11-18 RX ADMIN — SODIUM CHLORIDE, PRESERVATIVE FREE 3 ML: 5 INJECTION INTRAVENOUS at 20:17

## 2021-11-18 RX ADMIN — SODIUM CHLORIDE, PRESERVATIVE FREE 3 ML: 5 INJECTION INTRAVENOUS at 09:12

## 2021-11-18 RX ADMIN — IPRATROPIUM BROMIDE AND ALBUTEROL SULFATE 3 ML: 2.5; .5 SOLUTION RESPIRATORY (INHALATION) at 07:09

## 2021-11-18 RX ADMIN — CEFEPIME 2 G: 1 INJECTION, SOLUTION INTRAVENOUS at 17:40

## 2021-11-18 RX ADMIN — IPRATROPIUM BROMIDE AND ALBUTEROL SULFATE 3 ML: 2.5; .5 SOLUTION RESPIRATORY (INHALATION) at 12:30

## 2021-11-18 RX ADMIN — IPRATROPIUM BROMIDE AND ALBUTEROL SULFATE 3 ML: 2.5; .5 SOLUTION RESPIRATORY (INHALATION) at 15:57

## 2021-11-18 RX ADMIN — ACETAMINOPHEN 325 MG: 325 TABLET ORAL at 20:18

## 2021-11-18 RX ADMIN — ACETAMINOPHEN 325 MG: 325 TABLET ORAL at 14:45

## 2021-11-18 RX ADMIN — ALBUMIN HUMAN 25 G: 0.25 SOLUTION INTRAVENOUS at 14:15

## 2021-11-18 RX ADMIN — SODIUM ZIRCONIUM CYCLOSILICATE 10 G: 10 POWDER, FOR SUSPENSION ORAL at 20:16

## 2021-11-18 RX ADMIN — SODIUM ZIRCONIUM CYCLOSILICATE 10 G: 10 POWDER, FOR SUSPENSION ORAL at 12:52

## 2021-11-18 RX ADMIN — METOPROLOL SUCCINATE 25 MG: 25 TABLET, EXTENDED RELEASE ORAL at 09:10

## 2021-11-18 RX ADMIN — IPRATROPIUM BROMIDE AND ALBUTEROL SULFATE 3 ML: 2.5; .5 SOLUTION RESPIRATORY (INHALATION) at 20:52

## 2021-11-19 ENCOUNTER — APPOINTMENT (OUTPATIENT)
Dept: CARDIOLOGY | Facility: HOSPITAL | Age: 76
End: 2021-11-19

## 2021-11-19 ENCOUNTER — APPOINTMENT (OUTPATIENT)
Dept: GENERAL RADIOLOGY | Facility: HOSPITAL | Age: 76
End: 2021-11-19

## 2021-11-19 PROBLEM — A41.9 SEVERE SEPSIS: Status: ACTIVE | Noted: 2021-11-19

## 2021-11-19 PROBLEM — R65.20 SEVERE SEPSIS (HCC): Status: ACTIVE | Noted: 2021-11-19

## 2021-11-19 LAB
ANION GAP SERPL CALCULATED.3IONS-SCNC: 13.6 MMOL/L (ref 5–15)
BH BB BLOOD EXPIRATION DATE: NORMAL
BH BB BLOOD EXPIRATION DATE: NORMAL
BH BB BLOOD TYPE BARCODE: 5100
BH BB BLOOD TYPE BARCODE: 5100
BH BB DISPENSE STATUS: NORMAL
BH BB DISPENSE STATUS: NORMAL
BH BB PRODUCT CODE: NORMAL
BH BB PRODUCT CODE: NORMAL
BH BB UNIT NUMBER: NORMAL
BH BB UNIT NUMBER: NORMAL
BUN SERPL-MCNC: 64 MG/DL (ref 8–23)
BUN/CREAT SERPL: 15 (ref 7–25)
CALCIUM SPEC-SCNC: 7.1 MG/DL (ref 8.6–10.5)
CHLORIDE SERPL-SCNC: 107 MMOL/L (ref 98–107)
CO2 SERPL-SCNC: 16.4 MMOL/L (ref 22–29)
CREAT SERPL-MCNC: 4.27 MG/DL (ref 0.76–1.27)
CROSSMATCH INTERPRETATION: NORMAL
CROSSMATCH INTERPRETATION: NORMAL
GFR SERPL CREATININE-BSD FRML MDRD: 14 ML/MIN/1.73
GFR SERPL CREATININE-BSD FRML MDRD: ABNORMAL ML/MIN/{1.73_M2}
GLUCOSE SERPL-MCNC: 119 MG/DL (ref 65–99)
HCT VFR BLD AUTO: 22.4 % (ref 37.5–51)
HGB BLD-MCNC: 7.4 G/DL (ref 13–17.7)
POTASSIUM SERPL-SCNC: 5 MMOL/L (ref 3.5–5.2)
SODIUM SERPL-SCNC: 137 MMOL/L (ref 136–145)
UNIT  ABO: NORMAL
UNIT  ABO: NORMAL
UNIT  RH: NORMAL
UNIT  RH: NORMAL
VANCOMYCIN SERPL-MCNC: 19.96 MCG/ML (ref 5–40)

## 2021-11-19 PROCEDURE — 25010000002 FUROSEMIDE PER 20 MG: Performed by: INTERNAL MEDICINE

## 2021-11-19 PROCEDURE — 85014 HEMATOCRIT: CPT | Performed by: ORTHOPAEDIC SURGERY

## 2021-11-19 PROCEDURE — 94799 UNLISTED PULMONARY SVC/PX: CPT

## 2021-11-19 PROCEDURE — 97110 THERAPEUTIC EXERCISES: CPT

## 2021-11-19 PROCEDURE — 94660 CPAP INITIATION&MGMT: CPT

## 2021-11-19 PROCEDURE — 94761 N-INVAS EAR/PLS OXIMETRY MLT: CPT

## 2021-11-19 PROCEDURE — 93306 TTE W/DOPPLER COMPLETE: CPT

## 2021-11-19 PROCEDURE — 97530 THERAPEUTIC ACTIVITIES: CPT

## 2021-11-19 PROCEDURE — 85018 HEMOGLOBIN: CPT | Performed by: ORTHOPAEDIC SURGERY

## 2021-11-19 PROCEDURE — 80202 ASSAY OF VANCOMYCIN: CPT | Performed by: INTERNAL MEDICINE

## 2021-11-19 PROCEDURE — 25010000002 CEFEPIME PER 500 MG: Performed by: INTERNAL MEDICINE

## 2021-11-19 PROCEDURE — 80048 BASIC METABOLIC PNL TOTAL CA: CPT | Performed by: INTERNAL MEDICINE

## 2021-11-19 PROCEDURE — 71045 X-RAY EXAM CHEST 1 VIEW: CPT

## 2021-11-19 RX ORDER — FUROSEMIDE 10 MG/ML
80 INJECTION INTRAMUSCULAR; INTRAVENOUS ONCE
Status: COMPLETED | OUTPATIENT
Start: 2021-11-19 | End: 2021-11-19

## 2021-11-19 RX ORDER — SODIUM BICARBONATE 650 MG/1
1300 TABLET ORAL 4 TIMES DAILY
Status: DISCONTINUED | OUTPATIENT
Start: 2021-11-19 | End: 2021-11-20

## 2021-11-19 RX ORDER — MIDODRINE HYDROCHLORIDE 10 MG/1
10 TABLET ORAL
Status: DISCONTINUED | OUTPATIENT
Start: 2021-11-19 | End: 2021-11-23

## 2021-11-19 RX ORDER — FAMOTIDINE 20 MG/1
20 TABLET, FILM COATED ORAL DAILY
Status: DISCONTINUED | OUTPATIENT
Start: 2021-11-20 | End: 2021-11-23

## 2021-11-19 RX ADMIN — MIDODRINE HYDROCHLORIDE 15 MG: 10 TABLET ORAL at 02:20

## 2021-11-19 RX ADMIN — IPRATROPIUM BROMIDE AND ALBUTEROL SULFATE 3 ML: 2.5; .5 SOLUTION RESPIRATORY (INHALATION) at 19:45

## 2021-11-19 RX ADMIN — PRAMIPEXOLE DIHYDROCHLORIDE 1 MG: 1 TABLET ORAL at 20:28

## 2021-11-19 RX ADMIN — CEFEPIME 2 G: 1 INJECTION, SOLUTION INTRAVENOUS at 15:32

## 2021-11-19 RX ADMIN — ACETAMINOPHEN 325 MG: 325 TABLET ORAL at 16:29

## 2021-11-19 RX ADMIN — OXYCODONE HYDROCHLORIDE AND ACETAMINOPHEN 2 TABLET: 5; 325 TABLET ORAL at 02:19

## 2021-11-19 RX ADMIN — IPRATROPIUM BROMIDE AND ALBUTEROL SULFATE 3 ML: 2.5; .5 SOLUTION RESPIRATORY (INHALATION) at 15:17

## 2021-11-19 RX ADMIN — SODIUM BICARBONATE 1300 MG: 650 TABLET ORAL at 20:27

## 2021-11-19 RX ADMIN — IPRATROPIUM BROMIDE AND ALBUTEROL SULFATE 3 ML: 2.5; .5 SOLUTION RESPIRATORY (INHALATION) at 11:41

## 2021-11-19 RX ADMIN — CITALOPRAM HYDROBROMIDE 40 MG: 20 TABLET ORAL at 06:45

## 2021-11-19 RX ADMIN — APIXABAN 2.5 MG: 2.5 TABLET, FILM COATED ORAL at 08:47

## 2021-11-19 RX ADMIN — APIXABAN 2.5 MG: 2.5 TABLET, FILM COATED ORAL at 20:27

## 2021-11-19 RX ADMIN — SODIUM CHLORIDE, PRESERVATIVE FREE 3 ML: 5 INJECTION INTRAVENOUS at 08:49

## 2021-11-19 RX ADMIN — SODIUM BICARBONATE 650 MG: 650 TABLET ORAL at 08:47

## 2021-11-19 RX ADMIN — MIDODRINE HYDROCHLORIDE 10 MG: 10 TABLET ORAL at 20:27

## 2021-11-19 RX ADMIN — FAMOTIDINE 40 MG: 20 TABLET, FILM COATED ORAL at 08:47

## 2021-11-19 RX ADMIN — IPRATROPIUM BROMIDE AND ALBUTEROL SULFATE 3 ML: 2.5; .5 SOLUTION RESPIRATORY (INHALATION) at 00:58

## 2021-11-19 RX ADMIN — IPRATROPIUM BROMIDE AND ALBUTEROL SULFATE 3 ML: 2.5; .5 SOLUTION RESPIRATORY (INHALATION) at 07:52

## 2021-11-19 RX ADMIN — IPRATROPIUM BROMIDE AND ALBUTEROL SULFATE 3 ML: 2.5; .5 SOLUTION RESPIRATORY (INHALATION) at 04:13

## 2021-11-19 RX ADMIN — FUROSEMIDE 80 MG: 10 INJECTION INTRAMUSCULAR; INTRAVENOUS at 16:29

## 2021-11-19 RX ADMIN — FINASTERIDE 5 MG: 5 TABLET, FILM COATED ORAL at 08:47

## 2021-11-19 RX ADMIN — METOPROLOL SUCCINATE 25 MG: 25 TABLET, EXTENDED RELEASE ORAL at 08:47

## 2021-11-19 RX ADMIN — MIDODRINE HYDROCHLORIDE 15 MG: 10 TABLET ORAL at 09:30

## 2021-11-20 ENCOUNTER — APPOINTMENT (OUTPATIENT)
Dept: ULTRASOUND IMAGING | Facility: HOSPITAL | Age: 76
End: 2021-11-20

## 2021-11-20 LAB
ABO GROUP BLD: NORMAL
ANION GAP SERPL CALCULATED.3IONS-SCNC: 16.6 MMOL/L (ref 5–15)
AORTIC DIMENSIONLESS INDEX: 0.5 (DI)
ASCENDING AORTA: 3.3 CM
BACTERIA UR QL AUTO: ABNORMAL /HPF
BH CV ECHO MEAS - AO MAX PG: 19 MMHG
BH CV ECHO MEAS - AO MEAN PG: 12 MMHG
BH CV ECHO MEAS - AO ROOT DIAM: 3.5 CM
BH CV ECHO MEAS - AO V2 MAX: 218 CM/SEC
BH CV ECHO MEAS - AO V2 VTI: 39.1 CM
BH CV ECHO MEAS - EDV(MOD-SP2): 64.1 ML
BH CV ECHO MEAS - EDV(MOD-SP4): 88.3 ML
BH CV ECHO MEAS - EF(MOD-BP): 65 %
BH CV ECHO MEAS - ESV(MOD-SP2): 22.4 ML
BH CV ECHO MEAS - ESV(MOD-SP4): 30.4 ML
BH CV ECHO MEAS - IVSD: 1 CM
BH CV ECHO MEAS - LA A2CS (ATRIAL LENGTH): 7.1 CM
BH CV ECHO MEAS - LA DIMENSION(2D): 4.5 CM
BH CV ECHO MEAS - LAT PEAK E' VEL: 14 CM/SEC
BH CV ECHO MEAS - LV MAX PG: 5 MMHG
BH CV ECHO MEAS - LV MEAN PG: 2 MMHG
BH CV ECHO MEAS - LV V1 MAX: 108 CM/SEC
BH CV ECHO MEAS - LV V1 VTI: 17.8 CM
BH CV ECHO MEAS - LVIDD: 3.7 CM
BH CV ECHO MEAS - LVIDS: 2.6 CM
BH CV ECHO MEAS - LVOT DIAM: 2 CM
BH CV ECHO MEAS - LVPWD: 1 CM
BH CV ECHO MEAS - MED PEAK E' VEL: 7.8 CM/SEC
BH CV ECHO MEAS - MR MAX PG: 141 MMHG
BH CV ECHO MEAS - MR MAX VEL: 594 CM/SEC
BH CV ECHO MEAS - MR MEAN PG: 100 MMHG
BH CV ECHO MEAS - MR MEAN VEL: 473 CM/SEC
BH CV ECHO MEAS - MR VTI: 166 CM
BH CV ECHO MEAS - MV A MAX VEL: 90 CM/SEC
BH CV ECHO MEAS - MV DEC TIME: 158 MSEC
BH CV ECHO MEAS - MV E MAX VEL: 119 CM/SEC
BH CV ECHO MEAS - MV E/A: 1.5
BH CV ECHO MEAS - MV MAX PG: 6 MMHG
BH CV ECHO MEAS - MV MEAN PG: 2 MMHG
BH CV ECHO MEAS - MV P1/2T: 59 MSEC
BH CV ECHO MEAS - MV V2 VTI: 28 CM
BH CV ECHO MEAS - MVA(P1/2T): 3.7 CM2
BH CV ECHO MEAS - TR MAX PG: 24 MMHG
BH CV ECHO MEAS - TR MAX VEL: 246 CM/SEC
BH CV ECHO MEASUREMENTS AVERAGE E/E' RATIO: 10.92
BILIRUB UR QL STRIP: NEGATIVE
BLD GP AB SCN SERPL QL: NEGATIVE
BUN SERPL-MCNC: 68 MG/DL (ref 8–23)
BUN/CREAT SERPL: 15 (ref 7–25)
CALCIUM SPEC-SCNC: 7.6 MG/DL (ref 8.6–10.5)
CHLORIDE SERPL-SCNC: 106 MMOL/L (ref 98–107)
CLARITY UR: ABNORMAL
CO2 SERPL-SCNC: 16.4 MMOL/L (ref 22–29)
COLOR UR: ABNORMAL
CREAT SERPL-MCNC: 4.52 MG/DL (ref 0.76–1.27)
DEPRECATED RDW RBC AUTO: 69.1 FL (ref 37–54)
ERYTHROCYTE [DISTWIDTH] IN BLOOD BY AUTOMATED COUNT: 18.5 % (ref 12.3–15.4)
GFR SERPL CREATININE-BSD FRML MDRD: 13 ML/MIN/1.73
GFR SERPL CREATININE-BSD FRML MDRD: ABNORMAL ML/MIN/{1.73_M2}
GLUCOSE SERPL-MCNC: 101 MG/DL (ref 65–99)
GLUCOSE UR STRIP-MCNC: NEGATIVE MG/DL
HCT VFR BLD AUTO: 22.4 % (ref 37.5–51)
HGB BLD-MCNC: 7.5 G/DL (ref 13–17.7)
HGB UR QL STRIP.AUTO: ABNORMAL
HYALINE CASTS UR QL AUTO: ABNORMAL /LPF
IVRT: 74 MSEC
KETONES UR QL STRIP: NEGATIVE
LEFT ATRIUM VOLUME INDEX: 34.5 ML/M2
LEFT ATRIUM VOLUME: 79.6 CM3
LEUKOCYTE ESTERASE UR QL STRIP.AUTO: ABNORMAL
MAGNESIUM SERPL-MCNC: 1.4 MG/DL (ref 1.6–2.4)
MAXIMAL PREDICTED HEART RATE: 145 BPM
MCH RBC QN AUTO: 33.9 PG (ref 26.6–33)
MCHC RBC AUTO-ENTMCNC: 33.5 G/DL (ref 31.5–35.7)
MCV RBC AUTO: 101.4 FL (ref 79–97)
NITRITE UR QL STRIP: NEGATIVE
PH UR STRIP.AUTO: 5.5 [PH] (ref 5–8)
PHOSPHATE SERPL-MCNC: 4 MG/DL (ref 2.5–4.5)
PLATELET # BLD AUTO: 100 10*3/MM3 (ref 140–450)
PMV BLD AUTO: 12.3 FL (ref 6–12)
POTASSIUM SERPL-SCNC: 4.1 MMOL/L (ref 3.5–5.2)
PROT UR QL STRIP: ABNORMAL
RBC # BLD AUTO: 2.21 10*6/MM3 (ref 4.14–5.8)
RBC # UR STRIP: ABNORMAL /HPF
REF LAB TEST METHOD: ABNORMAL
RH BLD: POSITIVE
SODIUM SERPL-SCNC: 139 MMOL/L (ref 136–145)
SP GR UR STRIP: 1.02 (ref 1–1.03)
SQUAMOUS #/AREA URNS HPF: ABNORMAL /HPF
STRESS TARGET HR: 123 BPM
T&S EXPIRATION DATE: NORMAL
UROBILINOGEN UR QL STRIP: ABNORMAL
VANCOMYCIN SERPL-MCNC: 14.57 MCG/ML (ref 5–40)
WBC # UR STRIP: ABNORMAL /HPF
WBC NRBC COR # BLD: 6.39 10*3/MM3 (ref 3.4–10.8)

## 2021-11-20 PROCEDURE — P9016 RBC LEUKOCYTES REDUCED: HCPCS

## 2021-11-20 PROCEDURE — 76775 US EXAM ABDO BACK WALL LIM: CPT

## 2021-11-20 PROCEDURE — 85027 COMPLETE CBC AUTOMATED: CPT | Performed by: INTERNAL MEDICINE

## 2021-11-20 PROCEDURE — 94761 N-INVAS EAR/PLS OXIMETRY MLT: CPT

## 2021-11-20 PROCEDURE — 80202 ASSAY OF VANCOMYCIN: CPT | Performed by: INTERNAL MEDICINE

## 2021-11-20 PROCEDURE — 25010000002 VANCOMYCIN 5 G RECONSTITUTED SOLUTION: Performed by: INTERNAL MEDICINE

## 2021-11-20 PROCEDURE — 84100 ASSAY OF PHOSPHORUS: CPT | Performed by: INTERNAL MEDICINE

## 2021-11-20 PROCEDURE — 86900 BLOOD TYPING SEROLOGIC ABO: CPT

## 2021-11-20 PROCEDURE — 94799 UNLISTED PULMONARY SVC/PX: CPT

## 2021-11-20 PROCEDURE — 25010000002 CEFEPIME PER 500 MG: Performed by: INTERNAL MEDICINE

## 2021-11-20 PROCEDURE — 81001 URINALYSIS AUTO W/SCOPE: CPT | Performed by: INTERNAL MEDICINE

## 2021-11-20 PROCEDURE — 86901 BLOOD TYPING SEROLOGIC RH(D): CPT | Performed by: INTERNAL MEDICINE

## 2021-11-20 PROCEDURE — 83735 ASSAY OF MAGNESIUM: CPT | Performed by: INTERNAL MEDICINE

## 2021-11-20 PROCEDURE — 94760 N-INVAS EAR/PLS OXIMETRY 1: CPT

## 2021-11-20 PROCEDURE — 84300 ASSAY OF URINE SODIUM: CPT | Performed by: INTERNAL MEDICINE

## 2021-11-20 PROCEDURE — 86923 COMPATIBILITY TEST ELECTRIC: CPT

## 2021-11-20 PROCEDURE — 86850 RBC ANTIBODY SCREEN: CPT | Performed by: INTERNAL MEDICINE

## 2021-11-20 PROCEDURE — 36430 TRANSFUSION BLD/BLD COMPNT: CPT

## 2021-11-20 PROCEDURE — 94660 CPAP INITIATION&MGMT: CPT

## 2021-11-20 PROCEDURE — 87086 URINE CULTURE/COLONY COUNT: CPT | Performed by: INTERNAL MEDICINE

## 2021-11-20 PROCEDURE — 25010000002 MAGNESIUM SULFATE 2 GM/50ML SOLUTION: Performed by: INTERNAL MEDICINE

## 2021-11-20 PROCEDURE — 80048 BASIC METABOLIC PNL TOTAL CA: CPT | Performed by: INTERNAL MEDICINE

## 2021-11-20 PROCEDURE — 94762 N-INVAS EAR/PLS OXIMTRY CONT: CPT

## 2021-11-20 PROCEDURE — 87040 BLOOD CULTURE FOR BACTERIA: CPT | Performed by: INTERNAL MEDICINE

## 2021-11-20 PROCEDURE — 86900 BLOOD TYPING SEROLOGIC ABO: CPT | Performed by: INTERNAL MEDICINE

## 2021-11-20 PROCEDURE — 25010000002 HALOPERIDOL LACTATE PER 5 MG: Performed by: INTERNAL MEDICINE

## 2021-11-20 RX ORDER — SODIUM BICARBONATE 650 MG/1
650 TABLET ORAL 3 TIMES DAILY
Status: DISCONTINUED | OUTPATIENT
Start: 2021-11-20 | End: 2021-11-25

## 2021-11-20 RX ORDER — HALOPERIDOL 5 MG/ML
2 INJECTION INTRAMUSCULAR ONCE
Status: COMPLETED | OUTPATIENT
Start: 2021-11-20 | End: 2021-11-20

## 2021-11-20 RX ORDER — HALOPERIDOL 5 MG/ML
2 INJECTION INTRAMUSCULAR ONCE AS NEEDED
Status: COMPLETED | OUTPATIENT
Start: 2021-11-20 | End: 2021-11-20

## 2021-11-20 RX ORDER — MAGNESIUM SULFATE HEPTAHYDRATE 40 MG/ML
2 INJECTION, SOLUTION INTRAVENOUS ONCE
Status: COMPLETED | OUTPATIENT
Start: 2021-11-20 | End: 2021-11-20

## 2021-11-20 RX ORDER — PRAMIPEXOLE DIHYDROCHLORIDE 1 MG/1
1 TABLET ORAL EVERY 12 HOURS SCHEDULED
Status: DISCONTINUED | OUTPATIENT
Start: 2021-11-20 | End: 2021-12-03 | Stop reason: HOSPADM

## 2021-11-20 RX ADMIN — SODIUM BICARBONATE 650 MG: 650 TABLET ORAL at 19:00

## 2021-11-20 RX ADMIN — MAGNESIUM SULFATE 2 G: 2 INJECTION INTRAVENOUS at 20:46

## 2021-11-20 RX ADMIN — MIDODRINE HYDROCHLORIDE 10 MG: 10 TABLET ORAL at 12:32

## 2021-11-20 RX ADMIN — HALOPERIDOL LACTATE 2 MG: 5 INJECTION, SOLUTION INTRAMUSCULAR at 04:23

## 2021-11-20 RX ADMIN — SODIUM BICARBONATE 1300 MG: 650 TABLET ORAL at 12:32

## 2021-11-20 RX ADMIN — CEFEPIME 2 G: 1 INJECTION, SOLUTION INTRAVENOUS at 19:00

## 2021-11-20 RX ADMIN — FAMOTIDINE 20 MG: 20 TABLET ORAL at 09:51

## 2021-11-20 RX ADMIN — APIXABAN 2.5 MG: 2.5 TABLET, FILM COATED ORAL at 09:51

## 2021-11-20 RX ADMIN — MIDODRINE HYDROCHLORIDE 10 MG: 10 TABLET ORAL at 19:00

## 2021-11-20 RX ADMIN — IPRATROPIUM BROMIDE AND ALBUTEROL SULFATE 3 ML: 2.5; .5 SOLUTION RESPIRATORY (INHALATION) at 06:18

## 2021-11-20 RX ADMIN — VANCOMYCIN HYDROCHLORIDE 500 MG: 5 INJECTION, POWDER, LYOPHILIZED, FOR SOLUTION INTRAVENOUS at 09:54

## 2021-11-20 RX ADMIN — PRAMIPEXOLE DIHYDROCHLORIDE 1 MG: 1 TABLET ORAL at 20:46

## 2021-11-20 RX ADMIN — OXYCODONE HYDROCHLORIDE AND ACETAMINOPHEN 1 TABLET: 5; 325 TABLET ORAL at 11:08

## 2021-11-20 RX ADMIN — IPRATROPIUM BROMIDE AND ALBUTEROL SULFATE 3 ML: 2.5; .5 SOLUTION RESPIRATORY (INHALATION) at 03:20

## 2021-11-20 RX ADMIN — SODIUM BICARBONATE 650 MG: 650 TABLET ORAL at 20:46

## 2021-11-20 RX ADMIN — IPRATROPIUM BROMIDE AND ALBUTEROL SULFATE 3 ML: 2.5; .5 SOLUTION RESPIRATORY (INHALATION) at 00:05

## 2021-11-20 RX ADMIN — APIXABAN 2.5 MG: 2.5 TABLET, FILM COATED ORAL at 20:46

## 2021-11-20 RX ADMIN — METOPROLOL SUCCINATE 25 MG: 25 TABLET, EXTENDED RELEASE ORAL at 09:48

## 2021-11-20 RX ADMIN — SODIUM CHLORIDE, PRESERVATIVE FREE 3 ML: 5 INJECTION INTRAVENOUS at 20:46

## 2021-11-20 RX ADMIN — FINASTERIDE 5 MG: 5 TABLET, FILM COATED ORAL at 10:07

## 2021-11-20 RX ADMIN — IPRATROPIUM BROMIDE AND ALBUTEROL SULFATE 3 ML: 2.5; .5 SOLUTION RESPIRATORY (INHALATION) at 18:48

## 2021-11-20 RX ADMIN — CITALOPRAM HYDROBROMIDE 40 MG: 20 TABLET ORAL at 10:07

## 2021-11-20 RX ADMIN — IPRATROPIUM BROMIDE AND ALBUTEROL SULFATE 3 ML: 2.5; .5 SOLUTION RESPIRATORY (INHALATION) at 11:05

## 2021-11-20 RX ADMIN — HALOPERIDOL LACTATE 2 MG: 5 INJECTION, SOLUTION INTRAMUSCULAR at 02:40

## 2021-11-20 RX ADMIN — SODIUM CHLORIDE, PRESERVATIVE FREE 3 ML: 5 INJECTION INTRAVENOUS at 09:53

## 2021-11-20 RX ADMIN — IPRATROPIUM BROMIDE AND ALBUTEROL SULFATE 3 ML: 2.5; .5 SOLUTION RESPIRATORY (INHALATION) at 15:27

## 2021-11-20 RX ADMIN — MAGNESIUM HYDROXIDE 10 ML: 2400 SUSPENSION ORAL at 22:30

## 2021-11-21 ENCOUNTER — APPOINTMENT (OUTPATIENT)
Dept: GENERAL RADIOLOGY | Facility: HOSPITAL | Age: 76
End: 2021-11-21

## 2021-11-21 LAB
ANION GAP SERPL CALCULATED.3IONS-SCNC: 16.2 MMOL/L (ref 5–15)
BACTERIA SPEC AEROBE CULT: NO GROWTH
BUN SERPL-MCNC: 71 MG/DL (ref 8–23)
BUN/CREAT SERPL: 16.8 (ref 7–25)
CALCIUM SPEC-SCNC: 8.5 MG/DL (ref 8.6–10.5)
CHLORIDE SERPL-SCNC: 103 MMOL/L (ref 98–107)
CO2 SERPL-SCNC: 17.8 MMOL/L (ref 22–29)
CREAT SERPL-MCNC: 4.22 MG/DL (ref 0.76–1.27)
GFR SERPL CREATININE-BSD FRML MDRD: 14 ML/MIN/1.73
GFR SERPL CREATININE-BSD FRML MDRD: ABNORMAL ML/MIN/{1.73_M2}
GLUCOSE SERPL-MCNC: 106 MG/DL (ref 65–99)
HCT VFR BLD AUTO: 23.9 % (ref 37.5–51)
HGB BLD-MCNC: 8 G/DL (ref 13–17.7)
MAGNESIUM SERPL-MCNC: 1.9 MG/DL (ref 1.6–2.4)
POTASSIUM SERPL-SCNC: 4.2 MMOL/L (ref 3.5–5.2)
SODIUM SERPL-SCNC: 137 MMOL/L (ref 136–145)
SODIUM UR-SCNC: 33 MMOL/L
VANCOMYCIN SERPL-MCNC: 20 MCG/ML (ref 5–40)

## 2021-11-21 PROCEDURE — 85014 HEMATOCRIT: CPT | Performed by: ORTHOPAEDIC SURGERY

## 2021-11-21 PROCEDURE — 80202 ASSAY OF VANCOMYCIN: CPT | Performed by: INTERNAL MEDICINE

## 2021-11-21 PROCEDURE — 73552 X-RAY EXAM OF FEMUR 2/>: CPT

## 2021-11-21 PROCEDURE — 94760 N-INVAS EAR/PLS OXIMETRY 1: CPT

## 2021-11-21 PROCEDURE — 80048 BASIC METABOLIC PNL TOTAL CA: CPT | Performed by: INTERNAL MEDICINE

## 2021-11-21 PROCEDURE — 97110 THERAPEUTIC EXERCISES: CPT

## 2021-11-21 PROCEDURE — 25010000002 CEFEPIME PER 500 MG: Performed by: INTERNAL MEDICINE

## 2021-11-21 PROCEDURE — 83735 ASSAY OF MAGNESIUM: CPT | Performed by: INTERNAL MEDICINE

## 2021-11-21 PROCEDURE — 94799 UNLISTED PULMONARY SVC/PX: CPT

## 2021-11-21 PROCEDURE — 94761 N-INVAS EAR/PLS OXIMETRY MLT: CPT

## 2021-11-21 PROCEDURE — 25010000002 ONDANSETRON PER 1 MG: Performed by: ORTHOPAEDIC SURGERY

## 2021-11-21 PROCEDURE — 85018 HEMOGLOBIN: CPT | Performed by: ORTHOPAEDIC SURGERY

## 2021-11-21 PROCEDURE — 97116 GAIT TRAINING THERAPY: CPT

## 2021-11-21 RX ORDER — HYDROXYZINE HYDROCHLORIDE 25 MG/1
25 TABLET, FILM COATED ORAL NIGHTLY PRN
Status: DISCONTINUED | OUTPATIENT
Start: 2021-11-21 | End: 2021-11-21

## 2021-11-21 RX ORDER — LACTULOSE 10 G/15ML
20 SOLUTION ORAL ONCE
Status: COMPLETED | OUTPATIENT
Start: 2021-11-21 | End: 2021-11-21

## 2021-11-21 RX ORDER — HALOPERIDOL 5 MG/ML
4 INJECTION INTRAMUSCULAR ONCE
Status: COMPLETED | OUTPATIENT
Start: 2021-11-22 | End: 2021-11-21

## 2021-11-21 RX ADMIN — PRAMIPEXOLE DIHYDROCHLORIDE 1 MG: 1 TABLET ORAL at 08:59

## 2021-11-21 RX ADMIN — METOPROLOL SUCCINATE 25 MG: 25 TABLET, EXTENDED RELEASE ORAL at 08:59

## 2021-11-21 RX ADMIN — IPRATROPIUM BROMIDE AND ALBUTEROL SULFATE 3 ML: 2.5; .5 SOLUTION RESPIRATORY (INHALATION) at 19:26

## 2021-11-21 RX ADMIN — IPRATROPIUM BROMIDE AND ALBUTEROL SULFATE 3 ML: 2.5; .5 SOLUTION RESPIRATORY (INHALATION) at 00:01

## 2021-11-21 RX ADMIN — IPRATROPIUM BROMIDE AND ALBUTEROL SULFATE 3 ML: 2.5; .5 SOLUTION RESPIRATORY (INHALATION) at 11:51

## 2021-11-21 RX ADMIN — SODIUM BICARBONATE 650 MG: 650 TABLET ORAL at 20:09

## 2021-11-21 RX ADMIN — FAMOTIDINE 20 MG: 20 TABLET ORAL at 08:59

## 2021-11-21 RX ADMIN — SENNOSIDES AND DOCUSATE SODIUM 2 TABLET: 50; 8.6 TABLET ORAL at 05:53

## 2021-11-21 RX ADMIN — ONDANSETRON 4 MG: 2 INJECTION INTRAMUSCULAR; INTRAVENOUS at 17:43

## 2021-11-21 RX ADMIN — SODIUM CHLORIDE, PRESERVATIVE FREE 3 ML: 5 INJECTION INTRAVENOUS at 09:00

## 2021-11-21 RX ADMIN — SODIUM BICARBONATE 650 MG: 650 TABLET ORAL at 08:59

## 2021-11-21 RX ADMIN — PRAMIPEXOLE DIHYDROCHLORIDE 1 MG: 1 TABLET ORAL at 20:10

## 2021-11-21 RX ADMIN — APIXABAN 2.5 MG: 2.5 TABLET, FILM COATED ORAL at 08:59

## 2021-11-21 RX ADMIN — LACTULOSE 20 G: 20 SOLUTION ORAL at 15:28

## 2021-11-21 RX ADMIN — SODIUM CHLORIDE, PRESERVATIVE FREE 3 ML: 5 INJECTION INTRAVENOUS at 20:10

## 2021-11-21 RX ADMIN — CITALOPRAM HYDROBROMIDE 40 MG: 20 TABLET ORAL at 05:53

## 2021-11-21 RX ADMIN — IPRATROPIUM BROMIDE AND ALBUTEROL SULFATE 3 ML: 2.5; .5 SOLUTION RESPIRATORY (INHALATION) at 07:17

## 2021-11-21 RX ADMIN — CEFEPIME 2 G: 1 INJECTION, SOLUTION INTRAVENOUS at 15:28

## 2021-11-21 RX ADMIN — HALOPERIDOL LACTATE 4 MG: 5 INJECTION, SOLUTION INTRAMUSCULAR at 23:10

## 2021-11-21 RX ADMIN — MIDODRINE HYDROCHLORIDE 10 MG: 10 TABLET ORAL at 13:19

## 2021-11-21 RX ADMIN — APIXABAN 2.5 MG: 2.5 TABLET, FILM COATED ORAL at 20:09

## 2021-11-21 RX ADMIN — HYDROXYZINE HYDROCHLORIDE 25 MG: 25 TABLET, FILM COATED ORAL at 20:10

## 2021-11-21 RX ADMIN — IPRATROPIUM BROMIDE AND ALBUTEROL SULFATE 3 ML: 2.5; .5 SOLUTION RESPIRATORY (INHALATION) at 15:03

## 2021-11-21 RX ADMIN — IPRATROPIUM BROMIDE AND ALBUTEROL SULFATE 3 ML: 2.5; .5 SOLUTION RESPIRATORY (INHALATION) at 04:14

## 2021-11-21 RX ADMIN — SODIUM BICARBONATE 650 MG: 650 TABLET ORAL at 17:43

## 2021-11-21 RX ADMIN — FINASTERIDE 5 MG: 5 TABLET, FILM COATED ORAL at 08:59

## 2021-11-21 RX ADMIN — MIDODRINE HYDROCHLORIDE 10 MG: 10 TABLET ORAL at 08:59

## 2021-11-22 ENCOUNTER — APPOINTMENT (OUTPATIENT)
Dept: CT IMAGING | Facility: HOSPITAL | Age: 76
End: 2021-11-22

## 2021-11-22 ENCOUNTER — APPOINTMENT (OUTPATIENT)
Dept: GENERAL RADIOLOGY | Facility: HOSPITAL | Age: 76
End: 2021-11-22

## 2021-11-22 PROBLEM — K56.7 ILEUS (HCC): Status: ACTIVE | Noted: 2021-11-22

## 2021-11-22 LAB
ALBUMIN SERPL-MCNC: 3.4 G/DL (ref 3.5–5.2)
ALBUMIN/GLOB SERPL: 1.3 G/DL
ALP SERPL-CCNC: 73 U/L (ref 39–117)
ALT SERPL W P-5'-P-CCNC: 51 U/L (ref 1–41)
ANION GAP SERPL CALCULATED.3IONS-SCNC: 19.3 MMOL/L (ref 5–15)
AST SERPL-CCNC: 154 U/L (ref 1–40)
BH BB BLOOD EXPIRATION DATE: NORMAL
BH BB BLOOD TYPE BARCODE: 5100
BH BB DISPENSE STATUS: NORMAL
BH BB PRODUCT CODE: NORMAL
BH BB UNIT NUMBER: NORMAL
BILIRUB SERPL-MCNC: 0.9 MG/DL (ref 0–1.2)
BUN SERPL-MCNC: 70 MG/DL (ref 8–23)
BUN/CREAT SERPL: 18.9 (ref 7–25)
CALCIUM SPEC-SCNC: 8.8 MG/DL (ref 8.6–10.5)
CHLORIDE SERPL-SCNC: 106 MMOL/L (ref 98–107)
CO2 SERPL-SCNC: 16.7 MMOL/L (ref 22–29)
CREAT SERPL-MCNC: 3.71 MG/DL (ref 0.76–1.27)
CROSSMATCH INTERPRETATION: NORMAL
D-LACTATE SERPL-SCNC: 0.8 MMOL/L (ref 0.5–2)
GFR SERPL CREATININE-BSD FRML MDRD: 16 ML/MIN/1.73
GLOBULIN UR ELPH-MCNC: 2.7 GM/DL
GLUCOSE BLDC GLUCOMTR-MCNC: 107 MG/DL (ref 70–99)
GLUCOSE SERPL-MCNC: 115 MG/DL (ref 65–99)
HCT VFR BLD AUTO: 24.1 % (ref 37.5–51)
HGB BLD-MCNC: 7.9 G/DL (ref 13–17.7)
POTASSIUM SERPL-SCNC: 3.9 MMOL/L (ref 3.5–5.2)
PROCALCITONIN SERPL-MCNC: 0.56 NG/ML (ref 0–0.25)
PROT SERPL-MCNC: 6.1 G/DL (ref 6–8.5)
SODIUM SERPL-SCNC: 142 MMOL/L (ref 136–145)
UNIT  ABO: NORMAL
UNIT  RH: NORMAL
VANCOMYCIN SERPL-MCNC: 12.3 MCG/ML (ref 5–40)

## 2021-11-22 PROCEDURE — 25010000002 CEFEPIME PER 500 MG: Performed by: INTERNAL MEDICINE

## 2021-11-22 PROCEDURE — 94761 N-INVAS EAR/PLS OXIMETRY MLT: CPT

## 2021-11-22 PROCEDURE — 83605 ASSAY OF LACTIC ACID: CPT | Performed by: INTERNAL MEDICINE

## 2021-11-22 PROCEDURE — 80202 ASSAY OF VANCOMYCIN: CPT | Performed by: INTERNAL MEDICINE

## 2021-11-22 PROCEDURE — 74018 RADEX ABDOMEN 1 VIEW: CPT

## 2021-11-22 PROCEDURE — 85014 HEMATOCRIT: CPT | Performed by: ORTHOPAEDIC SURGERY

## 2021-11-22 PROCEDURE — 25010000002 HALOPERIDOL LACTATE PER 5 MG: Performed by: INTERNAL MEDICINE

## 2021-11-22 PROCEDURE — 70450 CT HEAD/BRAIN W/O DYE: CPT

## 2021-11-22 PROCEDURE — 80053 COMPREHEN METABOLIC PANEL: CPT | Performed by: INTERNAL MEDICINE

## 2021-11-22 PROCEDURE — 94660 CPAP INITIATION&MGMT: CPT

## 2021-11-22 PROCEDURE — 94799 UNLISTED PULMONARY SVC/PX: CPT

## 2021-11-22 PROCEDURE — 99222 1ST HOSP IP/OBS MODERATE 55: CPT | Performed by: NURSE PRACTITIONER

## 2021-11-22 PROCEDURE — 84145 PROCALCITONIN (PCT): CPT | Performed by: INTERNAL MEDICINE

## 2021-11-22 PROCEDURE — 82962 GLUCOSE BLOOD TEST: CPT

## 2021-11-22 PROCEDURE — 85018 HEMOGLOBIN: CPT | Performed by: ORTHOPAEDIC SURGERY

## 2021-11-22 RX ADMIN — PRAMIPEXOLE DIHYDROCHLORIDE 1 MG: 1 TABLET ORAL at 09:59

## 2021-11-22 RX ADMIN — APIXABAN 2.5 MG: 2.5 TABLET, FILM COATED ORAL at 09:58

## 2021-11-22 RX ADMIN — SODIUM CHLORIDE, PRESERVATIVE FREE 3 ML: 5 INJECTION INTRAVENOUS at 20:14

## 2021-11-22 RX ADMIN — APIXABAN 2.5 MG: 2.5 TABLET, FILM COATED ORAL at 20:14

## 2021-11-22 RX ADMIN — SODIUM BICARBONATE 650 MG: 650 TABLET ORAL at 09:58

## 2021-11-22 RX ADMIN — IPRATROPIUM BROMIDE AND ALBUTEROL SULFATE 3 ML: 2.5; .5 SOLUTION RESPIRATORY (INHALATION) at 11:59

## 2021-11-22 RX ADMIN — IPRATROPIUM BROMIDE AND ALBUTEROL SULFATE 3 ML: 2.5; .5 SOLUTION RESPIRATORY (INHALATION) at 19:38

## 2021-11-22 RX ADMIN — IPRATROPIUM BROMIDE AND ALBUTEROL SULFATE 3 ML: 2.5; .5 SOLUTION RESPIRATORY (INHALATION) at 08:09

## 2021-11-22 RX ADMIN — IPRATROPIUM BROMIDE AND ALBUTEROL SULFATE 3 ML: 2.5; .5 SOLUTION RESPIRATORY (INHALATION) at 15:52

## 2021-11-22 RX ADMIN — SODIUM BICARBONATE 650 MG: 650 TABLET ORAL at 20:14

## 2021-11-22 RX ADMIN — FINASTERIDE 5 MG: 5 TABLET, FILM COATED ORAL at 09:58

## 2021-11-22 RX ADMIN — METOPROLOL SUCCINATE 25 MG: 25 TABLET, EXTENDED RELEASE ORAL at 09:58

## 2021-11-22 RX ADMIN — SODIUM CHLORIDE, PRESERVATIVE FREE 3 ML: 5 INJECTION INTRAVENOUS at 09:58

## 2021-11-22 RX ADMIN — PRAMIPEXOLE DIHYDROCHLORIDE 1 MG: 1 TABLET ORAL at 20:14

## 2021-11-22 RX ADMIN — CEFEPIME 2 G: 1 INJECTION, SOLUTION INTRAVENOUS at 15:53

## 2021-11-22 RX ADMIN — FAMOTIDINE 20 MG: 20 TABLET ORAL at 09:58

## 2021-11-22 RX ADMIN — CITALOPRAM HYDROBROMIDE 40 MG: 20 TABLET ORAL at 07:57

## 2021-11-22 RX ADMIN — IPRATROPIUM BROMIDE AND ALBUTEROL SULFATE 3 ML: 2.5; .5 SOLUTION RESPIRATORY (INHALATION) at 23:48

## 2021-11-23 ENCOUNTER — APPOINTMENT (OUTPATIENT)
Dept: GENERAL RADIOLOGY | Facility: HOSPITAL | Age: 76
End: 2021-11-23

## 2021-11-23 PROBLEM — G93.41 METABOLIC ENCEPHALOPATHY: Status: ACTIVE | Noted: 2021-11-23

## 2021-11-23 LAB
ALBUMIN SERPL-MCNC: 3.4 G/DL (ref 3.5–5.2)
ALBUMIN/GLOB SERPL: 1.2 G/DL
ALP SERPL-CCNC: 86 U/L (ref 39–117)
ALT SERPL W P-5'-P-CCNC: 65 U/L (ref 1–41)
ANION GAP SERPL CALCULATED.3IONS-SCNC: 16.5 MMOL/L (ref 5–15)
AST SERPL-CCNC: 128 U/L (ref 1–40)
BACTERIA SPEC AEROBE CULT: NORMAL
BACTERIA SPEC AEROBE CULT: NORMAL
BASOPHILS # BLD AUTO: 0.03 10*3/MM3 (ref 0–0.2)
BASOPHILS NFR BLD AUTO: 0.5 % (ref 0–1.5)
BILIRUB SERPL-MCNC: 0.8 MG/DL (ref 0–1.2)
BUN SERPL-MCNC: 74 MG/DL (ref 8–23)
BUN/CREAT SERPL: 23.1 (ref 7–25)
CALCIUM SPEC-SCNC: 9.2 MG/DL (ref 8.6–10.5)
CHLORIDE SERPL-SCNC: 106 MMOL/L (ref 98–107)
CO2 SERPL-SCNC: 17.5 MMOL/L (ref 22–29)
CREAT SERPL-MCNC: 3.2 MG/DL (ref 0.76–1.27)
DEPRECATED RDW RBC AUTO: 65 FL (ref 37–54)
EOSINOPHIL # BLD AUTO: 0.02 10*3/MM3 (ref 0–0.4)
EOSINOPHIL NFR BLD AUTO: 0.3 % (ref 0.3–6.2)
ERYTHROCYTE [DISTWIDTH] IN BLOOD BY AUTOMATED COUNT: 18.1 % (ref 12.3–15.4)
GFR SERPL CREATININE-BSD FRML MDRD: 19 ML/MIN/1.73
GLOBULIN UR ELPH-MCNC: 2.8 GM/DL
GLUCOSE SERPL-MCNC: 108 MG/DL (ref 65–99)
HCT VFR BLD AUTO: 24.8 % (ref 37.5–51)
HGB BLD-MCNC: 8.1 G/DL (ref 13–17.7)
IMM GRANULOCYTES # BLD AUTO: 0.17 10*3/MM3 (ref 0–0.05)
IMM GRANULOCYTES NFR BLD AUTO: 2.6 % (ref 0–0.5)
LYMPHOCYTES # BLD AUTO: 0.4 10*3/MM3 (ref 0.7–3.1)
LYMPHOCYTES NFR BLD AUTO: 6.2 % (ref 19.6–45.3)
MCH RBC QN AUTO: 32.5 PG (ref 26.6–33)
MCHC RBC AUTO-ENTMCNC: 32.7 G/DL (ref 31.5–35.7)
MCV RBC AUTO: 99.6 FL (ref 79–97)
MONOCYTES # BLD AUTO: 0.62 10*3/MM3 (ref 0.1–0.9)
MONOCYTES NFR BLD AUTO: 9.6 % (ref 5–12)
NEUTROPHILS NFR BLD AUTO: 5.25 10*3/MM3 (ref 1.7–7)
NEUTROPHILS NFR BLD AUTO: 80.8 % (ref 42.7–76)
NRBC BLD AUTO-RTO: 0 /100 WBC (ref 0–0.2)
PLATELET # BLD AUTO: 136 10*3/MM3 (ref 140–450)
PMV BLD AUTO: 11.5 FL (ref 6–12)
POTASSIUM SERPL-SCNC: 4 MMOL/L (ref 3.5–5.2)
PROT SERPL-MCNC: 6.2 G/DL (ref 6–8.5)
RBC # BLD AUTO: 2.49 10*6/MM3 (ref 4.14–5.8)
SODIUM SERPL-SCNC: 140 MMOL/L (ref 136–145)
WBC NRBC COR # BLD: 6.49 10*3/MM3 (ref 3.4–10.8)

## 2021-11-23 PROCEDURE — 25010000002 CEFEPIME PER 500 MG: Performed by: INTERNAL MEDICINE

## 2021-11-23 PROCEDURE — 94761 N-INVAS EAR/PLS OXIMETRY MLT: CPT

## 2021-11-23 PROCEDURE — 94799 UNLISTED PULMONARY SVC/PX: CPT

## 2021-11-23 PROCEDURE — 74019 RADEX ABDOMEN 2 VIEWS: CPT

## 2021-11-23 PROCEDURE — 94660 CPAP INITIATION&MGMT: CPT

## 2021-11-23 PROCEDURE — 80053 COMPREHEN METABOLIC PANEL: CPT | Performed by: INTERNAL MEDICINE

## 2021-11-23 PROCEDURE — 85025 COMPLETE CBC W/AUTO DIFF WBC: CPT | Performed by: INTERNAL MEDICINE

## 2021-11-23 RX ORDER — FAMOTIDINE 10 MG/ML
20 INJECTION, SOLUTION INTRAVENOUS EVERY 12 HOURS SCHEDULED
Status: DISCONTINUED | OUTPATIENT
Start: 2021-11-23 | End: 2021-11-28

## 2021-11-23 RX ORDER — CHOLECALCIFEROL (VITAMIN D3) 125 MCG
10 CAPSULE ORAL NIGHTLY
Status: DISCONTINUED | OUTPATIENT
Start: 2021-11-23 | End: 2021-11-25

## 2021-11-23 RX ADMIN — CITALOPRAM HYDROBROMIDE 40 MG: 20 TABLET ORAL at 07:06

## 2021-11-23 RX ADMIN — IPRATROPIUM BROMIDE AND ALBUTEROL SULFATE 3 ML: 2.5; .5 SOLUTION RESPIRATORY (INHALATION) at 11:22

## 2021-11-23 RX ADMIN — IPRATROPIUM BROMIDE AND ALBUTEROL SULFATE 3 ML: 2.5; .5 SOLUTION RESPIRATORY (INHALATION) at 03:43

## 2021-11-23 RX ADMIN — SODIUM BICARBONATE 650 MG: 650 TABLET ORAL at 21:29

## 2021-11-23 RX ADMIN — CEFEPIME 2 G: 1 INJECTION, SOLUTION INTRAVENOUS at 15:08

## 2021-11-23 RX ADMIN — METOPROLOL SUCCINATE 25 MG: 25 TABLET, EXTENDED RELEASE ORAL at 16:17

## 2021-11-23 RX ADMIN — IPRATROPIUM BROMIDE AND ALBUTEROL SULFATE 3 ML: 2.5; .5 SOLUTION RESPIRATORY (INHALATION) at 20:15

## 2021-11-23 RX ADMIN — Medication 10 MG: at 21:29

## 2021-11-23 RX ADMIN — FAMOTIDINE 20 MG: 10 INJECTION INTRAVENOUS at 21:35

## 2021-11-23 RX ADMIN — SODIUM CHLORIDE, PRESERVATIVE FREE 3 ML: 5 INJECTION INTRAVENOUS at 10:12

## 2021-11-23 RX ADMIN — APIXABAN 2.5 MG: 2.5 TABLET, FILM COATED ORAL at 21:28

## 2021-11-23 RX ADMIN — IPRATROPIUM BROMIDE AND ALBUTEROL SULFATE 3 ML: 2.5; .5 SOLUTION RESPIRATORY (INHALATION) at 23:41

## 2021-11-23 RX ADMIN — IPRATROPIUM BROMIDE AND ALBUTEROL SULFATE 3 ML: 2.5; .5 SOLUTION RESPIRATORY (INHALATION) at 06:25

## 2021-11-23 RX ADMIN — IPRATROPIUM BROMIDE AND ALBUTEROL SULFATE 3 ML: 2.5; .5 SOLUTION RESPIRATORY (INHALATION) at 15:32

## 2021-11-23 RX ADMIN — FAMOTIDINE 20 MG: 10 INJECTION INTRAVENOUS at 10:11

## 2021-11-23 RX ADMIN — PRAMIPEXOLE DIHYDROCHLORIDE 1 MG: 1 TABLET ORAL at 21:29

## 2021-11-24 ENCOUNTER — APPOINTMENT (OUTPATIENT)
Dept: GENERAL RADIOLOGY | Facility: HOSPITAL | Age: 76
End: 2021-11-24

## 2021-11-24 LAB
ALBUMIN SERPL-MCNC: 3.3 G/DL (ref 3.5–5.2)
ALBUMIN/GLOB SERPL: 1.2 G/DL
ALP SERPL-CCNC: 71 U/L (ref 39–117)
ALT SERPL W P-5'-P-CCNC: 85 U/L (ref 1–41)
ANION GAP SERPL CALCULATED.3IONS-SCNC: 16 MMOL/L (ref 5–15)
AST SERPL-CCNC: 130 U/L (ref 1–40)
BASOPHILS # BLD AUTO: 0.02 10*3/MM3 (ref 0–0.2)
BASOPHILS NFR BLD AUTO: 0.3 % (ref 0–1.5)
BILIRUB SERPL-MCNC: 0.8 MG/DL (ref 0–1.2)
BUN SERPL-MCNC: 64 MG/DL (ref 8–23)
BUN/CREAT SERPL: 25 (ref 7–25)
CALCIUM SPEC-SCNC: 9.2 MG/DL (ref 8.6–10.5)
CHLORIDE SERPL-SCNC: 108 MMOL/L (ref 98–107)
CO2 SERPL-SCNC: 20 MMOL/L (ref 22–29)
CREAT SERPL-MCNC: 2.56 MG/DL (ref 0.76–1.27)
DEPRECATED RDW RBC AUTO: 65.4 FL (ref 37–54)
EOSINOPHIL # BLD AUTO: 0.08 10*3/MM3 (ref 0–0.4)
EOSINOPHIL NFR BLD AUTO: 1.2 % (ref 0.3–6.2)
ERYTHROCYTE [DISTWIDTH] IN BLOOD BY AUTOMATED COUNT: 17.8 % (ref 12.3–15.4)
GFR SERPL CREATININE-BSD FRML MDRD: 25 ML/MIN/1.73
GLOBULIN UR ELPH-MCNC: 2.7 GM/DL
GLUCOSE SERPL-MCNC: 112 MG/DL (ref 65–99)
HCT VFR BLD AUTO: 25.4 % (ref 37.5–51)
HGB BLD-MCNC: 8.4 G/DL (ref 13–17.7)
IMM GRANULOCYTES # BLD AUTO: 0.19 10*3/MM3 (ref 0–0.05)
IMM GRANULOCYTES NFR BLD AUTO: 2.8 % (ref 0–0.5)
LYMPHOCYTES # BLD AUTO: 0.57 10*3/MM3 (ref 0.7–3.1)
LYMPHOCYTES NFR BLD AUTO: 8.4 % (ref 19.6–45.3)
MCH RBC QN AUTO: 33.1 PG (ref 26.6–33)
MCHC RBC AUTO-ENTMCNC: 33.1 G/DL (ref 31.5–35.7)
MCV RBC AUTO: 100 FL (ref 79–97)
MONOCYTES # BLD AUTO: 0.54 10*3/MM3 (ref 0.1–0.9)
MONOCYTES NFR BLD AUTO: 8 % (ref 5–12)
NEUTROPHILS NFR BLD AUTO: 5.35 10*3/MM3 (ref 1.7–7)
NEUTROPHILS NFR BLD AUTO: 79.3 % (ref 42.7–76)
NRBC BLD AUTO-RTO: 0 /100 WBC (ref 0–0.2)
PLATELET # BLD AUTO: 169 10*3/MM3 (ref 140–450)
PMV BLD AUTO: 11.7 FL (ref 6–12)
POTASSIUM SERPL-SCNC: 4 MMOL/L (ref 3.5–5.2)
PROCALCITONIN SERPL-MCNC: 0.26 NG/ML (ref 0–0.25)
PROT SERPL-MCNC: 6 G/DL (ref 6–8.5)
RBC # BLD AUTO: 2.54 10*6/MM3 (ref 4.14–5.8)
SODIUM SERPL-SCNC: 144 MMOL/L (ref 136–145)
WBC NRBC COR # BLD: 6.75 10*3/MM3 (ref 3.4–10.8)

## 2021-11-24 PROCEDURE — 94799 UNLISTED PULMONARY SVC/PX: CPT

## 2021-11-24 PROCEDURE — 85025 COMPLETE CBC W/AUTO DIFF WBC: CPT | Performed by: INTERNAL MEDICINE

## 2021-11-24 PROCEDURE — 94760 N-INVAS EAR/PLS OXIMETRY 1: CPT

## 2021-11-24 PROCEDURE — 84145 PROCALCITONIN (PCT): CPT | Performed by: INTERNAL MEDICINE

## 2021-11-24 PROCEDURE — 80053 COMPREHEN METABOLIC PANEL: CPT | Performed by: INTERNAL MEDICINE

## 2021-11-24 PROCEDURE — 94761 N-INVAS EAR/PLS OXIMETRY MLT: CPT

## 2021-11-24 PROCEDURE — 74019 RADEX ABDOMEN 2 VIEWS: CPT

## 2021-11-24 PROCEDURE — 25010000002 CEFEPIME PER 500 MG: Performed by: INTERNAL MEDICINE

## 2021-11-24 RX ORDER — QUETIAPINE FUMARATE 25 MG/1
25 TABLET, FILM COATED ORAL NIGHTLY
Status: DISCONTINUED | OUTPATIENT
Start: 2021-11-24 | End: 2021-11-25

## 2021-11-24 RX ADMIN — IPRATROPIUM BROMIDE AND ALBUTEROL SULFATE 3 ML: 2.5; .5 SOLUTION RESPIRATORY (INHALATION) at 19:36

## 2021-11-24 RX ADMIN — CEFEPIME 2 G: 1 INJECTION, SOLUTION INTRAVENOUS at 16:21

## 2021-11-24 RX ADMIN — SODIUM CHLORIDE, PRESERVATIVE FREE 3 ML: 5 INJECTION INTRAVENOUS at 21:22

## 2021-11-24 RX ADMIN — PRAMIPEXOLE DIHYDROCHLORIDE 1 MG: 1 TABLET ORAL at 08:15

## 2021-11-24 RX ADMIN — FINASTERIDE 5 MG: 5 TABLET, FILM COATED ORAL at 08:15

## 2021-11-24 RX ADMIN — FAMOTIDINE 20 MG: 10 INJECTION INTRAVENOUS at 08:16

## 2021-11-24 RX ADMIN — APIXABAN 2.5 MG: 2.5 TABLET, FILM COATED ORAL at 08:15

## 2021-11-24 RX ADMIN — SODIUM CHLORIDE, PRESERVATIVE FREE 3 ML: 5 INJECTION INTRAVENOUS at 08:17

## 2021-11-24 RX ADMIN — IPRATROPIUM BROMIDE AND ALBUTEROL SULFATE 3 ML: 2.5; .5 SOLUTION RESPIRATORY (INHALATION) at 15:03

## 2021-11-24 RX ADMIN — SODIUM BICARBONATE 650 MG: 650 TABLET ORAL at 08:15

## 2021-11-24 RX ADMIN — METOPROLOL SUCCINATE 25 MG: 25 TABLET, EXTENDED RELEASE ORAL at 08:15

## 2021-11-24 RX ADMIN — SODIUM BICARBONATE 650 MG: 650 TABLET ORAL at 21:21

## 2021-11-24 RX ADMIN — FAMOTIDINE 20 MG: 10 INJECTION INTRAVENOUS at 21:21

## 2021-11-24 RX ADMIN — IPRATROPIUM BROMIDE AND ALBUTEROL SULFATE 3 ML: 2.5; .5 SOLUTION RESPIRATORY (INHALATION) at 23:51

## 2021-11-24 RX ADMIN — Medication 10 MG: at 21:21

## 2021-11-24 RX ADMIN — CITALOPRAM HYDROBROMIDE 40 MG: 20 TABLET ORAL at 06:46

## 2021-11-24 RX ADMIN — QUETIAPINE FUMARATE 25 MG: 25 TABLET ORAL at 21:21

## 2021-11-24 RX ADMIN — IPRATROPIUM BROMIDE AND ALBUTEROL SULFATE 3 ML: 2.5; .5 SOLUTION RESPIRATORY (INHALATION) at 04:01

## 2021-11-24 RX ADMIN — PRAMIPEXOLE DIHYDROCHLORIDE 1 MG: 1 TABLET ORAL at 21:21

## 2021-11-24 RX ADMIN — IPRATROPIUM BROMIDE AND ALBUTEROL SULFATE 3 ML: 2.5; .5 SOLUTION RESPIRATORY (INHALATION) at 07:36

## 2021-11-24 RX ADMIN — IPRATROPIUM BROMIDE AND ALBUTEROL SULFATE 3 ML: 2.5; .5 SOLUTION RESPIRATORY (INHALATION) at 11:56

## 2021-11-24 RX ADMIN — APIXABAN 2.5 MG: 2.5 TABLET, FILM COATED ORAL at 21:21

## 2021-11-25 ENCOUNTER — APPOINTMENT (OUTPATIENT)
Dept: GENERAL RADIOLOGY | Facility: HOSPITAL | Age: 76
End: 2021-11-25

## 2021-11-25 LAB
ANION GAP SERPL CALCULATED.3IONS-SCNC: 14.7 MMOL/L (ref 5–15)
BACTERIA SPEC AEROBE CULT: NORMAL
BACTERIA SPEC AEROBE CULT: NORMAL
BUN SERPL-MCNC: 58 MG/DL (ref 8–23)
BUN/CREAT SERPL: 28.2 (ref 7–25)
CALCIUM SPEC-SCNC: 9.5 MG/DL (ref 8.6–10.5)
CHLORIDE SERPL-SCNC: 109 MMOL/L (ref 98–107)
CO2 SERPL-SCNC: 22.3 MMOL/L (ref 22–29)
CREAT SERPL-MCNC: 2.06 MG/DL (ref 0.76–1.27)
GFR SERPL CREATININE-BSD FRML MDRD: 32 ML/MIN/1.73
GLUCOSE SERPL-MCNC: 120 MG/DL (ref 65–99)
HCT VFR BLD AUTO: 25.9 % (ref 37.5–51)
HGB BLD-MCNC: 8.7 G/DL (ref 13–17.7)
POTASSIUM SERPL-SCNC: 4 MMOL/L (ref 3.5–5.2)
SODIUM SERPL-SCNC: 146 MMOL/L (ref 136–145)

## 2021-11-25 PROCEDURE — 74019 RADEX ABDOMEN 2 VIEWS: CPT

## 2021-11-25 PROCEDURE — 25010000002 CEFEPIME PER 500 MG: Performed by: INTERNAL MEDICINE

## 2021-11-25 PROCEDURE — 94761 N-INVAS EAR/PLS OXIMETRY MLT: CPT

## 2021-11-25 PROCEDURE — 99233 SBSQ HOSP IP/OBS HIGH 50: CPT | Performed by: SURGERY

## 2021-11-25 PROCEDURE — 94799 UNLISTED PULMONARY SVC/PX: CPT

## 2021-11-25 PROCEDURE — 85018 HEMOGLOBIN: CPT | Performed by: ORTHOPAEDIC SURGERY

## 2021-11-25 PROCEDURE — 80048 BASIC METABOLIC PNL TOTAL CA: CPT | Performed by: INTERNAL MEDICINE

## 2021-11-25 PROCEDURE — 85014 HEMATOCRIT: CPT | Performed by: ORTHOPAEDIC SURGERY

## 2021-11-25 RX ORDER — QUETIAPINE FUMARATE 25 MG/1
25 TABLET, FILM COATED ORAL NIGHTLY
Status: DISCONTINUED | OUTPATIENT
Start: 2021-11-25 | End: 2021-11-26

## 2021-11-25 RX ORDER — OXYCODONE HYDROCHLORIDE AND ACETAMINOPHEN 5; 325 MG/1; MG/1
1 TABLET ORAL EVERY 4 HOURS PRN
Status: DISCONTINUED | OUTPATIENT
Start: 2021-11-25 | End: 2021-11-26

## 2021-11-25 RX ORDER — ACETAMINOPHEN 325 MG/1
325 TABLET ORAL EVERY 4 HOURS PRN
Status: DISCONTINUED | OUTPATIENT
Start: 2021-11-25 | End: 2021-11-26

## 2021-11-25 RX ORDER — OXYCODONE HYDROCHLORIDE AND ACETAMINOPHEN 5; 325 MG/1; MG/1
2 TABLET ORAL EVERY 4 HOURS PRN
Status: DISCONTINUED | OUTPATIENT
Start: 2021-11-25 | End: 2021-11-26

## 2021-11-25 RX ORDER — CHOLECALCIFEROL (VITAMIN D3) 125 MCG
10 CAPSULE ORAL NIGHTLY
Status: DISCONTINUED | OUTPATIENT
Start: 2021-11-25 | End: 2021-11-26

## 2021-11-25 RX ORDER — ONDANSETRON 4 MG/1
4 TABLET, FILM COATED ORAL EVERY 6 HOURS PRN
Status: DISCONTINUED | OUTPATIENT
Start: 2021-11-25 | End: 2021-12-03 | Stop reason: HOSPADM

## 2021-11-25 RX ORDER — CITALOPRAM 20 MG/1
40 TABLET ORAL EVERY MORNING
Status: DISCONTINUED | OUTPATIENT
Start: 2021-11-26 | End: 2021-11-26

## 2021-11-25 RX ORDER — SODIUM BICARBONATE 650 MG/1
650 TABLET ORAL 3 TIMES DAILY
Status: DISCONTINUED | OUTPATIENT
Start: 2021-11-25 | End: 2021-11-25

## 2021-11-25 RX ORDER — ONDANSETRON 2 MG/ML
4 INJECTION INTRAMUSCULAR; INTRAVENOUS EVERY 6 HOURS PRN
Status: DISCONTINUED | OUTPATIENT
Start: 2021-11-25 | End: 2021-12-03 | Stop reason: HOSPADM

## 2021-11-25 RX ADMIN — IPRATROPIUM BROMIDE AND ALBUTEROL SULFATE 3 ML: 2.5; .5 SOLUTION RESPIRATORY (INHALATION) at 19:58

## 2021-11-25 RX ADMIN — FAMOTIDINE 20 MG: 10 INJECTION INTRAVENOUS at 21:19

## 2021-11-25 RX ADMIN — FAMOTIDINE 20 MG: 10 INJECTION INTRAVENOUS at 09:45

## 2021-11-25 RX ADMIN — APIXABAN 2.5 MG: 2.5 TABLET, FILM COATED ORAL at 09:46

## 2021-11-25 RX ADMIN — QUETIAPINE FUMARATE 25 MG: 25 TABLET ORAL at 21:19

## 2021-11-25 RX ADMIN — SODIUM BICARBONATE 650 MG: 650 TABLET ORAL at 09:45

## 2021-11-25 RX ADMIN — PRAMIPEXOLE DIHYDROCHLORIDE 1 MG: 1 TABLET ORAL at 21:19

## 2021-11-25 RX ADMIN — METOPROLOL SUCCINATE 25 MG: 25 TABLET, EXTENDED RELEASE ORAL at 09:45

## 2021-11-25 RX ADMIN — IPRATROPIUM BROMIDE AND ALBUTEROL SULFATE 3 ML: 2.5; .5 SOLUTION RESPIRATORY (INHALATION) at 15:42

## 2021-11-25 RX ADMIN — SODIUM CHLORIDE, PRESERVATIVE FREE 3 ML: 5 INJECTION INTRAVENOUS at 09:46

## 2021-11-25 RX ADMIN — PRAMIPEXOLE DIHYDROCHLORIDE 1 MG: 1 TABLET ORAL at 09:45

## 2021-11-25 RX ADMIN — SODIUM CHLORIDE, PRESERVATIVE FREE 3 ML: 5 INJECTION INTRAVENOUS at 21:20

## 2021-11-25 RX ADMIN — APIXABAN 2.5 MG: 2.5 TABLET, FILM COATED ORAL at 21:19

## 2021-11-25 RX ADMIN — IPRATROPIUM BROMIDE AND ALBUTEROL SULFATE 3 ML: 2.5; .5 SOLUTION RESPIRATORY (INHALATION) at 06:41

## 2021-11-25 RX ADMIN — Medication 10 MG: at 21:19

## 2021-11-25 RX ADMIN — FINASTERIDE 5 MG: 5 TABLET, FILM COATED ORAL at 09:45

## 2021-11-25 RX ADMIN — CEFEPIME 2 G: 1 INJECTION, SOLUTION INTRAVENOUS at 14:46

## 2021-11-26 LAB
HCT VFR BLD AUTO: 27.4 % (ref 37.5–51)
HGB BLD-MCNC: 9 G/DL (ref 13–17.7)

## 2021-11-26 PROCEDURE — 99232 SBSQ HOSP IP/OBS MODERATE 35: CPT | Performed by: SURGERY

## 2021-11-26 PROCEDURE — 97530 THERAPEUTIC ACTIVITIES: CPT

## 2021-11-26 PROCEDURE — 94761 N-INVAS EAR/PLS OXIMETRY MLT: CPT

## 2021-11-26 PROCEDURE — 25010000002 HALOPERIDOL LACTATE PER 5 MG: Performed by: INTERNAL MEDICINE

## 2021-11-26 PROCEDURE — 97164 PT RE-EVAL EST PLAN CARE: CPT

## 2021-11-26 PROCEDURE — 94799 UNLISTED PULMONARY SVC/PX: CPT

## 2021-11-26 PROCEDURE — 85018 HEMOGLOBIN: CPT | Performed by: ORTHOPAEDIC SURGERY

## 2021-11-26 PROCEDURE — 85014 HEMATOCRIT: CPT | Performed by: ORTHOPAEDIC SURGERY

## 2021-11-26 PROCEDURE — 25010000002 HYDROMORPHONE 1 MG/ML SOLUTION: Performed by: INTERNAL MEDICINE

## 2021-11-26 RX ORDER — OXYCODONE HYDROCHLORIDE AND ACETAMINOPHEN 5; 325 MG/1; MG/1
1 TABLET ORAL EVERY 4 HOURS PRN
Status: ACTIVE | OUTPATIENT
Start: 2021-11-26 | End: 2021-11-30

## 2021-11-26 RX ORDER — HALOPERIDOL 5 MG/ML
5 INJECTION INTRAMUSCULAR ONCE
Status: COMPLETED | OUTPATIENT
Start: 2021-11-26 | End: 2021-11-26

## 2021-11-26 RX ORDER — CITALOPRAM 20 MG/1
40 TABLET ORAL EVERY MORNING
Status: DISCONTINUED | OUTPATIENT
Start: 2021-11-27 | End: 2021-11-28

## 2021-11-26 RX ORDER — ACETAMINOPHEN 325 MG/1
325 TABLET ORAL EVERY 4 HOURS PRN
Status: DISCONTINUED | OUTPATIENT
Start: 2021-11-26 | End: 2021-12-03 | Stop reason: HOSPADM

## 2021-11-26 RX ORDER — QUETIAPINE FUMARATE 25 MG/1
25 TABLET, FILM COATED ORAL NIGHTLY
Status: DISCONTINUED | OUTPATIENT
Start: 2021-11-27 | End: 2021-11-27

## 2021-11-26 RX ORDER — OXYCODONE HYDROCHLORIDE AND ACETAMINOPHEN 5; 325 MG/1; MG/1
2 TABLET ORAL EVERY 4 HOURS PRN
Status: ACTIVE | OUTPATIENT
Start: 2021-11-26 | End: 2021-11-30

## 2021-11-26 RX ORDER — CHOLECALCIFEROL (VITAMIN D3) 125 MCG
10 CAPSULE ORAL NIGHTLY
Status: DISCONTINUED | OUTPATIENT
Start: 2021-11-26 | End: 2021-11-27

## 2021-11-26 RX ADMIN — IPRATROPIUM BROMIDE AND ALBUTEROL SULFATE 3 ML: 2.5; .5 SOLUTION RESPIRATORY (INHALATION) at 00:39

## 2021-11-26 RX ADMIN — IPRATROPIUM BROMIDE AND ALBUTEROL SULFATE 3 ML: 2.5; .5 SOLUTION RESPIRATORY (INHALATION) at 11:52

## 2021-11-26 RX ADMIN — ACETAMINOPHEN 325 MG: 325 TABLET ORAL at 15:19

## 2021-11-26 RX ADMIN — APIXABAN 2.5 MG: 2.5 TABLET, FILM COATED ORAL at 20:08

## 2021-11-26 RX ADMIN — METOPROLOL SUCCINATE 25 MG: 25 TABLET, EXTENDED RELEASE ORAL at 08:39

## 2021-11-26 RX ADMIN — SODIUM CHLORIDE, PRESERVATIVE FREE 3 ML: 5 INJECTION INTRAVENOUS at 20:08

## 2021-11-26 RX ADMIN — APIXABAN 2.5 MG: 2.5 TABLET, FILM COATED ORAL at 08:39

## 2021-11-26 RX ADMIN — FAMOTIDINE 20 MG: 10 INJECTION INTRAVENOUS at 08:38

## 2021-11-26 RX ADMIN — HALOPERIDOL LACTATE 5 MG: 5 INJECTION, SOLUTION INTRAMUSCULAR at 18:27

## 2021-11-26 RX ADMIN — SODIUM CHLORIDE, PRESERVATIVE FREE 3 ML: 5 INJECTION INTRAVENOUS at 08:39

## 2021-11-26 RX ADMIN — FINASTERIDE 5 MG: 5 TABLET, FILM COATED ORAL at 08:39

## 2021-11-26 RX ADMIN — HYDROMORPHONE HYDROCHLORIDE 0.25 MG: 1 INJECTION, SOLUTION INTRAMUSCULAR; INTRAVENOUS; SUBCUTANEOUS at 20:05

## 2021-11-26 RX ADMIN — IPRATROPIUM BROMIDE AND ALBUTEROL SULFATE 3 ML: 2.5; .5 SOLUTION RESPIRATORY (INHALATION) at 18:46

## 2021-11-26 RX ADMIN — PRAMIPEXOLE DIHYDROCHLORIDE 1 MG: 1 TABLET ORAL at 08:39

## 2021-11-26 RX ADMIN — FAMOTIDINE 20 MG: 10 INJECTION INTRAVENOUS at 20:08

## 2021-11-26 RX ADMIN — Medication 10 MG: at 20:07

## 2021-11-26 RX ADMIN — IPRATROPIUM BROMIDE AND ALBUTEROL SULFATE 3 ML: 2.5; .5 SOLUTION RESPIRATORY (INHALATION) at 07:39

## 2021-11-26 RX ADMIN — IPRATROPIUM BROMIDE AND ALBUTEROL SULFATE 3 ML: 2.5; .5 SOLUTION RESPIRATORY (INHALATION) at 03:09

## 2021-11-26 RX ADMIN — IPRATROPIUM BROMIDE AND ALBUTEROL SULFATE 3 ML: 2.5; .5 SOLUTION RESPIRATORY (INHALATION) at 15:48

## 2021-11-26 RX ADMIN — PRAMIPEXOLE DIHYDROCHLORIDE 1 MG: 1 TABLET ORAL at 20:05

## 2021-11-26 RX ADMIN — CITALOPRAM HYDROBROMIDE 40 MG: 20 TABLET ORAL at 06:09

## 2021-11-26 RX ADMIN — QUETIAPINE FUMARATE 25 MG: 25 TABLET ORAL at 20:07

## 2021-11-27 LAB
ALBUMIN SERPL-MCNC: 3.4 G/DL (ref 3.5–5.2)
ALBUMIN/GLOB SERPL: 1.1 G/DL
ALP SERPL-CCNC: 80 U/L (ref 39–117)
ALT SERPL W P-5'-P-CCNC: 62 U/L (ref 1–41)
ANION GAP SERPL CALCULATED.3IONS-SCNC: 15.4 MMOL/L (ref 5–15)
AST SERPL-CCNC: 84 U/L (ref 1–40)
BASOPHILS # BLD AUTO: 0.02 10*3/MM3 (ref 0–0.2)
BASOPHILS NFR BLD AUTO: 0.3 % (ref 0–1.5)
BILIRUB SERPL-MCNC: 1.3 MG/DL (ref 0–1.2)
BUN SERPL-MCNC: 54 MG/DL (ref 8–23)
BUN/CREAT SERPL: 23.4 (ref 7–25)
CALCIUM SPEC-SCNC: 9.9 MG/DL (ref 8.6–10.5)
CHLORIDE SERPL-SCNC: 111 MMOL/L (ref 98–107)
CO2 SERPL-SCNC: 23.6 MMOL/L (ref 22–29)
CREAT SERPL-MCNC: 2.31 MG/DL (ref 0.76–1.27)
DEPRECATED RDW RBC AUTO: 65.9 FL (ref 37–54)
EOSINOPHIL # BLD AUTO: 0.07 10*3/MM3 (ref 0–0.4)
EOSINOPHIL NFR BLD AUTO: 1 % (ref 0.3–6.2)
ERYTHROCYTE [DISTWIDTH] IN BLOOD BY AUTOMATED COUNT: 18.1 % (ref 12.3–15.4)
GFR SERPL CREATININE-BSD FRML MDRD: 28 ML/MIN/1.73
GLOBULIN UR ELPH-MCNC: 3.1 GM/DL
GLUCOSE SERPL-MCNC: 106 MG/DL (ref 65–99)
HCT VFR BLD AUTO: 28.7 % (ref 37.5–51)
HGB BLD-MCNC: 9.4 G/DL (ref 13–17.7)
IMM GRANULOCYTES # BLD AUTO: 0.11 10*3/MM3 (ref 0–0.05)
IMM GRANULOCYTES NFR BLD AUTO: 1.5 % (ref 0–0.5)
LYMPHOCYTES # BLD AUTO: 0.67 10*3/MM3 (ref 0.7–3.1)
LYMPHOCYTES NFR BLD AUTO: 9.2 % (ref 19.6–45.3)
MCH RBC QN AUTO: 32.6 PG (ref 26.6–33)
MCHC RBC AUTO-ENTMCNC: 32.8 G/DL (ref 31.5–35.7)
MCV RBC AUTO: 99.7 FL (ref 79–97)
MONOCYTES # BLD AUTO: 0.6 10*3/MM3 (ref 0.1–0.9)
MONOCYTES NFR BLD AUTO: 8.2 % (ref 5–12)
NEUTROPHILS NFR BLD AUTO: 5.81 10*3/MM3 (ref 1.7–7)
NEUTROPHILS NFR BLD AUTO: 79.8 % (ref 42.7–76)
NRBC BLD AUTO-RTO: 0 /100 WBC (ref 0–0.2)
PLATELET # BLD AUTO: 213 10*3/MM3 (ref 140–450)
PMV BLD AUTO: 11.6 FL (ref 6–12)
POTASSIUM SERPL-SCNC: 3.7 MMOL/L (ref 3.5–5.2)
PROT SERPL-MCNC: 6.5 G/DL (ref 6–8.5)
RBC # BLD AUTO: 2.88 10*6/MM3 (ref 4.14–5.8)
SODIUM SERPL-SCNC: 150 MMOL/L (ref 136–145)
WBC NRBC COR # BLD: 7.28 10*3/MM3 (ref 3.4–10.8)

## 2021-11-27 PROCEDURE — 97110 THERAPEUTIC EXERCISES: CPT

## 2021-11-27 PROCEDURE — 94761 N-INVAS EAR/PLS OXIMETRY MLT: CPT

## 2021-11-27 PROCEDURE — 80053 COMPREHEN METABOLIC PANEL: CPT | Performed by: INTERNAL MEDICINE

## 2021-11-27 PROCEDURE — 94799 UNLISTED PULMONARY SVC/PX: CPT

## 2021-11-27 PROCEDURE — 97116 GAIT TRAINING THERAPY: CPT

## 2021-11-27 PROCEDURE — 99232 SBSQ HOSP IP/OBS MODERATE 35: CPT | Performed by: SURGERY

## 2021-11-27 PROCEDURE — 25010000002 HYDROMORPHONE 1 MG/ML SOLUTION: Performed by: INTERNAL MEDICINE

## 2021-11-27 PROCEDURE — 85025 COMPLETE CBC W/AUTO DIFF WBC: CPT | Performed by: INTERNAL MEDICINE

## 2021-11-27 RX ORDER — CHOLECALCIFEROL (VITAMIN D3) 125 MCG
10 CAPSULE ORAL NIGHTLY
Status: DISCONTINUED | OUTPATIENT
Start: 2021-11-27 | End: 2021-12-03 | Stop reason: HOSPADM

## 2021-11-27 RX ORDER — CHOLECALCIFEROL (VITAMIN D3) 125 MCG
10 CAPSULE ORAL NIGHTLY
Status: DISCONTINUED | OUTPATIENT
Start: 2021-11-27 | End: 2021-11-27

## 2021-11-27 RX ORDER — DEXTROSE MONOHYDRATE 50 MG/ML
100 INJECTION, SOLUTION INTRAVENOUS CONTINUOUS
Status: DISCONTINUED | OUTPATIENT
Start: 2021-11-27 | End: 2021-11-28

## 2021-11-27 RX ORDER — METHION/INOS/CHOL BT/B COM/LIV 110MG-86MG
100 CAPSULE ORAL DAILY
Status: DISCONTINUED | OUTPATIENT
Start: 2021-11-27 | End: 2021-12-03 | Stop reason: HOSPADM

## 2021-11-27 RX ORDER — QUETIAPINE FUMARATE 25 MG/1
50 TABLET, FILM COATED ORAL NIGHTLY
Status: DISCONTINUED | OUTPATIENT
Start: 2021-11-27 | End: 2021-11-28

## 2021-11-27 RX ORDER — QUETIAPINE FUMARATE 25 MG/1
50 TABLET, FILM COATED ORAL NIGHTLY
Status: DISCONTINUED | OUTPATIENT
Start: 2021-11-27 | End: 2021-11-27

## 2021-11-27 RX ADMIN — DEXTROSE MONOHYDRATE 100 ML/HR: 50 INJECTION, SOLUTION INTRAVENOUS at 13:06

## 2021-11-27 RX ADMIN — Medication 10 MG: at 21:55

## 2021-11-27 RX ADMIN — QUETIAPINE FUMARATE 50 MG: 25 TABLET ORAL at 21:54

## 2021-11-27 RX ADMIN — Medication 100 MG: at 18:14

## 2021-11-27 RX ADMIN — HYDROMORPHONE HYDROCHLORIDE 0.25 MG: 1 INJECTION, SOLUTION INTRAMUSCULAR; INTRAVENOUS; SUBCUTANEOUS at 18:14

## 2021-11-27 RX ADMIN — FAMOTIDINE 20 MG: 10 INJECTION INTRAVENOUS at 08:58

## 2021-11-27 RX ADMIN — PRAMIPEXOLE DIHYDROCHLORIDE 1 MG: 1 TABLET ORAL at 08:58

## 2021-11-27 RX ADMIN — CITALOPRAM HYDROBROMIDE 40 MG: 20 TABLET ORAL at 07:29

## 2021-11-27 RX ADMIN — IPRATROPIUM BROMIDE AND ALBUTEROL SULFATE 3 ML: 2.5; .5 SOLUTION RESPIRATORY (INHALATION) at 07:40

## 2021-11-27 RX ADMIN — APIXABAN 2.5 MG: 2.5 TABLET, FILM COATED ORAL at 21:53

## 2021-11-27 RX ADMIN — PRAMIPEXOLE DIHYDROCHLORIDE 1 MG: 1 TABLET ORAL at 21:53

## 2021-11-27 RX ADMIN — DEXTROSE MONOHYDRATE 100 ML/HR: 50 INJECTION, SOLUTION INTRAVENOUS at 21:53

## 2021-11-27 RX ADMIN — HYDROMORPHONE HYDROCHLORIDE 0.25 MG: 1 INJECTION, SOLUTION INTRAMUSCULAR; INTRAVENOUS; SUBCUTANEOUS at 07:28

## 2021-11-27 RX ADMIN — IPRATROPIUM BROMIDE AND ALBUTEROL SULFATE 3 ML: 2.5; .5 SOLUTION RESPIRATORY (INHALATION) at 18:41

## 2021-11-27 RX ADMIN — IPRATROPIUM BROMIDE AND ALBUTEROL SULFATE 3 ML: 2.5; .5 SOLUTION RESPIRATORY (INHALATION) at 10:47

## 2021-11-27 RX ADMIN — IPRATROPIUM BROMIDE AND ALBUTEROL SULFATE 3 ML: 2.5; .5 SOLUTION RESPIRATORY (INHALATION) at 03:37

## 2021-11-27 RX ADMIN — FAMOTIDINE 20 MG: 10 INJECTION INTRAVENOUS at 21:53

## 2021-11-27 RX ADMIN — SODIUM CHLORIDE, PRESERVATIVE FREE 3 ML: 5 INJECTION INTRAVENOUS at 21:56

## 2021-11-27 RX ADMIN — APIXABAN 2.5 MG: 2.5 TABLET, FILM COATED ORAL at 08:58

## 2021-11-27 RX ADMIN — HYDROMORPHONE HYDROCHLORIDE 0.25 MG: 1 INJECTION, SOLUTION INTRAMUSCULAR; INTRAVENOUS; SUBCUTANEOUS at 02:41

## 2021-11-27 RX ADMIN — FINASTERIDE 5 MG: 5 TABLET, FILM COATED ORAL at 08:58

## 2021-11-27 RX ADMIN — METOPROLOL SUCCINATE 25 MG: 25 TABLET, EXTENDED RELEASE ORAL at 08:58

## 2021-11-27 RX ADMIN — HYDROMORPHONE HYDROCHLORIDE 0.25 MG: 1 INJECTION, SOLUTION INTRAMUSCULAR; INTRAVENOUS; SUBCUTANEOUS at 13:06

## 2021-11-27 RX ADMIN — IPRATROPIUM BROMIDE AND ALBUTEROL SULFATE 3 ML: 2.5; .5 SOLUTION RESPIRATORY (INHALATION) at 23:04

## 2021-11-27 RX ADMIN — IPRATROPIUM BROMIDE AND ALBUTEROL SULFATE 3 ML: 2.5; .5 SOLUTION RESPIRATORY (INHALATION) at 14:53

## 2021-11-28 LAB
ALBUMIN SERPL-MCNC: 3.1 G/DL (ref 3.5–5.2)
ALBUMIN/GLOB SERPL: 1.3 G/DL
ALP SERPL-CCNC: 74 U/L (ref 39–117)
ALT SERPL W P-5'-P-CCNC: 54 U/L (ref 1–41)
ANION GAP SERPL CALCULATED.3IONS-SCNC: 12 MMOL/L (ref 5–15)
AST SERPL-CCNC: 77 U/L (ref 1–40)
BILIRUB SERPL-MCNC: 1 MG/DL (ref 0–1.2)
BUN SERPL-MCNC: 60 MG/DL (ref 8–23)
BUN/CREAT SERPL: 20.7 (ref 7–25)
CALCIUM SPEC-SCNC: 8.3 MG/DL (ref 8.6–10.5)
CHLORIDE SERPL-SCNC: 101 MMOL/L (ref 98–107)
CO2 SERPL-SCNC: 24 MMOL/L (ref 22–29)
CREAT SERPL-MCNC: 2.9 MG/DL (ref 0.76–1.27)
GFR SERPL CREATININE-BSD FRML MDRD: 21 ML/MIN/1.73
GLOBULIN UR ELPH-MCNC: 2.3 GM/DL
GLUCOSE SERPL-MCNC: 110 MG/DL (ref 65–99)
HCT VFR BLD AUTO: 25.1 % (ref 37.5–51)
HGB BLD-MCNC: 8.3 G/DL (ref 13–17.7)
MAGNESIUM SERPL-MCNC: 1.4 MG/DL (ref 1.6–2.4)
PHOSPHATE SERPL-MCNC: 3.6 MG/DL (ref 2.5–4.5)
POTASSIUM SERPL-SCNC: 3.3 MMOL/L (ref 3.5–5.2)
PROT SERPL-MCNC: 5.4 G/DL (ref 6–8.5)
SODIUM SERPL-SCNC: 137 MMOL/L (ref 136–145)
VIT B12 BLD-MCNC: 1350 PG/ML (ref 211–946)

## 2021-11-28 PROCEDURE — 84100 ASSAY OF PHOSPHORUS: CPT | Performed by: INTERNAL MEDICINE

## 2021-11-28 PROCEDURE — 94799 UNLISTED PULMONARY SVC/PX: CPT

## 2021-11-28 PROCEDURE — 85014 HEMATOCRIT: CPT | Performed by: ORTHOPAEDIC SURGERY

## 2021-11-28 PROCEDURE — 25010000002 MAGNESIUM SULFATE IN D5W 1G/100ML (PREMIX) 1-5 GM/100ML-% SOLUTION: Performed by: INTERNAL MEDICINE

## 2021-11-28 PROCEDURE — 80053 COMPREHEN METABOLIC PANEL: CPT | Performed by: INTERNAL MEDICINE

## 2021-11-28 PROCEDURE — 85018 HEMOGLOBIN: CPT | Performed by: ORTHOPAEDIC SURGERY

## 2021-11-28 PROCEDURE — 82607 VITAMIN B-12: CPT | Performed by: INTERNAL MEDICINE

## 2021-11-28 PROCEDURE — 83735 ASSAY OF MAGNESIUM: CPT | Performed by: INTERNAL MEDICINE

## 2021-11-28 RX ORDER — DOCUSATE SODIUM 100 MG/1
100 CAPSULE, LIQUID FILLED ORAL 2 TIMES DAILY
Status: DISCONTINUED | OUTPATIENT
Start: 2021-11-28 | End: 2021-12-03 | Stop reason: HOSPADM

## 2021-11-28 RX ORDER — POTASSIUM CHLORIDE 750 MG/1
20 CAPSULE, EXTENDED RELEASE ORAL ONCE
Status: COMPLETED | OUTPATIENT
Start: 2021-11-28 | End: 2021-11-28

## 2021-11-28 RX ORDER — MAGNESIUM SULFATE 1 G/100ML
1 INJECTION INTRAVENOUS ONCE
Status: COMPLETED | OUTPATIENT
Start: 2021-11-28 | End: 2021-11-28

## 2021-11-28 RX ORDER — RISPERIDONE 1 MG/1
1 TABLET ORAL NIGHTLY
Status: DISCONTINUED | OUTPATIENT
Start: 2021-11-28 | End: 2021-11-28

## 2021-11-28 RX ORDER — TEMAZEPAM 15 MG/1
15 CAPSULE ORAL NIGHTLY PRN
Status: DISCONTINUED | OUTPATIENT
Start: 2021-11-28 | End: 2021-12-03 | Stop reason: HOSPADM

## 2021-11-28 RX ORDER — CITALOPRAM 20 MG/1
20 TABLET ORAL EVERY MORNING
Status: DISCONTINUED | OUTPATIENT
Start: 2021-11-29 | End: 2021-12-03 | Stop reason: HOSPADM

## 2021-11-28 RX ORDER — SODIUM CHLORIDE 9 MG/ML
75 INJECTION, SOLUTION INTRAVENOUS CONTINUOUS
Status: ACTIVE | OUTPATIENT
Start: 2021-11-28 | End: 2021-11-29

## 2021-11-28 RX ORDER — FAMOTIDINE 20 MG/1
20 TABLET, FILM COATED ORAL DAILY
Status: DISCONTINUED | OUTPATIENT
Start: 2021-11-28 | End: 2021-12-03 | Stop reason: HOSPADM

## 2021-11-28 RX ADMIN — FAMOTIDINE 20 MG: 20 TABLET ORAL at 09:09

## 2021-11-28 RX ADMIN — IPRATROPIUM BROMIDE AND ALBUTEROL SULFATE 3 ML: 2.5; .5 SOLUTION RESPIRATORY (INHALATION) at 15:56

## 2021-11-28 RX ADMIN — METOPROLOL SUCCINATE 25 MG: 25 TABLET, EXTENDED RELEASE ORAL at 09:09

## 2021-11-28 RX ADMIN — FINASTERIDE 5 MG: 5 TABLET, FILM COATED ORAL at 09:09

## 2021-11-28 RX ADMIN — MAGNESIUM SULFATE 1 G: 1 INJECTION INTRAVENOUS at 16:11

## 2021-11-28 RX ADMIN — IPRATROPIUM BROMIDE AND ALBUTEROL SULFATE 3 ML: 2.5; .5 SOLUTION RESPIRATORY (INHALATION) at 02:59

## 2021-11-28 RX ADMIN — PRAMIPEXOLE DIHYDROCHLORIDE 1 MG: 1 TABLET ORAL at 22:20

## 2021-11-28 RX ADMIN — DOCUSATE SODIUM 100 MG: 100 CAPSULE, LIQUID FILLED ORAL at 11:26

## 2021-11-28 RX ADMIN — Medication 10 MG: at 21:14

## 2021-11-28 RX ADMIN — Medication 100 MG: at 09:09

## 2021-11-28 RX ADMIN — TEMAZEPAM 15 MG: 15 CAPSULE ORAL at 21:13

## 2021-11-28 RX ADMIN — PRAMIPEXOLE DIHYDROCHLORIDE 1 MG: 1 TABLET ORAL at 11:26

## 2021-11-28 RX ADMIN — IPRATROPIUM BROMIDE AND ALBUTEROL SULFATE 3 ML: 2.5; .5 SOLUTION RESPIRATORY (INHALATION) at 11:51

## 2021-11-28 RX ADMIN — APIXABAN 2.5 MG: 2.5 TABLET, FILM COATED ORAL at 21:14

## 2021-11-28 RX ADMIN — IPRATROPIUM BROMIDE AND ALBUTEROL SULFATE 3 ML: 2.5; .5 SOLUTION RESPIRATORY (INHALATION) at 08:02

## 2021-11-28 RX ADMIN — POTASSIUM CHLORIDE 20 MEQ: 750 CAPSULE, EXTENDED RELEASE ORAL at 16:10

## 2021-11-28 RX ADMIN — CITALOPRAM HYDROBROMIDE 40 MG: 20 TABLET ORAL at 06:44

## 2021-11-28 RX ADMIN — DOCUSATE SODIUM 100 MG: 100 CAPSULE, LIQUID FILLED ORAL at 21:13

## 2021-11-28 RX ADMIN — APIXABAN 2.5 MG: 2.5 TABLET, FILM COATED ORAL at 09:09

## 2021-11-28 RX ADMIN — IPRATROPIUM BROMIDE AND ALBUTEROL SULFATE 3 ML: 2.5; .5 SOLUTION RESPIRATORY (INHALATION) at 22:40

## 2021-11-28 RX ADMIN — IPRATROPIUM BROMIDE AND ALBUTEROL SULFATE 3 ML: 2.5; .5 SOLUTION RESPIRATORY (INHALATION) at 19:50

## 2021-11-28 RX ADMIN — SODIUM CHLORIDE 75 ML/HR: 9 INJECTION, SOLUTION INTRAVENOUS at 16:10

## 2021-11-29 LAB
ANION GAP SERPL CALCULATED.3IONS-SCNC: 11.7 MMOL/L (ref 5–15)
BUN SERPL-MCNC: 72 MG/DL (ref 8–23)
BUN/CREAT SERPL: 22.4 (ref 7–25)
CALCIUM SPEC-SCNC: 8.6 MG/DL (ref 8.6–10.5)
CHLORIDE SERPL-SCNC: 102 MMOL/L (ref 98–107)
CO2 SERPL-SCNC: 22.3 MMOL/L (ref 22–29)
CREAT SERPL-MCNC: 3.21 MG/DL (ref 0.76–1.27)
GFR SERPL CREATININE-BSD FRML MDRD: 19 ML/MIN/1.73
GLUCOSE SERPL-MCNC: 108 MG/DL (ref 65–99)
HCT VFR BLD AUTO: 24.6 % (ref 37.5–51)
HGB BLD-MCNC: 8.2 G/DL (ref 13–17.7)
MAGNESIUM SERPL-MCNC: 1.7 MG/DL (ref 1.6–2.4)
POTASSIUM SERPL-SCNC: 3.7 MMOL/L (ref 3.5–5.2)
SODIUM SERPL-SCNC: 136 MMOL/L (ref 136–145)

## 2021-11-29 PROCEDURE — 94799 UNLISTED PULMONARY SVC/PX: CPT

## 2021-11-29 PROCEDURE — 83735 ASSAY OF MAGNESIUM: CPT | Performed by: INTERNAL MEDICINE

## 2021-11-29 PROCEDURE — 97116 GAIT TRAINING THERAPY: CPT

## 2021-11-29 PROCEDURE — 80048 BASIC METABOLIC PNL TOTAL CA: CPT | Performed by: INTERNAL MEDICINE

## 2021-11-29 PROCEDURE — 85018 HEMOGLOBIN: CPT | Performed by: ORTHOPAEDIC SURGERY

## 2021-11-29 PROCEDURE — 84166 PROTEIN E-PHORESIS/URINE/CSF: CPT | Performed by: INTERNAL MEDICINE

## 2021-11-29 PROCEDURE — 84156 ASSAY OF PROTEIN URINE: CPT | Performed by: INTERNAL MEDICINE

## 2021-11-29 PROCEDURE — 85014 HEMATOCRIT: CPT | Performed by: ORTHOPAEDIC SURGERY

## 2021-11-29 RX ADMIN — IPRATROPIUM BROMIDE AND ALBUTEROL SULFATE 3 ML: 2.5; .5 SOLUTION RESPIRATORY (INHALATION) at 11:00

## 2021-11-29 RX ADMIN — APIXABAN 2.5 MG: 2.5 TABLET, FILM COATED ORAL at 08:52

## 2021-11-29 RX ADMIN — CITALOPRAM HYDROBROMIDE 20 MG: 20 TABLET ORAL at 06:32

## 2021-11-29 RX ADMIN — Medication 10 MG: at 20:24

## 2021-11-29 RX ADMIN — DOCUSATE SODIUM 100 MG: 100 CAPSULE, LIQUID FILLED ORAL at 20:24

## 2021-11-29 RX ADMIN — SODIUM CHLORIDE, PRESERVATIVE FREE 3 ML: 5 INJECTION INTRAVENOUS at 20:25

## 2021-11-29 RX ADMIN — IPRATROPIUM BROMIDE AND ALBUTEROL SULFATE 3 ML: 2.5; .5 SOLUTION RESPIRATORY (INHALATION) at 15:25

## 2021-11-29 RX ADMIN — IPRATROPIUM BROMIDE AND ALBUTEROL SULFATE 3 ML: 2.5; .5 SOLUTION RESPIRATORY (INHALATION) at 19:44

## 2021-11-29 RX ADMIN — APIXABAN 2.5 MG: 2.5 TABLET, FILM COATED ORAL at 20:24

## 2021-11-29 RX ADMIN — METOPROLOL SUCCINATE 25 MG: 25 TABLET, EXTENDED RELEASE ORAL at 08:52

## 2021-11-29 RX ADMIN — FINASTERIDE 5 MG: 5 TABLET, FILM COATED ORAL at 12:34

## 2021-11-29 RX ADMIN — PRAMIPEXOLE DIHYDROCHLORIDE 1 MG: 1 TABLET ORAL at 20:24

## 2021-11-29 RX ADMIN — PRAMIPEXOLE DIHYDROCHLORIDE 1 MG: 1 TABLET ORAL at 12:35

## 2021-11-29 RX ADMIN — FAMOTIDINE 20 MG: 20 TABLET ORAL at 08:52

## 2021-11-29 RX ADMIN — IPRATROPIUM BROMIDE AND ALBUTEROL SULFATE 3 ML: 2.5; .5 SOLUTION RESPIRATORY (INHALATION) at 07:26

## 2021-11-29 RX ADMIN — Medication 100 MG: at 08:52

## 2021-11-30 LAB
BACTERIA UR QL AUTO: ABNORMAL /HPF
BILIRUB UR QL STRIP: NEGATIVE
CLARITY UR: CLEAR
COLOR UR: YELLOW
GLUCOSE UR STRIP-MCNC: NEGATIVE MG/DL
HCT VFR BLD AUTO: 25.7 % (ref 37.5–51)
HGB BLD-MCNC: 8.8 G/DL (ref 13–17.7)
HGB UR QL STRIP.AUTO: ABNORMAL
HYALINE CASTS UR QL AUTO: ABNORMAL /LPF
KETONES UR QL STRIP: NEGATIVE
LEUKOCYTE ESTERASE UR QL STRIP.AUTO: NEGATIVE
NITRITE UR QL STRIP: NEGATIVE
PH UR STRIP.AUTO: 5.5 [PH] (ref 5–8)
PROT UR QL STRIP: ABNORMAL
RBC # UR STRIP: ABNORMAL /HPF
REF LAB TEST METHOD: ABNORMAL
SP GR UR STRIP: 1.01 (ref 1–1.03)
SQUAMOUS #/AREA URNS HPF: ABNORMAL /HPF
UROBILINOGEN UR QL STRIP: ABNORMAL
WBC # UR STRIP: ABNORMAL /HPF

## 2021-11-30 PROCEDURE — 94799 UNLISTED PULMONARY SVC/PX: CPT

## 2021-11-30 PROCEDURE — 97110 THERAPEUTIC EXERCISES: CPT

## 2021-11-30 PROCEDURE — 97116 GAIT TRAINING THERAPY: CPT

## 2021-11-30 PROCEDURE — 81001 URINALYSIS AUTO W/SCOPE: CPT | Performed by: INTERNAL MEDICINE

## 2021-11-30 PROCEDURE — 83883 ASSAY NEPHELOMETRY NOT SPEC: CPT | Performed by: INTERNAL MEDICINE

## 2021-11-30 PROCEDURE — 84155 ASSAY OF PROTEIN SERUM: CPT | Performed by: INTERNAL MEDICINE

## 2021-11-30 PROCEDURE — 85018 HEMOGLOBIN: CPT | Performed by: ORTHOPAEDIC SURGERY

## 2021-11-30 PROCEDURE — 85014 HEMATOCRIT: CPT | Performed by: ORTHOPAEDIC SURGERY

## 2021-11-30 PROCEDURE — 84165 PROTEIN E-PHORESIS SERUM: CPT | Performed by: INTERNAL MEDICINE

## 2021-11-30 RX ADMIN — IPRATROPIUM BROMIDE AND ALBUTEROL SULFATE 3 ML: 2.5; .5 SOLUTION RESPIRATORY (INHALATION) at 15:06

## 2021-11-30 RX ADMIN — FINASTERIDE 5 MG: 5 TABLET, FILM COATED ORAL at 09:09

## 2021-11-30 RX ADMIN — Medication 100 MG: at 09:09

## 2021-11-30 RX ADMIN — PRAMIPEXOLE DIHYDROCHLORIDE 1 MG: 1 TABLET ORAL at 09:09

## 2021-11-30 RX ADMIN — METOPROLOL SUCCINATE 25 MG: 25 TABLET, EXTENDED RELEASE ORAL at 09:08

## 2021-11-30 RX ADMIN — FAMOTIDINE 20 MG: 20 TABLET ORAL at 09:09

## 2021-11-30 RX ADMIN — IPRATROPIUM BROMIDE AND ALBUTEROL SULFATE 3 ML: 2.5; .5 SOLUTION RESPIRATORY (INHALATION) at 19:43

## 2021-11-30 RX ADMIN — DOCUSATE SODIUM 100 MG: 100 CAPSULE, LIQUID FILLED ORAL at 09:08

## 2021-11-30 RX ADMIN — IPRATROPIUM BROMIDE AND ALBUTEROL SULFATE 3 ML: 2.5; .5 SOLUTION RESPIRATORY (INHALATION) at 10:40

## 2021-11-30 RX ADMIN — APIXABAN 2.5 MG: 2.5 TABLET, FILM COATED ORAL at 09:09

## 2021-11-30 RX ADMIN — Medication 10 MG: at 20:10

## 2021-11-30 RX ADMIN — CITALOPRAM HYDROBROMIDE 20 MG: 20 TABLET ORAL at 09:09

## 2021-11-30 RX ADMIN — IPRATROPIUM BROMIDE AND ALBUTEROL SULFATE 3 ML: 2.5; .5 SOLUTION RESPIRATORY (INHALATION) at 07:29

## 2021-11-30 RX ADMIN — DOCUSATE SODIUM 100 MG: 100 CAPSULE, LIQUID FILLED ORAL at 20:08

## 2021-11-30 RX ADMIN — SODIUM CHLORIDE, PRESERVATIVE FREE 3 ML: 5 INJECTION INTRAVENOUS at 20:09

## 2021-11-30 RX ADMIN — PRAMIPEXOLE DIHYDROCHLORIDE 1 MG: 1 TABLET ORAL at 20:08

## 2021-11-30 RX ADMIN — SODIUM CHLORIDE, PRESERVATIVE FREE 10 ML: 5 INJECTION INTRAVENOUS at 20:08

## 2021-11-30 RX ADMIN — APIXABAN 2.5 MG: 2.5 TABLET, FILM COATED ORAL at 20:08

## 2021-11-30 RX ADMIN — SODIUM CHLORIDE, PRESERVATIVE FREE 3 ML: 5 INJECTION INTRAVENOUS at 09:10

## 2021-12-01 ENCOUNTER — DOCUMENTATION (OUTPATIENT)
Dept: ORTHOPEDIC SURGERY | Facility: CLINIC | Age: 76
End: 2021-12-01

## 2021-12-01 ENCOUNTER — APPOINTMENT (OUTPATIENT)
Dept: GENERAL RADIOLOGY | Facility: HOSPITAL | Age: 76
End: 2021-12-01

## 2021-12-01 LAB
ALBUMIN SERPL ELPH-MCNC: 3 G/DL (ref 2.9–4.4)
ALBUMIN/GLOB SERPL: 1.2 {RATIO} (ref 0.7–1.7)
ALPHA1 GLOB SERPL ELPH-MCNC: 0.3 G/DL (ref 0–0.4)
ALPHA2 GLOB SERPL ELPH-MCNC: 0.8 G/DL (ref 0.4–1)
ANION GAP SERPL CALCULATED.3IONS-SCNC: 11.5 MMOL/L (ref 5–15)
B-GLOBULIN SERPL ELPH-MCNC: 0.7 G/DL (ref 0.7–1.3)
BUN SERPL-MCNC: 49 MG/DL (ref 8–23)
BUN/CREAT SERPL: 24.4 (ref 7–25)
CALCIUM SPEC-SCNC: 9.2 MG/DL (ref 8.6–10.5)
CHLORIDE SERPL-SCNC: 104 MMOL/L (ref 98–107)
CO2 SERPL-SCNC: 22.5 MMOL/L (ref 22–29)
CREAT SERPL-MCNC: 2.01 MG/DL (ref 0.76–1.27)
GAMMA GLOB SERPL ELPH-MCNC: 0.7 G/DL (ref 0.4–1.8)
GFR SERPL CREATININE-BSD FRML MDRD: 33 ML/MIN/1.73
GLOBULIN SER CALC-MCNC: 2.6 G/DL (ref 2.2–3.9)
GLUCOSE SERPL-MCNC: 110 MG/DL (ref 65–99)
HCT VFR BLD AUTO: 26.7 % (ref 37.5–51)
HGB BLD-MCNC: 9.1 G/DL (ref 13–17.7)
KAPPA LC FREE SER-MCNC: 49.9 MG/L (ref 3.3–19.4)
KAPPA LC FREE/LAMBDA FREE SER: 1.08 {RATIO} (ref 0.26–1.65)
LABORATORY COMMENT REPORT: ABNORMAL
LAMBDA LC FREE SERPL-MCNC: 46 MG/L (ref 5.7–26.3)
M PROTEIN SERPL ELPH-MCNC: 0.1 G/DL
POTASSIUM SERPL-SCNC: 3.9 MMOL/L (ref 3.5–5.2)
PROT PATTERN SERPL ELPH-IMP: ABNORMAL
PROT SERPL-MCNC: 5.6 G/DL (ref 6–8.5)
SODIUM SERPL-SCNC: 138 MMOL/L (ref 136–145)

## 2021-12-01 PROCEDURE — 80048 BASIC METABOLIC PNL TOTAL CA: CPT | Performed by: INTERNAL MEDICINE

## 2021-12-01 PROCEDURE — 85018 HEMOGLOBIN: CPT | Performed by: ORTHOPAEDIC SURGERY

## 2021-12-01 PROCEDURE — 94799 UNLISTED PULMONARY SVC/PX: CPT

## 2021-12-01 PROCEDURE — 94760 N-INVAS EAR/PLS OXIMETRY 1: CPT

## 2021-12-01 PROCEDURE — 97530 THERAPEUTIC ACTIVITIES: CPT

## 2021-12-01 PROCEDURE — 85014 HEMATOCRIT: CPT | Performed by: ORTHOPAEDIC SURGERY

## 2021-12-01 PROCEDURE — 73501 X-RAY EXAM HIP UNI 1 VIEW: CPT

## 2021-12-01 RX ADMIN — APIXABAN 2.5 MG: 2.5 TABLET, FILM COATED ORAL at 22:15

## 2021-12-01 RX ADMIN — IPRATROPIUM BROMIDE AND ALBUTEROL SULFATE 3 ML: 2.5; .5 SOLUTION RESPIRATORY (INHALATION) at 00:48

## 2021-12-01 RX ADMIN — SODIUM CHLORIDE, PRESERVATIVE FREE 3 ML: 5 INJECTION INTRAVENOUS at 22:18

## 2021-12-01 RX ADMIN — SODIUM CHLORIDE, PRESERVATIVE FREE 3 ML: 5 INJECTION INTRAVENOUS at 08:57

## 2021-12-01 RX ADMIN — IPRATROPIUM BROMIDE AND ALBUTEROL SULFATE 3 ML: 2.5; .5 SOLUTION RESPIRATORY (INHALATION) at 15:04

## 2021-12-01 RX ADMIN — POLYVINYL ALCOHOL, POVIDONE 2 DROP: 14; 6 SOLUTION/ DROPS OPHTHALMIC at 22:14

## 2021-12-01 RX ADMIN — TEMAZEPAM 15 MG: 15 CAPSULE ORAL at 00:52

## 2021-12-01 RX ADMIN — Medication 10 MG: at 22:18

## 2021-12-01 RX ADMIN — PRAMIPEXOLE DIHYDROCHLORIDE 1 MG: 1 TABLET ORAL at 22:16

## 2021-12-01 RX ADMIN — DOCUSATE SODIUM 100 MG: 100 CAPSULE, LIQUID FILLED ORAL at 22:15

## 2021-12-01 RX ADMIN — IPRATROPIUM BROMIDE AND ALBUTEROL SULFATE 3 ML: 2.5; .5 SOLUTION RESPIRATORY (INHALATION) at 23:15

## 2021-12-01 RX ADMIN — Medication 100 MG: at 08:59

## 2021-12-01 RX ADMIN — CITALOPRAM HYDROBROMIDE 20 MG: 20 TABLET ORAL at 06:05

## 2021-12-01 RX ADMIN — IPRATROPIUM BROMIDE AND ALBUTEROL SULFATE 3 ML: 2.5; .5 SOLUTION RESPIRATORY (INHALATION) at 04:01

## 2021-12-01 RX ADMIN — FAMOTIDINE 20 MG: 20 TABLET ORAL at 08:58

## 2021-12-01 RX ADMIN — FINASTERIDE 5 MG: 5 TABLET, FILM COATED ORAL at 08:57

## 2021-12-01 RX ADMIN — IPRATROPIUM BROMIDE AND ALBUTEROL SULFATE 3 ML: 2.5; .5 SOLUTION RESPIRATORY (INHALATION) at 12:34

## 2021-12-01 RX ADMIN — APIXABAN 2.5 MG: 2.5 TABLET, FILM COATED ORAL at 08:58

## 2021-12-01 RX ADMIN — METOPROLOL SUCCINATE 25 MG: 25 TABLET, EXTENDED RELEASE ORAL at 08:59

## 2021-12-01 RX ADMIN — IPRATROPIUM BROMIDE AND ALBUTEROL SULFATE 3 ML: 2.5; .5 SOLUTION RESPIRATORY (INHALATION) at 07:17

## 2021-12-01 RX ADMIN — IPRATROPIUM BROMIDE AND ALBUTEROL SULFATE 3 ML: 2.5; .5 SOLUTION RESPIRATORY (INHALATION) at 19:56

## 2021-12-01 RX ADMIN — PRAMIPEXOLE DIHYDROCHLORIDE 1 MG: 1 TABLET ORAL at 08:59

## 2021-12-01 NOTE — PLAN OF CARE
Goal Outcome Evaluation:  Plan of Care Reviewed With: patient, spouse, son      Patient AAO with no signs of confusion this shift. Reports no new concerns, no pain. Staples removed and steri strips applied to left hip (arthoplasty done by Dr. Francois on 11/16/21-obtained telephone order from GIOVANNI Cage @ [facility] to DC staples). VSS, compliant with medications and PT. Able to ambulate with walker as standby. Urine output sufficient. Potential DC home with  or possible inpatient rehab-awaiting confirmation. Renal function improving (see lab result HX)-MT, LPN

## 2021-12-01 NOTE — PROGRESS NOTES
[facility]     Progress Note    Patient Name: Rafael Gmoez  : 1945  MRN: [id number]  Primary Care Physician:  Claude Hair MD  Date of admission: 2021      Subjective   Brief summary.  Admitted to hospital post hip replacement, complicated course since then, developed renal failure and hospital psychosis      HPI:  Follow-up on renal failure and psychosis, feeling much better awake alert sitting in chair, son at bedside.  Working with therapist    Review of Systems     No chest pain or shortness of breath.  Fatigue and weakness.  No constipation or diarrhea      Objective     Vitals:   Temp:  [97.3 °F (36.3 °C)-98.6 °F (37 °C)] 97.3 °F (36.3 °C)  Heart Rate:  [71-89] 78  Resp:  [18-20] 18  BP: (123-138)/(57-65) 123/60    Physical Exam :     Morbidly obese male, restless.  More awake and alert than yesterday  Pupils reactive external ocular muscle intact  Heart regular.  Lungs diminished breath sounds bilaterally.  Abdomen obese, less distended  Neurologically awake and alert, comfortable   extremities with trace of edema.      Result Review:  I have personally reviewed the results from the time of this admission to 2021 20:07 EST and agree with these findings:  [x]  Laboratory  []  Microbiology  []  Radiology  []  EKG/Telemetry   []  Cardiology/Vascular   []  Pathology  []  Old records  []  Other:           Assessment / Plan       Active Hospital Problems:  Active Hospital Problems    Diagnosis    • Metabolic encephalopathy    • Ileus (HCC)    • Severe sepsis (HCC)    • Acute-on-chronic kidney injury (HCC)    • CKD (chronic kidney disease) stage 3, GFR 30-59 ml/min ([facility])    • Acute blood loss anemia    • Left hip pain      Added automatically from request for surgery [id number]     • Primary osteoarthritis of one hip, left        Plan:   Stable and significantly improved.  Will check labs tomorrow morning.  Continue PT OT efforts.  Waiting for placement       DVT prophylaxis:  Medical  and mechanical DVT prophylaxis orders are present.    CODE STATUS:   Code Status (Patient has no pulse and is not breathing): CPR (Attempt to Resuscitate)  Medical Interventions (Patient has pulse or is breathing): Full Support            Electronically signed by Melchor Nichole MD, 11/30/21, 8:07 PM EST.

## 2021-12-01 NOTE — PROGRESS NOTES
[facility] Progress Note      Patient Name: Rafael Gomez  : 1945  MRN: [id number]  Primary Care Physician:  Claude Hair MD  Date of admission: 2021    Subjective     Interval History:     Patient more interactive back to baseline  Eating  Participating with physical therapy  Urinating spontaneously    Review of Systems:   As noted above    Objective     Vitals:   Temp:  [97.3 °F (36.3 °C)-98.3 °F (36.8 °C)] 97.8 °F (36.6 °C)  Heart Rate:  [71-94] 81  Resp:  [18-22] 18  BP: (123-164)/(59-85) 137/67    Intake/Output Summary (Last 24 hours) at 2021 1036  Last data filed at 2021 0937  Gross per 24 hour   Intake 700 ml   Output 1400 ml   Net -700 ml       Physical Exam:      Constitutional: Alert x2 patient comparative accompanied by wife at bedside  HEENT: Sclera anicteric, no conjunctival injection  Neck: Supple, no thyromegaly, no lymphadenopathy, trachea at midline, no JVD  Respiratory: Clear to auscultation bilaterally, nonlabored respiration  Cardiovascular: RRR w CHARLENE grade III   Gastrointestinal: Decreased bowel sounds, abdomen is distended , nontender and nondistended  Musculoskeletal: No edema, no clubbing or cyanosis.  Left hip surgical wound in place with dressing  Psychiatric: Appropriate affect, cooperative  Neurologic: Alert x2 without focalization  Skin: Warm and dry        Scheduled Meds:     apixaban, 2.5 mg, Oral, Q12H  citalopram, 20 mg, Oral, QAM  docusate sodium, 100 mg, Oral, BID  famotidine, 20 mg, Oral, Daily  finasteride, 5 mg, Oral, Daily  ipratropium-albuterol, 3 mL, Nebulization, Q4H - RT  melatonin, 10 mg, Oral, Nightly  metoprolol succinate XL, 25 mg, Oral, Daily  pramipexole, 1 mg, Oral, Q12H  sodium chloride, 500 mL, Intravenous, Once  sodium chloride, 3 mL, Intravenous, Q12H  thiamine, 100 mg, Oral, Daily      IV Meds:        Results Reviewed:   I have personally reviewed the results from the time of this admission to  12/1/2021 10:36 EST     Results from last 7 days   Lab Units 12/01/21 0452 11/29/21 0328 11/28/21 0357 11/27/21 0418 11/27/21 0418   SODIUM mmol/L 138 136 137   < > 150*   POTASSIUM mmol/L 3.9 3.7 3.3*   < > 3.7   CHLORIDE mmol/L 104 102 101   < > 111*   CO2 mmol/L 22.5 22.3 24.0   < > 23.6   BUN mg/dL 49* 72* 60*   < > 54*   CREATININE mg/dL 2.01* 3.21* 2.90*   < > 2.31*   CALCIUM mg/dL 9.2 8.6 8.3*   < > 9.9   BILIRUBIN mg/dL  --   --  1.0  --  1.3*   ALK PHOS U/L  --   --  74  --  80   ALT (SGPT) U/L  --   --  54*  --  62*   AST (SGOT) U/L  --   --  77*  --  84*   GLUCOSE mg/dL 110* 108* 110*   < > 106*    < > = values in this interval not displayed.       Estimated Creatinine Clearance: 40.4 mL/min (A) (by C-G formula based on SCr of 2.01 mg/dL (H)).    Results from last 7 days   Lab Units 11/29/21 0328 11/28/21 0357   MAGNESIUM mg/dL 1.7 1.4*   PHOSPHORUS mg/dL  --  3.6             Results from last 7 days   Lab Units 12/01/21 0452 11/30/21 0444 11/29/21 0327 11/28/21 0357 11/27/21 0418   WBC 10*3/mm3  --   --   --   --  7.28   HEMOGLOBIN g/dL 9.1* 8.8* 8.2* 8.3* 9.4*   PLATELETS 10*3/mm3  --   --   --   --  213                 Assessment / Plan     ASSESSMENT:    - Acute kidney injury on CKD stage 3 .  Baseline creatinine 1.6-1.8.  Peaked creatinine of 4.5 currently improving to 2.0 . acute etiology secondary to prerenal cause /hypotension .  - Acute on chronic normocytic anemia likely secondary to surgical intervention and chronic kidney disease/chronic illness .  Following H&H closely., transfusion as needed as per primary team  - COPD .  Followed by pulmonary placed on supplemental oxygen inhalers  - Obstructive sleep apnea, compliant with CPAP since admission  - Hyponatremia secondary to acute kidney injury will follow trend, currently stable at 138  - Hyperphosphatemia secondary to acute on chronic kidney disease following with renal diet  - Anion gap metabolic acidosis secondary to acute on  chronic kidney disease.   Resolved  - Hip OA left total hip arthroplasty on 11/16/2021, as per orthopaedics   - History of paroxysmal atrial tachycardia/ Afib was on chronic anticoagulation. On apixaban and rate control on metoprolol   - Metabolic encephalopathy , patient with h/o of daily alcohol intake . 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 Velcade, Cytoxan, and dexamethasone (CyBorD).  Treatment initiated 7/2019    He received therapy through 9/2019.  As of 9/23/2019, treatment was held for cytopenias, fatigue, orthostasis.     PLAN:  -Follow renal function closely  - Awaiting repeat repeat Amyloid work up   -Avoid nephrotoxins  -Adjust medications to GFR      Thank you for involving us in the care of Rafael Gomez.  Please feel free to call with any questions.    Olman Vanessa MD  12/01/21  10:36 EST    [facility]  [phone]      Much of this encounter note is an electronic transcription/translation of spoken language to printed text. The electronic translation of spoken language may permit erroneous, or at times, nonsensical words or phrases to be inadvertently transcribed; Although I have reviewed the note for such errors, some may still exist.

## 2021-12-01 NOTE — THERAPY TREATMENT NOTE
Acute Care - Physical Therapy Progress Note  MIRELLA Watson     Patient Name: Rafael Gomez  : 1945  MRN: [id number]  Today's Date: 2021      Visit Dx:     ICD-10-CM ICD-9-CM   1. Left hip pain  M25.552 719.45   2. Decreased activities of daily living (ADL)  Z78.9 V49.89   3. Difficulty in walking  R26.2 719.7     Patient Active Problem List   Diagnosis   • Lung nodule, solitary   • Cough   • Wheeze   • Multiple lung nodules on CT   • Follow-up examination, following other surgery   • Renal mass   • Plasmacytoma (HCC)   • Macrocytic anemia   • Leukopenia   • Monoclonal gammopathy   • Thrombocytopenia (HCC)   • AL amyloidosis (HCC)   • Neck pain   • Chronic low back pain   • Cervical spondylosis without myelopathy   • Primary osteoarthritis of one hip, left   • Left hip pain   • Encounter for preadmission testing   • Hypotension   • CKD (chronic kidney disease) stage 3, GFR 30-59 ml/min ([facility])   • Acute blood loss anemia   • Acute-on-chronic kidney injury (HCC)   • Severe sepsis (HCC)   • Ileus (HCC)   • Metabolic encephalopathy     Past Medical History:   Diagnosis Date   • AA (aortic aneurysm) ([facility])     normal diameter noted on scan 21   • AL amyloidosis ([facility]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   • Anemia     no current issues   • Anxiety    • Arthritis     OA OF LEFT HIP   • Colitis     REPORTS CURRENTLY INCONTINENT OF BOWEL AND HAVING DIARRHEA   • COPD (chronic obstructive pulmonary disease) ([facility])     inhalers, neb tx   • Coronary artery disease    • Depression    • ED (erectile dysfunction)    • GERD (gastroesophageal reflux disease)    • Gout    • History of PAT (paroxysmal atrial tachycardia)    • Hyperlipidemia    • Hypertension    • Leukopenia     no current  issues   • Macrocytic anemia    • Multiple renal cysts     no current issues   • Myocardial infarction (HCC)    • Neuropathy     HANDS AND FEET   • PTSD (post-traumatic stress disorder)    • PVD (peripheral vascular  disease) (HCC)    • Requires supplemental oxygen     02 AT 2LPM THROUGH CPAP MACHINE AT NIGHT   • Rheumatoid arthritis (HCC)    • RLS (restless legs syndrome)    • Seasonal allergies    • Sleep apnea     uses CPAP   • Stroke (HCC)     prior to diallo carotid endarterectomy, no residual issues AROUND EARLY 2000     Past Surgical History:   Procedure Laterality Date   • APPENDECTOMY N/A 2005   • CAROTID ENDARTERECTOMY Bilateral 2007   • COLONOSCOPY N/A 1995    done in [facility]   • CORONARY ARTERY BYPASS GRAFT  1997    4 vessel, no  c/o cp or soa, follows w/ DNeema JHAVERI   • HIATAL HERNIA REPAIR N/A    • REPLACEMENT TOTAL KNEE Left 09/08/2015   • ROTATOR CUFF REPAIR Right    • SKIN CANCER EXCISION      back   • THORACOSCOPY Left 6/2/2016    Procedure: LEFT VIDEO ASSISTED THORACOTOMY W/ LEFT UPPER LOBE WEDGE RESECTION X 2; EXPLORATORY BRONCHOSCOPY; PAIN PUMP PLACEMENT X 2;  Surgeon: Larry Hernandez MD;  Location: [facility];  Service:    • TONSILLECTOMY Bilateral    • TOTAL HIP ARTHROPLASTY Right 1997   • TOTAL HIP ARTHROPLASTY Left 11/16/2021    Procedure: LEFT TOTAL HIP ARTHROPLASTY;  Surgeon: Man Francois MD;  Location: [facility] OR;  Service: Orthopedics;  Laterality: Left;     PT Assessment (last 12 hours)     PT Evaluation and Treatment     Row Name 12/01/21 1300          Physical Therapy Time and Intention    Subjective Information no complaints  -CS     Document Type therapy note (daily note)  -CS     Mode of Treatment individual therapy; physical therapy  -CS     Patient Effort good  -CS     Symptoms Noted During/After Treatment none  -CS     Row Name 12/01/21 1300          Transfers    Sit-Stand [place] (Transfers) standby assist; 1 person assist  -CS     Stand-Sit [place] (Transfers) standby assist; 1 person assist  -CS     Row Name 12/01/21 1300          Sit-Stand Transfer    Assistive Device (Sit-Stand Transfers) walker, front-wheeled  -CS     Row Name 12/01/21 1300          Stand-Sit  Transfer    Assistive Device (Stand-Sit Transfers) walker, front-wheeled  -CS     Row Name 12/01/21 1300          Gait/Stairs (Locomotion)    Gait/Stairs Locomotion gait/ambulation independence; gait/ambulation assistive device; distance ambulated; gait pattern  -CS     Patrick Level (Gait) standby assist; 1 person assist  -CS     Assistive Device (Gait) walker, front-wheeled  -CS     Distance in Feet (Gait) 300  -CS     Pattern (Gait) 4-point; step-through  -CS     Row Name             Wound 11/16/21 0754 Left anterior hip Incision    Wound - Properties Group Placement Date: 11/16/21  -SC Placement Time: 0754  -SC Present on Hospital Admission: N  -SC Side: Left  -SC Orientation: anterior  -SC Location: hip  -SC Primary Wound Type: Incision  -SC     Retired Wound - Properties Group Date first assessed: 11/16/21  -SC Time first assessed: 0754  -SC Present on Hospital Admission: N  -SC Side: Left  -SC Location: hip  -SC Primary Wound Type: Incision  -SC     Row Name             Wound 11/18/21 0401 Right upper back    Wound - Properties Group Placement Date: 11/18/21  - Placement Time: 0401  -JH Present on Hospital Admission: N  -JH Side: Right  -JH Orientation: upper  -JH Location: back  -JH     Retired Wound - Properties Group Date first assessed: 11/18/21  - Time first assessed: 0401  -JH Present on Hospital Admission: N  -JH Side: Right  -JH Location: back  -JH     Row Name 12/01/21 1300          Progress Summary (PT)    Daily Progress Summary (PT) Pt. presents today sitting up in chair and agreeable to PT Tx.  Pt. alert and oriented today and performed all mobility without external assistance.  Pt. ambulated with step-through gait pattern today for increased distance and no LOB observed.  Pt. returned to room and T/F'd back to chair with family present in room.  -CS           User Key  (r) = Recorded By, (t) = Taken By, (c) = Cosigned By    Initials Name Provider Type    Fauzia Suggs RN  Registered Nurse    Susanne Rodas, RN Registered Nurse    Mike Villagomez PTA Physical Therapy Assistant                Physical Therapy Education                 Title: PT OT SLP Therapies (Done)     Topic: Physical Therapy (Done)     Point: Mobility training (Done)     Learning Progress Summary           Patient Acceptance, E,TB, VU by TREY at 11/16/2021 1257                   Point: Home exercise program (Done)     Learning Progress Summary           Patient Acceptance, E,TB, VU by TREY at 11/16/2021 1257                   Point: Body mechanics (Done)     Learning Progress Summary           Patient Acceptance, E,TB, VU by TREY at 11/16/2021 1257                   Point: Precautions (Done)     Learning Progress Summary           Patient Acceptance, E,TB, VU by TREY at 11/16/2021 1257                               User Key     Initials Effective Dates Name Provider Type Discipline    TREY 06/03/21 -  Juanito Kumar, PT Physical Therapist PT              PT Recommendation and Plan     Progress Summary (PT)  Daily Progress Summary (PT): Pt. presents today sitting up in chair and agreeable to PT Tx.  Pt. alert and oriented today and performed all mobility without external assistance.  Pt. ambulated with step-through gait pattern today for increased distance and no LOB observed.  Pt. returned to room and T/F'd back to chair with family present in room.   Outcome Measures     Row Name 12/01/21 1300             How much help from another person do you currently need...    Turning from your back to your side while in flat bed without using bedrails? 4  -CS      Moving from lying on back to sitting on the side of a flat bed without bedrails? 4  -CS      Moving to and from a bed to a chair (including a wheelchair)? 4  -CS      Standing up from a chair using your arms (e.g., wheelchair, bedside chair)? 4  -CS      Climbing 3-5 steps with a railing? 3  -CS      To walk in hospital room? 4  -CS      AM-PAC 6 Clicks  Score (PT) 23  -CS              Functional Assessment    Outcome Measure Options AM-PAC 6 Clicks Basic Mobility (PT)  -CS            User Key  (r) = Recorded By, (t) = Taken By, (c) = Cosigned By    Initials Name Provider Type    Mike Villagomez PTA Physical Therapy Assistant                 Time Calculation:    PT Charges     Row Name 12/01/21 1344             Time Calculation    Start Time 1047  -CS      PT Received On 12/01/21  -CS      PT Goal Re-Cert Due Date 12/05/21  -CS              Timed Charges    90654 - PT Therapeutic Activity Minutes 13  -CS              Total Minutes    Timed Charges Total Minutes 13  -CS       Total Minutes 13  -CS            User Key  (r) = Recorded By, (t) = Taken By, (c) = Cosigned By    Initials Name Provider Type    Mike Villagomez PTA Physical Therapy Assistant              Therapy Charges for Today     Code Description Service Date Service Provider Modifiers Qty    81867073000 HC PT THERAPEUTIC ACT EA 15 MIN 12/1/2021 Mike Shetty PTA GP 1          PT G-Codes  Outcome Measure Options: AM-PAC 6 Clicks Basic Mobility (PT)  AM-PAC 6 Clicks Score (PT): 23  AM-PAC 6 Clicks Score (OT): 14    Mike Shetty PTA  12/1/2021

## 2021-12-02 LAB
ALBUMIN MFR UR ELPH: 31.5 %
ALPHA1 GLOB MFR UR ELPH: 2.4 %
ALPHA2 GLOB MFR UR ELPH: 19.7 %
B-GLOBULIN MFR UR ELPH: 17.4 %
GAMMA GLOB MFR UR ELPH: 29 %
HCT VFR BLD AUTO: 26 % (ref 37.5–51)
HGB BLD-MCNC: 8.7 G/DL (ref 13–17.7)
LABORATORY COMMENT REPORT: ABNORMAL
M PROTEIN MFR UR ELPH: 8.9 %
PROT UR-MCNC: 36.1 MG/DL

## 2021-12-02 PROCEDURE — 85014 HEMATOCRIT: CPT | Performed by: ORTHOPAEDIC SURGERY

## 2021-12-02 PROCEDURE — 94799 UNLISTED PULMONARY SVC/PX: CPT

## 2021-12-02 PROCEDURE — 85018 HEMOGLOBIN: CPT | Performed by: ORTHOPAEDIC SURGERY

## 2021-12-02 PROCEDURE — 97530 THERAPEUTIC ACTIVITIES: CPT

## 2021-12-02 RX ORDER — IPRATROPIUM BROMIDE AND ALBUTEROL SULFATE 2.5; .5 MG/3ML; MG/3ML
3 SOLUTION RESPIRATORY (INHALATION) EVERY 6 HOURS PRN
Status: DISCONTINUED | OUTPATIENT
Start: 2021-12-02 | End: 2021-12-03 | Stop reason: HOSPADM

## 2021-12-02 RX ADMIN — IPRATROPIUM BROMIDE AND ALBUTEROL SULFATE 3 ML: 2.5; .5 SOLUTION RESPIRATORY (INHALATION) at 07:48

## 2021-12-02 RX ADMIN — FAMOTIDINE 20 MG: 20 TABLET ORAL at 08:27

## 2021-12-02 RX ADMIN — FINASTERIDE 5 MG: 5 TABLET, FILM COATED ORAL at 08:27

## 2021-12-02 RX ADMIN — APIXABAN 2.5 MG: 2.5 TABLET, FILM COATED ORAL at 21:05

## 2021-12-02 RX ADMIN — DOCUSATE SODIUM 100 MG: 100 CAPSULE, LIQUID FILLED ORAL at 08:27

## 2021-12-02 RX ADMIN — PRAMIPEXOLE DIHYDROCHLORIDE 1 MG: 1 TABLET ORAL at 08:26

## 2021-12-02 RX ADMIN — PRAMIPEXOLE DIHYDROCHLORIDE 1 MG: 1 TABLET ORAL at 21:05

## 2021-12-02 RX ADMIN — Medication 10 MG: at 21:05

## 2021-12-02 RX ADMIN — DOCUSATE SODIUM 100 MG: 100 CAPSULE, LIQUID FILLED ORAL at 21:05

## 2021-12-02 RX ADMIN — Medication 100 MG: at 08:27

## 2021-12-02 RX ADMIN — APIXABAN 2.5 MG: 2.5 TABLET, FILM COATED ORAL at 08:27

## 2021-12-02 RX ADMIN — SODIUM CHLORIDE, PRESERVATIVE FREE 3 ML: 5 INJECTION INTRAVENOUS at 21:05

## 2021-12-02 RX ADMIN — METOPROLOL SUCCINATE 25 MG: 25 TABLET, EXTENDED RELEASE ORAL at 08:27

## 2021-12-02 RX ADMIN — TEMAZEPAM 15 MG: 15 CAPSULE ORAL at 22:54

## 2021-12-02 RX ADMIN — CITALOPRAM HYDROBROMIDE 20 MG: 20 TABLET ORAL at 08:29

## 2021-12-02 RX ADMIN — SODIUM CHLORIDE, PRESERVATIVE FREE 3 ML: 5 INJECTION INTRAVENOUS at 08:27

## 2021-12-02 NOTE — PROGRESS NOTES
[facility]     Progress Note    Patient Name: Rafael Gomez  : 1945  MRN: [id number]  Primary Care Physician:  Claude Hair MD  Date of admission: 2021      Subjective   Brief summary.  Admitted to hospital post hip replacement, complicated course since then, developed renal failure and hospital psychosis      HPI:  Follow-up on renal failure and psychosis, feeling much better awake alert sitting in chair, son and wife both present.  Concerned about his weakness does not want to take him home.  Waiting for placement.  Patient feels much better and wants to go home.    Review of Systems     No chest pain or shortness of breath.  Fatigue and weakness.  No constipation or diarrhea      Objective     Vitals:   Temp:  [97.3 °F (36.3 °C)-98.3 °F (36.8 °C)] 97.3 °F (36.3 °C)  Heart Rate:  [73-94] 73  Resp:  [18-22] 20  BP: (133-147)/(59-80) 147/65    Physical Exam :     Morbidly obese male, restless.  More awake and alert than yesterday  Pupils reactive external ocular muscle intact  Heart regular.  Lungs diminished breath sounds bilaterally.  Abdomen obese, less distended  Neurologically awake and alert, comfortable   extremities with trace of edema.      Result Review:  I have personally reviewed the results from the time of this admission to 2021 20:20 EST and agree with these findings:  [x]  Laboratory  []  Microbiology  []  Radiology  []  EKG/Telemetry   []  Cardiology/Vascular   []  Pathology  []  Old records  []  Other:           Assessment / Plan       Active Hospital Problems:  Active Hospital Problems    Diagnosis    • Metabolic encephalopathy    • Ileus (HCC)    • Severe sepsis (HCC)    • Acute-on-chronic kidney injury (HCC)    • CKD (chronic kidney disease) stage 3, GFR 30-59 ml/min (HCC)    • Acute blood loss anemia    • Left hip pain      Added automatically from request for surgery [id number]     • Primary osteoarthritis of one hip, left        Plan:   Stable and significantly  improved.  BUN and creatinine improving .  Continue PT and OT .  Discharge planner/ on case       DVT prophylaxis:  Medical and mechanical DVT prophylaxis orders are present.    CODE STATUS:   Code Status (Patient has no pulse and is not breathing): CPR (Attempt to Resuscitate)  Medical Interventions (Patient has pulse or is breathing): Full Support            Electronically signed by Melchor Nichole MD, 11/30/21, 8:07 PM EST.

## 2021-12-02 NOTE — PROGRESS NOTES
[facility] Progress Note      Patient Name: Rafael Gomez  : 1945  MRN: [id number]  Primary Care Physician:  Claude Hair MD  Date of admission: 2021    Subjective     Interval History:   Patient less confused and more interactive  Offers no complaints.    Review of Systems:   As noted above    Objective     Vitals:   Temp:  [97.3 °F (36.3 °C)-98.7 °F (37.1 °C)] 97.4 °F (36.3 °C)  Heart Rate:  [73-85] 83  Resp:  [18-22] 20  BP: (130-161)/(61-69) 148/69    Intake/Output Summary (Last 24 hours) at 2021 1030  Last data filed at 2021 1002  Gross per 24 hour   Intake 440 ml   Output 1100 ml   Net -660 ml       Physical Exam:      Constitutional: confused   HEENT: Sclera anicteric, no conjunctival injection  Neck: Supple, no thyromegaly, no lymphadenopathy, trachea at midline, no JVD  Respiratory: Clear to auscultation bilaterally, nonlabored respiration  Cardiovascular: RRR w CHARLENE grade III   Gastrointestinal: Decreased bowel sounds, abdomen is distended , nontender and nondistended  : Lemon catheter in place   Musculoskeletal: No edema, no clubbing or cyanosis.  Left hip surgical wound in place with dressing  Psychiatric: Appropriate affect, cooperative  Neurologic: Confused moving all extremities,   Skin: Warm and dry        Scheduled Meds:     apixaban, 2.5 mg, Oral, Q12H  citalopram, 20 mg, Oral, QAM  docusate sodium, 100 mg, Oral, BID  famotidine, 20 mg, Oral, Daily  finasteride, 5 mg, Oral, Daily  melatonin, 10 mg, Oral, Nightly  metoprolol succinate XL, 25 mg, Oral, Daily  pramipexole, 1 mg, Oral, Q12H  sodium chloride, 500 mL, Intravenous, Once  sodium chloride, 3 mL, Intravenous, Q12H  thiamine, 100 mg, Oral, Daily      IV Meds:        Results Reviewed:   I have personally reviewed the results from the time of this admission to 2021 10:30 EST     Results from last 7 days   Lab Units 21  0452 21  0328 21  0357 21  0418  11/27/21 0418   SODIUM mmol/L 138 136 137   < > 150*   POTASSIUM mmol/L 3.9 3.7 3.3*   < > 3.7   CHLORIDE mmol/L 104 102 101   < > 111*   CO2 mmol/L 22.5 22.3 24.0   < > 23.6   BUN mg/dL 49* 72* 60*   < > 54*   CREATININE mg/dL 2.01* 3.21* 2.90*   < > 2.31*   CALCIUM mg/dL 9.2 8.6 8.3*   < > 9.9   BILIRUBIN mg/dL  --   --  1.0  --  1.3*   ALK PHOS U/L  --   --  74  --  80   ALT (SGPT) U/L  --   --  54*  --  62*   AST (SGOT) U/L  --   --  77*  --  84*   GLUCOSE mg/dL 110* 108* 110*   < > 106*    < > = values in this interval not displayed.       Estimated Creatinine Clearance: 40.4 mL/min (A) (by C-G formula based on SCr of 2.01 mg/dL (H)).    Results from last 7 days   Lab Units 11/29/21  0328 11/28/21  0357   MAGNESIUM mg/dL 1.7 1.4*   PHOSPHORUS mg/dL  --  3.6             Results from last 7 days   Lab Units 12/02/21  0451 12/01/21  0452 11/30/21  0444 11/29/21  0327 11/28/21  0357 11/27/21  0418 11/27/21 0418   WBC 10*3/mm3  --   --   --   --   --   --  7.28   HEMOGLOBIN g/dL 8.7* 9.1* 8.8* 8.2* 8.3*   < > 9.4*   PLATELETS 10*3/mm3  --   --   --   --   --   --  213    < > = values in this interval not displayed.                 Assessment / Plan     ASSESSMENT:  - Acute kidney injury on CKD stage 3 .  Baseline creatinine 1.6-1.8.  Peaked creatinine of 4.5 had improved to 2.5->2.0, but increased to 2.3->2.9->3.2->2.0 yest.  Awaiting labs today.  acute etiology secondary to prerenal cause /hypotension.  Bladder scan normal and remains non-oliguric.  Continue supportive care.    - Acute on chronic normocytic anemia likely secondary to surgical intervention and chronic kidney disease/chronic illness. transfusion as needed as per primary team  - COPD .  Followed by pulmonary.  - Obstructive sleep apnea.  - Hypernatremia-  Had initial hyponatremia, improved now.  - Hyperphosphatemia secondary to acute on chronic kidney disease following with renal diet  - Anion gap metabolic acidosis secondary to acute on chronic  kidney disease.  Improved now.   - Hip OA left total hip arthroplasty on 11/16/2021, as per orthopaedics   - History of paroxysmal atrial tachycardia/ Afib was on chronic anticoagulation. On apixaban and rate control on metoprolol   - Metabolic encephalopathy , patient with h/o of daily alcohol intake     Gregory Liriano MD  12/02/21  10:30 EST    [facility]  [phone]

## 2021-12-02 NOTE — PLAN OF CARE
Goal Outcome Evaluation:  Plan of Care Reviewed With: patient        Progress: no changePATIENT ORIENTED TO PLACE,  PERSON  AND SITUATION CONFUSED ON TIME AND DAY/DATE, REORIENTED EASILY . PT RESTLESS THIS SHIFT VOIDING WITHOUT DIFFICULTY LEFT HIP INCISION SIERRA STERI STRIPS IN PLACE . VITALS STABLE BED /CHAIR ALERT IN PLACE CONTINUE PLAN OF CARE.

## 2021-12-02 NOTE — THERAPY TREATMENT NOTE
Patient Name: Rafael Gomez  : 1945    MRN: [id number]                              Today's Date: 2021       Admit Date: 2021    Visit Dx:     ICD-10-CM ICD-9-CM   1. Left hip pain  M25.552 719.45   2. Decreased activities of daily living (ADL)  Z78.9 V49.89   3. Difficulty in walking  R26.2 719.7     Patient Active Problem List   Diagnosis   • Lung nodule, solitary   • Cough   • Wheeze   • Multiple lung nodules on CT   • Follow-up examination, following other surgery   • Renal mass   • Plasmacytoma (HCC)   • Macrocytic anemia   • Leukopenia   • Monoclonal gammopathy   • Thrombocytopenia (HCC)   • AL amyloidosis (HCC)   • Neck pain   • Chronic low back pain   • Cervical spondylosis without myelopathy   • Primary osteoarthritis of one hip, left   • Left hip pain   • Encounter for preadmission testing   • Hypotension   • CKD (chronic kidney disease) stage 3, GFR 30-59 ml/min ([facility])   • Acute blood loss anemia   • Acute-on-chronic kidney injury (HCC)   • Severe sepsis (HCC)   • Ileus (HCC)   • Metabolic encephalopathy     Past Medical History:   Diagnosis Date   • AA (aortic aneurysm) ([facility])     normal diameter noted on scan 21   • AL amyloidosis ([facility]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   • Anemia     no current issues   • Anxiety    • Arthritis     OA OF LEFT HIP   • Colitis     REPORTS CURRENTLY INCONTINENT OF BOWEL AND HAVING DIARRHEA   • COPD (chronic obstructive pulmonary disease) (HCC)     inhalers, neb tx   • Coronary artery disease    • Depression    • ED (erectile dysfunction)    • GERD (gastroesophageal reflux disease)    • Gout    • History of PAT (paroxysmal atrial tachycardia)    • Hyperlipidemia    • Hypertension    • Leukopenia     no current  issues   • Macrocytic anemia    • Multiple renal cysts     no current issues   • Myocardial infarction (HCC)    • Neuropathy     HANDS AND FEET   • PTSD (post-traumatic stress disorder)    • PVD (peripheral vascular  disease) (HCC)    • Requires supplemental oxygen     02 AT 2LPM THROUGH CPAP MACHINE AT NIGHT   • Rheumatoid arthritis (HCC)    • RLS (restless legs syndrome)    • Seasonal allergies    • Sleep apnea     uses CPAP   • Stroke (HCC)     prior to diallo carotid endarterectomy, no residual issues AROUND EARLY 2000     Past Surgical History:   Procedure Laterality Date   • APPENDECTOMY N/A 2005   • CAROTID ENDARTERECTOMY Bilateral 2007   • COLONOSCOPY N/A 1995    done in [facility]   • CORONARY ARTERY BYPASS GRAFT  1997    4 vessel, no  c/o cp or soa, follows w/ D. Sloane JHAVERI   • HIATAL HERNIA REPAIR N/A    • REPLACEMENT TOTAL KNEE Left 09/08/2015   • ROTATOR CUFF REPAIR Right    • SKIN CANCER EXCISION      back   • THORACOSCOPY Left 6/2/2016    Procedure: LEFT VIDEO ASSISTED THORACOTOMY W/ LEFT UPPER LOBE WEDGE RESECTION X 2; EXPLORATORY BRONCHOSCOPY; PAIN PUMP PLACEMENT X 2;  Surgeon: Larry Hernandez MD;  Location: [facility];  Service:    • TONSILLECTOMY Bilateral    • TOTAL HIP ARTHROPLASTY Right 1997   • TOTAL HIP ARTHROPLASTY Left 11/16/2021    Procedure: LEFT TOTAL HIP ARTHROPLASTY;  Surgeon: Man Francois MD;  Location: [facility] OR;  Service: Orthopedics;  Laterality: Left;      General Information     Row Name 12/02/21 1620          OT Time and Intention    Document Type therapy note (daily note)  -     Mode of Treatment individual therapy; occupational therapy  -           User Key  (r) = Recorded By, (t) = Taken By, (c) = Cosigned By    Initials Name Provider Type     Valerie Pennington OT Occupational Therapist                 Mobility/ADL's     Row Name 12/02/21 1620          Bed Mobility    Comment (Bed Mobility) Patient upright and in recliner upon therapist arrival.  -     Row Name 12/02/21 1620          Transfers    Transfers sit-stand transfer  -     Sit-Stand [place] (Transfers) standby assist; 1 person assist  -     [place] Level (Toilet Transfer) standby assist  -      Assistive Device (Toilet Transfer) walker, front-wheeled; grab bars/safety frame; raised toilet seat  -     Row Name 12/02/21 1620          Sit-Stand Transfer    Assistive Device (Sit-Stand Transfers) walker, front-wheeled  -     Row Name 12/02/21 1620          Toilet Transfer    Type (Toilet Transfer) sit-stand; stand-sit  -     Row Name 12/02/21 1620          Functional Mobility    Functional Mobility- Ind. Level other (see comments)  standby assist  -     Functional Mobility- Device rolling walker  -     Functional Mobility- Safety Issues other (see comments)  Abandons RW during functional tasks. Max cues for use while toileting and standing at sink to groom to improve dynamic standing balance/safety.  -     Row Name 12/02/21 1620          Activities of Daily Living    BADL Assessment/Intervention grooming; toileting  -     Row Name 12/02/21 1620          Mobility    Extremity Weight-bearing Status left lower extremity  -     Left Lower Extremity (Weight-bearing Status) weight-bearing as tolerated (WBAT)  -     Row Name 12/02/21 1620          Grooming Assessment/Training    [place] Level (Grooming) grooming skills; hair care, combing/brushing; oral care regimen; wash face, hands; supervision  -     Position (Grooming) supported standing; sink side  -     Row Name 12/02/21 1620          Toileting Assessment/Training    [place] Level (Toileting) toileting skills; adjust/manage clothing; perform perineal hygiene; standby assist  -     Position (Toileting) unsupported standing; unsupported sitting  -           User Key  (r) = Recorded By, (t) = Taken By, (c) = Cosigned By    Initials Name Provider Type     Valerie Pennington OT Occupational Therapist               Obj/Interventions     Row Name 12/02/21 1623          Motor Skills    Motor Skills functional endurance  -     Functional Endurance Fair+  -     Row Name 12/02/21 1623          Balance    Balance Assessment sitting  dynamic balance; standing dynamic balance  -LF     Dynamic Sitting Balance WFL; unsupported; sitting in chair  -LF     Dynamic Standing Balance mild impairment; supported; standing  -LF     Balance Interventions sitting; standing; sit to stand; supported; dynamic; occupation based/functional task; weight shifting activity  -LF           User Key  (r) = Recorded By, (t) = Taken By, (c) = Cosigned By    Initials Name Provider Type     Valerie Pennington OT Occupational Therapist               Goals/Plan    No documentation.                Clinical Impression     Row Name 12/02/21 1624          Pain Assessment    Additional Documentation Pain Scale: FACES Pre/Post-Treatment (Group)  -     Row Name 12/02/21 1624          Pain Scale: FACES Pre/Post-Treatment    Pain: FACES Scale, Pretreatment 0-->no hurt  -LF     Posttreatment Pain Rating 0-->no hurt  -LF     Row Name 12/02/21 1624          Plan of Care Review    Plan of Care Reviewed With patient  -     Progress improving  -     Outcome Summary Patient was pleasant and agreeable to grooming at the sink and functional transfers this session. He performed all transfers with standby assist using RW, but required cues not to abandon RW during functional tasks to ensure safety.  -     Row Name 12/02/21 1624          Vital Signs    O2 Delivery Pre Treatment room air  -LF     O2 Delivery Intra Treatment room air  -LF     O2 Delivery Post Treatment room air  -           User Key  (r) = Recorded By, (t) = Taken By, (c) = Cosigned By    Initials Name Provider Type     Valerie Pennington OT Occupational Therapist               Outcome Measures     Row Name 12/02/21 1626          How much help from another is currently needed...    Putting on and taking off regular lower body clothing? 3  -LF     Bathing (including washing, rinsing, and drying) 3  -LF     Toileting (which includes using toilet bed pan or urinal) 3  -LF     Putting on and taking off regular upper body  clothing 4  -LF     Taking care of personal grooming (such as brushing teeth) 4  -LF     Eating meals 4  -LF     AM-PAC 6 Clicks Score (OT) 21  -LF     Row Name 12/02/21 0815          How much help from another person do you currently need...    Turning from your back to your side while in flat bed without using bedrails? 4  -AS     Moving from lying on back to sitting on the side of a flat bed without bedrails? 4  -AS     Moving to and from a bed to a chair (including a wheelchair)? 4  -AS     Standing up from a chair using your arms (e.g., wheelchair, bedside chair)? 4  -AS     Climbing 3-5 steps with a railing? 2  -AS     To walk in hospital room? 3  -AS     AM-PAC 6 Clicks Score (PT) 21  -AS     Row Name 12/02/21 1628          Functional Assessment    Outcome Measure Options AM-PAC 6 Clicks Daily Activity (OT); Optimal Instrument  -LF     Row Name 12/02/21 1628          Optimal Instrument    Optimal Instrument Optimal - 3  -LF     Bending/Stooping 2  -LF     Standing 2  -LF     Reaching 1  -LF     From the list, choose the 3 activities you would most like to be able to do without any difficulty Bending/stooping; Standing; Reaching  -LF     Total Score Optimal - 3 5  -LF           User Key  (r) = Recorded By, (t) = Taken By, (c) = Cosigned By    Initials Name Provider Type    AS Pinky James, RN Registered Nurse    Valerie Montejo OT Occupational Therapist                Occupational Therapy Education                 Title: PT OT SLP Therapies (Done)     Topic: Occupational Therapy (Done)     Point: ADL training (Done)     Description:   Instruct learner(s) on proper safety adaptation and remediation techniques during self care or transfers.   Instruct in proper use of assistive devices.              Learning Progress Summary           Patient Acceptance, E, VU by ES at 11/16/2021 1454   Family Acceptance, E, VU by ES at 11/16/2021 1454                   Point: Home exercise program (Done)      Description:   Instruct learner(s) on appropriate technique for monitoring, assisting and/or progressing therapeutic exercises/activities.              Learning Progress Summary           Patient Acceptance, E, VU by ES at 11/16/2021 1454   Family Acceptance, E, VU by ES at 11/16/2021 1454                   Point: Precautions (Done)     Description:   Instruct learner(s) on prescribed precautions during self-care and functional transfers.              Learning Progress Summary           Patient Acceptance, E, VU by ES at 11/16/2021 1454   Family Acceptance, E, VU by ES at 11/16/2021 1454                   Point: Body mechanics (Done)     Description:   Instruct learner(s) on proper positioning and spine alignment during self-care, functional mobility activities and/or exercises.              Learning Progress Summary           Patient Acceptance, E, VU by ES at 11/16/2021 1454   Family Acceptance, E, VU by ES at 11/16/2021 1454                               User Key     Initials Effective Dates Name Provider Type Discipline     09/13/21 -  Tenisha Wong, OT Occupational Therapist OT              OT Recommendation and Plan  Planned Therapy Interventions (OT): activity tolerance training, BADL retraining, functional balance retraining, occupation/activity based interventions, patient/caregiver education/training, transfer/mobility retraining  Therapy Frequency (OT): 5 times/wk  Plan of Care Review  Plan of Care Reviewed With: patient  Progress: improving  Outcome Summary: Patient was pleasant and agreeable to grooming at the sink and functional transfers this session. He performed all transfers with standby assist using RW, but required cues not to abandon RW during functional tasks to ensure safety.     Time Calculation:    Time Calculation- OT     Row Name 12/02/21 1632             Time Calculation- OT    OT Received On 12/02/21  -      OT Goal Re-Cert Due Date 12/05/21  -              Timed Charges    49294 -  OT Therapeutic Activity Minutes 10  -LF      93449 - OT Self Care/Mgmt Minutes 5  -LF              Total Minutes    Timed Charges Total Minutes 15  -LF       Total Minutes 15  -LF            User Key  (r) = Recorded By, (t) = Taken By, (c) = Cosigned By    Initials Name Provider Type     Valerie Pennington OT Occupational Therapist              Therapy Charges for Today     Code Description Service Date Service Provider Modifiers Qty    [id number]  OT THERAPEUTIC ACT EA 15 MIN 12/2/2021 Valerie Pennington OT GO 1               Valerie Pennington OT  12/2/2021

## 2021-12-02 NOTE — SIGNIFICANT NOTE
12/02/21 1515   Plan   Plan patient's wife states she will be taking patient home with home health care. [facility] arranged.

## 2021-12-03 VITALS
WEIGHT: 261.91 LBS | HEIGHT: 69 IN | DIASTOLIC BLOOD PRESSURE: 67 MMHG | SYSTOLIC BLOOD PRESSURE: 148 MMHG | HEART RATE: 79 BPM | BODY MASS INDEX: 38.79 KG/M2 | TEMPERATURE: 98.8 F | RESPIRATION RATE: 22 BRPM | OXYGEN SATURATION: 95 %

## 2021-12-03 PROBLEM — N18.9 ACUTE-ON-CHRONIC KIDNEY INJURY (HCC): Status: RESOLVED | Noted: 2021-11-18 | Resolved: 2021-12-03

## 2021-12-03 PROBLEM — N17.9 ACUTE-ON-CHRONIC KIDNEY INJURY (HCC): Status: RESOLVED | Noted: 2021-11-18 | Resolved: 2021-12-03

## 2021-12-03 PROBLEM — M16.12 PRIMARY OSTEOARTHRITIS OF ONE HIP, LEFT: Status: RESOLVED | Noted: 2021-08-11 | Resolved: 2021-12-03

## 2021-12-03 LAB
ANION GAP SERPL CALCULATED.3IONS-SCNC: 8 MMOL/L (ref 5–15)
BUN SERPL-MCNC: 31 MG/DL (ref 8–23)
BUN/CREAT SERPL: 20.5 (ref 7–25)
CALCIUM SPEC-SCNC: 9.4 MG/DL (ref 8.6–10.5)
CHLORIDE SERPL-SCNC: 105 MMOL/L (ref 98–107)
CO2 SERPL-SCNC: 24 MMOL/L (ref 22–29)
CREAT SERPL-MCNC: 1.51 MG/DL (ref 0.76–1.27)
GFR SERPL CREATININE-BSD FRML MDRD: 45 ML/MIN/1.73
GLUCOSE SERPL-MCNC: 102 MG/DL (ref 65–99)
HCT VFR BLD AUTO: 26 % (ref 37.5–51)
HGB BLD-MCNC: 8.7 G/DL (ref 13–17.7)
POTASSIUM SERPL-SCNC: 4.4 MMOL/L (ref 3.5–5.2)
SODIUM SERPL-SCNC: 137 MMOL/L (ref 136–145)

## 2021-12-03 PROCEDURE — 80048 BASIC METABOLIC PNL TOTAL CA: CPT | Performed by: INTERNAL MEDICINE

## 2021-12-03 PROCEDURE — 94799 UNLISTED PULMONARY SVC/PX: CPT

## 2021-12-03 PROCEDURE — 85014 HEMATOCRIT: CPT | Performed by: ORTHOPAEDIC SURGERY

## 2021-12-03 PROCEDURE — 85018 HEMOGLOBIN: CPT | Performed by: ORTHOPAEDIC SURGERY

## 2021-12-03 RX ADMIN — DOCUSATE SODIUM 100 MG: 100 CAPSULE, LIQUID FILLED ORAL at 08:39

## 2021-12-03 RX ADMIN — APIXABAN 2.5 MG: 2.5 TABLET, FILM COATED ORAL at 08:39

## 2021-12-03 RX ADMIN — METOPROLOL SUCCINATE 25 MG: 25 TABLET, EXTENDED RELEASE ORAL at 08:39

## 2021-12-03 RX ADMIN — CITALOPRAM HYDROBROMIDE 20 MG: 20 TABLET ORAL at 08:39

## 2021-12-03 RX ADMIN — Medication 100 MG: at 08:39

## 2021-12-03 RX ADMIN — SODIUM CHLORIDE, PRESERVATIVE FREE 3 ML: 5 INJECTION INTRAVENOUS at 08:40

## 2021-12-03 RX ADMIN — FAMOTIDINE 20 MG: 20 TABLET ORAL at 08:39

## 2021-12-03 RX ADMIN — FINASTERIDE 5 MG: 5 TABLET, FILM COATED ORAL at 08:39

## 2021-12-03 RX ADMIN — PRAMIPEXOLE DIHYDROCHLORIDE 1 MG: 1 TABLET ORAL at 08:39

## 2021-12-03 NOTE — PROGRESS NOTES
[facility]     Progress Note    Patient Name: Rafael Gomez  : 1945  MRN: [id number]  Primary Care Physician:  Claude Hair MD  Date of admission: 2021      Subjective   Brief summary.  Admitted to hospital post hip replacement, complicated course since then, developed renal failure and hospital psychosis      HPI:  Follow-up on renal failure and psychosis, feeling much better awake alert sitting in chair, wants to go home.  Feels like he is ready.  Denies any chest pain or shortness of breath.    Review of Systems     No chest pain or shortness of breath.  Fatigue and weakness.  No constipation or diarrhea      Objective     Vitals:   Temp:  [97.3 °F (36.3 °C)-98.6 °F (37 °C)] 98.6 °F (37 °C)  Heart Rate:  [72-83] 72  Resp:  [18-24] 24  BP: (130-152)/(61-69) 152/69    Physical Exam :     Morbidly obese male not in acute distress.  Heart regular  Lungs diminished breath sounds bilaterally.  Abdomen obese, less distended  Neurologically awake and alert, comfortable   extremities with trace of edema.      Result Review:  I have personally reviewed the results from the time of this admission to 2021 19:50 EST and agree with these findings:  [x]  Laboratory  []  Microbiology  []  Radiology  []  EKG/Telemetry   []  Cardiology/Vascular   []  Pathology  []  Old records  []  Other:           Assessment / Plan       Active Hospital Problems:  Active Hospital Problems    Diagnosis    • Metabolic encephalopathy    • Ileus (HCC)    • Severe sepsis ([facility])    • Acute-on-chronic kidney injury (HCC)    • CKD (chronic kidney disease) stage 3, GFR 30-59 ml/min ([facility])    • Acute blood loss anemia    • Left hip pain      Added automatically from request for surgery [id number]     • Primary osteoarthritis of one hip, left        Plan:   Stable and significantly improved.  Possible discharge in 1 to 2 days .   and discharge planner working on placement versus discharging home.       DVT  prophylaxis:  Medical and mechanical DVT prophylaxis orders are present.    CODE STATUS:   Code Status (Patient has no pulse and is not breathing): CPR (Attempt to Resuscitate)  Medical Interventions (Patient has pulse or is breathing): Full Support            Electronically signed by Melchor Nichole MD, 11/30/21, 8:07 PM EST.

## 2021-12-03 NOTE — DISCHARGE SUMMARY
[facility]         DISCHARGE SUMMARY    Patient Name: Rafael Gomez  : 1945  MRN: [id number]    Date of Admission: 2021  Date of Discharge: December 3 of 2021  Primary Care Physician: Claude Hair MD    Consults     Date and Time Order Name Status Description    2021  2:12 PM Inpatient General Surgery Consult Completed     2021  8:06 AM Inpatient Nephrology Consult Completed     10/18/2021  7:46 PM Pulmonology (on-call MD unless specified) Completed           Presenting Problem:   Left hip pain [M25.552]  Primary osteoarthritis of one hip, left [M16.12]  Hypotension [I95.9]    Active and Resolved Hospital Problems:  Active Hospital Problems    Diagnosis POA   • Metabolic encephalopathy [G93.41] No   • Ileus (HCC) [K56.7] No   • Severe sepsis (HCC) [A41.9, R65.20] No   • CKD (chronic kidney disease) stage 3, GFR 30-59 ml/min (HCC) [N18.30] Yes   • Acute blood loss anemia [D62] No   • Left hip pain [M25.552] Yes      Resolved Hospital Problems    Diagnosis POA   • Acute-on-chronic kidney injury (HCC) [N17.9, N18.9] No   • Primary osteoarthritis of one hip, left [M16.12] Yes         Hospital Course     Hospital Course:  Rafael Gomez is a 76 y.o. male who was admitted to hospital post hip.  Patient had a complicated course patient post hip became somewhat hypotensive.  He also had significant anemia.  Patient was admitted to hospital as his symptoms got worse and was transferred to PCU for hypoxia, hypotension and worsening renal failure.  He was seen by intensivist and nephrologist.  Patient was given blood transfusion.  He progressively got worse and remained sick for several days.  His kidney functions deteriorated, he had underlying chronic kidney disease stage III.  His kidney functions, creatinine went up to 4.  He was closely monitored by nephrologist.  Patient also had confusion and agitation, he was treated with anxiolytics and sedatives along with  Seroquel..  Patient had a very rough course he also developed abdominal distention and possible bowel obstruction.  Surgical consult was placed he was placed on conservative management with NG suction, he improved with this measures.  His bowel functions returned back.  In last 7 days patient has significantly improved.  His kidney functions and creatinine is back to baseline his hemoglobin is stable without any active bleed.  He is able to walk 50 feet with minimal assistance.  Patient refused nursing home and he wanted to go home.  He refused inpatient rehab.  As he is doing better he will be discharged home today with home health and outpatient follow-up recommendations    DISCHARGE Follow Up Recommendations for labs and diagnostics:   Patient stable ready for discharge.  Advised to take medications as prescribed on discharge.  Recommended follow-up with consultants as advised.  Patient advised to return to ER if symptoms get worse.  Patient advised to follow-up with PCP next week  Day of Discharge     Vital Signs:  Temp:  [97.3 °F (36.3 °C)-98.8 °F (37.1 °C)] 98.8 °F (37.1 °C)  Heart Rate:  [72-79] 79  Resp:  [18-24] 22  BP: (137-152)/(65-85) 148/67    Physical Exam:    Elderly male not in acute distress.  Heart regular and lungs clear.  Abdomen obese and soft.  Surgical site looks clean on the hip.  Extremities with trace of edema.  Neuro awake alert and oriented      Pertinent  and/or Most Recent Results     LAB RESULTS:      Lab 12/03/21  0555 12/02/21  0451 12/01/21  0452 11/30/21  0444 11/29/21  0327 11/28/21  0357 11/27/21  0418   WBC  --   --   --   --   --   --  7.28   HEMOGLOBIN 8.7* 8.7* 9.1* 8.8* 8.2*   < > 9.4*   HEMATOCRIT 26.0* 26.0* 26.7* 25.7* 24.6*   < > 28.7*   PLATELETS  --   --   --   --   --   --  213   NEUTROS ABS  --   --   --   --   --   --  5.81   IMMATURE GRANS (ABS)  --   --   --   --   --   --  0.11*   LYMPHS ABS  --   --   --   --   --   --  0.67*   MONOS ABS  --   --   --   --   --    --  0.60   EOS ABS  --   --   --   --   --   --  0.07   MCV  --   --   --   --   --   --  99.7*    < > = values in this interval not displayed.         Lab 12/03/21  0555 12/01/21  0452 11/29/21  0328 11/28/21 0357 11/27/21  0418   SODIUM 137 138 136 137 150*   POTASSIUM 4.4 3.9 3.7 3.3* 3.7   CHLORIDE 105 104 102 101 111*   CO2 24.0 22.5 22.3 24.0 23.6   ANION GAP 8.0 11.5 11.7 12.0 15.4*   BUN 31* 49* 72* 60* 54*   CREATININE 1.51* 2.01* 3.21* 2.90* 2.31*   GLUCOSE 102* 110* 108* 110* 106*   CALCIUM 9.4 9.2 8.6 8.3* 9.9   MAGNESIUM  --   --  1.7 1.4*  --    PHOSPHORUS  --   --   --  3.6  --          Lab 11/30/21  0444 11/28/21  0357 11/27/21  0418   TOTAL PROTEIN  --  5.4* 6.5   ALBUMIN 3.0 3.10* 3.40*   GLOBULIN  --  2.3 3.1   ALT (SGPT)  --  54* 62*   AST (SGOT)  --  77* 84*   BILIRUBIN  --  1.0 1.3*   ALK PHOS  --  74 80                 Lab 11/28/21  0357   VITAMIN B 12 1,350*         Brief Urine Lab Results  (Last result in the past 365 days)      Color   Clarity   Blood   Leuk Est   Nitrite   Protein   CREAT   Urine HCG        11/30/21 1051 Yellow   Clear   Small (1+)   Negative   Negative   30 mg/dL (1+)               Microbiology Results (last 10 days)     ** No results found for the last 240 hours. **               Results for orders placed in visit on 03/12/20    SCANNED VASCULAR STUDIES      Results for orders placed in visit on 03/12/20    SCANNED VASCULAR STUDIES      Results for orders placed during the hospital encounter of 11/16/21    Adult Transthoracic Echo Complete w/ Color, Spectral and Contrast if necessary per protocol    Interpretation Summary  · Peak velocity of the flow distal to the aortic valve is 218 cm/s. Aortic valve maximum pressure gradient is 19 mmHg. Aortic valve dimensionless index is 0.5 .  · Calculated left ventricular EF = 65% Estimated left ventricular EF was in agreement with the calculated left ventricular EF.  · Left ventricular diastolic function is consistent with  (grade II w/high LAP) pseudonormalization.  · Moderate mitral valve regurgitation is present.  · Mild aortic valve stenosis is present.      Labs Pending at Discharge:        Discharge Details        Discharge Medications      New Medications      Instructions Start Date   aspirin  MG tablet  Replaces: aspirin 81 MG chewable tablet   After finishing Eliquis, Take 1 tablet by mouth Daily.      HYDROcodone-acetaminophen 7.5-325 MG per tablet  Commonly known as: Norco   1 tablet, Oral, Every 4 Hours PRN         Continue These Medications      Instructions Start Date   albuterol sulfate  (90 Base) MCG/ACT inhaler  Commonly known as: PROVENTIL HFA;VENTOLIN HFA;PROAIR HFA   2 puffs, Inhalation, Every 4 Hours PRN      albuterol (2.5 MG/3ML) 0.083% nebulizer solution  Commonly known as: PROVENTIL   2.5 mg, Nebulization, Every 4 Hours PRN      allopurinol 300 MG tablet  Commonly known as: ZYLOPRIM   300 mg, Oral, Daily      atorvastatin 40 MG tablet  Commonly known as: LIPITOR   40 mg, Oral, Nightly      cetirizine 10 MG tablet  Commonly known as: zyrTEC   10 mg, Oral, Daily      cilostazol 100 MG tablet  Commonly known as: PLETAL   100 mg, Oral, 2 Times Daily, 11/12/21 HANK [facility]ISTA [facility] CHECKED WITH CARDIOLOGIST AND PT TO HOLD 2-3 DAYS PRIOR TO PROCEDURE. PER HANK PT WAS NOTIFIED AND AWARE OF WHEN TO STOP.      citalopram 40 MG tablet  Commonly known as: CeleXA   40 mg, Oral, Every Morning      finasteride 5 MG tablet  Commonly known as: PROSCAR   5 mg, Oral, Daily      furosemide 40 MG tablet  Commonly known as: LASIX   40 mg, Oral, Daily      hydroCHLOROthiazide 25 MG tablet  Commonly known as: HYDRODIURIL   25 mg, Oral, Daily      ketoconazole 2 % shampoo  Commonly known as: NIZORAL   Topical, 2 Times Weekly      metoprolol succinate XL 50 MG 24 hr tablet  Commonly known as: TOPROL-XL   50 mg, Oral, Daily      montelukast 10 MG tablet  Commonly known as: SINGULAIR   10 mg, Oral, Nightly       multivitamin with minerals tablet tablet   Oral, Daily      pantoprazole 20 MG EC tablet  Commonly known as: PROTONIX   20 mg, Oral, Daily      pramipexole 1 MG tablet  Commonly known as: MIRAPEX   1 mg, Oral, Nightly      terazosin 2 MG capsule  Commonly known as: HYTRIN   2 mg, Oral, Every Evening      Trelegy Ellipta 200-62.5-25 MCG/INH inhaler  Generic drug: Fluticasone-Umeclidin-Vilant   1 puff, Inhalation, Daily - RT         Stop These Medications    aspirin 81 MG chewable tablet  Replaced by: aspirin  MG tablet     fish oil 1000 MG capsule capsule     sildenafil 100 MG tablet  Commonly known as: VIAGRA     telmisartan 80 MG tablet  Commonly known as: MICARDIS            No Known Allergies      Discharge Disposition:    Home-Health Care Sv    Diet:    Heart healthy 1800 [address]    Discharge Activity:     As tolerated with assistance      Future Appointments   Date Time Provider Department Center   2/14/2022 10:10 AM LAB CHAIR 2 [facility]  [facility]   2/14/2022 10:40 AM Rafael Heredia MD [facility]   3/15/2022  8:00 AM Shagufta Peña MD [facility]     Follow-up with PCP Dr. Hair next week.  Follow-up with orthopedic surgeon Dr. Francois in 1 week.  Follow-up with Dr. Jerome nephrologist in 2 weeks      Time spent on Discharge including face to face service: 33 minutes.            I have dictated this note utilizing Dragon Dictation.             Please note that portions of this note were completed with a voice recognition program.             Part of this note may be an electronic transcription/translation of spoken language to printed text         using the Dragon Dictation System.       Electronically signed by Melchor Nichole MD, 12/03/21, 10:06 AM EST.

## 2021-12-03 NOTE — PLAN OF CARE
Goal Outcome Evaluation:           Progress: improving  Outcome Summary: WIFE AT BEDSIDE, VSS, CONFUSED AT TIMES BUT BETTER, RESTED WELL, NO C/O PAIN OR NAUSEA, PLANS TO DC WITH HOME HEALTH.Daina Phillips RN

## 2021-12-04 ENCOUNTER — READMISSION MANAGEMENT (OUTPATIENT)
Dept: CALL CENTER | Facility: HOSPITAL | Age: 76
End: 2021-12-04

## 2021-12-04 NOTE — OUTREACH NOTE
Prep Survey      Responses   Religion facility patient discharged from? [facility]   Is LACE score < 7 ? No   Emergency Room discharge w/ pulse ox? No   Eligibility Readm Mgmt   Discharge diagnosis Severe sepsis   Does the patient have one of the following disease processes/diagnoses(primary or secondary)? Sepsis   Does the patient have Home health ordered? Yes   What is the Home health agency?  [facility]   Is there a DME ordered? No   Comments regarding appointments Listed   Medication alerts for this patient see AVS   Prep survey completed? Yes          Nano Goins RN

## 2021-12-07 ENCOUNTER — READMISSION MANAGEMENT (OUTPATIENT)
Dept: CALL CENTER | Facility: HOSPITAL | Age: 76
End: 2021-12-07

## 2021-12-07 NOTE — OUTREACH NOTE
Sepsis Week 1 Survey      Responses   [facility] patient discharged from? [facility]   Does the patient have one of the following disease processes/diagnoses(primary or secondary)? Sepsis   Week 1 attempt successful? Yes   Call start time 1725   Call end time 1726   Discharge diagnosis Severe sepsis   Is patient permission given to speak with other caregiver? Yes   List who call center can speak with wife   Meds reviewed with patient/caregiver? Yes   Is the patient having any side effects they believe may be caused by any medication additions or changes? No   Does the patient have all medications related to this admission filled (includes all antibiotics, inhalers, nebulizers,steroids,etc.) Yes   Is the patient taking all medications as directed (includes completed medication regime)? Yes   Does the patient have a primary care provider?  Yes   Does the patient have an appointment with their PCP within 7 days of discharge? Yes   Has the patient kept scheduled appointments due by today? N/A   What is the Home health agency?  [facility]   Has home health visited the patient within 72 hours of discharge? Yes   Comments states he is doing well and rehab begins tomorrow.    Did the patient receive a copy of their discharge instructions? Yes   Nursing interventions Reviewed instructions with patient   What is the patient's perception of their health status since discharge? Improving   Nursing interventions Nurse provided patient education   Is the patient/caregiver able to teach back the hierarchy of who to call/visit for symptoms/problems? PCP, Specialist, Home health nurse, Urgent Care, ED, 911 Yes   Week 1 call completed? Yes          Anne-Marie Macdonald RN

## 2021-12-15 ENCOUNTER — PREP FOR SURGERY (OUTPATIENT)
Dept: OTHER | Facility: HOSPITAL | Age: 76
End: 2021-12-15

## 2021-12-15 ENCOUNTER — OFFICE VISIT (OUTPATIENT)
Dept: ORTHOPEDIC SURGERY | Facility: CLINIC | Age: 76
End: 2021-12-15

## 2021-12-15 VITALS — HEIGHT: 69 IN | BODY MASS INDEX: 38.66 KG/M2 | WEIGHT: 261 LBS

## 2021-12-15 DIAGNOSIS — Z96.642 STATUS POST TOTAL REPLACEMENT OF LEFT HIP: Primary | ICD-10-CM

## 2021-12-15 PROCEDURE — 99024 POSTOP FOLLOW-UP VISIT: CPT | Performed by: ORTHOPAEDIC SURGERY

## 2021-12-15 NOTE — PROGRESS NOTES
"Chief Complaint  Follow-up of the Left Hip     Subjective      Rafael Gomez presents to [facility] for a follow-up of left hip. Patient is s/p left total hip arthroplasty performed on 21 by Dr. Francois. Patient comes in using a walker for ambulation assistance today. Patient states that his left total hip arthroplasty is doing well. Patient states that therapy has been coming to his home. Therapy has been going well. Patient takes Tylenol as needed for pain.     Allergies   Allergen Reactions   • Cephalexin Nausea And Vomiting        Social History     Socioeconomic History   • Marital status:      Spouse name: Liliya   • Years of education: High school   Tobacco Use   • Smoking status: Former Smoker     Packs/day: 1.00     Years: 30.00     Pack years: 30.00     Types: Cigarettes     Quit date:      Years since quittin.9   • Smokeless tobacco: Current User     Types: Chew   • Tobacco comment: ONE HALF CAN DAILY    Vaping Use   • Vaping Use: Never used   Substance and Sexual Activity   • Alcohol use: Yes     Alcohol/week: 28.0 - 35.0 standard drinks     Types: 28 - 35 Cans of beer per week   • Drug use: No   • Sexual activity: Defer        Review of Systems     Objective   Vital Signs:   Ht 175.3 cm (69\")   Wt 118 kg (261 lb)   BMI 38.54 kg/m²       Physical Exam  Constitutional:       Appearance: Normal appearance. Patient is well-developed and normal weight.   HENT:      Head: Normocephalic.      Right Ear: Hearing and external ear normal.      Left Ear: Hearing and external ear normal.      Nose: Nose normal.   Eyes:      Conjunctiva/sclera: Conjunctivae normal.   Cardiovascular:      Rate and Rhythm: Normal rate.   Pulmonary:      Effort: Pulmonary effort is normal.      Breath sounds: No wheezing or rales.   Abdominal:      Palpations: Abdomen is soft.      Tenderness: There is no abdominal tenderness.   Musculoskeletal:      Cervical back: Normal range of " motion.   Skin:     Findings: No rash.   Neurological:      Mental Status: Patient  is alert and oriented to person, place, and time.   Psychiatric:         Mood and Affect: Mood and affect normal.         Judgment: Judgment normal.       Ortho Exam      LEFT HIP: Ambulation with a walker. Gait non-antalgic. Dorsal Pedal Pulse 2+, posterior tibialis pulse 2+. Calf supple, non-tender, no signs of DVT. Incisions are well healing, no signs of infection. Non-tender hip and pelvic muscles. No groin pain. Good tone of hip flexors, hip extensors, hip adductor, hip abductors.       Procedures        Imaging Results (Most Recent)     Procedure Component Value Units Date/Time    XR Hip With or Without Pelvis 2 - 3 View Left [[id number]] Resulted: 12/15/21 1009     Updated: 12/15/21 1009           Result Review :     X-Ray Report:  Left hip(s) X-Ray  Indication: Evaluation of left hip pain   AP and Lateral view(s)  Findings: Demonstrates intact left total hip arthroplasty with no evidence of wearing or loosening. No periprosthetic fracture noted.   Prior studies available for comparison: yes         Assessment and Plan     DX: Aftercare following left total hip arthroplasty     Discussed treatment plans with the patient. Patient is getting ready to be in [place] until February. He requests a referral for [place] to continue therapy.     Call or return if worsening symptoms.    Follow Up     Schedule an appointment when returning from [place].        Patient was given instructions and counseling regarding his condition or for health maintenance advice. Please see specific information pulled into the AVS if appropriate.     Scribed for Man Francois MD by Chio Francois.  12/15/21   10:12 EST    I have personally performed the services described in this document as scribed by the above individual and it is both accurate and complete. Man Francois MD 12/17/21

## 2021-12-16 ENCOUNTER — READMISSION MANAGEMENT (OUTPATIENT)
Dept: CALL CENTER | Facility: HOSPITAL | Age: 76
End: 2021-12-16

## 2021-12-16 NOTE — OUTREACH NOTE
Sepsis Week 2 Survey      Responses   [facility] patient discharged from [facility]   Does the patient have one of the following disease processes/diagnoses(primary or secondary)? Sepsis   Week 2 attempt successful? Yes   Call start time 1354   Call end time 1356   Discharge diagnosis Severe sepsis   Meds reviewed with patient/caregiver? Yes   Is the patient having any side effects they believe may be caused by any medication additions or changes? No   Does the patient have all medications related to this admission filled (includes all antibiotics, inhalers, nebulizers,steroids,etc.) Yes   Is the patient taking all medications as directed (includes completed medication regime)? Yes   Does the patient have a primary care provider?  Yes   Does the patient have an appointment with their PCP within 7 days of discharge? Yes   Has the patient kept scheduled appointments due by today? Yes   What is the Home health agency?  [facility]   Has home health visited the patient within 72 hours of discharge? Yes   Psychosocial issues? No   Did the patient receive a copy of their discharge instructions? Yes   Nursing interventions Reviewed instructions with patient   Nursing interventions Nurse provided patient education   Is the patient/caregiver able to teach back Sepsis? E - Extreme pain or generalized discomfort (worst ever,especially abdomen),  P - Pale or discolored skin,  S - Sleepy, difficult to arouse,confused,  S - Short of breath,  S - Shivering,fever or very cold,  I -   I feel like I might die-a feeling of hopelessness   Nursing interventions Nurse provided reassurance to patient   Is patient/caregiver able to teach back steps to recovery at home? Set small, achievable goals for return to baseline health,  Rest and regain strength,  Eat a balanced diet   Is the patient/caregiver able to teach back signs and symptoms of worsening condition: Fever,  Rapid heart rate (>90),  Altered mental  status(confusion/coma),  Hyperthermia,  Shortness of breath/rapid respiratory rate,  Edema   Is the patient/caregiver able to teach back the hierarchy of who to call/visit for symptoms/problems? PCP, Specialist, Home health nurse, Urgent Care, ED, 911 Yes   Week 2 call completed? Yes   Wrap up additional comments call brief, pt had no questions          Marizol Dave RN

## 2022-01-20 ENCOUNTER — TELEPHONE (OUTPATIENT)
Dept: ORTHOPEDIC SURGERY | Facility: CLINIC | Age: 77
End: 2022-01-20
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
## 2022-01-21 NOTE — TELEPHONE ENCOUNTER
CALLED PT AND LET THEM KNOW WE COULD NOT RECOMMEND A DRNeema IN [place] BUT ANY BOARD CERTIFIED DR. WOULD DO.

## 2022-01-31 ENCOUNTER — TELEPHONE (OUTPATIENT)
Dept: ONCOLOGY | Facility: CLINIC | Age: 77
End: 2022-01-31

## 2022-01-31 NOTE — TELEPHONE ENCOUNTER
Pt has been rescheduled and called with his appt date and time on 2/15/22 - spoke with pt spouse and she has confirmed appt date and time

## 2022-01-31 NOTE — TELEPHONE ENCOUNTER
Caller: Liliya Gomez    Relationship to patient: Emergency Contact    Best call back number: [phone]    Chief complaint: PATIENT IS CURRENTLY SCHEDULED FOR A LAB/FU ON 2/14/22 BUT IS OUT [place] TOWN AND WILL NEED TO RESCHEDULE.     Type of visit: LAB/FU     Requested date: April 14TH IN THE AFTERNOON IF POSSIBLE.  OTHERWISE, ANY OTHER DAY OK.      If rescheduling, when is the original appointment: 2/14/22    Additional notes: PLEASE CALL SPOUSE TO ADVISE OF NEW APPT DATE/TIME

## 2022-02-15 ENCOUNTER — APPOINTMENT (OUTPATIENT)
Dept: LAB | Facility: HOSPITAL | Age: 77
End: 2022-02-15

## 2022-04-12 ENCOUNTER — TRANSCRIBE ORDERS (OUTPATIENT)
Dept: ADMINISTRATIVE | Facility: HOSPITAL | Age: 77
End: 2022-04-12

## 2022-04-12 DIAGNOSIS — D50.0 IRON DEFICIENCY ANEMIA DUE TO CHRONIC BLOOD LOSS: Primary | ICD-10-CM

## 2022-04-14 PROBLEM — D63.1 ANEMIA DUE TO STAGE 3 CHRONIC KIDNEY DISEASE (HCC): Status: ACTIVE | Noted: 2022-04-14

## 2022-04-14 PROBLEM — N18.30 ANEMIA DUE TO STAGE 3 CHRONIC KIDNEY DISEASE (HCC): Status: ACTIVE | Noted: 2022-04-14

## 2022-04-15 ENCOUNTER — HOSPITAL ENCOUNTER (OUTPATIENT)
Dept: INFUSION THERAPY | Facility: HOSPITAL | Age: 77
Setting detail: INFUSION SERIES
Discharge: HOME | End: 2022-04-15

## 2022-04-15 VITALS
SYSTOLIC BLOOD PRESSURE: 125 MMHG | DIASTOLIC BLOOD PRESSURE: 64 MMHG | HEART RATE: 60 BPM | WEIGHT: 259.48 LBS | BODY MASS INDEX: 37.15 KG/M2 | HEIGHT: 70 IN | TEMPERATURE: 97.6 F | OXYGEN SATURATION: 97 % | RESPIRATION RATE: 20 BRPM

## 2022-04-15 DIAGNOSIS — D63.1 ANEMIA DUE TO STAGE 3 CHRONIC KIDNEY DISEASE, UNSPECIFIED WHETHER STAGE 3A OR 3B CKD: Primary | ICD-10-CM

## 2022-04-15 DIAGNOSIS — N18.30 ANEMIA DUE TO STAGE 3 CHRONIC KIDNEY DISEASE, UNSPECIFIED WHETHER STAGE 3A OR 3B CKD: Primary | ICD-10-CM

## 2022-04-15 PROCEDURE — 96365 THER/PROPH/DIAG IV INF INIT: CPT

## 2022-04-15 PROCEDURE — 25010000002 IRON SUCROSE PER 1 MG: Performed by: INTERNAL MEDICINE

## 2022-04-15 PROCEDURE — 96366 THER/PROPH/DIAG IV INF ADDON: CPT

## 2022-04-15 NOTE — CODE DOCUMENTATION
Iron Sucrose 400mg was started at 1412 and stopped at 1652. There was no stop option on the MAR to enter this data.

## 2022-04-19 NOTE — PROGRESS NOTES
[facility] GROUP OUTPATIENT FOLLOW UP CLINIC VISIT    REASON FOR FOLLOW-UP:    1. 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  Macrocytic anemia  3.  Mild leukopenia  4.  Pulmonary nodules  5.  [facility] can get [facility]-connected for plasmacytoma/amyloidosis.  [facility] [place] [facility] and saw Dr. Reed there. His case was discussed further in [place] after review of his pathology.  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  A fat pad biopsy and cardiac MRI were recommended.  Treatment with CyBorD was recommended.    6.  Peripheral neuropathy in his hands and feet.  [facility] that was concerning for cervical myelopathy rather than a neuropathic process consistent with amyloidosis.  MRI of the cervical spine performed at the [facility] on 4[facility]2/2019 shows multilevel degenerative change with moderate spinal canal stenosis at C4-C5 and C5-C6 and mild spinal canal stenosis at C6-C7.  Severe foraminal narrowing at C4-C5 and C5-C6 with moderate neuroforaminal narrowing at C6-C7.  7.  Cardiac MRI performed on 4/16/2019 at the [facility] with normal left ventricle wall thickness with right ventricular cavity enlargement.  No aneurysm.  No fibrosis or scarring to suggest an infiltrative process such as amyloidosis.  Left ventricular ejection fraction 47%.  8.  Negative fat pad biopsy on 6/4/2019  9.  It was recommended from [facility] that he initiate therapy with Velcade, Cytoxan, and dexamethasone (CyBorD).  Treatment initiated 7/8/2019  10.  He received therapy through 9/16/2019.  As of 9/23/2019, treatment was held for cytopenias, fatigue, orthostasis.    HISTORY OF PRESENT ILLNESS:  Rafael Gomez is a 76 y.o. male with the above-mentioned history, who returns the office today for scheduled follow-up.      He had a left hip replacement in November 2021 that required  "hospitalization and ICU care following that.  He had acute kidney injury.  He required transfusion.  He had significant ileus.    He was recently seen in the pulmonology office.  He is waiting for his CPAP machine to be replaced.  He has recently been on prednisone for a productive cough.    Follow up with Dr. Peña at [facility] on 5/3. He has not had a CT chest done.    He is currently receiving iron infusions after labs indicated iron deficiency.  Energy level waxes and wanes.  He overall remains fatigued.  He is on oxygen at night but has not been able to use his CPAP.  He does have some mild lower extremity edema.    He spent 3 months in [place] over the winter.  He relaxed and fistulotomy and did well without lower extremity edema during that time.        ROS:  As per the HPI    Vitals:    04/20/22 1004   BP: 114/60   Pulse: 67   Resp: 16   Temp: 97.4 °F (36.3 °C)   TempSrc: Temporal   SpO2: 97%   Weight: 121 kg (267 lb 11.2 oz)   Height: 177.8 cm (70\")   PainSc: 0-No pain       PHYSICAL EXAMINATION:  General:  No acute distress, awake, alert and oriented obese.  Walks with a cane again today  Skin:  Warm and dry, no visible rash  HEENT:  Normocephalic/atraumatic.  Wearing a facemask.  Chest:  Normal respiratory effort.  Lungs clear to auscultation bilaterally with good air movement today.  Heart: Regular rate and rhythm  Extremities: Trace bilateral lower extremity edema  Neuro/psych:  Grossly non-focal.  Normal mood and affect.        DIAGNOSTIC DATA:  CBC & Differential (04/20/2022 09:42)    ASSESSMENT:  This is a 76 y.o. 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    · He had a wedge resection.    · Dr. Hernandez was  following a couple of other small pulmonary nodules on the left which initially increased in size.  His M spike continues to be stable.  The PET scan did not show anything we didn't expect from the CT scan.  The left upper lobe " lung nodule is too risky to biopsy base of its location.    · His case was presented at the multidisciplinary thoracic conference and recommendations included to follow him with serial CT scans with biopsy if it appears to be safer.    · CT imaging on 12/18/2017 was stable.    · CT imaging on 5/25/2018 showed that one of the pleural based nodules and enlarged slightly measuring 2.3 cm.    · CT imaging from 8/22/2018 shows some slight increase in size of the pulmonary nodules with the largest measuring now 2.0 cm 3 other tiny noncalcified nodules in the right lung are stable, only showing a slight increase since 2017.  If any do grow significantly in size, we could consider radiation.  He would likely not be a surgical candidate considering his oxygen dependence.  There is some question as to how much of this is amyloid.  There is also some question as to whether this is primary or reactive.    · He was [facility] in [place] by Dr. Reed, and they requested his pathology for review.  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  · It was recommended treatment with FAM (inhaled fluticasone, azithromycin 250 mg on Mondays Wednesdays and Fridays, montelukast daily) plus doxycycline 100 mg twice daily for his lungs.  He did initiate this through [facility] in February 2019.  · A cardiac [facility] performed at [facility].  This was not consistent with involvement by amyloidosis.  He had an EMG also at the [facility] that was concerning for the possibility of cervical myelopathy rather than a process consistent with amyloidosis.   · He did have a negative fat pad biopsy on 6/4/2019.  · It was recommended from [facility] that he initiate therapy with Velcade, Cytoxan, and dexamethasone (CyBorD).  Treatment initiated 7/8/2019  · As of 9/23/2019 he was  progressively fatigued and struggling with constant pruritus despite several skin creams for management.  Additionally, he was more anemic and is having issues with orthostatic  hypotension.  This actually resulted in a fall with several abrasions.  He had acute kidney injury.  He was given IV fluids.  An anemia evaluation showed no evidence for iron, vitamin B12, or folic acid deficiency.    · Treatment was held.   · I also discussed his case with Dr. Roberson at [facility].  No further therapy suggested.  · CT imaging of the chest from 12/2/2019 stable.  · CT imaging 2/26/2020 stable  · CT imaging 3/8/2021 with multiple bilateral pulmonary nodules measuring up to 2.3 cm, unchanged. He follows up with Dr. Peña with thoracic surgery    *IgM monoclonal gammopathy:   · We did repeat labs 7/1/2019 to service a baseline, and spike was stable at 0.2 gm.    · Repeat studies on 10/7/2019 showed an M spike that was too small to measure.  With a serum free kappa light chain of 23.0, lambda 18.9, ratio 1.22.  · Repeat studies 8/17/2020 stable.  · M spike 0.1 gm on 2/1/2020  · M spike 0.3 gm on 8/2/2021  · Serum protein studies pending today    *Macrocytic anemia: Hemoglobin worsened to 9.8 as of 9/23/2019.  No evidence for iron, vitamin B12, or folic acid deficiency.    · Hemoglobin  a little bit lower at 9.0.  Recent labs indicated iron deficiency and he is receiving IV iron infusions at [place].    *Thrombocytopenia  · Platelets normal at 147,000    *Right renal mass which is chronic.  He is followed by urology.    *Claudication with peripheral vascular disease followed by Dr. Freeman.  Chronic bilateral lower extremity edema, this is unchanged. He followed up with Dr. Freeman who said everything looks good to him apparently.    *Low back pain with an MRI on 5/18/2017 showing degenerative changes at multiple levels he states that a fusion has been recommended but he has been reluctant to proceed.  He continues to have left-sided low back pain.    *COPD: He follows with pulmonology    *Peripheral neuropathy and left shoulder pain: EMG completed.  MRI cervical spine complete.  There are some  abnormalities in the cervical spine.  He was referred to Dr. Robins, neurosurgery.  While the report is available from the MRI performed at [place], these images are not available.  The patient was seen by Dr. Robins with CT of the neck performed. He had additional imaging performed.  He followed up with Dr. Aranda with further evaluation of his shoulder recommended as it was suspected that the pain is not neuropathic.  He saw orthopedics and ultimately had a left shoulder procedure done in January 2019 with improvement in his pain.  He continues to have neck pain with some injections and perhaps RFA planned.    *Chronic kidney disease.  Creatinine pending today    *Orthostatic hypotension improved    *Fatigue and tiredness:  multifactorial    *Hypomagnesemia: He is on a magnesium supplement.  Unclear etiology.  We will check a magnesium level today.    PLAN:  1. Follow up with Dr. Peña is scheduled at [place] on 5/3.  He has not had a CT scan of his chest done and I will request a CT scan of his chest to be done at [place] next week so that she has this information available at her visit.  2. Follow-up serum protein studies and other labs from today  3. He will continue iron infusions at [place]  4. I will see him back in 6 months with labs    I spent 42 minutes today reviewing his record, communicating with him and his wife, examining him, placing orders, documenting the encounter.      Rafael Heredia MD

## 2022-04-20 ENCOUNTER — OFFICE VISIT (OUTPATIENT)
Dept: ONCOLOGY | Facility: CLINIC | Age: 77
End: 2022-04-20

## 2022-04-20 ENCOUNTER — LAB (OUTPATIENT)
Dept: LAB | Facility: HOSPITAL | Age: 77
End: 2022-04-20

## 2022-04-20 ENCOUNTER — TELEPHONE (OUTPATIENT)
Dept: ONCOLOGY | Facility: CLINIC | Age: 77
End: 2022-04-20

## 2022-04-20 VITALS
SYSTOLIC BLOOD PRESSURE: 114 MMHG | RESPIRATION RATE: 16 BRPM | HEIGHT: 70 IN | DIASTOLIC BLOOD PRESSURE: 60 MMHG | HEART RATE: 67 BPM | BODY MASS INDEX: 38.33 KG/M2 | OXYGEN SATURATION: 97 % | WEIGHT: 267.7 LBS | TEMPERATURE: 97.4 F

## 2022-04-20 DIAGNOSIS — D53.9 MACROCYTIC ANEMIA: ICD-10-CM

## 2022-04-20 DIAGNOSIS — D53.9 MACROCYTIC ANEMIA: Primary | ICD-10-CM

## 2022-04-20 DIAGNOSIS — R91.8 MULTIPLE LUNG NODULES ON CT: ICD-10-CM

## 2022-04-20 DIAGNOSIS — E83.42 HYPOMAGNESEMIA: ICD-10-CM

## 2022-04-20 DIAGNOSIS — D47.2 MONOCLONAL GAMMOPATHY: ICD-10-CM

## 2022-04-20 DIAGNOSIS — N18.30 ANEMIA DUE TO STAGE 3 CHRONIC KIDNEY DISEASE, UNSPECIFIED WHETHER STAGE 3A OR 3B CKD: ICD-10-CM

## 2022-04-20 DIAGNOSIS — C90.21 EXTRAMEDULLARY PLASMACYTOMA IN REMISSION: ICD-10-CM

## 2022-04-20 DIAGNOSIS — D63.1 ANEMIA DUE TO STAGE 3 CHRONIC KIDNEY DISEASE, UNSPECIFIED WHETHER STAGE 3A OR 3B CKD: ICD-10-CM

## 2022-04-20 LAB
ALBUMIN SERPL-MCNC: 3.8 G/DL (ref 3.5–5.2)
ALBUMIN/GLOB SERPL: 1.7 G/DL (ref 1.1–2.4)
ALP SERPL-CCNC: 70 U/L (ref 38–116)
ALT SERPL W P-5'-P-CCNC: 11 U/L (ref 0–41)
ANION GAP SERPL CALCULATED.3IONS-SCNC: 9.3 MMOL/L (ref 5–15)
AST SERPL-CCNC: 15 U/L (ref 0–40)
BASOPHILS # BLD AUTO: 0.04 10*3/MM3 (ref 0–0.2)
BASOPHILS NFR BLD AUTO: 0.6 % (ref 0–1.5)
BILIRUB SERPL-MCNC: 0.5 MG/DL (ref 0.2–1.2)
BUN SERPL-MCNC: 49 MG/DL (ref 6–20)
BUN/CREAT SERPL: 25.4 (ref 7.3–30)
CALCIUM SPEC-SCNC: 9.6 MG/DL (ref 8.5–10.2)
CHLORIDE SERPL-SCNC: 111 MMOL/L (ref 98–107)
CO2 SERPL-SCNC: 21.7 MMOL/L (ref 22–29)
CREAT SERPL-MCNC: 1.93 MG/DL (ref 0.7–1.3)
DEPRECATED RDW RBC AUTO: 76.8 FL (ref 37–54)
EGFRCR SERPLBLD CKD-EPI 2021: 35.7 ML/MIN/1.73
EOSINOPHIL # BLD AUTO: 0.13 10*3/MM3 (ref 0–0.4)
EOSINOPHIL NFR BLD AUTO: 2.1 % (ref 0.3–6.2)
ERYTHROCYTE [DISTWIDTH] IN BLOOD BY AUTOMATED COUNT: 20.9 % (ref 12.3–15.4)
GLOBULIN UR ELPH-MCNC: 2.2 GM/DL (ref 1.8–3.5)
GLUCOSE SERPL-MCNC: 107 MG/DL (ref 74–124)
HCT VFR BLD AUTO: 27.9 % (ref 37.5–51)
HGB BLD-MCNC: 9 G/DL (ref 13–17.7)
IMM GRANULOCYTES # BLD AUTO: 0.12 10*3/MM3 (ref 0–0.05)
IMM GRANULOCYTES NFR BLD AUTO: 1.9 % (ref 0–0.5)
LYMPHOCYTES # BLD AUTO: 1.1 10*3/MM3 (ref 0.7–3.1)
LYMPHOCYTES NFR BLD AUTO: 17.4 % (ref 19.6–45.3)
MAGNESIUM SERPL-MCNC: 1.4 MG/DL (ref 1.8–2.5)
MCH RBC QN AUTO: 33.8 PG (ref 26.6–33)
MCHC RBC AUTO-ENTMCNC: 32.3 G/DL (ref 31.5–35.7)
MCV RBC AUTO: 104.9 FL (ref 79–97)
MONOCYTES # BLD AUTO: 0.5 10*3/MM3 (ref 0.1–0.9)
MONOCYTES NFR BLD AUTO: 7.9 % (ref 5–12)
NEUTROPHILS NFR BLD AUTO: 4.43 10*3/MM3 (ref 1.7–7)
NEUTROPHILS NFR BLD AUTO: 70.1 % (ref 42.7–76)
NRBC BLD AUTO-RTO: 0.8 /100 WBC (ref 0–0.2)
PLATELET # BLD AUTO: 147 10*3/MM3 (ref 140–450)
PMV BLD AUTO: 11.3 FL (ref 6–12)
POTASSIUM SERPL-SCNC: 5 MMOL/L (ref 3.5–4.7)
PROT SERPL-MCNC: 6 G/DL (ref 6.3–8)
RBC # BLD AUTO: 2.66 10*6/MM3 (ref 4.14–5.8)
SODIUM SERPL-SCNC: 142 MMOL/L (ref 134–145)
WBC NRBC COR # BLD: 6.32 10*3/MM3 (ref 3.4–10.8)

## 2022-04-20 PROCEDURE — 83735 ASSAY OF MAGNESIUM: CPT | Performed by: INTERNAL MEDICINE

## 2022-04-20 PROCEDURE — 36415 COLL VENOUS BLD VENIPUNCTURE: CPT

## 2022-04-20 PROCEDURE — 85025 COMPLETE CBC W/AUTO DIFF WBC: CPT

## 2022-04-20 PROCEDURE — 99215 OFFICE O/P EST HI 40 MIN: CPT | Performed by: INTERNAL MEDICINE

## 2022-04-20 PROCEDURE — 80053 COMPREHEN METABOLIC PANEL: CPT

## 2022-04-20 RX ORDER — BETAMETHASONE DIPROPIONATE 0.05 %
1 OINTMENT (GRAM) TOPICAL 2 TIMES DAILY
Status: ON HOLD | COMMUNITY
End: 2022-06-12

## 2022-04-20 RX ORDER — TELMISARTAN 80 MG/1
80 TABLET ORAL DAILY
COMMUNITY
Start: 2022-04-11 | End: 2022-05-03 | Stop reason: HOSPADM

## 2022-04-20 RX ORDER — CETIRIZINE HYDROCHLORIDE 10 MG/1
10 TABLET ORAL DAILY
COMMUNITY

## 2022-04-20 RX ORDER — OMEGA-3 FATTY ACIDS/FISH OIL 300-1000MG
1 CAPSULE ORAL DAILY
Status: ON HOLD | COMMUNITY
End: 2022-06-12

## 2022-04-20 RX ORDER — TAMSULOSIN HYDROCHLORIDE 0.4 MG/1
0.4 CAPSULE ORAL DAILY
Status: ON HOLD | COMMUNITY
End: 2022-06-12

## 2022-04-20 RX ORDER — SILDENAFIL 100 MG/1
100 TABLET, FILM COATED ORAL DAILY PRN
Status: ON HOLD | COMMUNITY
End: 2022-06-12

## 2022-04-20 RX ORDER — DOXYCYCLINE HYCLATE 100 MG
100 TABLET ORAL DAILY
COMMUNITY
Start: 2022-04-15 | End: 2022-05-03 | Stop reason: HOSPADM

## 2022-04-20 RX ORDER — MECOBAL/LEVOMEFOLAT CA/B6 PHOS 2-3-35 MG
1 TABLET ORAL DAILY
Status: ON HOLD | COMMUNITY
End: 2022-06-12

## 2022-04-20 RX ORDER — ACETAMINOPHEN 325 MG/1
325 TABLET ORAL EVERY 6 HOURS PRN
Status: ON HOLD | COMMUNITY
End: 2022-06-12

## 2022-04-20 RX ORDER — FERROUS SULFATE 325(65) MG
325 TABLET ORAL
Status: ON HOLD | COMMUNITY
End: 2022-06-12

## 2022-04-20 RX ORDER — PANTOPRAZOLE SODIUM 20 MG/1
20 TABLET, DELAYED RELEASE ORAL
COMMUNITY
End: 2022-04-20 | Stop reason: SDUPTHER

## 2022-04-20 RX ORDER — GABAPENTIN 300 MG/1
300 CAPSULE ORAL 2 TIMES DAILY
Status: ON HOLD | COMMUNITY
Start: 2022-04-11 | End: 2022-06-12

## 2022-04-20 RX ORDER — METHYLPREDNISOLONE 4 MG/1
1 TABLET ORAL 2 TIMES DAILY
COMMUNITY
End: 2022-05-03 | Stop reason: HOSPADM

## 2022-04-20 RX ORDER — TESTOSTERONE CYPIONATE 200 MG/ML
200 VIAL (ML) INTRAMUSCULAR
COMMUNITY

## 2022-04-20 RX ORDER — CLOTRIMAZOLE 1 %
1 CREAM (GRAM) TOPICAL 2 TIMES DAILY
Status: ON HOLD | COMMUNITY
End: 2022-06-12

## 2022-04-20 RX ORDER — HYDROCORTISONE ACETATE 25 MG/1
25 SUPPOSITORY RECTAL 2 TIMES DAILY
Status: ON HOLD | COMMUNITY
End: 2022-06-12

## 2022-04-20 RX ORDER — PREDNISONE 20 MG/1
TABLET ORAL
COMMUNITY
Start: 2022-04-11 | End: 2022-04-20 | Stop reason: SDDI

## 2022-04-20 RX ORDER — METHYLPREDNISOLONE 4 MG
1 TABLET, DOSE PACK ORAL TAKE AS DIRECTED
Status: ON HOLD | COMMUNITY
End: 2022-06-12

## 2022-04-20 RX ORDER — OMEPRAZOLE 20 MG/1
20 CAPSULE, DELAYED RELEASE ORAL DAILY
COMMUNITY
End: 2022-05-03 | Stop reason: HOSPADM

## 2022-04-20 RX ORDER — ASPIRIN 81 MG/1
81 TABLET, COATED ORAL DAILY
COMMUNITY
Start: 2022-04-11 | End: 2022-04-29

## 2022-04-20 NOTE — ADDENDUM NOTE
Encounter addended by: Tiffany Patel RN on: 4/20/2022 3:09 PM   Actions taken: MAR administration accepted, MAR administration edited

## 2022-04-20 NOTE — TELEPHONE ENCOUNTER
Called pt regarding low magnesium level. Per Dr. Heredia, see what magnesium supplement pt is on. Per pt's wife, he is taking 600mg once daily. She states he does have some diarrhea but it is sporadic. Per Dr. Heredia, pt should try to increase to twice daily but if he develops more diarrhea, he should down down again. Informed pt's wife and she v/u.

## 2022-04-21 LAB
ALBUMIN SERPL ELPH-MCNC: 3.4 G/DL (ref 2.9–4.4)
ALBUMIN/GLOB SERPL: 1.6 {RATIO} (ref 0.7–1.7)
ALPHA1 GLOB SERPL ELPH-MCNC: 0.2 G/DL (ref 0–0.4)
ALPHA2 GLOB SERPL ELPH-MCNC: 0.7 G/DL (ref 0.4–1)
B-GLOBULIN SERPL ELPH-MCNC: 0.6 G/DL (ref 0.7–1.3)
GAMMA GLOB SERPL ELPH-MCNC: 0.7 G/DL (ref 0.4–1.8)
GLOBULIN SER-MCNC: 2.2 G/DL (ref 2.2–3.9)
IGA SERPL-MCNC: 115 MG/DL (ref 61–437)
IGG SERPL-MCNC: 575 MG/DL (ref 603–1613)
IGM SERPL-MCNC: 303 MG/DL (ref 15–143)
INTERPRETATION SERPL IEP-IMP: ABNORMAL
KAPPA LC FREE SER-MCNC: 29.6 MG/L (ref 3.3–19.4)
KAPPA LC FREE/LAMBDA FREE SER: 1.25 {RATIO} (ref 0.26–1.65)
LABORATORY COMMENT REPORT: ABNORMAL
LAMBDA LC FREE SERPL-MCNC: 23.6 MG/L (ref 5.7–26.3)
M PROTEIN SERPL ELPH-MCNC: 0.1 G/DL
PROT SERPL-MCNC: 5.6 G/DL (ref 6–8.5)

## 2022-04-22 ENCOUNTER — HOSPITAL ENCOUNTER (OUTPATIENT)
Dept: INFUSION THERAPY | Facility: HOSPITAL | Age: 77
Setting detail: INFUSION SERIES
Discharge: HOME | End: 2022-04-22

## 2022-04-22 VITALS
DIASTOLIC BLOOD PRESSURE: 66 MMHG | TEMPERATURE: 98 F | SYSTOLIC BLOOD PRESSURE: 134 MMHG | OXYGEN SATURATION: 95 % | HEART RATE: 62 BPM | RESPIRATION RATE: 20 BRPM

## 2022-04-22 DIAGNOSIS — D63.1 ANEMIA DUE TO STAGE 3 CHRONIC KIDNEY DISEASE, UNSPECIFIED WHETHER STAGE 3A OR 3B CKD: Primary | ICD-10-CM

## 2022-04-22 DIAGNOSIS — N18.30 ANEMIA DUE TO STAGE 3 CHRONIC KIDNEY DISEASE, UNSPECIFIED WHETHER STAGE 3A OR 3B CKD: Primary | ICD-10-CM

## 2022-04-22 PROCEDURE — 96365 THER/PROPH/DIAG IV INF INIT: CPT

## 2022-04-22 PROCEDURE — 96366 THER/PROPH/DIAG IV INF ADDON: CPT

## 2022-04-22 PROCEDURE — 25010000002 IRON SUCROSE PER 1 MG: Performed by: INTERNAL MEDICINE

## 2022-04-22 RX ADMIN — IRON SUCROSE 400 MG: 20 INJECTION, SOLUTION INTRAVENOUS at 14:44

## 2022-04-26 NOTE — CODE DOCUMENTATION
Iron sucrose 400mg/250ml NS. Ran over 2.5 hours. Started at 1444 and stopped at 1715. Patient tolerated well.

## 2022-04-28 ENCOUNTER — HOSPITAL ENCOUNTER (OUTPATIENT)
Dept: CT IMAGING | Facility: HOSPITAL | Age: 77
Discharge: HOME | End: 2022-04-28
Admitting: INTERNAL MEDICINE

## 2022-04-28 DIAGNOSIS — R91.8 MULTIPLE LUNG NODULES ON CT: ICD-10-CM

## 2022-04-28 DIAGNOSIS — C90.21 EXTRAMEDULLARY PLASMACYTOMA IN REMISSION: ICD-10-CM

## 2022-04-28 PROCEDURE — 71250 CT THORAX DX C-: CPT

## 2022-04-29 ENCOUNTER — HOSPITAL ENCOUNTER (OUTPATIENT)
Dept: INFUSION THERAPY | Facility: HOSPITAL | Age: 77
Setting detail: INFUSION SERIES
Discharge: HOME | End: 2022-04-29

## 2022-04-29 ENCOUNTER — APPOINTMENT (OUTPATIENT)
Dept: GENERAL RADIOLOGY | Facility: HOSPITAL | Age: 77
End: 2022-04-29

## 2022-04-29 ENCOUNTER — HOSPITAL ENCOUNTER (INPATIENT)
Facility: HOSPITAL | Age: 77
LOS: 4 days | Discharge: HOME | End: 2022-05-03
Attending: EMERGENCY MEDICINE | Admitting: INTERNAL MEDICINE

## 2022-04-29 VITALS
OXYGEN SATURATION: 95 % | SYSTOLIC BLOOD PRESSURE: 121 MMHG | RESPIRATION RATE: 20 BRPM | DIASTOLIC BLOOD PRESSURE: 71 MMHG | TEMPERATURE: 97.5 F | WEIGHT: 276.68 LBS | HEIGHT: 71 IN | HEART RATE: 63 BPM | BODY MASS INDEX: 38.73 KG/M2

## 2022-04-29 DIAGNOSIS — N28.9 ACUTE ON CHRONIC RENAL INSUFFICIENCY: ICD-10-CM

## 2022-04-29 DIAGNOSIS — R26.2 DIFFICULTY WALKING: ICD-10-CM

## 2022-04-29 DIAGNOSIS — K62.5 RECTAL BLEEDING: ICD-10-CM

## 2022-04-29 DIAGNOSIS — N18.9 ACUTE ON CHRONIC RENAL INSUFFICIENCY: ICD-10-CM

## 2022-04-29 DIAGNOSIS — J44.9 CHRONIC OBSTRUCTIVE PULMONARY DISEASE, UNSPECIFIED COPD TYPE: ICD-10-CM

## 2022-04-29 DIAGNOSIS — N18.30 ANEMIA DUE TO STAGE 3 CHRONIC KIDNEY DISEASE, UNSPECIFIED WHETHER STAGE 3A OR 3B CKD: Primary | ICD-10-CM

## 2022-04-29 DIAGNOSIS — D63.1 ANEMIA DUE TO STAGE 3 CHRONIC KIDNEY DISEASE, UNSPECIFIED WHETHER STAGE 3A OR 3B CKD: Primary | ICD-10-CM

## 2022-04-29 DIAGNOSIS — Z78.9 DECREASED ACTIVITIES OF DAILY LIVING (ADL): ICD-10-CM

## 2022-04-29 DIAGNOSIS — D64.9 CHRONIC ANEMIA: ICD-10-CM

## 2022-04-29 DIAGNOSIS — R60.1 ANASARCA: Primary | ICD-10-CM

## 2022-04-29 DIAGNOSIS — E87.5 HYPERKALEMIA: ICD-10-CM

## 2022-04-29 DIAGNOSIS — D62 ACUTE BLOOD LOSS ANEMIA: ICD-10-CM

## 2022-04-29 LAB
ALBUMIN SERPL-MCNC: 3.7 G/DL (ref 3.5–5.2)
ALBUMIN/GLOB SERPL: 1.3 G/DL
ALP SERPL-CCNC: 76 U/L (ref 39–117)
ALT SERPL W P-5'-P-CCNC: 14 U/L (ref 1–41)
ANION GAP SERPL CALCULATED.3IONS-SCNC: 10.8 MMOL/L (ref 5–15)
AST SERPL-CCNC: 18 U/L (ref 1–40)
BASOPHILS # BLD AUTO: 0.02 10*3/MM3 (ref 0–0.2)
BASOPHILS NFR BLD AUTO: 0.5 % (ref 0–1.5)
BILIRUB SERPL-MCNC: 0.6 MG/DL (ref 0–1.2)
BUN SERPL-MCNC: 42 MG/DL (ref 8–23)
BUN/CREAT SERPL: 19.7 (ref 7–25)
CALCIUM SPEC-SCNC: 9.3 MG/DL (ref 8.6–10.5)
CHLORIDE SERPL-SCNC: 106 MMOL/L (ref 98–107)
CO2 SERPL-SCNC: 20.2 MMOL/L (ref 22–29)
CREAT SERPL-MCNC: 2.13 MG/DL (ref 0.76–1.27)
DEPRECATED RDW RBC AUTO: 82.5 FL (ref 37–54)
EGFRCR SERPLBLD CKD-EPI 2021: 31.7 ML/MIN/1.73
EOSINOPHIL # BLD AUTO: 0.12 10*3/MM3 (ref 0–0.4)
EOSINOPHIL NFR BLD AUTO: 2.7 % (ref 0.3–6.2)
ERYTHROCYTE [DISTWIDTH] IN BLOOD BY AUTOMATED COUNT: 21 % (ref 12.3–15.4)
GLOBULIN UR ELPH-MCNC: 2.8 GM/DL
GLUCOSE SERPL-MCNC: 96 MG/DL (ref 65–99)
HCT VFR BLD AUTO: 28.3 % (ref 37.5–51)
HGB BLD-MCNC: 8.8 G/DL (ref 13–17.7)
HOLD SPECIMEN: NORMAL
HOLD SPECIMEN: NORMAL
IMM GRANULOCYTES # BLD AUTO: 0.04 10*3/MM3 (ref 0–0.05)
IMM GRANULOCYTES NFR BLD AUTO: 0.9 % (ref 0–0.5)
INR PPP: 1.21 (ref 0.86–1.15)
LYMPHOCYTES # BLD AUTO: 0.85 10*3/MM3 (ref 0.7–3.1)
LYMPHOCYTES NFR BLD AUTO: 19.4 % (ref 19.6–45.3)
MCH RBC QN AUTO: 33.3 PG (ref 26.6–33)
MCHC RBC AUTO-ENTMCNC: 31.1 G/DL (ref 31.5–35.7)
MCV RBC AUTO: 107.2 FL (ref 79–97)
MONOCYTES # BLD AUTO: 0.42 10*3/MM3 (ref 0.1–0.9)
MONOCYTES NFR BLD AUTO: 9.6 % (ref 5–12)
NEUTROPHILS NFR BLD AUTO: 2.94 10*3/MM3 (ref 1.7–7)
NEUTROPHILS NFR BLD AUTO: 66.9 % (ref 42.7–76)
NRBC BLD AUTO-RTO: 0.5 /100 WBC (ref 0–0.2)
NT-PROBNP SERPL-MCNC: 2916 PG/ML (ref 0–1800)
PLATELET # BLD AUTO: 101 10*3/MM3 (ref 140–450)
PMV BLD AUTO: 11.1 FL (ref 6–12)
POTASSIUM SERPL-SCNC: 5.7 MMOL/L (ref 3.5–5.2)
PROT SERPL-MCNC: 6.5 G/DL (ref 6–8.5)
PROTHROMBIN TIME: 15.5 SECONDS (ref 11.8–14.9)
RBC # BLD AUTO: 2.64 10*6/MM3 (ref 4.14–5.8)
SODIUM SERPL-SCNC: 137 MMOL/L (ref 136–145)
T4 FREE SERPL-MCNC: 0.8 NG/DL (ref 0.93–1.7)
TROPONIN T SERPL-MCNC: <0.01 NG/ML (ref 0–0.03)
TSH SERPL DL<=0.05 MIU/L-ACNC: 1.34 UIU/ML (ref 0.27–4.2)
WBC NRBC COR # BLD: 4.39 10*3/MM3 (ref 3.4–10.8)
WHOLE BLOOD HOLD SPECIMEN: NORMAL
WHOLE BLOOD HOLD SPECIMEN: NORMAL

## 2022-04-29 PROCEDURE — 85025 COMPLETE CBC W/AUTO DIFF WBC: CPT

## 2022-04-29 PROCEDURE — 94799 UNLISTED PULMONARY SVC/PX: CPT

## 2022-04-29 PROCEDURE — 93005 ELECTROCARDIOGRAM TRACING: CPT

## 2022-04-29 PROCEDURE — 96365 THER/PROPH/DIAG IV INF INIT: CPT

## 2022-04-29 PROCEDURE — 80053 COMPREHEN METABOLIC PANEL: CPT

## 2022-04-29 PROCEDURE — 25010000002 FUROSEMIDE PER 20 MG: Performed by: EMERGENCY MEDICINE

## 2022-04-29 PROCEDURE — 83880 ASSAY OF NATRIURETIC PEPTIDE: CPT

## 2022-04-29 PROCEDURE — 94640 AIRWAY INHALATION TREATMENT: CPT

## 2022-04-29 PROCEDURE — 93005 ELECTROCARDIOGRAM TRACING: CPT | Performed by: EMERGENCY MEDICINE

## 2022-04-29 PROCEDURE — 25010000002 IRON SUCROSE PER 1 MG: Performed by: INTERNAL MEDICINE

## 2022-04-29 PROCEDURE — 84443 ASSAY THYROID STIM HORMONE: CPT | Performed by: EMERGENCY MEDICINE

## 2022-04-29 PROCEDURE — 84439 ASSAY OF FREE THYROXINE: CPT | Performed by: EMERGENCY MEDICINE

## 2022-04-29 PROCEDURE — 99284 EMERGENCY DEPT VISIT MOD MDM: CPT

## 2022-04-29 PROCEDURE — 84484 ASSAY OF TROPONIN QUANT: CPT

## 2022-04-29 PROCEDURE — 96366 THER/PROPH/DIAG IV INF ADDON: CPT

## 2022-04-29 PROCEDURE — 71045 X-RAY EXAM CHEST 1 VIEW: CPT

## 2022-04-29 PROCEDURE — 94664 DEMO&/EVAL PT USE INHALER: CPT

## 2022-04-29 PROCEDURE — 93010 ELECTROCARDIOGRAM REPORT: CPT | Performed by: SPECIALIST

## 2022-04-29 PROCEDURE — 85610 PROTHROMBIN TIME: CPT | Performed by: EMERGENCY MEDICINE

## 2022-04-29 RX ORDER — FUROSEMIDE 10 MG/ML
80 INJECTION INTRAMUSCULAR; INTRAVENOUS ONCE
Status: COMPLETED | OUTPATIENT
Start: 2022-04-29 | End: 2022-04-29

## 2022-04-29 RX ORDER — MAGNESIUM OXIDE 400 MG/1
400 TABLET ORAL 2 TIMES DAILY
Status: ON HOLD | COMMUNITY
End: 2022-06-12

## 2022-04-29 RX ORDER — ALBUTEROL SULFATE 2.5 MG/3ML
2.5 SOLUTION RESPIRATORY (INHALATION) ONCE
Status: COMPLETED | OUTPATIENT
Start: 2022-04-29 | End: 2022-04-29

## 2022-04-29 RX ORDER — SODIUM CHLORIDE 0.9 % (FLUSH) 0.9 %
10 SYRINGE (ML) INJECTION AS NEEDED
Status: DISCONTINUED | OUTPATIENT
Start: 2022-04-29 | End: 2022-05-03 | Stop reason: HOSPADM

## 2022-04-29 RX ADMIN — ALBUTEROL SULFATE 2.5 MG: 2.5 SOLUTION RESPIRATORY (INHALATION) at 21:21

## 2022-04-29 RX ADMIN — IRON SUCROSE 400 MG: 20 INJECTION, SOLUTION INTRAVENOUS at 13:16

## 2022-04-29 RX ADMIN — FUROSEMIDE 80 MG: 10 INJECTION, SOLUTION INTRAMUSCULAR; INTRAVENOUS at 22:35

## 2022-04-30 PROBLEM — I50.33 ACUTE ON CHRONIC DIASTOLIC CHF (CONGESTIVE HEART FAILURE) (HCC): Chronic | Status: ACTIVE | Noted: 2022-04-30

## 2022-04-30 LAB
ALBUMIN SERPL-MCNC: 3.9 G/DL (ref 3.5–5.2)
ALBUMIN/GLOB SERPL: 1.6 G/DL
ALP SERPL-CCNC: 66 U/L (ref 39–117)
ALT SERPL W P-5'-P-CCNC: 13 U/L (ref 1–41)
ANION GAP SERPL CALCULATED.3IONS-SCNC: 9.7 MMOL/L (ref 5–15)
AST SERPL-CCNC: 16 U/L (ref 1–40)
BASOPHILS # BLD AUTO: 0.02 10*3/MM3 (ref 0–0.2)
BASOPHILS NFR BLD AUTO: 0.4 % (ref 0–1.5)
BILIRUB SERPL-MCNC: 0.6 MG/DL (ref 0–1.2)
BILIRUB UR QL STRIP: NEGATIVE
BUN SERPL-MCNC: 39 MG/DL (ref 8–23)
BUN/CREAT SERPL: 20.1 (ref 7–25)
CALCIUM SPEC-SCNC: 9.4 MG/DL (ref 8.6–10.5)
CHLORIDE SERPL-SCNC: 104 MMOL/L (ref 98–107)
CLARITY UR: CLEAR
CO2 SERPL-SCNC: 23.3 MMOL/L (ref 22–29)
COLOR UR: YELLOW
CREAT SERPL-MCNC: 1.94 MG/DL (ref 0.76–1.27)
CREAT UR-MCNC: 38.5 MG/DL
DACRYOCYTES BLD QL SMEAR: NORMAL
DEPRECATED RDW RBC AUTO: 82.6 FL (ref 37–54)
EGFRCR SERPLBLD CKD-EPI 2021: 35.4 ML/MIN/1.73
ELLIPTOCYTES BLD QL SMEAR: NORMAL
EOSINOPHIL # BLD AUTO: 0.14 10*3/MM3 (ref 0–0.4)
EOSINOPHIL NFR BLD AUTO: 2.7 % (ref 0.3–6.2)
ERYTHROCYTE [DISTWIDTH] IN BLOOD BY AUTOMATED COUNT: 20.9 % (ref 12.3–15.4)
GLOBULIN UR ELPH-MCNC: 2.4 GM/DL
GLUCOSE SERPL-MCNC: 91 MG/DL (ref 65–99)
GLUCOSE UR STRIP-MCNC: NEGATIVE MG/DL
HCT VFR BLD AUTO: 27.8 % (ref 37.5–51)
HCT VFR BLD AUTO: 27.9 % (ref 37.5–51)
HGB BLD-MCNC: 8.2 G/DL (ref 13–17.7)
HGB BLD-MCNC: 8.8 G/DL (ref 13–17.7)
HGB UR QL STRIP.AUTO: NEGATIVE
IMM GRANULOCYTES # BLD AUTO: 0.05 10*3/MM3 (ref 0–0.05)
IMM GRANULOCYTES NFR BLD AUTO: 1 % (ref 0–0.5)
KETONES UR QL STRIP: NEGATIVE
LEUKOCYTE ESTERASE UR QL STRIP.AUTO: NEGATIVE
LYMPHOCYTES # BLD AUTO: 0.94 10*3/MM3 (ref 0.7–3.1)
LYMPHOCYTES NFR BLD AUTO: 17.9 % (ref 19.6–45.3)
MACROCYTES BLD QL SMEAR: NORMAL
MCH RBC QN AUTO: 33.7 PG (ref 26.6–33)
MCHC RBC AUTO-ENTMCNC: 31.7 G/DL (ref 31.5–35.7)
MCV RBC AUTO: 106.5 FL (ref 79–97)
MONOCYTES # BLD AUTO: 0.4 10*3/MM3 (ref 0.1–0.9)
MONOCYTES NFR BLD AUTO: 7.6 % (ref 5–12)
NEUTROPHILS NFR BLD AUTO: 3.7 10*3/MM3 (ref 1.7–7)
NEUTROPHILS NFR BLD AUTO: 70.4 % (ref 42.7–76)
NITRITE UR QL STRIP: NEGATIVE
NRBC BLD AUTO-RTO: 0 /100 WBC (ref 0–0.2)
PH UR STRIP.AUTO: <=5 [PH] (ref 5–8)
PLAT MORPH BLD: NORMAL
PLATELET # BLD AUTO: 118 10*3/MM3 (ref 140–450)
PMV BLD AUTO: 12 FL (ref 6–12)
POLYCHROMASIA BLD QL SMEAR: NORMAL
POTASSIUM SERPL-SCNC: 6 MMOL/L (ref 3.5–5.2)
PROT ?TM UR-MCNC: 5.7 MG/DL
PROT SERPL-MCNC: 6.3 G/DL (ref 6–8.5)
PROT UR QL STRIP: NEGATIVE
PROT/CREAT UR: 0.15 MG/G{CREAT}
QT INTERVAL: 430 MS
RBC # BLD AUTO: 2.61 10*6/MM3 (ref 4.14–5.8)
SODIUM SERPL-SCNC: 137 MMOL/L (ref 136–145)
SP GR UR STRIP: 1.01 (ref 1–1.03)
TROPONIN T SERPL-MCNC: <0.01 NG/ML (ref 0–0.03)
TROPONIN T SERPL-MCNC: <0.01 NG/ML (ref 0–0.03)
UROBILINOGEN UR QL STRIP: NORMAL
WBC MORPH BLD: NORMAL
WBC NRBC COR # BLD: 5.25 10*3/MM3 (ref 3.4–10.8)

## 2022-04-30 PROCEDURE — 25010000002 ENOXAPARIN PER 10 MG: Performed by: INTERNAL MEDICINE

## 2022-04-30 PROCEDURE — 84484 ASSAY OF TROPONIN QUANT: CPT | Performed by: INTERNAL MEDICINE

## 2022-04-30 PROCEDURE — 94799 UNLISTED PULMONARY SVC/PX: CPT

## 2022-04-30 PROCEDURE — 85018 HEMOGLOBIN: CPT | Performed by: INTERNAL MEDICINE

## 2022-04-30 PROCEDURE — 85014 HEMATOCRIT: CPT | Performed by: INTERNAL MEDICINE

## 2022-04-30 PROCEDURE — 80053 COMPREHEN METABOLIC PANEL: CPT | Performed by: INTERNAL MEDICINE

## 2022-04-30 PROCEDURE — 94761 N-INVAS EAR/PLS OXIMETRY MLT: CPT

## 2022-04-30 PROCEDURE — 25010000002 FUROSEMIDE PER 20 MG: Performed by: INTERNAL MEDICINE

## 2022-04-30 PROCEDURE — 85007 BL SMEAR W/DIFF WBC COUNT: CPT | Performed by: INTERNAL MEDICINE

## 2022-04-30 PROCEDURE — 82570 ASSAY OF URINE CREATININE: CPT | Performed by: INTERNAL MEDICINE

## 2022-04-30 PROCEDURE — 63710000001 LEVALBUTEROL PER 0.5 MG: Performed by: INTERNAL MEDICINE

## 2022-04-30 PROCEDURE — 81003 URINALYSIS AUTO W/O SCOPE: CPT | Performed by: INTERNAL MEDICINE

## 2022-04-30 PROCEDURE — 84156 ASSAY OF PROTEIN URINE: CPT | Performed by: INTERNAL MEDICINE

## 2022-04-30 PROCEDURE — 25010000002 THIAMINE PER 100 MG: Performed by: INTERNAL MEDICINE

## 2022-04-30 PROCEDURE — 85025 COMPLETE CBC W/AUTO DIFF WBC: CPT | Performed by: INTERNAL MEDICINE

## 2022-04-30 RX ORDER — ENOXAPARIN SODIUM 100 MG/ML
40 INJECTION SUBCUTANEOUS EVERY 24 HOURS
Status: DISCONTINUED | OUTPATIENT
Start: 2022-04-30 | End: 2022-04-30

## 2022-04-30 RX ORDER — SODIUM CHLORIDE 0.9 % (FLUSH) 0.9 %
10 SYRINGE (ML) INJECTION AS NEEDED
Status: DISCONTINUED | OUTPATIENT
Start: 2022-04-30 | End: 2022-05-03 | Stop reason: HOSPADM

## 2022-04-30 RX ORDER — GABAPENTIN 100 MG/1
100 CAPSULE ORAL EVERY 12 HOURS SCHEDULED
Status: DISCONTINUED | OUTPATIENT
Start: 2022-04-30 | End: 2022-05-03 | Stop reason: HOSPADM

## 2022-04-30 RX ORDER — FUROSEMIDE 10 MG/ML
20 INJECTION INTRAMUSCULAR; INTRAVENOUS EVERY 12 HOURS
Status: DISCONTINUED | OUTPATIENT
Start: 2022-04-30 | End: 2022-04-30

## 2022-04-30 RX ORDER — SODIUM CHLORIDE 0.9 % (FLUSH) 0.9 %
10 SYRINGE (ML) INJECTION EVERY 12 HOURS SCHEDULED
Status: DISCONTINUED | OUTPATIENT
Start: 2022-04-30 | End: 2022-05-03 | Stop reason: HOSPADM

## 2022-04-30 RX ORDER — TAMSULOSIN HYDROCHLORIDE 0.4 MG/1
0.4 CAPSULE ORAL DAILY
Status: DISCONTINUED | OUTPATIENT
Start: 2022-04-30 | End: 2022-05-03 | Stop reason: HOSPADM

## 2022-04-30 RX ORDER — ONDANSETRON 2 MG/ML
4 INJECTION INTRAMUSCULAR; INTRAVENOUS EVERY 4 HOURS PRN
Status: DISCONTINUED | OUTPATIENT
Start: 2022-04-30 | End: 2022-05-03 | Stop reason: HOSPADM

## 2022-04-30 RX ORDER — ACETAMINOPHEN 325 MG/1
650 TABLET ORAL EVERY 4 HOURS PRN
Status: DISCONTINUED | OUTPATIENT
Start: 2022-04-30 | End: 2022-05-03 | Stop reason: HOSPADM

## 2022-04-30 RX ORDER — FINASTERIDE 5 MG/1
5 TABLET, FILM COATED ORAL DAILY
Status: DISCONTINUED | OUTPATIENT
Start: 2022-04-30 | End: 2022-05-03 | Stop reason: HOSPADM

## 2022-04-30 RX ORDER — NITROGLYCERIN 0.4 MG/1
0.4 TABLET SUBLINGUAL
Status: DISCONTINUED | OUTPATIENT
Start: 2022-04-30 | End: 2022-05-03 | Stop reason: HOSPADM

## 2022-04-30 RX ORDER — FAMOTIDINE 20 MG/1
40 TABLET, FILM COATED ORAL DAILY
Status: DISCONTINUED | OUTPATIENT
Start: 2022-04-30 | End: 2022-05-03 | Stop reason: HOSPADM

## 2022-04-30 RX ORDER — MORPHINE SULFATE 2 MG/ML
2 INJECTION, SOLUTION INTRAMUSCULAR; INTRAVENOUS
Status: ACTIVE | OUTPATIENT
Start: 2022-04-30 | End: 2022-05-03

## 2022-04-30 RX ORDER — BISACODYL 10 MG
10 SUPPOSITORY, RECTAL RECTAL DAILY PRN
Status: DISCONTINUED | OUTPATIENT
Start: 2022-04-30 | End: 2022-05-03 | Stop reason: HOSPADM

## 2022-04-30 RX ORDER — METOPROLOL SUCCINATE 50 MG/1
50 TABLET, EXTENDED RELEASE ORAL DAILY
Status: DISCONTINUED | OUTPATIENT
Start: 2022-04-30 | End: 2022-05-03 | Stop reason: HOSPADM

## 2022-04-30 RX ORDER — LEVALBUTEROL INHALATION SOLUTION 0.63 MG/3ML
0.63 SOLUTION RESPIRATORY (INHALATION) 4 TIMES DAILY
Status: DISCONTINUED | OUTPATIENT
Start: 2022-04-30 | End: 2022-05-03 | Stop reason: HOSPADM

## 2022-04-30 RX ORDER — ASPIRIN 325 MG
325 TABLET, DELAYED RELEASE (ENTERIC COATED) ORAL DAILY
Status: DISCONTINUED | OUTPATIENT
Start: 2022-04-30 | End: 2022-05-02

## 2022-04-30 RX ORDER — TEMAZEPAM 7.5 MG/1
15 CAPSULE ORAL NIGHTLY PRN
Status: DISCONTINUED | OUTPATIENT
Start: 2022-04-30 | End: 2022-05-03 | Stop reason: HOSPADM

## 2022-04-30 RX ORDER — FUROSEMIDE 10 MG/ML
80 INJECTION INTRAMUSCULAR; INTRAVENOUS EVERY 8 HOURS
Status: DISCONTINUED | OUTPATIENT
Start: 2022-04-30 | End: 2022-05-01

## 2022-04-30 RX ORDER — BISACODYL 5 MG/1
5 TABLET, DELAYED RELEASE ORAL DAILY PRN
Status: DISCONTINUED | OUTPATIENT
Start: 2022-04-30 | End: 2022-05-03 | Stop reason: HOSPADM

## 2022-04-30 RX ORDER — NALOXONE HCL 0.4 MG/ML
0.4 VIAL (ML) INJECTION
Status: DISCONTINUED | OUTPATIENT
Start: 2022-04-30 | End: 2022-05-03 | Stop reason: HOSPADM

## 2022-04-30 RX ORDER — AMOXICILLIN 250 MG
1 CAPSULE ORAL 2 TIMES DAILY
Status: DISCONTINUED | OUTPATIENT
Start: 2022-04-30 | End: 2022-05-03 | Stop reason: HOSPADM

## 2022-04-30 RX ORDER — HYDROCODONE BITARTRATE AND ACETAMINOPHEN 5; 325 MG/1; MG/1
1 TABLET ORAL EVERY 4 HOURS PRN
Status: DISCONTINUED | OUTPATIENT
Start: 2022-04-30 | End: 2022-05-03 | Stop reason: HOSPADM

## 2022-04-30 RX ORDER — ALLOPURINOL 300 MG/1
300 TABLET ORAL DAILY
Status: DISCONTINUED | OUTPATIENT
Start: 2022-04-30 | End: 2022-05-03 | Stop reason: HOSPADM

## 2022-04-30 RX ORDER — ALUMINA, MAGNESIA, AND SIMETHICONE 2400; 2400; 240 MG/30ML; MG/30ML; MG/30ML
15 SUSPENSION ORAL EVERY 6 HOURS PRN
Status: DISCONTINUED | OUTPATIENT
Start: 2022-04-30 | End: 2022-05-03 | Stop reason: HOSPADM

## 2022-04-30 RX ORDER — MULTIPLE VITAMINS W/ MINERALS TAB 9MG-400MCG
1 TAB ORAL EVERY 24 HOURS
Status: DISCONTINUED | OUTPATIENT
Start: 2022-04-30 | End: 2022-05-03 | Stop reason: HOSPADM

## 2022-04-30 RX ORDER — LEVALBUTEROL INHALATION SOLUTION 0.63 MG/3ML
0.63 SOLUTION RESPIRATORY (INHALATION) EVERY 4 HOURS PRN
Status: DISCONTINUED | OUTPATIENT
Start: 2022-04-30 | End: 2022-04-30

## 2022-04-30 RX ORDER — ALPRAZOLAM 0.25 MG/1
0.25 TABLET ORAL EVERY 6 HOURS PRN
Status: DISCONTINUED | OUTPATIENT
Start: 2022-04-30 | End: 2022-05-03 | Stop reason: HOSPADM

## 2022-04-30 RX ORDER — POLYETHYLENE GLYCOL 3350 17 G/17G
17 POWDER, FOR SOLUTION ORAL DAILY PRN
Status: DISCONTINUED | OUTPATIENT
Start: 2022-04-30 | End: 2022-05-03 | Stop reason: HOSPADM

## 2022-04-30 RX ORDER — CITALOPRAM 20 MG/1
40 TABLET ORAL EVERY MORNING
Status: DISCONTINUED | OUTPATIENT
Start: 2022-04-30 | End: 2022-05-03 | Stop reason: HOSPADM

## 2022-04-30 RX ADMIN — LEVALBUTEROL HYDROCHLORIDE 0.63 MG: 0.63 SOLUTION RESPIRATORY (INHALATION) at 12:33

## 2022-04-30 RX ADMIN — FAMOTIDINE 40 MG: 20 TABLET ORAL at 08:30

## 2022-04-30 RX ADMIN — ALLOPURINOL 300 MG: 300 TABLET ORAL at 08:30

## 2022-04-30 RX ADMIN — ENOXAPARIN SODIUM 40 MG: 100 INJECTION SUBCUTANEOUS at 08:30

## 2022-04-30 RX ADMIN — Medication 100 MG: at 16:58

## 2022-04-30 RX ADMIN — GABAPENTIN 100 MG: 100 CAPSULE ORAL at 10:00

## 2022-04-30 RX ADMIN — THIAMINE HYDROCHLORIDE 100 ML/HR: 100 INJECTION, SOLUTION INTRAMUSCULAR; INTRAVENOUS at 11:20

## 2022-04-30 RX ADMIN — RIVAROXABAN 20 MG: 20 TABLET, FILM COATED ORAL at 10:00

## 2022-04-30 RX ADMIN — LEVALBUTEROL HYDROCHLORIDE 0.63 MG: 0.63 SOLUTION RESPIRATORY (INHALATION) at 18:45

## 2022-04-30 RX ADMIN — FUROSEMIDE 20 MG: 10 INJECTION, SOLUTION INTRAMUSCULAR; INTRAVENOUS at 10:00

## 2022-04-30 RX ADMIN — FUROSEMIDE 80 MG: 10 INJECTION, SOLUTION INTRAMUSCULAR; INTRAVENOUS at 16:58

## 2022-04-30 RX ADMIN — CITALOPRAM HYDROBROMIDE 40 MG: 20 TABLET ORAL at 10:00

## 2022-04-30 RX ADMIN — Medication 10 ML: at 08:30

## 2022-04-30 RX ADMIN — MULTIPLE VITAMINS W/ MINERALS TAB 1 TABLET: TAB at 16:58

## 2022-04-30 RX ADMIN — FINASTERIDE 5 MG: 5 TABLET, FILM COATED ORAL at 10:00

## 2022-04-30 RX ADMIN — TAMSULOSIN HYDROCHLORIDE 0.4 MG: 0.4 CAPSULE ORAL at 10:00

## 2022-04-30 RX ADMIN — Medication 10 ML: at 01:29

## 2022-04-30 RX ADMIN — METOPROLOL SUCCINATE 50 MG: 50 TABLET, EXTENDED RELEASE ORAL at 10:00

## 2022-04-30 RX ADMIN — ASPIRIN 325 MG: 325 TABLET, COATED ORAL at 08:30

## 2022-04-30 RX ADMIN — SODIUM ZIRCONIUM CYCLOSILICATE 10 G: 10 POWDER, FOR SUSPENSION ORAL at 01:28

## 2022-04-30 RX ADMIN — GABAPENTIN 100 MG: 100 CAPSULE ORAL at 20:13

## 2022-04-30 RX ADMIN — Medication 10 ML: at 20:13

## 2022-04-30 RX ADMIN — SODIUM ZIRCONIUM CYCLOSILICATE 10 G: 10 POWDER, FOR SUSPENSION ORAL at 16:58

## 2022-04-30 RX ADMIN — SENNOSIDES AND DOCUSATE SODIUM 1 TABLET: 50; 8.6 TABLET ORAL at 08:30

## 2022-05-01 PROBLEM — K92.2 GI BLEED: Status: ACTIVE | Noted: 2022-05-01

## 2022-05-01 LAB
ABO GROUP BLD: NORMAL
ANION GAP SERPL CALCULATED.3IONS-SCNC: 10.5 MMOL/L (ref 5–15)
BLD GP AB SCN SERPL QL: NEGATIVE
BUN SERPL-MCNC: 40 MG/DL (ref 8–23)
BUN/CREAT SERPL: 18.4 (ref 7–25)
CALCIUM SPEC-SCNC: 9.1 MG/DL (ref 8.6–10.5)
CHLORIDE SERPL-SCNC: 103 MMOL/L (ref 98–107)
CO2 SERPL-SCNC: 23.5 MMOL/L (ref 22–29)
CREAT SERPL-MCNC: 2.17 MG/DL (ref 0.76–1.27)
EGFRCR SERPLBLD CKD-EPI 2021: 31 ML/MIN/1.73
FERRITIN SERPL-MCNC: 1148 NG/ML (ref 30–400)
GLUCOSE SERPL-MCNC: 90 MG/DL (ref 65–99)
HCT VFR BLD AUTO: 24.9 % (ref 37.5–51)
HCT VFR BLD AUTO: 25 % (ref 37.5–51)
HCT VFR BLD AUTO: 25.4 % (ref 37.5–51)
HEMOCCULT STL QL IA: POSITIVE
HGB BLD-MCNC: 8.1 G/DL (ref 13–17.7)
IRON 24H UR-MRATE: 65 MCG/DL (ref 59–158)
IRON SATN MFR SERPL: 26 % (ref 20–50)
MAGNESIUM SERPL-MCNC: 1.6 MG/DL (ref 1.6–2.4)
POTASSIUM SERPL-SCNC: 5.2 MMOL/L (ref 3.5–5.2)
RH BLD: POSITIVE
SODIUM SERPL-SCNC: 137 MMOL/L (ref 136–145)
T&S EXPIRATION DATE: NORMAL
TIBC SERPL-MCNC: 250 MCG/DL (ref 298–536)
TRANSFERRIN SERPL-MCNC: 168 MG/DL (ref 200–360)
URATE SERPL-MCNC: 6.1 MG/DL (ref 3.4–7)

## 2022-05-01 PROCEDURE — 84466 ASSAY OF TRANSFERRIN: CPT | Performed by: INTERNAL MEDICINE

## 2022-05-01 PROCEDURE — 86850 RBC ANTIBODY SCREEN: CPT | Performed by: INTERNAL MEDICINE

## 2022-05-01 PROCEDURE — 25010000002 FUROSEMIDE PER 20 MG: Performed by: INTERNAL MEDICINE

## 2022-05-01 PROCEDURE — 86900 BLOOD TYPING SEROLOGIC ABO: CPT | Performed by: INTERNAL MEDICINE

## 2022-05-01 PROCEDURE — 80048 BASIC METABOLIC PNL TOTAL CA: CPT | Performed by: INTERNAL MEDICINE

## 2022-05-01 PROCEDURE — 85014 HEMATOCRIT: CPT | Performed by: INTERNAL MEDICINE

## 2022-05-01 PROCEDURE — 82728 ASSAY OF FERRITIN: CPT | Performed by: INTERNAL MEDICINE

## 2022-05-01 PROCEDURE — 85018 HEMOGLOBIN: CPT | Performed by: INTERNAL MEDICINE

## 2022-05-01 PROCEDURE — 94799 UNLISTED PULMONARY SVC/PX: CPT

## 2022-05-01 PROCEDURE — 63710000001 LEVALBUTEROL PER 0.5 MG: Performed by: INTERNAL MEDICINE

## 2022-05-01 PROCEDURE — 84550 ASSAY OF BLOOD/URIC ACID: CPT | Performed by: INTERNAL MEDICINE

## 2022-05-01 PROCEDURE — 83735 ASSAY OF MAGNESIUM: CPT | Performed by: INTERNAL MEDICINE

## 2022-05-01 PROCEDURE — 94760 N-INVAS EAR/PLS OXIMETRY 1: CPT

## 2022-05-01 PROCEDURE — 83540 ASSAY OF IRON: CPT | Performed by: INTERNAL MEDICINE

## 2022-05-01 PROCEDURE — 82274 ASSAY TEST FOR BLOOD FECAL: CPT | Performed by: INTERNAL MEDICINE

## 2022-05-01 PROCEDURE — 86901 BLOOD TYPING SEROLOGIC RH(D): CPT | Performed by: INTERNAL MEDICINE

## 2022-05-01 RX ADMIN — FUROSEMIDE 80 MG: 10 INJECTION, SOLUTION INTRAMUSCULAR; INTRAVENOUS at 00:06

## 2022-05-01 RX ADMIN — Medication 10 ML: at 08:34

## 2022-05-01 RX ADMIN — MULTIPLE VITAMINS W/ MINERALS TAB 1 TABLET: TAB at 16:57

## 2022-05-01 RX ADMIN — RIVAROXABAN 20 MG: 20 TABLET, FILM COATED ORAL at 08:33

## 2022-05-01 RX ADMIN — GABAPENTIN 100 MG: 100 CAPSULE ORAL at 20:35

## 2022-05-01 RX ADMIN — FINASTERIDE 5 MG: 5 TABLET, FILM COATED ORAL at 08:33

## 2022-05-01 RX ADMIN — Medication 100 MG: at 08:32

## 2022-05-01 RX ADMIN — LEVALBUTEROL HYDROCHLORIDE 0.63 MG: 0.63 SOLUTION RESPIRATORY (INHALATION) at 18:54

## 2022-05-01 RX ADMIN — FAMOTIDINE 40 MG: 20 TABLET ORAL at 08:33

## 2022-05-01 RX ADMIN — FUROSEMIDE 80 MG: 10 INJECTION, SOLUTION INTRAMUSCULAR; INTRAVENOUS at 08:33

## 2022-05-01 RX ADMIN — POLYETHYLENE GLYCOL 3350, SODIUM SULFATE ANHYDROUS, SODIUM BICARBONATE, SODIUM CHLORIDE, POTASSIUM CHLORIDE 2000 ML: 236; 22.74; 6.74; 5.86; 2.97 POWDER, FOR SOLUTION ORAL at 20:35

## 2022-05-01 RX ADMIN — LEVALBUTEROL HYDROCHLORIDE 0.63 MG: 0.63 SOLUTION RESPIRATORY (INHALATION) at 12:02

## 2022-05-01 RX ADMIN — METOPROLOL SUCCINATE 50 MG: 50 TABLET, EXTENDED RELEASE ORAL at 08:33

## 2022-05-01 RX ADMIN — TAMSULOSIN HYDROCHLORIDE 0.4 MG: 0.4 CAPSULE ORAL at 08:32

## 2022-05-01 RX ADMIN — LEVALBUTEROL HYDROCHLORIDE 0.63 MG: 0.63 SOLUTION RESPIRATORY (INHALATION) at 06:42

## 2022-05-01 RX ADMIN — SENNOSIDES AND DOCUSATE SODIUM 1 TABLET: 50; 8.6 TABLET ORAL at 08:32

## 2022-05-01 RX ADMIN — SENNOSIDES AND DOCUSATE SODIUM 1 TABLET: 50; 8.6 TABLET ORAL at 20:35

## 2022-05-01 RX ADMIN — ALLOPURINOL 300 MG: 300 TABLET ORAL at 08:33

## 2022-05-01 RX ADMIN — ALPRAZOLAM 0.25 MG: 0.25 TABLET ORAL at 20:34

## 2022-05-01 RX ADMIN — Medication 10 ML: at 20:36

## 2022-05-01 RX ADMIN — LEVALBUTEROL HYDROCHLORIDE 0.63 MG: 0.63 SOLUTION RESPIRATORY (INHALATION) at 00:26

## 2022-05-01 RX ADMIN — GABAPENTIN 100 MG: 100 CAPSULE ORAL at 08:33

## 2022-05-01 RX ADMIN — ASPIRIN 325 MG: 325 TABLET, COATED ORAL at 08:32

## 2022-05-01 RX ADMIN — CITALOPRAM HYDROBROMIDE 40 MG: 20 TABLET ORAL at 06:23

## 2022-05-01 NOTE — CASE MANAGEMENT/SOCIAL WORK
Discharge Planning Assessment   [facility]     Patient Name: Rafael Gomez  MRN: [id number]  Today's Date: 5/1/2022    Admit Date: 4/29/2022     Discharge Needs Assessment     Row Name 05/01/22 0855       Living Environment    People in Home spouse    Name(s) of People in Home Liliya wife    Current Living Arrangements home    Primary Care Provided by spouse/significant other    Provides Primary Care For no one, unable/limited ability to care for self    Family Caregiver if Needed none    Quality of Family Relationships supportive;helpful;involved    Able to Return to Prior Arrangements yes       Resource/Environmental Concerns    Resource/Environmental Concerns none    Transportation Concerns none       Transition Planning    Patient/Family Anticipates Transition to home with family    Transportation Anticipated family or friend will provide       Discharge Needs Assessment    Readmission Within the Last 30 Days no previous admission in last 30 days    Equipment Currently Used at Home cane, straight;wheelchair, motorized;bath bench;power chair,(recliner lift);oxygen;pulse ox;nebulizer;grab bar;cpap;respiratory supplies    Concerns to be Addressed no discharge needs identified;denies needs/concerns at this time    Anticipated Changes Related to Illness none    Equipment Needed After Discharge none    Patient's Choice of Community Agency(s) [facility] Cpap[facility]bul[facility]               Discharge Plan     Row Name 05/01/22 0858       Plan    Plan CM spoke with patient and wife at bedside, patient currently on 3L nasal canula- normally using nocturnal home oxygen. patient [facility] Eckert, CM will send d/c summary to [facility] clinic. Patient wife cares patient ADL's, denies needs at this time. Patient was receiving Iron infusion and was sent to ER for SOA. CM will continue to follow              Continued Care and Services - Admitted Since 4/29/2022    Coordination has not been started for this  encounter.          Demographic Summary     Row Name 05/01/22 0849       General Information    Admission Type inpatient    Arrived From procedure suite    Referral Source admission list    Preferred Language English    General Information Comments Patient was at infusion suite       Contact Information    Permission Granted to Share Info With family/designee               Functional Status     Row Name 05/01/22 0850       Functional Status    Usual Activity Tolerance good    Current Activity Tolerance good       Functional Status, IADL    Medications completely dependent    Meal Preparation completely dependent    Laundry completely dependent    Shopping completely dependent    IADL Comments Wife assists patient with all ADL's- patient completely depends on wife       Mental Status    General Appearance WDL WDL       Mental Status Summary    Recent Changes in Mental Status/Cognitive Functioning no changes       Employment/    Employment Status retired;, previous service    Current or Previous Occupation            Current or Previous  Service active duty, past     Service Experiences experienced combat;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                Abuse/Neglect    No documentation.                Legal     Row Name 05/01/22 0855       Financial/Legal    Source of Income [facility] pension;social security       Legal    Criminal Activity/Legal Involvement none               Substance Abuse    No documentation.                Patient Forms    No documentation.                   Shagufta Mancuso RN

## 2022-05-01 NOTE — H&P (VIEW-ONLY)
Chief Complaint  Weight Gain, Weakness - Generalized, and Urinary Retention    History of Present Illness  Rafael Gomez is a 76 y.o. male with history of aortic aneurysm, amyloidosis, osteoarthritis, anxiety, COPD, coronary artery disease, high blood pressure who presents to [facility] 4TH FLOOR MEDICAL TELE[facility]RY UNIT on referral from Claude Hair MD for a gastroenterology evaluation of GI bleeding.  Patient was admitted to the hospital with anasarca but since he has been here he has been having rectal bleeding.  According to the patient's wife each time he had a bowel movement there is about 1/4 cup of fresh blood in the stool.  Patient denies any abdominal pain.  There is no history of nausea vomiting.  There is no history of throwing up blood.  Patient has remote history of ulcerative colitis but he said that he had no symptoms of ulcerative colitis for more than 10 years and has not had a scope for more than 10 years.  Currently he does not take any medications for ulcerative colitis        Labs Result Review Imaging    Past Medical History:   Diagnosis Date   • AA (aortic aneurysm) (HCC)     normal diameter noted on scan 03/18/21   • AL amyloidosis ([facility]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   • Anemia     no current issues   • Anxiety    • Arthritis     OA OF LEFT HIP   • Colitis     REPORTS CURRENTLY INCONTINENT OF BOWEL AND HAVING DIARRHEA   • COPD (chronic obstructive pulmonary disease) ([facility])     inhalers, neb tx   • Coronary artery disease    • Depression    • ED (erectile dysfunction)    • GERD (gastroesophageal reflux disease)    • Gout    • History of PAT (paroxysmal atrial tachycardia)    • Hyperlipidemia    • Hypertension    • Leukopenia     no current  issues   • Macrocytic anemia    • Multiple renal cysts     no current issues   • Myocardial infarction (HCC)    • Neuropathy     HANDS AND FEET   • PTSD (post-traumatic stress disorder)    • PVD (peripheral vascular  disease) (HCC)    • Requires supplemental oxygen     02 AT 2LPM THROUGH CPAP MACHINE AT NIGHT   • Rheumatoid arthritis (HCC)    • RLS (restless legs syndrome)    • Seasonal allergies    • Sleep apnea     uses CPAP   • Stroke (HCC)     prior to diallo carotid endarterectomy, no residual issues AROUND EARLY 2000       Past Surgical History:   Procedure Laterality Date   • APPENDECTOMY N/A 2005   • CAROTID ENDARTERECTOMY Bilateral 2007   • COLONOSCOPY N/A 1995    done in [facility]   • CORONARY ARTERY BYPASS GRAFT  1997    4 vessel, no  c/o cp or soa, follows w/ D. Sloane JHAVERI   • HIATAL HERNIA REPAIR N/A    • REPLACEMENT TOTAL KNEE Left 09/08/2015   • ROTATOR CUFF REPAIR Right    • SKIN CANCER EXCISION      back   • THORACOSCOPY Left 6/2/2016    Procedure: LEFT VIDEO ASSISTED THORACOTOMY W/ LEFT UPPER LOBE WEDGE RESECTION X 2; EXPLORATORY BRONCHOSCOPY; PAIN PUMP PLACEMENT X 2;  Surgeon: Larry Hernandez MD;  Location: [facility];  Service:    • TONSILLECTOMY Bilateral    • TOTAL HIP ARTHROPLASTY Right 1997   • TOTAL HIP ARTHROPLASTY Left 11/16/2021    Procedure: LEFT TOTAL HIP ARTHROPLASTY;  Surgeon: Man Francois MD;  Location: [facility] OR;  Service: Orthopedics;  Laterality: Left;         Current Facility-Administered Medications:   •  acetaminophen (TYLENOL) tablet 650 mg, 650 mg, Oral, Q4H PRN, Melchor Nichole MD  •  allopurinol (ZYLOPRIM) tablet 300 mg, 300 mg, Oral, Daily, Melchor Nichole MD, 300 mg at 05/01/22 0833  •  ALPRAZolam (XANAX) tablet 0.25 mg, 0.25 mg, Oral, Q6H PRN, Melchor Nichole MD  •  aluminum-magnesium hydroxide-simethicone (MAALOX MAX) 400-400-40 MG/5ML suspension 15 mL, 15 mL, Oral, Q6H PRN, Melchor Nichole MD  •  aspirin EC tablet 325 mg, 325 mg, Oral, Daily, Melchor Nichole MD, 325 mg at 05/01/22 0832  •  sennosides-docusate (PERICOLACE) 8.6-50 MG per tablet 1 tablet, 1 tablet, Oral, BID, 1 tablet at 05/01/22 0832 **AND** polyethylene glycol (MIRALAX) packet 17 g, 17 g, Oral, Daily PRN **AND**  bisacodyl (DULCOLAX) EC tablet 5 mg, 5 mg, Oral, Daily PRN **AND** bisacodyl (DULCOLAX) suppository 10 mg, 10 mg, Rectal, Daily PRN, Melchor Nichole MD  •  citalopram (CeleXA) tablet 40 mg, 40 mg, Oral, QAM, Melchor Nichole MD, 40 mg at 05/01/22 0623  •  famotidine (PEPCID) tablet 40 mg, 40 mg, Oral, Daily, Melchor Nichole MD, 40 mg at 05/01/22 0833  •  finasteride (PROSCAR) tablet 5 mg, 5 mg, Oral, Daily, Melchor Nichole MD, 5 mg at 05/01/22 0833  •  furosemide (LASIX) injection 80 mg, 80 mg, Intravenous, Q8H, Bijan Pinon MD, 80 mg at 05/01/22 0833  •  gabapentin (NEURONTIN) capsule 100 mg, 100 mg, Oral, Q12H, Melchor Nichole MD, 100 mg at 05/01/22 0833  •  HYDROcodone-acetaminophen (NORCO) 5-325 MG per tablet 1 tablet, 1 tablet, Oral, Q4H PRN, Melchor Nichole MD  •  levalbuterol (XOPENEX) nebulizer solution 0.63 mg, 0.63 mg, Nebulization, 4x Daily, Melchor Nichole MD, 0.63 mg at 05/01/22 1202  •  metoprolol succinate XL (TOPROL-XL) 24 hr tablet 50 mg, 50 mg, Oral, Daily, Melchor Nichole MD, 50 mg at 05/01/22 0833  •  morphine injection 2 mg, 2 mg, Intravenous, Q2H PRN **AND** naloxone (NARCAN) injection 0.4 mg, 0.4 mg, Intravenous, Q5 Min PRN, Melchor Nichole MD  •  multivitamin with minerals 1 tablet, 1 tablet, Oral, Q24H, Bijan Pinon MD, 1 tablet at 04/30/22 1658  •  nitroglycerin (NITROSTAT) SL tablet 0.4 mg, 0.4 mg, Sublingual, Q5 Min PRN, Melchor Nichole MD  •  ondansetron (ZOFRAN) injection 4 mg, 4 mg, Intravenous, Q4H PRN, Melchor Nichole MD  •  polyethylene glycol (GoLYTELY) solution 2,000 mL, 2,000 mL, Oral, Q8H, Lew Mg MD  •  rivaroxaban (XARELTO) tablet 20 mg, 20 mg, Oral, Daily, Melchor Nichole MD, 20 mg at 05/01/22 0833  •  sodium chloride 0.9 % flush 10 mL, 10 mL, Intravenous, PRN, Melchor Nichole MD  •  sodium chloride 0.9 % flush 10 mL, 10 mL, Intravenous, Q12H, Melchor Nichole MD, 10 mL at 05/01/22 0834  •  sodium chloride 0.9 % flush 10 mL, 10 mL, Intravenous, PRN, Melchor Nichole MD  •  tamsulosin  "(FLOMAX) 24 hr capsule 0.4 mg, 0.4 mg, Oral, Daily, Melchor Nichole MD, 0.4 mg at 05/01/22 0832  •  temazepam (RESTORIL) capsule 15 mg, 15 mg, Oral, Nightly PRN, Melchor Nichole MD  •  thiamine (VITAMIN B-1) tablet 100 mg, 100 mg, Oral, Daily, Bijan Pinon MD, 100 mg at 05/01/22 0832     Allergies   Allergen Reactions   • Cephalexin Nausea And Vomiting       Family History   Problem Relation Age of Onset   • Heart disease Mother    • Multiple myeloma Mother    • Heart disease Father    • Cancer Father         Gallbladder Cancer   • Leukemia Brother         CLL   • Other Brother         Black Lung         Social History     Social History Narrative   • Not on file     Social history neck smoking, alcohol use, drug  Immunization:  Immunization History   Administered Date(s) Administered   • COVID-19 (MODERNA) 1st, 2nd, 3rd Dose Only 01/04/2021, 02/08/2021   • COVID-19 (MODERNA) BOOSTER 09/06/2021   • Flu Vaccine Intradermal Quad 18-64YR 10/01/2018   • Fluzone High Dose =>65 Years ([employer] ONLY) 10/18/2019        Objective     Review of Systems 10 system review is negative except was mentioned HPI    Vital Signs:   /62 (BP Location: Right arm, Patient Position: Lying)   Pulse 90   Temp 98.2 °F (36.8 °C) (Oral)   Resp 18   Ht 180.3 cm (71\")   Wt 120 kg (263 lb 14.3 oz)   SpO2 98%   BMI 36.81 kg/m²       Physical Exam  Constitutional:       General: He is awake. He is not in acute distress.     Appearance: Normal appearance. He is well-developed and well-groomed.   HENT:      Head: Normocephalic and atraumatic.      Mouth/Throat:      Mouth: Mucous membranes are moist.      Comments: [place] dental hygiene is good  Eyes:      General: Lids are normal.      Conjunctiva/sclera: Conjunctivae normal.      Pupils: Pupils are equal, round, and reactive to light.   Neck:      Thyroid: No thyroid mass.      Trachea: Trachea normal.   Cardiovascular:      Rate and Rhythm: Normal rate and regular rhythm.      Heart " sounds: Normal heart sounds.   Pulmonary:      Effort: Pulmonary effort is normal.      Breath sounds: Normal breath sounds and air entry.   Abdominal:      General: Abdomen is flat. Bowel sounds are normal. There is no distension.      Palpations: Abdomen is soft. There is no mass.      Tenderness: There is no abdominal tenderness. There is no guarding.   Musculoskeletal:      Cervical back: Neck supple.      Right lower leg: No edema.      Left lower leg: No edema.   Skin:     General: Skin is warm and moist.      Coloration: Skin is not cyanotic, jaundiced or pale.      Findings: No rash.      Nails: There is no clubbing.   Neurological:      Mental Status: He is alert and oriented to person, place, and time.   Psychiatric:         Attention and Perception: Attention normal.         Mood and Affect: Mood and affect normal.         Speech: Speech normal.         Labs:  Results from last 7 days   Lab Units 05/01/22  1144 05/01/22  0523 04/30/22  2231 04/30/22  0611 04/29/22  1706   WBC 10*3/mm3  --   --   --  5.25 4.39   HEMOGLOBIN g/dL 8.1* 8.1* 8.2* 8.8* 8.8*   HEMATOCRIT % 25.0* 25.4* 27.9* 27.8* 28.3*   PLATELETS 10*3/mm3  --   --   --  118* 101*      Results from last 7 days   Lab Units 05/01/22  0523 04/30/22  0611 04/29/22  1706   SODIUM mmol/L 137 137 137   POTASSIUM mmol/L 5.2 6.0* 5.7*   CHLORIDE mmol/L 103 104 106   CO2 mmol/L 23.5 23.3 20.2*   BUN mg/dL 40* 39* 42*   CREATININE mg/dL 2.17* 1.94* 2.13*   CALCIUM mg/dL 9.1 9.4 9.3   BILIRUBIN mg/dL  --  0.6 0.6   ALK PHOS U/L  --  66 76   ALT (SGPT) U/L  --  13 14   AST (SGOT) U/L  --  16 18   GLUCOSE mg/dL 90 91 96      Results from last 7 days   Lab Units 04/29/22  1706   INR  1.21*        Assessment & Plan:  Active Problems:    Multiple lung nodules on CT    Macrocytic anemia    CKD (chronic kidney disease) stage 3, GFR 30-59 ml/min ([facility])    Anasarca    Acute on chronic diastolic CHF (congestive heart failure) (HCC)    GI bleed     Possible colonoscopy  in the morning.  Risk and benefits discussed with patient and wife  Patient was given instructions and counseling regarding his condition or for health maintenance advice. Please see specific information pulled into the AVS if appropriate.        Signed:  Marlene Mg MD  05/01/22  14:53 EDT

## 2022-05-01 NOTE — PROGRESS NOTES
"   LOS: 2 days   Patient Care Team:  Claude Hair MD as PCP - General (Internal Medicine)  Rafael Heredia MD as Consulting Physician (Hematology and Oncology)  Galina Reed MD (Internal Medicine)  Cathleen Morocho MD as Consulting Physician (Hematology and Oncology)  Shagufta Peña MD as Surgeon (Thoracic Surgery)    Chief Complaint:  Renal issues    Subjective     Interval History:     Patient Complaints: Chart reviewed.    Bloody diarrhea last night  Good diuresis > 1 Liter  He says he's lost 8 pounds, has about \"10 to go\"  SOA better  Swelling better    allopurinol, 300 mg, Oral, Daily  aspirin EC, 325 mg, Oral, Daily  citalopram, 40 mg, Oral, QAM  famotidine, 40 mg, Oral, Daily  finasteride, 5 mg, Oral, Daily  gabapentin, 100 mg, Oral, Q12H  levalbuterol, 0.63 mg, Nebulization, 4x Daily  metoprolol succinate XL, 50 mg, Oral, Daily  multivitamin with minerals, 1 tablet, Oral, Q24H  polyethylene glycol, 2,000 mL, Oral, Q8H  rivaroxaban, 20 mg, Oral, Daily  senna-docusate sodium, 1 tablet, Oral, BID  sodium chloride, 10 mL, Intravenous, Q12H  tamsulosin, 0.4 mg, Oral, Daily  thiamine, 100 mg, Oral, Daily           Review of Systems:   General : No pain, no chills  HEENT: No headache, no nosebleed, no sore throat, no blurry vision  Chest : no chest pain, no palpitations  Respiratory: No cough, no SOA, no hemoptysis  GI:  No N/V, no abdominal pain  Skin : no rashes, no pruritus  Musculoskeletal : no flank pain, no joint effusions  Neuro : No weakness, no dizziness, no focal deficits  Endocrine: no heat/cold intolerance  Genitourinary: no dysuria or hematuria    Objective     Vital Signs  Temp:  [97.7 °F (36.5 °C)-98.2 °F (36.8 °C)] 98.2 °F (36.8 °C)  Heart Rate:  [66-90] 90  Resp:  [18] 18  BP: ()/(53-71) 132/62    Intake/Output Summary (Last 24 hours) at 5/1/2022 1505  Last data filed at 5/1/2022 1300  Gross per 24 hour   Intake 840 ml   Output 2550 ml   Net -1710 ml           Physical " Exam:     General Appearance:    Alert,  in no acute distress   Head:    Normocephalic, without obvious abnormality, atraumatic   Throat:    no thrush, oral mucosa moist   Neck:   No adenopathy, supple   Musc:     No CVA tenderness to palpation   Lungs:     respirations unlabored, no wheezes or rhonchi    Heart:    Regular rate and rhythm , normal S1 and S2, no            murmur, no gallop, no rub   Abdomen:     Normal bowel sounds, no masses, soft non-tender, obese   Extremities:    Trace  lower extremity edema, no cyanosis   :     No hematuria    Skin:   Dry, No rash                  Results Review:    Results from last 7 days   Lab Units 05/01/22  0523 04/30/22  0611 04/29/22  1706   SODIUM mmol/L 137 137 137   POTASSIUM mmol/L 5.2 6.0* 5.7*   CHLORIDE mmol/L 103 104 106   CO2 mmol/L 23.5 23.3 20.2*   BUN mg/dL 40* 39* 42*   CREATININE mg/dL 2.17* 1.94* 2.13*   GLUCOSE mg/dL 90 91 96   CALCIUM mg/dL 9.1 9.4 9.3     Results from last 7 days   Lab Units 05/01/22  1144 05/01/22  0523 04/30/22  2231 04/30/22  0611 04/29/22  1706   WBC 10*3/mm3  --   --   --  5.25 4.39   HEMOGLOBIN g/dL 8.1* 8.1* 8.2* 8.8* 8.8*   HEMATOCRIT % 25.0* 25.4* 27.9* 27.8* 28.3*   PLATELETS 10*3/mm3  --   --   --  118* 101*     Magnesium   Date Value Ref Range Status   05/01/2022 1.6 1.6 - 2.4 mg/dL Final     No results found for: PHOS  No results found for: BNP  Lab Results   Component Value Date    ALBUMIN 3.90 04/30/2022      Brief Urine Lab Results  (Last result in the past 365 days)      Color   Clarity   Blood   Leuk Est   Nitrite   Protein   CREAT   Urine HCG        04/30/22 1942             38.5         04/30/22 1942 Yellow   Clear   Negative   Negative   Negative   Negative                  XR Chest 1 View    Result Date: 4/29/2022  PROCEDURE: XR CHEST 1 VW  COMPARISON: [facility], CR, XR CHEST 1 VW, 11/17/2021, 17:11.  [facility], CR, XR CHEST 1 VW, 11/19/2021, 7:25.  INDICATIONS: short of breath,  cough  FINDINGS:  Chronic changes are noted.  No new consolidations or pleural effusions are observed. The cardiac silhouette and mediastinum are unchanged. No definitive acute osseous abnormalities are seen on this single view.      Impression:   1. Chronic changes are noted.  There is no evidence for acute cardiopulmonary process.         NAV LANDON MD       Electronically Signed and Approved By: NAV LANDON MD on 4/29/2022 at 18:56                    Assessment/Plan       Multiple lung nodules on CT    Macrocytic anemia    CKD (chronic kidney disease) stage 3, GFR 30-59 ml/min ([facility])    Anasarca    Acute on chronic diastolic CHF (congestive heart failure) ([facility])    GI bleed      1. CKD3/4-  Near baseline 1.6 -1.8 or so.   ? Amyloid involvement with kidneys with near normal albumin      -  UA bland and protein ratio only 150 mg, doubt amyloid kidneys      - seen here in Nov when he had SOURAV from ? [facility].  U/S then showed some CKD changes      -- stable with diuresis     2. Hyperkalemia- held ARB.  Redosed Lokelma and diuresed-better     3. CHF- DD grade 2 on 11/21 echo with MR-  Effusions on CT scan--  lasix 80 mg IV q 8     4. HTN- BP looks too low for HCTZ and home ARB.  Will need to watch     5. Anemia- +BRBPR- scope planned tomorrow     6. [facility]yloidosis- [facility]     7. Alcoholism- he and wife adamantly deny more than 2 drinks a day.  Will change vitamins to po     8. H/O afib     9. COPD/JOSE     10. Obesity - complicates all care     Plan:  GI consulted for scope  Will have to hold lasix during colon prep       Please call [phone] for any questions.

## 2022-05-01 NOTE — CONSULTS
Chief Complaint  Weight Gain, Weakness - Generalized, and Urinary Retention    History of Present Illness  Rafael Gomez is a 76 y.o. male with history of aortic aneurysm, amyloidosis, osteoarthritis, anxiety, COPD, coronary artery disease, high blood pressure who presents to [facility]TH FLOOR MEDICAL [facility]LE[facility]RY UNIT on referral from Claude Hair MD for a gastroenterology evaluation of GI bleeding.  Patient was admitted to the hospital with anasarca but since he has been here he has been having rectal bleeding.  According to the patient's wife each time he had a bowel movement there is about 1/4 cup of fresh blood in the stool.  Patient denies any abdominal pain.  There is no history of nausea vomiting.  There is no history of throwing up blood.  Patient has remote history of ulcerative colitis but he said that he had no symptoms of ulcerative colitis for more than 10 years and has not had a scope for more than 10 years.  Currently he does not take any medications for ulcerative colitis        Labs Result Review Imaging    Past Medical History:   Diagnosis Date   • AA (aortic aneurysm) (HCC)     normal diameter noted on scan 03/18/21   • AL amyloidosis ([facility]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   • Anemia     no current issues   • Anxiety    • Arthritis     OA OF LEFT HIP   • Colitis     REPORTS CURRENTLY INCONTINENT OF BOWEL AND HAVING DIARRHEA   • COPD (chronic obstructive pulmonary disease) ([facility])     inhalers, neb tx   • Coronary artery disease    • Depression    • ED (erectile dysfunction)    • GERD (gastroesophageal reflux disease)    • Gout    • History of PAT (paroxysmal atrial tachycardia)    • Hyperlipidemia    • Hypertension    • Leukopenia     no current  issues   • Macrocytic anemia    • Multiple renal cysts     no current issues   • Myocardial infarction (HCC)    • Neuropathy     HANDS AND FEET   • PTSD (post-traumatic stress disorder)    • PVD (peripheral vascular  disease) (HCC)    • Requires supplemental oxygen     02 AT 2LPM THROUGH CPAP MACHINE AT NIGHT   • Rheumatoid arthritis (HCC)    • RLS (restless legs syndrome)    • Seasonal allergies    • Sleep apnea     uses CPAP   • Stroke (HCC)     prior to diallo carotid endarterectomy, no residual issues AROUND EARLY 2000       Past Surgical History:   Procedure Laterality Date   • APPENDECTOMY N/A 2005   • CAROTID ENDARTERECTOMY Bilateral 2007   • COLONOSCOPY N/A 1995    done in [facility]   • CORONARY ARTERY BYPASS GRAFT  1997    4 vessel, no  c/o cp or soa, follows w/ D. Sloane JHAVERI   • HIATAL HERNIA REPAIR N/A    • REPLACEMENT TOTAL KNEE Left 09/08/2015   • ROTATOR CUFF REPAIR Right    • SKIN CANCER EXCISION      back   • THORACOSCOPY Left 6/2/2016    Procedure: LEFT VIDEO ASSISTED THORACOTOMY W/ LEFT UPPER LOBE WEDGE RESECTION X 2; EXPLORATORY BRONCHOSCOPY; PAIN PUMP PLACEMENT X 2;  Surgeon: Larry Hernandez MD;  Location: [facility];  Service:    • TONSILLECTOMY Bilateral    • TOTAL HIP ARTHROPLASTY Right 1997   • TOTAL HIP ARTHROPLASTY Left 11/16/2021    Procedure: LEFT TOTAL HIP ARTHROPLASTY;  Surgeon: Man Francois MD;  Location: [facility] OR;  Service: Orthopedics;  Laterality: Left;         Current Facility-Administered Medications:   •  acetaminophen (TYLENOL) tablet 650 mg, 650 mg, Oral, Q4H PRN, Melchor Nichole MD  •  allopurinol (ZYLOPRIM) tablet 300 mg, 300 mg, Oral, Daily, Melchor Nichole MD, 300 mg at 05/01/22 0833  •  ALPRAZolam (XANAX) tablet 0.25 mg, 0.25 mg, Oral, Q6H PRN, Melchor Nichole MD  •  aluminum-magnesium hydroxide-simethicone (MAALOX MAX) 400-400-40 MG/5ML suspension 15 mL, 15 mL, Oral, Q6H PRN, Melchor Nichole MD  •  aspirin EC tablet 325 mg, 325 mg, Oral, Daily, Melchor Nichole MD, 325 mg at 05/01/22 0832  •  sennosides-docusate (PERICOLACE) 8.6-50 MG per tablet 1 tablet, 1 tablet, Oral, BID, 1 tablet at 05/01/22 0832 **AND** polyethylene glycol (MIRALAX) packet 17 g, 17 g, Oral, Daily PRN **AND**  bisacodyl (DULCOLAX) EC tablet 5 mg, 5 mg, Oral, Daily PRN **AND** bisacodyl (DULCOLAX) suppository 10 mg, 10 mg, Rectal, Daily PRN, Melchor Nichole MD  •  citalopram (CeleXA) tablet 40 mg, 40 mg, Oral, QAM, Melchor Nichole MD, 40 mg at 05/01/22 0623  •  famotidine (PEPCID) tablet 40 mg, 40 mg, Oral, Daily, Melchor Nichole MD, 40 mg at 05/01/22 0833  •  finasteride (PROSCAR) tablet 5 mg, 5 mg, Oral, Daily, Melchor Nichole MD, 5 mg at 05/01/22 0833  •  furosemide (LASIX) injection 80 mg, 80 mg, Intravenous, Q8H, Bijan Pinon MD, 80 mg at 05/01/22 0833  •  gabapentin (NEURONTIN) capsule 100 mg, 100 mg, Oral, Q12H, Melchor Nichole MD, 100 mg at 05/01/22 0833  •  HYDROcodone-acetaminophen (NORCO) 5-325 MG per tablet 1 tablet, 1 tablet, Oral, Q4H PRN, Melchor Nichole MD  •  levalbuterol (XOPENEX) nebulizer solution 0.63 mg, 0.63 mg, Nebulization, 4x Daily, Melchor Nichole MD, 0.63 mg at 05/01/22 1202  •  metoprolol succinate XL (TOPROL-XL) 24 hr tablet 50 mg, 50 mg, Oral, Daily, Melchor Nichole MD, 50 mg at 05/01/22 0833  •  morphine injection 2 mg, 2 mg, Intravenous, Q2H PRN **AND** naloxone (NARCAN) injection 0.4 mg, 0.4 mg, Intravenous, Q5 Min PRN, Melchor Nichole MD  •  multivitamin with minerals 1 tablet, 1 tablet, Oral, Q24H, Bijan Pinon MD, 1 tablet at 04/30/22 1658  •  nitroglycerin (NITROSTAT) SL tablet 0.4 mg, 0.4 mg, Sublingual, Q5 Min PRN, Melchor Nichole MD  •  ondansetron (ZOFRAN) injection 4 mg, 4 mg, Intravenous, Q4H PRN, Melchor Nichole MD  •  polyethylene glycol (GoLYTELY) solution 2,000 mL, 2,000 mL, Oral, Q8H, Lew Mg MD  •  rivaroxaban (XARELTO) tablet 20 mg, 20 mg, Oral, Daily, Melchor Nichole MD, 20 mg at 05/01/22 0833  •  sodium chloride 0.9 % flush 10 mL, 10 mL, Intravenous, PRN, Melchor Nichole MD  •  sodium chloride 0.9 % flush 10 mL, 10 mL, Intravenous, Q12H, Melchor Nichole MD, 10 mL at 05/01/22 0834  •  sodium chloride 0.9 % flush 10 mL, 10 mL, Intravenous, PRN, Melchor Nichole MD  •  tamsulosin  "(FLOMAX) 24 hr capsule 0.4 mg, 0.4 mg, Oral, Daily, Melchor Nichole MD, 0.4 mg at 05/01/22 0832  •  temazepam (RESTORIL) capsule 15 mg, 15 mg, Oral, Nightly PRN, Melchor Nichole MD  •  thiamine (VITAMIN B-1) tablet 100 mg, 100 mg, Oral, Daily, Bijan Pinon MD, 100 mg at 05/01/22 0832     Allergies   Allergen Reactions   • Cephalexin Nausea And Vomiting       Family History   Problem Relation Age of Onset   • Heart disease Mother    • Multiple myeloma Mother    • Heart disease Father    • Cancer Father         Gallbladder Cancer   • Leukemia Brother         CLL   • Other Brother         Black Lung         Social History     Social History Narrative   • Not on file     Social history neck smoking, alcohol use, drug  Immunization:  Immunization History   Administered Date(s) Administered   • COVID-19 (MODERNA) 1st, 2nd, 3rd Dose Only 01/04/2021, 02/08/2021   • COVID-19 (MODERNA) BOOSTER 09/06/2021   • Flu Vaccine Intradermal Quad 18-64YR 10/01/2018   • Fluzone High Dose =>65 Years ([employer] ONLY) 10/18/2019        Objective     Review of Systems 10 system review is negative except was mentioned HPI    Vital Signs:   /62 (BP Location: Right arm, Patient Position: Lying)   Pulse 90   Temp 98.2 °F (36.8 °C) (Oral)   Resp 18   Ht 180.3 cm (71\")   Wt 120 kg (263 lb 14.3 oz)   SpO2 98%   BMI 36.81 kg/m²       Physical Exam  Constitutional:       General: He is awake. He is not in acute distress.     Appearance: Normal appearance. He is well-developed and well-groomed.   HENT:      Head: Normocephalic and atraumatic.      Mouth/Throat:      Mouth: Mucous membranes are moist.      Comments: Cordell dental hygiene is good  Eyes:      General: Lids are normal.      Conjunctiva/sclera: Conjunctivae normal.      Pupils: Pupils are equal, round, and reactive to light.   Neck:      Thyroid: No thyroid mass.      Trachea: Trachea normal.   Cardiovascular:      Rate and Rhythm: Normal rate and regular rhythm.      Heart " sounds: Normal heart sounds.   Pulmonary:      Effort: Pulmonary effort is normal.      Breath sounds: Normal breath sounds and air entry.   Abdominal:      General: Abdomen is flat. Bowel sounds are normal. There is no distension.      Palpations: Abdomen is soft. There is no mass.      Tenderness: There is no abdominal tenderness. There is no guarding.   Musculoskeletal:      Cervical back: Neck supple.      Right lower leg: No edema.      Left lower leg: No edema.   Skin:     General: Skin is warm and moist.      Coloration: Skin is not cyanotic, jaundiced or pale.      Findings: No rash.      Nails: There is no clubbing.   Neurological:      Mental Status: He is alert and oriented to person, place, and time.   Psychiatric:         Attention and Perception: Attention normal.         Mood and Affect: Mood and affect normal.         Speech: Speech normal.         Labs:  Results from last 7 days   Lab Units 05/01/22  1144 05/01/22  0523 04/30/22  2231 04/30/22  0611 04/29/22  1706   WBC 10*3/mm3  --   --   --  5.25 4.39   HEMOGLOBIN g/dL 8.1* 8.1* 8.2* 8.8* 8.8*   HEMATOCRIT % 25.0* 25.4* 27.9* 27.8* 28.3*   PLATELETS 10*3/mm3  --   --   --  118* 101*      Results from last 7 days   Lab Units 05/01/22  0523 04/30/22  0611 04/29/22  1706   SODIUM mmol/L 137 137 137   POTASSIUM mmol/L 5.2 6.0* 5.7*   CHLORIDE mmol/L 103 104 106   CO2 mmol/L 23.5 23.3 20.2*   BUN mg/dL 40* 39* 42*   CREATININE mg/dL 2.17* 1.94* 2.13*   CALCIUM mg/dL 9.1 9.4 9.3   BILIRUBIN mg/dL  --  0.6 0.6   ALK PHOS U/L  --  66 76   ALT (SGPT) U/L  --  13 14   AST (SGOT) U/L  --  16 18   GLUCOSE mg/dL 90 91 96      Results from last 7 days   Lab Units 04/29/22  1706   INR  1.21*        Assessment & Plan:  Active Problems:    Multiple lung nodules on CT    Macrocytic anemia    CKD (chronic kidney disease) stage 3, GFR 30-59 ml/min ([facility])    Anasarca    Acute on chronic diastolic CHF (congestive heart failure) (HCC)    GI bleed     Possible colonoscopy  in the morning.  Risk and benefits discussed with patient and wife  Patient was given instructions and counseling regarding his condition or for health maintenance advice. Please see specific information pulled into the AVS if appropriate.        Signed:  Marlene Mg MD  05/01/22  14:53 EDT

## 2022-05-02 ENCOUNTER — ANESTHESIA EVENT (OUTPATIENT)
Dept: GASTROENTEROLOGY | Facility: HOSPITAL | Age: 77
End: 2022-05-02

## 2022-05-02 ENCOUNTER — TELEPHONE (OUTPATIENT)
Dept: SURGERY | Facility: CLINIC | Age: 77
End: 2022-05-02

## 2022-05-02 ENCOUNTER — ANESTHESIA (OUTPATIENT)
Dept: GASTROENTEROLOGY | Facility: HOSPITAL | Age: 77
End: 2022-05-02

## 2022-05-02 PROBLEM — K62.5 RECTAL BLEEDING: Status: ACTIVE | Noted: 2022-04-29

## 2022-05-02 LAB
ALBUMIN SERPL-MCNC: 3.9 G/DL (ref 3.5–5.2)
ALBUMIN/GLOB SERPL: 1.4 G/DL
ALP SERPL-CCNC: 68 U/L (ref 39–117)
ALT SERPL W P-5'-P-CCNC: 12 U/L (ref 1–41)
ANION GAP SERPL CALCULATED.3IONS-SCNC: 10.4 MMOL/L (ref 5–15)
AST SERPL-CCNC: 19 U/L (ref 1–40)
BASOPHILS # BLD AUTO: 0.02 10*3/MM3 (ref 0–0.2)
BASOPHILS NFR BLD AUTO: 0.5 % (ref 0–1.5)
BILIRUB SERPL-MCNC: 0.7 MG/DL (ref 0–1.2)
BUN SERPL-MCNC: 39 MG/DL (ref 8–23)
BUN/CREAT SERPL: 19.3 (ref 7–25)
CALCIUM SPEC-SCNC: 9.6 MG/DL (ref 8.6–10.5)
CHLORIDE SERPL-SCNC: 99 MMOL/L (ref 98–107)
CO2 SERPL-SCNC: 25.6 MMOL/L (ref 22–29)
CREAT SERPL-MCNC: 2.02 MG/DL (ref 0.76–1.27)
DEPRECATED RDW RBC AUTO: 77.6 FL (ref 37–54)
EGFRCR SERPLBLD CKD-EPI 2021: 33.8 ML/MIN/1.73
EOSINOPHIL # BLD AUTO: 0.13 10*3/MM3 (ref 0–0.4)
EOSINOPHIL NFR BLD AUTO: 3.3 % (ref 0.3–6.2)
ERYTHROCYTE [DISTWIDTH] IN BLOOD BY AUTOMATED COUNT: 20.2 % (ref 12.3–15.4)
GLOBULIN UR ELPH-MCNC: 2.7 GM/DL
GLUCOSE SERPL-MCNC: 93 MG/DL (ref 65–99)
HCT VFR BLD AUTO: 25.5 % (ref 37.5–51)
HCT VFR BLD AUTO: 25.9 % (ref 37.5–51)
HCT VFR BLD AUTO: 26.7 % (ref 37.5–51)
HGB BLD-MCNC: 8.2 G/DL (ref 13–17.7)
HGB BLD-MCNC: 8.2 G/DL (ref 13–17.7)
HGB BLD-MCNC: 8.5 G/DL (ref 13–17.7)
IMM GRANULOCYTES # BLD AUTO: 0.03 10*3/MM3 (ref 0–0.05)
IMM GRANULOCYTES NFR BLD AUTO: 0.8 % (ref 0–0.5)
LYMPHOCYTES # BLD AUTO: 0.87 10*3/MM3 (ref 0.7–3.1)
LYMPHOCYTES NFR BLD AUTO: 22.4 % (ref 19.6–45.3)
MCH RBC QN AUTO: 33.6 PG (ref 26.6–33)
MCHC RBC AUTO-ENTMCNC: 31.8 G/DL (ref 31.5–35.7)
MCV RBC AUTO: 105.5 FL (ref 79–97)
MONOCYTES # BLD AUTO: 0.35 10*3/MM3 (ref 0.1–0.9)
MONOCYTES NFR BLD AUTO: 9 % (ref 5–12)
NEUTROPHILS NFR BLD AUTO: 2.49 10*3/MM3 (ref 1.7–7)
NEUTROPHILS NFR BLD AUTO: 64 % (ref 42.7–76)
NRBC BLD AUTO-RTO: 0 /100 WBC (ref 0–0.2)
PLATELET # BLD AUTO: 133 10*3/MM3 (ref 140–450)
PMV BLD AUTO: 11.7 FL (ref 6–12)
POTASSIUM SERPL-SCNC: 5.5 MMOL/L (ref 3.5–5.2)
PROT SERPL-MCNC: 6.6 G/DL (ref 6–8.5)
RBC # BLD AUTO: 2.53 10*6/MM3 (ref 4.14–5.8)
SODIUM SERPL-SCNC: 135 MMOL/L (ref 136–145)
WBC NRBC COR # BLD: 3.89 10*3/MM3 (ref 3.4–10.8)

## 2022-05-02 PROCEDURE — 85025 COMPLETE CBC W/AUTO DIFF WBC: CPT | Performed by: INTERNAL MEDICINE

## 2022-05-02 PROCEDURE — 82746 ASSAY OF FOLIC ACID SERUM: CPT | Performed by: INTERNAL MEDICINE

## 2022-05-02 PROCEDURE — 45382 COLONOSCOPY W/CONTROL BLEED: CPT | Performed by: INTERNAL MEDICINE

## 2022-05-02 PROCEDURE — 82607 VITAMIN B-12: CPT | Performed by: INTERNAL MEDICINE

## 2022-05-02 PROCEDURE — 97165 OT EVAL LOW COMPLEX 30 MIN: CPT

## 2022-05-02 PROCEDURE — 84100 ASSAY OF PHOSPHORUS: CPT | Performed by: INTERNAL MEDICINE

## 2022-05-02 PROCEDURE — 63710000001 LEVALBUTEROL PER 0.5 MG: Performed by: INTERNAL MEDICINE

## 2022-05-02 PROCEDURE — 25010000002 PROPOFOL 10 MG/ML EMULSION: Performed by: NURSE ANESTHETIST, CERTIFIED REGISTERED

## 2022-05-02 PROCEDURE — 0DJD8ZZ INSPECTION OF LOWER INTESTINAL TRACT, VIA NATURAL OR ARTIFICIAL OPENING ENDOSCOPIC: ICD-10-PCS | Performed by: INTERNAL MEDICINE

## 2022-05-02 PROCEDURE — 94799 UNLISTED PULMONARY SVC/PX: CPT

## 2022-05-02 PROCEDURE — 80053 COMPREHEN METABOLIC PANEL: CPT | Performed by: INTERNAL MEDICINE

## 2022-05-02 PROCEDURE — 85018 HEMOGLOBIN: CPT | Performed by: INTERNAL MEDICINE

## 2022-05-02 PROCEDURE — 94760 N-INVAS EAR/PLS OXIMETRY 1: CPT

## 2022-05-02 PROCEDURE — 85027 COMPLETE CBC AUTOMATED: CPT | Performed by: INTERNAL MEDICINE

## 2022-05-02 PROCEDURE — 85014 HEMATOCRIT: CPT | Performed by: INTERNAL MEDICINE

## 2022-05-02 RX ORDER — SODIUM CHLORIDE, SODIUM LACTATE, POTASSIUM CHLORIDE, CALCIUM CHLORIDE 600; 310; 30; 20 MG/100ML; MG/100ML; MG/100ML; MG/100ML
30 INJECTION, SOLUTION INTRAVENOUS CONTINUOUS
Status: DISCONTINUED | OUTPATIENT
Start: 2022-05-02 | End: 2022-05-02

## 2022-05-02 RX ORDER — PHENYLEPHRINE HCL IN 0.9% NACL 1 MG/10 ML
SYRINGE (ML) INTRAVENOUS AS NEEDED
Status: DISCONTINUED | OUTPATIENT
Start: 2022-05-02 | End: 2022-05-02 | Stop reason: SURG

## 2022-05-02 RX ORDER — LIDOCAINE HYDROCHLORIDE 20 MG/ML
INJECTION, SOLUTION EPIDURAL; INFILTRATION; INTRACAUDAL; PERINEURAL AS NEEDED
Status: DISCONTINUED | OUTPATIENT
Start: 2022-05-02 | End: 2022-05-02 | Stop reason: SURG

## 2022-05-02 RX ORDER — FUROSEMIDE 40 MG/1
40 TABLET ORAL DAILY
Status: DISCONTINUED | OUTPATIENT
Start: 2022-05-02 | End: 2022-05-03 | Stop reason: HOSPADM

## 2022-05-02 RX ORDER — PROPOFOL 10 MG/ML
VIAL (ML) INTRAVENOUS AS NEEDED
Status: DISCONTINUED | OUTPATIENT
Start: 2022-05-02 | End: 2022-05-02 | Stop reason: SURG

## 2022-05-02 RX ADMIN — FUROSEMIDE 40 MG: 40 TABLET ORAL at 18:05

## 2022-05-02 RX ADMIN — GABAPENTIN 100 MG: 100 CAPSULE ORAL at 20:13

## 2022-05-02 RX ADMIN — PROPOFOL 80 MG: 10 INJECTION, EMULSION INTRAVENOUS at 10:58

## 2022-05-02 RX ADMIN — Medication 100 MCG: at 11:19

## 2022-05-02 RX ADMIN — PROPOFOL 200 MCG/KG/MIN: 10 INJECTION, EMULSION INTRAVENOUS at 10:58

## 2022-05-02 RX ADMIN — CITALOPRAM HYDROBROMIDE 40 MG: 20 TABLET ORAL at 06:13

## 2022-05-02 RX ADMIN — Medication 100 MG: at 12:45

## 2022-05-02 RX ADMIN — FAMOTIDINE 40 MG: 20 TABLET ORAL at 12:45

## 2022-05-02 RX ADMIN — FINASTERIDE 5 MG: 5 TABLET, FILM COATED ORAL at 12:45

## 2022-05-02 RX ADMIN — Medication 100 MCG: at 11:13

## 2022-05-02 RX ADMIN — SODIUM ZIRCONIUM CYCLOSILICATE 10 G: 10 POWDER, FOR SUSPENSION ORAL at 18:05

## 2022-05-02 RX ADMIN — LIDOCAINE HYDROCHLORIDE 100 MG: 20 INJECTION, SOLUTION EPIDURAL; INFILTRATION; INTRACAUDAL; PERINEURAL at 10:58

## 2022-05-02 RX ADMIN — METOPROLOL SUCCINATE 50 MG: 50 TABLET, EXTENDED RELEASE ORAL at 12:45

## 2022-05-02 RX ADMIN — LEVALBUTEROL HYDROCHLORIDE 0.63 MG: 0.63 SOLUTION RESPIRATORY (INHALATION) at 00:24

## 2022-05-02 RX ADMIN — SENNOSIDES AND DOCUSATE SODIUM 1 TABLET: 50; 8.6 TABLET ORAL at 12:45

## 2022-05-02 RX ADMIN — ALLOPURINOL 300 MG: 300 TABLET ORAL at 12:45

## 2022-05-02 RX ADMIN — SODIUM CHLORIDE, POTASSIUM CHLORIDE, SODIUM LACTATE AND CALCIUM CHLORIDE: 600; 310; 30; 20 INJECTION, SOLUTION INTRAVENOUS at 10:56

## 2022-05-02 RX ADMIN — LEVALBUTEROL HYDROCHLORIDE 0.63 MG: 0.63 SOLUTION RESPIRATORY (INHALATION) at 19:14

## 2022-05-02 RX ADMIN — GABAPENTIN 100 MG: 100 CAPSULE ORAL at 12:45

## 2022-05-02 RX ADMIN — LEVALBUTEROL HYDROCHLORIDE 0.63 MG: 0.63 SOLUTION RESPIRATORY (INHALATION) at 06:52

## 2022-05-02 RX ADMIN — Medication 10 ML: at 12:46

## 2022-05-02 RX ADMIN — Medication 10 ML: at 20:14

## 2022-05-02 RX ADMIN — Medication 10 ML: at 12:47

## 2022-05-02 RX ADMIN — TAMSULOSIN HYDROCHLORIDE 0.4 MG: 0.4 CAPSULE ORAL at 12:45

## 2022-05-02 RX ADMIN — POLYETHYLENE GLYCOL 3350, SODIUM SULFATE ANHYDROUS, SODIUM BICARBONATE, SODIUM CHLORIDE, POTASSIUM CHLORIDE 2000 ML: 236; 22.74; 6.74; 5.86; 2.97 POWDER, FOR SOLUTION ORAL at 06:14

## 2022-05-02 RX ADMIN — MULTIPLE VITAMINS W/ MINERALS TAB 1 TABLET: TAB at 18:05

## 2022-05-02 NOTE — PROGRESS NOTES
[facility]     Nephrology Progress Note      Patient Name: Rafael Gomez  : 1945  MRN: [id number]  Primary Care Physician:  Claude Hair MD  Date of admission: 2022    Subjective   Subjective     Interval History:  Patient Reports Feeling okay.  Just back in his room after colonoscopy.  No distress.  Some increased edema.    Review of Systems   All systems were reviewed and negative except for: What is mentioned above.    Objective   Objective     Vitals:   Temp:  [97.3 °F (36.3 °C)-98.2 °F (36.8 °C)] 97.9 °F (36.6 °C)  Heart Rate:  [69-90] 86  Resp:  [17-31] 20  BP: ()/(42-85) 125/56  Flow (L/min):  [2-3] 2  Physical Exam:   Constitutional: Awake, alert   Eyes: sclerae anicteric, no conjunctival injection   HENT: mucous membranes moist   Neck: Supple, no thyromegaly, no lymphadenopathy, trachea midline, No JVD   Respiratory: Clear to auscultation bilaterally, nonlabored respirations    Cardiovascular: RRR, no murmurs, rubs, or gallops.   Gastrointestinal: Positive bowel sounds, soft, nontender, nondistended, obese.   Musculoskeletal: 1+ edema, no clubbing or cyanosis   Psychiatric: Appropriate affect, cooperative   Neurologic: Oriented x 3, moving all extremities, Cranial Nerves grossly intact, speech clear   Skin: warm and dry, no rashes     Result Review    Result Reviewed:  I have personally reviewed the results from the time of this admission to 2022 16:30 EDT and agree with these findings:  [x]  Laboratory  []  Microbiology  [x]  Radiology  []  EKG/Telemetry   []  Cardiology/Vascular   []  Pathology  []  Old records  []  Other:  Lab Results   Component Value Date    GLUCOSE 93 2022    CALCIUM 9.6 2022     (L) 2022    K 5.5 (H) 2022    CO2 25.6 2022    CL 99 2022    BUN 39 (H) 2022    CREATININE 2.02 (H) 2022    EGFRIFAFRI  2021      Comment:      <15 Indicative of kidney failure.    EGFRIFNONA 45 (L) 2021     BCR 19.3 05/02/2022    ANIONGAP 10.4 05/02/2022     Lab Results   Component Value Date    CALCIUM 9.6 05/02/2022    PHOS 3.6 11/28/2021      Results from last 7 days   Lab Units 05/01/22  0523   MAGNESIUM mg/dL 1.6          Most notable findings include: Potassium 5.5, creatinine 2.02.    Assessment/Plan   Assessment / Plan       Active Hospital Problems:  Active Hospital Problems    Diagnosis    • GI bleed    • Acute on chronic diastolic CHF (congestive heart failure) ([facility])    • Anasarca    • Rectal bleeding      Added automatically from request for surgery [id number]     • CKD (chronic kidney disease) stage 3, GFR 30-59 ml/min ([facility])    • Acute blood loss anemia    • Macrocytic anemia    • Multiple lung nodules on CT        Assessment and Plan:    - CKD stage III or stage IV with bland UA and mild proteinuria possibly secondary to amyloidosis though other possibilities exist including previous vancomycin toxicity (previous admission).  Creatinine baseline around 1.6-1.8.  Volume status is generous.  Resume oral Lasix.  - Hyperkalemia, persistent, off ARB.  Will give Lokelma 10 g today.  Resume Lasix as above.  - CHF, diastolic dysfunction grade 2 from echo in November 2021.  - Hypertension, blood pressure is acceptable now. Had transient hypotension.  - Anemia with positive blood in the stool.  Status post colonoscopy.  - Macrocytosis, will check B12 and folic acid level with a.m. labs.  - Amyloidosis- [facility]  - Alcoholism.  - History of A. fib.  - Obstructive sleep apnea, obesity and COPD.  Discussed with wife.    Electronically signed by Neva Reddy MD, 05/02/22, 4:30 PM EDT.

## 2022-05-02 NOTE — THERAPY EVALUATION
Patient Name: Rafael Gomez  : 1945    MRN: [id number]                              Today's Date: 2022       Admit Date: 2022    Visit Dx:     ICD-10-CM ICD-9-CM   1. Anasarca  R60.1 782.3   2. Acute on chronic renal insufficiency  N28.9 593.9    N18.9 585.9   3. Hyperkalemia  E87.5 276.7   4. Chronic anemia  D64.9 285.9   5. Chronic obstructive pulmonary disease, unspecified COPD type ([facility])  J44.9 496   6. Acute blood loss anemia  D62 285.1   7. Rectal bleeding  K62.5 569.3   8. Decreased activities of daily living (ADL)  Z78.9 V49.89     Patient Active Problem List   Diagnosis   • Lung nodule, solitary   • Cough   • Wheeze   • Multiple lung nodules on CT   • Follow-up examination, following other surgery   • Renal mass   • Plasmacytoma (HCC)   • Macrocytic anemia   • Leukopenia   • Monoclonal gammopathy   • Thrombocytopenia ([facility])   • AL amyloidosis ([facility])   • Neck pain   • Chronic low back pain   • Cervical spondylosis without myelopathy   • Left hip pain   • Encounter for preadmission testing   • Hypotension   • CKD (chronic kidney disease) stage 3, GFR 30-59 ml/min ([facility])   • Acute blood loss anemia   • Severe sepsis ([facility])   • Ileus ([facility])   • Metabolic encephalopathy   • Anemia due to stage 3 chronic kidney disease ([facility])   • Anasarca   • Acute on chronic diastolic CHF (congestive heart failure) ([facility])   • GI bleed   • Rectal bleeding     Past Medical History:   Diagnosis Date   • AA (aortic aneurysm) ([facility])     normal diameter noted on scan 21   • AL amyloidosis ([facility]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   • Anemia     no current issues   • Anxiety    • Arthritis     OA OF LEFT HIP   • Colitis     REPORTS CURRENTLY INCONTINENT OF BOWEL AND HAVING DIARRHEA   • COPD (chronic obstructive pulmonary disease) ([facility])     inhalers, neb tx   • Coronary artery disease    • Depression    • ED (erectile dysfunction)    • GERD (gastroesophageal reflux disease)    • Gout    • History of PAT  (paroxysmal atrial tachycardia)    • Hyperlipidemia    • Hypertension    • Leukopenia     no current  issues   • Macrocytic anemia    • Multiple renal cysts     no current issues   • Myocardial infarction (HCC)    • Neuropathy     HANDS AND FEET   • PTSD (post-traumatic stress disorder)    • PVD (peripheral vascular disease) (HCC)    • Requires supplemental oxygen     02 AT 2LPM THROUGH CPAP MACHINE AT NIGHT   • Rheumatoid arthritis (HCC)    • RLS (restless legs syndrome)    • Seasonal allergies    • Sleep apnea     uses CPAP   • Stroke (HCC)     prior to diallo carotid endarterectomy, no residual issues AROUND EARLY 2000     Past Surgical History:   Procedure Laterality Date   • APPENDECTOMY N/A 2005   • CAROTID ENDARTERECTOMY Bilateral 2007   • COLONOSCOPY N/A 1995    done in [facility]   • CORONARY ARTERY BYPASS GRAFT  1997    4 vessel, no  c/o cp or soa, follows w/ D. Sloane JHAVERI   • HIATAL HERNIA REPAIR N/A    • REPLACEMENT TOTAL KNEE Left 09/08/2015   • ROTATOR CUFF REPAIR Right    • SKIN CANCER EXCISION      back   • THORACOSCOPY Left 6/2/2016    Procedure: LEFT VIDEO ASSISTED THORACOTOMY W/ LEFT UPPER LOBE WEDGE RESECTION X 2; EXPLORATORY BRONCHOSCOPY; PAIN PUMP PLACEMENT X 2;  Surgeon: Larry Hernandze MD;  Location: [facility] OR;  Service:    • TONSILLECTOMY Bilateral    • TOTAL HIP ARTHROPLASTY Right 1997   • TOTAL HIP ARTHROPLASTY Left 11/16/2021    Procedure: LEFT TOTAL HIP ARTHROPLASTY;  Surgeon: Man Francois MD;  Location: [facility] MA[facility] OR;  Service: Orthopedics;  Laterality: Left;      General Information     Row Name 05/02/22 1314          OT Time and Intention    Document Type evaluation  -AV     Mode of Treatment individual therapy;occupational therapy  -AV     Row Name 05/02/22 1314          General Information    Patient Profile Reviewed yes  -AV     Prior Level of Function independent:;ADL's;all household mobility  walk-in shower/ seat. sits to groom. elevated commode/ brief. ambulates with STC.  2L O2 noc. no IADLs.  -AV     Existing Precautions/Restrictions fall;oxygen therapy device and L/min  -AV     Barriers to Rehab none identified  -AV     Row Name 05/02/22 1314          Occupational Profile    Reason for Services/Referral (Occupational Profile) Patient is a 76 year old male admitted [facility] on [facility]/2022 with shortness of air, edema. He is currently on 4th floor/ 3L O2. He is having colonoscopy later today.OT consulted due to recent decline in ADL/transfer (I). No previous OT services for current condition.  -AV     Row Name 05/02/22 1314          Living Environment    People in Home spouse  -AV     Row Name 05/02/22 1314          Home Main Entrance    Number of Stairs, Main Entrance three  -AV     Row Name 05/02/22 1314          Stairs Within Home, Primary    Stairs Comment, Within Home, Primary flight  -AV     Row Name 05/02/22 1314          Cognition    Orientation Status (Cognition) --  alert, pleasant and cooperative. able to retain information and follow commands.  -AV     Row Name 05/02/22 1314          Safety Issues, Functional Mobility    Impairments Affecting Function (Mobility) balance;endurance/activity tolerance  -AV           User Key  (r) = Recorded By, (t) = Taken By, (c) = Cosigned By    Initials Name Provider Type    Brock Martinez OT Occupational Therapist                 Mobility/ADL's     Row Name 05/02/22 1316          Transfers    Comment, (Transfers) CG/min assist  -AV     Row Name 05/02/22 1316          Activities of Daily Living    BADL Assessment/Intervention --  (I) feeding and grooming with setup while seated. Min assist bathing/dressing. (I) urinal while seated and CGA further toilet hygiene using BSC.  -AV           User Key  (r) = Recorded By, (t) = Taken By, (c) = Cosigned By    Initials Name Provider Type    Brock Martinez OT Occupational Therapist               Obj/Interventions     Row Name 05/02/22 1317          Sensory Assessment  (Somatosensory)    Sensory Assessment (Somatosensory) UE sensation intact  -AV     Row Name 05/02/22 1317          Vision Assessment/Intervention    Visual Impairment/Limitations WFL;corrective lenses full-time  -AV     Row Name 05/02/22 1317          Range of Motion Comprehensive    General Range of Motion bilateral upper extremity ROM WFL  AROM  -AV     Row Name 05/02/22 1317          Strength Comprehensive (MMT)    Comment, General Manual Muscle Testing (MMT) Assessment upper extremities equal at 5/5  -AV     Row Name 05/02/22 1317          Motor Skills    Motor Skills coordination;functional endurance  -AV     Coordination WFL  right dominant  -AV     [facility] Name 05/02/22 1317          Balance    Balance Assessment standing dynamic balance  -AV     Dynamic Standing Balance contact guard;minimal assist  -AV           User Key  (r) = Recorded By, (t) = Taken By, (c) = Cosigned By    Initials Name Provider Type    Brock Martinez OT Occupational Therapist               Goals/Plan     [facility] Name 05/02/22 1319          Transfer Goal 1 (OT)    Activity/Assistive Device (Transfer Goal 1, OT) transfers, all;walker, rolling  -AV     [place] Level/Cues Needed (Transfer Goal 1, OT) standby assist;verbal cues required  -AV     Time Frame (Transfer Goal 1, OT) long term goal (LTG);10 days  -AV     Row Name 05/02/22 1319          Bathing Goal 1 (OT)    Activity/Device (Bathing Goal 1, OT) bathing skills, all;shower chair  -AV     [place] Level/Cues Needed (Bathing Goal 1, OT) standby assist;set-up required;verbal cues required  -AV     Time Frame (Bathing Goal 1, OT) long term goal (LTG);10 days  -AV     Row Name 05/02/22 1319          Dressing Goal 1 (OT)    Activity/Device (Dressing Goal 1, OT) dressing skills, all  -AV     [place]/Cues Needed (Dressing Goal 1, OT) standby assist;set-up required;verbal cues required  -AV     Time Frame (Dressing Goal 1, OT) long term goal (LTG);10 days  [facility] Name  05/02/22 1319          Toileting Goal 1 (OT)    Activity/Device (Toileting Goal 1, OT) toileting skills, all;raised toilet seat  -AV     [place] Level/Cues Needed (Toileting Goal 1, OT) standby assist;set-up required;verbal cues required  -AV     Time Frame (Toileting Goal 1, OT) long term goal (LTG);10 days  -AV     Row Name 05/02/22 1319          Grooming Goal 1 (OT)    Activity/Device (Grooming Goal 1, OT) grooming skills, all  -AV     [place] (Grooming Goal 1, OT) standby assist;set-up required;verbal cues required  -AV     Time Frame (Grooming Goal 1, OT) long term goal (LTG);10 days  -AV     Row Name 05/02/22 1319          Problem Specific Goal 1 (OT)    Problem Specific Goal 1 (OT) Patient will demonstrate fair plus/good endurance/ activity tolerance needed to support ADLs.  -AV     Time Frame (Problem Specific Goal 1, OT) long term goal (LTG);10 days  -AV     Row Name 05/02/22 1319          Therapy Assessment/Plan (OT)    Planned Therapy Interventions (OT) activity tolerance training;BADL retraining;functional balance retraining;occupation/activity based interventions;patient/caregiver education/training;transfer/mobility retraining  -AV           User Key  (r) = Recorded By, (t) = Taken By, (c) = Cosigned By    Initials Name Provider Type    Brock Martinez OT Occupational Therapist               Clinical Impression     Row Name 05/02/22 1318          Pain Assessment    Additional Documentation Pain Scale: FACES Pre/Post-Treatment (Group)  -AV     Row Name 05/02/22 1318          Pain Scale: FACES Pre/Post-Treatment    Pain: FACES Scale, Pretreatment 0-->no hurt  -AV     Posttreatment Pain Rating 0-->no hurt  -AV     Row Name 05/02/22 1318          Plan of Care Review    Plan of Care Reviewed With patient  -AV     Progress no change  first session for eval  -AV     Outcome Evaluation Patient presents with limitations of balance and endurance/activity tolerance needed to support ADLs. OT is  needed to remediate/compensate for deficits to max (I).  -AV     Row Name 05/02/22 1318          Therapy Assessment/Plan (OT)    Patient/Family Therapy Goal Statement (OT) regain (I)  -AV     Rehab Potential (OT) good, to achieve stated therapy goals  -AV     Criteria for Skilled Therapeutic Interventions Met (OT) yes;meets criteria;skilled treatment is necessary  -AV     Therapy Frequency (OT) 5 times/wk  -AV     Row Name 05/02/22 1318          Therapy Plan Review/Discharge Plan (OT)    Anticipated Discharge Disposition (OT) home with assist;home with home health  -AV     Row Name 05/02/22 1318          Vital Signs    O2 Delivery Pre Treatment nasal cannula  -AV     O2 Delivery Intra Treatment nasal cannula  -AV     O2 Delivery Post Treatment nasal cannula  -AV           User Key  (r) = Recorded By, (t) = Taken By, (c) = Cosigned By    Initials Name Provider Type    Brock Martinez, OT Occupational Therapist               Outcome Measures     Row Name 05/02/22 1320          How much help from another is currently needed...    Putting on and taking off regular lower body clothing? 3  -AV     Bathing (including washing, rinsing, and drying) 3  -AV     Toileting (which includes using toilet bed pan or urinal) 3  -AV     Putting on and taking off regular upper body clothing 4  -AV     Taking care of personal grooming (such as brushing teeth) 4  -AV     Eating meals 4  -AV     AM-PAC 6 Clicks Score (OT) 21  -AV     Row Name 05/02/22 1320          Functional Assessment    Outcome Measure Options AM-PAC 6 Clicks Daily Activity (OT);Optimal Instrument  -AV     Row Name 05/02/22 1320          Optimal Instrument    Optimal Instrument Optimal - 3  -AV     Bending/Stooping 3  -AV     Standing 2  -AV     Reaching 1  -AV     From the list, choose the 3 activities you would most like to be able to do without any difficulty Bending/stooping;Standing;Reaching  -AV     Total Score Optimal - 3 6  -AV           User Key  (r) =  Recorded By, (t) = Taken By, (c) = Cosigned By    Initials Name Provider Type    Brock Martinez OT Occupational Therapist                Occupational Therapy Education                 Title: PT OT SLP Therapies (In Progress)     Topic: Occupational Therapy (In Progress)     Point: ADL training (Done)     Description:   Instruct learner(s) on proper safety adaptation and remediation techniques during self care or transfers.   Instruct in proper use of assistive devices.              Learning Progress Summary           Patient Acceptance, E, VU by VIKRAM at 5/2/2022 1321                   Point: Home exercise program (Not Started)     Description:   Instruct learner(s) on appropriate technique for monitoring, assisting and/or progressing therapeutic exercises/activities.              Learner Progress:  Not documented in this visit.          Point: Precautions (Not Started)     Description:   Instruct learner(s) on prescribed precautions during self-care and functional transfers.              Learner Progress:  Not documented in this visit.          Point: Body mechanics (Not Started)     Description:   Instruct learner(s) on proper positioning and spine alignment during self-care, functional mobility activities and/or exercises.              Learner Progress:  Not documented in this visit.                      User Key     Initials Effective Dates Name Provider Type Discipline     06/16/21 -  Brock Costa OT Occupational Therapist OT              OT Recommendation and Plan  Planned Therapy Interventions (OT): activity tolerance training, BADL retraining, functional balance retraining, occupation/activity based interventions, patient/caregiver education/training, transfer/mobility retraining  Therapy Frequency (OT): 5 times/wk  Plan of Care Review  Plan of Care Reviewed With: patient  Progress: no change (first session for eval)  Outcome Evaluation: Patient presents with limitations of balance and  endurance/activity tolerance needed to support ADLs. OT is needed to remediate/compensate for deficits to max (I).     Time Calculation:    Time Calculation- OT     Row Name 05/02/22 1322             Time Calculation- OT    OT Received On 05/02/22  -AV      OT Goal Re-Cert Due Date 05/11/22  -AV              Untimed Charges    OT Eval/Re-eval Minutes 35  -AV              Total Minutes    Untimed Charges Total Minutes 35  -AV       Total Minutes 35  -AV            User Key  (r) = Recorded By, (t) = Taken By, (c) = Cosigned By    Initials Name Provider Type    Brock Martinez OT Occupational Therapist              Therapy Charges for Today     Code Description Service Date Service Provider Modifiers Qty    [id number] HC OT EVAL LOW COMPLEXITY 3 5/2/2022 Brock Costa OT GO 1               Brock Costa OT  5/2/2022

## 2022-05-02 NOTE — ANESTHESIA POSTPROCEDURE EVALUATION
Patient: Rafael Gomez    Procedure Summary     Date: 05/02/22 Room / Location: [facility] ENDOSCOPY 4 / [facility] ENDOSCOPY    Anesthesia Start: 1056 Anesthesia Stop: 1131    Procedure: COLONOSCOPY WITH APPLICATION OF APC (N/A ) Diagnosis:       Acute blood loss anemia      Rectal bleeding      (Acute blood loss anemia [D62])      (Rectal bleeding [K62.5])    Surgeons: Lew Mg MD Provider: Jose Brenner MD    Anesthesia Type: general ASA Status: 4          Anesthesia Type: general    Vitals  Vitals Value Taken Time   /85 05/02/22 1143   Temp 36.4 °C (97.6 °F) 05/02/22 1143   Pulse 90 05/02/22 1143   Resp 21 05/02/22 1143   SpO2 94 % 05/02/22 1143           Post Anesthesia Care and Evaluation    Patient location during evaluation: bedside  Patient participation: complete - patient participated  Level of consciousness: awake  Pain management: adequate  Airway patency: patent  Anesthetic complications: No anesthetic complications  PONV Status: none  Cardiovascular status: acceptable and stable  Respiratory status: acceptable  Hydration status: acceptable    Comments: An Anesthesiologist personally participated in the most demanding procedures (including induction and emergence if applicable) in the anesthesia plan, monitored the course of anesthesia administration at frequent intervals and remained physically present and available for immediate diagnosis and treatment of emergencies.

## 2022-05-02 NOTE — PLAN OF CARE
Goal Outcome Evaluation:  Plan of Care Reviewed With: patient        Progress: no change (first session for eval)  Outcome Evaluation: Patient presents with limitations of balance and endurance/activity tolerance needed to support ADLs. OT is needed to remediate/compensate for deficits to max (I).

## 2022-05-02 NOTE — TELEPHONE ENCOUNTER
Caller: Liliya Gomez    Relationship: Emergency Contact    Best call back number: [phone]   What is the best time to reach you: ANYTIME   Who are you requesting to speak with (clinical staff, provider,  specific staff member): PROVIDER OR CARE TEAM PROVIDER    Do you know the name of the person who called: LILIYA    What was the call regarding: TEST RESULTS FROM LUNG NODULE BIOPSY    Do you require a callback: YES    PT IS HOSPITALIZED THERE HAS BEEN A HEALTH CHANGE IN PTS STATUS-WIFE CAN EXPLAIN FURTHER IN DETAIL AS TO WHAT IS GOING

## 2022-05-02 NOTE — PROGRESS NOTES
[facility]     Progress Note    Patient Name: Rafael Gomez  : 1945  MRN: [id number]  Primary Care Physician:  Claude Hair MD  Date of admission: 2022      Subjective   Brief summary.  Patient admitted with anasarca weight gain and shortness of breath.      HPI:  RN notified patient had bloody diarrhea.  Had to 3 episodes since night.  Patient denies any abdominal discomfort, he is sitting in the bed and eating his lunch.  Wife and daughter at bedside.  Feeling a whole lot better compared to yesterday.  Swelling coming down.      Review of Systems     Fatigue  Complains of shortness of breath with exertion.  No abdominal pain no blood in the stools  No nausea vomiting          Objective     Vitals:   Temp:  [97.3 °F (36.3 °C)-98.2 °F (36.8 °C)] 97.9 °F (36.6 °C)  Heart Rate:  [69-90] 86  Resp:  [17-31] 20  BP: ()/(42-85) 125/56  Flow (L/min):  [2-3] 2    Physical Exam :     Elderly obese male anxious not in acute distress.  HEENT with mild pallor  Heart regular.  Lungs diminished breath sounds bilaterally.  Coarse crackles at bases  Abdome n soft and obese.  Nontender  Lower extremity edema 1+ significantly improved from yesterday  Neuro awake alert oriented but extremely anxious      Result Review:  I have personally reviewed the results from the time of this admission to 2022 17:16 EDT and agree with these findings:  [x]  Laboratory  []  Microbiology  [x]  Radiology  []  EKG/Telemetry   []  Cardiology/Vascular   []  Pathology  []  Old records  []  Other:       Hemoglobin stable around 8 g  Assessment / Plan       Active Hospital Problems:  Active Hospital Problems    Diagnosis    • GI bleed    • Acute on chronic diastolic CHF (congestive heart failure) ([facility])    • Anasarca    • Rectal bleeding      Added automatically from request for surgery [id number]     • CKD (chronic kidney disease) stage 3, GFR 30-59 ml/min ([facility])    • Acute blood loss anemia    • Macrocytic anemia    •  Multiple lung nodules on CT        Plan:   Discussed with Dr. Mg.  Patient had not colonoscopy.  AV malformation.  Patient doing better postprocedure.  No further bleeding.  Overall improving discussed with nephrologist.  To give further dose of Lokelma to reduce potassium.  Patient wants to go home.  Increase activity.  Possible discharge tomorrow       DVT prophylaxis:  No DVT prophylaxis order currently exists.    CODE STATUS:   Code Status (Patient has no pulse and is not breathing): CPR (Attempt to Resuscitate)  Medical Interventions (Patient has pulse or is breathing): Full Support            Electronically signed by Melchor Nichole MD, 05/02/22, 5:20 PM EDT.

## 2022-05-02 NOTE — ANESTHESIA PREPROCEDURE EVALUATION
Anesthesia Evaluation     Patient summary reviewed and Nursing notes reviewed   no history of anesthetic complications:  NPO Solid Status: > 8 hours  NPO Liquid Status: > 2 hours           Airway   Mallampati: II  TM distance: >3 FB  Neck ROM: full  No difficulty expected  Dental    (+) poor dentition    Comment: Missing multiple teeth    Pulmonary - normal exam    breath sounds clear to auscultation  (+) COPD severe, home oxygen, sleep apnea,   Cardiovascular - normal exam  Exercise tolerance: good (4-7 METS)    Rhythm: regular  Rate: normal    (+) hypertension, past MI , CAD, CABG >6 Months, CHF ,     ROS comment:  HX of CABG 97', mild aortic stenosis, CAD, Chronic RBBB, Artic Aneurym . Echo EF50%, Cardiac Clearance (HIGH RISK) on chart.  METS>4 -    Neuro/Psych  (+) CVA, psychiatric history Anxiety, Depression and PTSD,      ROS Comment: Left facial droop  GI/Hepatic/Renal/Endo    (+)  GERD,  renal disease CRI,     Musculoskeletal     (+) neck pain,   Abdominal    Substance History - negative use     OB/GYN negative ob/gyn ROS         Other   arthritis,                      Anesthesia Plan    ASA 4     general       Anesthetic plan, all risks, benefits, and alternatives have been provided, discussed and informed consent has been obtained with: patient and spouse/significant other.        CODE STATUS:    Code Status (Patient has no pulse and is not breathing): CPR (Attempt to Resuscitate)  Medical Interventions (Patient has pulse or is breathing): Full Support

## 2022-05-02 NOTE — PLAN OF CARE
Goal Outcome Evaluation:  Plan of Care Reviewed With: patient           Outcome Evaluation: Patient is drinking GoLytely prep aproproately, stool not all the way clear on ending shift, bloody stool, doctors already aware from yesterday, patient is NPO for colonoscopy today, Patient has been very cooperative and undestanding, Patient has been anxious about procedure, Patient has wife at bedside, Patient has had no acute changes to report at this time, Will continue to monitor.

## 2022-05-02 NOTE — PLAN OF CARE
Goal Outcome Evaluation:              Outcome Evaluation: Patient went for colonoscopy today. Diet restarted adjusted to low K+ per Barry. Noted scant amount of bloody stool right after scope, continuing to monitor for cont bloody stools.

## 2022-05-03 ENCOUNTER — APPOINTMENT (OUTPATIENT)
Dept: OTHER | Facility: HOSPITAL | Age: 77
End: 2022-05-03

## 2022-05-03 ENCOUNTER — READMISSION MANAGEMENT (OUTPATIENT)
Dept: CALL CENTER | Facility: HOSPITAL | Age: 77
End: 2022-05-03

## 2022-05-03 VITALS
DIASTOLIC BLOOD PRESSURE: 54 MMHG | OXYGEN SATURATION: 97 % | HEART RATE: 76 BPM | TEMPERATURE: 97.7 F | RESPIRATION RATE: 18 BRPM | BODY MASS INDEX: 35.68 KG/M2 | WEIGHT: 254.85 LBS | SYSTOLIC BLOOD PRESSURE: 139 MMHG | HEIGHT: 71 IN

## 2022-05-03 PROBLEM — I50.33 ACUTE ON CHRONIC DIASTOLIC CHF (CONGESTIVE HEART FAILURE) (HCC): Chronic | Status: RESOLVED | Noted: 2022-04-30 | Resolved: 2022-05-03

## 2022-05-03 PROBLEM — R60.1 ANASARCA: Status: RESOLVED | Noted: 2022-04-29 | Resolved: 2022-05-03

## 2022-05-03 PROBLEM — K62.5 RECTAL BLEEDING: Status: RESOLVED | Noted: 2022-04-29 | Resolved: 2022-05-03

## 2022-05-03 PROBLEM — K92.2 GI BLEED: Status: RESOLVED | Noted: 2022-05-01 | Resolved: 2022-05-03

## 2022-05-03 LAB
ALBUMIN SERPL-MCNC: 4.1 G/DL (ref 3.5–5.2)
ANION GAP SERPL CALCULATED.3IONS-SCNC: 12.2 MMOL/L (ref 5–15)
BUN SERPL-MCNC: 33 MG/DL (ref 8–23)
BUN/CREAT SERPL: 18.2 (ref 7–25)
CALCIUM SPEC-SCNC: 9.5 MG/DL (ref 8.6–10.5)
CHLORIDE SERPL-SCNC: 101 MMOL/L (ref 98–107)
CO2 SERPL-SCNC: 23.8 MMOL/L (ref 22–29)
CREAT SERPL-MCNC: 1.81 MG/DL (ref 0.76–1.27)
DEPRECATED RDW RBC AUTO: 79.7 FL (ref 37–54)
EGFRCR SERPLBLD CKD-EPI 2021: 38.5 ML/MIN/1.73
ERYTHROCYTE [DISTWIDTH] IN BLOOD BY AUTOMATED COUNT: 20.3 % (ref 12.3–15.4)
FOLATE SERPL-MCNC: 19.3 NG/ML (ref 4.78–24.2)
GLUCOSE SERPL-MCNC: 129 MG/DL (ref 65–99)
HCT VFR BLD AUTO: 25.8 % (ref 37.5–51)
HCT VFR BLD AUTO: 26.6 % (ref 37.5–51)
HGB BLD-MCNC: 8.3 G/DL (ref 13–17.7)
HGB BLD-MCNC: 8.4 G/DL (ref 13–17.7)
MCH RBC QN AUTO: 33.6 PG (ref 26.6–33)
MCHC RBC AUTO-ENTMCNC: 31.6 G/DL (ref 31.5–35.7)
MCV RBC AUTO: 106.4 FL (ref 79–97)
PHOSPHATE SERPL-MCNC: 3.7 MG/DL (ref 2.5–4.5)
PLATELET # BLD AUTO: 135 10*3/MM3 (ref 140–450)
PMV BLD AUTO: 11.1 FL (ref 6–12)
POTASSIUM SERPL-SCNC: 4.7 MMOL/L (ref 3.5–5.2)
RBC # BLD AUTO: 2.5 10*6/MM3 (ref 4.14–5.8)
SODIUM SERPL-SCNC: 137 MMOL/L (ref 136–145)
VIT B12 BLD-MCNC: 610 PG/ML (ref 211–946)
WBC NRBC COR # BLD: 4.39 10*3/MM3 (ref 3.4–10.8)

## 2022-05-03 PROCEDURE — 94799 UNLISTED PULMONARY SVC/PX: CPT

## 2022-05-03 PROCEDURE — 63710000001 LEVALBUTEROL PER 0.5 MG: Performed by: INTERNAL MEDICINE

## 2022-05-03 PROCEDURE — 85014 HEMATOCRIT: CPT | Performed by: INTERNAL MEDICINE

## 2022-05-03 PROCEDURE — 97116 GAIT TRAINING THERAPY: CPT

## 2022-05-03 PROCEDURE — 97161 PT EVAL LOW COMPLEX 20 MIN: CPT

## 2022-05-03 PROCEDURE — 85018 HEMOGLOBIN: CPT | Performed by: INTERNAL MEDICINE

## 2022-05-03 RX ADMIN — TAMSULOSIN HYDROCHLORIDE 0.4 MG: 0.4 CAPSULE ORAL at 09:56

## 2022-05-03 RX ADMIN — Medication 10 ML: at 09:57

## 2022-05-03 RX ADMIN — FAMOTIDINE 40 MG: 20 TABLET ORAL at 09:56

## 2022-05-03 RX ADMIN — LEVALBUTEROL HYDROCHLORIDE 0.63 MG: 0.63 SOLUTION RESPIRATORY (INHALATION) at 00:29

## 2022-05-03 RX ADMIN — FUROSEMIDE 40 MG: 40 TABLET ORAL at 09:56

## 2022-05-03 RX ADMIN — GABAPENTIN 100 MG: 100 CAPSULE ORAL at 09:56

## 2022-05-03 RX ADMIN — TEMAZEPAM 15 MG: 7.5 CAPSULE ORAL at 00:35

## 2022-05-03 RX ADMIN — CITALOPRAM HYDROBROMIDE 40 MG: 20 TABLET ORAL at 06:06

## 2022-05-03 RX ADMIN — SENNOSIDES AND DOCUSATE SODIUM 1 TABLET: 50; 8.6 TABLET ORAL at 09:56

## 2022-05-03 RX ADMIN — Medication 100 MG: at 09:56

## 2022-05-03 RX ADMIN — ALLOPURINOL 300 MG: 300 TABLET ORAL at 09:56

## 2022-05-03 RX ADMIN — FINASTERIDE 5 MG: 5 TABLET, FILM COATED ORAL at 09:56

## 2022-05-03 RX ADMIN — METOPROLOL SUCCINATE 50 MG: 50 TABLET, EXTENDED RELEASE ORAL at 09:56

## 2022-05-03 RX ADMIN — LEVALBUTEROL HYDROCHLORIDE 0.63 MG: 0.63 SOLUTION RESPIRATORY (INHALATION) at 07:07

## 2022-05-03 NOTE — PLAN OF CARE
Goal Outcome Evaluation:  Plan of Care Reviewed With: (P) patient, spouse           Outcome Evaluation: (P) Pt evaluated and demonstrates independence with functional mobility using SPC. Inpatient physical therapy services not required at this time. Pt able to ambulate around room and hallway until d/c home.

## 2022-05-03 NOTE — PLAN OF CARE
Goal Outcome Evaluation:  Plan of Care Reviewed With: patient        Progress: improving  Outcome Evaluation: Patient c/o trouble sleeping and requested prn sleep med. medication and given and effective for a shirt period of time. Patient up at 4am dressed and ambulating on unit. Denies pain at this time. No reports of further episodes of bleeding post colonoscopy. Hopeful to d/c home today. Will continue to monitor and update as needed.

## 2022-05-03 NOTE — PLAN OF CARE
Goal Outcome Evaluation:         Patient cleared for discharge this morning. Vital signs stable. No s/s of distress at this time.

## 2022-05-03 NOTE — THERAPY EVALUATION
Acute Care - Physical Therapy Initial Evaluation   [facility]ter     Patient Name: Rafael Gomez  : 1945  MRN: [id number]  Today's Date: 5/3/2022     Admit date: 2022     Referring Physician: Lashaun att. providers found     Surgery Date:2022 - 2022   Procedure(s) (LRB):  COLONOSCOPY WITH APPLICATION OF APC (N/A)         Visit Dx:     ICD-10-CM ICD-9-CM   1. Anasarca  R60.1 782.3   2. Acute on chronic renal insufficiency  N28.9 593.9    N18.9 585.9   3. Hyperkalemia  E87.5 276.7   4. Chronic anemia  D64.9 285.9   5. Chronic obstructive pulmonary disease, unspecified COPD type (HCC)  J44.9 496   6. Acute blood loss anemia  D62 285.1   7. Rectal bleeding  K62.5 569.3   8. Decreased activities of daily living (ADL)  Z78.9 V49.89   9. Difficulty walking  R26.2 719.7     Patient Active Problem List   Diagnosis   • Lung nodule, solitary   • Cough   • Wheeze   • Multiple lung nodules on CT   • Follow-up examination, following other surgery   • Renal mass   • Plasmacytoma (HCC)   • Macrocytic anemia   • Leukopenia   • Monoclonal gammopathy   • Thrombocytopenia (HCC)   • AL amyloidosis (HCC)   • Neck pain   • Chronic low back pain   • Cervical spondylosis without myelopathy   • Left hip pain   • Encounter for preadmission testing   • Hypotension   • CKD (chronic kidney disease) stage 3, GFR 30-59 ml/min ([facility])   • Acute blood loss anemia   • Severe sepsis (HCC)   • Ileus (HCC)   • Metabolic encephalopathy   • Anemia due to stage 3 chronic kidney disease (HCC)     Past Medical History:   Diagnosis Date   • AA (aortic aneurysm) ([facility])     normal diameter noted on scan 21   • AL amyloidosis ([facility]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   • Anemia     no current issues   • Anxiety    • Arthritis     OA OF LEFT HIP   • Colitis     REPORTS CURRENTLY INCONTINENT OF BOWEL AND HAVING DIARRHEA   • COPD (chronic obstructive pulmonary disease) ([facility])     inhalers, neb tx   • Coronary artery disease    •  Depression    • ED (erectile dysfunction)    • GERD (gastroesophageal reflux disease)    • Gout    • History of PAT (paroxysmal atrial tachycardia)    • Hyperlipidemia    • Hypertension    • Leukopenia     no current  issues   • Macrocytic anemia    • Multiple renal cysts     no current issues   • Myocardial infarction (HCC)    • Neuropathy     HANDS AND FEET   • PTSD (post-traumatic stress disorder)    • PVD (peripheral vascular disease) (HCC)    • Requires supplemental oxygen     02 AT 2LPM THROUGH CPAP MACHINE AT NIGHT   • Rheumatoid arthritis (HCC)    • RLS (restless legs syndrome)    • Seasonal allergies    • Sleep apnea     uses CPAP   • Stroke (HCC)     prior to diallo carotid endarterectomy, no residual issues AROUND EARLY 2000     Past Surgical History:   Procedure Laterality Date   • APPENDECTOMY N/A 2005   • CAROTID ENDARTERECTOMY Bilateral 2007   • COLONOSCOPY N/A 1995    done in [facility]   • COLONOSCOPY N/A 5/2/2022    Procedure: COLONOSCOPY WITH APPLICATION OF APC;  Surgeon: Lew Mg MD;  Location: [facility] ENDOSCOPY;  Service: Gastroenterology;  Laterality: N/A;  AVMs IN COLON, DIVERTICULOSIS, HEMORRHOIDS   • CORONARY ARTERY BYPASS GRAFT  1997    4 vessel, no  c/o cp or soa, follows w/ [facility]   • HIATAL HERNIA REPAIR N/A    • REPLACEMENT TOTAL KNEE Left 09/08/2015   • ROTATOR CUFF REPAIR Right    • SKIN CANCER EXCISION      back   • THORACOSCOPY Left 6/2/2016    Procedure: LEFT VIDEO ASSISTED THORACOTOMY W/ LEFT UPPER LOBE WEDGE RESECTION X 2; EXPLORATORY BRONCHOSCOPY; PAIN PUMP PLACEMENT X 2;  Surgeon: Larry Hernandez MD;  Location: [facility];  Service:    • TONSILLECTOMY Bilateral    • TOTAL HIP ARTHROPLASTY Right 1997   • TOTAL HIP ARTHROPLASTY Left 11/16/2021    Procedure: LEFT TOTAL HIP ARTHROPLASTY;  Surgeon: Mna Francois MD;  Location: [facility] MAIN OR;  Service: Orthopedics;  Laterality: Left;     PT Assessment (last 12 hours)     PT Evaluation and Treatment     Row Name  05/03/22 1022          Physical Therapy Time and Intention    Subjective Information no complaints (P)   -PJ     Document Type evaluation (P)   -PJ     Mode of Treatment individual therapy;physical therapy (P)   -PJ     Patient Effort excellent (P)   -PJ     Symptoms Noted During/After Treatment shortness of breath (P)   Post ambulation  -PJ     Row Name 05/03/22 1022          General Information    Patient Profile Reviewed yes (P)   -PJ     Patient Observations alert;cooperative;agree to therapy (P)   -PJ     Prior Level of Function independent:;all household mobility;community mobility;ADL's (P)   -PJ     Equipment Currently Used at Home cane, straight (P)   -PJ     Existing Precautions/Restrictions fall (P)   -PJ     Barriers to Rehab none identified (P)   -PJ     Row Name 05/03/22 1022          Living Environment    Current Living Arrangements home (P)   -PJ     Home Accessibility stairs within home;stairs to enter home (P)   -PJ     People in Home spouse (P)   -PJ     Primary Care Provided by self (P)   -PJ     Row Name 05/03/22 1022          Home Main Entrance    Number of Stairs, Main Entrance three (P)   -PJ     Stair Railings, Main Entrance railings on both sides of stairs (P)   -PJ     Row Name 05/03/22 1022          Stairs Within Home, Primary    Number of Stairs, Within Home, Primary eleven (P)   -PJ     Stair Railings, Within Home, Primary railings on both sides of stairs (P)   -PJ     Row Name 05/03/22 1022          Home Use of Assistive/Adaptive Equipment    Equipment Currently Used at Home cane, straight;wheelchair, motorized;bath bench;power chair,(recliner lift);oxygen;pulse ox;nebulizer;grab bar;cpap;respiratory supplies (P)   -PJ     Row Name 05/03/22 1022          Cognition    Orientation Status (Cognition) oriented x 4 (P)   -PJ     Follows Commands (Cognition) WFL (P)   -PJ     Row Name 05/03/22 1022          Range of Motion (ROM)    Range of Motion bilateral lower extremities;ROM is WFL (P)    -PJ     Row Name 05/03/22 1022          Strength (Manual Muscle Testing)    Strength (Manual Muscle Testing) right lower extremity strength (P)   -PJ     Right Lower Extremity Strength right LE strength is WFL except;hip;knee (P)   -PJ     Hip, Right (Strength) 4+/5 (P)   -PJ     Knee, Right (Strength) 4+/5 (P)   -PJ     Row Name 05/03/22 1022          Bed Mobility    Bed Mobility bed mobility (all) activities (P)   -PJ     All Activities, Reeves (Bed Mobility) independent (P)   -PJ     Row Name 05/03/22 1022          Transfers    Transfers sit-stand transfer;bed-chair transfer (P)   -PJ     Bed-Chair Reeves (Transfers) independent (P)   -PJ     Assistive Device (Bed-Chair Transfers) cane, straight (P)   -PJ     Sit-Stand Reeves (Transfers) independent (P)   -PJ     Row Name 05/03/22 1022          Sit-Stand Transfer    Assistive Device (Sit-Stand Transfers) other (see comments) (P)   None  -PJ     Row Name 05/03/22 1022          Gait/Stairs (Locomotion)    Gait/Stairs Locomotion gait/ambulation independence;gait/ambulation assistive device (P)   -PJ     Reeves Level (Gait) modified independence (P)   -PJ     Assistive Device (Gait) cane, straight (P)   -PJ     Distance in Feet (Gait) 400 (P)   -PJ     Pattern (Gait) step-through (P)   -PJ     Deviations/Abnormal Patterns (Gait) stride length decreased;weight shifting decreased (P)   -PJ     Right Sided Gait Deviations weight shift ability decreased (P)   -PJ     Row Name 05/03/22 1022          Safety Issues, Functional Mobility    Impairments Affecting Function (Mobility) balance;endurance/activity tolerance (P)   -PJ     Row Name 05/03/22 1022          Balance    Balance Assessment standing dynamic balance (P)   -PJ     Dynamic Standing Balance modified independence (P)   -PJ     Position/Device Used, Standing Balance supported;cane, straight (P)   -PJ     Row Name 05/03/22 1022          Plan of Care Review    Plan of Care Reviewed With  patient;spouse (P)   -PJ     Outcome Evaluation Pt evaluated and demonstrates independence with functional mobility using SPC. Inpatient physical therapy services not required at this time. Pt able to ambulate around room and hallway until d/c home. (P)   -PJ     Row Name 05/03/22 1022          Vital Signs    O2 Delivery Pre Treatment room air (P)   -PJ     O2 Delivery Intra Treatment room air (P)   -PJ     O2 Delivery Post Treatment room air (P)   -PJ     Row Name 05/03/22 1022          Positioning and Restraints    Post Treatment Position chair (P)   -PJ     In Chair sitting;call light within reach;with family/caregiver (P)   -PJ     Row Name 05/03/22 1022          Therapy Assessment/Plan (PT)    Criteria for Skilled Interventions Met (PT) no;does not meet criteria for skilled intervention (P)   -PJ     Therapy Frequency (PT) evaluation only (P)   -PJ           User Key  (r) = Recorded By, (t) = Taken By, (c) = Cosigned By    Initials Name Provider Type    Casa Alas, PT Student PT Student                Physical Therapy Education                 Title: PT OT SLP Therapies (Done)     Topic: Physical Therapy (Done)     Point: Mobility training (Done)     Learning Progress Summary           Patient Acceptance, E, VU by KEVIN at 5/3/2022 1029                   Point: Home exercise program (Resolved)     Learner Progress:  Not documented in this visit.          Point: Body mechanics (Done)     Learning Progress Summary           Patient Acceptance, E, VU by KEVIN at 5/3/2022 1029                   Point: Precautions (Resolved)     Learner Progress:  Not documented in this visit.                      User Key     Initials Effective Dates Name Provider Type [facility] 03/10/22 -  Casa Robertson, PT Student PT Student PT              PT Recommendation and Plan  Anticipated Discharge Disposition (PT): (P) home  Therapy Frequency (PT): (P) evaluation only  Plan of Care Reviewed With: (P) patient, spouse  Outcome  Evaluation: (P) Pt evaluated and demonstrates independence with functional mobility using SPC. Inpatient physical therapy services not required at this time. Pt able to ambulate around room and hallway until d/c home.   Outcome Measures     Row Name 05/03/22 1000             How much help from another person do you currently need...    Turning from your back to your side while in flat bed without using bedrails? 4 (P)   -PJ      Moving from lying on back to sitting on the side of a flat bed without bedrails? 4 (P)   -PJ      Moving to and from a bed to a chair (including a wheelchair)? 4 (P)   -PJ      Standing up from a chair using your arms (e.g., wheelchair, bedside chair)? 4 (P)   -PJ      Climbing 3-5 steps with a railing? 3 (P)   -PJ      To walk in hospital room? 4 (P)   -PJ      AM-PAC 6 Clicks Score (PT) 23 (P)   -PJ              Functional Assessment    Outcome Measure Options AM-PAC 6 Clicks Basic Mobility (PT) (P)   -PJ            User Key  (r) = Recorded By, (t) = Taken By, (c) = Cosigned By    Initials Name Provider Type    Casa Alas, PT Student PT Student                 Time Calculation:    PT Charges     Row Name 05/03/22 1022             Time Calculation    PT Received On 05/03/22 (P)   -PJ              Timed Charges    93160 - Gait Training Minutes  10 (P)   -PJ              Untimed Charges    PT Eval/Re-eval Minutes 34 (P)   -PJ              Total Minutes    Timed Charges Total Minutes 10 (P)   -PJ      Untimed Charges Total Minutes 34 (P)   -PJ       Total Minutes 44 (P)   -PJ            User Key  (r) = Recorded By, (t) = Taken By, (c) = Cosigned By    Initials Name Provider Type    Casa Alas, PT Student PT Student              Therapy Charges for Today     Code Description Service Date Service Provider Modifiers Qty    14608159393 HC GAIT TRAINING EA 15 MIN 5/3/2022 Casa Robertson, PT Student GP 1    27793944277 HC PT EVAL LOW COMPLEXITY 3 5/3/2022 Casa Robertson, PT  Student GP 1          PT G-Codes  Outcome Measure Options: (P) AM-PAC 6 Clicks Basic Mobility (PT)  AM-PAC 6 Clicks Score (PT): (P) 23  AM-PAC 6 Clicks Score (OT): 21    Casa Robertson, PT Student  5/3/2022

## 2022-05-04 NOTE — DISCHARGE SUMMARY
[facility]         DISCHARGE SUMMARY    Patient Name: Rafael Gomez  : 1945  MRN: [id number]    Date of Admission: 2022  Date of Discharge: May 3, 2022  Primary Care Physician: Claude Hair MD    Consults     Date and Time Order Name Status Description    2022 12:09 PM Inpatient Gastroenterology Consult Completed           Presenting Problem:   Hyperkalemia [E87.5]  Anasarca [R60.1]  Acute on chronic renal insufficiency [N28.9, N18.9]  Chronic anemia [D64.9]  Chronic obstructive pulmonary disease, unspecified COPD type ([facility]) [J44.9]    Active and Resolved Hospital Problems:  Active Hospital Problems    Diagnosis POA   • CKD (chronic kidney disease) stage 3, GFR 30-59 ml/min ([facility]) [N18.30] Yes   • Acute blood loss anemia [D62] Yes   • Macrocytic anemia [D53.9] Yes   • Multiple lung nodules on CT [R91.8] Yes      Resolved Hospital Problems    Diagnosis POA   • GI bleed [K92.2] No   • Acute on chronic diastolic CHF (congestive heart failure) ([facility]) [I50.33] Yes   • Anasarca [R60.1] Yes   • Rectal bleeding [K62.5] Unknown         Hospital Course     Hospital Course:  Rafael Gomez is a 76 y.o. male admitted to hospital for swelling of legs and retention of fluid.  He also had significant weight gain and shortness of breath with weight gain and fluid retention.  Patient was admitted to medical service and started on IV diuretics, his nephrologist Dr. Reddy was consulted.    Patient's symptoms improved with diuresis  He was feeling much better  Patient has underlying multiple medical issues [facility]  His wife wants him to be discharged soon as he was feeling better.  On the third day of his stay in hospital patient developed bloody stools  Dr. Mg gastroenterology was consulted and the scope was done  Patient had angiectasis with some bleeding, coagulation was done.  Patient also had internal hemorrhoids and  diverticulosis.  Further patient felt better and was feeling strong and wanted to go home he was advised to stay 1 more day for observation for which he agreed.  On the day of discharge patient was awake alert sitting in bed dressed and waiting for us to discharge him.  His wife was at bedside.  Patient denies any chest pain his shortness of breath has significantly improved his edema has improved.  His hemoglobin stable he did not have any further episodes of bleed.  He will be discharged to home with outpatient follow-up recommendations        DISCHARGE Follow Up Recommendations for labs and diagnostics:   Patient stable ready for discharge.  Advised to take medications as prescribed on discharge.  Recommended follow-up with consultants as advised.  Patient advised to return to ER if symptoms get worse.  Patient advised to follow-up with PCP post discharge in 1 week.      Day of Discharge     Vital Signs:  Temp:  [97.7 °F (36.5 °C)-97.9 °F (36.6 °C)] 97.7 °F (36.5 °C)  Heart Rate:  [70-77] 76  Resp:  [18-20] 18  BP: (118-139)/(54-63) 139/54    Physical Exam:    *Elderly obese male not in acute distress  Mild pallor  Heart regular  Lungs clear  Abdomen obese and nontender  Extremities with trace of edema significantly improved  Neuro awake alert and oriented      Pertinent  and/or Most Recent Results     LAB RESULTS:      Lab 05/03/22  0816 05/02/22  2341 05/02/22  1705 05/02/22  0542 05/01/22  2344 04/30/22  2231 04/30/22  0611 04/29/22  1706   WBC  --  4.39  --  3.89  --   --  5.25 4.39   HEMOGLOBIN 8.3* 8.4* 8.2* 8.5* 8.2*   < > 8.8* 8.8*   HEMATOCRIT 25.8* 26.6* 25.9* 26.7* 25.5*   < > 27.8* 28.3*   PLATELETS  --  135*  --  133*  --   --  118* 101*   NEUTROS ABS  --   --   --  2.49  --   --  3.70 2.94   IMMATURE GRANS (ABS)  --   --   --  0.03  --   --  0.05 0.04   LYMPHS ABS  --   --   --  0.87  --   --  0.94 0.85   MONOS ABS  --   --   --  0.35  --   --  0.40 0.42   EOS ABS  --   --   --  0.13  --   --  0.14  0.12   MCV  --  106.4*  --  105.5*  --   --  106.5* 107.2*   PROTIME  --   --   --   --   --   --   --  15.5*    < > = values in this interval not displayed.         Lab 05/02/22 2341 05/02/22  0542 05/01/22  0523 04/30/22  0611 04/29/22  1706   SODIUM 137 135* 137 137 137   POTASSIUM 4.7 5.5* 5.2 6.0* 5.7*   CHLORIDE 101 99 103 104 106   CO2 23.8 25.6 23.5 23.3 20.2*   ANION GAP 12.2 10.4 10.5 9.7 10.8   BUN 33* 39* 40* 39* 42*   CREATININE 1.81* 2.02* 2.17* 1.94* 2.13*   EGFR 38.5* 33.8* 31.0* 35.4* 31.7*   GLUCOSE 129* 93 90 91 96   CALCIUM 9.5 9.6 9.1 9.4 9.3   MAGNESIUM  --   --  1.6  --   --    PHOSPHORUS 3.7  --   --   --   --    TSH  --   --   --   --  1.340         Lab 05/02/22 2341 05/02/22  0542 04/30/22  0611 04/29/22  1706   TOTAL PROTEIN  --  6.6 6.3 6.5   ALBUMIN 4.10 3.90 3.90 3.70   GLOBULIN  --  2.7 2.4 2.8   ALT (SGPT)  --  12 13 14   AST (SGOT)  --  19 16 18   BILIRUBIN  --  0.7 0.6 0.6   ALK PHOS  --  68 66 76         Lab 04/30/22  0611 04/30/22  0131 04/29/22  1706   PROBNP  --   --  2,916.0*   TROPONIN T <0.010 <0.010 <0.010   PROTIME  --   --  15.5*   INR  --   --  1.21*             Lab 05/02/22  2341 05/01/22  1144 05/01/22 0523   IRON  --   --  65   IRON SATURATION  --   --  26   TIBC  --   --  250*   TRANSFERRIN  --   --  168*   FERRITIN  --   --  1,148.00*   FOLATE 19.30  --   --    VITAMIN B 12 610  --   --    ABO TYPING  --  O  --    RH TYPING  --  Positive  --    ANTIBODY SCREEN  --  Negative  --          Brief Urine Lab Results  (Last result in the past 365 days)      Color   Clarity   Blood   Leuk Est   Nitrite   Protein   CREAT   Urine HCG        04/30/22 1942             38.5         04/30/22 1942 Yellow   Clear   Negative   Negative   Negative   Negative               Microbiology Results (last 10 days)     ** No results found for the last 240 hours. **          PROCEDURES:    [unfilled]    CT Chest Without Contrast Diagnostic    Result Date: 4/28/2022  Impression:    1.  Multiple bilateral pulmonary nodules of varying sizes appear unchanged as compared to prior study of 10/18/2021.  Continued serial CT follow-up to document stability for a total of 2 years versus PET scan to evaluate for malignant potential.   2. Interval development of small bilateral pleural effusions, right greater than left.  3. Additional findings as given above.      KASSI PLUMMER MD       Electronically Signed and Approved By: KASSI PLUMMER MD on 4/28/2022 at 11:08             XR Chest 1 View    Result Date: 4/29/2022  Impression:   1. Chronic changes are noted.  There is no evidence for acute cardiopulmonary process.         NAV LANDON MD       Electronically Signed and Approved By: NAV LANDON MD on 4/29/2022 at 18:56               Results for orders placed in visit on 03/12/20    SCANNED VASCULAR STUDIES      Results for orders placed in visit on 03/12/20    SCANNED VASCULAR STUDIES      Results for orders placed during the hospital encounter of 11/16/21    Adult Transthoracic Echo Complete w/ Color, Spectral and Contrast if necessary per protocol    Interpretation Summary  · Peak velocity of the flow distal to the aortic valve is 218 cm/s. Aortic valve maximum pressure gradient is 19 mmHg. Aortic valve dimensionless index is 0.5 .  · Calculated left ventricular EF = 65% Estimated left ventricular EF was in agreement with the calculated left ventricular EF.  · Left ventricular diastolic function is consistent with (grade II w/high LAP) pseudonormalization.  · Moderate mitral valve regurgitation is present.  · Mild aortic valve stenosis is present.      Labs Pending at Discharge:        Discharge Details        Discharge Medications      Changes to Medications      Instructions Start Date   ketoconazole 2 % shampoo  Commonly known as: NIZORAL  What changed: how much to take   Topical, 2 Times Weekly         Continue These Medications      Instructions Start Date   acetaminophen 325 MG tablet  Commonly  known as: TYLENOL   325 mg, Oral, Every 6 Hours PRN      albuterol sulfate  (90 Base) MCG/ACT inhaler  Commonly known as: PROVENTIL HFA;VENTOLIN HFA;PROAIR HFA   2 puffs, Inhalation, Every 4 Hours PRN      albuterol (2.5 MG/3ML) 0.083% nebulizer solution  Commonly known as: PROVENTIL   2.5 mg, Nebulization, Every 4 Hours PRN      allopurinol 300 MG tablet  Commonly known as: ZYLOPRIM   300 mg, Oral, Daily      atorvastatin 40 MG tablet  Commonly known as: LIPITOR   40 mg, Oral, Nightly      betamethasone dipropionate 0.05 % ointment  Commonly known as: DIPROLENE   1 application, Topical, 2 Times Daily      cetirizine 10 MG tablet  Commonly known as: zyrTEC   10 mg, Oral, Daily      citalopram 40 MG tablet  Commonly known as: CeleXA   40 mg, Oral, Every Morning      clotrimazole 1 % cream  Commonly known as: LOTRIMIN   1 application, Topical, 2 Times Daily      ferrous sulfate 325 (65 FE) MG tablet   325 mg, Oral, Daily With Breakfast      finasteride 5 MG tablet  Commonly known as: PROSCAR   5 mg, Oral, Daily      furosemide 40 MG tablet  Commonly known as: LASIX   40 mg, Oral, Daily      gabapentin 300 MG capsule  Commonly known as: NEURONTIN   300 mg, Oral, 2 Times Daily      Glucosamine Sulfate 500 MG tablet   1 tablet, Oral, Take As Directed      hydroCHLOROthiazide 25 MG tablet  Commonly known as: HYDRODIURIL   25 mg, Oral, Daily      hydrocortisone 25 MG suppository  Commonly known as: ANUSOL-HC   25 mg, Rectal, 2 Times Daily      L-Methylfolate-B6-B12 3-35-2 MG tablet   1 tablet, Oral, Daily      magnesium oxide 400 MG tablet  Commonly known as: MAG-OX   400 mg, Oral, 2 Times Daily      metoprolol succinate XL 50 MG 24 hr tablet  Commonly known as: TOPROL-XL   50 mg, Oral, Daily      montelukast 10 MG tablet  Commonly known as: SINGULAIR   10 mg, Oral, Nightly      multivitamin with minerals tablet tablet   1 tablet, Oral, Daily      Omega 3 1000 MG capsule   1 capsule, Oral, Daily      pantoprazole 20  MG EC tablet  Commonly known as: PROTONIX   20 mg, Oral, Daily      pramipexole 1 MG tablet  Commonly known as: MIRAPEX   1 mg, Oral, Nightly      rivaroxaban 20 MG tablet  Commonly known as: XARELTO   20 mg, Oral, Daily      sildenafil 100 MG tablet  Commonly known as: VIAGRA   100 mg, Oral, Daily PRN      tamsulosin 0.4 MG capsule 24 hr capsule  Commonly known as: FLOMAX   0.4 mg, Oral, Daily      Testosterone Cypionate 200 MG/ML solution   Injection      Trelegy Ellipta 200-62.5-25 MCG/INH inhaler  Generic drug: Fluticasone-Umeclidin-Vilant   1 puff, Inhalation, Daily - RT         Stop These Medications    aspirin  MG tablet     doxycycline 100 MG tablet  Commonly known as: VIBRAMYICN     methylPREDNISolone 4 MG dose pack  Commonly known as: MEDROL     omeprazole 20 MG capsule  Commonly known as: priLOSEC     salmeterol 50 MCG/DOSE diskus inhaler  Commonly known as: SEREVENT     telmisartan 80 MG tablet  Commonly known as: MICARDIS     terazosin 2 MG capsule  Commonly known as: HYTRIN            Allergies   Allergen Reactions   • Cephalexin Nausea And Vomiting         Discharge Disposition:    Home or Self Care    Diet:    Heart healthy/renal    Discharge Activity:     Activity Instructions     Activity as Tolerated              Future Appointments   Date Time Provider Department Center   5/6/2022  1:30 PM CHAIR 4 [facility]   5/13/2022  1:30 PM CHAIR 6 [facility]   5/17/2022  9:00 AM Shagufta Peña MD [facility]   9/29/2022 10:50 AM LAB CHAIR 1 [facility]  [facility]   9/29/2022 11:20 AM Rafael Heredia MD [facility]       Additional Instructions for the Follow-ups that You Need to Schedule     Discharge Follow-up with PCP   As directed       Currently Documented PCP:    Claude Hair MD    PCP Phone Number:    [phone]     Follow Up Details: next week         Discharge Follow-up with Specified Provider: Dr Reddy next week   As directed      To:   Barry next week               Time spent on Discharge including face to face service: 29 minutes.            I have dictated this note utilizing Dragon Dictation.             Please note that portions of this note were completed with a voice recognition program.             Part of this note may be an electronic transcription/translation of spoken language to printed text         using the Dragon Dictation System.       Electronically signed by Melchor Nichole MD, 05/03/22, 9:06 PM EDT.

## 2022-05-04 NOTE — OUTREACH NOTE
Prep Survey    Flowsheet Row Responses   [facility] facility patient discharged from? [facility]   Is LACE score < 7 ? No   Emergency Room discharge w/ pulse ox? No   Eligibility Readm Mgmt   Discharge diagnosis Acute on chronic diastolic CHF   Does the patient have one of the following disease processes/diagnoses(primary or secondary)? CHF   Does the patient have Home health ordered? No   Is there a DME ordered? No   Prep survey completed? Yes          HARDIK CAUSEY - Registered Nurse

## 2022-05-05 ENCOUNTER — READMISSION MANAGEMENT (OUTPATIENT)
Dept: CALL CENTER | Facility: HOSPITAL | Age: 77
End: 2022-05-05

## 2022-05-06 ENCOUNTER — HOSPITAL ENCOUNTER (OUTPATIENT)
Dept: INFUSION THERAPY | Facility: HOSPITAL | Age: 77
Setting detail: INFUSION SERIES
Discharge: HOME | End: 2022-05-06

## 2022-05-06 VITALS
BODY MASS INDEX: 35.46 KG/M2 | TEMPERATURE: 97.8 F | DIASTOLIC BLOOD PRESSURE: 46 MMHG | RESPIRATION RATE: 20 BRPM | SYSTOLIC BLOOD PRESSURE: 119 MMHG | WEIGHT: 253.31 LBS | OXYGEN SATURATION: 100 % | HEART RATE: 66 BPM | HEIGHT: 71 IN

## 2022-05-06 NOTE — CODE DOCUMENTATION
Patient has been admitted to the hospital and treatment plan is on hold.  RN contacted office x 2 and messages were sent to Dr Hair to find out if ok to release hold and administer the final dose.  MD is in [place] until 4[place]5 and will not answer phone messages until after he leaves.   RN contacted acting manager today and he agreed that only option is to reschedule patient next week or allow patient to stay and wait.  RN discussed with patient and he prefers to reschedule for next week.   Jovani mcclain

## 2022-05-09 ENCOUNTER — READMISSION MANAGEMENT (OUTPATIENT)
Dept: CALL CENTER | Facility: HOSPITAL | Age: 77
End: 2022-05-09

## 2022-05-09 NOTE — CODE DOCUMENTATION
Final dose of venofer discontinued by Dr Hair.  Not needed post hospitalization.  Order discontinued   Jovani mcclain

## 2022-05-11 ENCOUNTER — READMISSION MANAGEMENT (OUTPATIENT)
Dept: CALL CENTER | Facility: HOSPITAL | Age: 77
End: 2022-05-11

## 2022-05-11 NOTE — OUTREACH NOTE
CHF Week 1 Survey    Flowsheet Row Responses   [facility] patient discharged from? [facility]   Does the patient have one of the following disease processes/diagnoses(primary or secondary)? CHF   CHF Week 1 attempt successful? Yes   Call start time 1056   Call end time 1111   Is patient permission given to speak with other caregiver? Yes   List who call center can speak with SAMRA DAMON   Person spoke with today (if not patient) and relationship SAMRA DAMON- WIFE   Medication alerts for this patient PATIENT HAS A PCP APPOINTMENT THIS AFTERNOON AND WIFE WANTS TO DISCUSS MEDICATION CHANGES WITH PCP   Meds reviewed with patient/caregiver? Yes   Does the patient have all medications ordered at discharge? N/A   Does the patient have a primary care provider?  Yes   Does the patient have an appointment with their PCP within 7 days of discharge? Greater than 7 days   Comments regarding PCP PCP APPOINTMENT IS TODAY   What is preventing the patient from scheduling follow up appointments within 7 days of discharge? Earlier appointment not available   Nursing Interventions Verified appointment date/time/provider   Has the patient kept scheduled appointments due by today? N/A   Has home health visited the patient within 72 hours of discharge? N/A   Pulse Ox monitoring None   Psychosocial issues? No   Did the patient receive a copy of their discharge instructions? Yes   Nursing interventions Reviewed instructions with patient   What is the patient's perception of their health status since discharge? Improving   Nursing interventions Nurse provided patient education   Is the patient weighing daily? Yes   Does the patient have scales? Yes   Daily weight interventions Education provided on importance of daily weight   Is the patient able to teach back Heart Failure diet management? Yes   Is the patient able to teach back Heart Failure Zones? Yes   Is the patient able to teach back signs and symptoms of worsening  condition? (i.e. weight gain, shortness of air, etc.) Yes   If the patient is a current smoker, are they able to teach back resources for cessation? Not a smoker   Is the patient/caregiver able to teach back the hierarchy of who to call/visit for symptoms/problems? PCP, Specialist, Home health nurse, Urgent Care, ED, 911 Yes    CHF Week 1 call completed? Yes          DIANNE CAUSEY - Licensed Nurse

## 2022-05-12 ENCOUNTER — TELEPHONE (OUTPATIENT)
Dept: GASTROENTEROLOGY | Facility: CLINIC | Age: 77
End: 2022-05-12

## 2022-05-13 ENCOUNTER — APPOINTMENT (OUTPATIENT)
Dept: INFUSION THERAPY | Facility: HOSPITAL | Age: 77
End: 2022-05-13

## 2022-05-17 NOTE — TELEPHONE ENCOUNTER
I called pt to inform to follow up PRN, and 5 year colon recall. Pt agreeable and not having any GI complaints at this time

## 2022-06-11 ENCOUNTER — APPOINTMENT (OUTPATIENT)
Dept: GENERAL RADIOLOGY | Facility: HOSPITAL | Age: 77
End: 2022-06-11

## 2022-06-11 ENCOUNTER — HOSPITAL ENCOUNTER (INPATIENT)
Facility: HOSPITAL | Age: 77
LOS: 13 days | Discharge: HOME | End: 2022-06-25
Attending: EMERGENCY MEDICINE | Admitting: INTERNAL MEDICINE

## 2022-06-11 DIAGNOSIS — Z78.9 DECREASED ACTIVITIES OF DAILY LIVING (ADL): ICD-10-CM

## 2022-06-11 DIAGNOSIS — M54.40 CHRONIC MIDLINE LOW BACK PAIN WITH SCIATICA, SCIATICA LATERALITY UNSPECIFIED: Primary | ICD-10-CM

## 2022-06-11 DIAGNOSIS — R26.2 DIFFICULTY WALKING: ICD-10-CM

## 2022-06-11 DIAGNOSIS — N18.6 ESRD (END STAGE RENAL DISEASE): ICD-10-CM

## 2022-06-11 DIAGNOSIS — N17.9 ACUTE RENAL FAILURE, UNSPECIFIED ACUTE RENAL FAILURE TYPE: ICD-10-CM

## 2022-06-11 DIAGNOSIS — G89.29 CHRONIC MIDLINE LOW BACK PAIN WITH SCIATICA, SCIATICA LATERALITY UNSPECIFIED: Primary | ICD-10-CM

## 2022-06-11 DIAGNOSIS — M47.812 CERVICAL SPONDYLOSIS WITHOUT MYELOPATHY: ICD-10-CM

## 2022-06-11 DIAGNOSIS — I50.9 ACUTE CONGESTIVE HEART FAILURE, UNSPECIFIED HEART FAILURE TYPE: ICD-10-CM

## 2022-06-11 DIAGNOSIS — J81.0 ACUTE PULMONARY EDEMA: ICD-10-CM

## 2022-06-11 LAB
ALBUMIN SERPL-MCNC: 4.2 G/DL (ref 3.5–5.2)
ALBUMIN/GLOB SERPL: 1.6 G/DL
ALP SERPL-CCNC: 102 U/L (ref 39–117)
ALT SERPL W P-5'-P-CCNC: 18 U/L (ref 1–41)
ANION GAP SERPL CALCULATED.3IONS-SCNC: 12.6 MMOL/L (ref 5–15)
AST SERPL-CCNC: 20 U/L (ref 1–40)
BASOPHILS # BLD AUTO: 0.03 10*3/MM3 (ref 0–0.2)
BASOPHILS NFR BLD AUTO: 0.4 % (ref 0–1.5)
BILIRUB SERPL-MCNC: 0.4 MG/DL (ref 0–1.2)
BUN SERPL-MCNC: 66 MG/DL (ref 8–23)
BUN/CREAT SERPL: 21.8 (ref 7–25)
CALCIUM SPEC-SCNC: 9.4 MG/DL (ref 8.6–10.5)
CHLORIDE SERPL-SCNC: 105 MMOL/L (ref 98–107)
CK MB SERPL-CCNC: 4.25 NG/ML
CK SERPL-CCNC: 99 U/L (ref 20–200)
CO2 SERPL-SCNC: 19.4 MMOL/L (ref 22–29)
CREAT SERPL-MCNC: 3.03 MG/DL (ref 0.76–1.27)
DEPRECATED RDW RBC AUTO: 64.8 FL (ref 37–54)
EGFRCR SERPLBLD CKD-EPI 2021: 20.6 ML/MIN/1.73
EOSINOPHIL # BLD AUTO: 0.13 10*3/MM3 (ref 0–0.4)
EOSINOPHIL NFR BLD AUTO: 1.6 % (ref 0.3–6.2)
ERYTHROCYTE [DISTWIDTH] IN BLOOD BY AUTOMATED COUNT: 17.5 % (ref 12.3–15.4)
GLOBULIN UR ELPH-MCNC: 2.7 GM/DL
GLUCOSE SERPL-MCNC: 111 MG/DL (ref 65–99)
HCT VFR BLD AUTO: 27.8 % (ref 37.5–51)
HGB BLD-MCNC: 8.9 G/DL (ref 13–17.7)
HOLD SPECIMEN: 11
HOLD SPECIMEN: 11
IMM GRANULOCYTES # BLD AUTO: 0.03 10*3/MM3 (ref 0–0.05)
IMM GRANULOCYTES NFR BLD AUTO: 0.4 % (ref 0–0.5)
LIPASE SERPL-CCNC: 57 U/L (ref 13–60)
LYMPHOCYTES # BLD AUTO: 0.92 10*3/MM3 (ref 0.7–3.1)
LYMPHOCYTES NFR BLD AUTO: 11 % (ref 19.6–45.3)
MAGNESIUM SERPL-MCNC: 1.5 MG/DL (ref 1.6–2.4)
MCH RBC QN AUTO: 32.8 PG (ref 26.6–33)
MCHC RBC AUTO-ENTMCNC: 32 G/DL (ref 31.5–35.7)
MCV RBC AUTO: 102.6 FL (ref 79–97)
MONOCYTES # BLD AUTO: 0.55 10*3/MM3 (ref 0.1–0.9)
MONOCYTES NFR BLD AUTO: 6.6 % (ref 5–12)
NEUTROPHILS NFR BLD AUTO: 6.67 10*3/MM3 (ref 1.7–7)
NEUTROPHILS NFR BLD AUTO: 80 % (ref 42.7–76)
NRBC BLD AUTO-RTO: 0.2 /100 WBC (ref 0–0.2)
NT-PROBNP SERPL-MCNC: 6333 PG/ML (ref 0–1800)
PLATELET # BLD AUTO: 102 10*3/MM3 (ref 140–450)
PMV BLD AUTO: 12.5 FL (ref 6–12)
POTASSIUM SERPL-SCNC: 5.5 MMOL/L (ref 3.5–5.2)
PROT SERPL-MCNC: 6.9 G/DL (ref 6–8.5)
RBC # BLD AUTO: 2.71 10*6/MM3 (ref 4.14–5.8)
SODIUM SERPL-SCNC: 137 MMOL/L (ref 136–145)
TROPONIN I SERPL-MCNC: 0.01 NG/ML (ref 0–0.6)
WBC NRBC COR # BLD: 8.33 10*3/MM3 (ref 3.4–10.8)
WHOLE BLOOD HOLD COAG: 11
WHOLE BLOOD HOLD SPECIMEN: 11

## 2022-06-11 PROCEDURE — 93010 ELECTROCARDIOGRAM REPORT: CPT | Performed by: INTERNAL MEDICINE

## 2022-06-11 PROCEDURE — 82550 ASSAY OF CK (CPK): CPT | Performed by: EMERGENCY MEDICINE

## 2022-06-11 PROCEDURE — 83880 ASSAY OF NATRIURETIC PEPTIDE: CPT | Performed by: EMERGENCY MEDICINE

## 2022-06-11 PROCEDURE — 82553 CREATINE MB FRACTION: CPT | Performed by: EMERGENCY MEDICINE

## 2022-06-11 PROCEDURE — 71045 X-RAY EXAM CHEST 1 VIEW: CPT

## 2022-06-11 PROCEDURE — 85025 COMPLETE CBC W/AUTO DIFF WBC: CPT | Performed by: EMERGENCY MEDICINE

## 2022-06-11 PROCEDURE — 93005 ELECTROCARDIOGRAM TRACING: CPT | Performed by: EMERGENCY MEDICINE

## 2022-06-11 PROCEDURE — 36415 COLL VENOUS BLD VENIPUNCTURE: CPT | Performed by: EMERGENCY MEDICINE

## 2022-06-11 PROCEDURE — 84484 ASSAY OF TROPONIN QUANT: CPT

## 2022-06-11 PROCEDURE — 80053 COMPREHEN METABOLIC PANEL: CPT | Performed by: EMERGENCY MEDICINE

## 2022-06-11 PROCEDURE — 99285 EMERGENCY DEPT VISIT HI MDM: CPT

## 2022-06-11 PROCEDURE — 93005 ELECTROCARDIOGRAM TRACING: CPT

## 2022-06-11 PROCEDURE — 83735 ASSAY OF MAGNESIUM: CPT | Performed by: EMERGENCY MEDICINE

## 2022-06-11 PROCEDURE — 83690 ASSAY OF LIPASE: CPT | Performed by: EMERGENCY MEDICINE

## 2022-06-11 RX ORDER — SODIUM CHLORIDE 0.9 % (FLUSH) 0.9 %
10 SYRINGE (ML) INJECTION AS NEEDED
Status: DISCONTINUED | OUTPATIENT
Start: 2022-06-11 | End: 2022-06-25 | Stop reason: HOSPADM

## 2022-06-11 RX ORDER — ASPIRIN 81 MG/1
324 TABLET, CHEWABLE ORAL ONCE
Status: COMPLETED | OUTPATIENT
Start: 2022-06-11 | End: 2022-06-12

## 2022-06-12 PROBLEM — J81.1 PULMONARY EDEMA: Status: ACTIVE | Noted: 2022-06-12

## 2022-06-12 PROBLEM — I50.9 ACUTE CONGESTIVE HEART FAILURE (HCC): Status: ACTIVE | Noted: 2022-06-12

## 2022-06-12 LAB
ALBUMIN SERPL-MCNC: 4.3 G/DL (ref 3.5–5.2)
ALBUMIN/GLOB SERPL: 1.7 G/DL
ALP SERPL-CCNC: 88 U/L (ref 39–117)
ALT SERPL W P-5'-P-CCNC: 19 U/L (ref 1–41)
ANION GAP SERPL CALCULATED.3IONS-SCNC: 14.3 MMOL/L (ref 5–15)
AST SERPL-CCNC: 20 U/L (ref 1–40)
BASOPHILS # BLD AUTO: 0.01 10*3/MM3 (ref 0–0.2)
BASOPHILS NFR BLD AUTO: 0.1 % (ref 0–1.5)
BILIRUB SERPL-MCNC: 0.5 MG/DL (ref 0–1.2)
BUN SERPL-MCNC: 67 MG/DL (ref 8–23)
BUN/CREAT SERPL: 23.1 (ref 7–25)
CALCIUM SPEC-SCNC: 9.5 MG/DL (ref 8.6–10.5)
CHLORIDE SERPL-SCNC: 105 MMOL/L (ref 98–107)
CO2 SERPL-SCNC: 18.7 MMOL/L (ref 22–29)
CREAT SERPL-MCNC: 2.9 MG/DL (ref 0.76–1.27)
DEPRECATED RDW RBC AUTO: 63.4 FL (ref 37–54)
EGFRCR SERPLBLD CKD-EPI 2021: 21.7 ML/MIN/1.73
EOSINOPHIL # BLD AUTO: 0.01 10*3/MM3 (ref 0–0.4)
EOSINOPHIL NFR BLD AUTO: 0.1 % (ref 0.3–6.2)
ERYTHROCYTE [DISTWIDTH] IN BLOOD BY AUTOMATED COUNT: 17.4 % (ref 12.3–15.4)
GLOBULIN UR ELPH-MCNC: 2.6 GM/DL
GLUCOSE SERPL-MCNC: 132 MG/DL (ref 65–99)
HCT VFR BLD AUTO: 27.2 % (ref 37.5–51)
HGB BLD-MCNC: 8.8 G/DL (ref 13–17.7)
HOLD SPECIMEN: NORMAL
IMM GRANULOCYTES # BLD AUTO: 0.03 10*3/MM3 (ref 0–0.05)
IMM GRANULOCYTES NFR BLD AUTO: 0.4 % (ref 0–0.5)
LYMPHOCYTES # BLD AUTO: 0.42 10*3/MM3 (ref 0.7–3.1)
LYMPHOCYTES NFR BLD AUTO: 5.5 % (ref 19.6–45.3)
MCH RBC QN AUTO: 32.8 PG (ref 26.6–33)
MCHC RBC AUTO-ENTMCNC: 32.4 G/DL (ref 31.5–35.7)
MCV RBC AUTO: 101.5 FL (ref 79–97)
MONOCYTES # BLD AUTO: 0.12 10*3/MM3 (ref 0.1–0.9)
MONOCYTES NFR BLD AUTO: 1.6 % (ref 5–12)
NEUTROPHILS NFR BLD AUTO: 7.03 10*3/MM3 (ref 1.7–7)
NEUTROPHILS NFR BLD AUTO: 92.3 % (ref 42.7–76)
NRBC BLD AUTO-RTO: 0 /100 WBC (ref 0–0.2)
PLATELET # BLD AUTO: 102 10*3/MM3 (ref 140–450)
PMV BLD AUTO: 12.6 FL (ref 6–12)
POTASSIUM SERPL-SCNC: 5.5 MMOL/L (ref 3.5–5.2)
PROT SERPL-MCNC: 6.9 G/DL (ref 6–8.5)
RBC # BLD AUTO: 2.68 10*6/MM3 (ref 4.14–5.8)
SODIUM SERPL-SCNC: 138 MMOL/L (ref 136–145)
WBC NRBC COR # BLD: 7.62 10*3/MM3 (ref 3.4–10.8)

## 2022-06-12 PROCEDURE — 94640 AIRWAY INHALATION TREATMENT: CPT

## 2022-06-12 PROCEDURE — 93010 ELECTROCARDIOGRAM REPORT: CPT | Performed by: INTERNAL MEDICINE

## 2022-06-12 PROCEDURE — 25010000002 FUROSEMIDE PER 20 MG: Performed by: INTERNAL MEDICINE

## 2022-06-12 PROCEDURE — 94799 UNLISTED PULMONARY SVC/PX: CPT

## 2022-06-12 PROCEDURE — 25010000002 MAGNESIUM SULFATE 2 GM/50ML SOLUTION: Performed by: INTERNAL MEDICINE

## 2022-06-12 PROCEDURE — 80053 COMPREHEN METABOLIC PANEL: CPT | Performed by: INTERNAL MEDICINE

## 2022-06-12 PROCEDURE — 25010000002 FUROSEMIDE PER 20 MG: Performed by: EMERGENCY MEDICINE

## 2022-06-12 PROCEDURE — 93005 ELECTROCARDIOGRAM TRACING: CPT | Performed by: EMERGENCY MEDICINE

## 2022-06-12 PROCEDURE — 99222 1ST HOSP IP/OBS MODERATE 55: CPT | Performed by: INTERNAL MEDICINE

## 2022-06-12 PROCEDURE — 94664 DEMO&/EVAL PT USE INHALER: CPT

## 2022-06-12 PROCEDURE — 85025 COMPLETE CBC W/AUTO DIFF WBC: CPT | Performed by: INTERNAL MEDICINE

## 2022-06-12 PROCEDURE — 25010000002 METHYLPREDNISOLONE PER 125 MG: Performed by: EMERGENCY MEDICINE

## 2022-06-12 RX ORDER — TAMSULOSIN HYDROCHLORIDE 0.4 MG/1
0.4 CAPSULE ORAL DAILY
Status: DISCONTINUED | OUTPATIENT
Start: 2022-06-12 | End: 2022-06-25 | Stop reason: HOSPADM

## 2022-06-12 RX ORDER — NALOXONE HCL 0.4 MG/ML
0.4 VIAL (ML) INJECTION
Status: DISCONTINUED | OUTPATIENT
Start: 2022-06-12 | End: 2022-06-25 | Stop reason: HOSPADM

## 2022-06-12 RX ORDER — ONDANSETRON 2 MG/ML
4 INJECTION INTRAMUSCULAR; INTRAVENOUS EVERY 4 HOURS PRN
Status: DISCONTINUED | OUTPATIENT
Start: 2022-06-12 | End: 2022-06-25 | Stop reason: HOSPADM

## 2022-06-12 RX ORDER — IPRATROPIUM BROMIDE AND ALBUTEROL SULFATE 2.5; .5 MG/3ML; MG/3ML
3 SOLUTION RESPIRATORY (INHALATION)
Status: DISCONTINUED | OUTPATIENT
Start: 2022-06-12 | End: 2022-06-25 | Stop reason: HOSPADM

## 2022-06-12 RX ORDER — ENOXAPARIN SODIUM 100 MG/ML
30 INJECTION SUBCUTANEOUS EVERY 24 HOURS
Status: DISCONTINUED | OUTPATIENT
Start: 2022-06-13 | End: 2022-06-13

## 2022-06-12 RX ORDER — SODIUM CHLORIDE 0.9 % (FLUSH) 0.9 %
10 SYRINGE (ML) INJECTION EVERY 12 HOURS SCHEDULED
Status: DISCONTINUED | OUTPATIENT
Start: 2022-06-12 | End: 2022-06-25 | Stop reason: HOSPADM

## 2022-06-12 RX ORDER — SODIUM CHLORIDE 0.9 % (FLUSH) 0.9 %
10 SYRINGE (ML) INJECTION AS NEEDED
Status: DISCONTINUED | OUTPATIENT
Start: 2022-06-12 | End: 2022-06-25 | Stop reason: HOSPADM

## 2022-06-12 RX ORDER — FAMOTIDINE 20 MG/1
40 TABLET, FILM COATED ORAL DAILY
Status: DISCONTINUED | OUTPATIENT
Start: 2022-06-12 | End: 2022-06-12

## 2022-06-12 RX ORDER — ATORVASTATIN CALCIUM 40 MG/1
40 TABLET, FILM COATED ORAL NIGHTLY
Status: DISCONTINUED | OUTPATIENT
Start: 2022-06-12 | End: 2022-06-25 | Stop reason: HOSPADM

## 2022-06-12 RX ORDER — AMOXICILLIN 250 MG
1 CAPSULE ORAL 2 TIMES DAILY
Status: DISCONTINUED | OUTPATIENT
Start: 2022-06-12 | End: 2022-06-25 | Stop reason: HOSPADM

## 2022-06-12 RX ORDER — FUROSEMIDE 10 MG/ML
80 INJECTION INTRAMUSCULAR; INTRAVENOUS ONCE
Status: COMPLETED | OUTPATIENT
Start: 2022-06-12 | End: 2022-06-12

## 2022-06-12 RX ORDER — GABAPENTIN 300 MG/1
300 CAPSULE ORAL 2 TIMES DAILY
COMMUNITY
End: 2022-06-25 | Stop reason: HOSPADM

## 2022-06-12 RX ORDER — MAGNESIUM SULFATE HEPTAHYDRATE 40 MG/ML
2 INJECTION, SOLUTION INTRAVENOUS ONCE
Status: COMPLETED | OUTPATIENT
Start: 2022-06-12 | End: 2022-06-12

## 2022-06-12 RX ORDER — IPRATROPIUM BROMIDE AND ALBUTEROL SULFATE 2.5; .5 MG/3ML; MG/3ML
3 SOLUTION RESPIRATORY (INHALATION) ONCE
Status: COMPLETED | OUTPATIENT
Start: 2022-06-12 | End: 2022-06-12

## 2022-06-12 RX ORDER — FUROSEMIDE 10 MG/ML
80 INJECTION INTRAMUSCULAR; INTRAVENOUS EVERY 12 HOURS
Status: DISCONTINUED | OUTPATIENT
Start: 2022-06-12 | End: 2022-06-13

## 2022-06-12 RX ORDER — TEMAZEPAM 15 MG/1
15 CAPSULE ORAL NIGHTLY PRN
Status: DISPENSED | OUTPATIENT
Start: 2022-06-12 | End: 2022-06-25

## 2022-06-12 RX ORDER — PRAMIPEXOLE DIHYDROCHLORIDE 1 MG/1
1 TABLET ORAL NIGHTLY
Status: DISCONTINUED | OUTPATIENT
Start: 2022-06-12 | End: 2022-06-25 | Stop reason: HOSPADM

## 2022-06-12 RX ORDER — MORPHINE SULFATE 2 MG/ML
2 INJECTION, SOLUTION INTRAMUSCULAR; INTRAVENOUS
Status: ACTIVE | OUTPATIENT
Start: 2022-06-12 | End: 2022-06-15

## 2022-06-12 RX ORDER — ACETAMINOPHEN 325 MG/1
650 TABLET ORAL EVERY 4 HOURS PRN
Status: DISCONTINUED | OUTPATIENT
Start: 2022-06-12 | End: 2022-06-25 | Stop reason: HOSPADM

## 2022-06-12 RX ORDER — HYDROCODONE BITARTRATE AND ACETAMINOPHEN 5; 325 MG/1; MG/1
1 TABLET ORAL EVERY 4 HOURS PRN
Status: DISCONTINUED | OUTPATIENT
Start: 2022-06-12 | End: 2022-06-20

## 2022-06-12 RX ORDER — FAMOTIDINE 20 MG/1
20 TABLET, FILM COATED ORAL DAILY
Status: DISCONTINUED | OUTPATIENT
Start: 2022-06-13 | End: 2022-06-25 | Stop reason: HOSPADM

## 2022-06-12 RX ORDER — NITROGLYCERIN 0.4 MG/1
0.4 TABLET SUBLINGUAL
Status: DISCONTINUED | OUTPATIENT
Start: 2022-06-12 | End: 2022-06-25 | Stop reason: HOSPADM

## 2022-06-12 RX ORDER — METHYLPREDNISOLONE SODIUM SUCCINATE 125 MG/2ML
125 INJECTION, POWDER, LYOPHILIZED, FOR SOLUTION INTRAMUSCULAR; INTRAVENOUS ONCE
Status: COMPLETED | OUTPATIENT
Start: 2022-06-12 | End: 2022-06-12

## 2022-06-12 RX ORDER — ENOXAPARIN SODIUM 100 MG/ML
30 INJECTION SUBCUTANEOUS EVERY 24 HOURS
Status: DISCONTINUED | OUTPATIENT
Start: 2022-06-12 | End: 2022-06-12 | Stop reason: SDUPTHER

## 2022-06-12 RX ORDER — KETOCONAZOLE 20 MG/ML
1 SHAMPOO TOPICAL 2 TIMES WEEKLY
COMMUNITY

## 2022-06-12 RX ORDER — BISACODYL 5 MG/1
5 TABLET, DELAYED RELEASE ORAL DAILY PRN
Status: DISCONTINUED | OUTPATIENT
Start: 2022-06-12 | End: 2022-06-25 | Stop reason: HOSPADM

## 2022-06-12 RX ORDER — METOPROLOL SUCCINATE 50 MG/1
50 TABLET, EXTENDED RELEASE ORAL DAILY
Status: DISCONTINUED | OUTPATIENT
Start: 2022-06-12 | End: 2022-06-13

## 2022-06-12 RX ORDER — BISACODYL 10 MG
10 SUPPOSITORY, RECTAL RECTAL DAILY PRN
Status: DISCONTINUED | OUTPATIENT
Start: 2022-06-12 | End: 2022-06-25 | Stop reason: HOSPADM

## 2022-06-12 RX ORDER — POLYETHYLENE GLYCOL 3350 17 G/17G
17 POWDER, FOR SOLUTION ORAL DAILY PRN
Status: DISCONTINUED | OUTPATIENT
Start: 2022-06-12 | End: 2022-06-25 | Stop reason: HOSPADM

## 2022-06-12 RX ORDER — ALUMINA, MAGNESIA, AND SIMETHICONE 2400; 2400; 240 MG/30ML; MG/30ML; MG/30ML
15 SUSPENSION ORAL EVERY 6 HOURS PRN
Status: DISCONTINUED | OUTPATIENT
Start: 2022-06-12 | End: 2022-06-25 | Stop reason: HOSPADM

## 2022-06-12 RX ORDER — ACETAMINOPHEN 325 MG/1
650 TABLET ORAL EVERY 6 HOURS PRN
COMMUNITY

## 2022-06-12 RX ORDER — ALPRAZOLAM 0.25 MG/1
0.25 TABLET ORAL EVERY 6 HOURS PRN
Status: DISCONTINUED | OUTPATIENT
Start: 2022-06-12 | End: 2022-06-15

## 2022-06-12 RX ORDER — CITALOPRAM 20 MG/1
40 TABLET ORAL DAILY
Status: DISCONTINUED | OUTPATIENT
Start: 2022-06-12 | End: 2022-06-25 | Stop reason: HOSPADM

## 2022-06-12 RX ORDER — CHLORAL HYDRATE 500 MG
1000 CAPSULE ORAL DAILY
COMMUNITY
End: 2022-09-12

## 2022-06-12 RX ADMIN — METHYLPREDNISOLONE SODIUM SUCCINATE 125 MG: 125 INJECTION, POWDER, FOR SOLUTION INTRAMUSCULAR; INTRAVENOUS at 00:47

## 2022-06-12 RX ADMIN — RIVAROXABAN 10 MG: 10 TABLET, FILM COATED ORAL at 13:33

## 2022-06-12 RX ADMIN — FAMOTIDINE 40 MG: 20 TABLET ORAL at 10:45

## 2022-06-12 RX ADMIN — FUROSEMIDE 80 MG: 10 INJECTION, SOLUTION INTRAMUSCULAR; INTRAVENOUS at 00:52

## 2022-06-12 RX ADMIN — IPRATROPIUM BROMIDE AND ALBUTEROL SULFATE 3 ML: 2.5; .5 SOLUTION RESPIRATORY (INHALATION) at 19:47

## 2022-06-12 RX ADMIN — PRAMIPEXOLE DIHYDROCHLORIDE 1 MG: 1 TABLET ORAL at 21:00

## 2022-06-12 RX ADMIN — IPRATROPIUM BROMIDE AND ALBUTEROL SULFATE 3 ML: 2.5; .5 SOLUTION RESPIRATORY (INHALATION) at 09:27

## 2022-06-12 RX ADMIN — TAMSULOSIN HYDROCHLORIDE 0.4 MG: 0.4 CAPSULE ORAL at 13:33

## 2022-06-12 RX ADMIN — FUROSEMIDE 80 MG: 10 INJECTION, SOLUTION INTRAMUSCULAR; INTRAVENOUS at 10:45

## 2022-06-12 RX ADMIN — MAGNESIUM SULFATE 2 G: 2 INJECTION INTRAVENOUS at 14:12

## 2022-06-12 RX ADMIN — FUROSEMIDE 80 MG: 10 INJECTION, SOLUTION INTRAMUSCULAR; INTRAVENOUS at 20:52

## 2022-06-12 RX ADMIN — METOPROLOL SUCCINATE 50 MG: 50 TABLET, EXTENDED RELEASE ORAL at 13:33

## 2022-06-12 RX ADMIN — ALPRAZOLAM 0.25 MG: 0.25 TABLET ORAL at 17:18

## 2022-06-12 RX ADMIN — ASPIRIN 81 MG CHEWABLE TABLET 324 MG: 81 TABLET CHEWABLE at 00:47

## 2022-06-12 RX ADMIN — CITALOPRAM HYDROBROMIDE 40 MG: 20 TABLET ORAL at 13:33

## 2022-06-12 RX ADMIN — SODIUM ZIRCONIUM CYCLOSILICATE 10 G: 10 POWDER, FOR SUSPENSION ORAL at 13:33

## 2022-06-12 RX ADMIN — SENNOSIDES AND DOCUSATE SODIUM 1 TABLET: 50; 8.6 TABLET ORAL at 20:52

## 2022-06-12 RX ADMIN — Medication 10 ML: at 21:00

## 2022-06-12 RX ADMIN — IPRATROPIUM BROMIDE AND ALBUTEROL SULFATE 3 ML: .5; 3 SOLUTION RESPIRATORY (INHALATION) at 00:14

## 2022-06-12 RX ADMIN — ATORVASTATIN CALCIUM 40 MG: 40 TABLET, FILM COATED ORAL at 20:52

## 2022-06-13 LAB
ALBUMIN SERPL-MCNC: 4 G/DL (ref 3.5–5.2)
ANION GAP SERPL CALCULATED.3IONS-SCNC: 12.5 MMOL/L (ref 5–15)
BASOPHILS # BLD AUTO: 0 10*3/MM3 (ref 0–0.2)
BASOPHILS NFR BLD AUTO: 0 % (ref 0–1.5)
BUN SERPL-MCNC: 81 MG/DL (ref 8–23)
BUN/CREAT SERPL: 28.8 (ref 7–25)
CALCIUM SPEC-SCNC: 9.7 MG/DL (ref 8.6–10.5)
CHLORIDE SERPL-SCNC: 99 MMOL/L (ref 98–107)
CO2 SERPL-SCNC: 18.5 MMOL/L (ref 22–29)
CREAT SERPL-MCNC: 2.81 MG/DL (ref 0.76–1.27)
DEPRECATED RDW RBC AUTO: 58.6 FL (ref 37–54)
EGFRCR SERPLBLD CKD-EPI 2021: 22.6 ML/MIN/1.73
EOSINOPHIL # BLD AUTO: 0 10*3/MM3 (ref 0–0.4)
EOSINOPHIL NFR BLD AUTO: 0 % (ref 0.3–6.2)
ERYTHROCYTE [DISTWIDTH] IN BLOOD BY AUTOMATED COUNT: 17 % (ref 12.3–15.4)
FERRITIN SERPL-MCNC: 696 NG/ML (ref 30–400)
GLUCOSE SERPL-MCNC: 156 MG/DL (ref 65–99)
HCT VFR BLD AUTO: 24.5 % (ref 37.5–51)
HGB BLD-MCNC: 8.2 G/DL (ref 13–17.7)
IMM GRANULOCYTES # BLD AUTO: 0.04 10*3/MM3 (ref 0–0.05)
IMM GRANULOCYTES NFR BLD AUTO: 0.6 % (ref 0–0.5)
IRON 24H UR-MRATE: 56 MCG/DL (ref 59–158)
IRON SATN MFR SERPL: 21 % (ref 20–50)
LYMPHOCYTES # BLD AUTO: 0.37 10*3/MM3 (ref 0.7–3.1)
LYMPHOCYTES NFR BLD AUTO: 5.7 % (ref 19.6–45.3)
MAGNESIUM SERPL-MCNC: 1.8 MG/DL (ref 1.6–2.4)
MCH RBC QN AUTO: 32.7 PG (ref 26.6–33)
MCHC RBC AUTO-ENTMCNC: 33.5 G/DL (ref 31.5–35.7)
MCV RBC AUTO: 97.6 FL (ref 79–97)
MONOCYTES # BLD AUTO: 0.35 10*3/MM3 (ref 0.1–0.9)
MONOCYTES NFR BLD AUTO: 5.4 % (ref 5–12)
NEUTROPHILS NFR BLD AUTO: 5.7 10*3/MM3 (ref 1.7–7)
NEUTROPHILS NFR BLD AUTO: 88.3 % (ref 42.7–76)
NRBC BLD AUTO-RTO: 0 /100 WBC (ref 0–0.2)
PHOSPHATE SERPL-MCNC: 4.1 MG/DL (ref 2.5–4.5)
PLATELET # BLD AUTO: 95 10*3/MM3 (ref 140–450)
PMV BLD AUTO: 11.1 FL (ref 6–12)
POTASSIUM SERPL-SCNC: 4.9 MMOL/L (ref 3.5–5.2)
QT INTERVAL: 445 MS
RBC # BLD AUTO: 2.51 10*6/MM3 (ref 4.14–5.8)
SODIUM SERPL-SCNC: 130 MMOL/L (ref 136–145)
TIBC SERPL-MCNC: 268 MCG/DL (ref 298–536)
TRANSFERRIN SERPL-MCNC: 180 MG/DL (ref 200–360)
WBC NRBC COR # BLD: 6.46 10*3/MM3 (ref 3.4–10.8)

## 2022-06-13 PROCEDURE — 85025 COMPLETE CBC W/AUTO DIFF WBC: CPT | Performed by: INTERNAL MEDICINE

## 2022-06-13 PROCEDURE — 83540 ASSAY OF IRON: CPT | Performed by: INTERNAL MEDICINE

## 2022-06-13 PROCEDURE — 99232 SBSQ HOSP IP/OBS MODERATE 35: CPT | Performed by: INTERNAL MEDICINE

## 2022-06-13 PROCEDURE — 25010000002 MAGNESIUM SULFATE 2 GM/50ML SOLUTION: Performed by: INTERNAL MEDICINE

## 2022-06-13 PROCEDURE — 83735 ASSAY OF MAGNESIUM: CPT | Performed by: INTERNAL MEDICINE

## 2022-06-13 PROCEDURE — 80069 RENAL FUNCTION PANEL: CPT | Performed by: INTERNAL MEDICINE

## 2022-06-13 PROCEDURE — 84466 ASSAY OF TRANSFERRIN: CPT | Performed by: INTERNAL MEDICINE

## 2022-06-13 PROCEDURE — 25010000002 ENOXAPARIN PER 10 MG: Performed by: INTERNAL MEDICINE

## 2022-06-13 PROCEDURE — 25010000002 FUROSEMIDE PER 20 MG: Performed by: INTERNAL MEDICINE

## 2022-06-13 PROCEDURE — 94799 UNLISTED PULMONARY SVC/PX: CPT

## 2022-06-13 PROCEDURE — 93010 ELECTROCARDIOGRAM REPORT: CPT | Performed by: INTERNAL MEDICINE

## 2022-06-13 PROCEDURE — 93005 ELECTROCARDIOGRAM TRACING: CPT | Performed by: INTERNAL MEDICINE

## 2022-06-13 PROCEDURE — 82728 ASSAY OF FERRITIN: CPT | Performed by: INTERNAL MEDICINE

## 2022-06-13 RX ORDER — METOPROLOL SUCCINATE 25 MG/1
25 TABLET, EXTENDED RELEASE ORAL EVERY 12 HOURS SCHEDULED
Status: DISCONTINUED | OUTPATIENT
Start: 2022-06-13 | End: 2022-06-25 | Stop reason: HOSPADM

## 2022-06-13 RX ORDER — BUMETANIDE 1 MG/1
2 TABLET ORAL 2 TIMES DAILY
Status: DISCONTINUED | OUTPATIENT
Start: 2022-06-13 | End: 2022-06-14

## 2022-06-13 RX ORDER — MAGNESIUM SULFATE HEPTAHYDRATE 40 MG/ML
2 INJECTION, SOLUTION INTRAVENOUS ONCE
Status: COMPLETED | OUTPATIENT
Start: 2022-06-13 | End: 2022-06-13

## 2022-06-13 RX ADMIN — PRAMIPEXOLE DIHYDROCHLORIDE 1 MG: 1 TABLET ORAL at 20:59

## 2022-06-13 RX ADMIN — TAMSULOSIN HYDROCHLORIDE 0.4 MG: 0.4 CAPSULE ORAL at 08:23

## 2022-06-13 RX ADMIN — BUMETANIDE 2 MG: 1 TABLET ORAL at 21:00

## 2022-06-13 RX ADMIN — IPRATROPIUM BROMIDE AND ALBUTEROL SULFATE 3 ML: 2.5; .5 SOLUTION RESPIRATORY (INHALATION) at 18:21

## 2022-06-13 RX ADMIN — METOPROLOL SUCCINATE 25 MG: 25 TABLET, EXTENDED RELEASE ORAL at 20:59

## 2022-06-13 RX ADMIN — Medication 10 ML: at 21:02

## 2022-06-13 RX ADMIN — FAMOTIDINE 20 MG: 20 TABLET ORAL at 08:23

## 2022-06-13 RX ADMIN — METOPROLOL TARTRATE 25 MG: 25 TABLET, FILM COATED ORAL at 03:53

## 2022-06-13 RX ADMIN — Medication 10 ML: at 08:23

## 2022-06-13 RX ADMIN — CITALOPRAM HYDROBROMIDE 40 MG: 20 TABLET ORAL at 08:23

## 2022-06-13 RX ADMIN — IPRATROPIUM BROMIDE AND ALBUTEROL SULFATE 3 ML: 2.5; .5 SOLUTION RESPIRATORY (INHALATION) at 14:57

## 2022-06-13 RX ADMIN — ATORVASTATIN CALCIUM 40 MG: 40 TABLET, FILM COATED ORAL at 21:01

## 2022-06-13 RX ADMIN — FUROSEMIDE 80 MG: 10 INJECTION, SOLUTION INTRAMUSCULAR; INTRAVENOUS at 08:23

## 2022-06-13 RX ADMIN — MAGNESIUM SULFATE 2 G: 2 INJECTION INTRAVENOUS at 04:34

## 2022-06-13 RX ADMIN — METOPROLOL SUCCINATE 25 MG: 25 TABLET, EXTENDED RELEASE ORAL at 08:22

## 2022-06-13 RX ADMIN — IPRATROPIUM BROMIDE AND ALBUTEROL SULFATE 3 ML: 2.5; .5 SOLUTION RESPIRATORY (INHALATION) at 07:41

## 2022-06-13 RX ADMIN — IPRATROPIUM BROMIDE AND ALBUTEROL SULFATE 3 ML: 2.5; .5 SOLUTION RESPIRATORY (INHALATION) at 00:22

## 2022-06-13 RX ADMIN — APIXABAN 5 MG: 5 TABLET, FILM COATED ORAL at 21:01

## 2022-06-13 RX ADMIN — ENOXAPARIN SODIUM 30 MG: 100 INJECTION SUBCUTANEOUS at 08:23

## 2022-06-14 ENCOUNTER — APPOINTMENT (OUTPATIENT)
Dept: GENERAL RADIOLOGY | Facility: HOSPITAL | Age: 77
End: 2022-06-14

## 2022-06-14 PROBLEM — I49.3 FREQUENT PVCS: Status: ACTIVE | Noted: 2022-06-14

## 2022-06-14 PROBLEM — F23: Status: ACTIVE | Noted: 2022-06-14

## 2022-06-14 LAB
ANION GAP SERPL CALCULATED.3IONS-SCNC: 14.7 MMOL/L (ref 5–15)
BUN SERPL-MCNC: 91 MG/DL (ref 8–23)
BUN/CREAT SERPL: 29.7 (ref 7–25)
CALCIUM SPEC-SCNC: 9.5 MG/DL (ref 8.6–10.5)
CHLORIDE SERPL-SCNC: 98 MMOL/L (ref 98–107)
CO2 SERPL-SCNC: 18.3 MMOL/L (ref 22–29)
CREAT SERPL-MCNC: 3.06 MG/DL (ref 0.76–1.27)
D-LACTATE SERPL-SCNC: 1.4 MMOL/L (ref 0.5–2)
EGFRCR SERPLBLD CKD-EPI 2021: 20.4 ML/MIN/1.73
GLUCOSE SERPL-MCNC: 108 MG/DL (ref 65–99)
MAGNESIUM SERPL-MCNC: 1.9 MG/DL (ref 1.6–2.4)
POTASSIUM SERPL-SCNC: 5 MMOL/L (ref 3.5–5.2)
PROCALCITONIN SERPL-MCNC: 0.13 NG/ML (ref 0–0.25)
SODIUM SERPL-SCNC: 131 MMOL/L (ref 136–145)

## 2022-06-14 PROCEDURE — 99232 SBSQ HOSP IP/OBS MODERATE 35: CPT | Performed by: INTERNAL MEDICINE

## 2022-06-14 PROCEDURE — 80048 BASIC METABOLIC PNL TOTAL CA: CPT | Performed by: INTERNAL MEDICINE

## 2022-06-14 PROCEDURE — 94799 UNLISTED PULMONARY SVC/PX: CPT

## 2022-06-14 PROCEDURE — 83735 ASSAY OF MAGNESIUM: CPT | Performed by: INTERNAL MEDICINE

## 2022-06-14 PROCEDURE — 25010000002 AZITHROMYCIN PER 500 MG: Performed by: INTERNAL MEDICINE

## 2022-06-14 PROCEDURE — 87040 BLOOD CULTURE FOR BACTERIA: CPT | Performed by: INTERNAL MEDICINE

## 2022-06-14 PROCEDURE — 83605 ASSAY OF LACTIC ACID: CPT | Performed by: INTERNAL MEDICINE

## 2022-06-14 PROCEDURE — 25010000002 HALOPERIDOL LACTATE PER 5 MG: Performed by: INTERNAL MEDICINE

## 2022-06-14 PROCEDURE — 83880 ASSAY OF NATRIURETIC PEPTIDE: CPT | Performed by: INTERNAL MEDICINE

## 2022-06-14 PROCEDURE — 84145 PROCALCITONIN (PCT): CPT | Performed by: INTERNAL MEDICINE

## 2022-06-14 PROCEDURE — 71045 X-RAY EXAM CHEST 1 VIEW: CPT

## 2022-06-14 RX ORDER — HALOPERIDOL 5 MG/ML
2 INJECTION INTRAMUSCULAR ONCE
Status: COMPLETED | OUTPATIENT
Start: 2022-06-14 | End: 2022-06-14

## 2022-06-14 RX ORDER — LORAZEPAM 2 MG/ML
0.5 INJECTION INTRAMUSCULAR EVERY 4 HOURS PRN
Status: DISPENSED | OUTPATIENT
Start: 2022-06-14 | End: 2022-06-21

## 2022-06-14 RX ORDER — QUETIAPINE FUMARATE 25 MG/1
25 TABLET, FILM COATED ORAL NIGHTLY
Status: DISCONTINUED | OUTPATIENT
Start: 2022-06-14 | End: 2022-06-15

## 2022-06-14 RX ORDER — GUAIFENESIN 600 MG/1
600 TABLET, EXTENDED RELEASE ORAL 2 TIMES DAILY
Status: DISCONTINUED | OUTPATIENT
Start: 2022-06-14 | End: 2022-06-25 | Stop reason: HOSPADM

## 2022-06-14 RX ORDER — BUMETANIDE 1 MG/1
2 TABLET ORAL 2 TIMES DAILY
Status: DISCONTINUED | OUTPATIENT
Start: 2022-06-14 | End: 2022-06-15

## 2022-06-14 RX ORDER — BUMETANIDE 1 MG/1
4 TABLET ORAL 2 TIMES DAILY
Status: DISCONTINUED | OUTPATIENT
Start: 2022-06-14 | End: 2022-06-14

## 2022-06-14 RX ADMIN — QUETIAPINE FUMARATE 25 MG: 25 TABLET ORAL at 20:49

## 2022-06-14 RX ADMIN — IPRATROPIUM BROMIDE AND ALBUTEROL SULFATE 3 ML: 2.5; .5 SOLUTION RESPIRATORY (INHALATION) at 13:01

## 2022-06-14 RX ADMIN — TEMAZEPAM 15 MG: 15 CAPSULE ORAL at 20:58

## 2022-06-14 RX ADMIN — SENNOSIDES AND DOCUSATE SODIUM 1 TABLET: 50; 8.6 TABLET ORAL at 09:52

## 2022-06-14 RX ADMIN — METOPROLOL SUCCINATE 25 MG: 25 TABLET, EXTENDED RELEASE ORAL at 20:49

## 2022-06-14 RX ADMIN — TAMSULOSIN HYDROCHLORIDE 0.4 MG: 0.4 CAPSULE ORAL at 09:51

## 2022-06-14 RX ADMIN — ACETAMINOPHEN 650 MG: 325 TABLET ORAL at 00:54

## 2022-06-14 RX ADMIN — IPRATROPIUM BROMIDE AND ALBUTEROL SULFATE 3 ML: 2.5; .5 SOLUTION RESPIRATORY (INHALATION) at 06:57

## 2022-06-14 RX ADMIN — ATORVASTATIN CALCIUM 40 MG: 40 TABLET, FILM COATED ORAL at 20:49

## 2022-06-14 RX ADMIN — IPRATROPIUM BROMIDE AND ALBUTEROL SULFATE 3 ML: 2.5; .5 SOLUTION RESPIRATORY (INHALATION) at 19:25

## 2022-06-14 RX ADMIN — METOPROLOL SUCCINATE 25 MG: 25 TABLET, EXTENDED RELEASE ORAL at 09:51

## 2022-06-14 RX ADMIN — IPRATROPIUM BROMIDE AND ALBUTEROL SULFATE 3 ML: 2.5; .5 SOLUTION RESPIRATORY (INHALATION) at 01:05

## 2022-06-14 RX ADMIN — HALOPERIDOL LACTATE 2 MG: 5 INJECTION, SOLUTION INTRAMUSCULAR at 12:08

## 2022-06-14 RX ADMIN — GUAIFENESIN 600 MG: 600 TABLET ORAL at 20:49

## 2022-06-14 RX ADMIN — AZITHROMYCIN 500 MG: 500 INJECTION, POWDER, LYOPHILIZED, FOR SOLUTION INTRAVENOUS at 12:28

## 2022-06-14 RX ADMIN — FAMOTIDINE 20 MG: 20 TABLET ORAL at 09:51

## 2022-06-14 RX ADMIN — BUMETANIDE 4 MG: 1 TABLET ORAL at 09:51

## 2022-06-14 RX ADMIN — PRAMIPEXOLE DIHYDROCHLORIDE 1 MG: 1 TABLET ORAL at 20:49

## 2022-06-14 RX ADMIN — CITALOPRAM HYDROBROMIDE 40 MG: 20 TABLET ORAL at 09:51

## 2022-06-14 RX ADMIN — APIXABAN 5 MG: 5 TABLET, FILM COATED ORAL at 20:48

## 2022-06-14 RX ADMIN — APIXABAN 5 MG: 5 TABLET, FILM COATED ORAL at 09:51

## 2022-06-14 RX ADMIN — ALPRAZOLAM 0.25 MG: 0.25 TABLET ORAL at 23:27

## 2022-06-14 RX ADMIN — ACETAMINOPHEN 650 MG: 325 TABLET ORAL at 12:08

## 2022-06-14 RX ADMIN — Medication 10 ML: at 09:52

## 2022-06-14 RX ADMIN — BUMETANIDE 2 MG: 1 TABLET ORAL at 20:49

## 2022-06-14 RX ADMIN — HYDROCODONE BITARTRATE AND ACETAMINOPHEN 1 TABLET: 5; 325 TABLET ORAL at 23:27

## 2022-06-14 RX ADMIN — GUAIFENESIN 600 MG: 600 TABLET ORAL at 09:52

## 2022-06-14 RX ADMIN — Medication 10 ML: at 20:58

## 2022-06-15 ENCOUNTER — APPOINTMENT (OUTPATIENT)
Dept: CT IMAGING | Facility: HOSPITAL | Age: 77
End: 2022-06-15

## 2022-06-15 LAB
BILIRUB UR QL STRIP: NEGATIVE
CLARITY UR: CLEAR
COLOR UR: YELLOW
CREAT UR-MCNC: 114 MG/DL
GLUCOSE UR STRIP-MCNC: NEGATIVE MG/DL
HGB UR QL STRIP.AUTO: NEGATIVE
KETONES UR QL STRIP: NEGATIVE
LEUKOCYTE ESTERASE UR QL STRIP.AUTO: NEGATIVE
NITRITE UR QL STRIP: NEGATIVE
NT-PROBNP SERPL-MCNC: ABNORMAL PG/ML (ref 0–1800)
PH UR STRIP.AUTO: <=5 [PH] (ref 5–8)
PROT ?TM UR-MCNC: 19 MG/DL
PROT UR QL STRIP: NEGATIVE
PROT/CREAT UR: 0.17 MG/G{CREAT}
SP GR UR STRIP: 1.01 (ref 1–1.03)
TROPONIN I SERPL-MCNC: NORMAL NG/ML
UROBILINOGEN UR QL STRIP: NORMAL

## 2022-06-15 PROCEDURE — 94799 UNLISTED PULMONARY SVC/PX: CPT

## 2022-06-15 PROCEDURE — 71250 CT THORAX DX C-: CPT

## 2022-06-15 PROCEDURE — 82570 ASSAY OF URINE CREATININE: CPT | Performed by: INTERNAL MEDICINE

## 2022-06-15 PROCEDURE — 84156 ASSAY OF PROTEIN URINE: CPT | Performed by: INTERNAL MEDICINE

## 2022-06-15 PROCEDURE — 25010000002 AZITHROMYCIN PER 500 MG: Performed by: INTERNAL MEDICINE

## 2022-06-15 PROCEDURE — 94664 DEMO&/EVAL PT USE INHALER: CPT

## 2022-06-15 PROCEDURE — 25010000002 FUROSEMIDE PER 20 MG: Performed by: INTERNAL MEDICINE

## 2022-06-15 PROCEDURE — 81003 URINALYSIS AUTO W/O SCOPE: CPT | Performed by: INTERNAL MEDICINE

## 2022-06-15 RX ORDER — BUMETANIDE 0.25 MG/ML
4 INJECTION INTRAMUSCULAR; INTRAVENOUS EVERY 12 HOURS
Status: DISCONTINUED | OUTPATIENT
Start: 2022-06-15 | End: 2022-06-16

## 2022-06-15 RX ORDER — HYDROXYZINE PAMOATE 25 MG/1
25 CAPSULE ORAL EVERY 8 HOURS
Status: DISCONTINUED | OUTPATIENT
Start: 2022-06-15 | End: 2022-06-15

## 2022-06-15 RX ORDER — TRAZODONE HYDROCHLORIDE 50 MG/1
50 TABLET ORAL NIGHTLY
Status: DISCONTINUED | OUTPATIENT
Start: 2022-06-15 | End: 2022-06-15

## 2022-06-15 RX ORDER — FUROSEMIDE 10 MG/ML
40 INJECTION INTRAMUSCULAR; INTRAVENOUS ONCE
Status: COMPLETED | OUTPATIENT
Start: 2022-06-15 | End: 2022-06-15

## 2022-06-15 RX ADMIN — TAMSULOSIN HYDROCHLORIDE 0.4 MG: 0.4 CAPSULE ORAL at 09:22

## 2022-06-15 RX ADMIN — IPRATROPIUM BROMIDE AND ALBUTEROL SULFATE 3 ML: 2.5; .5 SOLUTION RESPIRATORY (INHALATION) at 19:28

## 2022-06-15 RX ADMIN — METOPROLOL SUCCINATE 25 MG: 25 TABLET, EXTENDED RELEASE ORAL at 09:23

## 2022-06-15 RX ADMIN — SENNOSIDES AND DOCUSATE SODIUM 1 TABLET: 50; 8.6 TABLET ORAL at 09:22

## 2022-06-15 RX ADMIN — IPRATROPIUM BROMIDE AND ALBUTEROL SULFATE 3 ML: 2.5; .5 SOLUTION RESPIRATORY (INHALATION) at 08:18

## 2022-06-15 RX ADMIN — ACETAMINOPHEN 650 MG: 325 TABLET ORAL at 18:10

## 2022-06-15 RX ADMIN — MICONAZOLE NITRATE: 2 POWDER TOPICAL at 16:55

## 2022-06-15 RX ADMIN — FUROSEMIDE 40 MG: 10 INJECTION, SOLUTION INTRAMUSCULAR; INTRAVENOUS at 11:51

## 2022-06-15 RX ADMIN — Medication 10 ML: at 21:01

## 2022-06-15 RX ADMIN — BUMETANIDE 2 MG: 1 TABLET ORAL at 09:22

## 2022-06-15 RX ADMIN — AZITHROMYCIN 500 MG: 500 INJECTION, POWDER, LYOPHILIZED, FOR SOLUTION INTRAVENOUS at 11:52

## 2022-06-15 RX ADMIN — APIXABAN 5 MG: 5 TABLET, FILM COATED ORAL at 21:00

## 2022-06-15 RX ADMIN — GUAIFENESIN 600 MG: 600 TABLET ORAL at 21:00

## 2022-06-15 RX ADMIN — APIXABAN 5 MG: 5 TABLET, FILM COATED ORAL at 09:23

## 2022-06-15 RX ADMIN — ACETAMINOPHEN 650 MG: 325 TABLET ORAL at 06:46

## 2022-06-15 RX ADMIN — HYDROCODONE BITARTRATE AND ACETAMINOPHEN 1 TABLET: 5; 325 TABLET ORAL at 23:19

## 2022-06-15 RX ADMIN — BUMETANIDE 4 MG: 0.25 INJECTION INTRAMUSCULAR; INTRAVENOUS at 16:58

## 2022-06-15 RX ADMIN — ATORVASTATIN CALCIUM 40 MG: 40 TABLET, FILM COATED ORAL at 21:00

## 2022-06-15 RX ADMIN — IPRATROPIUM BROMIDE AND ALBUTEROL SULFATE 3 ML: 2.5; .5 SOLUTION RESPIRATORY (INHALATION) at 12:35

## 2022-06-15 RX ADMIN — HYDROCODONE BITARTRATE AND ACETAMINOPHEN 1 TABLET: 5; 325 TABLET ORAL at 12:04

## 2022-06-15 RX ADMIN — Medication 10 ML: at 09:23

## 2022-06-15 RX ADMIN — METOPROLOL SUCCINATE 25 MG: 25 TABLET, EXTENDED RELEASE ORAL at 21:00

## 2022-06-15 RX ADMIN — FAMOTIDINE 20 MG: 20 TABLET ORAL at 09:22

## 2022-06-15 RX ADMIN — HYDROXYZINE PAMOATE 25 MG: 25 CAPSULE ORAL at 11:52

## 2022-06-15 RX ADMIN — PRAMIPEXOLE DIHYDROCHLORIDE 1 MG: 1 TABLET ORAL at 21:00

## 2022-06-15 RX ADMIN — CITALOPRAM HYDROBROMIDE 40 MG: 20 TABLET ORAL at 09:22

## 2022-06-15 RX ADMIN — GUAIFENESIN 600 MG: 600 TABLET ORAL at 09:23

## 2022-06-16 LAB
ALBUMIN SERPL-MCNC: 4 G/DL (ref 3.5–5.2)
ANION GAP SERPL CALCULATED.3IONS-SCNC: 19.1 MMOL/L (ref 5–15)
BUN SERPL-MCNC: 118 MG/DL (ref 8–23)
BUN/CREAT SERPL: 34.3 (ref 7–25)
CALCIUM SPEC-SCNC: 9.6 MG/DL (ref 8.6–10.5)
CHLORIDE SERPL-SCNC: 99 MMOL/L (ref 98–107)
CO2 SERPL-SCNC: 16.9 MMOL/L (ref 22–29)
CREAT SERPL-MCNC: 3.44 MG/DL (ref 0.76–1.27)
EGFRCR SERPLBLD CKD-EPI 2021: 17.7 ML/MIN/1.73
GLUCOSE SERPL-MCNC: 87 MG/DL (ref 65–99)
PHOSPHATE SERPL-MCNC: 6.4 MG/DL (ref 2.5–4.5)
POTASSIUM SERPL-SCNC: 4.7 MMOL/L (ref 3.5–5.2)
QT INTERVAL: 367 MS
QT INTERVAL: 367 MS
SODIUM SERPL-SCNC: 135 MMOL/L (ref 136–145)

## 2022-06-16 PROCEDURE — 99232 SBSQ HOSP IP/OBS MODERATE 35: CPT | Performed by: INTERNAL MEDICINE

## 2022-06-16 PROCEDURE — 80069 RENAL FUNCTION PANEL: CPT | Performed by: NURSE PRACTITIONER

## 2022-06-16 PROCEDURE — 94799 UNLISTED PULMONARY SVC/PX: CPT

## 2022-06-16 PROCEDURE — 94664 DEMO&/EVAL PT USE INHALER: CPT

## 2022-06-16 PROCEDURE — 25010000002 AZITHROMYCIN PER 500 MG: Performed by: INTERNAL MEDICINE

## 2022-06-16 RX ORDER — BUMETANIDE 0.25 MG/ML
4 INJECTION INTRAMUSCULAR; INTRAVENOUS EVERY 8 HOURS
Status: DISCONTINUED | OUTPATIENT
Start: 2022-06-16 | End: 2022-06-18

## 2022-06-16 RX ORDER — METOLAZONE 5 MG/1
5 TABLET ORAL DAILY
Status: DISCONTINUED | OUTPATIENT
Start: 2022-06-16 | End: 2022-06-21

## 2022-06-16 RX ADMIN — IPRATROPIUM BROMIDE AND ALBUTEROL SULFATE 3 ML: 2.5; .5 SOLUTION RESPIRATORY (INHALATION) at 07:22

## 2022-06-16 RX ADMIN — APIXABAN 5 MG: 5 TABLET, FILM COATED ORAL at 08:25

## 2022-06-16 RX ADMIN — BUMETANIDE 4 MG: 0.25 INJECTION INTRAMUSCULAR; INTRAVENOUS at 05:00

## 2022-06-16 RX ADMIN — HYDROCODONE BITARTRATE AND ACETAMINOPHEN 1 TABLET: 5; 325 TABLET ORAL at 20:52

## 2022-06-16 RX ADMIN — TAMSULOSIN HYDROCHLORIDE 0.4 MG: 0.4 CAPSULE ORAL at 08:25

## 2022-06-16 RX ADMIN — MICONAZOLE NITRATE: 2 POWDER TOPICAL at 08:24

## 2022-06-16 RX ADMIN — METOLAZONE 5 MG: 5 TABLET ORAL at 12:32

## 2022-06-16 RX ADMIN — ACETAMINOPHEN 650 MG: 325 TABLET ORAL at 10:39

## 2022-06-16 RX ADMIN — PRAMIPEXOLE DIHYDROCHLORIDE 1 MG: 1 TABLET ORAL at 20:51

## 2022-06-16 RX ADMIN — IPRATROPIUM BROMIDE AND ALBUTEROL SULFATE 3 ML: 2.5; .5 SOLUTION RESPIRATORY (INHALATION) at 00:41

## 2022-06-16 RX ADMIN — GUAIFENESIN 600 MG: 600 TABLET ORAL at 08:25

## 2022-06-16 RX ADMIN — APIXABAN 5 MG: 5 TABLET, FILM COATED ORAL at 20:52

## 2022-06-16 RX ADMIN — BUMETANIDE 4 MG: 0.25 INJECTION INTRAMUSCULAR; INTRAVENOUS at 20:52

## 2022-06-16 RX ADMIN — BUMETANIDE 4 MG: 0.25 INJECTION INTRAMUSCULAR; INTRAVENOUS at 12:31

## 2022-06-16 RX ADMIN — ATORVASTATIN CALCIUM 40 MG: 40 TABLET, FILM COATED ORAL at 20:52

## 2022-06-16 RX ADMIN — FAMOTIDINE 20 MG: 20 TABLET ORAL at 08:25

## 2022-06-16 RX ADMIN — MICONAZOLE NITRATE: 2 POWDER TOPICAL at 20:53

## 2022-06-16 RX ADMIN — METOPROLOL SUCCINATE 25 MG: 25 TABLET, EXTENDED RELEASE ORAL at 20:52

## 2022-06-16 RX ADMIN — IPRATROPIUM BROMIDE AND ALBUTEROL SULFATE 3 ML: 2.5; .5 SOLUTION RESPIRATORY (INHALATION) at 12:27

## 2022-06-16 RX ADMIN — SENNOSIDES AND DOCUSATE SODIUM 1 TABLET: 50; 8.6 TABLET ORAL at 20:52

## 2022-06-16 RX ADMIN — TEMAZEPAM 15 MG: 15 CAPSULE ORAL at 20:51

## 2022-06-16 RX ADMIN — AZITHROMYCIN 500 MG: 500 INJECTION, POWDER, LYOPHILIZED, FOR SOLUTION INTRAVENOUS at 12:32

## 2022-06-16 RX ADMIN — METOPROLOL SUCCINATE 25 MG: 25 TABLET, EXTENDED RELEASE ORAL at 08:25

## 2022-06-16 RX ADMIN — GUAIFENESIN 600 MG: 600 TABLET ORAL at 20:51

## 2022-06-16 RX ADMIN — CITALOPRAM HYDROBROMIDE 40 MG: 20 TABLET ORAL at 08:25

## 2022-06-16 RX ADMIN — HYDROCODONE BITARTRATE AND ACETAMINOPHEN 1 TABLET: 5; 325 TABLET ORAL at 11:31

## 2022-06-16 RX ADMIN — Medication 10 ML: at 20:53

## 2022-06-16 RX ADMIN — IPRATROPIUM BROMIDE AND ALBUTEROL SULFATE 3 ML: 2.5; .5 SOLUTION RESPIRATORY (INHALATION) at 20:47

## 2022-06-16 RX ADMIN — Medication 10 ML: at 08:25

## 2022-06-16 RX ADMIN — SENNOSIDES AND DOCUSATE SODIUM 1 TABLET: 50; 8.6 TABLET ORAL at 08:25

## 2022-06-17 ENCOUNTER — TELEPHONE (OUTPATIENT)
Dept: ONCOLOGY | Facility: CLINIC | Age: 77
End: 2022-06-17

## 2022-06-17 LAB
ANION GAP SERPL CALCULATED.3IONS-SCNC: 17.3 MMOL/L (ref 5–15)
BUN SERPL-MCNC: 111 MG/DL (ref 8–23)
BUN/CREAT SERPL: 38.5 (ref 7–25)
CALCIUM SPEC-SCNC: 10.1 MG/DL (ref 8.6–10.5)
CHLORIDE SERPL-SCNC: 95 MMOL/L (ref 98–107)
CO2 SERPL-SCNC: 22.7 MMOL/L (ref 22–29)
CREAT SERPL-MCNC: 2.88 MG/DL (ref 0.76–1.27)
EGFRCR SERPLBLD CKD-EPI 2021: 21.9 ML/MIN/1.73
GLUCOSE SERPL-MCNC: 108 MG/DL (ref 65–99)
POTASSIUM SERPL-SCNC: 4 MMOL/L (ref 3.5–5.2)
SODIUM SERPL-SCNC: 135 MMOL/L (ref 136–145)

## 2022-06-17 PROCEDURE — 80048 BASIC METABOLIC PNL TOTAL CA: CPT | Performed by: INTERNAL MEDICINE

## 2022-06-17 PROCEDURE — 94799 UNLISTED PULMONARY SVC/PX: CPT

## 2022-06-17 PROCEDURE — 25010000002 AZITHROMYCIN PER 500 MG: Performed by: INTERNAL MEDICINE

## 2022-06-17 PROCEDURE — 99231 SBSQ HOSP IP/OBS SF/LOW 25: CPT | Performed by: INTERNAL MEDICINE

## 2022-06-17 RX ADMIN — HYDROCODONE BITARTRATE AND ACETAMINOPHEN 1 TABLET: 5; 325 TABLET ORAL at 16:21

## 2022-06-17 RX ADMIN — BUMETANIDE 4 MG: 0.25 INJECTION INTRAMUSCULAR; INTRAVENOUS at 13:01

## 2022-06-17 RX ADMIN — METOPROLOL SUCCINATE 25 MG: 25 TABLET, EXTENDED RELEASE ORAL at 20:07

## 2022-06-17 RX ADMIN — MICONAZOLE NITRATE: 2 POWDER TOPICAL at 20:07

## 2022-06-17 RX ADMIN — APIXABAN 5 MG: 5 TABLET, FILM COATED ORAL at 20:07

## 2022-06-17 RX ADMIN — PRAMIPEXOLE DIHYDROCHLORIDE 1 MG: 1 TABLET ORAL at 20:07

## 2022-06-17 RX ADMIN — CITALOPRAM HYDROBROMIDE 40 MG: 20 TABLET ORAL at 08:47

## 2022-06-17 RX ADMIN — IPRATROPIUM BROMIDE AND ALBUTEROL SULFATE 3 ML: 2.5; .5 SOLUTION RESPIRATORY (INHALATION) at 19:01

## 2022-06-17 RX ADMIN — AZITHROMYCIN 500 MG: 500 INJECTION, POWDER, LYOPHILIZED, FOR SOLUTION INTRAVENOUS at 13:01

## 2022-06-17 RX ADMIN — IPRATROPIUM BROMIDE AND ALBUTEROL SULFATE 3 ML: 2.5; .5 SOLUTION RESPIRATORY (INHALATION) at 07:23

## 2022-06-17 RX ADMIN — HYDROCODONE BITARTRATE AND ACETAMINOPHEN 1 TABLET: 5; 325 TABLET ORAL at 09:13

## 2022-06-17 RX ADMIN — SENNOSIDES AND DOCUSATE SODIUM 1 TABLET: 50; 8.6 TABLET ORAL at 20:07

## 2022-06-17 RX ADMIN — IPRATROPIUM BROMIDE AND ALBUTEROL SULFATE 3 ML: 2.5; .5 SOLUTION RESPIRATORY (INHALATION) at 00:27

## 2022-06-17 RX ADMIN — GUAIFENESIN 600 MG: 600 TABLET ORAL at 08:48

## 2022-06-17 RX ADMIN — BUMETANIDE 4 MG: 0.25 INJECTION INTRAMUSCULAR; INTRAVENOUS at 05:47

## 2022-06-17 RX ADMIN — FAMOTIDINE 20 MG: 20 TABLET ORAL at 08:48

## 2022-06-17 RX ADMIN — METOLAZONE 5 MG: 5 TABLET ORAL at 13:02

## 2022-06-17 RX ADMIN — Medication 10 ML: at 08:48

## 2022-06-17 RX ADMIN — METOPROLOL SUCCINATE 25 MG: 25 TABLET, EXTENDED RELEASE ORAL at 08:47

## 2022-06-17 RX ADMIN — APIXABAN 5 MG: 5 TABLET, FILM COATED ORAL at 08:48

## 2022-06-17 RX ADMIN — IPRATROPIUM BROMIDE AND ALBUTEROL SULFATE 3 ML: 2.5; .5 SOLUTION RESPIRATORY (INHALATION) at 12:12

## 2022-06-17 RX ADMIN — Medication 10 ML: at 20:07

## 2022-06-17 RX ADMIN — HYDROCODONE BITARTRATE AND ACETAMINOPHEN 1 TABLET: 5; 325 TABLET ORAL at 20:07

## 2022-06-17 RX ADMIN — ATORVASTATIN CALCIUM 40 MG: 40 TABLET, FILM COATED ORAL at 20:07

## 2022-06-17 RX ADMIN — TEMAZEPAM 15 MG: 15 CAPSULE ORAL at 20:06

## 2022-06-17 RX ADMIN — TAMSULOSIN HYDROCHLORIDE 0.4 MG: 0.4 CAPSULE ORAL at 08:48

## 2022-06-17 RX ADMIN — MICONAZOLE NITRATE: 2 POWDER TOPICAL at 08:48

## 2022-06-17 RX ADMIN — SENNOSIDES AND DOCUSATE SODIUM 1 TABLET: 50; 8.6 TABLET ORAL at 08:47

## 2022-06-17 RX ADMIN — GUAIFENESIN 600 MG: 600 TABLET ORAL at 20:07

## 2022-06-17 RX ADMIN — BUMETANIDE 4 MG: 0.25 INJECTION INTRAMUSCULAR; INTRAVENOUS at 20:06

## 2022-06-17 NOTE — TELEPHONE ENCOUNTER
Caller: PatriciaLiliya    Relationship: Emergency Contact    Best call back number: [phone]    What is the best time to reach you: ANYTIME    Who are you requesting to speak with (clinical staff, provider,  specific staff member): CLINICAL    What was the call regarding: PATIENT IS CURRENTLY ADMITTED TO [facility] UNDER MD. BULLOCK DUE TO FLUID BUILD UP IN LUNGS AS KIDNEYS ARE NOT PROCESSING FLUIDS PROPERLY.    HOSPITAL HAS REQUESTED PATIENT TO HAVE MD. CASAS CONTACT MD. BULLOCK CONCERNING BUILD UP AND POTENTIAL NEED FOR PULMINOLOGIST IN ORDER TO DETERMINE IF THIS IS DUE TO SPREAD OF CANCER OR SOMETHING ELSE. PATIENT WIFE DID NOT HAVE PHONE NUMBER AT TIME. .      Do you require a callback: YES

## 2022-06-17 NOTE — TELEPHONE ENCOUNTER
Returned call to family member with the information from Dr. Heredia, that Dr. Nichole can contact Dr. Heredia via phone at office or through [employer].  She v/u.

## 2022-06-18 LAB
ANION GAP SERPL CALCULATED.3IONS-SCNC: 16.8 MMOL/L (ref 5–15)
BASE EXCESS BLDA CALC-SCNC: 3.1 MMOL/L (ref -2–2)
BASOPHILS # BLD AUTO: 0.02 10*3/MM3 (ref 0–0.2)
BASOPHILS NFR BLD AUTO: 0.4 % (ref 0–1.5)
BDY SITE: ABNORMAL
BUN SERPL-MCNC: 114 MG/DL (ref 8–23)
BUN/CREAT SERPL: 45.8 (ref 7–25)
CALCIUM SPEC-SCNC: 10.1 MG/DL (ref 8.6–10.5)
CHLORIDE SERPL-SCNC: 91 MMOL/L (ref 98–107)
CO2 SERPL-SCNC: 27.2 MMOL/L (ref 22–29)
COHGB MFR BLD: 0.1 % (ref 0–1.5)
CREAT SERPL-MCNC: 2.49 MG/DL (ref 0.76–1.27)
DEPRECATED RDW RBC AUTO: 58.3 FL (ref 37–54)
EGFRCR SERPLBLD CKD-EPI 2021: 26.1 ML/MIN/1.73
EOSINOPHIL # BLD AUTO: 0.21 10*3/MM3 (ref 0–0.4)
EOSINOPHIL NFR BLD AUTO: 3.7 % (ref 0.3–6.2)
ERYTHROCYTE [DISTWIDTH] IN BLOOD BY AUTOMATED COUNT: 16.7 % (ref 12.3–15.4)
FHHB: 11.1 % (ref 0–5)
GLUCOSE SERPL-MCNC: 113 MG/DL (ref 65–99)
HCO3 BLDA-SCNC: 28 MMOL/L (ref 22–26)
HCT VFR BLD AUTO: 28 % (ref 37.5–51)
HGB BLD-MCNC: 9.5 G/DL (ref 13–17.7)
HGB BLDA-MCNC: 10.6 G/DL (ref 13.8–16.4)
IMM GRANULOCYTES # BLD AUTO: 0.03 10*3/MM3 (ref 0–0.05)
IMM GRANULOCYTES NFR BLD AUTO: 0.5 % (ref 0–0.5)
INHALED O2 CONCENTRATION: 21 %
LYMPHOCYTES # BLD AUTO: 0.97 10*3/MM3 (ref 0.7–3.1)
LYMPHOCYTES NFR BLD AUTO: 17.1 % (ref 19.6–45.3)
MCH RBC QN AUTO: 32.6 PG (ref 26.6–33)
MCHC RBC AUTO-ENTMCNC: 33.9 G/DL (ref 31.5–35.7)
MCV RBC AUTO: 96.2 FL (ref 79–97)
METHGB BLD QL: 0.1 % (ref 0–1.5)
MODALITY: ABNORMAL
MONOCYTES # BLD AUTO: 0.67 10*3/MM3 (ref 0.1–0.9)
MONOCYTES NFR BLD AUTO: 11.8 % (ref 5–12)
NEUTROPHILS NFR BLD AUTO: 3.78 10*3/MM3 (ref 1.7–7)
NEUTROPHILS NFR BLD AUTO: 66.5 % (ref 42.7–76)
NRBC BLD AUTO-RTO: 0 /100 WBC (ref 0–0.2)
OXYHGB MFR BLDV: 88.7 % (ref 94–99)
PCO2 BLDA: 43.8 MM HG (ref 35–45)
PH BLDA: 7.42 PH UNITS (ref 7.35–7.45)
PLATELET # BLD AUTO: 179 10*3/MM3 (ref 140–450)
PMV BLD AUTO: 11.9 FL (ref 6–12)
PO2 BLD: 282 MM[HG] (ref 0–500)
PO2 BLDA: 59.3 MM HG (ref 80–100)
POTASSIUM SERPL-SCNC: 3.7 MMOL/L (ref 3.5–5.2)
RBC # BLD AUTO: 2.91 10*6/MM3 (ref 4.14–5.8)
SAO2 % BLDCOA: 88.9 % (ref 95–99)
SODIUM SERPL-SCNC: 135 MMOL/L (ref 136–145)
WBC NRBC COR # BLD: 5.68 10*3/MM3 (ref 3.4–10.8)

## 2022-06-18 PROCEDURE — 99231 SBSQ HOSP IP/OBS SF/LOW 25: CPT | Performed by: INTERNAL MEDICINE

## 2022-06-18 PROCEDURE — 82805 BLOOD GASES W/O2 SATURATION: CPT | Performed by: INTERNAL MEDICINE

## 2022-06-18 PROCEDURE — 99223 1ST HOSP IP/OBS HIGH 75: CPT | Performed by: INTERNAL MEDICINE

## 2022-06-18 PROCEDURE — 36600 WITHDRAWAL OF ARTERIAL BLOOD: CPT | Performed by: INTERNAL MEDICINE

## 2022-06-18 PROCEDURE — 94664 DEMO&/EVAL PT USE INHALER: CPT

## 2022-06-18 PROCEDURE — 25010000002 AZITHROMYCIN PER 500 MG: Performed by: INTERNAL MEDICINE

## 2022-06-18 PROCEDURE — 94799 UNLISTED PULMONARY SVC/PX: CPT

## 2022-06-18 PROCEDURE — 94760 N-INVAS EAR/PLS OXIMETRY 1: CPT

## 2022-06-18 PROCEDURE — 85025 COMPLETE CBC W/AUTO DIFF WBC: CPT | Performed by: INTERNAL MEDICINE

## 2022-06-18 PROCEDURE — 80048 BASIC METABOLIC PNL TOTAL CA: CPT | Performed by: INTERNAL MEDICINE

## 2022-06-18 PROCEDURE — 83050 HGB METHEMOGLOBIN QUAN: CPT | Performed by: INTERNAL MEDICINE

## 2022-06-18 PROCEDURE — 94761 N-INVAS EAR/PLS OXIMETRY MLT: CPT

## 2022-06-18 PROCEDURE — 82375 ASSAY CARBOXYHB QUANT: CPT | Performed by: INTERNAL MEDICINE

## 2022-06-18 RX ORDER — BUDESONIDE 0.5 MG/2ML
0.5 INHALANT ORAL
Status: DISCONTINUED | OUTPATIENT
Start: 2022-06-18 | End: 2022-06-25 | Stop reason: HOSPADM

## 2022-06-18 RX ORDER — ARFORMOTEROL TARTRATE 15 UG/2ML
15 SOLUTION RESPIRATORY (INHALATION)
Status: DISCONTINUED | OUTPATIENT
Start: 2022-06-18 | End: 2022-06-25 | Stop reason: HOSPADM

## 2022-06-18 RX ORDER — BUMETANIDE 1 MG/1
4 TABLET ORAL
Status: DISCONTINUED | OUTPATIENT
Start: 2022-06-18 | End: 2022-06-25 | Stop reason: HOSPADM

## 2022-06-18 RX ADMIN — HYDROCODONE BITARTRATE AND ACETAMINOPHEN 1 TABLET: 5; 325 TABLET ORAL at 06:43

## 2022-06-18 RX ADMIN — GUAIFENESIN 600 MG: 600 TABLET ORAL at 21:30

## 2022-06-18 RX ADMIN — IPRATROPIUM BROMIDE AND ALBUTEROL SULFATE 3 ML: 2.5; .5 SOLUTION RESPIRATORY (INHALATION) at 12:11

## 2022-06-18 RX ADMIN — HYDROCODONE BITARTRATE AND ACETAMINOPHEN 1 TABLET: 5; 325 TABLET ORAL at 23:21

## 2022-06-18 RX ADMIN — APIXABAN 5 MG: 5 TABLET, FILM COATED ORAL at 09:53

## 2022-06-18 RX ADMIN — METOPROLOL SUCCINATE 25 MG: 25 TABLET, EXTENDED RELEASE ORAL at 09:53

## 2022-06-18 RX ADMIN — METOLAZONE 5 MG: 5 TABLET ORAL at 09:52

## 2022-06-18 RX ADMIN — MICONAZOLE NITRATE: 2 POWDER TOPICAL at 10:00

## 2022-06-18 RX ADMIN — METOPROLOL SUCCINATE 25 MG: 25 TABLET, EXTENDED RELEASE ORAL at 21:30

## 2022-06-18 RX ADMIN — AZITHROMYCIN 500 MG: 500 INJECTION, POWDER, LYOPHILIZED, FOR SOLUTION INTRAVENOUS at 10:05

## 2022-06-18 RX ADMIN — CITALOPRAM HYDROBROMIDE 40 MG: 20 TABLET ORAL at 09:53

## 2022-06-18 RX ADMIN — IPRATROPIUM BROMIDE AND ALBUTEROL SULFATE 3 ML: 2.5; .5 SOLUTION RESPIRATORY (INHALATION) at 00:07

## 2022-06-18 RX ADMIN — TEMAZEPAM 15 MG: 15 CAPSULE ORAL at 23:21

## 2022-06-18 RX ADMIN — BUMETANIDE 4 MG: 0.25 INJECTION INTRAMUSCULAR; INTRAVENOUS at 05:41

## 2022-06-18 RX ADMIN — TAMSULOSIN HYDROCHLORIDE 0.4 MG: 0.4 CAPSULE ORAL at 09:53

## 2022-06-18 RX ADMIN — PRAMIPEXOLE DIHYDROCHLORIDE 1 MG: 1 TABLET ORAL at 21:30

## 2022-06-18 RX ADMIN — ACETAMINOPHEN 650 MG: 325 TABLET ORAL at 17:58

## 2022-06-18 RX ADMIN — ATORVASTATIN CALCIUM 40 MG: 40 TABLET, FILM COATED ORAL at 21:30

## 2022-06-18 RX ADMIN — BUMETANIDE 4 MG: 1 TABLET ORAL at 17:58

## 2022-06-18 RX ADMIN — ACETAMINOPHEN 650 MG: 325 TABLET ORAL at 10:01

## 2022-06-18 RX ADMIN — APIXABAN 5 MG: 5 TABLET, FILM COATED ORAL at 21:30

## 2022-06-18 RX ADMIN — IPRATROPIUM BROMIDE AND ALBUTEROL SULFATE 3 ML: 2.5; .5 SOLUTION RESPIRATORY (INHALATION) at 07:08

## 2022-06-18 RX ADMIN — FAMOTIDINE 20 MG: 20 TABLET ORAL at 09:53

## 2022-06-18 RX ADMIN — GUAIFENESIN 600 MG: 600 TABLET ORAL at 09:53

## 2022-06-18 RX ADMIN — IPRATROPIUM BROMIDE AND ALBUTEROL SULFATE 3 ML: 2.5; .5 SOLUTION RESPIRATORY (INHALATION) at 20:09

## 2022-06-19 ENCOUNTER — APPOINTMENT (OUTPATIENT)
Dept: MRI IMAGING | Facility: HOSPITAL | Age: 77
End: 2022-06-19

## 2022-06-19 LAB
ANION GAP SERPL CALCULATED.3IONS-SCNC: 17.4 MMOL/L (ref 5–15)
BACTERIA SPEC AEROBE CULT: NORMAL
BACTERIA SPEC AEROBE CULT: NORMAL
BACTERIA SPEC RESP CULT: NORMAL
BUN SERPL-MCNC: 118 MG/DL (ref 8–23)
BUN/CREAT SERPL: 41.7 (ref 7–25)
CALCIUM SPEC-SCNC: 10.2 MG/DL (ref 8.6–10.5)
CHLORIDE SERPL-SCNC: 92 MMOL/L (ref 98–107)
CO2 SERPL-SCNC: 26.6 MMOL/L (ref 22–29)
CREAT SERPL-MCNC: 2.83 MG/DL (ref 0.76–1.27)
EGFRCR SERPLBLD CKD-EPI 2021: 22.4 ML/MIN/1.73
GLUCOSE SERPL-MCNC: 122 MG/DL (ref 65–99)
GRAM STN SPEC: NORMAL
POTASSIUM SERPL-SCNC: 4 MMOL/L (ref 3.5–5.2)
SODIUM SERPL-SCNC: 136 MMOL/L (ref 136–145)

## 2022-06-19 PROCEDURE — 94799 UNLISTED PULMONARY SVC/PX: CPT

## 2022-06-19 PROCEDURE — 70551 MRI BRAIN STEM W/O DYE: CPT

## 2022-06-19 PROCEDURE — 25010000002 LORAZEPAM PER 2 MG: Performed by: INTERNAL MEDICINE

## 2022-06-19 PROCEDURE — 99232 SBSQ HOSP IP/OBS MODERATE 35: CPT | Performed by: INTERNAL MEDICINE

## 2022-06-19 PROCEDURE — 94664 DEMO&/EVAL PT USE INHALER: CPT

## 2022-06-19 PROCEDURE — 99231 SBSQ HOSP IP/OBS SF/LOW 25: CPT | Performed by: INTERNAL MEDICINE

## 2022-06-19 PROCEDURE — 80048 BASIC METABOLIC PNL TOTAL CA: CPT | Performed by: INTERNAL MEDICINE

## 2022-06-19 PROCEDURE — 94760 N-INVAS EAR/PLS OXIMETRY 1: CPT

## 2022-06-19 PROCEDURE — 97165 OT EVAL LOW COMPLEX 30 MIN: CPT

## 2022-06-19 PROCEDURE — 87205 SMEAR GRAM STAIN: CPT | Performed by: NURSE PRACTITIONER

## 2022-06-19 RX ADMIN — IPRATROPIUM BROMIDE AND ALBUTEROL SULFATE 3 ML: 2.5; .5 SOLUTION RESPIRATORY (INHALATION) at 18:27

## 2022-06-19 RX ADMIN — IPRATROPIUM BROMIDE AND ALBUTEROL SULFATE 3 ML: 2.5; .5 SOLUTION RESPIRATORY (INHALATION) at 07:25

## 2022-06-19 RX ADMIN — TEMAZEPAM 15 MG: 15 CAPSULE ORAL at 22:25

## 2022-06-19 RX ADMIN — IPRATROPIUM BROMIDE AND ALBUTEROL SULFATE 3 ML: 2.5; .5 SOLUTION RESPIRATORY (INHALATION) at 12:19

## 2022-06-19 RX ADMIN — CITALOPRAM HYDROBROMIDE 40 MG: 20 TABLET ORAL at 08:54

## 2022-06-19 RX ADMIN — MICONAZOLE NITRATE: 2 POWDER TOPICAL at 08:55

## 2022-06-19 RX ADMIN — ATORVASTATIN CALCIUM 40 MG: 40 TABLET, FILM COATED ORAL at 20:57

## 2022-06-19 RX ADMIN — METOPROLOL SUCCINATE 25 MG: 25 TABLET, EXTENDED RELEASE ORAL at 20:57

## 2022-06-19 RX ADMIN — Medication 10 ML: at 20:57

## 2022-06-19 RX ADMIN — METOLAZONE 5 MG: 5 TABLET ORAL at 08:54

## 2022-06-19 RX ADMIN — BUMETANIDE 4 MG: 1 TABLET ORAL at 17:00

## 2022-06-19 RX ADMIN — BUDESONIDE 0.5 MG: 0.5 SUSPENSION RESPIRATORY (INHALATION) at 18:28

## 2022-06-19 RX ADMIN — LORAZEPAM 0.5 MG: 2 INJECTION INTRAMUSCULAR; INTRAVENOUS at 15:56

## 2022-06-19 RX ADMIN — ARFORMOTEROL TARTRATE 15 MCG: 15 SOLUTION RESPIRATORY (INHALATION) at 07:25

## 2022-06-19 RX ADMIN — TAMSULOSIN HYDROCHLORIDE 0.4 MG: 0.4 CAPSULE ORAL at 08:54

## 2022-06-19 RX ADMIN — GUAIFENESIN 600 MG: 600 TABLET ORAL at 20:57

## 2022-06-19 RX ADMIN — FAMOTIDINE 20 MG: 20 TABLET ORAL at 08:54

## 2022-06-19 RX ADMIN — BUDESONIDE 0.5 MG: 0.5 SUSPENSION RESPIRATORY (INHALATION) at 07:25

## 2022-06-19 RX ADMIN — GUAIFENESIN 600 MG: 600 TABLET ORAL at 08:54

## 2022-06-19 RX ADMIN — HYDROCODONE BITARTRATE AND ACETAMINOPHEN 1 TABLET: 5; 325 TABLET ORAL at 22:25

## 2022-06-19 RX ADMIN — APIXABAN 5 MG: 5 TABLET, FILM COATED ORAL at 20:57

## 2022-06-19 RX ADMIN — APIXABAN 5 MG: 5 TABLET, FILM COATED ORAL at 08:54

## 2022-06-19 RX ADMIN — BUMETANIDE 4 MG: 1 TABLET ORAL at 08:54

## 2022-06-19 RX ADMIN — ACETAMINOPHEN 650 MG: 325 TABLET ORAL at 05:49

## 2022-06-19 RX ADMIN — PRAMIPEXOLE DIHYDROCHLORIDE 1 MG: 1 TABLET ORAL at 20:57

## 2022-06-19 RX ADMIN — ARFORMOTEROL TARTRATE 15 MCG: 15 SOLUTION RESPIRATORY (INHALATION) at 18:27

## 2022-06-19 RX ADMIN — METOPROLOL SUCCINATE 25 MG: 25 TABLET, EXTENDED RELEASE ORAL at 08:54

## 2022-06-20 PROBLEM — F03.92: Chronic | Status: ACTIVE | Noted: 2022-06-20

## 2022-06-20 LAB
ANION GAP SERPL CALCULATED.3IONS-SCNC: 15.6 MMOL/L (ref 5–15)
BASOPHILS # BLD AUTO: 0.02 10*3/MM3 (ref 0–0.2)
BASOPHILS NFR BLD AUTO: 0.4 % (ref 0–1.5)
BUN SERPL-MCNC: 119 MG/DL (ref 8–23)
BUN/CREAT SERPL: 38.5 (ref 7–25)
CALCIUM SPEC-SCNC: 10.5 MG/DL (ref 8.6–10.5)
CHLORIDE SERPL-SCNC: 88 MMOL/L (ref 98–107)
CO2 SERPL-SCNC: 28.4 MMOL/L (ref 22–29)
COLLECT DURATION TIME UR: 24 HRS
CREAT CL 24H UR+SERPL-VRATE: 14.7 ML/MIN (ref 97–137)
CREAT CL 24H UR+SERPL-VRATE: 21.2 L/24 HR (ref 139.7–197.3)
CREAT SERPL-MCNC: 3.09 MG/DL (ref 0.76–1.27)
CREAT UR-MCNC: 65.2 MG/DL
CREATINE 24H UR-MRATE: 0.85 G/24 HR (ref 1–2.4)
DEPRECATED RDW RBC AUTO: 57.5 FL (ref 37–54)
EGFRCR SERPLBLD CKD-EPI 2021: 20.1 ML/MIN/1.73
EOSINOPHIL # BLD AUTO: 0.11 10*3/MM3 (ref 0–0.4)
EOSINOPHIL NFR BLD AUTO: 2 % (ref 0.3–6.2)
ERYTHROCYTE [DISTWIDTH] IN BLOOD BY AUTOMATED COUNT: 16.4 % (ref 12.3–15.4)
GLUCOSE SERPL-MCNC: 129 MG/DL (ref 65–99)
HCT VFR BLD AUTO: 31.7 % (ref 37.5–51)
HGB BLD-MCNC: 10.7 G/DL (ref 13–17.7)
IMM GRANULOCYTES # BLD AUTO: 0.07 10*3/MM3 (ref 0–0.05)
IMM GRANULOCYTES NFR BLD AUTO: 1.3 % (ref 0–0.5)
LYMPHOCYTES # BLD AUTO: 1.09 10*3/MM3 (ref 0.7–3.1)
LYMPHOCYTES NFR BLD AUTO: 20.1 % (ref 19.6–45.3)
MCH RBC QN AUTO: 32.6 PG (ref 26.6–33)
MCHC RBC AUTO-ENTMCNC: 33.8 G/DL (ref 31.5–35.7)
MCV RBC AUTO: 96.6 FL (ref 79–97)
MONOCYTES # BLD AUTO: 0.76 10*3/MM3 (ref 0.1–0.9)
MONOCYTES NFR BLD AUTO: 14 % (ref 5–12)
NEUTROPHILS NFR BLD AUTO: 3.38 10*3/MM3 (ref 1.7–7)
NEUTROPHILS NFR BLD AUTO: 62.2 % (ref 42.7–76)
NRBC BLD AUTO-RTO: 0 /100 WBC (ref 0–0.2)
PLATELET # BLD AUTO: 205 10*3/MM3 (ref 140–450)
PMV BLD AUTO: 11.5 FL (ref 6–12)
POTASSIUM SERPL-SCNC: 3.8 MMOL/L (ref 3.5–5.2)
RBC # BLD AUTO: 3.28 10*6/MM3 (ref 4.14–5.8)
SODIUM SERPL-SCNC: 132 MMOL/L (ref 136–145)
SPECIMEN VOL 24H UR: 1300 ML
WBC NRBC COR # BLD: 5.43 10*3/MM3 (ref 3.4–10.8)

## 2022-06-20 PROCEDURE — 97161 PT EVAL LOW COMPLEX 20 MIN: CPT

## 2022-06-20 PROCEDURE — 82575 CREATININE CLEARANCE TEST: CPT | Performed by: INTERNAL MEDICINE

## 2022-06-20 PROCEDURE — 94799 UNLISTED PULMONARY SVC/PX: CPT

## 2022-06-20 PROCEDURE — 80048 BASIC METABOLIC PNL TOTAL CA: CPT | Performed by: INTERNAL MEDICINE

## 2022-06-20 PROCEDURE — 99232 SBSQ HOSP IP/OBS MODERATE 35: CPT | Performed by: INTERNAL MEDICINE

## 2022-06-20 PROCEDURE — 85025 COMPLETE CBC W/AUTO DIFF WBC: CPT | Performed by: INTERNAL MEDICINE

## 2022-06-20 RX ORDER — NICOTINE 21 MG/24HR
1 PATCH, TRANSDERMAL 24 HOURS TRANSDERMAL
Status: DISCONTINUED | OUTPATIENT
Start: 2022-06-20 | End: 2022-06-25 | Stop reason: HOSPADM

## 2022-06-20 RX ORDER — HYDROXYZINE PAMOATE 25 MG/1
25 CAPSULE ORAL 3 TIMES DAILY PRN
Status: DISCONTINUED | OUTPATIENT
Start: 2022-06-20 | End: 2022-06-25 | Stop reason: HOSPADM

## 2022-06-20 RX ORDER — DONEPEZIL HYDROCHLORIDE 5 MG/1
5 TABLET, FILM COATED ORAL DAILY
Status: DISCONTINUED | OUTPATIENT
Start: 2022-06-20 | End: 2022-06-25 | Stop reason: HOSPADM

## 2022-06-20 RX ADMIN — GUAIFENESIN 600 MG: 600 TABLET ORAL at 20:27

## 2022-06-20 RX ADMIN — PRAMIPEXOLE DIHYDROCHLORIDE 1 MG: 1 TABLET ORAL at 20:27

## 2022-06-20 RX ADMIN — ACETAMINOPHEN 650 MG: 325 TABLET ORAL at 11:04

## 2022-06-20 RX ADMIN — ACETAMINOPHEN 650 MG: 325 TABLET ORAL at 20:35

## 2022-06-20 RX ADMIN — BUDESONIDE 0.5 MG: 0.5 SUSPENSION RESPIRATORY (INHALATION) at 06:55

## 2022-06-20 RX ADMIN — GUAIFENESIN 600 MG: 600 TABLET ORAL at 08:09

## 2022-06-20 RX ADMIN — APIXABAN 5 MG: 5 TABLET, FILM COATED ORAL at 20:27

## 2022-06-20 RX ADMIN — APIXABAN 5 MG: 5 TABLET, FILM COATED ORAL at 08:10

## 2022-06-20 RX ADMIN — BUMETANIDE 4 MG: 1 TABLET ORAL at 17:36

## 2022-06-20 RX ADMIN — METOPROLOL SUCCINATE 25 MG: 25 TABLET, EXTENDED RELEASE ORAL at 08:09

## 2022-06-20 RX ADMIN — METOPROLOL SUCCINATE 25 MG: 25 TABLET, EXTENDED RELEASE ORAL at 20:27

## 2022-06-20 RX ADMIN — Medication 10 ML: at 08:11

## 2022-06-20 RX ADMIN — DONEPEZIL HYDROCHLORIDE 5 MG: 5 TABLET ORAL at 10:15

## 2022-06-20 RX ADMIN — CITALOPRAM HYDROBROMIDE 40 MG: 20 TABLET ORAL at 08:10

## 2022-06-20 RX ADMIN — ATORVASTATIN CALCIUM 40 MG: 40 TABLET, FILM COATED ORAL at 20:27

## 2022-06-20 RX ADMIN — FAMOTIDINE 20 MG: 20 TABLET ORAL at 08:10

## 2022-06-20 RX ADMIN — BUMETANIDE 4 MG: 1 TABLET ORAL at 08:10

## 2022-06-20 RX ADMIN — IPRATROPIUM BROMIDE AND ALBUTEROL SULFATE 3 ML: 2.5; .5 SOLUTION RESPIRATORY (INHALATION) at 06:54

## 2022-06-20 RX ADMIN — TAMSULOSIN HYDROCHLORIDE 0.4 MG: 0.4 CAPSULE ORAL at 08:09

## 2022-06-20 RX ADMIN — BUDESONIDE 0.5 MG: 0.5 SUSPENSION RESPIRATORY (INHALATION) at 19:17

## 2022-06-20 RX ADMIN — METOLAZONE 5 MG: 5 TABLET ORAL at 08:10

## 2022-06-20 RX ADMIN — ARFORMOTEROL TARTRATE 15 MCG: 15 SOLUTION RESPIRATORY (INHALATION) at 06:54

## 2022-06-20 RX ADMIN — IPRATROPIUM BROMIDE AND ALBUTEROL SULFATE 3 ML: 2.5; .5 SOLUTION RESPIRATORY (INHALATION) at 00:01

## 2022-06-20 RX ADMIN — Medication 10 ML: at 20:27

## 2022-06-20 RX ADMIN — NICOTINE 1 PATCH: 21 PATCH, EXTENDED RELEASE TRANSDERMAL at 10:14

## 2022-06-20 RX ADMIN — MICONAZOLE NITRATE: 2 POWDER TOPICAL at 08:11

## 2022-06-20 RX ADMIN — IPRATROPIUM BROMIDE AND ALBUTEROL SULFATE 3 ML: 2.5; .5 SOLUTION RESPIRATORY (INHALATION) at 12:24

## 2022-06-20 RX ADMIN — ARFORMOTEROL TARTRATE 15 MCG: 15 SOLUTION RESPIRATORY (INHALATION) at 19:17

## 2022-06-20 RX ADMIN — IPRATROPIUM BROMIDE AND ALBUTEROL SULFATE 3 ML: 2.5; .5 SOLUTION RESPIRATORY (INHALATION) at 19:17

## 2022-06-21 PROBLEM — N18.6 ESRD (END STAGE RENAL DISEASE) (HCC): Status: ACTIVE | Noted: 2022-06-11

## 2022-06-21 LAB
ALBUMIN SERPL-MCNC: 4.3 G/DL (ref 3.5–5.2)
ALBUMIN/GLOB SERPL: 1.3 G/DL
ALP SERPL-CCNC: 98 U/L (ref 39–117)
ALT SERPL W P-5'-P-CCNC: 96 U/L (ref 1–41)
ANION GAP SERPL CALCULATED.3IONS-SCNC: 17 MMOL/L (ref 5–15)
AST SERPL-CCNC: 30 U/L (ref 1–40)
BASOPHILS # BLD AUTO: 0.03 10*3/MM3 (ref 0–0.2)
BASOPHILS NFR BLD AUTO: 0.5 % (ref 0–1.5)
BILIRUB SERPL-MCNC: 0.6 MG/DL (ref 0–1.2)
BUN SERPL-MCNC: 123 MG/DL (ref 8–23)
BUN/CREAT SERPL: 40.7 (ref 7–25)
CALCIUM SPEC-SCNC: 10.2 MG/DL (ref 8.6–10.5)
CHLORIDE SERPL-SCNC: 87 MMOL/L (ref 98–107)
CO2 SERPL-SCNC: 27 MMOL/L (ref 22–29)
CREAT SERPL-MCNC: 3.02 MG/DL (ref 0.76–1.27)
DEPRECATED RDW RBC AUTO: 57.4 FL (ref 37–54)
EGFRCR SERPLBLD CKD-EPI 2021: 20.7 ML/MIN/1.73
EOSINOPHIL # BLD AUTO: 0.12 10*3/MM3 (ref 0–0.4)
EOSINOPHIL NFR BLD AUTO: 1.9 % (ref 0.3–6.2)
ERYTHROCYTE [DISTWIDTH] IN BLOOD BY AUTOMATED COUNT: 16.6 % (ref 12.3–15.4)
GLOBULIN UR ELPH-MCNC: 3.2 GM/DL
GLUCOSE SERPL-MCNC: 125 MG/DL (ref 65–99)
HCT VFR BLD AUTO: 29.9 % (ref 37.5–51)
HGB BLD-MCNC: 10.1 G/DL (ref 13–17.7)
IMM GRANULOCYTES # BLD AUTO: 0.08 10*3/MM3 (ref 0–0.05)
IMM GRANULOCYTES NFR BLD AUTO: 1.3 % (ref 0–0.5)
LYMPHOCYTES # BLD AUTO: 1.21 10*3/MM3 (ref 0.7–3.1)
LYMPHOCYTES NFR BLD AUTO: 19.2 % (ref 19.6–45.3)
MCH RBC QN AUTO: 32.3 PG (ref 26.6–33)
MCHC RBC AUTO-ENTMCNC: 33.8 G/DL (ref 31.5–35.7)
MCV RBC AUTO: 95.5 FL (ref 79–97)
MONOCYTES # BLD AUTO: 0.78 10*3/MM3 (ref 0.1–0.9)
MONOCYTES NFR BLD AUTO: 12.4 % (ref 5–12)
NEUTROPHILS NFR BLD AUTO: 4.07 10*3/MM3 (ref 1.7–7)
NEUTROPHILS NFR BLD AUTO: 64.7 % (ref 42.7–76)
NRBC BLD AUTO-RTO: 0 /100 WBC (ref 0–0.2)
PLATELET # BLD AUTO: 201 10*3/MM3 (ref 140–450)
PMV BLD AUTO: 11.7 FL (ref 6–12)
POTASSIUM SERPL-SCNC: 3.4 MMOL/L (ref 3.5–5.2)
PROT SERPL-MCNC: 7.5 G/DL (ref 6–8.5)
RBC # BLD AUTO: 3.13 10*6/MM3 (ref 4.14–5.8)
SODIUM SERPL-SCNC: 131 MMOL/L (ref 136–145)
WBC NRBC COR # BLD: 6.29 10*3/MM3 (ref 3.4–10.8)

## 2022-06-21 PROCEDURE — 99232 SBSQ HOSP IP/OBS MODERATE 35: CPT | Performed by: INTERNAL MEDICINE

## 2022-06-21 PROCEDURE — 94799 UNLISTED PULMONARY SVC/PX: CPT

## 2022-06-21 PROCEDURE — 80053 COMPREHEN METABOLIC PANEL: CPT | Performed by: INTERNAL MEDICINE

## 2022-06-21 PROCEDURE — 94664 DEMO&/EVAL PT USE INHALER: CPT

## 2022-06-21 PROCEDURE — 97116 GAIT TRAINING THERAPY: CPT

## 2022-06-21 PROCEDURE — 85025 COMPLETE CBC W/AUTO DIFF WBC: CPT | Performed by: INTERNAL MEDICINE

## 2022-06-21 PROCEDURE — 99221 1ST HOSP IP/OBS SF/LOW 40: CPT | Performed by: SURGERY

## 2022-06-21 RX ADMIN — IPRATROPIUM BROMIDE AND ALBUTEROL SULFATE 3 ML: 2.5; .5 SOLUTION RESPIRATORY (INHALATION) at 07:43

## 2022-06-21 RX ADMIN — SENNOSIDES AND DOCUSATE SODIUM 1 TABLET: 50; 8.6 TABLET ORAL at 09:44

## 2022-06-21 RX ADMIN — ACETAMINOPHEN 650 MG: 325 TABLET ORAL at 13:14

## 2022-06-21 RX ADMIN — MICONAZOLE NITRATE: 2 POWDER TOPICAL at 10:03

## 2022-06-21 RX ADMIN — ARFORMOTEROL TARTRATE 15 MCG: 15 SOLUTION RESPIRATORY (INHALATION) at 07:43

## 2022-06-21 RX ADMIN — BUMETANIDE 4 MG: 1 TABLET ORAL at 09:43

## 2022-06-21 RX ADMIN — IPRATROPIUM BROMIDE AND ALBUTEROL SULFATE 3 ML: 2.5; .5 SOLUTION RESPIRATORY (INHALATION) at 01:16

## 2022-06-21 RX ADMIN — PRAMIPEXOLE DIHYDROCHLORIDE 1 MG: 1 TABLET ORAL at 20:13

## 2022-06-21 RX ADMIN — MICONAZOLE NITRATE: 2 POWDER TOPICAL at 20:14

## 2022-06-21 RX ADMIN — FAMOTIDINE 20 MG: 20 TABLET ORAL at 09:43

## 2022-06-21 RX ADMIN — IPRATROPIUM BROMIDE AND ALBUTEROL SULFATE 3 ML: 2.5; .5 SOLUTION RESPIRATORY (INHALATION) at 18:54

## 2022-06-21 RX ADMIN — ATORVASTATIN CALCIUM 40 MG: 40 TABLET, FILM COATED ORAL at 20:14

## 2022-06-21 RX ADMIN — IPRATROPIUM BROMIDE AND ALBUTEROL SULFATE 3 ML: 2.5; .5 SOLUTION RESPIRATORY (INHALATION) at 12:20

## 2022-06-21 RX ADMIN — METOPROLOL SUCCINATE 25 MG: 25 TABLET, EXTENDED RELEASE ORAL at 09:44

## 2022-06-21 RX ADMIN — BUDESONIDE 0.5 MG: 0.5 SUSPENSION RESPIRATORY (INHALATION) at 18:54

## 2022-06-21 RX ADMIN — GUAIFENESIN 600 MG: 600 TABLET ORAL at 09:44

## 2022-06-21 RX ADMIN — BUMETANIDE 4 MG: 1 TABLET ORAL at 17:39

## 2022-06-21 RX ADMIN — Medication 10 ML: at 09:44

## 2022-06-21 RX ADMIN — Medication 10 ML: at 20:14

## 2022-06-21 RX ADMIN — DONEPEZIL HYDROCHLORIDE 5 MG: 5 TABLET ORAL at 09:44

## 2022-06-21 RX ADMIN — CITALOPRAM HYDROBROMIDE 40 MG: 20 TABLET ORAL at 09:43

## 2022-06-21 RX ADMIN — BUDESONIDE 0.5 MG: 0.5 SUSPENSION RESPIRATORY (INHALATION) at 07:43

## 2022-06-21 RX ADMIN — TAMSULOSIN HYDROCHLORIDE 0.4 MG: 0.4 CAPSULE ORAL at 09:44

## 2022-06-21 RX ADMIN — GUAIFENESIN 600 MG: 600 TABLET ORAL at 20:14

## 2022-06-21 RX ADMIN — METOPROLOL SUCCINATE 25 MG: 25 TABLET, EXTENDED RELEASE ORAL at 20:14

## 2022-06-21 RX ADMIN — ARFORMOTEROL TARTRATE 15 MCG: 15 SOLUTION RESPIRATORY (INHALATION) at 18:54

## 2022-06-21 RX ADMIN — APIXABAN 5 MG: 5 TABLET, FILM COATED ORAL at 09:44

## 2022-06-21 RX ADMIN — ACETAMINOPHEN 650 MG: 325 TABLET ORAL at 22:27

## 2022-06-21 RX ADMIN — NICOTINE 1 PATCH: 21 PATCH, EXTENDED RELEASE TRANSDERMAL at 09:44

## 2022-06-21 RX ADMIN — ACETAMINOPHEN 650 MG: 325 TABLET ORAL at 06:34

## 2022-06-22 LAB
ANION GAP SERPL CALCULATED.3IONS-SCNC: 17.8 MMOL/L (ref 5–15)
BUN SERPL-MCNC: 127 MG/DL (ref 8–23)
BUN/CREAT SERPL: 37.5 (ref 7–25)
CALCIUM SPEC-SCNC: 10.7 MG/DL (ref 8.6–10.5)
CHLORIDE SERPL-SCNC: 87 MMOL/L (ref 98–107)
CO2 SERPL-SCNC: 29.2 MMOL/L (ref 22–29)
CREAT SERPL-MCNC: 3.39 MG/DL (ref 0.76–1.27)
EGFRCR SERPLBLD CKD-EPI 2021: 18 ML/MIN/1.73
GLUCOSE SERPL-MCNC: 133 MG/DL (ref 65–99)
POTASSIUM SERPL-SCNC: 3.7 MMOL/L (ref 3.5–5.2)
SODIUM SERPL-SCNC: 134 MMOL/L (ref 136–145)

## 2022-06-22 PROCEDURE — 25010000002 ONDANSETRON PER 1 MG: Performed by: INTERNAL MEDICINE

## 2022-06-22 PROCEDURE — 94799 UNLISTED PULMONARY SVC/PX: CPT

## 2022-06-22 PROCEDURE — 80048 BASIC METABOLIC PNL TOTAL CA: CPT | Performed by: INTERNAL MEDICINE

## 2022-06-22 PROCEDURE — 99232 SBSQ HOSP IP/OBS MODERATE 35: CPT | Performed by: INTERNAL MEDICINE

## 2022-06-22 PROCEDURE — 94760 N-INVAS EAR/PLS OXIMETRY 1: CPT

## 2022-06-22 RX ADMIN — FAMOTIDINE 20 MG: 20 TABLET ORAL at 09:31

## 2022-06-22 RX ADMIN — METOPROLOL SUCCINATE 25 MG: 25 TABLET, EXTENDED RELEASE ORAL at 09:33

## 2022-06-22 RX ADMIN — IPRATROPIUM BROMIDE AND ALBUTEROL SULFATE 3 ML: 2.5; .5 SOLUTION RESPIRATORY (INHALATION) at 19:06

## 2022-06-22 RX ADMIN — ATORVASTATIN CALCIUM 40 MG: 40 TABLET, FILM COATED ORAL at 20:03

## 2022-06-22 RX ADMIN — IPRATROPIUM BROMIDE AND ALBUTEROL SULFATE 3 ML: 2.5; .5 SOLUTION RESPIRATORY (INHALATION) at 08:20

## 2022-06-22 RX ADMIN — GUAIFENESIN 600 MG: 600 TABLET ORAL at 20:03

## 2022-06-22 RX ADMIN — IPRATROPIUM BROMIDE AND ALBUTEROL SULFATE 3 ML: 2.5; .5 SOLUTION RESPIRATORY (INHALATION) at 12:31

## 2022-06-22 RX ADMIN — ONDANSETRON 4 MG: 2 INJECTION INTRAMUSCULAR; INTRAVENOUS at 06:01

## 2022-06-22 RX ADMIN — Medication 10 ML: at 09:33

## 2022-06-22 RX ADMIN — MICONAZOLE NITRATE: 2 POWDER TOPICAL at 20:03

## 2022-06-22 RX ADMIN — ACETAMINOPHEN 650 MG: 325 TABLET ORAL at 12:00

## 2022-06-22 RX ADMIN — TAMSULOSIN HYDROCHLORIDE 0.4 MG: 0.4 CAPSULE ORAL at 09:31

## 2022-06-22 RX ADMIN — ARFORMOTEROL TARTRATE 15 MCG: 15 SOLUTION RESPIRATORY (INHALATION) at 08:20

## 2022-06-22 RX ADMIN — CITALOPRAM HYDROBROMIDE 40 MG: 20 TABLET ORAL at 09:31

## 2022-06-22 RX ADMIN — BUDESONIDE 0.5 MG: 0.5 SUSPENSION RESPIRATORY (INHALATION) at 08:20

## 2022-06-22 RX ADMIN — DONEPEZIL HYDROCHLORIDE 5 MG: 5 TABLET ORAL at 09:31

## 2022-06-22 RX ADMIN — GUAIFENESIN 600 MG: 600 TABLET ORAL at 09:31

## 2022-06-22 RX ADMIN — ONDANSETRON 4 MG: 2 INJECTION INTRAMUSCULAR; INTRAVENOUS at 01:10

## 2022-06-22 RX ADMIN — ONDANSETRON 4 MG: 2 INJECTION INTRAMUSCULAR; INTRAVENOUS at 20:03

## 2022-06-22 RX ADMIN — BUMETANIDE 4 MG: 1 TABLET ORAL at 09:32

## 2022-06-22 RX ADMIN — BUDESONIDE 0.5 MG: 0.5 SUSPENSION RESPIRATORY (INHALATION) at 19:06

## 2022-06-22 RX ADMIN — BUMETANIDE 4 MG: 1 TABLET ORAL at 18:18

## 2022-06-22 RX ADMIN — METOPROLOL SUCCINATE 25 MG: 25 TABLET, EXTENDED RELEASE ORAL at 20:03

## 2022-06-22 RX ADMIN — Medication 10 ML: at 20:03

## 2022-06-22 RX ADMIN — NICOTINE 1 PATCH: 21 PATCH, EXTENDED RELEASE TRANSDERMAL at 09:33

## 2022-06-22 RX ADMIN — SENNOSIDES AND DOCUSATE SODIUM 1 TABLET: 50; 8.6 TABLET ORAL at 09:31

## 2022-06-22 RX ADMIN — IPRATROPIUM BROMIDE AND ALBUTEROL SULFATE 3 ML: 2.5; .5 SOLUTION RESPIRATORY (INHALATION) at 00:07

## 2022-06-22 RX ADMIN — PRAMIPEXOLE DIHYDROCHLORIDE 1 MG: 1 TABLET ORAL at 20:03

## 2022-06-22 RX ADMIN — MICONAZOLE NITRATE: 2 POWDER TOPICAL at 09:33

## 2022-06-22 RX ADMIN — ARFORMOTEROL TARTRATE 15 MCG: 15 SOLUTION RESPIRATORY (INHALATION) at 19:06

## 2022-06-23 ENCOUNTER — APPOINTMENT (OUTPATIENT)
Dept: GENERAL RADIOLOGY | Facility: HOSPITAL | Age: 77
End: 2022-06-23

## 2022-06-23 ENCOUNTER — ANESTHESIA (OUTPATIENT)
Dept: PERIOP | Facility: HOSPITAL | Age: 77
End: 2022-06-23

## 2022-06-23 ENCOUNTER — ANESTHESIA EVENT (OUTPATIENT)
Dept: PERIOP | Facility: HOSPITAL | Age: 77
End: 2022-06-23

## 2022-06-23 LAB
ANION GAP SERPL CALCULATED.3IONS-SCNC: 17.4 MMOL/L (ref 5–15)
BUN SERPL-MCNC: 131 MG/DL (ref 8–23)
BUN/CREAT SERPL: 33.9 (ref 7–25)
CALCIUM SPEC-SCNC: 10.7 MG/DL (ref 8.6–10.5)
CHLORIDE SERPL-SCNC: 85 MMOL/L (ref 98–107)
CO2 SERPL-SCNC: 29.6 MMOL/L (ref 22–29)
CREAT SERPL-MCNC: 3.87 MG/DL (ref 0.76–1.27)
EGFRCR SERPLBLD CKD-EPI 2021: 15.4 ML/MIN/1.73
GLUCOSE SERPL-MCNC: 130 MG/DL (ref 65–99)
HBV SURFACE AB SER RIA-ACNC: NORMAL
HBV SURFACE AG SERPL QL IA: NORMAL
POTASSIUM SERPL-SCNC: 3.2 MMOL/L (ref 3.5–5.2)
SODIUM SERPL-SCNC: 132 MMOL/L (ref 136–145)

## 2022-06-23 PROCEDURE — 94799 UNLISTED PULMONARY SVC/PX: CPT

## 2022-06-23 PROCEDURE — 0JH63XZ INSERTION OF TUNNELED VASCULAR ACCESS DEVICE INTO CHEST SUBCUTANEOUS TISSUE AND FASCIA, PERCUTANEOUS APPROACH: ICD-10-PCS | Performed by: SURGERY

## 2022-06-23 PROCEDURE — 25010000002 ONDANSETRON PER 1 MG: Performed by: INTERNAL MEDICINE

## 2022-06-23 PROCEDURE — 94760 N-INVAS EAR/PLS OXIMETRY 1: CPT

## 2022-06-23 PROCEDURE — 0 LIDOCAINE 1 % SOLUTION 10 ML VIAL: Performed by: SURGERY

## 2022-06-23 PROCEDURE — B548ZZA ULTRASONOGRAPHY OF SUPERIOR VENA CAVA, GUIDANCE: ICD-10-PCS | Performed by: SURGERY

## 2022-06-23 PROCEDURE — 99232 SBSQ HOSP IP/OBS MODERATE 35: CPT | Performed by: INTERNAL MEDICINE

## 2022-06-23 PROCEDURE — 71045 X-RAY EXAM CHEST 1 VIEW: CPT

## 2022-06-23 PROCEDURE — 86706 HEP B SURFACE ANTIBODY: CPT | Performed by: INTERNAL MEDICINE

## 2022-06-23 PROCEDURE — 02H633Z INSERTION OF INFUSION DEVICE INTO RIGHT ATRIUM, PERCUTANEOUS APPROACH: ICD-10-PCS | Performed by: SURGERY

## 2022-06-23 PROCEDURE — 86704 HEP B CORE ANTIBODY TOTAL: CPT | Performed by: INTERNAL MEDICINE

## 2022-06-23 PROCEDURE — C1750 CATH, HEMODIALYSIS,LONG-TERM: HCPCS | Performed by: SURGERY

## 2022-06-23 PROCEDURE — 25010000002 DESMOPRESSIN ACETATE PF 4 MCG/ML SOLUTION 1 ML AMPULE: Performed by: SURGERY

## 2022-06-23 PROCEDURE — 87340 HEPATITIS B SURFACE AG IA: CPT | Performed by: INTERNAL MEDICINE

## 2022-06-23 PROCEDURE — 25010000002 HEPARIN (PORCINE) PER 1000 UNITS: Performed by: SURGERY

## 2022-06-23 PROCEDURE — 25010000002 FENTANYL CITRATE (PF) 50 MCG/ML SOLUTION: Performed by: NURSE ANESTHETIST, CERTIFIED REGISTERED

## 2022-06-23 PROCEDURE — 25010000002 CEFAZOLIN IN DEXTROSE 2-4 GM/100ML-% SOLUTION: Performed by: SURGERY

## 2022-06-23 PROCEDURE — 36558 INSERT TUNNELED CV CATH: CPT | Performed by: SURGERY

## 2022-06-23 PROCEDURE — 25010000002 DEXAMETHASONE PER 1 MG: Performed by: NURSE ANESTHETIST, CERTIFIED REGISTERED

## 2022-06-23 PROCEDURE — 77001 FLUOROGUIDE FOR VEIN DEVICE: CPT | Performed by: SURGERY

## 2022-06-23 PROCEDURE — C1894 INTRO/SHEATH, NON-LASER: HCPCS | Performed by: SURGERY

## 2022-06-23 PROCEDURE — 76000 FLUOROSCOPY <1 HR PHYS/QHP: CPT

## 2022-06-23 PROCEDURE — 80048 BASIC METABOLIC PNL TOTAL CA: CPT | Performed by: SURGERY

## 2022-06-23 PROCEDURE — 25010000002 PROPOFOL 10 MG/ML EMULSION: Performed by: NURSE ANESTHETIST, CERTIFIED REGISTERED

## 2022-06-23 RX ORDER — CEFAZOLIN SODIUM 2 G/100ML
2 INJECTION, SOLUTION INTRAVENOUS ONCE
Status: COMPLETED | OUTPATIENT
Start: 2022-06-23 | End: 2022-06-23

## 2022-06-23 RX ORDER — DEXAMETHASONE SODIUM PHOSPHATE 4 MG/ML
INJECTION, SOLUTION INTRA-ARTICULAR; INTRALESIONAL; INTRAMUSCULAR; INTRAVENOUS; SOFT TISSUE AS NEEDED
Status: DISCONTINUED | OUTPATIENT
Start: 2022-06-23 | End: 2022-06-23 | Stop reason: SURG

## 2022-06-23 RX ORDER — LIDOCAINE HYDROCHLORIDE 20 MG/ML
INJECTION, SOLUTION EPIDURAL; INFILTRATION; INTRACAUDAL; PERINEURAL AS NEEDED
Status: DISCONTINUED | OUTPATIENT
Start: 2022-06-23 | End: 2022-06-23 | Stop reason: SURG

## 2022-06-23 RX ORDER — SODIUM CHLORIDE, SODIUM LACTATE, POTASSIUM CHLORIDE, CALCIUM CHLORIDE 600; 310; 30; 20 MG/100ML; MG/100ML; MG/100ML; MG/100ML
INJECTION, SOLUTION INTRAVENOUS CONTINUOUS PRN
Status: DISCONTINUED | OUTPATIENT
Start: 2022-06-23 | End: 2022-06-23 | Stop reason: SURG

## 2022-06-23 RX ORDER — PROPOFOL 10 MG/ML
VIAL (ML) INTRAVENOUS AS NEEDED
Status: DISCONTINUED | OUTPATIENT
Start: 2022-06-23 | End: 2022-06-23 | Stop reason: SURG

## 2022-06-23 RX ORDER — HEPARIN SODIUM 1000 [USP'U]/ML
INJECTION, SOLUTION INTRAVENOUS; SUBCUTANEOUS AS NEEDED
Status: DISCONTINUED | OUTPATIENT
Start: 2022-06-23 | End: 2022-06-23 | Stop reason: HOSPADM

## 2022-06-23 RX ORDER — PHENYLEPHRINE HCL IN 0.9% NACL 1 MG/10 ML
SYRINGE (ML) INTRAVENOUS AS NEEDED
Status: DISCONTINUED | OUTPATIENT
Start: 2022-06-23 | End: 2022-06-23 | Stop reason: SURG

## 2022-06-23 RX ORDER — FENTANYL CITRATE 50 UG/ML
INJECTION, SOLUTION INTRAMUSCULAR; INTRAVENOUS AS NEEDED
Status: DISCONTINUED | OUTPATIENT
Start: 2022-06-23 | End: 2022-06-23 | Stop reason: SURG

## 2022-06-23 RX ADMIN — NICOTINE 1 PATCH: 21 PATCH, EXTENDED RELEASE TRANSDERMAL at 08:43

## 2022-06-23 RX ADMIN — PROPOFOL 50 MG: 10 INJECTION, EMULSION INTRAVENOUS at 14:58

## 2022-06-23 RX ADMIN — DEXAMETHASONE SODIUM PHOSPHATE 4 MG: 4 INJECTION, SOLUTION INTRA-ARTICULAR; INTRALESIONAL; INTRAMUSCULAR; INTRAVENOUS; SOFT TISSUE at 14:49

## 2022-06-23 RX ADMIN — PRAMIPEXOLE DIHYDROCHLORIDE 1 MG: 1 TABLET ORAL at 21:06

## 2022-06-23 RX ADMIN — Medication 10 ML: at 08:44

## 2022-06-23 RX ADMIN — DONEPEZIL HYDROCHLORIDE 5 MG: 5 TABLET ORAL at 08:35

## 2022-06-23 RX ADMIN — TAMSULOSIN HYDROCHLORIDE 0.4 MG: 0.4 CAPSULE ORAL at 08:34

## 2022-06-23 RX ADMIN — BUDESONIDE 0.5 MG: 0.5 SUSPENSION RESPIRATORY (INHALATION) at 07:10

## 2022-06-23 RX ADMIN — GUAIFENESIN 600 MG: 600 TABLET ORAL at 21:06

## 2022-06-23 RX ADMIN — DESMOPRESSIN ACETATE 30 MCG: 4 INJECTION INTRAVENOUS at 18:26

## 2022-06-23 RX ADMIN — METOPROLOL SUCCINATE 25 MG: 25 TABLET, EXTENDED RELEASE ORAL at 08:34

## 2022-06-23 RX ADMIN — IPRATROPIUM BROMIDE AND ALBUTEROL SULFATE 3 ML: 2.5; .5 SOLUTION RESPIRATORY (INHALATION) at 07:10

## 2022-06-23 RX ADMIN — Medication 10 ML: at 21:07

## 2022-06-23 RX ADMIN — ARFORMOTEROL TARTRATE 15 MCG: 15 SOLUTION RESPIRATORY (INHALATION) at 07:10

## 2022-06-23 RX ADMIN — SENNOSIDES AND DOCUSATE SODIUM 1 TABLET: 50; 8.6 TABLET ORAL at 08:35

## 2022-06-23 RX ADMIN — DESMOPRESSIN ACETATE 30 MCG: 4 INJECTION INTRAVENOUS at 16:25

## 2022-06-23 RX ADMIN — ACETAMINOPHEN 650 MG: 325 TABLET ORAL at 21:09

## 2022-06-23 RX ADMIN — ONDANSETRON 4 MG: 2 INJECTION INTRAMUSCULAR; INTRAVENOUS at 08:19

## 2022-06-23 RX ADMIN — Medication 100 MCG: at 14:45

## 2022-06-23 RX ADMIN — MICONAZOLE NITRATE: 2 POWDER TOPICAL at 21:07

## 2022-06-23 RX ADMIN — MICONAZOLE NITRATE: 2 POWDER TOPICAL at 08:44

## 2022-06-23 RX ADMIN — CEFAZOLIN SODIUM 2 G: 2 INJECTION, SOLUTION INTRAVENOUS at 13:15

## 2022-06-23 RX ADMIN — PROPOFOL 100 MG: 10 INJECTION, EMULSION INTRAVENOUS at 14:33

## 2022-06-23 RX ADMIN — METOPROLOL SUCCINATE 25 MG: 25 TABLET, EXTENDED RELEASE ORAL at 21:06

## 2022-06-23 RX ADMIN — BUMETANIDE 4 MG: 1 TABLET ORAL at 08:35

## 2022-06-23 RX ADMIN — PROPOFOL 50 MG: 10 INJECTION, EMULSION INTRAVENOUS at 14:52

## 2022-06-23 RX ADMIN — GUAIFENESIN 600 MG: 600 TABLET ORAL at 08:35

## 2022-06-23 RX ADMIN — Medication 50 MCG: at 14:42

## 2022-06-23 RX ADMIN — LIDOCAINE HYDROCHLORIDE 100 MG: 20 INJECTION, SOLUTION EPIDURAL; INFILTRATION; INTRACAUDAL; PERINEURAL at 14:32

## 2022-06-23 RX ADMIN — SENNOSIDES AND DOCUSATE SODIUM 1 TABLET: 50; 8.6 TABLET ORAL at 21:09

## 2022-06-23 RX ADMIN — CITALOPRAM HYDROBROMIDE 40 MG: 20 TABLET ORAL at 08:34

## 2022-06-23 RX ADMIN — FENTANYL CITRATE 100 MCG: 50 INJECTION, SOLUTION INTRAMUSCULAR; INTRAVENOUS at 14:32

## 2022-06-23 RX ADMIN — SODIUM CHLORIDE, POTASSIUM CHLORIDE, SODIUM LACTATE AND CALCIUM CHLORIDE: 600; 310; 30; 20 INJECTION, SOLUTION INTRAVENOUS at 14:21

## 2022-06-23 RX ADMIN — FAMOTIDINE 20 MG: 20 TABLET ORAL at 08:35

## 2022-06-23 RX ADMIN — IPRATROPIUM BROMIDE AND ALBUTEROL SULFATE 3 ML: 2.5; .5 SOLUTION RESPIRATORY (INHALATION) at 12:23

## 2022-06-23 RX ADMIN — ONDANSETRON 4 MG: 2 INJECTION INTRAMUSCULAR; INTRAVENOUS at 14:49

## 2022-06-23 RX ADMIN — ATORVASTATIN CALCIUM 40 MG: 40 TABLET, FILM COATED ORAL at 21:06

## 2022-06-23 NOTE — ANESTHESIA PREPROCEDURE EVALUATION
Anesthesia Evaluation     Patient summary reviewed and Nursing notes reviewed   no history of anesthetic complications:  NPO Solid Status: > 8 hours  NPO Liquid Status: > 2 hours           Airway   Mallampati: III  TM distance: >3 FB  Neck ROM: full  No difficulty expected  Dental    (+) poor dentition        Pulmonary - normal exam    breath sounds clear to auscultation  (+) COPD, sleep apnea on CPAP,   Cardiovascular - normal exam  Exercise tolerance: poor (<4 METS)    Patient on routine beta blocker and Beta blocker given within 24 hours of surgery  Rhythm: regular  Rate: normal    (+) hypertension, CAD, CABG >6 Months, dysrhythmias Tachycardia, CHF Diastolic >=55%, PVD, hyperlipidemia,  carotid artery disease (S/P endarterectomy)      Neuro/Psych  (+) CVA, psychiatric history Depression and PTSD, dementia,    GI/Hepatic/Renal/Endo    (+)  GERD,  renal disease ESRD,     Musculoskeletal     (+) neck pain,   Abdominal    Substance History - negative use     OB/GYN negative ob/gyn ROS         Other   arthritis,    history of cancer                    Anesthesia Plan    ASA 4     general and MAC     (Patient understands anesthesia not responsible for dental damage.)  intravenous induction     Anesthetic plan, risks, benefits, and alternatives have been provided, discussed and informed consent has been obtained with: patient.  Use of blood products discussed with patient .   Plan discussed with CRNA.        CODE STATUS:    Code Status (Patient has no pulse and is not breathing): CPR (Attempt to Resuscitate)  Medical Interventions (Patient has pulse or is breathing): Full Support

## 2022-06-23 NOTE — ANESTHESIA POSTPROCEDURE EVALUATION
Patient: Rafael Gomez    Procedure Summary     Date: 06/23/22 Room / Location: [facility] OR 02 / [facility] MAIN OR    Anesthesia Start: 1426 Anesthesia Stop: 1517    Procedure: Insertion of tunneled catheter for hemodialysis (N/A ) Diagnosis:       ESRD (end stage renal disease) (HCC)      (ESRD (end stage renal disease) (HCC) [N18.6])    Surgeons: Warren Rodríguez MD Provider: Leticia Mckeon MD    Anesthesia Type: general, MAC ASA Status: 4          Anesthesia Type: general, MAC    Vitals  Vitals Value Taken Time   /62 06/23/22 1527   Temp 36.2 °C (97.2 °F) 06/23/22 1520   Pulse 82 06/23/22 1530   Resp 16 06/23/22 1525   SpO2 100 % 06/23/22 1530   Vitals shown include unvalidated device data.        Post Anesthesia Care and Evaluation    Patient location during evaluation: bedside  Patient participation: complete - patient participated  Level of consciousness: awake  Pain management: adequate    Airway patency: patent  Anesthetic complications: No anesthetic complications  PONV Status: none  Cardiovascular status: acceptable  Respiratory status: acceptable  Hydration status: acceptable    Comments: An Anesthesiologist personally participated in the most demanding procedures (including induction and emergence if applicable) in the anesthesia plan, monitored the course of anesthesia administration at frequent intervals and remained physically present and available for immediate diagnosis and treatment of emergencies.

## 2022-06-24 LAB
ANION GAP SERPL CALCULATED.3IONS-SCNC: 18.8 MMOL/L (ref 5–15)
BUN SERPL-MCNC: 134 MG/DL (ref 8–23)
BUN/CREAT SERPL: 31.3 (ref 7–25)
CALCIUM SPEC-SCNC: 10.2 MG/DL (ref 8.6–10.5)
CHLORIDE SERPL-SCNC: 83 MMOL/L (ref 98–107)
CO2 SERPL-SCNC: 28.2 MMOL/L (ref 22–29)
CREAT SERPL-MCNC: 4.28 MG/DL (ref 0.76–1.27)
DEPRECATED RDW RBC AUTO: 56.1 FL (ref 37–54)
EGFRCR SERPLBLD CKD-EPI 2021: 13.6 ML/MIN/1.73
ERYTHROCYTE [DISTWIDTH] IN BLOOD BY AUTOMATED COUNT: 16.1 % (ref 12.3–15.4)
GLUCOSE SERPL-MCNC: 156 MG/DL (ref 65–99)
HBV CORE AB SERPL QL IA: NEGATIVE
HCT VFR BLD AUTO: 30.7 % (ref 37.5–51)
HGB BLD-MCNC: 10.3 G/DL (ref 13–17.7)
MCH RBC QN AUTO: 31.9 PG (ref 26.6–33)
MCHC RBC AUTO-ENTMCNC: 33.6 G/DL (ref 31.5–35.7)
MCV RBC AUTO: 95 FL (ref 79–97)
PLATELET # BLD AUTO: 206 10*3/MM3 (ref 140–450)
PMV BLD AUTO: 12 FL (ref 6–12)
POTASSIUM SERPL-SCNC: 3.7 MMOL/L (ref 3.5–5.2)
RBC # BLD AUTO: 3.23 10*6/MM3 (ref 4.14–5.8)
SODIUM SERPL-SCNC: 130 MMOL/L (ref 136–145)
WBC NRBC COR # BLD: 6.84 10*3/MM3 (ref 3.4–10.8)

## 2022-06-24 PROCEDURE — 99024 POSTOP FOLLOW-UP VISIT: CPT | Performed by: SURGERY

## 2022-06-24 PROCEDURE — 94799 UNLISTED PULMONARY SVC/PX: CPT

## 2022-06-24 PROCEDURE — 99232 SBSQ HOSP IP/OBS MODERATE 35: CPT | Performed by: INTERNAL MEDICINE

## 2022-06-24 PROCEDURE — 25010000002 HEPARIN (PORCINE) PER 1000 UNITS: Performed by: INTERNAL MEDICINE

## 2022-06-24 PROCEDURE — 85027 COMPLETE CBC AUTOMATED: CPT | Performed by: NURSE PRACTITIONER

## 2022-06-24 PROCEDURE — 80048 BASIC METABOLIC PNL TOTAL CA: CPT | Performed by: SURGERY

## 2022-06-24 PROCEDURE — 25010000002 ONDANSETRON PER 1 MG: Performed by: SURGERY

## 2022-06-24 PROCEDURE — 5A1D70Z PERFORMANCE OF URINARY FILTRATION, INTERMITTENT, LESS THAN 6 HOURS PER DAY: ICD-10-PCS | Performed by: INTERNAL MEDICINE

## 2022-06-24 RX ORDER — HEPARIN SODIUM 1000 [USP'U]/ML
3000 INJECTION, SOLUTION INTRAVENOUS; SUBCUTANEOUS AS NEEDED
Status: DISCONTINUED | OUTPATIENT
Start: 2022-06-24 | End: 2022-06-25 | Stop reason: HOSPADM

## 2022-06-24 RX ADMIN — METOPROLOL SUCCINATE 25 MG: 25 TABLET, EXTENDED RELEASE ORAL at 12:18

## 2022-06-24 RX ADMIN — NICOTINE 1 PATCH: 21 PATCH, EXTENDED RELEASE TRANSDERMAL at 12:33

## 2022-06-24 RX ADMIN — MICONAZOLE NITRATE: 2 POWDER TOPICAL at 20:22

## 2022-06-24 RX ADMIN — FAMOTIDINE 20 MG: 20 TABLET ORAL at 12:20

## 2022-06-24 RX ADMIN — ACETAMINOPHEN 650 MG: 325 TABLET ORAL at 02:06

## 2022-06-24 RX ADMIN — IPRATROPIUM BROMIDE AND ALBUTEROL SULFATE 3 ML: 2.5; .5 SOLUTION RESPIRATORY (INHALATION) at 00:51

## 2022-06-24 RX ADMIN — BUMETANIDE 4 MG: 1 TABLET ORAL at 12:20

## 2022-06-24 RX ADMIN — GUAIFENESIN 600 MG: 600 TABLET ORAL at 20:21

## 2022-06-24 RX ADMIN — ATORVASTATIN CALCIUM 40 MG: 40 TABLET, FILM COATED ORAL at 20:21

## 2022-06-24 RX ADMIN — MICONAZOLE NITRATE: 2 POWDER TOPICAL at 12:33

## 2022-06-24 RX ADMIN — BUDESONIDE 0.5 MG: 0.5 SUSPENSION RESPIRATORY (INHALATION) at 21:47

## 2022-06-24 RX ADMIN — Medication 10 ML: at 22:36

## 2022-06-24 RX ADMIN — BUDESONIDE 0.5 MG: 0.5 SUSPENSION RESPIRATORY (INHALATION) at 06:20

## 2022-06-24 RX ADMIN — Medication 10 ML: at 12:33

## 2022-06-24 RX ADMIN — TAMSULOSIN HYDROCHLORIDE 0.4 MG: 0.4 CAPSULE ORAL at 12:20

## 2022-06-24 RX ADMIN — DONEPEZIL HYDROCHLORIDE 5 MG: 5 TABLET ORAL at 12:19

## 2022-06-24 RX ADMIN — ARFORMOTEROL TARTRATE 15 MCG: 15 SOLUTION RESPIRATORY (INHALATION) at 21:47

## 2022-06-24 RX ADMIN — ONDANSETRON 4 MG: 2 INJECTION INTRAMUSCULAR; INTRAVENOUS at 19:54

## 2022-06-24 RX ADMIN — IPRATROPIUM BROMIDE AND ALBUTEROL SULFATE 3 ML: 2.5; .5 SOLUTION RESPIRATORY (INHALATION) at 12:11

## 2022-06-24 RX ADMIN — BUMETANIDE 4 MG: 1 TABLET ORAL at 18:37

## 2022-06-24 RX ADMIN — ARFORMOTEROL TARTRATE 15 MCG: 15 SOLUTION RESPIRATORY (INHALATION) at 06:20

## 2022-06-24 RX ADMIN — PRAMIPEXOLE DIHYDROCHLORIDE 1 MG: 1 TABLET ORAL at 20:21

## 2022-06-24 RX ADMIN — HEPARIN SODIUM 3000 UNITS: 1000 INJECTION, SOLUTION INTRAVENOUS; SUBCUTANEOUS at 15:21

## 2022-06-24 RX ADMIN — SENNOSIDES AND DOCUSATE SODIUM 1 TABLET: 50; 8.6 TABLET ORAL at 12:18

## 2022-06-24 RX ADMIN — CITALOPRAM HYDROBROMIDE 40 MG: 20 TABLET ORAL at 12:19

## 2022-06-24 RX ADMIN — ACETAMINOPHEN 650 MG: 325 TABLET ORAL at 12:18

## 2022-06-24 RX ADMIN — GUAIFENESIN 600 MG: 600 TABLET ORAL at 12:19

## 2022-06-24 RX ADMIN — IPRATROPIUM BROMIDE AND ALBUTEROL SULFATE 3 ML: 2.5; .5 SOLUTION RESPIRATORY (INHALATION) at 06:20

## 2022-06-25 VITALS
HEIGHT: 70 IN | HEART RATE: 78 BPM | SYSTOLIC BLOOD PRESSURE: 91 MMHG | TEMPERATURE: 97.5 F | OXYGEN SATURATION: 99 % | BODY MASS INDEX: 32.35 KG/M2 | DIASTOLIC BLOOD PRESSURE: 42 MMHG | RESPIRATION RATE: 12 BRPM | WEIGHT: 225.97 LBS

## 2022-06-25 PROBLEM — G47.00 INSOMNIA DISORDER: Chronic | Status: ACTIVE | Noted: 2022-06-25

## 2022-06-25 PROCEDURE — 99232 SBSQ HOSP IP/OBS MODERATE 35: CPT | Performed by: INTERNAL MEDICINE

## 2022-06-25 PROCEDURE — 94799 UNLISTED PULMONARY SVC/PX: CPT

## 2022-06-25 PROCEDURE — 94664 DEMO&/EVAL PT USE INHALER: CPT

## 2022-06-25 RX ORDER — GABAPENTIN 100 MG/1
100 CAPSULE ORAL 2 TIMES DAILY
Qty: 60 CAPSULE | Refills: 0 | Status: SHIPPED | OUTPATIENT
Start: 2022-06-25 | End: 2022-09-12

## 2022-06-25 RX ORDER — ONDANSETRON 2 MG/ML
4 INJECTION INTRAMUSCULAR; INTRAVENOUS EVERY 6 HOURS PRN
Qty: 28 ML | Refills: 0 | Status: SHIPPED | OUTPATIENT
Start: 2022-06-25 | End: 2022-07-02

## 2022-06-25 RX ORDER — DONEPEZIL HYDROCHLORIDE 5 MG/1
5 TABLET, FILM COATED ORAL DAILY
Qty: 30 TABLET | Refills: 0 | Status: SHIPPED | OUTPATIENT
Start: 2022-06-26 | End: 2022-09-12

## 2022-06-25 RX ORDER — TAMSULOSIN HYDROCHLORIDE 0.4 MG/1
0.4 CAPSULE ORAL DAILY
Qty: 30 CAPSULE | Refills: 0 | Status: SHIPPED | OUTPATIENT
Start: 2022-06-25 | End: 2022-08-29

## 2022-06-25 RX ORDER — TEMAZEPAM 15 MG/1
15 CAPSULE ORAL NIGHTLY PRN
Qty: 14 CAPSULE | Refills: 0 | Status: SHIPPED | OUTPATIENT
Start: 2022-06-25 | End: 2022-07-09

## 2022-06-25 RX ADMIN — NICOTINE 1 PATCH: 21 PATCH, EXTENDED RELEASE TRANSDERMAL at 08:20

## 2022-06-25 RX ADMIN — ARFORMOTEROL TARTRATE 15 MCG: 15 SOLUTION RESPIRATORY (INHALATION) at 07:19

## 2022-06-25 RX ADMIN — SENNOSIDES AND DOCUSATE SODIUM 1 TABLET: 50; 8.6 TABLET ORAL at 08:20

## 2022-06-25 RX ADMIN — GUAIFENESIN 600 MG: 600 TABLET ORAL at 08:21

## 2022-06-25 RX ADMIN — ACETAMINOPHEN 650 MG: 325 TABLET ORAL at 08:21

## 2022-06-25 RX ADMIN — TAMSULOSIN HYDROCHLORIDE 0.4 MG: 0.4 CAPSULE ORAL at 08:20

## 2022-06-25 RX ADMIN — CITALOPRAM HYDROBROMIDE 40 MG: 20 TABLET ORAL at 08:20

## 2022-06-25 RX ADMIN — IPRATROPIUM BROMIDE AND ALBUTEROL SULFATE 3 ML: 2.5; .5 SOLUTION RESPIRATORY (INHALATION) at 12:46

## 2022-06-25 RX ADMIN — IPRATROPIUM BROMIDE AND ALBUTEROL SULFATE 3 ML: 2.5; .5 SOLUTION RESPIRATORY (INHALATION) at 00:17

## 2022-06-25 RX ADMIN — FAMOTIDINE 20 MG: 20 TABLET ORAL at 08:21

## 2022-06-25 RX ADMIN — IPRATROPIUM BROMIDE AND ALBUTEROL SULFATE 3 ML: 2.5; .5 SOLUTION RESPIRATORY (INHALATION) at 07:19

## 2022-06-25 RX ADMIN — DONEPEZIL HYDROCHLORIDE 5 MG: 5 TABLET ORAL at 08:21

## 2022-06-25 RX ADMIN — BUDESONIDE 0.5 MG: 0.5 SUSPENSION RESPIRATORY (INHALATION) at 07:19

## 2022-06-26 ENCOUNTER — READMISSION MANAGEMENT (OUTPATIENT)
Dept: CALL CENTER | Facility: HOSPITAL | Age: 77
End: 2022-06-26

## 2022-06-26 NOTE — OUTREACH NOTE
Prep Survey    Flowsheet Row Responses   [facility] facility patient discharged from? [facility]   Is LACE score < 7 ? No   Emergency Room discharge w/ pulse ox? No   Eligibility Readm Mgmt   Discharge diagnosis **ESRD (end stage renal disease                             6/23 Insertion of tunneled catheter for hemodialysis   Does the patient have one of the following disease processes/diagnoses(primary or secondary)? Other   Does the patient have Home health ordered? No   Is there a DME ordered? No   General alerts for this patient HD pt    Prep survey completed? Yes          ES FRAZIER - Registered Nurse

## 2022-06-27 ENCOUNTER — APPOINTMENT (OUTPATIENT)
Dept: CT IMAGING | Facility: HOSPITAL | Age: 77
End: 2022-06-27

## 2022-06-27 ENCOUNTER — APPOINTMENT (OUTPATIENT)
Dept: GENERAL RADIOLOGY | Facility: HOSPITAL | Age: 77
End: 2022-06-27

## 2022-06-27 ENCOUNTER — HOSPITAL ENCOUNTER (INPATIENT)
Facility: HOSPITAL | Age: 77
LOS: 3 days | Discharge: HOME HEALTH | End: 2022-06-30
Attending: EMERGENCY MEDICINE | Admitting: INTERNAL MEDICINE

## 2022-06-27 DIAGNOSIS — E86.1 HYPOTENSION DUE TO HYPOVOLEMIA: Primary | ICD-10-CM

## 2022-06-27 DIAGNOSIS — I95.89 HYPOTENSION DUE TO HYPOVOLEMIA: Primary | ICD-10-CM

## 2022-06-27 DIAGNOSIS — S22.31XA CLOSED FRACTURE OF ONE RIB OF RIGHT SIDE, INITIAL ENCOUNTER: ICD-10-CM

## 2022-06-27 DIAGNOSIS — R19.7 DIARRHEA, UNSPECIFIED TYPE: ICD-10-CM

## 2022-06-27 DIAGNOSIS — R26.2 DIFFICULTY WALKING: ICD-10-CM

## 2022-06-27 DIAGNOSIS — R55 SYNCOPE AND COLLAPSE: ICD-10-CM

## 2022-06-27 LAB
ALBUMIN SERPL-MCNC: 4.2 G/DL (ref 3.5–5.2)
ALBUMIN/GLOB SERPL: 1.6 G/DL
ALP SERPL-CCNC: 100 U/L (ref 39–117)
ALT SERPL W P-5'-P-CCNC: 25 U/L (ref 1–41)
ANION GAP SERPL CALCULATED.3IONS-SCNC: 15.8 MMOL/L (ref 5–15)
AST SERPL-CCNC: 26 U/L (ref 1–40)
BASOPHILS # BLD AUTO: 0.05 10*3/MM3 (ref 0–0.2)
BASOPHILS NFR BLD AUTO: 0.5 % (ref 0–1.5)
BILIRUB SERPL-MCNC: 0.4 MG/DL (ref 0–1.2)
BUN SERPL-MCNC: 80 MG/DL (ref 8–23)
BUN/CREAT SERPL: 14.8 (ref 7–25)
CALCIUM SPEC-SCNC: 9.7 MG/DL (ref 8.6–10.5)
CHLORIDE SERPL-SCNC: 96 MMOL/L (ref 98–107)
CO2 SERPL-SCNC: 21.2 MMOL/L (ref 22–29)
CREAT SERPL-MCNC: 5.4 MG/DL (ref 0.76–1.27)
D-LACTATE SERPL-SCNC: 1.6 MMOL/L (ref 0.5–2)
DEPRECATED RDW RBC AUTO: 56.9 FL (ref 37–54)
EGFRCR SERPLBLD CKD-EPI 2021: 10.3 ML/MIN/1.73
EOSINOPHIL # BLD AUTO: 0.14 10*3/MM3 (ref 0–0.4)
EOSINOPHIL NFR BLD AUTO: 1.5 % (ref 0.3–6.2)
ERYTHROCYTE [DISTWIDTH] IN BLOOD BY AUTOMATED COUNT: 16.3 % (ref 12.3–15.4)
GLOBULIN UR ELPH-MCNC: 2.7 GM/DL
GLUCOSE SERPL-MCNC: 129 MG/DL (ref 65–99)
HCT VFR BLD AUTO: 29.2 % (ref 37.5–51)
HGB BLD-MCNC: 9.9 G/DL (ref 13–17.7)
HOLD SPECIMEN: 11
HOLD SPECIMEN: 11
IMM GRANULOCYTES # BLD AUTO: 0.06 10*3/MM3 (ref 0–0.05)
IMM GRANULOCYTES NFR BLD AUTO: 0.6 % (ref 0–0.5)
LYMPHOCYTES # BLD AUTO: 1 10*3/MM3 (ref 0.7–3.1)
LYMPHOCYTES NFR BLD AUTO: 10.7 % (ref 19.6–45.3)
MAGNESIUM SERPL-MCNC: 2.1 MG/DL (ref 1.6–2.4)
MCH RBC QN AUTO: 32.9 PG (ref 26.6–33)
MCHC RBC AUTO-ENTMCNC: 33.9 G/DL (ref 31.5–35.7)
MCV RBC AUTO: 97 FL (ref 79–97)
MONOCYTES # BLD AUTO: 0.63 10*3/MM3 (ref 0.1–0.9)
MONOCYTES NFR BLD AUTO: 6.8 % (ref 5–12)
NEUTROPHILS NFR BLD AUTO: 7.45 10*3/MM3 (ref 1.7–7)
NEUTROPHILS NFR BLD AUTO: 79.9 % (ref 42.7–76)
NRBC BLD AUTO-RTO: 0 /100 WBC (ref 0–0.2)
PLATELET # BLD AUTO: 160 10*3/MM3 (ref 140–450)
PMV BLD AUTO: 11.7 FL (ref 6–12)
POTASSIUM SERPL-SCNC: 4.1 MMOL/L (ref 3.5–5.2)
PROT SERPL-MCNC: 6.9 G/DL (ref 6–8.5)
QT INTERVAL: 498 MS
QT INTERVAL: 516 MS
RBC # BLD AUTO: 3.01 10*6/MM3 (ref 4.14–5.8)
SODIUM SERPL-SCNC: 133 MMOL/L (ref 136–145)
TROPONIN T SERPL-MCNC: 0.07 NG/ML (ref 0–0.03)
WBC NRBC COR # BLD: 9.33 10*3/MM3 (ref 3.4–10.8)
WHOLE BLOOD HOLD COAG: 11
WHOLE BLOOD HOLD SPECIMEN: 11

## 2022-06-27 PROCEDURE — 84484 ASSAY OF TROPONIN QUANT: CPT | Performed by: EMERGENCY MEDICINE

## 2022-06-27 PROCEDURE — 93005 ELECTROCARDIOGRAM TRACING: CPT | Performed by: EMERGENCY MEDICINE

## 2022-06-27 PROCEDURE — 85025 COMPLETE CBC W/AUTO DIFF WBC: CPT | Performed by: EMERGENCY MEDICINE

## 2022-06-27 PROCEDURE — 83605 ASSAY OF LACTIC ACID: CPT | Performed by: EMERGENCY MEDICINE

## 2022-06-27 PROCEDURE — 94799 UNLISTED PULMONARY SVC/PX: CPT

## 2022-06-27 PROCEDURE — 5A1D70Z PERFORMANCE OF URINARY FILTRATION, INTERMITTENT, LESS THAN 6 HOURS PER DAY: ICD-10-PCS | Performed by: INTERNAL MEDICINE

## 2022-06-27 PROCEDURE — 74177 CT ABD & PELVIS W/CONTRAST: CPT

## 2022-06-27 PROCEDURE — 80053 COMPREHEN METABOLIC PANEL: CPT | Performed by: EMERGENCY MEDICINE

## 2022-06-27 PROCEDURE — 99285 EMERGENCY DEPT VISIT HI MDM: CPT

## 2022-06-27 PROCEDURE — 83735 ASSAY OF MAGNESIUM: CPT | Performed by: EMERGENCY MEDICINE

## 2022-06-27 PROCEDURE — 94640 AIRWAY INHALATION TREATMENT: CPT

## 2022-06-27 PROCEDURE — 71045 X-RAY EXAM CHEST 1 VIEW: CPT

## 2022-06-27 PROCEDURE — 25010000002 EPOETIN ALFA PER 1000 UNITS: Performed by: INTERNAL MEDICINE

## 2022-06-27 PROCEDURE — 0 IOPAMIDOL PER 1 ML: Performed by: EMERGENCY MEDICINE

## 2022-06-27 PROCEDURE — 25010000002 ENOXAPARIN PER 10 MG: Performed by: INTERNAL MEDICINE

## 2022-06-27 RX ORDER — SODIUM CHLORIDE 0.9 % (FLUSH) 0.9 %
10 SYRINGE (ML) INJECTION AS NEEDED
Status: DISCONTINUED | OUTPATIENT
Start: 2022-06-27 | End: 2022-06-30 | Stop reason: HOSPADM

## 2022-06-27 RX ORDER — SODIUM CHLORIDE 0.9 % (FLUSH) 0.9 %
10 SYRINGE (ML) INJECTION EVERY 12 HOURS SCHEDULED
Status: DISCONTINUED | OUTPATIENT
Start: 2022-06-27 | End: 2022-06-30 | Stop reason: HOSPADM

## 2022-06-27 RX ORDER — ENOXAPARIN SODIUM 100 MG/ML
30 INJECTION SUBCUTANEOUS EVERY 24 HOURS
Status: DISCONTINUED | OUTPATIENT
Start: 2022-06-27 | End: 2022-06-30 | Stop reason: HOSPADM

## 2022-06-27 RX ORDER — ACETAMINOPHEN 325 MG/1
650 TABLET ORAL EVERY 4 HOURS PRN
Status: DISCONTINUED | OUTPATIENT
Start: 2022-06-27 | End: 2022-06-30 | Stop reason: HOSPADM

## 2022-06-27 RX ORDER — POLYETHYLENE GLYCOL 3350 17 G/17G
17 POWDER, FOR SOLUTION ORAL DAILY PRN
Status: DISCONTINUED | OUTPATIENT
Start: 2022-06-27 | End: 2022-06-30 | Stop reason: HOSPADM

## 2022-06-27 RX ORDER — ALUMINA, MAGNESIA, AND SIMETHICONE 2400; 2400; 240 MG/30ML; MG/30ML; MG/30ML
15 SUSPENSION ORAL EVERY 6 HOURS PRN
Status: DISCONTINUED | OUTPATIENT
Start: 2022-06-27 | End: 2022-06-30 | Stop reason: HOSPADM

## 2022-06-27 RX ORDER — NITROGLYCERIN 0.4 MG/1
0.4 TABLET SUBLINGUAL
Status: DISCONTINUED | OUTPATIENT
Start: 2022-06-27 | End: 2022-06-30 | Stop reason: HOSPADM

## 2022-06-27 RX ORDER — GABAPENTIN 100 MG/1
100 CAPSULE ORAL 2 TIMES DAILY
Status: DISCONTINUED | OUTPATIENT
Start: 2022-06-27 | End: 2022-06-30 | Stop reason: HOSPADM

## 2022-06-27 RX ORDER — ALBUMIN (HUMAN) 12.5 G/50ML
12.5 SOLUTION INTRAVENOUS AS NEEDED
Status: ACTIVE | OUTPATIENT
Start: 2022-06-27 | End: 2022-06-28

## 2022-06-27 RX ORDER — HYDROXYZINE PAMOATE 25 MG/1
25 CAPSULE ORAL 3 TIMES DAILY PRN
Status: DISCONTINUED | OUTPATIENT
Start: 2022-06-27 | End: 2022-06-30 | Stop reason: HOSPADM

## 2022-06-27 RX ORDER — ONDANSETRON 2 MG/ML
4 INJECTION INTRAMUSCULAR; INTRAVENOUS EVERY 4 HOURS PRN
Status: DISCONTINUED | OUTPATIENT
Start: 2022-06-27 | End: 2022-06-30 | Stop reason: HOSPADM

## 2022-06-27 RX ORDER — DONEPEZIL HYDROCHLORIDE 5 MG/1
5 TABLET, FILM COATED ORAL DAILY
Status: DISCONTINUED | OUTPATIENT
Start: 2022-06-27 | End: 2022-06-30 | Stop reason: HOSPADM

## 2022-06-27 RX ORDER — PANTOPRAZOLE SODIUM 40 MG/1
40 TABLET, DELAYED RELEASE ORAL DAILY
Status: DISCONTINUED | OUTPATIENT
Start: 2022-06-27 | End: 2022-06-30 | Stop reason: HOSPADM

## 2022-06-27 RX ORDER — ALPRAZOLAM 0.25 MG/1
0.25 TABLET ORAL EVERY 6 HOURS PRN
Status: DISCONTINUED | OUTPATIENT
Start: 2022-06-27 | End: 2022-06-30 | Stop reason: HOSPADM

## 2022-06-27 RX ORDER — FAMOTIDINE 20 MG/1
40 TABLET, FILM COATED ORAL DAILY
Status: DISCONTINUED | OUTPATIENT
Start: 2022-06-27 | End: 2022-06-27

## 2022-06-27 RX ORDER — BISACODYL 10 MG
10 SUPPOSITORY, RECTAL RECTAL DAILY PRN
Status: DISCONTINUED | OUTPATIENT
Start: 2022-06-27 | End: 2022-06-30 | Stop reason: HOSPADM

## 2022-06-27 RX ORDER — SODIUM CHLORIDE 9 MG/ML
100 INJECTION, SOLUTION INTRAVENOUS CONTINUOUS
Status: DISCONTINUED | OUTPATIENT
Start: 2022-06-27 | End: 2022-06-29

## 2022-06-27 RX ORDER — CITALOPRAM 20 MG/1
40 TABLET ORAL EVERY MORNING
Status: DISCONTINUED | OUTPATIENT
Start: 2022-06-28 | End: 2022-06-30 | Stop reason: HOSPADM

## 2022-06-27 RX ORDER — SODIUM CHLORIDE 9 MG/ML
INJECTION, SOLUTION INTRAVENOUS
Status: COMPLETED
Start: 2022-06-27 | End: 2022-06-27

## 2022-06-27 RX ORDER — IPRATROPIUM BROMIDE AND ALBUTEROL SULFATE 2.5; .5 MG/3ML; MG/3ML
3 SOLUTION RESPIRATORY (INHALATION)
Status: DISCONTINUED | OUTPATIENT
Start: 2022-06-27 | End: 2022-06-29

## 2022-06-27 RX ORDER — BISACODYL 5 MG/1
5 TABLET, DELAYED RELEASE ORAL DAILY PRN
Status: DISCONTINUED | OUTPATIENT
Start: 2022-06-27 | End: 2022-06-30 | Stop reason: HOSPADM

## 2022-06-27 RX ORDER — AMOXICILLIN 250 MG
1 CAPSULE ORAL 2 TIMES DAILY
Status: DISCONTINUED | OUTPATIENT
Start: 2022-06-27 | End: 2022-06-30 | Stop reason: HOSPADM

## 2022-06-27 RX ORDER — TEMAZEPAM 15 MG/1
15 CAPSULE ORAL NIGHTLY PRN
Status: DISCONTINUED | OUTPATIENT
Start: 2022-06-27 | End: 2022-06-30 | Stop reason: HOSPADM

## 2022-06-27 RX ADMIN — IPRATROPIUM BROMIDE AND ALBUTEROL SULFATE 3 ML: 2.5; .5 SOLUTION RESPIRATORY (INHALATION) at 23:49

## 2022-06-27 RX ADMIN — IPRATROPIUM BROMIDE AND ALBUTEROL SULFATE 3 ML: 2.5; .5 SOLUTION RESPIRATORY (INHALATION) at 19:22

## 2022-06-27 RX ADMIN — SODIUM CHLORIDE 500 ML: 9 INJECTION, SOLUTION INTRAVENOUS at 09:05

## 2022-06-27 RX ADMIN — GABAPENTIN 100 MG: 100 CAPSULE ORAL at 21:23

## 2022-06-27 RX ADMIN — SODIUM CHLORIDE 100 ML/HR: 9 INJECTION, SOLUTION INTRAVENOUS at 11:50

## 2022-06-27 RX ADMIN — DONEPEZIL HYDROCHLORIDE 5 MG: 5 TABLET ORAL at 15:37

## 2022-06-27 RX ADMIN — Medication 10 ML: at 11:51

## 2022-06-27 RX ADMIN — SODIUM CHLORIDE 100 ML/HR: 9 INJECTION, SOLUTION INTRAVENOUS at 21:22

## 2022-06-27 RX ADMIN — IPRATROPIUM BROMIDE AND ALBUTEROL SULFATE 3 ML: 2.5; .5 SOLUTION RESPIRATORY (INHALATION) at 14:44

## 2022-06-27 RX ADMIN — SODIUM CHLORIDE 1000 ML: 9 INJECTION, SOLUTION INTRAVENOUS at 07:20

## 2022-06-27 RX ADMIN — EPOETIN ALFA 6000 UNITS: 3000 SOLUTION INTRAVENOUS; SUBCUTANEOUS at 21:23

## 2022-06-27 RX ADMIN — SODIUM CHLORIDE 500 ML: 9 INJECTION, SOLUTION INTRAVENOUS at 06:52

## 2022-06-27 RX ADMIN — ENOXAPARIN SODIUM 30 MG: 100 INJECTION SUBCUTANEOUS at 13:22

## 2022-06-27 RX ADMIN — PANTOPRAZOLE SODIUM 40 MG: 40 TABLET, DELAYED RELEASE ORAL at 15:36

## 2022-06-27 RX ADMIN — IOPAMIDOL 100 ML: 755 INJECTION, SOLUTION INTRAVENOUS at 08:56

## 2022-06-27 RX ADMIN — FAMOTIDINE 40 MG: 20 TABLET ORAL at 13:22

## 2022-06-27 RX ADMIN — SODIUM CHLORIDE, POTASSIUM CHLORIDE, SODIUM LACTATE AND CALCIUM CHLORIDE 1000 ML: 600; 310; 30; 20 INJECTION, SOLUTION INTRAVENOUS at 10:31

## 2022-06-27 RX ADMIN — SODIUM CHLORIDE 100 ML/HR: 9 INJECTION, SOLUTION INTRAVENOUS at 13:28

## 2022-06-28 ENCOUNTER — READMISSION MANAGEMENT (OUTPATIENT)
Dept: CALL CENTER | Facility: HOSPITAL | Age: 77
End: 2022-06-28

## 2022-06-28 LAB
ALBUMIN SERPL-MCNC: 3.5 G/DL (ref 3.5–5.2)
ALBUMIN/GLOB SERPL: 1.5 G/DL
ALP SERPL-CCNC: 76 U/L (ref 39–117)
ALT SERPL W P-5'-P-CCNC: 17 U/L (ref 1–41)
ANION GAP SERPL CALCULATED.3IONS-SCNC: 12.7 MMOL/L (ref 5–15)
AST SERPL-CCNC: 21 U/L (ref 1–40)
BASOPHILS # BLD AUTO: 0.03 10*3/MM3 (ref 0–0.2)
BASOPHILS NFR BLD AUTO: 0.4 % (ref 0–1.5)
BILIRUB SERPL-MCNC: 0.4 MG/DL (ref 0–1.2)
BUN SERPL-MCNC: 80 MG/DL (ref 8–23)
BUN/CREAT SERPL: 22.2 (ref 7–25)
CALCIUM SPEC-SCNC: 8.8 MG/DL (ref 8.6–10.5)
CHLORIDE SERPL-SCNC: 103 MMOL/L (ref 98–107)
CO2 SERPL-SCNC: 19.3 MMOL/L (ref 22–29)
CREAT SERPL-MCNC: 3.61 MG/DL (ref 0.76–1.27)
DEPRECATED RDW RBC AUTO: 56.7 FL (ref 37–54)
EGFRCR SERPLBLD CKD-EPI 2021: 16.7 ML/MIN/1.73
EOSINOPHIL # BLD AUTO: 0.08 10*3/MM3 (ref 0–0.4)
EOSINOPHIL NFR BLD AUTO: 1.2 % (ref 0.3–6.2)
ERYTHROCYTE [DISTWIDTH] IN BLOOD BY AUTOMATED COUNT: 16.2 % (ref 12.3–15.4)
FERRITIN SERPL-MCNC: 1495 NG/ML (ref 30–400)
GLOBULIN UR ELPH-MCNC: 2.4 GM/DL
GLUCOSE SERPL-MCNC: 90 MG/DL (ref 65–99)
HCT VFR BLD AUTO: 26.3 % (ref 37.5–51)
HGB BLD-MCNC: 8.8 G/DL (ref 13–17.7)
IMM GRANULOCYTES # BLD AUTO: 0.03 10*3/MM3 (ref 0–0.05)
IMM GRANULOCYTES NFR BLD AUTO: 0.4 % (ref 0–0.5)
IRON 24H UR-MRATE: 33 MCG/DL (ref 59–158)
IRON SATN MFR SERPL: 13 % (ref 20–50)
LYMPHOCYTES # BLD AUTO: 1.06 10*3/MM3 (ref 0.7–3.1)
LYMPHOCYTES NFR BLD AUTO: 15.4 % (ref 19.6–45.3)
MCH RBC QN AUTO: 32.1 PG (ref 26.6–33)
MCHC RBC AUTO-ENTMCNC: 33.5 G/DL (ref 31.5–35.7)
MCV RBC AUTO: 96 FL (ref 79–97)
MONOCYTES # BLD AUTO: 0.38 10*3/MM3 (ref 0.1–0.9)
MONOCYTES NFR BLD AUTO: 5.5 % (ref 5–12)
NEUTROPHILS NFR BLD AUTO: 5.32 10*3/MM3 (ref 1.7–7)
NEUTROPHILS NFR BLD AUTO: 77.1 % (ref 42.7–76)
NRBC BLD AUTO-RTO: 0 /100 WBC (ref 0–0.2)
PHOSPHATE SERPL-MCNC: 5.8 MG/DL (ref 2.5–4.5)
PLATELET # BLD AUTO: 144 10*3/MM3 (ref 140–450)
PMV BLD AUTO: 12.5 FL (ref 6–12)
POTASSIUM SERPL-SCNC: 3.9 MMOL/L (ref 3.5–5.2)
PROT SERPL-MCNC: 5.9 G/DL (ref 6–8.5)
RBC # BLD AUTO: 2.74 10*6/MM3 (ref 4.14–5.8)
SODIUM SERPL-SCNC: 135 MMOL/L (ref 136–145)
TIBC SERPL-MCNC: 255 MCG/DL (ref 298–536)
TRANSFERRIN SERPL-MCNC: 171 MG/DL (ref 200–360)
WBC NRBC COR # BLD: 6.9 10*3/MM3 (ref 3.4–10.8)

## 2022-06-28 PROCEDURE — 25010000002 HEPARIN (PORCINE) PER 1000 UNITS: Performed by: INTERNAL MEDICINE

## 2022-06-28 PROCEDURE — 80053 COMPREHEN METABOLIC PANEL: CPT | Performed by: INTERNAL MEDICINE

## 2022-06-28 PROCEDURE — 94799 UNLISTED PULMONARY SVC/PX: CPT

## 2022-06-28 PROCEDURE — 85025 COMPLETE CBC W/AUTO DIFF WBC: CPT | Performed by: INTERNAL MEDICINE

## 2022-06-28 PROCEDURE — 94760 N-INVAS EAR/PLS OXIMETRY 1: CPT

## 2022-06-28 PROCEDURE — 84466 ASSAY OF TRANSFERRIN: CPT | Performed by: INTERNAL MEDICINE

## 2022-06-28 PROCEDURE — 83540 ASSAY OF IRON: CPT | Performed by: INTERNAL MEDICINE

## 2022-06-28 PROCEDURE — 25010000002 ENOXAPARIN PER 10 MG: Performed by: INTERNAL MEDICINE

## 2022-06-28 PROCEDURE — 84100 ASSAY OF PHOSPHORUS: CPT | Performed by: INTERNAL MEDICINE

## 2022-06-28 PROCEDURE — 82728 ASSAY OF FERRITIN: CPT | Performed by: INTERNAL MEDICINE

## 2022-06-28 PROCEDURE — 36415 COLL VENOUS BLD VENIPUNCTURE: CPT | Performed by: INTERNAL MEDICINE

## 2022-06-28 RX ORDER — LIDOCAINE 50 MG/G
1 PATCH TOPICAL
Status: DISCONTINUED | OUTPATIENT
Start: 2022-06-29 | End: 2022-06-28

## 2022-06-28 RX ORDER — LIDOCAINE 50 MG/G
1 PATCH TOPICAL
Status: DISCONTINUED | OUTPATIENT
Start: 2022-06-28 | End: 2022-06-29

## 2022-06-28 RX ORDER — HEPARIN SODIUM 1000 [USP'U]/ML
3000 INJECTION, SOLUTION INTRAVENOUS; SUBCUTANEOUS AS NEEDED
Status: DISCONTINUED | OUTPATIENT
Start: 2022-06-28 | End: 2022-06-30 | Stop reason: HOSPADM

## 2022-06-28 RX ORDER — TAMSULOSIN HYDROCHLORIDE 0.4 MG/1
0.4 CAPSULE ORAL NIGHTLY
Status: DISCONTINUED | OUTPATIENT
Start: 2022-06-28 | End: 2022-06-30 | Stop reason: HOSPADM

## 2022-06-28 RX ADMIN — SODIUM CHLORIDE 100 ML/HR: 9 INJECTION, SOLUTION INTRAVENOUS at 20:28

## 2022-06-28 RX ADMIN — ACETAMINOPHEN 650 MG: 325 TABLET ORAL at 08:49

## 2022-06-28 RX ADMIN — Medication 10 ML: at 20:29

## 2022-06-28 RX ADMIN — GABAPENTIN 100 MG: 100 CAPSULE ORAL at 08:44

## 2022-06-28 RX ADMIN — IPRATROPIUM BROMIDE AND ALBUTEROL SULFATE 3 ML: 2.5; .5 SOLUTION RESPIRATORY (INHALATION) at 11:39

## 2022-06-28 RX ADMIN — Medication 10 ML: at 08:52

## 2022-06-28 RX ADMIN — CITALOPRAM HYDROBROMIDE 40 MG: 20 TABLET ORAL at 06:12

## 2022-06-28 RX ADMIN — ENOXAPARIN SODIUM 30 MG: 100 INJECTION SUBCUTANEOUS at 13:21

## 2022-06-28 RX ADMIN — DONEPEZIL HYDROCHLORIDE 5 MG: 5 TABLET ORAL at 08:44

## 2022-06-28 RX ADMIN — ACETAMINOPHEN 650 MG: 325 TABLET ORAL at 23:09

## 2022-06-28 RX ADMIN — IPRATROPIUM BROMIDE AND ALBUTEROL SULFATE 3 ML: 2.5; .5 SOLUTION RESPIRATORY (INHALATION) at 07:00

## 2022-06-28 RX ADMIN — LIDOCAINE 1 PATCH: 50 PATCH TOPICAL at 21:56

## 2022-06-28 RX ADMIN — HEPARIN SODIUM 3000 UNITS: 1000 INJECTION, SOLUTION INTRAVENOUS; SUBCUTANEOUS at 17:00

## 2022-06-28 RX ADMIN — TAMSULOSIN HYDROCHLORIDE 0.4 MG: 0.4 CAPSULE ORAL at 20:28

## 2022-06-28 RX ADMIN — IPRATROPIUM BROMIDE AND ALBUTEROL SULFATE 3 ML: 2.5; .5 SOLUTION RESPIRATORY (INHALATION) at 19:28

## 2022-06-28 RX ADMIN — PANTOPRAZOLE SODIUM 40 MG: 40 TABLET, DELAYED RELEASE ORAL at 08:44

## 2022-06-28 RX ADMIN — SODIUM CHLORIDE 100 ML/HR: 9 INJECTION, SOLUTION INTRAVENOUS at 01:03

## 2022-06-28 RX ADMIN — ACETAMINOPHEN 650 MG: 325 TABLET ORAL at 18:36

## 2022-06-28 RX ADMIN — GABAPENTIN 100 MG: 100 CAPSULE ORAL at 20:28

## 2022-06-28 RX ADMIN — IPRATROPIUM BROMIDE AND ALBUTEROL SULFATE 3 ML: 2.5; .5 SOLUTION RESPIRATORY (INHALATION) at 03:55

## 2022-06-28 NOTE — OUTREACH NOTE
Medical Week 1 Survey    Flowsheet Row Responses   [facility] facility patient discharged from? [facility]   Does the patient have one of the following disease processes/diagnoses(primary or secondary)? Other   Week 1 attempt successful? No   Revoke Readmitted          STACI HERNANDEZ - Registered Nurse

## 2022-06-29 PROCEDURE — 94799 UNLISTED PULMONARY SVC/PX: CPT

## 2022-06-29 PROCEDURE — 25010000002 EPOETIN ALFA PER 1000 UNITS: Performed by: INTERNAL MEDICINE

## 2022-06-29 PROCEDURE — 25010000002 ENOXAPARIN PER 10 MG: Performed by: INTERNAL MEDICINE

## 2022-06-29 PROCEDURE — 97161 PT EVAL LOW COMPLEX 20 MIN: CPT

## 2022-06-29 RX ORDER — LIDOCAINE 50 MG/G
1 PATCH TOPICAL EVERY 24 HOURS
Status: DISCONTINUED | OUTPATIENT
Start: 2022-06-29 | End: 2022-06-30 | Stop reason: HOSPADM

## 2022-06-29 RX ORDER — IPRATROPIUM BROMIDE AND ALBUTEROL SULFATE 2.5; .5 MG/3ML; MG/3ML
3 SOLUTION RESPIRATORY (INHALATION) EVERY 4 HOURS PRN
Status: DISCONTINUED | OUTPATIENT
Start: 2022-06-29 | End: 2022-06-30 | Stop reason: HOSPADM

## 2022-06-29 RX ORDER — CHOLECALCIFEROL (VITAMIN D3) 125 MCG
5 CAPSULE ORAL NIGHTLY
Status: DISCONTINUED | OUTPATIENT
Start: 2022-06-29 | End: 2022-06-30 | Stop reason: HOSPADM

## 2022-06-29 RX ADMIN — LIDOCAINE 1 PATCH: 50 PATCH TOPICAL at 21:52

## 2022-06-29 RX ADMIN — DONEPEZIL HYDROCHLORIDE 5 MG: 5 TABLET ORAL at 09:49

## 2022-06-29 RX ADMIN — GABAPENTIN 100 MG: 100 CAPSULE ORAL at 09:49

## 2022-06-29 RX ADMIN — SENNOSIDES AND DOCUSATE SODIUM 1 TABLET: 50; 8.6 TABLET ORAL at 20:55

## 2022-06-29 RX ADMIN — Medication 10 ML: at 09:49

## 2022-06-29 RX ADMIN — IPRATROPIUM BROMIDE AND ALBUTEROL SULFATE 3 ML: 2.5; .5 SOLUTION RESPIRATORY (INHALATION) at 07:37

## 2022-06-29 RX ADMIN — ENOXAPARIN SODIUM 30 MG: 100 INJECTION SUBCUTANEOUS at 13:52

## 2022-06-29 RX ADMIN — CITALOPRAM HYDROBROMIDE 40 MG: 20 TABLET ORAL at 06:26

## 2022-06-29 RX ADMIN — Medication 5 MG: at 20:55

## 2022-06-29 RX ADMIN — EPOETIN ALFA 6000 UNITS: 3000 SOLUTION INTRAVENOUS; SUBCUTANEOUS at 09:49

## 2022-06-29 RX ADMIN — TAMSULOSIN HYDROCHLORIDE 0.4 MG: 0.4 CAPSULE ORAL at 20:55

## 2022-06-29 RX ADMIN — ACETAMINOPHEN 650 MG: 325 TABLET ORAL at 06:26

## 2022-06-29 RX ADMIN — ACETAMINOPHEN 650 MG: 325 TABLET ORAL at 21:51

## 2022-06-29 RX ADMIN — IPRATROPIUM BROMIDE AND ALBUTEROL SULFATE 3 ML: 2.5; .5 SOLUTION RESPIRATORY (INHALATION) at 00:10

## 2022-06-29 RX ADMIN — Medication 10 ML: at 20:55

## 2022-06-29 RX ADMIN — PANTOPRAZOLE SODIUM 40 MG: 40 TABLET, DELAYED RELEASE ORAL at 09:49

## 2022-06-29 RX ADMIN — GABAPENTIN 100 MG: 100 CAPSULE ORAL at 20:55

## 2022-06-29 RX ADMIN — ACETAMINOPHEN 650 MG: 325 TABLET ORAL at 10:42

## 2022-06-30 VITALS
SYSTOLIC BLOOD PRESSURE: 128 MMHG | RESPIRATION RATE: 18 BRPM | TEMPERATURE: 97.7 F | BODY MASS INDEX: 38.09 KG/M2 | DIASTOLIC BLOOD PRESSURE: 54 MMHG | HEIGHT: 66 IN | OXYGEN SATURATION: 98 % | WEIGHT: 236.99 LBS | HEART RATE: 63 BPM

## 2022-06-30 PROBLEM — R19.7 DIARRHEA: Chronic | Status: RESOLVED | Noted: 2022-06-27 | Resolved: 2022-06-30

## 2022-06-30 PROBLEM — R55 SYNCOPE AND COLLAPSE: Status: RESOLVED | Noted: 2022-06-27 | Resolved: 2022-06-30

## 2022-06-30 PROBLEM — F03.92: Chronic | Status: RESOLVED | Noted: 2022-06-20 | Resolved: 2022-06-30

## 2022-06-30 PROBLEM — I95.9 HYPOTENSION: Status: RESOLVED | Noted: 2021-11-16 | Resolved: 2022-06-30

## 2022-06-30 LAB
ALBUMIN SERPL-MCNC: 3.7 G/DL (ref 3.5–5.2)
ANION GAP SERPL CALCULATED.3IONS-SCNC: 11.7 MMOL/L (ref 5–15)
BUN SERPL-MCNC: 41 MG/DL (ref 8–23)
BUN/CREAT SERPL: 25.5 (ref 7–25)
CALCIUM SPEC-SCNC: 9.6 MG/DL (ref 8.6–10.5)
CHLORIDE SERPL-SCNC: 105 MMOL/L (ref 98–107)
CO2 SERPL-SCNC: 23.3 MMOL/L (ref 22–29)
CREAT SERPL-MCNC: 1.61 MG/DL (ref 0.76–1.27)
DEPRECATED RDW RBC AUTO: 56.7 FL (ref 37–54)
EGFRCR SERPLBLD CKD-EPI 2021: 44 ML/MIN/1.73
ERYTHROCYTE [DISTWIDTH] IN BLOOD BY AUTOMATED COUNT: 16.2 % (ref 12.3–15.4)
GLUCOSE SERPL-MCNC: 103 MG/DL (ref 65–99)
HCT VFR BLD AUTO: 27.7 % (ref 37.5–51)
HGB BLD-MCNC: 9.4 G/DL (ref 13–17.7)
MCH RBC QN AUTO: 32.2 PG (ref 26.6–33)
MCHC RBC AUTO-ENTMCNC: 33.9 G/DL (ref 31.5–35.7)
MCV RBC AUTO: 94.9 FL (ref 79–97)
PHOSPHATE SERPL-MCNC: 2.6 MG/DL (ref 2.5–4.5)
PLATELET # BLD AUTO: 137 10*3/MM3 (ref 140–450)
PMV BLD AUTO: 12 FL (ref 6–12)
POTASSIUM SERPL-SCNC: 3.9 MMOL/L (ref 3.5–5.2)
RBC # BLD AUTO: 2.92 10*6/MM3 (ref 4.14–5.8)
SODIUM SERPL-SCNC: 140 MMOL/L (ref 136–145)
WBC NRBC COR # BLD: 4.06 10*3/MM3 (ref 3.4–10.8)

## 2022-06-30 PROCEDURE — 97110 THERAPEUTIC EXERCISES: CPT

## 2022-06-30 PROCEDURE — 85027 COMPLETE CBC AUTOMATED: CPT | Performed by: INTERNAL MEDICINE

## 2022-06-30 PROCEDURE — 80069 RENAL FUNCTION PANEL: CPT | Performed by: INTERNAL MEDICINE

## 2022-06-30 PROCEDURE — 97530 THERAPEUTIC ACTIVITIES: CPT

## 2022-06-30 RX ORDER — METOPROLOL SUCCINATE 50 MG/1
25 TABLET, EXTENDED RELEASE ORAL DAILY
Qty: 15 TABLET | Refills: 0
Start: 2022-06-30 | End: 2022-08-29

## 2022-06-30 RX ADMIN — ACETAMINOPHEN 650 MG: 325 TABLET ORAL at 08:00

## 2022-06-30 RX ADMIN — Medication 10 ML: at 08:00

## 2022-06-30 RX ADMIN — PANTOPRAZOLE SODIUM 40 MG: 40 TABLET, DELAYED RELEASE ORAL at 08:00

## 2022-06-30 RX ADMIN — DONEPEZIL HYDROCHLORIDE 5 MG: 5 TABLET ORAL at 08:00

## 2022-06-30 RX ADMIN — GABAPENTIN 100 MG: 100 CAPSULE ORAL at 08:00

## 2022-06-30 RX ADMIN — CITALOPRAM HYDROBROMIDE 40 MG: 20 TABLET ORAL at 05:42

## 2022-07-12 ENCOUNTER — TRANSCRIBE ORDERS (OUTPATIENT)
Dept: ADMINISTRATIVE | Facility: HOSPITAL | Age: 77
End: 2022-07-12

## 2022-07-12 ENCOUNTER — HOSPITAL ENCOUNTER (OUTPATIENT)
Dept: GENERAL RADIOLOGY | Facility: HOSPITAL | Age: 77
Discharge: HOME | End: 2022-07-12
Admitting: INTERNAL MEDICINE

## 2022-07-12 DIAGNOSIS — J44.9 CHRONIC OBSTRUCTIVE PULMONARY DISEASE, UNSPECIFIED COPD TYPE: ICD-10-CM

## 2022-07-12 DIAGNOSIS — F03.90 DEMENTIA WITHOUT BEHAVIORAL DISTURBANCE, UNSPECIFIED DEMENTIA TYPE: ICD-10-CM

## 2022-07-12 DIAGNOSIS — M47.812 OSTEOARTHRITIS OF CERVICAL SPINE, UNSPECIFIED SPINAL OSTEOARTHRITIS COMPLICATION STATUS: ICD-10-CM

## 2022-07-12 DIAGNOSIS — N18.6 END STAGE RENAL DISEASE: ICD-10-CM

## 2022-07-12 DIAGNOSIS — M51.37 DEGENERATION OF LUMBAR OR LUMBOSACRAL INTERVERTEBRAL DISC: ICD-10-CM

## 2022-07-12 DIAGNOSIS — Z99.2 DEPENDENCE ON RENAL DIALYSIS: ICD-10-CM

## 2022-07-12 DIAGNOSIS — N17.9 ACUTE RENAL FAILURE, UNSPECIFIED ACUTE RENAL FAILURE TYPE: ICD-10-CM

## 2022-07-12 DIAGNOSIS — N17.9 ACUTE RENAL FAILURE, UNSPECIFIED ACUTE RENAL FAILURE TYPE: Primary | ICD-10-CM

## 2022-07-12 DIAGNOSIS — I12.9 HYPERTENSIVE KIDNEY DISEASE: ICD-10-CM

## 2022-07-12 PROCEDURE — 72052 X-RAY EXAM NECK SPINE 6/>VWS: CPT

## 2022-07-15 ENCOUNTER — TELEPHONE (OUTPATIENT)
Dept: ONCOLOGY | Facility: CLINIC | Age: 77
End: 2022-07-15

## 2022-07-15 NOTE — TELEPHONE ENCOUNTER
Caller: Liliya Gomez    Relationship: Emergency Contact    Best call back number: [phone]      What was the call regarding: PATIENT NEEDS A FOLLOW UP APPOINTMENT, NEEDS TUES OR THURSDAYS AFTER 11:00 OR CAN DO MON, WED, AND FRIDAYS BETWEEN 11:00 AND 2:00     Do you require a callback: YES

## 2022-07-20 ENCOUNTER — TRANSCRIBE ORDERS (OUTPATIENT)
Dept: VASCULAR SURGERY | Facility: HOSPITAL | Age: 77
End: 2022-07-20

## 2022-07-20 DIAGNOSIS — N18.6 ESRD ON DIALYSIS: Primary | ICD-10-CM

## 2022-07-20 DIAGNOSIS — Z99.2 ESRD ON DIALYSIS: Primary | ICD-10-CM

## 2022-07-28 ENCOUNTER — HOSPITAL ENCOUNTER (OUTPATIENT)
Dept: CARDIOLOGY | Facility: HOSPITAL | Age: 77
Discharge: HOME | End: 2022-07-28
Admitting: INTERNAL MEDICINE

## 2022-07-28 DIAGNOSIS — Z99.2 ESRD ON DIALYSIS: ICD-10-CM

## 2022-07-28 DIAGNOSIS — N18.6 ESRD ON DIALYSIS: ICD-10-CM

## 2022-07-28 LAB
BH CV VAS MEAS BASILIC ANTECUBITAL FOSSA LEFT: 0.27 CM
BH CV VAS MEAS BASILIC ANTECUBITAL FOSSA RIGHT: 0.31 CM
BH CV VAS MEAS BASILIC FOREARM LEFT - MID: 0.16 CM
BH CV VAS MEAS BASILIC FOREARM LEFT - PROX: 0.21 CM
BH CV VAS MEAS BASILIC FOREARM RIGHT - DIST: 0.26 CM
BH CV VAS MEAS BASILIC FOREARM RIGHT - MID: 0.27 CM
BH CV VAS MEAS BASILIC FOREARM RIGHT - PROX: 0.4 CM
BH CV VAS MEAS BASILIC UPPER ARM LEFT - DIST: 0.4 CM
BH CV VAS MEAS BASILIC UPPER ARM LEFT - MID: 0.41 CM
BH CV VAS MEAS BASILIC UPPER ARM RIGHT - DIST: 0.48 CM
BH CV VAS MEAS BASILIC UPPER ARM RIGHT - MID: 0.39 CM
BH CV VAS MEAS BASILIC UPPER ARM RIGHT - PROX: 0.54 CM
BH CV VAS MEAS CEPHALIC ANTECUBITAL FOSSA LEFT: 0.39 CM
BH CV VAS MEAS CEPHALIC ANTECUBITAL FOSSA RIGHT: 0.52 CM
BH CV VAS MEAS CEPHALIC FOREARM LEFT - DIST: 0.3 CM
BH CV VAS MEAS CEPHALIC FOREARM LEFT - MID: 0.32 CM
BH CV VAS MEAS CEPHALIC FOREARM LEFT - PROX: 0.28 CM
BH CV VAS MEAS CEPHALIC FOREARM RIGHT - DIST: 0.24 CM
BH CV VAS MEAS CEPHALIC FOREARM RIGHT - MID: 0.25 CM
BH CV VAS MEAS CEPHALIC FOREARM RIGHT - PROX: 0.33 CM
BH CV VAS MEAS CEPHALIC UPPER ARM LEFT - DIST: 0.43 CM
BH CV VAS MEAS CEPHALIC UPPER ARM LEFT - MID: 0.45 CM
BH CV VAS MEAS CEPHALIC UPPER ARM LEFT - PROX: 0.42 CM
BH CV VAS MEAS CEPHALIC UPPER ARM RIGHT - DIST: 0.47 CM
BH CV VAS MEAS CEPHALIC UPPER ARM RIGHT - MID: 0.37 CM
BH CV VAS MEAS CEPHALIC UPPER ARM RIGHT - PROX: 0.41 CM
BH CV VAS MEAS RADIAL UPPER ARM LEFT - MID: 80.1 CM
BH CV VAS MEAS RADIAL UPPER ARM RIGHT - MID: 98.2 CM
MAXIMAL PREDICTED HEART RATE: 144 BPM
STRESS TARGET HR: 122 BPM

## 2022-07-28 PROCEDURE — 93985 DUP-SCAN HEMO COMPL BI STD: CPT

## 2022-07-28 PROCEDURE — 93985 DUP-SCAN HEMO COMPL BI STD: CPT | Performed by: SURGERY

## 2022-08-03 ENCOUNTER — LAB (OUTPATIENT)
Dept: LAB | Facility: HOSPITAL | Age: 77
End: 2022-08-03

## 2022-08-03 ENCOUNTER — OFFICE VISIT (OUTPATIENT)
Dept: ONCOLOGY | Facility: CLINIC | Age: 77
End: 2022-08-03

## 2022-08-03 VITALS
WEIGHT: 233 LBS | OXYGEN SATURATION: 95 % | RESPIRATION RATE: 18 BRPM | SYSTOLIC BLOOD PRESSURE: 133 MMHG | HEART RATE: 64 BPM | DIASTOLIC BLOOD PRESSURE: 68 MMHG | BODY MASS INDEX: 37.45 KG/M2 | TEMPERATURE: 97.7 F | HEIGHT: 66 IN

## 2022-08-03 DIAGNOSIS — N18.30 ANEMIA DUE TO STAGE 3 CHRONIC KIDNEY DISEASE, UNSPECIFIED WHETHER STAGE 3A OR 3B CKD: ICD-10-CM

## 2022-08-03 DIAGNOSIS — D53.9 MACROCYTIC ANEMIA: ICD-10-CM

## 2022-08-03 DIAGNOSIS — C90.21 EXTRAMEDULLARY PLASMACYTOMA IN REMISSION: ICD-10-CM

## 2022-08-03 DIAGNOSIS — E85.81 AL AMYLOIDOSIS: ICD-10-CM

## 2022-08-03 DIAGNOSIS — C90.21 EXTRAMEDULLARY PLASMACYTOMA IN REMISSION: Primary | ICD-10-CM

## 2022-08-03 DIAGNOSIS — E83.42 HYPOMAGNESEMIA: ICD-10-CM

## 2022-08-03 DIAGNOSIS — D47.2 MONOCLONAL GAMMOPATHY: ICD-10-CM

## 2022-08-03 DIAGNOSIS — D63.1 ANEMIA DUE TO STAGE 3 CHRONIC KIDNEY DISEASE, UNSPECIFIED WHETHER STAGE 3A OR 3B CKD: ICD-10-CM

## 2022-08-03 DIAGNOSIS — R91.8 MULTIPLE LUNG NODULES ON CT: ICD-10-CM

## 2022-08-03 LAB
ALBUMIN SERPL-MCNC: 4.2 G/DL (ref 3.5–5.2)
ALBUMIN/GLOB SERPL: 1.8 G/DL (ref 1.1–2.4)
ALP SERPL-CCNC: 124 U/L (ref 38–116)
ALT SERPL W P-5'-P-CCNC: 12 U/L (ref 0–41)
ANION GAP SERPL CALCULATED.3IONS-SCNC: 12.5 MMOL/L (ref 5–15)
AST SERPL-CCNC: 17 U/L (ref 0–40)
BASOPHILS # BLD AUTO: 0.04 10*3/MM3 (ref 0–0.2)
BASOPHILS NFR BLD AUTO: 0.9 % (ref 0–1.5)
BILIRUB SERPL-MCNC: 0.4 MG/DL (ref 0.2–1.2)
BUN SERPL-MCNC: 25 MG/DL (ref 6–20)
BUN/CREAT SERPL: 15.2 (ref 7.3–30)
CALCIUM SPEC-SCNC: 9.7 MG/DL (ref 8.5–10.2)
CHLORIDE SERPL-SCNC: 105 MMOL/L (ref 98–107)
CO2 SERPL-SCNC: 21.5 MMOL/L (ref 22–29)
CREAT SERPL-MCNC: 1.65 MG/DL (ref 0.7–1.3)
DEPRECATED RDW RBC AUTO: 65.8 FL (ref 37–54)
EGFRCR SERPLBLD CKD-EPI 2021: 42.8 ML/MIN/1.73
EOSINOPHIL # BLD AUTO: 0.11 10*3/MM3 (ref 0–0.4)
EOSINOPHIL NFR BLD AUTO: 2.6 % (ref 0.3–6.2)
ERYTHROCYTE [DISTWIDTH] IN BLOOD BY AUTOMATED COUNT: 17.8 % (ref 12.3–15.4)
GLOBULIN UR ELPH-MCNC: 2.3 GM/DL (ref 1.8–3.5)
GLUCOSE SERPL-MCNC: 98 MG/DL (ref 74–124)
HCT VFR BLD AUTO: 29.9 % (ref 37.5–51)
HGB BLD-MCNC: 9.8 G/DL (ref 13–17.7)
IMM GRANULOCYTES # BLD AUTO: 0.01 10*3/MM3 (ref 0–0.05)
IMM GRANULOCYTES NFR BLD AUTO: 0.2 % (ref 0–0.5)
LYMPHOCYTES # BLD AUTO: 1.19 10*3/MM3 (ref 0.7–3.1)
LYMPHOCYTES NFR BLD AUTO: 27.8 % (ref 19.6–45.3)
MAGNESIUM SERPL-MCNC: 1.5 MG/DL (ref 1.8–2.5)
MCH RBC QN AUTO: 32.7 PG (ref 26.6–33)
MCHC RBC AUTO-ENTMCNC: 32.8 G/DL (ref 31.5–35.7)
MCV RBC AUTO: 99.7 FL (ref 79–97)
MONOCYTES # BLD AUTO: 0.43 10*3/MM3 (ref 0.1–0.9)
MONOCYTES NFR BLD AUTO: 10 % (ref 5–12)
NEUTROPHILS NFR BLD AUTO: 2.5 10*3/MM3 (ref 1.7–7)
NEUTROPHILS NFR BLD AUTO: 58.5 % (ref 42.7–76)
NRBC BLD AUTO-RTO: 0 /100 WBC (ref 0–0.2)
PLATELET # BLD AUTO: 104 10*3/MM3 (ref 140–450)
PMV BLD AUTO: 10.8 FL (ref 6–12)
POTASSIUM SERPL-SCNC: 5.1 MMOL/L (ref 3.5–4.7)
PROT SERPL-MCNC: 6.5 G/DL (ref 6.3–8)
RBC # BLD AUTO: 3 10*6/MM3 (ref 4.14–5.8)
SODIUM SERPL-SCNC: 139 MMOL/L (ref 134–145)
WBC NRBC COR # BLD: 4.28 10*3/MM3 (ref 3.4–10.8)

## 2022-08-03 PROCEDURE — 80053 COMPREHEN METABOLIC PANEL: CPT

## 2022-08-03 PROCEDURE — 83735 ASSAY OF MAGNESIUM: CPT

## 2022-08-03 PROCEDURE — 85025 COMPLETE CBC W/AUTO DIFF WBC: CPT

## 2022-08-03 PROCEDURE — 36415 COLL VENOUS BLD VENIPUNCTURE: CPT

## 2022-08-03 PROCEDURE — 99215 OFFICE O/P EST HI 40 MIN: CPT | Performed by: INTERNAL MEDICINE

## 2022-08-03 RX ORDER — TEMAZEPAM 15 MG/1
1 CAPSULE ORAL
COMMUNITY
Start: 2022-06-27 | End: 2022-08-29

## 2022-08-05 LAB
ALBUMIN SERPL ELPH-MCNC: 3.6 G/DL (ref 2.9–4.4)
ALBUMIN/GLOB SERPL: 1.6 {RATIO} (ref 0.7–1.7)
ALPHA1 GLOB SERPL ELPH-MCNC: 0.2 G/DL (ref 0–0.4)
ALPHA2 GLOB SERPL ELPH-MCNC: 0.7 G/DL (ref 0.4–1)
B-GLOBULIN SERPL ELPH-MCNC: 0.7 G/DL (ref 0.7–1.3)
GAMMA GLOB SERPL ELPH-MCNC: 0.7 G/DL (ref 0.4–1.8)
GLOBULIN SER-MCNC: 2.4 G/DL (ref 2.2–3.9)
IGA SERPL-MCNC: 116 MG/DL (ref 61–437)
IGG SERPL-MCNC: 623 MG/DL (ref 603–1613)
IGM SERPL-MCNC: 248 MG/DL (ref 15–143)
INTERPRETATION SERPL IEP-IMP: ABNORMAL
KAPPA LC FREE SER-MCNC: 34.8 MG/L (ref 3.3–19.4)
KAPPA LC FREE/LAMBDA FREE SER: 1.36 {RATIO} (ref 0.26–1.65)
LABORATORY COMMENT REPORT: ABNORMAL
LAMBDA LC FREE SERPL-MCNC: 25.6 MG/L (ref 5.7–26.3)
M PROTEIN SERPL ELPH-MCNC: 0.1 G/DL
PROT SERPL-MCNC: 6 G/DL (ref 6–8.5)

## 2022-08-29 ENCOUNTER — PRE-ADMISSION TESTING (OUTPATIENT)
Dept: PREADMISSION TESTING | Facility: HOSPITAL | Age: 77
End: 2022-08-29

## 2022-08-29 VITALS
OXYGEN SATURATION: 96 % | TEMPERATURE: 97.8 F | DIASTOLIC BLOOD PRESSURE: 72 MMHG | RESPIRATION RATE: 16 BRPM | BODY MASS INDEX: 34.32 KG/M2 | HEART RATE: 65 BPM | HEIGHT: 70 IN | SYSTOLIC BLOOD PRESSURE: 145 MMHG | WEIGHT: 239.7 LBS

## 2022-08-29 LAB
ANION GAP SERPL CALCULATED.3IONS-SCNC: 6 MMOL/L (ref 5–15)
BUN SERPL-MCNC: 21 MG/DL (ref 8–23)
BUN/CREAT SERPL: 14.1 (ref 7–25)
CALCIUM SPEC-SCNC: 9.4 MG/DL (ref 8.6–10.5)
CHLORIDE SERPL-SCNC: 105 MMOL/L (ref 98–107)
CO2 SERPL-SCNC: 27 MMOL/L (ref 22–29)
CREAT SERPL-MCNC: 1.49 MG/DL (ref 0.76–1.27)
DEPRECATED RDW RBC AUTO: 57.9 FL (ref 37–54)
EGFRCR SERPLBLD CKD-EPI 2021: 48.3 ML/MIN/1.73
ERYTHROCYTE [DISTWIDTH] IN BLOOD BY AUTOMATED COUNT: 17 % (ref 12.3–15.4)
GLUCOSE SERPL-MCNC: 79 MG/DL (ref 65–99)
HCT VFR BLD AUTO: 31.3 % (ref 37.5–51)
HGB BLD-MCNC: 10.5 G/DL (ref 13–17.7)
MCH RBC QN AUTO: 31.3 PG (ref 26.6–33)
MCHC RBC AUTO-ENTMCNC: 33.5 G/DL (ref 31.5–35.7)
MCV RBC AUTO: 93.2 FL (ref 79–97)
PLATELET # BLD AUTO: 113 10*3/MM3 (ref 140–450)
PMV BLD AUTO: 10.8 FL (ref 6–12)
POTASSIUM SERPL-SCNC: 5.7 MMOL/L (ref 3.5–5.2)
QT INTERVAL: 444 MS
RBC # BLD AUTO: 3.36 10*6/MM3 (ref 4.14–5.8)
SARS-COV-2 ORF1AB RESP QL NAA+PROBE: NOT DETECTED
SODIUM SERPL-SCNC: 138 MMOL/L (ref 136–145)
WBC NRBC COR # BLD: 3.97 10*3/MM3 (ref 3.4–10.8)

## 2022-08-29 PROCEDURE — 93005 ELECTROCARDIOGRAM TRACING: CPT

## 2022-08-29 PROCEDURE — C9803 HOPD COVID-19 SPEC COLLECT: HCPCS

## 2022-08-29 PROCEDURE — U0004 COV-19 TEST NON-CDC HGH THRU: HCPCS

## 2022-08-29 PROCEDURE — 36415 COLL VENOUS BLD VENIPUNCTURE: CPT

## 2022-08-29 PROCEDURE — 80048 BASIC METABOLIC PNL TOTAL CA: CPT

## 2022-08-29 PROCEDURE — 93010 ELECTROCARDIOGRAM REPORT: CPT | Performed by: INTERNAL MEDICINE

## 2022-08-29 PROCEDURE — 85027 COMPLETE CBC AUTOMATED: CPT

## 2022-08-30 ENCOUNTER — HOSPITAL ENCOUNTER (OUTPATIENT)
Facility: HOSPITAL | Age: 77
Setting detail: HOSPITAL OUTPATIENT SURGERY
Discharge: HOME | End: 2022-08-30
Attending: SURGERY | Admitting: SURGERY

## 2022-08-30 ENCOUNTER — ANESTHESIA EVENT (OUTPATIENT)
Dept: PERIOP | Facility: HOSPITAL | Age: 77
End: 2022-08-30

## 2022-08-30 ENCOUNTER — ANESTHESIA (OUTPATIENT)
Dept: PERIOP | Facility: HOSPITAL | Age: 77
End: 2022-08-30

## 2022-08-30 VITALS
HEART RATE: 65 BPM | TEMPERATURE: 97.8 F | OXYGEN SATURATION: 98 % | SYSTOLIC BLOOD PRESSURE: 147 MMHG | RESPIRATION RATE: 18 BRPM | DIASTOLIC BLOOD PRESSURE: 61 MMHG

## 2022-08-30 DIAGNOSIS — Z99.2 ESRD (END STAGE RENAL DISEASE) ON DIALYSIS: Primary | ICD-10-CM

## 2022-08-30 DIAGNOSIS — N18.6 ESRD (END STAGE RENAL DISEASE) ON DIALYSIS: Primary | ICD-10-CM

## 2022-08-30 LAB
ANION GAP SERPL CALCULATED.3IONS-SCNC: 9.1 MMOL/L (ref 5–15)
BUN SERPL-MCNC: 11 MG/DL (ref 8–23)
BUN/CREAT SERPL: 8.5 (ref 7–25)
CALCIUM SPEC-SCNC: 9.6 MG/DL (ref 8.6–10.5)
CHLORIDE SERPL-SCNC: 103 MMOL/L (ref 98–107)
CO2 SERPL-SCNC: 24.9 MMOL/L (ref 22–29)
CREAT SERPL-MCNC: 1.3 MG/DL (ref 0.76–1.27)
EGFRCR SERPLBLD CKD-EPI 2021: 56.9 ML/MIN/1.73
GLUCOSE SERPL-MCNC: 80 MG/DL (ref 65–99)
POTASSIUM SERPL-SCNC: 5.3 MMOL/L (ref 3.5–5.2)
SODIUM SERPL-SCNC: 137 MMOL/L (ref 136–145)

## 2022-08-30 PROCEDURE — 25010000002 HEPARIN (PORCINE) PER 1000 UNITS: Performed by: SURGERY

## 2022-08-30 PROCEDURE — 25010000002 PROPOFOL 10 MG/ML EMULSION: Performed by: NURSE ANESTHETIST, CERTIFIED REGISTERED

## 2022-08-30 PROCEDURE — 25010000002 CEFAZOLIN IN DEXTROSE 2-4 GM/100ML-% SOLUTION: Performed by: SURGERY

## 2022-08-30 PROCEDURE — 80048 BASIC METABOLIC PNL TOTAL CA: CPT | Performed by: SURGERY

## 2022-08-30 PROCEDURE — 25010000002 FENTANYL CITRATE (PF) 50 MCG/ML SOLUTION: Performed by: ANESTHESIOLOGY

## 2022-08-30 PROCEDURE — 25010000002 CEFAZOLIN PER 500 MG: Performed by: SURGERY

## 2022-08-30 PROCEDURE — 76942 ECHO GUIDE FOR BIOPSY: CPT | Performed by: SURGERY

## 2022-08-30 PROCEDURE — 25010000002 HEPARIN (PORCINE) PER 1000 UNITS: Performed by: NURSE ANESTHETIST, CERTIFIED REGISTERED

## 2022-08-30 PROCEDURE — 25010000002 ROPIVACAINE PER 1 MG: Performed by: ANESTHESIOLOGY

## 2022-08-30 PROCEDURE — A4565 SLINGS: HCPCS | Performed by: SURGERY

## 2022-08-30 DEVICE — FLOSEAL HEMOSTATIC MATRIX, 5ML
Type: IMPLANTABLE DEVICE | Site: ARM | Status: FUNCTIONAL
Brand: FLOSEAL HEMOSTATIC MATRIX

## 2022-08-30 RX ORDER — PROPOFOL 10 MG/ML
VIAL (ML) INTRAVENOUS CONTINUOUS PRN
Status: DISCONTINUED | OUTPATIENT
Start: 2022-08-30 | End: 2022-08-30 | Stop reason: SURG

## 2022-08-30 RX ORDER — SODIUM CHLORIDE 0.9 % (FLUSH) 0.9 %
3 SYRINGE (ML) INJECTION EVERY 12 HOURS SCHEDULED
Status: DISCONTINUED | OUTPATIENT
Start: 2022-08-30 | End: 2022-08-30 | Stop reason: HOSPADM

## 2022-08-30 RX ORDER — ROPIVACAINE HYDROCHLORIDE 5 MG/ML
INJECTION, SOLUTION EPIDURAL; INFILTRATION; PERINEURAL
Status: COMPLETED | OUTPATIENT
Start: 2022-08-30 | End: 2022-08-30

## 2022-08-30 RX ORDER — FENTANYL CITRATE 50 UG/ML
50 INJECTION, SOLUTION INTRAMUSCULAR; INTRAVENOUS
Status: DISCONTINUED | OUTPATIENT
Start: 2022-08-30 | End: 2022-08-30 | Stop reason: HOSPADM

## 2022-08-30 RX ORDER — HYDRALAZINE HYDROCHLORIDE 20 MG/ML
5 INJECTION INTRAMUSCULAR; INTRAVENOUS
Status: DISCONTINUED | OUTPATIENT
Start: 2022-08-30 | End: 2022-08-30 | Stop reason: HOSPADM

## 2022-08-30 RX ORDER — ROPIVACAINE HYDROCHLORIDE 5 MG/ML
INJECTION, SOLUTION EPIDURAL; INFILTRATION; PERINEURAL
Status: COMPLETED
Start: 2022-08-30 | End: 2022-08-30

## 2022-08-30 RX ORDER — MIDAZOLAM HYDROCHLORIDE 1 MG/ML
0.5 INJECTION INTRAMUSCULAR; INTRAVENOUS
Status: DISCONTINUED | OUTPATIENT
Start: 2022-08-30 | End: 2022-08-30 | Stop reason: HOSPADM

## 2022-08-30 RX ORDER — LIDOCAINE HYDROCHLORIDE 10 MG/ML
0.5 INJECTION, SOLUTION EPIDURAL; INFILTRATION; INTRACAUDAL; PERINEURAL ONCE AS NEEDED
Status: DISCONTINUED | OUTPATIENT
Start: 2022-08-30 | End: 2022-08-30 | Stop reason: HOSPADM

## 2022-08-30 RX ORDER — LIDOCAINE HYDROCHLORIDE 20 MG/ML
INJECTION, SOLUTION INFILTRATION; PERINEURAL AS NEEDED
Status: DISCONTINUED | OUTPATIENT
Start: 2022-08-30 | End: 2022-08-30 | Stop reason: SURG

## 2022-08-30 RX ORDER — ONDANSETRON 2 MG/ML
4 INJECTION INTRAMUSCULAR; INTRAVENOUS ONCE AS NEEDED
Status: DISCONTINUED | OUTPATIENT
Start: 2022-08-30 | End: 2022-08-30 | Stop reason: HOSPADM

## 2022-08-30 RX ORDER — LABETALOL HYDROCHLORIDE 5 MG/ML
5 INJECTION, SOLUTION INTRAVENOUS
Status: DISCONTINUED | OUTPATIENT
Start: 2022-08-30 | End: 2022-08-30 | Stop reason: HOSPADM

## 2022-08-30 RX ORDER — HYDROCODONE BITARTRATE AND ACETAMINOPHEN 5; 325 MG/1; MG/1
1 TABLET ORAL ONCE AS NEEDED
Status: DISCONTINUED | OUTPATIENT
Start: 2022-08-30 | End: 2022-08-30 | Stop reason: HOSPADM

## 2022-08-30 RX ORDER — SODIUM CHLORIDE 0.9 % (FLUSH) 0.9 %
3-10 SYRINGE (ML) INJECTION AS NEEDED
Status: DISCONTINUED | OUTPATIENT
Start: 2022-08-30 | End: 2022-08-30 | Stop reason: HOSPADM

## 2022-08-30 RX ORDER — NALOXONE HCL 0.4 MG/ML
0.2 VIAL (ML) INJECTION AS NEEDED
Status: DISCONTINUED | OUTPATIENT
Start: 2022-08-30 | End: 2022-08-30 | Stop reason: HOSPADM

## 2022-08-30 RX ORDER — SODIUM CHLORIDE 9 MG/ML
INJECTION, SOLUTION INTRAVENOUS CONTINUOUS PRN
Status: DISCONTINUED | OUTPATIENT
Start: 2022-08-30 | End: 2022-08-30 | Stop reason: SURG

## 2022-08-30 RX ORDER — SODIUM CHLORIDE, SODIUM LACTATE, POTASSIUM CHLORIDE, CALCIUM CHLORIDE 600; 310; 30; 20 MG/100ML; MG/100ML; MG/100ML; MG/100ML
9 INJECTION, SOLUTION INTRAVENOUS CONTINUOUS
Status: DISCONTINUED | OUTPATIENT
Start: 2022-08-30 | End: 2022-08-30 | Stop reason: HOSPADM

## 2022-08-30 RX ORDER — FAMOTIDINE 10 MG/ML
20 INJECTION, SOLUTION INTRAVENOUS ONCE
Status: COMPLETED | OUTPATIENT
Start: 2022-08-30 | End: 2022-08-30

## 2022-08-30 RX ORDER — FENTANYL CITRATE 50 UG/ML
INJECTION, SOLUTION INTRAMUSCULAR; INTRAVENOUS
Status: COMPLETED | OUTPATIENT
Start: 2022-08-30 | End: 2022-08-30

## 2022-08-30 RX ORDER — HEPARIN SODIUM 1000 [USP'U]/ML
INJECTION, SOLUTION INTRAVENOUS; SUBCUTANEOUS AS NEEDED
Status: DISCONTINUED | OUTPATIENT
Start: 2022-08-30 | End: 2022-08-30 | Stop reason: SURG

## 2022-08-30 RX ORDER — PROPOFOL 10 MG/ML
VIAL (ML) INTRAVENOUS AS NEEDED
Status: DISCONTINUED | OUTPATIENT
Start: 2022-08-30 | End: 2022-08-30 | Stop reason: SURG

## 2022-08-30 RX ORDER — CEFAZOLIN SODIUM 2 G/100ML
2 INJECTION, SOLUTION INTRAVENOUS ONCE
Status: COMPLETED | OUTPATIENT
Start: 2022-08-30 | End: 2022-08-30

## 2022-08-30 RX ORDER — HYDROCODONE BITARTRATE AND ACETAMINOPHEN 5; 325 MG/1; MG/1
1-2 TABLET ORAL EVERY 4 HOURS PRN
Qty: 30 TABLET | Refills: 0 | Status: SHIPPED | OUTPATIENT
Start: 2022-08-30 | End: 2022-09-12

## 2022-08-30 RX ORDER — METOPROLOL SUCCINATE 50 MG/1
50 TABLET, EXTENDED RELEASE ORAL DAILY
COMMUNITY
Start: 2022-07-01

## 2022-08-30 RX ORDER — FLUMAZENIL 0.1 MG/ML
0.2 INJECTION INTRAVENOUS AS NEEDED
Status: DISCONTINUED | OUTPATIENT
Start: 2022-08-30 | End: 2022-08-30 | Stop reason: HOSPADM

## 2022-08-30 RX ORDER — EPHEDRINE SULFATE 50 MG/ML
5 INJECTION, SOLUTION INTRAVENOUS ONCE AS NEEDED
Status: DISCONTINUED | OUTPATIENT
Start: 2022-08-30 | End: 2022-08-30 | Stop reason: HOSPADM

## 2022-08-30 RX ADMIN — SODIUM CHLORIDE: 9 INJECTION, SOLUTION INTRAVENOUS at 07:34

## 2022-08-30 RX ADMIN — FENTANYL CITRATE 25 MCG: 50 INJECTION INTRAMUSCULAR; INTRAVENOUS at 07:17

## 2022-08-30 RX ADMIN — Medication 25 MCG/KG/MIN: at 07:41

## 2022-08-30 RX ADMIN — ROPIVACAINE HYDROCHLORIDE 30 ML: 5 INJECTION, SOLUTION EPIDURAL; INFILTRATION; PERINEURAL at 07:33

## 2022-08-30 RX ADMIN — HYDROCODONE BITARTRATE AND ACETAMINOPHEN 1 TABLET: 5; 325 TABLET ORAL at 09:22

## 2022-08-30 RX ADMIN — FENTANYL CITRATE 25 MCG: 50 INJECTION INTRAMUSCULAR; INTRAVENOUS at 07:16

## 2022-08-30 RX ADMIN — FAMOTIDINE 20 MG: 10 INJECTION, SOLUTION INTRAVENOUS at 07:04

## 2022-08-30 RX ADMIN — CEFAZOLIN SODIUM 2 G: 2 INJECTION, SOLUTION INTRAVENOUS at 07:30

## 2022-08-30 RX ADMIN — HEPARIN SODIUM 3000 UNITS: 1000 INJECTION INTRAVENOUS; SUBCUTANEOUS at 08:15

## 2022-08-30 RX ADMIN — PROPOFOL 30 MG: 10 INJECTION, EMULSION INTRAVENOUS at 07:47

## 2022-08-30 RX ADMIN — PROPOFOL 30 MG: 10 INJECTION, EMULSION INTRAVENOUS at 07:40

## 2022-08-30 RX ADMIN — FENTANYL CITRATE 25 MCG: 50 INJECTION INTRAMUSCULAR; INTRAVENOUS at 07:21

## 2022-08-30 RX ADMIN — LIDOCAINE HYDROCHLORIDE 60 MG: 20 INJECTION, SOLUTION INFILTRATION; PERINEURAL at 07:40

## 2022-08-30 RX ADMIN — PROPOFOL 30 MG: 10 INJECTION, EMULSION INTRAVENOUS at 08:00

## 2022-08-30 NOTE — DISCHARGE INSTRUCTIONS
[facility]  Bruno Jhaveri, Lionel Freeman Scherrer ,[place], [place]  [address] [facility] [facility], [address]  [phone]    Post-Operative Instructions for AV Fistula / Graft   Diet: Regular Diet    Medications: Take your regularly scheduled medications on the day of your surgery, unless your doctor has directed you otherwise. You may be sent home with a prescription for pain medication, follow the directions as prescribed.    Activity Restrictions / Driving: Avoid lifting more than 15 pounds or other activities that stress or compress the access area. No driving for the remainder of the day after surgery. You may drive when you no longer are taking narcotic pain medications. If a nerve block was done to numb your arm for surgery, you will be placed in an arm sling.  This numbness and inability to move the arm can last for as little as 6 hours but as many as 18.  The sling should be used during this time but can be removed when sensation and movement of your arm is normal and does not need to be used after that. Use of the arm is encouraged after the surgery.    Incision Care: Some bruising is normal. If you have drainage from the incision please notify the office. Dressing should be removed in 48 hours. After dressing is removed, it is OK to shower. Do not submerge incision until cleared by your surgeon (bath or swimming).    Bathing and Showering: You may shower after you remove your dressing.    Follow-up Appointments: You will need to return to the office for a follow-up visit within 1-3 weeks after your surgery. Please make sure you have your appointment scheduled, call [phone].    The patient (you) should:  1. Avoid wearing tight constrictive clothing over that arm.  2. Avoid wearing jewelry that is tight, such as a watch on the access arm.  3. Avoid carrying heavy objects.  4. Avoid purse straps over the fistula.  5. Avoid sleeping on the arm or keeping it bent for extended periods of time.  6.  "Each day, using your opposite hand, feel over the fistula for the \"thrill\" or vibration that is normally present.    Fistula Information / Care:   It is normal to have swelling in the surgical area. To help control this swelling, you should elevate your arm on a pillow.   Wiggle your fingers and clinch your fist 10 times every hour, while awake, for the first 5-7 days. Also, bend and straighten at the elbow to regain normal range of motion. These exercises are designed to promote circulation in the fingers and aid in draining away the excess fluid accumulation in the immediate area.  No blood pressures or needle sticks in the arm with your access.    Call the office for the followin. Fever greater than 101.0  2. Uncontrolled pain. This is on a scale of 1-10 (10 being the worst pain imaginable) your pain is a level 7 or above.  3. It is important that you notify our office if you are having numbness and significant pain in the extremity in which you have just had surgery!  4. Decreased or absent thrill.  5. Nausea, diarrhea, and/or vomiting that continue for 12-24 hours.  6. Signs of an infection: redness, increased swelling, drainage, fever and/or chills.  7. Chest pain or difficulty breathing.    The fistula or graft CAN NOT be used until the MD has given written approval. Generally, a graft will be ready to use in 2 weeks, and a fistula will be ready to use in 6-8 weeks.       If you have further questions after reading this handout, the office is open from 8:30am to 5:00pm Monday through Friday. Call [phone].                   What to expect after a Nerve Block    Nerve blocks administered to block pain affect many types of nerves, including those nerves that control movement, pain, and normal sensation. Following a nerve block, you may notice some bruising at the site where the block was given. You may experience sensations such as: numbness of the affected area or limb, tingling, heaviness (that is " the limb feels heavy to you), weakness or inability to move the affected arm or leg, or a feeling as if your arm or leg has “fallen asleep.”     A nerve block can last from 2 to 36 hours depending on the medications used.  Usually the weakness wears off first followed by the tingling and heaviness. As the block wears off, you may begin to notice pain; however, this sequence of events may occur in any order. Typically, you will be able to move your limb before you will feel it. Once a nerve block begins to wear off, the effects are usually completely gone within 60 minutes.  If you experience continued side effects that you believe are block related for longer than 48 hours, please call your healthcare provider. Please see block-specific instructions below.    Instructions for any block involving the shoulder or arm  If you have had any kind of shoulder/arm block, you will go home with your arm in a sling. Wear the sling until the block has completely worn off. You may be required to wear it for a longer period of time per your surgeon’s recommendations.  If you have had a shoulder/arm block, it is a good idea to sleep on a recliner with pillows under your arm.    You may experience symptoms such as:  Shortness of breath  Hoarseness   Blurry vision  Unequal pupils  Drooping of your face on the same side as the block was performed    These are side effects associated with this kind of block and should go away within 12 hours.    Note: If you have severe or prolonged shortness of breath, please seek medical assistance as soon as possible.     Protection of a “blocked” arm or leg (limb)  After a nerve block, you cannot feel pain, pressure, or extremes of temperature in the affected limb. And because of this, your blocked limb is at more risk for injury. For example, it is possible to burn your limb on an extremely hot surface without feeling it.     When resting, it is important to reposition your limb periodically to  avoid prolonged pressure on it. This may require the use of pillows and padding.    While sleeping, you should avoid rolling onto the affected limb or putting too much pressure on it.     If you have a cast or tight dressing, check the color of your fingers or toes of the affected limb. Call your surgeon if they look discolored (that is, dusky, dark colored).    Use caution in cold weather. Cover your limb appropriately to protect it from the cold.      Pain Management:    Your surgeon will give you a prescription for pain medication. Begin taking this before the nerve block wears off. Bear in mind that sometimes the block can wear off in the middle of the night.

## 2022-08-30 NOTE — ANESTHESIA PROCEDURE NOTES
Peripheral Block      Patient location during procedure: holding area  Stop time: 8/30/2022 7:34 AM  Reason for block: at surgeon's request and post-op pain management  Performed by  Anesthesiologist: Ashlyn Andersen MD  Preanesthetic Checklist  Completed: patient identified, IV checked, site marked, risks and benefits discussed, surgical consent, monitors and equipment checked, pre-op evaluation and timeout performed  Prep:  Pt Position: supine  Sterile barriers:gloves, mask and cap  Prep: ChloraPrep  Patient monitoring: continuous pulse oximetry, blood pressure monitoring and EKG  Procedure    Sedation: yes  Performed under: PNB  Guidance:ultrasound guided    ULTRASOUND INTERPRETATION.  Using ultrasound guidance a 20 G gauge needle was placed in close proximity to the brachial plexus nerve, at which point, under ultrasound guidance anesthetic was injected in the area of the nerve and spread of the anesthesia was seen on ultrasound in close proximity thereto.  There were no abnormalities seen on ultrasound; a digital image was taken; and the patient tolerated the procedure with no complications. Images:still images obtained, printed/placed on chart    Laterality:left  Block Type:supraclavicular  Needle Gauge:20 G    Catheter Size:20 G    Medications Used: ropivacaine (NAROPIN) 0.5 % injection, 30 mL      Medications  Comment:Ultrasound guidance was used to view and verify medication disbursement.  Ultrasound guidance was used for needle placement.    Post Assessment  Injection Assessment: negative aspiration for heme, no paresthesia on injection and incremental injection  Patient Tolerance:comfortable throughout block  Complications:no  Additional Notes  Ultrasound guidance was used to guide needle placement, as well as to view and verify medication disbursement.

## 2022-08-30 NOTE — ANESTHESIA PREPROCEDURE EVALUATION
Anesthesia Evaluation                  Airway   Mallampati: II  TM distance: >3 FB  Neck ROM: full  No difficulty expected  Dental    (+) partials    Pulmonary - normal exam   (+) COPD, sleep apnea,     ROS comment: Wedge resection left VAT  Cardiovascular - normal exam    PT is on anticoagulation therapy    (+) hypertension, past MI , CAD, CABG, dysrhythmias Atrial Fib, CHF Diastolic >=55% and Systolic <55%, PVD, hyperlipidemia,       Neuro/Psych  (+) CVA, psychiatric history Anxiety and Depression,    GI/Hepatic/Renal/Endo    (+)  GERD,  renal disease ESRD,     Musculoskeletal     (+) neck pain,   Abdominal    Substance History      OB/GYN          Other   arthritis,    history of cancer                    Anesthesia Plan    ASA 4     regional and MAC     intravenous induction     Anesthetic plan, risks, benefits, and alternatives have been provided, discussed and informed consent has been obtained with: patient.        CODE STATUS:

## 2022-08-30 NOTE — OP NOTE
Operative Note  Date of Admission:  8/30/2022  OR Date: 8/30/2022    Pre-op Diagnosis:   End-stage renal failure on dialysis    Post-Op Diagnosis Codes:  Same    Procedure:   1) left brachiocephalic arteriovenous fistula placement with cephalic vein transposition    Surgeon: Nura Freeman MD    Assistant: Glo ESCALANTE CSA and they provided critical assistance during the case including suctioning, exposure, retraction, and reduction of blood loss.    Anesthesia: Monitored Anesthesia Care with Regional    Staff:   Circulator: Daina Mcgovern RN  Scrub Person: Eliza Kemp; Charu Espinoza  Assistant: Glo Lao CSA    Estimated Blood Loss: minimal    Specimens:   Order Name Source Comment Collection Info Order Time   BASIC METABOLIC PANEL   Collected By: Muna Boyle RN 8/30/2022  6:18 AM        Complications: None    Findings: See dictation    Indications:    The patient is an 77 y.o. male seen for evaluation end-stage renal failure with lack of access permanently.  Patient is on on hemodialysis and is in need of AV fistula placement.  He understands risk benefits complications of the procedure and consents to the procedure.       Procedure:    Patient was prepped and draped sterilely.  Using an incision at the antecubital space localized by duplex over the site of the branching of the cephalic vein we exposed and controlled the cephalic vein which was mobilized up onto the biceps head for transposition.  Brachial artery was exposed and then the cephalic vein was divided at its division point to use the division as a spatulated endpoint.  This was opened widely and with this vein prepped and dilated we performed an end-to-side anastomosis under clamp control and longitudinal arteriotomy to the brachial artery.  6-0 Prolene sutures used for the anastomosis.  Heparin 3000 units have been given preeclamp.  At completion hemostasis was assured with Floseal.  Closure of the deep tissue with 3-0  Vicryl and closure of the skin with 4-0 Vicryl subicular closure and skin glue was performed.  There is excellent thrill of the fistula and good signal at the wrist.  Patient had no visible complications at completion.  He was sent to the recovery room stable.        Active Hospital Problems    Diagnosis  POA   • ESRD (end stage renal disease) on dialysis ([facility]) [N18.6, Z99.2]  Not Applicable      Resolved Hospital Problems   No resolved problems to display.      Aba Freeman MD     Date: 8/30/2022  Time: 11:39 EDT

## 2022-08-30 NOTE — BRIEF OP NOTE
ARTERIOVENOUS FISTULA FORMATION  Progress Note    Rafael Gomez  8/30/2022    Pre-op Diagnosis:   End-stage renal failure       Post-Op Diagnosis Codes:  Same    Procedure/CPT® Codes:        Procedure(s):  LEFT BRACHIAL CEPALIC ARTERIOVENOUS FISTULA PLACEMENT    Surgeon(s):  Aba Freeman MD    Anesthesia: Monitored Anesthesia Care with Regional    Staff:   Circulator: Daina Mcgovern RN  Scrub Person: Eliza Kemp; Charu Espinoza  Assistant: Glo Lao CSA  Assistant: Glo Lao CSA      Estimated Blood Loss: minimal    Urine Voided: * No values recorded between 8/30/2022  7:34 AM and 8/30/2022  8:56 AM *    Specimens:                None          Drains: * No LDAs found *    Findings: see dict        Complications: none    Assistant: Glo Lao CSA  was responsible for performing the following activities: Retraction, Suction, Irrigation, Suturing, Closing and Placing Dressing and their skilled assistance was necessary for the success of this case.    Aba Freeman MD     Date: 8/30/2022  Time: 09:01 EDT

## 2022-08-30 NOTE — ANESTHESIA POSTPROCEDURE EVALUATION
Patient: Rafael Gomez    Procedure Summary     Date: 08/30/22 Room / Location: [address] MAIN OR    Anesthesia Start: 0734 Anesthesia Stop: 0912    Procedure: LEFT BRACHIAL CEPALIC ARTERIOVENOUS FISTULA PLACEMENT (Left ) Diagnosis:     Surgeons: Aba Freeman MD Provider: Ashlyn Andersen MD    Anesthesia Type: regional, MAC ASA Status: 4          Anesthesia Type: regional, MAC    Vitals  Vitals Value Taken Time   /68 08/30/22 0908   Temp     Pulse 65 08/30/22 0915   Resp     SpO2 98 % 08/30/22 0915   Vitals shown include unvalidated device data.        Post Anesthesia Care and Evaluation    Patient location during evaluation: PACU  Patient participation: complete - patient participated  Level of consciousness: awake and alert  Pain management: adequate    Airway patency: patent  Anesthetic complications: No anesthetic complications    Cardiovascular status: acceptable  Respiratory status: acceptable  Hydration status: acceptable    Comments: --------------------            08/30/22 0728     --------------------   BP:       139/63     Pulse:      63       Resp:       23       Temp:                SpO2:      100%     --------------------

## 2022-08-30 NOTE — H&P
[facility]   HISTORY AND PHYSICAL    Patient Name:Rafael Gomez  : 1945  MRN: [id number]  Primary Care Physician: Claude Hair MD  Date of admission: 2022    Subjective   Subjective     Chief Complaint: End-stage renal failure    History of Present Illness   Rafael Gomez is a 77 y.o. male with end-stage renal failure needing long-term dialysis access.  Left brachiocephalic AV fistula is planned.  Patient understands and agrees to plan.    Review of Systems      Personal History     Past Medical History:   Diagnosis Date   • AA (aortic aneurysm) ([facility])     normal diameter noted on scan 21   • AL amyloidosis ([facility]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   • Anemia     no current issues   • Anesthesia complication     STATES AFTER TOTAL HIP REPLACEMENT WITH SPINAL HAD SEVERE CONFUSION. HOSPITALZED 21 DAYS. AT [facility]   • Anxiety    • Arthritis     OA OF LEFT HIP   • Colitis     REPORTS CURRENTLY INCONTINENT OF BOWEL AND HAVING DIARRHEA   • COPD (chronic obstructive pulmonary disease) ([facility])     inhalers, neb tx   • Coronary artery disease    • Depression    • ED (erectile dysfunction)    • ESRD (end stage renal disease) on dialysis ([facility])     PORT RIGHT CHEST FOR DIALYSIS. ELVIRA KELLY. KEVINF. MARK   • GERD (gastroesophageal reflux disease)    • Gout    • History of diverticulosis    • History of PAT (paroxysmal atrial tachycardia)    • History of transfusion    • Kake (hard of hearing)     DOES HAVING HEARING AID HE CAN USE   • Hyperlipidemia    • Hypertension    • Leukopenia     no current  issues   • Macrocytic anemia    • Multiple renal cysts     no current issues   • Myocardial infarction ([facility])    • Neuropathy     HANDS AND FEET   • PTSD (post-traumatic stress disorder)    • PVD (peripheral vascular disease) ([facility])    • Requires supplemental oxygen     2L of O2 THROUGH CPAP MACHINE AT NIGHT   • RLS (restless legs syndrome)    • Seasonal allergies    • Short-term  memory loss    • Sleep apnea     uses CPAP   • Stroke (HCC)     prior to diallo carotid endarterectomy, no residual issues AROUND EARLY 2000       Past Surgical History:   Procedure Laterality Date   • APPENDECTOMY N/A 2005   • CAROTID ENDARTERECTOMY Bilateral 2007   • COLONOSCOPY N/A 1995    done in [facility]   • COLONOSCOPY N/A 5/2/2022    Procedure: COLONOSCOPY WITH APPLICATION OF APC;  Surgeon: Lew Mg MD;  Location: [facility] ENDOSCOPY;  Service: Gastroenterology;  Laterality: N/A;  AVMs IN COLON, DIVERTICULOSIS, HEMORRHOIDS   • CORONARY ARTERY BYPASS GRAFT  1997    4 vessel, no  c/o cp or soa, follows w/ D. Sloane JHAVERI   • HIATAL HERNIA REPAIR N/A    • INSERTION HEMODIALYSIS CATHETER N/A 6/23/2022    Procedure: Insertion of tunneled catheter for hemodialysis;  Surgeon: Warren Rodríguez MD;  Location: [facility] MAIN OR;  Service: Vascular;  Laterality: N/A;   • REPLACEMENT TOTAL KNEE Left 09/08/2015   • ROTATOR CUFF REPAIR Right    • SKIN CANCER EXCISION      back   • THORACOSCOPY Left 6/2/2016    Procedure: LEFT VIDEO ASSISTED THORACOTOMY W/ LEFT UPPER LOBE WEDGE RESECTION X 2; EXPLORATORY BRONCHOSCOPY; PAIN PUMP PLACEMENT X 2;  Surgeon: Larry Hernandez MD;  Location: [facility] OR;  Service:    • TONSILLECTOMY Bilateral    • TOTAL HIP ARTHROPLASTY Right 1997   • TOTAL HIP ARTHROPLASTY Left 11/16/2021    Procedure: LEFT TOTAL HIP ARTHROPLASTY;  Surgeon: Man Francois MD;  Location: [facility] OR;  Service: Orthopedics;  Laterality: Left;       Family History: His family history includes Cancer in his father; Heart disease in his father and mother; Leukemia in his brother; Multiple myeloma in his mother; Other in his brother.     Social History: He  reports that he quit smoking about 25 years ago. His smoking use included cigarettes. He has a 30.00 pack-year smoking history. His smokeless tobacco use includes chew. He reports previous alcohol use. He reports that he does not use drugs.    Home  Medications:  Chlorhexidine Gluconate, Fluticasone-Umeclidin-Vilant, Testosterone Cypionate, acetaminophen, albuterol, albuterol sulfate HFA, allopurinol, atorvastatin, cetirizine, citalopram, donepezil, finasteride, fish oil, gabapentin, ketoconazole, metoprolol succinate XL, pantoprazole, pramipexole, and rivaroxaban    Allergies:  He is allergic to cephalexin and morphine.    Objective    Objective     Vitals:    Temp:  [98.6 °F (37 °C)] 98.6 °F (37 °C)  Heart Rate:  [63-64] 63  Resp:  [18-23] 23  BP: (128-151)/(63-75) 139/63  Flow (L/min):  [4] 4    Physical Exam   Lungs clear and equal  Heart regular rate and rhythm  Extremities viable    Result Review    Result Review:  I have personally reviewed the results from the time of this admission to 8/30/2022 07:29 EDT and agree with these findings:  []  Laboratory list / accordion  []  Microbiology  []  Radiology  []  EKG/Telemetry   []  Cardiology/Vascular   []  Pathology  []  Old records  []  Other:  Most notable findings include: Viable left arm vein      Assessment & Plan   Assessment / Plan     Brief Patient Summary:  Rafael Gomez is a 77 y.o. male with end-stage renal failure in need of permanent dialysis access.  Plans for left brachiocephalic fistula made.  Patient understands risk benefits complications and agrees to procedure.    Active Hospital Problems:  Active Hospital Problems    Diagnosis    • ESRD (end stage renal disease) on dialysis ([facility])      Plan:   Left brachiocephalic fistula placement          Aba Freeman MD

## 2022-08-30 NOTE — ADDENDUM NOTE
Addendum  created 08/30/22 1038 by Ashlyn Andersen MD    Attestation recorded in Intraprocedure, Intraprocedure Attestations filed

## 2022-09-12 ENCOUNTER — APPOINTMENT (OUTPATIENT)
Dept: CT IMAGING | Facility: HOSPITAL | Age: 77
End: 2022-09-12

## 2022-09-12 ENCOUNTER — HOSPITAL ENCOUNTER (INPATIENT)
Facility: HOSPITAL | Age: 77
LOS: 3 days | Discharge: HOME | End: 2022-09-15
Attending: EMERGENCY MEDICINE | Admitting: INTERNAL MEDICINE

## 2022-09-12 ENCOUNTER — PREP FOR SURGERY (OUTPATIENT)
Dept: OTHER | Facility: HOSPITAL | Age: 77
End: 2022-09-12

## 2022-09-12 ENCOUNTER — APPOINTMENT (OUTPATIENT)
Dept: GENERAL RADIOLOGY | Facility: HOSPITAL | Age: 77
End: 2022-09-12

## 2022-09-12 DIAGNOSIS — Z99.2 END-STAGE RENAL DISEASE ON HEMODIALYSIS: ICD-10-CM

## 2022-09-12 DIAGNOSIS — K92.2 ACUTE GI BLEEDING: Primary | ICD-10-CM

## 2022-09-12 DIAGNOSIS — N18.6 END-STAGE RENAL DISEASE ON HEMODIALYSIS: ICD-10-CM

## 2022-09-12 DIAGNOSIS — R57.8 HEMORRHAGIC SHOCK: ICD-10-CM

## 2022-09-12 PROBLEM — D62 ACUTE BLOOD LOSS ANEMIA: Status: ACTIVE | Noted: 2022-09-12

## 2022-09-12 LAB
ABO GROUP BLD: NORMAL
ACANTHOCYTES BLD QL SMEAR: NORMAL
ALBUMIN SERPL-MCNC: 3.5 G/DL (ref 3.5–5.2)
ALBUMIN/GLOB SERPL: 1.8 G/DL
ALP SERPL-CCNC: 90 U/L (ref 39–117)
ALT SERPL W P-5'-P-CCNC: 10 U/L (ref 1–41)
ANION GAP SERPL CALCULATED.3IONS-SCNC: 9 MMOL/L (ref 5–15)
ANISOCYTOSIS BLD QL: NORMAL
APTT PPP: 51.1 SECONDS (ref 24.2–34.2)
AST SERPL-CCNC: 13 U/L (ref 1–40)
BASOPHILS # BLD AUTO: 0.03 10*3/MM3 (ref 0–0.2)
BASOPHILS NFR BLD AUTO: 0.8 % (ref 0–1.5)
BILIRUB SERPL-MCNC: 0.5 MG/DL (ref 0–1.2)
BILIRUB UR QL STRIP: NEGATIVE
BUN SERPL-MCNC: 29 MG/DL (ref 8–23)
BUN/CREAT SERPL: 15.6 (ref 7–25)
CALCIUM SPEC-SCNC: 8.5 MG/DL (ref 8.6–10.5)
CHLORIDE SERPL-SCNC: 105 MMOL/L (ref 98–107)
CLARITY UR: CLEAR
CO2 SERPL-SCNC: 24 MMOL/L (ref 22–29)
COLOR UR: YELLOW
CREAT SERPL-MCNC: 1.86 MG/DL (ref 0.76–1.27)
DEPRECATED RDW RBC AUTO: 63.5 FL (ref 37–54)
EGFRCR SERPLBLD CKD-EPI 2021: 37 ML/MIN/1.73
ELLIPTOCYTES BLD QL SMEAR: NORMAL
EOSINOPHIL # BLD AUTO: 0.2 10*3/MM3 (ref 0–0.4)
EOSINOPHIL NFR BLD AUTO: 5.2 % (ref 0.3–6.2)
ERYTHROCYTE [DISTWIDTH] IN BLOOD BY AUTOMATED COUNT: 18.6 % (ref 12.3–15.4)
GLOBULIN UR ELPH-MCNC: 2 GM/DL
GLUCOSE SERPL-MCNC: 174 MG/DL (ref 65–99)
GLUCOSE UR STRIP-MCNC: NEGATIVE MG/DL
HCT VFR BLD AUTO: 20.9 % (ref 37.5–51)
HCT VFR BLD AUTO: 24.9 % (ref 37.5–51)
HEMOCCULT STL QL IA: POSITIVE
HGB BLD-MCNC: 6.9 G/DL (ref 13–17.7)
HGB BLD-MCNC: 8.3 G/DL (ref 13–17.7)
HGB UR QL STRIP.AUTO: NEGATIVE
HOLD SPECIMEN: NORMAL
HOLD SPECIMEN: NORMAL
HYPOCHROMIA BLD QL: NORMAL
IMM GRANULOCYTES # BLD AUTO: 0.02 10*3/MM3 (ref 0–0.05)
IMM GRANULOCYTES NFR BLD AUTO: 0.5 % (ref 0–0.5)
INR PPP: 2.33 (ref 0.86–1.15)
KETONES UR QL STRIP: NEGATIVE
LEUKOCYTE ESTERASE UR QL STRIP.AUTO: NEGATIVE
LYMPHOCYTES # BLD AUTO: 0.73 10*3/MM3 (ref 0.7–3.1)
LYMPHOCYTES NFR BLD AUTO: 19 % (ref 19.6–45.3)
MACROCYTES BLD QL SMEAR: NORMAL
MAGNESIUM SERPL-MCNC: 1.7 MG/DL (ref 1.6–2.4)
MCH RBC QN AUTO: 31.7 PG (ref 26.6–33)
MCHC RBC AUTO-ENTMCNC: 33 G/DL (ref 31.5–35.7)
MCV RBC AUTO: 95.9 FL (ref 79–97)
MONOCYTES # BLD AUTO: 0.22 10*3/MM3 (ref 0.1–0.9)
MONOCYTES NFR BLD AUTO: 5.7 % (ref 5–12)
NEUTROPHILS NFR BLD AUTO: 2.65 10*3/MM3 (ref 1.7–7)
NEUTROPHILS NFR BLD AUTO: 68.8 % (ref 42.7–76)
NITRITE UR QL STRIP: NEGATIVE
NRBC BLD AUTO-RTO: 0 /100 WBC (ref 0–0.2)
PH UR STRIP.AUTO: <=5 [PH] (ref 5–8)
PLATELET # BLD AUTO: 109 10*3/MM3 (ref 140–450)
PMV BLD AUTO: 12.4 FL (ref 6–12)
POIKILOCYTOSIS BLD QL SMEAR: NORMAL
POTASSIUM SERPL-SCNC: 4.1 MMOL/L (ref 3.5–5.2)
PROT SERPL-MCNC: 5.5 G/DL (ref 6–8.5)
PROT UR QL STRIP: NEGATIVE
PROTHROMBIN TIME: 26 SECONDS (ref 11.8–14.9)
RBC # BLD AUTO: 2.18 10*6/MM3 (ref 4.14–5.8)
RH BLD: POSITIVE
SMALL PLATELETS BLD QL SMEAR: NORMAL
SODIUM SERPL-SCNC: 138 MMOL/L (ref 136–145)
SP GR UR STRIP: >1.03 (ref 1–1.03)
TROPONIN T SERPL-MCNC: 0.02 NG/ML (ref 0–0.03)
UROBILINOGEN UR QL STRIP: ABNORMAL
WBC MORPH BLD: NORMAL
WBC NRBC COR # BLD: 3.85 10*3/MM3 (ref 3.4–10.8)
WHOLE BLOOD HOLD COAG: NORMAL
WHOLE BLOOD HOLD SPECIMEN: NORMAL

## 2022-09-12 PROCEDURE — 94799 UNLISTED PULMONARY SVC/PX: CPT

## 2022-09-12 PROCEDURE — P9016 RBC LEUKOCYTES REDUCED: HCPCS

## 2022-09-12 PROCEDURE — 85730 THROMBOPLASTIN TIME PARTIAL: CPT | Performed by: EMERGENCY MEDICINE

## 2022-09-12 PROCEDURE — 94640 AIRWAY INHALATION TREATMENT: CPT

## 2022-09-12 PROCEDURE — 86900 BLOOD TYPING SEROLOGIC ABO: CPT

## 2022-09-12 PROCEDURE — 71045 X-RAY EXAM CHEST 1 VIEW: CPT

## 2022-09-12 PROCEDURE — 0 IOPAMIDOL PER 1 ML: Performed by: EMERGENCY MEDICINE

## 2022-09-12 PROCEDURE — 93005 ELECTROCARDIOGRAM TRACING: CPT | Performed by: EMERGENCY MEDICINE

## 2022-09-12 PROCEDURE — 86920 COMPATIBILITY TEST SPIN: CPT

## 2022-09-12 PROCEDURE — 84484 ASSAY OF TROPONIN QUANT: CPT

## 2022-09-12 PROCEDURE — 83735 ASSAY OF MAGNESIUM: CPT

## 2022-09-12 PROCEDURE — 99291 CRITICAL CARE FIRST HOUR: CPT | Performed by: INTERNAL MEDICINE

## 2022-09-12 PROCEDURE — 86923 COMPATIBILITY TEST ELECTRIC: CPT

## 2022-09-12 PROCEDURE — 25010000002 PROTHROMBIN COMPLEX CONC HUMAN 500 UNITS KIT: Performed by: EMERGENCY MEDICINE

## 2022-09-12 PROCEDURE — 93005 ELECTROCARDIOGRAM TRACING: CPT

## 2022-09-12 PROCEDURE — 93010 ELECTROCARDIOGRAM REPORT: CPT | Performed by: INTERNAL MEDICINE

## 2022-09-12 PROCEDURE — 86901 BLOOD TYPING SEROLOGIC RH(D): CPT

## 2022-09-12 PROCEDURE — 86901 BLOOD TYPING SEROLOGIC RH(D): CPT | Performed by: EMERGENCY MEDICINE

## 2022-09-12 PROCEDURE — 85007 BL SMEAR W/DIFF WBC COUNT: CPT | Performed by: EMERGENCY MEDICINE

## 2022-09-12 PROCEDURE — 86900 BLOOD TYPING SEROLOGIC ABO: CPT | Performed by: EMERGENCY MEDICINE

## 2022-09-12 PROCEDURE — 85018 HEMOGLOBIN: CPT | Performed by: INTERNAL MEDICINE

## 2022-09-12 PROCEDURE — 74177 CT ABD & PELVIS W/CONTRAST: CPT

## 2022-09-12 PROCEDURE — 85610 PROTHROMBIN TIME: CPT | Performed by: EMERGENCY MEDICINE

## 2022-09-12 PROCEDURE — 80053 COMPREHEN METABOLIC PANEL: CPT

## 2022-09-12 PROCEDURE — 85014 HEMATOCRIT: CPT | Performed by: INTERNAL MEDICINE

## 2022-09-12 PROCEDURE — 82274 ASSAY TEST FOR BLOOD FECAL: CPT | Performed by: EMERGENCY MEDICINE

## 2022-09-12 PROCEDURE — 99285 EMERGENCY DEPT VISIT HI MDM: CPT

## 2022-09-12 PROCEDURE — 36415 COLL VENOUS BLD VENIPUNCTURE: CPT

## 2022-09-12 PROCEDURE — 86850 RBC ANTIBODY SCREEN: CPT | Performed by: EMERGENCY MEDICINE

## 2022-09-12 PROCEDURE — 85025 COMPLETE CBC W/AUTO DIFF WBC: CPT | Performed by: EMERGENCY MEDICINE

## 2022-09-12 PROCEDURE — 36430 TRANSFUSION BLD/BLD COMPNT: CPT

## 2022-09-12 PROCEDURE — 81003 URINALYSIS AUTO W/O SCOPE: CPT | Performed by: INTERNAL MEDICINE

## 2022-09-12 PROCEDURE — 94664 DEMO&/EVAL PT USE INHALER: CPT

## 2022-09-12 RX ORDER — IPRATROPIUM BROMIDE AND ALBUTEROL SULFATE 2.5; .5 MG/3ML; MG/3ML
3 SOLUTION RESPIRATORY (INHALATION)
Status: DISCONTINUED | OUTPATIENT
Start: 2022-09-12 | End: 2022-09-15 | Stop reason: HOSPADM

## 2022-09-12 RX ORDER — ONDANSETRON 2 MG/ML
4 INJECTION INTRAMUSCULAR; INTRAVENOUS EVERY 4 HOURS PRN
Status: DISCONTINUED | OUTPATIENT
Start: 2022-09-12 | End: 2022-09-15 | Stop reason: HOSPADM

## 2022-09-12 RX ORDER — BUDESONIDE 0.5 MG/2ML
0.5 INHALANT ORAL
Status: DISCONTINUED | OUTPATIENT
Start: 2022-09-12 | End: 2022-09-15 | Stop reason: HOSPADM

## 2022-09-12 RX ORDER — NITROGLYCERIN 0.4 MG/1
0.4 TABLET SUBLINGUAL
Status: DISCONTINUED | OUTPATIENT
Start: 2022-09-12 | End: 2022-09-15 | Stop reason: HOSPADM

## 2022-09-12 RX ORDER — NOREPINEPHRINE BIT/0.9 % NACL 8 MG/250ML
.02-.3 INFUSION BOTTLE (ML) INTRAVENOUS
Status: DISCONTINUED | OUTPATIENT
Start: 2022-09-12 | End: 2022-09-13

## 2022-09-12 RX ORDER — POLYETHYLENE GLYCOL 3350 17 G/17G
17 POWDER, FOR SOLUTION ORAL DAILY PRN
Status: DISCONTINUED | OUTPATIENT
Start: 2022-09-12 | End: 2022-09-15 | Stop reason: HOSPADM

## 2022-09-12 RX ORDER — ENOXAPARIN SODIUM 100 MG/ML
30 INJECTION SUBCUTANEOUS DAILY
Status: DISCONTINUED | OUTPATIENT
Start: 2022-09-12 | End: 2022-09-12

## 2022-09-12 RX ORDER — ARFORMOTEROL TARTRATE 15 UG/2ML
15 SOLUTION RESPIRATORY (INHALATION)
Status: DISCONTINUED | OUTPATIENT
Start: 2022-09-12 | End: 2022-09-15 | Stop reason: HOSPADM

## 2022-09-12 RX ORDER — TEMAZEPAM 15 MG/1
15 CAPSULE ORAL NIGHTLY PRN
Status: DISCONTINUED | OUTPATIENT
Start: 2022-09-12 | End: 2022-09-15 | Stop reason: HOSPADM

## 2022-09-12 RX ORDER — ACETAMINOPHEN 325 MG/1
650 TABLET ORAL EVERY 4 HOURS PRN
Status: DISCONTINUED | OUTPATIENT
Start: 2022-09-12 | End: 2022-09-15 | Stop reason: HOSPADM

## 2022-09-12 RX ORDER — BISACODYL 10 MG
10 SUPPOSITORY, RECTAL RECTAL DAILY PRN
Status: DISCONTINUED | OUTPATIENT
Start: 2022-09-12 | End: 2022-09-15 | Stop reason: HOSPADM

## 2022-09-12 RX ORDER — SODIUM CHLORIDE 0.9 % (FLUSH) 0.9 %
10 SYRINGE (ML) INJECTION AS NEEDED
Status: DISCONTINUED | OUTPATIENT
Start: 2022-09-12 | End: 2022-09-15 | Stop reason: HOSPADM

## 2022-09-12 RX ORDER — ALPRAZOLAM 0.25 MG/1
0.25 TABLET ORAL EVERY 6 HOURS PRN
Status: DISCONTINUED | OUTPATIENT
Start: 2022-09-12 | End: 2022-09-15 | Stop reason: HOSPADM

## 2022-09-12 RX ORDER — BISACODYL 5 MG/1
5 TABLET, DELAYED RELEASE ORAL DAILY PRN
Status: DISCONTINUED | OUTPATIENT
Start: 2022-09-12 | End: 2022-09-15 | Stop reason: HOSPADM

## 2022-09-12 RX ORDER — AMOXICILLIN 250 MG
1 CAPSULE ORAL 2 TIMES DAILY
Status: DISCONTINUED | OUTPATIENT
Start: 2022-09-12 | End: 2022-09-15 | Stop reason: HOSPADM

## 2022-09-12 RX ORDER — PANTOPRAZOLE SODIUM 40 MG/10ML
80 INJECTION, POWDER, LYOPHILIZED, FOR SOLUTION INTRAVENOUS ONCE
Status: COMPLETED | OUTPATIENT
Start: 2022-09-12 | End: 2022-09-12

## 2022-09-12 RX ADMIN — PANTOPRAZOLE SODIUM 80 MG: 40 INJECTION, POWDER, FOR SOLUTION INTRAVENOUS at 14:54

## 2022-09-12 RX ADMIN — ARFORMOTEROL TARTRATE 15 MCG: 15 SOLUTION RESPIRATORY (INHALATION) at 21:03

## 2022-09-12 RX ADMIN — BUDESONIDE 0.5 MG: 0.5 SUSPENSION RESPIRATORY (INHALATION) at 21:03

## 2022-09-12 RX ADMIN — PANTOPRAZOLE SODIUM 8 MG/HR: 40 INJECTION, POWDER, FOR SOLUTION INTRAVENOUS at 15:50

## 2022-09-12 RX ADMIN — PROTHROMBIN, COAGULATION FACTOR VII HUMAN, COAGULATION FACTOR IX HUMAN, COAGULATION FACTOR X HUMAN, PROTEIN C, PROTEIN S HUMAN, AND WATER 5031 UNITS: KIT at 15:15

## 2022-09-12 RX ADMIN — SODIUM CHLORIDE 100 ML: 9 INJECTION, SOLUTION INTRAVENOUS at 14:41

## 2022-09-12 RX ADMIN — IOPAMIDOL 100 ML: 755 INJECTION, SOLUTION INTRAVENOUS at 15:42

## 2022-09-12 RX ADMIN — TEMAZEPAM 15 MG: 7.5 CAPSULE ORAL at 23:33

## 2022-09-12 NOTE — H&P
[facility]   HISTORY AND PHYSICAL    Patient Name: Rafael Gomez  : 1945  MRN: [id number]  Primary Care Physician:  Claude Hair MD  Date of admission: 2022    Subjective   Subjective     Chief Complaint:   Rectal bleed      HPI:    Rafael Gomez is a 77 y.o. male presented to the emergency department with rectal bleeding starting x 4 days ago. Pt admits to diarrhea, headache and generalized weakness.  Pt had dialysis this morning at 06:15. Per wife, pt was put on Xarelto last week after for dialysis as patient's blood was clotting.  Patient had large amount of blood and clots in the stools.  Initially they thought it is from hemorrhoids.  Work-up in the ER revealed severe anemia and patient was started on Kcentra for reversal of anticoagulation.  As well as he was given blood transfusion.  When I saw him in ER he is awake alert, not in any distress heart rate is stable.  Blood pressure has improved systolic around 100.  Wife at bedside.  Denies any chest pain or shortness of breath.           Review of Systems:    Fatigue and weakness  No chest pain  No shortness of breath  Some abdominal discomfort  No nausea vomiting  Blood in the stools diarrhea  No fever chills      Personal History     Past Medical History:   Diagnosis Date   • AA (aortic aneurysm) ([facility])     normal diameter noted on scan 21   • AL amyloidosis ([facility]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   • Anemia     no current issues   • Anesthesia complication     STATES AFTER TOTAL HIP REPLACEMENT WITH SPINAL HAD SEVERE CONFUSION. HOSPITALZED 21 DAYS. AT [facility]   • Anxiety    • Arthritis     OA OF LEFT HIP   • Colitis     REPORTS CURRENTLY INCONTINENT OF BOWEL AND HAVING DIARRHEA   • COPD (chronic obstructive pulmonary disease) ([facility])     inhalers, neb tx   • Coronary artery disease    • Depression    • Diverticulitis    • ED (erectile dysfunction)    • ESRD (end stage renal disease) on dialysis ([facility])      PORT RIGHT CHEST FOR DIALYSIS. ELVIRA KELLY. JAVAD FLORES   • GERD (gastroesophageal reflux disease)    • Gout    • Hemorrhoids    • History of diverticulosis    • History of PAT (paroxysmal atrial tachycardia)    • History of transfusion    • [place] (hard of hearing)     DOES HAVING HEARING AID HE CAN USE   • Hyperlipidemia    • Hypertension    • Leukopenia     no current  issues   • Macrocytic anemia    • Multiple renal cysts     no current issues   • Myocardial infarction (HCC)    • Neuropathy     HANDS AND FEET   • PTSD (post-traumatic stress disorder)    • PVD (peripheral vascular disease) (HCC)    • Requires supplemental oxygen     2L of O2 THROUGH CPAP MACHINE AT NIGHT   • RLS (restless legs syndrome)    • Seasonal allergies    • Short-term memory loss    • Sleep apnea     uses CPAP   • Stroke (HCC)     prior to diallo carotid endarterectomy, no residual issues AROUND EARLY 2000       Past Surgical History:   Procedure Laterality Date   • APPENDECTOMY N/A 2005   • ARTERIOVENOUS FISTULA/SHUNT SURGERY Left 8/30/2022    Procedure: LEFT BRACHIAL CEPALIC ARTERIOVENOUS FISTULA PLACEMENT;  Surgeon: Aba Freeman MD;  Location: [facility] MAIN OR;  Service: Vascular;  Laterality: Left;   • CAROTID ENDARTERECTOMY Bilateral 2007   • COLONOSCOPY N/A 1995    done in [facility]   • COLONOSCOPY N/A 5/2/2022    Procedure: COLONOSCOPY WITH APPLICATION OF APC;  Surgeon: Lew Mg MD;  Location: [facility] ENDOSCOPY;  Service: Gastroenterology;  Laterality: N/A;  AVMs IN COLON, DIVERTICULOSIS, HEMORRHOIDS   • CORONARY ARTERY BYPASS GRAFT  1997    4 vessel, no  c/o cp or soa, follows w/ D. Sloane JHAVERI   • HIATAL HERNIA REPAIR N/A    • INSERTION HEMODIALYSIS CATHETER N/A 6/23/2022    Procedure: Insertion of tunneled catheter for hemodialysis;  Surgeon: Warren Rodríguez MD;  Location: [facility] MAIN OR;  Service: Vascular;  Laterality: N/A;   • REPLACEMENT TOTAL KNEE Left 09/08/2015   • ROTATOR CUFF REPAIR Right    • SKIN CANCER  EXCISION      back   • THORACOSCOPY Left 6/2/2016    Procedure: LEFT VIDEO ASSISTED THORACOTOMY W/ LEFT UPPER LOBE WEDGE RESECTION X 2; EXPLORATORY BRONCHOSCOPY; PAIN PUMP PLACEMENT X 2;  Surgeon: Larry Hernandez MD;  Location: [facility] MAIN OR;  Service:    • TONSILLECTOMY Bilateral    • TOTAL HIP ARTHROPLASTY Right 1997   • TOTAL HIP ARTHROPLASTY Left 11/16/2021    Procedure: LEFT TOTAL HIP ARTHROPLASTY;  Surgeon: Man Francois MD;  Location: [facility] MAIN OR;  Service: Orthopedics;  Laterality: Left;       Family History: family history includes Cancer in his father; Heart disease in his father and mother; Leukemia in his brother; Multiple myeloma in his mother; Other in his brother. Otherwise pertinent FHx was reviewed and not pertinent to current issue.    Social History:  reports that he quit smoking about 25 years ago. His smoking use included cigarettes. He has a 30.00 pack-year smoking history. His smokeless tobacco use includes chew. He reports previous alcohol use. He reports that he does not use drugs.    Home Medications:  Fluticasone-Umeclidin-Vilant, Testosterone Cypionate, acetaminophen, albuterol, albuterol sulfate HFA, allopurinol, atorvastatin, cetirizine, citalopram, donepezil, finasteride, gabapentin, ketoconazole, metoprolol succinate XL, pantoprazole, pramipexole, and rivaroxaban      Allergies:  Allergies   Allergen Reactions   • Cephalexin Nausea And Vomiting   • Morphine Other (See Comments)     Pt had long recovery involving morphine administration post surgery in December 2021.         Objective   Objective     Vitals:   Temp:  [97.4 °F (36.3 °C)-97.7 °F (36.5 °C)] 97.4 °F (36.3 °C)  Heart Rate:  [51-60] 55  Resp:  [18-20] 18  BP: ()/(32-63) 109/51    Physical Exam  Elderly male looks of his stated age,  Pallor noted  Neck supple  Heart regular  Lungs diminished breath sounds  Abdomen soft nontender, obese and somewhat distended  Neurologically awake alert and oriented    extremities no edema        I have personally reviewed the results from the time of this admission to 9/12/2022 17:22 EDT and agree with these findings:  [x]  Laboratory  []  Microbiology  [x]  Radiology  []  EKG/Telemetry   []  Cardiology/Vascular   []  Pathology  []  Old records  []  Other:    CBC:    WBC   Date Value Ref Range Status   09/12/2022 3.85 3.40 - 10.80 10*3/mm3 Final     RBC   Date Value Ref Range Status   09/12/2022 2.18 (L) 4.14 - 5.80 10*6/mm3 Final     Hemoglobin   Date Value Ref Range Status   09/12/2022 6.9 (C) 13.0 - 17.7 g/dL Final     Hematocrit   Date Value Ref Range Status   09/12/2022 20.9 (C) 37.5 - 51.0 % Final     MCV   Date Value Ref Range Status   09/12/2022 95.9 79.0 - 97.0 fL Final     MCH   Date Value Ref Range Status   09/12/2022 31.7 26.6 - 33.0 pg Final     MCHC   Date Value Ref Range Status   09/12/2022 33.0 31.5 - 35.7 g/dL Final     RDW   Date Value Ref Range Status   09/12/2022 18.6 (H) 12.3 - 15.4 % Final     RDW-SD   Date Value Ref Range Status   09/12/2022 63.5 (H) 37.0 - 54.0 fl Final     MPV   Date Value Ref Range Status   09/12/2022 12.4 (H) 6.0 - 12.0 fL Final     Platelets   Date Value Ref Range Status   09/12/2022 109 (L) 140 - 450 10*3/mm3 Final     Neutrophil %   Date Value Ref Range Status   09/12/2022 68.8 42.7 - 76.0 % Final     Lymphocyte %   Date Value Ref Range Status   09/12/2022 19.0 (L) 19.6 - 45.3 % Final     Monocyte %   Date Value Ref Range Status   09/12/2022 5.7 5.0 - 12.0 % Final     Eosinophil %   Date Value Ref Range Status   09/12/2022 5.2 0.3 - 6.2 % Final     Basophil %   Date Value Ref Range Status   09/12/2022 0.8 0.0 - 1.5 % Final     Immature Grans %   Date Value Ref Range Status   09/12/2022 0.5 0.0 - 0.5 % Final     Neutrophils, Absolute   Date Value Ref Range Status   09/12/2022 2.65 1.70 - 7.00 10*3/mm3 Final     Lymphocytes, Absolute   Date Value Ref Range Status   09/12/2022 0.73 0.70 - 3.10 10*3/mm3 Final     Monocytes, Absolute    Date Value Ref Range Status   09/12/2022 0.22 0.10 - 0.90 10*3/mm3 Final     Eosinophils, Absolute   Date Value Ref Range Status   09/12/2022 0.20 0.00 - 0.40 10*3/mm3 Final     Basophils, Absolute   Date Value Ref Range Status   09/12/2022 0.03 0.00 - 0.20 10*3/mm3 Final     Immature Grans, Absolute   Date Value Ref Range Status   09/12/2022 0.02 0.00 - 0.05 10*3/mm3 Final     nRBC   Date Value Ref Range Status   09/12/2022 0.0 0.0 - 0.2 /100 WBC Final        BMP:    Lab Results   Component Value Date    GLUCOSE 174 (H) 09/12/2022    BUN 29 (H) 09/12/2022    CREATININE 1.86 (H) 09/12/2022    EGFRIFNONA 45 (L) 12/03/2021    EGFRIFAFRI  11/21/2021      Comment:      <15 Indicative of kidney failure.    BCR 15.6 09/12/2022    K 4.1 09/12/2022    CO2 24.0 09/12/2022    CALCIUM 8.5 (L) 09/12/2022    PROTENTOTREF 6.0 08/03/2022    ALBUMIN 3.50 09/12/2022    LABIL2 1.6 08/03/2022    AST 13 09/12/2022    ALT 10 09/12/2022        No radiology results for the last day     CT abdomen pelvis no acute issues      Assessment & Plan   Assessment / Plan       Current Diagnosis:  Active Hospital Problems    Diagnosis    • **Acute blood loss anemia    • Acute GI bleeding    • ESRD (end stage renal disease) ([facility])    • Hypotension      Plan:   Admit to ICU  Monitor H&H acute 8  Transfused 2 unit  N.p.o. for now  IV Protonix  GI consult, Dr. Torres notified  Intensivist Dr. Torres notified of ICU admission  Dr. Reddy nephrologist who follows patient for end-stage renal disease and hemodialysis consulted  Hold all nonessential meds and antihypertensives at this time  Discussed with patient and wife in detail at bedside  Continue aggressive care, full code      DVT prophylaxis:  No DVT prophylaxis order currently exists.    GI Prophylaxis:       IV Protonix    CODE STATUS:    Code Status (Patient has no pulse and is not breathing): CPR (Attempt to Resuscitate)  Medical Interventions (Patient has pulse or is breathing): Full  Support    Admission Status:  I believe this patient meets inpatient status.             I have dictated this note utilizing Dragon Dictation.             Please note that portions of this note were completed with a voice recognition program.             Part of this note may be an electronic transcription/translation of spoken language to printed text         using the Dragon Dictation System.       Electronically signed by Melchor Nichole MD, 09/12/22, 5:16 PM EDT.    Total time spent with in evaluation and management: Approximately 55 minutes of intensive care with discussion with intensivist, gastroenterologist, ER physician, reviewing old records, reviewing current x-rays and lab work, admission orders as well as documentation.

## 2022-09-12 NOTE — ED NOTES
Patient received emergency blood at 1438 at 75ml/hr, no suspected reaction at this time, patients emergency blood increased to 500mL/hr per GIOVANNI Mariscal, patient current VS at this time   bp 121/62, 02 99%, t.97.9, rr 16, pulse 53bpm

## 2022-09-12 NOTE — ED NOTES
The patients emergency blood was complete at approximately 1540   bp 113/79, hr 54, o2 100% on room air, rr 18, temp 98.0

## 2022-09-12 NOTE — ED NOTES
Patient received 1 unit of emergency blood per DR. Carrasco verbal order, the blood is O positive given in the right AC starting AT 75ML/HR, Geovani GUTIERREZ RN witnessed, BP at start of transfusion is 90/47, HR 53, o2 98% on room air, temp 97.2 orally

## 2022-09-12 NOTE — ED PROVIDER NOTES
Time: 2:19 PM EDT  Arrived by: private car  Chief Complaint: Rectal bleeding, generalized weakness  History provided by: patient  History is limited by: N/A     History of Present Illness:  Patient is a 77 y.o.  male that presents to the emergency department with rectal bleeding starting x 4 days ago. Pt admits to diarrhea, headache and generalized weakness. Pt believes this may be due to a hemmorrhoid. Pt had dialysis this morning at 06:15. Per wife, pt was put on Xarelto again last week after noticing blood clots from dialysis.    Wife provided a picture of pt's bowel movement at home showing gelateneous appearing black/maroon stool.      History provided by:  Patient and spouse   used: No        Patient Care Team  Primary Care Provider: Claude Hair MD    Past Medical History:     Allergies   Allergen Reactions   • Cephalexin Nausea And Vomiting   • Morphine Other (See Comments)     Pt had long recovery involving morphine administration post surgery in December 2021.       Past Medical History:   Diagnosis Date   • AA (aortic aneurysm) ([facility])     normal diameter noted on scan 03/18/21   • AL amyloidosis ([facility]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   • Anemia     no current issues   • Anesthesia complication     STATES AFTER TOTAL HIP REPLACEMENT WITH SPINAL HAD SEVERE CONFUSION. HOSPITALZED 21 DAYS. AT [facility]   • Anxiety    • Arthritis     OA OF LEFT HIP   • Colitis     REPORTS CURRENTLY INCONTINENT OF BOWEL AND HAVING DIARRHEA   • COPD (chronic obstructive pulmonary disease) ([facility])     inhalers, neb tx   • Coronary artery disease    • Depression    • Diverticulitis    • ED (erectile dysfunction)    • ESRD (end stage renal disease) on dialysis ([facility])     PORT RIGHT CHEST FOR DIALYSIS. ELVIRA KELLY. JAVAD FLORES   • GERD (gastroesophageal reflux disease)    • Gout    • Hemorrhoids    • History of diverticulosis    • History of PAT (paroxysmal atrial tachycardia)    •  History of transfusion    [place]     DOES HAVING HEARING AID HE CAN USE   • Hyperlipidemia    • Hypertension    • Leukopenia     no current  issues   • Macrocytic anemia    • Multiple renal cysts     no current issues   • Myocardial infarction (HCC)    • Neuropathy     HANDS AND FEET   • PTSD (post-traumatic stress disorder)    • PVD (peripheral vascular disease) (HCC)    • Requires supplemental oxygen     2L of O2 THROUGH CPAP MACHINE AT NIGHT   • RLS (restless legs syndrome)    • Seasonal allergies    • Short-term memory loss    • Sleep apnea     uses CPAP   • Stroke (HCC)     prior to diallo carotid endarterectomy, no residual issues AROUND EARLY 2000     Past Surgical History:   Procedure Laterality Date   • APPENDECTOMY N/A 2005   • ARTERIOVENOUS FISTULA/SHUNT SURGERY Left 8/30/2022    Procedure: LEFT BRACHIAL CEPALIC ARTERIOVENOUS FISTULA PLACEMENT;  Surgeon: Aba Freeman MD;  Location: [facility] MAIN OR;  Service: Vascular;  Laterality: Left;   • CAROTID ENDARTERECTOMY Bilateral 2007   • COLONOSCOPY N/A 1995    done in [facility]   • COLONOSCOPY N/A 5/2/2022    Procedure: COLONOSCOPY WITH APPLICATION OF APC;  Surgeon: Lew Mg MD;  Location: [facility] ENDOSCOPY;  Service: Gastroenterology;  Laterality: N/A;  AVMs IN COLON, DIVERTICULOSIS, HEMORRHOIDS   • CORONARY ARTERY BYPASS GRAFT  1997    4 vessel, no  c/o cp or soa, follows w/ [facility]   • HIATAL HERNIA REPAIR N/A    • INSERTION HEMODIALYSIS CATHETER N/A 6/23/2022    Procedure: Insertion of tunneled catheter for hemodialysis;  Surgeon: Warren Rodríguez MD;  Location: [facility] MAIN OR;  Service: Vascular;  Laterality: N/A;   • REPLACEMENT TOTAL KNEE Left 09/08/2015   • ROTATOR CUFF REPAIR Right    • SKIN CANCER EXCISION      back   • THORACOSCOPY Left 6/2/2016    Procedure: LEFT VIDEO ASSISTED THORACOTOMY W/ LEFT UPPER LOBE WEDGE RESECTION X 2; EXPLORATORY BRONCHOSCOPY; PAIN PUMP PLACEMENT X 2;  Surgeon: Larry Hernandez MD;   Location: [facility] MAIN OR;  Service:    • TONSILLECTOMY Bilateral    • TOTAL HIP ARTHROPLASTY Right 1997   • TOTAL HIP ARTHROPLASTY Left 11/16/2021    Procedure: LEFT TOTAL HIP ARTHROPLASTY;  Surgeon: Man Francois MD;  Location: [facility] MAIN OR;  Service: Orthopedics;  Laterality: Left;     Family History   Problem Relation Age of Onset   • Heart disease Mother    • Multiple myeloma Mother    • Heart disease Father    • Cancer Father         Gallbladder Cancer   • Leukemia Brother         CLL   • Other Brother         Black Lung    • Colon cancer Neg Hx    • Malig Hyperthermia Neg Hx        Home Medications:  Prior to Admission medications    Medication Sig Start Date End Date Taking? Authorizing Provider   acetaminophen (TYLENOL) 325 MG tablet Take 650 mg by mouth Every 6 (Six) Hours As Needed for Mild Pain .    Krissy Dbuon MD   albuterol (PROVENTIL HFA;VENTOLIN HFA) 108 (90 Base) MCG/ACT inhaler Inhale 2 puffs Every 4 (Four) Hours As Needed for Wheezing.    Krissy Dubon MD   albuterol (PROVENTIL) (2.5 MG/3ML) 0.083% nebulizer solution Take 2.5 mg by nebulization Every 4 (Four) Hours As Needed for Wheezing or Shortness of Air.    Krissy Dubon MD   allopurinol (ZYLOPRIM) 300 MG tablet Take 300 mg by mouth daily.    Krissy Dubon MD   atorvastatin (LIPITOR) 40 MG tablet Take 40 mg by mouth Every Night.    Krissy Dubon MD   cetirizine (zyrTEC) 10 MG tablet Take 10 mg by mouth Daily.    Krissy Dubon MD   citalopram (CeleXA) 40 MG tablet Take 40 mg by mouth Every Morning. 7/9/19   Krissy Dubon MD   donepezil (ARICEPT) 5 MG tablet Take 1 tablet by mouth Daily for 30 days. 6/26/22 8/30/22  Lew Ladd MD   finasteride (PROSCAR) 5 MG tablet Take 5 mg by mouth Daily. 9/19/21   Krissy Dubon MD   gabapentin (NEURONTIN) 100 MG capsule Take 1 capsule by mouth 2 (Two) Times a Day for 30 days. 6/25/22 8/30/22  Lew Ladd MD    HYDROcodone-acetaminophen (NORCO) 5-325 MG per tablet Take 1-2 tablets by mouth Every 4 (Four) Hours As Needed (Pain). 22   Aba Freeman MD   ketoconazole (NIZORAL) 2 % shampoo Apply 1 application topically to the appropriate area as directed 2 (Two) Times a Week.    Krissy Dubon MD   metoprolol succinate XL (TOPROL-XL) 50 MG 24 hr tablet 1 tablet DAILY (route: oral) 22   Krissy Dubon MD   Omega-3 Fatty Acids (fish oil) 1000 MG capsule capsule Take 1,000 mg by mouth Daily. HOLD FOR SURGERY    Krissy Dubon MD   pantoprazole (PROTONIX) 20 MG EC tablet Take 20 mg by mouth Daily. 21   Krissy Dubon MD   pramipexole (MIRAPEX) 1 MG tablet Take 1 mg by mouth Every Night.    Krissy Dubon MD   rivaroxaban (XARELTO) 20 MG tablet 1 tablet DAILY (route: oral) 22   Krissy Dubon MD   Testosterone Cypionate 200 MG/ML solution Inject 200 mg as directed Every 14 (Fourteen) Days.    Krissy Dubon MD   Trebrain Ellipta 200-62.5-25 MCG/INH inhaler Inhale 1 puff Daily. 21   Krissy Dubon MD        Social History:   Social History     Tobacco Use   • Smoking status: Former Smoker     Packs/day: 1.00     Years: 30.00     Pack years: 30.00     Types: Cigarettes     Quit date:      Years since quittin.7   • Smokeless tobacco: Current User     Types: Chew   • Tobacco comment: ONE HALF CAN DAILY    Vaping Use   • Vaping Use: Never used   Substance Use Topics   • Alcohol use: Not Currently   • Drug use: No       Review of Systems:  Review of Systems   Constitutional: Negative for chills and fever.   HENT: Negative for congestion, ear pain and sore throat.    Eyes: Negative for pain.   Respiratory: Negative for cough, chest tightness and shortness of breath.    Cardiovascular: Negative for chest pain.   Gastrointestinal: Positive for anal bleeding and diarrhea. Negative for abdominal pain, nausea and vomiting.   Genitourinary: Negative for  "flank pain and hematuria.   Musculoskeletal: Negative for joint swelling.   Skin: Negative for pallor.   Neurological: Positive for weakness and headaches. Negative for seizures.   All other systems reviewed and are negative.         Physical Exam:  /52   Pulse 53   Temp 97.6 °F (36.4 °C) (Oral)   Resp 18   Ht 177.8 cm (70\")   Wt 106 kg (234 lb 3.2 oz)   SpO2 94%   BMI 33.60 kg/m²     Physical Exam  Vitals and nursing note reviewed.   Constitutional:       General: He is not in acute distress.     Appearance: Normal appearance. He is not toxic-appearing.   HENT:      Head: Normocephalic and atraumatic.      Mouth/Throat:      Mouth: Mucous membranes are moist.   Eyes:      General: No scleral icterus.  Cardiovascular:      Rate and Rhythm: Bradycardia present. Rhythm irregular.      Pulses: Normal pulses.      Heart sounds: Normal heart sounds.   Pulmonary:      Effort: Pulmonary effort is normal. No respiratory distress.      Breath sounds: Normal breath sounds.   Abdominal:      General: Abdomen is flat.      Palpations: Abdomen is soft. There is no mass or pulsatile mass.      Tenderness: There is no abdominal tenderness.   Musculoskeletal:         General: Normal range of motion.      Cervical back: Normal range of motion and neck supple.   Skin:     General: Skin is warm and dry.   Neurological:      Mental Status: He is alert and oriented to person, place, and time. Mental status is at baseline.     Rectal exam shows melanotic stools.          Medications in the Emergency Department:  Medications   sodium chloride 0.9 % flush 10 mL (has no administration in time range)   pantoprazole (PROTONIX) injection 80 mg (80 mg Intravenous Given 9/12/22 0024)     And   pantoprazole (PROTONIX) 40 mg in 100mL NS IVPB (8 mg/hr Intravenous New Bag 9/12/22 1550)   sodium chloride 0.9 % bolus 100 mL (0 mL Intravenous Stopped 9/12/22 1500)   prothrombin complex conc human (KCentra) IV solution 5,031 Units (5,031 " Units Intravenous Given 9/12/22 1515)   iopamidol (ISOVUE-370) 76 % injection 100 mL (100 mL Intravenous Given 9/12/22 1542)        Labs  Lab Results (last 24 hours)     Procedure Component Value Units Date/Time    CBC & Differential [[id number]]  (Abnormal) Collected: 09/12/22 1247    Specimen: Blood Updated: 09/12/22 6552    Narrative:      The following orders were created for panel order CBC & Differential.  Procedure                               Abnormality         Status                     ---------                               -----------         ------                     CBC Auto Differential[[id number]]        Abnormal            Final result               Scan Slide[[id number]]                                       Final result                 Please view results for these tests on the individual orders.    Comprehensive Metabolic Panel [[id number]]  (Abnormal) Collected: 09/12/22 1247    Specimen: Blood Updated: 09/12/22 1359     Glucose 174 mg/dL      BUN 29 mg/dL      Creatinine 1.86 mg/dL      Sodium 138 mmol/L      Potassium 4.1 mmol/L      Chloride 105 mmol/L      CO2 24.0 mmol/L      Calcium 8.5 mg/dL      Total Protein 5.5 g/dL      Albumin 3.50 g/dL      ALT (SGPT) 10 U/L      AST (SGOT) 13 U/L      Alkaline Phosphatase 90 U/L      Total Bilirubin 0.5 mg/dL      Globulin 2.0 gm/dL      A/G Ratio 1.8 g/dL      BUN/Creatinine Ratio 15.6     Anion Gap 9.0 mmol/L      eGFR 37.0 mL/min/1.73      Comment: National Kidney Foundation and American Society of Nephrology (ASN) Task Force recommended calculation based on the Chronic Kidney Disease Epidemiology Collaboration (CKD-EPI) equation refit without adjustment for race.       Narrative:      GFR Normal >60  Chronic Kidney Disease <60  Kidney Failure <15      Troponin [[id number]]  (Normal) Collected: 09/12/22 1247    Specimen: Blood Updated: 09/12/22 1359     Troponin T 0.024 ng/mL     Narrative:      Troponin T Reference Range:  <= 0.03 ng/mL-    Negative for AMI  >0.03 ng/mL-     Abnormal for myocardial necrosis.  Clinicians would have to utilize clinical acumen, EKG, Troponin and serial changes to determine if it is an Acute Myocardial Infarction or myocardial injury due to an underlying chronic condition.       Results may be falsely decreased if patient taking Biotin.      Magnesium [[id number]]  (Normal) Collected: 09/12/22 1247    Specimen: Blood Updated: 09/12/22 1359     Magnesium 1.7 mg/dL     CBC Auto Differential [[id number]]  (Abnormal) Collected: 09/12/22 1247    Specimen: Blood Updated: 09/12/22 1409     WBC 3.85 10*3/mm3      RBC 2.18 10*6/mm3      Hemoglobin 6.9 g/dL      Hematocrit 20.9 %      MCV 95.9 fL      MCH 31.7 pg      MCHC 33.0 g/dL      RDW 18.6 %      RDW-SD 63.5 fl      MPV 12.4 fL      Platelets 109 10*3/mm3      Neutrophil % 68.8 %      Lymphocyte % 19.0 %      Monocyte % 5.7 %      Eosinophil % 5.2 %      Basophil % 0.8 %      Immature Grans % 0.5 %      Neutrophils, Absolute 2.65 10*3/mm3      Lymphocytes, Absolute 0.73 10*3/mm3      Monocytes, Absolute 0.22 10*3/mm3      Eosinophils, Absolute 0.20 10*3/mm3      Basophils, Absolute 0.03 10*3/mm3      Immature Grans, Absolute 0.02 10*3/mm3      nRBC 0.0 /100 WBC     Narrative:      Appended report. These results have been appended to a previously verified report.    Scan Slide [[id number]] Collected: 09/12/22 1247    Specimen: Blood Updated: 09/12/22 1409     Acanthocytes Slight/1+     Anisocytosis Slight/1+     Elliptocytes Slight/1+     Hypochromia Slight/1+     Macrocytes Slight/1+     Poikilocytes Slight/1+     WBC Morphology Normal     Platelet Estimate Decreased    Protime-INR [[id number]]  (Abnormal) Collected: 09/12/22 1247    Specimen: Blood Updated: 09/12/22 1448     Protime 26.0 Seconds      INR 2.33    Narrative:      Suggested Therapeutic Ranges For Oral Anticoagulant Therapy:  Level of Therapy                      INR Target Range  Standard Dose                             2.0-3.0  High Dose                                2.5-3.5  Patients not receiving anticoagulant  Therapy Normal Range                     0.86-1.15    aPTT [[id number]]  (Abnormal) Collected: 09/12/22 1247    Specimen: Blood Updated: 09/12/22 1448     PTT 51.1 seconds     Occult Blood, Fecal By Immunoassay - Stool, Per Rectum [[id number]]  (Abnormal) Collected: 09/12/22 1413    Specimen: Stool from Per Rectum Updated: 09/12/22 1438     Occult Blood, Fecal by Immunoassay Positive           Imaging:  XR Chest 1 View    Result Date: 9/12/2022  PROCEDURE: XR CHEST 1 VW  COMPARISON: [facility], CT, CT CHEST WO CONTRAST DIAGNOSTIC, 6/15/2022, 16:01.  [facility], CR, XR CHEST 1 VW, 6/27/2022, 7:21.  INDICATIONS: Weak/Dizzy/AMS triage protocol/blood in stool  FINDINGS:   The lungs are well-expanded. The pulmonary vasculature is within normal limits.  Stable mild cardiomegaly No pleural effusions are identified.  The previously noted nodular densities in the lung fields are better visualized on the recent CT scan of the chest.  Right IJ dialysis catheter tips in the right atrium.  No evidence of pneumothorax.  Prior median sternotomy and CABG procedure.  IMPRESSION:  Stable mild cardiomegaly.  Previously noted nodular densities in the lung fields are better visualized on the recent CT scan of the chest.  KIMBER GARDNER MD       Electronically Signed and Approved By: KIMBER GARDNER MD on 9/12/2022 at 14:13                EKG:      Procedures:  Procedures    Progress                      The patient was seen and evaluated the ED by me.  The above history and physical examination was performed as document.  Diagnostic data was obtained.  Results reviewed.  Patient was found to be hypotensive and profoundly anemic.  On reviewing the patient's records 2 weeks ago his hemoglobin was 10.5.  Patient was typed and crossed for 2 units of type-specific blood.  However his blood pressure was slowly  estfeany subsequently ordered 1 unit of O- trauma blood.  Patient is also on Xarelto and due to his vital signs were not felt this warranted emergency reversal.  I did discuss the risk of reversal with the patient and the wife.  They were verbally agreed to this.  It is felt the risk benefits were in favor of reversal.  Subsite have consult with Dr. Reddy who also agreed for IV contrast to be used for CT of the abdomen.  Dr. Nichole was consult for primary admission.  On-call gastroenterology was consulted as well.    70 minutes of critical care provided. This time excludes other billable procedures. Time does include preparation of documents, medical consultations, review of old records, and direct bedside care. Patient is at high risk for life-threatening deterioration due to acute GI bleed with hemorrhagic shock.         Medical Decision Making:  MDM  Number of Diagnoses or Management Options     Amount and/or Complexity of Data Reviewed  Decide to obtain previous medical records or to obtain history from someone other than the patient: yes  Discuss the patient with other providers: yes (14:40 EDT - Consult with Dr. Reddy - Discussed patient's case, history, and current condition. Dr. Reddy unsure why ry is on why Xarelto. States he cannot comment on need to reverse, gave consent on obtaining CT scan of abdomen with contrast)    Patient Progress  Patient progress: (14:39 EDT nurse obtained a stool sample she described as being dark black in color)       Final diagnoses:   Acute GI bleeding   Hemorrhagic shock (HCC)   End-stage renal disease on hemodialysis (HCC)        Disposition:  ED Disposition     ED Disposition   Decision to Admit    Condition   --    Comment   --           I, Anna Cantu, am scribing for Dr. Patrick Carrasco. Information recorded by the scribe has been verified and validated by the provider.       Cantu, Anna C  09/12/22 1232       Patrick Carrasco DO  09/12/22 [id number]

## 2022-09-12 NOTE — CONSULTS
Pulmonary / Critical Care Consult Note      Patient Name: Rafael Gomez  : 1945  MRN: [id number]  Primary Care Physician:  Claude Hair MD  Referring Physician: No ref. provider found  Date of admission: 2022    Subjective   Subjective     Reason for Consult/ Chief Complaint: Rectal bleeding    HPI:  Rafael Gomez is a 77 y.o. male presented to the emergency room with rectal bleeding x4 days.  Patient was with additional symptoms of weakness, diarrhea and headache.  Initially patient felt like it was a hemorrhoid, he was with at least 2 bowel movements per day with noticeable blood.  Today he stated the blood in his stool was more jellylike secondary to having clots.  Patient is on Xarelto due to clots with his dialysis catheter.  Today patient was with weakness and worsening with blood in bowel movements and decided to come to the emergency room for further evaluation.  Work-up in the ER revealed hemoglobin of 6.9.  Patient was started on Kcentra and 3 units of blood were ordered.  Patient is admitted to ICU for monitoring overnight.  Pulmonary critical care has been consulted for further evaluation given symptomatic anemia requiring blood transfusions.       Review of Systems  Constitutional symptoms:   Weakness, fatigue; otherwise denied complaints   Ear, nose, throat: Denied complaints  Cardiovascular:  Denied complaints  Respiratory: Denied complaints  Gastrointestinal: Bloody stools, diarrhea; otherwise denied complaints  Musculoskeletal: Denied complaints  Genitourinary: Denied complaints  Allergy / Immunology: Denied complaints  Hematologic: Denied complaints  Neurologic: Headache; otherwise denied complaints  Skin: Denied complaints  Endocrine: ESRD on HD; otherwise denied complaints  Psychiatric: Denied complaints      Personal History     Past Medical History:   Diagnosis Date   • AA (aortic aneurysm) (HCC)     normal diameter noted on scan 21   • AL amyloidosis (HCC)     AL  amyloidosis protein plasma cell/agent orange   • Anemia     no current issues   • Anesthesia complication     STATES AFTER TOTAL HIP REPLACEMENT WITH SPINAL HAD SEVERE CONFUSION. HOSPITALZED 21 DAYS. AT [facility]   • Anxiety    • Arthritis     OA OF LEFT HIP   • Colitis     REPORTS CURRENTLY INCONTINENT OF BOWEL AND HAVING DIARRHEA   • COPD (chronic obstructive pulmonary disease) ([facility])     inhalers, neb tx   • Coronary artery disease    • Depression    • Diverticulitis    • ED (erectile dysfunction)    • ESRD (end stage renal disease) on dialysis ([facility])     PORT RIGHT CHEST FOR DIALYSIS. ELVIRA KELLY. JAVAD FLORES   • GERD (gastroesophageal reflux disease)    • Gout    • Hemorrhoids    • History of diverticulosis    • History of PAT (paroxysmal atrial tachycardia)    • History of transfusion    • [place] (hard of hearing)     DOES HAVING HEARING AID HE CAN USE   • Hyperlipidemia    • Hypertension    • Leukopenia     no current  issues   • Macrocytic anemia    • Multiple renal cysts     no current issues   • Myocardial infarction ([facility])    • Neuropathy     HANDS AND FEET   • PTSD (post-traumatic stress disorder)    • PVD (peripheral vascular disease) ([facility])    • Requires supplemental oxygen     2L of O2 THROUGH CPAP MACHINE AT NIGHT   • RLS (restless legs syndrome)    • Seasonal allergies    • Short-term memory loss    • Sleep apnea     uses CPAP   • Stroke ([facility])     prior to diallo carotid endarterectomy, no residual issues AROUND EARLY 2000       Past Surgical History:   Procedure Laterality Date   • APPENDECTOMY N/A 2005   • ARTERIOVENOUS FISTULA/SHUNT SURGERY Left 8/30/2022    Procedure: LEFT BRACHIAL CEPALIC ARTERIOVENOUS FISTULA PLACEMENT;  Surgeon: Aba Freeman MD;  Location: [facility];  Service: Vascular;  Laterality: Left;   • CAROTID ENDARTERECTOMY Bilateral 2007   • COLONOSCOPY N/A 1995    done in [facility]   • COLONOSCOPY N/A 5/2/2022    Procedure: COLONOSCOPY WITH APPLICATION OF APC;  Surgeon: Saurav  Lew Han MD;  Location: [facility] ENDOSCOPY;  Service: Gastroenterology;  Laterality: N/A;  AVMs IN COLON, DIVERTICULOSIS, HEMORRHOIDS   • CORONARY ARTERY BYPASS GRAFT  1997    4 vessel, no  c/o cp or soa, follows w/ D. Sloane JHAVERI   • HIATAL HERNIA REPAIR N/A    • INSERTION HEMODIALYSIS CATHETER N/A 6/23/2022    Procedure: Insertion of tunneled catheter for hemodialysis;  Surgeon: Warren Rodríguez MD;  Location: [facility] MAIN OR;  Service: Vascular;  Laterality: N/A;   • REPLACEMENT TOTAL KNEE Left 09/08/2015   • ROTATOR CUFF REPAIR Right    • SKIN CANCER EXCISION      back   • THORACOSCOPY Left 6/2/2016    Procedure: LEFT VIDEO ASSISTED THORACOTOMY W/ LEFT UPPER LOBE WEDGE RESECTION X 2; EXPLORATORY BRONCHOSCOPY; PAIN PUMP PLACEMENT X 2;  Surgeon: Larry Hernandez MD;  Location: [facility] OR;  Service:    • TONSILLECTOMY Bilateral    • TOTAL HIP ARTHROPLASTY Right 1997   • TOTAL HIP ARTHROPLASTY Left 11/16/2021    Procedure: LEFT TOTAL HIP ARTHROPLASTY;  Surgeon: Man Francois MD;  Location: [facility] MAIN OR;  Service: Orthopedics;  Laterality: Left;       Family History: family history includes Cancer in his father; Heart disease in his father and mother; Leukemia in his brother; Multiple myeloma in his mother; Other in his brother. Otherwise pertinent FHx was reviewed and not pertinent to current issue.    Social History:  reports that he quit smoking about 25 years ago. His smoking use included cigarettes. He has a 30.00 pack-year smoking history. His smokeless tobacco use includes chew. He reports previous alcohol use. He reports that he does not use drugs.    Home Medications:  Fluticasone-Umeclidin-Vilant, Testosterone Cypionate, acetaminophen, albuterol, albuterol sulfate HFA, allopurinol, atorvastatin, cetirizine, citalopram, donepezil, finasteride, gabapentin, ketoconazole, metoprolol succinate XL, pantoprazole, pramipexole, and rivaroxaban    Allergies:  Allergies   Allergen Reactions   • Cephalexin  Nausea And Vomiting   • Morphine Other (See Comments)     Pt had long recovery involving morphine administration post surgery in December 2021.         Objective    Objective     Vitals:   Temp:  [97.4 °F (36.3 °C)-97.7 °F (36.5 °C)] 97.7 °F (36.5 °C)  Heart Rate:  [51-60] 56  Resp:  [18-20] 18  BP: ()/(32-63) 114/54    Physical Exam:  Vital Signs Reviewed   Pale appearing, WDWN, Alert, NAD.    HEENT:  PERRL, EOMI.  OP, nares clear, MMM  Neck:  Supple, no JVD, no thyromegaly  Chest:  good aeration, rhonchi bilaterally, tympanic to percussion bilaterally, no work of breathing noted  CV: RRR, no MGR, pulses 2+, equal.  Abd:  Soft, NT, ND, + BS, no HSM  EXT:  no clubbing, no cyanosis, trace BLE edema, no joint tenderness  Neuro:  A&Ox3, CN grossly intact, no focal deficits.  Skin: No rashes or lesions noted      Result Review    Result Review:  I have personally reviewed the results from the time of this admission to 9/12/2022 20:05 EDT and agree with these findings:  [x]  Laboratory  [x]  Microbiology  [x]  Radiology  [x]  EKG/Telemetry   [x]  Cardiology/Vascular   []  Pathology  [x]  Old records   []  Other:  Most notable findings include: CT of abdomen pelvis reviewed INR 2.33  PTT 51.1  Hgb 6.9  HCT 20.9      Sodium 138  K4.1  CR 1.86  CA 8.5      Assessment & Plan   Assessment / Plan     Active Hospital Problems:  Active Hospital Problems    Diagnosis    • **Acute blood loss anemia    • Acute GI bleeding    • ESRD (end stage renal disease) (HCC)    • Hypotension          Impression:   Acute GI bleed with bloody stools  Acute blood loss anemia secondary to above  Weakness secondary to above  Hypotension secondary to above  ESRD  Hypomagnesemia  COPD without exacerbation     Plan:    Patient is with 3 units PBRC's ordered  Agree with kcentra. Hold xarelto for now  Trend H&H.  Recommend transfusion for Hgb 8 or less  Home antihypertensive regimen placed on hold given hypotension  Levophed ordered if  needed to maintain MAP of 65 or greater and SBP 90 or greater, currently not requiring  Current order for NPO status  GI consult in place  On Protonix drip  Add Brovana and Pulmicort, as needed DuoNeb  Bronchopulmonary hygiene protocol and bronchodilator protocol in place  Nephrology consulted secondary to ESRD on HD. Timing if dialysis per them  Bowel regimen in place  DVT prophylaxis: Xarelto on hold.  PCD's ordered      Labs, radiology, microbiology and provider notes were personally reviewed  Patient's case was discussed with the primary service as well as the bedside RN  Thank you for allowing us to participate in the care of your patient, we will follow him closely with you  Electronically signed by WATSON Villa, 09/12/22, 6:41 PM EDT.        Pt is critically ill in ICU with acute GI bleed with bloody stools, acute blood loss anemia, weakness and hypotension.  I have spent 31 minutes of critical care time reviewing previous documentation, reviewing all pertinent telemetry, labs, and imaging studies, examining the patient, modifying the care plan and discussing the patient´s condition and care plan with any available family, the primary service and during multidisciplinary rounds this morning at bedside. This does not include any procedures performed.    I, Dr. Chivo Pickard, have spent more than 50% of the total time managing the patient in this encounter today.  This included personally reviewing all pertinent labs, imaging, microbiology and documentation. Also discussing the case with the patient and any available family, the admitting physician and any available ancillary staff.    Electronically signed by Chivo Pickard MD, 09/12/22, 8:07 PM EDT.

## 2022-09-13 ENCOUNTER — ANESTHESIA EVENT (OUTPATIENT)
Dept: GASTROENTEROLOGY | Facility: HOSPITAL | Age: 77
End: 2022-09-13

## 2022-09-13 ENCOUNTER — ANESTHESIA (OUTPATIENT)
Dept: GASTROENTEROLOGY | Facility: HOSPITAL | Age: 77
End: 2022-09-13

## 2022-09-13 LAB
ALBUMIN SERPL-MCNC: 3.3 G/DL (ref 3.5–5.2)
ALBUMIN/GLOB SERPL: 1.8 G/DL
ALP SERPL-CCNC: 80 U/L (ref 39–117)
ALT SERPL W P-5'-P-CCNC: 9 U/L (ref 1–41)
ANION GAP SERPL CALCULATED.3IONS-SCNC: 10 MMOL/L (ref 5–15)
AST SERPL-CCNC: 14 U/L (ref 1–40)
BASOPHILS # BLD AUTO: 0.05 10*3/MM3 (ref 0–0.2)
BASOPHILS NFR BLD AUTO: 1.2 % (ref 0–1.5)
BILIRUB SERPL-MCNC: 0.5 MG/DL (ref 0–1.2)
BUN SERPL-MCNC: 36 MG/DL (ref 8–23)
BUN/CREAT SERPL: 18.2 (ref 7–25)
CALCIUM SPEC-SCNC: 8.2 MG/DL (ref 8.6–10.5)
CHLORIDE SERPL-SCNC: 106 MMOL/L (ref 98–107)
CO2 SERPL-SCNC: 21 MMOL/L (ref 22–29)
CREAT SERPL-MCNC: 1.98 MG/DL (ref 0.76–1.27)
DEPRECATED RDW RBC AUTO: 62.8 FL (ref 37–54)
EGFRCR SERPLBLD CKD-EPI 2021: 34.4 ML/MIN/1.73
EOSINOPHIL # BLD AUTO: 0.23 10*3/MM3 (ref 0–0.4)
EOSINOPHIL NFR BLD AUTO: 5.3 % (ref 0.3–6.2)
ERYTHROCYTE [DISTWIDTH] IN BLOOD BY AUTOMATED COUNT: 18.6 % (ref 12.3–15.4)
GLOBULIN UR ELPH-MCNC: 1.8 GM/DL
GLUCOSE SERPL-MCNC: 92 MG/DL (ref 65–99)
HCT VFR BLD AUTO: 24.7 % (ref 37.5–51)
HGB BLD-MCNC: 8.2 G/DL (ref 13–17.7)
IMM GRANULOCYTES # BLD AUTO: 0.01 10*3/MM3 (ref 0–0.05)
IMM GRANULOCYTES NFR BLD AUTO: 0.2 % (ref 0–0.5)
LYMPHOCYTES # BLD AUTO: 0.92 10*3/MM3 (ref 0.7–3.1)
LYMPHOCYTES NFR BLD AUTO: 21.2 % (ref 19.6–45.3)
MAGNESIUM SERPL-MCNC: 1.6 MG/DL (ref 1.6–2.4)
MCH RBC QN AUTO: 30.7 PG (ref 26.6–33)
MCHC RBC AUTO-ENTMCNC: 33.2 G/DL (ref 31.5–35.7)
MCV RBC AUTO: 92.5 FL (ref 79–97)
MONOCYTES # BLD AUTO: 0.32 10*3/MM3 (ref 0.1–0.9)
MONOCYTES NFR BLD AUTO: 7.4 % (ref 5–12)
NEUTROPHILS NFR BLD AUTO: 2.81 10*3/MM3 (ref 1.7–7)
NEUTROPHILS NFR BLD AUTO: 64.7 % (ref 42.7–76)
NRBC BLD AUTO-RTO: 0 /100 WBC (ref 0–0.2)
PLATELET # BLD AUTO: 95 10*3/MM3 (ref 140–450)
PMV BLD AUTO: 11.6 FL (ref 6–12)
POTASSIUM SERPL-SCNC: 5.1 MMOL/L (ref 3.5–5.2)
PROT SERPL-MCNC: 5.1 G/DL (ref 6–8.5)
RBC # BLD AUTO: 2.67 10*6/MM3 (ref 4.14–5.8)
SODIUM SERPL-SCNC: 137 MMOL/L (ref 136–145)
WBC NRBC COR # BLD: 4.34 10*3/MM3 (ref 3.4–10.8)

## 2022-09-13 PROCEDURE — 83735 ASSAY OF MAGNESIUM: CPT | Performed by: PHYSICIAN ASSISTANT

## 2022-09-13 PROCEDURE — 99233 SBSQ HOSP IP/OBS HIGH 50: CPT | Performed by: INTERNAL MEDICINE

## 2022-09-13 PROCEDURE — 85025 COMPLETE CBC W/AUTO DIFF WBC: CPT | Performed by: INTERNAL MEDICINE

## 2022-09-13 PROCEDURE — 94799 UNLISTED PULMONARY SVC/PX: CPT

## 2022-09-13 PROCEDURE — 43235 EGD DIAGNOSTIC BRUSH WASH: CPT | Performed by: INTERNAL MEDICINE

## 2022-09-13 PROCEDURE — 25010000002 PROPOFOL 10 MG/ML EMULSION: Performed by: NURSE ANESTHETIST, CERTIFIED REGISTERED

## 2022-09-13 PROCEDURE — 99222 1ST HOSP IP/OBS MODERATE 55: CPT | Performed by: INTERNAL MEDICINE

## 2022-09-13 PROCEDURE — 0DJ08ZZ INSPECTION OF UPPER INTESTINAL TRACT, VIA NATURAL OR ARTIFICIAL OPENING ENDOSCOPIC: ICD-10-PCS | Performed by: INTERNAL MEDICINE

## 2022-09-13 PROCEDURE — 80053 COMPREHEN METABOLIC PANEL: CPT | Performed by: INTERNAL MEDICINE

## 2022-09-13 RX ORDER — SODIUM CHLORIDE 9 MG/ML
9 INJECTION, SOLUTION INTRAVENOUS CONTINUOUS
Status: CANCELLED | OUTPATIENT
Start: 2022-09-13

## 2022-09-13 RX ORDER — PRAMIPEXOLE DIHYDROCHLORIDE 1 MG/1
1 TABLET ORAL EVERY 8 HOURS PRN
Status: DISCONTINUED | OUTPATIENT
Start: 2022-09-13 | End: 2022-09-15 | Stop reason: HOSPADM

## 2022-09-13 RX ORDER — PROPOFOL 10 MG/ML
VIAL (ML) INTRAVENOUS AS NEEDED
Status: DISCONTINUED | OUTPATIENT
Start: 2022-09-13 | End: 2022-09-13 | Stop reason: SURG

## 2022-09-13 RX ORDER — LIDOCAINE HYDROCHLORIDE 20 MG/ML
INJECTION, SOLUTION EPIDURAL; INFILTRATION; INTRACAUDAL; PERINEURAL AS NEEDED
Status: DISCONTINUED | OUTPATIENT
Start: 2022-09-13 | End: 2022-09-13 | Stop reason: SURG

## 2022-09-13 RX ORDER — SODIUM CHLORIDE 9 MG/ML
INJECTION, SOLUTION INTRAVENOUS CONTINUOUS PRN
Status: DISCONTINUED | OUTPATIENT
Start: 2022-09-13 | End: 2022-09-13 | Stop reason: SURG

## 2022-09-13 RX ADMIN — LIDOCAINE HYDROCHLORIDE 50 MG: 20 INJECTION, SOLUTION EPIDURAL; INFILTRATION; INTRACAUDAL; PERINEURAL at 08:54

## 2022-09-13 RX ADMIN — BUDESONIDE 0.5 MG: 0.5 SUSPENSION RESPIRATORY (INHALATION) at 10:41

## 2022-09-13 RX ADMIN — TEMAZEPAM 15 MG: 7.5 CAPSULE ORAL at 21:33

## 2022-09-13 RX ADMIN — BUDESONIDE 0.5 MG: 0.5 SUSPENSION RESPIRATORY (INHALATION) at 22:31

## 2022-09-13 RX ADMIN — PANTOPRAZOLE SODIUM 8 MG/HR: 40 INJECTION, POWDER, FOR SOLUTION INTRAVENOUS at 07:34

## 2022-09-13 RX ADMIN — ARFORMOTEROL TARTRATE 15 MCG: 15 SOLUTION RESPIRATORY (INHALATION) at 10:41

## 2022-09-13 RX ADMIN — SODIUM CHLORIDE: 9 INJECTION, SOLUTION INTRAVENOUS at 08:47

## 2022-09-13 RX ADMIN — ARFORMOTEROL TARTRATE 15 MCG: 15 SOLUTION RESPIRATORY (INHALATION) at 22:31

## 2022-09-13 RX ADMIN — PROPOFOL 150 MCG/KG/MIN: 10 INJECTION, EMULSION INTRAVENOUS at 08:54

## 2022-09-13 RX ADMIN — PRAMIPEXOLE DIHYDROCHLORIDE 1 MG: 1 TABLET ORAL at 16:44

## 2022-09-13 RX ADMIN — PROPOFOL 40 MG: 10 INJECTION, EMULSION INTRAVENOUS at 08:54

## 2022-09-13 NOTE — H&P (VIEW-ONLY)
[facility]  Initial Inpatient Consult Note    Referring Provider: Hospitalist    Reason for Consultation: GI bleeding, melena, acute blood loss anemia    Subjective     History of present illness:    77 y.o. male with multiple medical problems including end-stage renal disease on hemodialysis Monday Wednesday Friday, chronic anemia, recently started on Xarelto last week because of clotting with his dialysis catheter.  He was admitted now with 4 days of dark stools and rectal bleeding.  He denies significant abdominal pain.  On arrival to emergency department he was found to be hypotensive and he was given Kcentra and 3 units of blood overnight.  He remains normotensive currently but still continues to have episodes of rectal bleeding.  He had a colonoscopy in May of this year with Dr. Mg that showed small AVMs in the cecum and ascending colon which were treated with APC.  He also had diverticulosis of the sigmoid colon.  Hemoglobin on arrival here was 6.9, now 8.1 this morning    Past Medical History:  Past Medical History:   Diagnosis Date   • AA (aortic aneurysm) ([facility])     normal diameter noted on scan 03/18/21   • AL amyloidosis ([facility]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   • Anemia     no current issues   • Anesthesia complication     STATES AFTER TOTAL HIP REPLACEMENT WITH SPINAL HAD SEVERE CONFUSION. HOSPITALZED 21 DAYS. AT [facility]   • Anxiety    • Arthritis     OA OF LEFT HIP   • Colitis     REPORTS CURRENTLY INCONTINENT OF BOWEL AND HAVING DIARRHEA   • COPD (chronic obstructive pulmonary disease) ([facility])     inhalers, neb tx   • Coronary artery disease    • Depression    • Diverticulitis    • ED (erectile dysfunction)    • ESRD (end stage renal disease) on dialysis ([facility])     PORT RIGHT CHEST FOR DIALYSIS. ELVIRA KELLY. JAVAD FLORES   • GERD (gastroesophageal reflux disease)    • Gout    • Hemorrhoids    • History of diverticulosis    • History of PAT  (paroxysmal atrial tachycardia)    • History of transfusion    • [place] (hard of hearing)     DOES HAVING HEARING AID HE CAN USE   • Hyperlipidemia    • Hypertension    • Leukopenia     no current  issues   • Macrocytic anemia    • Multiple renal cysts     no current issues   • Myocardial infarction (HCC)    • Neuropathy     HANDS AND FEET   • PTSD (post-traumatic stress disorder)    • PVD (peripheral vascular disease) (HCC)    • Requires supplemental oxygen     2L of O2 THROUGH CPAP MACHINE AT NIGHT   • RLS (restless legs syndrome)    • Seasonal allergies    • Short-term memory loss    • Sleep apnea     uses CPAP   • Stroke (HCC)     prior to diallo carotid endarterectomy, no residual issues AROUND EARLY 2000     Past Surgical History:  Past Surgical History:   Procedure Laterality Date   • APPENDECTOMY N/A 2005   • ARTERIOVENOUS FISTULA/SHUNT SURGERY Left 8/30/2022    Procedure: LEFT BRACHIAL CEPALIC ARTERIOVENOUS FISTULA PLACEMENT;  Surgeon: Aba Freeman MD;  Location: [facility] OR;  Service: Vascular;  Laterality: Left;   • CAROTID ENDARTERECTOMY Bilateral 2007   • COLONOSCOPY N/A 1995    done in [facility]   • COLONOSCOPY N/A 5/2/2022    Procedure: COLONOSCOPY WITH APPLICATION OF APC;  Surgeon: Lew Mg MD;  Location: [facility] ENDOSCOPY;  Service: Gastroenterology;  Laterality: N/A;  AVMs IN COLON, DIVERTICULOSIS, HEMORRHOIDS   • CORONARY ARTERY BYPASS GRAFT  1997    4 vessel, no  c/o cp or soa, follows w/ D. Sloane JHAVERI   • HIATAL HERNIA REPAIR N/A    • INSERTION HEMODIALYSIS CATHETER N/A 6/23/2022    Procedure: Insertion of tunneled catheter for hemodialysis;  Surgeon: Warren Rodríguez MD;  Location: [facility] MAIN OR;  Service: Vascular;  Laterality: N/A;   • REPLACEMENT TOTAL KNEE Left 09/08/2015   • ROTATOR CUFF REPAIR Right    • SKIN CANCER EXCISION      back   • THORACOSCOPY Left 6/2/2016    Procedure: LEFT VIDEO ASSISTED THORACOTOMY W/ LEFT UPPER LOBE WEDGE RESECTION X 2; EXPLORATORY BRONCHOSCOPY;  PAIN PUMP PLACEMENT X 2;  Surgeon: Larry Hernandez MD;  Location: [facility] MAIN OR;  Service:    • TONSILLECTOMY Bilateral    • TOTAL HIP ARTHROPLASTY Right    • TOTAL HIP ARTHROPLASTY Left 2021    Procedure: LEFT TOTAL HIP ARTHROPLASTY;  Surgeon: Man Francois MD;  Location: [facility] MAIN OR;  Service: Orthopedics;  Laterality: Left;      Social History:   Social History     Tobacco Use   • Smoking status: Former Smoker     Packs/day: 1.00     Years: 30.00     Pack years: 30.00     Types: Cigarettes     Quit date:      Years since quittin.   • Smokeless tobacco: Current User     Types: Chew   • Tobacco comment: ONE HALF CAN DAILY    Substance Use Topics   • Alcohol use: Not Currently      Family History:  Family History   Problem Relation Age of Onset   • Heart disease Mother    • Multiple myeloma Mother    • Heart disease Father    • Cancer Father         Gallbladder Cancer   • Leukemia Brother         CLL   • Other Brother         Black Lung    • Colon cancer Neg Hx    • [place] Hyperthermia Neg Hx        Home Meds:  Medications Prior to Admission   Medication Sig Dispense Refill Last Dose   • acetaminophen (TYLENOL) 325 MG tablet Take 650 mg by mouth Every 6 (Six) Hours As Needed for Mild Pain .      • albuterol (PROVENTIL HFA;VENTOLIN HFA) 108 (90 Base) MCG/ACT inhaler Inhale 2 puffs Every 4 (Four) Hours As Needed for Wheezing.      • albuterol (PROVENTIL) (2.5 MG/3ML) 0.083% nebulizer solution Take 2.5 mg by nebulization Every 4 (Four) Hours As Needed for Wheezing or Shortness of Air.      • allopurinol (ZYLOPRIM) 300 MG tablet Take 300 mg by mouth daily.      • atorvastatin (LIPITOR) 40 MG tablet Take 40 mg by mouth Every Night.      • cetirizine (zyrTEC) 10 MG tablet Take 10 mg by mouth Daily.      • citalopram (CeleXA) 40 MG tablet Take 40 mg by mouth Every Morning.      • donepezil (ARICEPT) 5 MG tablet Take 1 tablet by mouth Daily for 30 days. 30 tablet 0    • finasteride (PROSCAR) 5 MG  tablet Take 5 mg by mouth Daily.      • gabapentin (NEURONTIN) 100 MG capsule Take 1 capsule by mouth 2 (Two) Times a Day for 30 days. 60 capsule 0    • ketoconazole (NIZORAL) 2 % shampoo Apply 1 application topically to the appropriate area as directed 2 (Two) Times a Week.      • metoprolol succinate XL (TOPROL-XL) 50 MG 24 hr tablet Take 50 mg by mouth Daily.      • pantoprazole (PROTONIX) 20 MG EC tablet Take 20 mg by mouth Daily.      • pramipexole (MIRAPEX) 0.5 MG tablet Take 0.5 mg by mouth Every Night.      • rivaroxaban (XARELTO) 20 MG tablet Take 20 mg by mouth Daily With Dinner.      • Testosterone Cypionate 200 MG/ML solution Inject 200 mg as directed Every 14 (Fourteen) Days.      • Trelegy Ellipta 200-62.5-25 MCG/INH inhaler Inhale 1 puff Daily.        Current Meds:   arformoterol, 15 mcg, Nebulization, BID - RT  budesonide, 0.5 mg, Nebulization, BID - RT  Pharmacy to Dose enoxaparin (LOVENOX), , Does not apply, Once  senna-docusate sodium, 1 tablet, Oral, BID      Allergies:  Allergies   Allergen Reactions   • Cephalexin Nausea And Vomiting   • Morphine Other (See Comments)     Pt had long recovery involving morphine administration post surgery in December 2021.       Review of Systems  Pertinent items are noted in HPI, all other systems reviewed and negative         Vital Signs  Temp:  [97.4 °F (36.3 °C)-98.9 °F (37.2 °C)] 98.8 °F (37.1 °C)  Heart Rate:  [49-60] 59  Resp:  [12-24] 17  BP: ()/(32-63) 118/51  Physical Exam:  General Appearance:    Alert, cooperative, in no acute distress   Head:    Normocephalic, without obvious abnormality, atraumatic   Eyes:          conjunctivae and sclerae normal, no   icterus   Throat:   no thrush, oral mucosa moist   Neck:   Supple, no adenopathy   Lungs:     Clear to auscultation bilaterally    Heart:    Regular rhythm and normal rate    Chest Wall:    No abnormalities observed   Abdomen:     Soft, nondistended, nontender; normal bowel sounds    Extremities:   no edema, no redness   Skin:   No bruising or rash   Psychiatric:  normal mood and insight     Results Review:  [x]  Laboratory   [x]  Radiology  []  Pathology      I reviewed the patient's new clinical results.    Results from last 7 days   Lab Units 09/13/22  0303 09/12/22  1940 09/12/22  1247   WBC 10*3/mm3 4.34  --  3.85   HEMOGLOBIN g/dL 8.2* 8.3* 6.9*   HEMATOCRIT % 24.7* 24.9* 20.9*   PLATELETS 10*3/mm3 95*  --  109*     Results from last 7 days   Lab Units 09/13/22  0302 09/12/22  1247   SODIUM mmol/L 137 138   POTASSIUM mmol/L 5.1 4.1   CHLORIDE mmol/L 106 105   CO2 mmol/L 21.0* 24.0   BUN mg/dL 36* 29*   CREATININE mg/dL 1.98* 1.86*   CALCIUM mg/dL 8.2* 8.5*   BILIRUBIN mg/dL 0.5 0.5   ALK PHOS U/L 80 90   ALT (SGPT) U/L 9 10   AST (SGOT) U/L 14 13   GLUCOSE mg/dL 92 174*     Results from last 7 days   Lab Units 09/12/22  1247   INR  2.33*     Lab Results   Lab Value Date/Time    LIPASE 57 06/11/2022 2320    LIPASE 80 (H) 01/23/2021 0017       Radiology:  CT Abdomen Pelvis With Contrast   Final Result      XR Chest 1 View   Final Result           Assessment & Plan     Patient Active Problem List   Diagnosis   • Lung nodule, solitary   • Cough   • Wheeze   • Multiple lung nodules on CT   • Follow-up examination, following other surgery   • Renal mass   • Plasmacytoma (HCC)   • Macrocytic anemia   • Leukopenia   • Monoclonal gammopathy   • Thrombocytopenia (HCC)   • AL amyloidosis (HCC)   • Neck pain   • Chronic low back pain   • Cervical spondylosis without myelopathy   • Left hip pain   • Encounter for preadmission testing   • Hypotension   • CKD (chronic kidney disease) stage 3, GFR 30-59 ml/min (HCC)   • Acute blood loss anemia   • Acute renal failure superimposed on chronic kidney disease (HCC)   • Severe sepsis (HCC)   • Ileus (HCC)   • Metabolic encephalopathy   • Anemia due to stage 3 chronic kidney disease (HCC)   • Anasarca   • Acute on chronic diastolic CHF (congestive heart  failure) (HCC)   • Pulmonary edema   • Hospital psychosis ([facility])   • Frequent PVCs   • ESRD (end stage renal disease) ([facility])   • Insomnia disorder   • Acute GI bleeding   • Acute blood loss anemia        Plan:  Patient recently started on Xarelto because of clotting issues with his dialysis catheter and now with GI bleeding.  He has a history of AVM seen in the ascending colon by his colonoscopy in May of this year as well as diverticulosis.  I will schedule him for an upper endoscopy to rule out peptic ulcer disease and upper GI bleeding this morning.  I discussed risk and benefits of the procedure with he and his wife and they are willing to proceed.  Xarelto has been held and Kcentra was given.  He received 3 units of blood.  He is currently on Protonix infusion.  He did receive dialysis yesterday      I discussed the patients findings and my recommendations with patient, family and nursing staff.    Murray Torres MD

## 2022-09-13 NOTE — CONSULTS
[facility], [facility]  Initial Inpatient Consult Note    Referring Provider: Hospitalist    Reason for Consultation: GI bleeding, melena, acute blood loss anemia    Subjective     History of present illness:    77 y.o. male with multiple medical problems including end-stage renal disease on hemodialysis Monday Wednesday Friday, chronic anemia, recently started on Xarelto last week because of clotting with his dialysis catheter.  He was admitted now with 4 days of dark stools and rectal bleeding.  He denies significant abdominal pain.  On arrival to emergency department he was found to be hypotensive and he was given Kcentra and 3 units of blood overnight.  He remains normotensive currently but still continues to have episodes of rectal bleeding.  He had a colonoscopy in May of this year with Dr. Mg that showed small AVMs in the cecum and ascending colon which were treated with APC.  He also had diverticulosis of the sigmoid colon.  Hemoglobin on arrival here was 6.9, now 8.1 this morning    Past Medical History:  Past Medical History:   Diagnosis Date   • AA (aortic aneurysm) ([facility])     normal diameter noted on scan 03/18/21   • AL amyloidosis ([facility]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   • Anemia     no current issues   • Anesthesia complication     STATES AFTER TOTAL HIP REPLACEMENT WITH SPINAL HAD SEVERE CONFUSION. HOSPITALZED 21 DAYS. AT [facility]   • Anxiety    • Arthritis     OA OF LEFT HIP   • Colitis     REPORTS CURRENTLY INCONTINENT OF BOWEL AND HAVING DIARRHEA   • COPD (chronic obstructive pulmonary disease) ([facility])     inhalers, neb tx   • Coronary artery disease    • Depression    • Diverticulitis    • ED (erectile dysfunction)    • ESRD (end stage renal disease) on dialysis ([facility])     PORT RIGHT CHEST FOR DIALYSIS. ELVIRA KELLY. JAVAD FLORES   • GERD (gastroesophageal reflux disease)    • Gout    • Hemorrhoids    • History of diverticulosis    • History of PAT  (paroxysmal atrial tachycardia)    • History of transfusion    • [place] (hard of hearing)     DOES HAVING HEARING AID HE CAN USE   • Hyperlipidemia    • Hypertension    • Leukopenia     no current  issues   • Macrocytic anemia    • Multiple renal cysts     no current issues   • Myocardial infarction (HCC)    • Neuropathy     HANDS AND FEET   • PTSD (post-traumatic stress disorder)    • PVD (peripheral vascular disease) (HCC)    • Requires supplemental oxygen     2L of O2 THROUGH CPAP MACHINE AT NIGHT   • RLS (restless legs syndrome)    • Seasonal allergies    • Short-term memory loss    • Sleep apnea     uses CPAP   • Stroke (HCC)     prior to diallo carotid endarterectomy, no residual issues AROUND EARLY 2000     Past Surgical History:  Past Surgical History:   Procedure Laterality Date   • APPENDECTOMY N/A 2005   • ARTERIOVENOUS FISTULA/SHUNT SURGERY Left 8/30/2022    Procedure: LEFT BRACHIAL CEPALIC ARTERIOVENOUS FISTULA PLACEMENT;  Surgeon: Aba Freeman MD;  Location: [facility] OR;  Service: Vascular;  Laterality: Left;   • CAROTID ENDARTERECTOMY Bilateral 2007   • COLONOSCOPY N/A 1995    done in [facility]   • COLONOSCOPY N/A 5/2/2022    Procedure: COLONOSCOPY WITH APPLICATION OF APC;  Surgeon: Lew Mg MD;  Location: [facility] ENDOSCOPY;  Service: Gastroenterology;  Laterality: N/A;  AVMs IN COLON, DIVERTICULOSIS, HEMORRHOIDS   • CORONARY ARTERY BYPASS GRAFT  1997    4 vessel, no  c/o cp or soa, follows w/ D. Sloane JHAVERI   • HIATAL HERNIA REPAIR N/A    • INSERTION HEMODIALYSIS CATHETER N/A 6/23/2022    Procedure: Insertion of tunneled catheter for hemodialysis;  Surgeon: Warren Rodríguez MD;  Location: [facility] MAIN OR;  Service: Vascular;  Laterality: N/A;   • REPLACEMENT TOTAL KNEE Left 09/08/2015   • ROTATOR CUFF REPAIR Right    • SKIN CANCER EXCISION      back   • THORACOSCOPY Left 6/2/2016    Procedure: LEFT VIDEO ASSISTED THORACOTOMY W/ LEFT UPPER LOBE WEDGE RESECTION X 2; EXPLORATORY BRONCHOSCOPY;  PAIN PUMP PLACEMENT X 2;  Surgeon: Larry Hernandez MD;  Location: [facility] MAIN OR;  Service:    • TONSILLECTOMY Bilateral    • TOTAL HIP ARTHROPLASTY Right    • TOTAL HIP ARTHROPLASTY Left 2021    Procedure: LEFT TOTAL HIP ARTHROPLASTY;  Surgeon: Man Francois MD;  Location: [facility] MAIN OR;  Service: Orthopedics;  Laterality: Left;      Social History:   Social History     Tobacco Use   • Smoking status: Former Smoker     Packs/day: 1.00     Years: 30.00     Pack years: 30.00     Types: Cigarettes     Quit date:      Years since quittin.   • Smokeless tobacco: Current User     Types: Chew   • Tobacco comment: ONE HALF CAN DAILY    Substance Use Topics   • Alcohol use: Not Currently      Family History:  Family History   Problem Relation Age of Onset   • Heart disease Mother    • Multiple myeloma Mother    • Heart disease Father    • Cancer Father         Gallbladder Cancer   • Leukemia Brother         CLL   • Other Brother         Black Lung    • Colon cancer Neg Hx    • Malig Hyperthermia Neg Hx        Home Meds:  Medications Prior to Admission   Medication Sig Dispense Refill Last Dose   • acetaminophen (TYLENOL) 325 MG tablet Take 650 mg by mouth Every 6 (Six) Hours As Needed for Mild Pain .      • albuterol (PROVENTIL HFA;VENTOLIN HFA) 108 (90 Base) MCG/ACT inhaler Inhale 2 puffs Every 4 (Four) Hours As Needed for Wheezing.      • albuterol (PROVENTIL) (2.5 MG/3ML) 0.083% nebulizer solution Take 2.5 mg by nebulization Every 4 (Four) Hours As Needed for Wheezing or Shortness of Air.      • allopurinol (ZYLOPRIM) 300 MG tablet Take 300 mg by mouth daily.      • atorvastatin (LIPITOR) 40 MG tablet Take 40 mg by mouth Every Night.      • cetirizine (zyrTEC) 10 MG tablet Take 10 mg by mouth Daily.      • citalopram (CeleXA) 40 MG tablet Take 40 mg by mouth Every Morning.      • donepezil (ARICEPT) 5 MG tablet Take 1 tablet by mouth Daily for 30 days. 30 tablet 0    • finasteride (PROSCAR) 5 MG  tablet Take 5 mg by mouth Daily.      • gabapentin (NEURONTIN) 100 MG capsule Take 1 capsule by mouth 2 (Two) Times a Day for 30 days. 60 capsule 0    • ketoconazole (NIZORAL) 2 % shampoo Apply 1 application topically to the appropriate area as directed 2 (Two) Times a Week.      • metoprolol succinate XL (TOPROL-XL) 50 MG 24 hr tablet Take 50 mg by mouth Daily.      • pantoprazole (PROTONIX) 20 MG EC tablet Take 20 mg by mouth Daily.      • pramipexole (MIRAPEX) 0.5 MG tablet Take 0.5 mg by mouth Every Night.      • rivaroxaban (XARELTO) 20 MG tablet Take 20 mg by mouth Daily With Dinner.      • Testosterone Cypionate 200 MG/ML solution Inject 200 mg as directed Every 14 (Fourteen) Days.      • Trelegy Ellipta 200-62.5-25 MCG/INH inhaler Inhale 1 puff Daily.        Current Meds:   arformoterol, 15 mcg, Nebulization, BID - RT  budesonide, 0.5 mg, Nebulization, BID - RT  Pharmacy to Dose enoxaparin (LOVENOX), , Does not apply, Once  senna-docusate sodium, 1 tablet, Oral, BID      Allergies:  Allergies   Allergen Reactions   • Cephalexin Nausea And Vomiting   • Morphine Other (See Comments)     Pt had long recovery involving morphine administration post surgery in December 2021.       Review of Systems  Pertinent items are noted in HPI, all other systems reviewed and negative         Vital Signs  Temp:  [97.4 °F (36.3 °C)-98.9 °F (37.2 °C)] 98.8 °F (37.1 °C)  Heart Rate:  [49-60] 59  Resp:  [12-24] 17  BP: ()/(32-63) 118/51  Physical Exam:  General Appearance:    Alert, cooperative, in no acute distress   Head:    Normocephalic, without obvious abnormality, atraumatic   Eyes:          conjunctivae and sclerae normal, no   icterus   Throat:   no thrush, oral mucosa moist   Neck:   Supple, no adenopathy   Lungs:     Clear to auscultation bilaterally    Heart:    Regular rhythm and normal rate    Chest Wall:    No abnormalities observed   Abdomen:     Soft, nondistended, nontender; normal bowel sounds    Extremities:   no edema, no redness   Skin:   No bruising or rash   Psychiatric:  normal mood and insight     Results Review:  [x]  Laboratory   [x]  Radiology  []  Pathology      I reviewed the patient's new clinical results.    Results from last 7 days   Lab Units 09/13/22  0303 09/12/22  1940 09/12/22  1247   WBC 10*3/mm3 4.34  --  3.85   HEMOGLOBIN g/dL 8.2* 8.3* 6.9*   HEMATOCRIT % 24.7* 24.9* 20.9*   PLATELETS 10*3/mm3 95*  --  109*     Results from last 7 days   Lab Units 09/13/22  0302 09/12/22  1247   SODIUM mmol/L 137 138   POTASSIUM mmol/L 5.1 4.1   CHLORIDE mmol/L 106 105   CO2 mmol/L 21.0* 24.0   BUN mg/dL 36* 29*   CREATININE mg/dL 1.98* 1.86*   CALCIUM mg/dL 8.2* 8.5*   BILIRUBIN mg/dL 0.5 0.5   ALK PHOS U/L 80 90   ALT (SGPT) U/L 9 10   AST (SGOT) U/L 14 13   GLUCOSE mg/dL 92 174*     Results from last 7 days   Lab Units 09/12/22  1247   INR  2.33*     Lab Results   Lab Value Date/Time    LIPASE 57 06/11/2022 2320    LIPASE 80 (H) 01/23/2021 0017       Radiology:  CT Abdomen Pelvis With Contrast   Final Result      XR Chest 1 View   Final Result           Assessment & Plan     Patient Active Problem List   Diagnosis   • Lung nodule, solitary   • Cough   • Wheeze   • Multiple lung nodules on CT   • Follow-up examination, following other surgery   • Renal mass   • Plasmacytoma (HCC)   • Macrocytic anemia   • Leukopenia   • Monoclonal gammopathy   • Thrombocytopenia (HCC)   • AL amyloidosis (HCC)   • Neck pain   • Chronic low back pain   • Cervical spondylosis without myelopathy   • Left hip pain   • Encounter for preadmission testing   • Hypotension   • CKD (chronic kidney disease) stage 3, GFR 30-59 ml/min (HCC)   • Acute blood loss anemia   • Acute renal failure superimposed on chronic kidney disease (HCC)   • Severe sepsis (HCC)   • Ileus (HCC)   • Metabolic encephalopathy   • Anemia due to stage 3 chronic kidney disease (HCC)   • Anasarca   • Acute on chronic diastolic CHF (congestive heart  failure) (HCC)   • Pulmonary edema   • Hospital psychosis ([facility])   • Frequent PVCs   • ESRD (end stage renal disease) ([facility])   • Insomnia disorder   • Acute GI bleeding   • Acute blood loss anemia        Plan:  Patient recently started on Xarelto because of clotting issues with his dialysis catheter and now with GI bleeding.  He has a history of AVM seen in the ascending colon by his colonoscopy in May of this year as well as diverticulosis.  I will schedule him for an upper endoscopy to rule out peptic ulcer disease and upper GI bleeding this morning.  I discussed risk and benefits of the procedure with he and his wife and they are willing to proceed.  Xarelto has been held and Kcentra was given.  He received 3 units of blood.  He is currently on Protonix infusion.  He did receive dialysis yesterday      I discussed the patients findings and my recommendations with patient, family and nursing staff.    Murray Torres MD

## 2022-09-13 NOTE — CONSULTS
Patient Care Team:  Claude Hair MD as PCP - General (Internal Medicine)  Rafael Heredia MD as Consulting Physician (Hematology and Oncology)  Galina Reed MD (Internal Medicine)  Cathleen Morocho MD as Consulting Physician (Hematology and Oncology)  Shagufta Peña MD as Surgeon (Thoracic Surgery)  System, Provider Not In    Chief complaint: ESRD    Subjective     History of Present Illness  76yo with h/o ESRD on dialysis m/w/f.  He had reported bloody BM over last several days with blood.  He is reportedly on xarelto for clotting dialysis machine per pt report.  We were asked to see him for dialysis needs.  He is awake and alert, no acute complaints.    Review of Systems   Constitutional: Positive for fatigue. Negative for chills and fever.   Respiratory: Negative for cough and shortness of breath.    Cardiovascular: Negative for chest pain and leg swelling.   Gastrointestinal: Positive for blood in stool and diarrhea. Negative for abdominal pain, nausea and vomiting.   Genitourinary: Negative for dysuria.   Musculoskeletal: Negative for back pain.   Neurological: Negative for headaches.        Past Medical History:   Diagnosis Date   • AA (aortic aneurysm) ([facility])     normal diameter noted on scan 03/18/21   • AL amyloidosis ([facility]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   • Anemia     no current issues   • Anesthesia complication     STATES AFTER TOTAL HIP REPLACEMENT WITH SPINAL HAD SEVERE CONFUSION. HOSPITALZED 21 DAYS. AT [facility]   • Anxiety    • Arthritis     OA OF LEFT HIP   • Colitis     REPORTS CURRENTLY INCONTINENT OF BOWEL AND HAVING DIARRHEA   • COPD (chronic obstructive pulmonary disease) ([facility])     inhalers, neb tx   • Coronary artery disease    • Depression    • Diverticulitis    • ED (erectile dysfunction)    • ESRD (end stage renal disease) on dialysis ([facility])     PORT RIGHT CHEST FOR DIALYSIS. ELVIRA KELLY. JAVAD FLORES   • GERD (gastroesophageal reflux disease)     • Gout    • Hemorrhoids    • History of diverticulosis    • History of PAT (paroxysmal atrial tachycardia)    • History of transfusion    • [place] (hard of hearing)     DOES HAVING HEARING AID HE CAN USE   • Hyperlipidemia    • Hypertension    • Leukopenia     no current  issues   • Macrocytic anemia    • Multiple renal cysts     no current issues   • Myocardial infarction (HCC)    • Neuropathy     HANDS AND FEET   • PTSD (post-traumatic stress disorder)    • PVD (peripheral vascular disease) (HCC)    • Requires supplemental oxygen     2L of O2 THROUGH CPAP MACHINE AT NIGHT   • RLS (restless legs syndrome)    • Seasonal allergies    • Short-term memory loss    • Sleep apnea     uses CPAP   • Stroke (HCC)     prior to diallo carotid endarterectomy, no residual issues AROUND EARLY 2000   ,   Past Surgical History:   Procedure Laterality Date   • APPENDECTOMY N/A 2005   • ARTERIOVENOUS FISTULA/SHUNT SURGERY Left 8/30/2022    Procedure: LEFT BRACHIAL CEPALIC ARTERIOVENOUS FISTULA PLACEMENT;  Surgeon: Aba Freeman MD;  Location: [facility] OR;  Service: Vascular;  Laterality: Left;   • CAROTID ENDARTERECTOMY Bilateral 2007   • COLONOSCOPY N/A 1995    done in [facility]   • COLONOSCOPY N/A 5/2/2022    Procedure: COLONOSCOPY WITH APPLICATION OF APC;  Surgeon: Lew Mg MD;  Location: [facility] ENDOSCOPY;  Service: Gastroenterology;  Laterality: N/A;  AVMs IN COLON, DIVERTICULOSIS, HEMORRHOIDS   • CORONARY ARTERY BYPASS GRAFT  1997    4 vessel, no  c/o cp or soa, follows w/ DNeema JHAVERI   • HIATAL HERNIA REPAIR N/A    • INSERTION HEMODIALYSIS CATHETER N/A 6/23/2022    Procedure: Insertion of tunneled catheter for hemodialysis;  Surgeon: Warren Rodríguez MD;  Location: [facility] MAIN OR;  Service: Vascular;  Laterality: N/A;   • REPLACEMENT TOTAL KNEE Left 09/08/2015   • ROTATOR CUFF REPAIR Right    • SKIN CANCER EXCISION      back   • THORACOSCOPY Left 6/2/2016    Procedure: LEFT VIDEO ASSISTED THORACOTOMY W/ LEFT UPPER  LOBE WEDGE RESECTION X 2; EXPLORATORY BRONCHOSCOPY; PAIN PUMP PLACEMENT X 2;  Surgeon: Larry Hernandez MD;  Location: [facility] MAIN OR;  Service:    • TONSILLECTOMY Bilateral    • TOTAL HIP ARTHROPLASTY Right    • TOTAL HIP ARTHROPLASTY Left 2021    Procedure: LEFT TOTAL HIP ARTHROPLASTY;  Surgeon: Man Francois MD;  Location: [facility] MAIN OR;  Service: Orthopedics;  Laterality: Left;   ,   Family History   Problem Relation Age of Onset   • Heart disease Mother    • Multiple myeloma Mother    • Heart disease Father    • Cancer Father         Gallbladder Cancer   • Leukemia Brother         CLL   • Other Brother         Black Lung    • Colon cancer Neg Hx    • Malig Hyperthermia Neg Hx    ,   Social History     Socioeconomic History   • Marital status:      Spouse name: Liliya   • Years of education: High school   Tobacco Use   • Smoking status: Former Smoker     Packs/day: 1.00     Years: 30.00     Pack years: 30.00     Types: Cigarettes     Quit date:      Years since quittin.7   • Smokeless tobacco: Current User     Types: Chew   • Tobacco comment: ONE HALF CAN DAILY    Vaping Use   • Vaping Use: Never used   Substance and Sexual Activity   • Alcohol use: Not Currently   • Drug use: No   • Sexual activity: Defer     E-cigarette/Vaping   • E-cigarette/Vaping Use Never User      E-cigarette/Vaping Substances     E-cigarette/Vaping Devices       ,   Medications Prior to Admission   Medication Sig Dispense Refill Last Dose   • acetaminophen (TYLENOL) 325 MG tablet Take 650 mg by mouth Every 6 (Six) Hours As Needed for Mild Pain .      • albuterol (PROVENTIL HFA;VENTOLIN HFA) 108 (90 Base) MCG/ACT inhaler Inhale 2 puffs Every 4 (Four) Hours As Needed for Wheezing.      • albuterol (PROVENTIL) (2.5 MG/3ML) 0.083% nebulizer solution Take 2.5 mg by nebulization Every 4 (Four) Hours As Needed for Wheezing or Shortness of Air.      • allopurinol (ZYLOPRIM) 300 MG tablet Take 300 mg by mouth  daily.      • atorvastatin (LIPITOR) 40 MG tablet Take 40 mg by mouth Every Night.      • cetirizine (zyrTEC) 10 MG tablet Take 10 mg by mouth Daily.      • citalopram (CeleXA) 40 MG tablet Take 40 mg by mouth Every Morning.      • donepezil (ARICEPT) 5 MG tablet Take 1 tablet by mouth Daily for 30 days. 30 tablet 0    • finasteride (PROSCAR) 5 MG tablet Take 5 mg by mouth Daily.      • gabapentin (NEURONTIN) 100 MG capsule Take 1 capsule by mouth 2 (Two) Times a Day for 30 days. 60 capsule 0    • ketoconazole (NIZORAL) 2 % shampoo Apply 1 application topically to the appropriate area as directed 2 (Two) Times a Week.      • metoprolol succinate XL (TOPROL-XL) 50 MG 24 hr tablet Take 50 mg by mouth Daily.      • pantoprazole (PROTONIX) 20 MG EC tablet Take 20 mg by mouth Daily.      • pramipexole (MIRAPEX) 0.5 MG tablet Take 0.5 mg by mouth Every Night.      • rivaroxaban (XARELTO) 20 MG tablet Take 20 mg by mouth Daily With Dinner.      • Testosterone Cypionate 200 MG/ML solution Inject 200 mg as directed Every 14 (Fourteen) Days.      • Trelegy Ellipta 200-62.5-25 MCG/INH inhaler Inhale 1 puff Daily.      , Scheduled Meds:  arformoterol, 15 mcg, Nebulization, BID - RT  budesonide, 0.5 mg, Nebulization, BID - RT  Pharmacy to Dose enoxaparin (LOVENOX), , Does not apply, Once  senna-docusate sodium, 1 tablet, Oral, BID    , Continuous Infusions:  norepinephrine, 0.02-0.3 mcg/kg/min  pantoprazole, 8 mg/hr, Last Rate: 8 mg/hr (09/13/22 0734)    , PRN Meds:  •  acetaminophen  •  ALPRAZolam  •  senna-docusate sodium **AND** polyethylene glycol **AND** bisacodyl **AND** bisacodyl  •  ipratropium-albuterol  •  nitroglycerin  •  ondansetron  •  sodium chloride  •  temazepam and Allergies:  Cephalexin and Morphine    Objective     Vital Signs  Temp:  [97.4 °F (36.3 °C)-98.9 °F (37.2 °C)] 98.8 °F (37.1 °C)  Heart Rate:  [49-60] 59  Resp:  [12-24] 17  BP: ()/(32-63) 118/51    No intake/output data recorded.  I/O last 3  completed shifts:  In: 307.5 [Blood:307.5]  Out: -     Physical Exam  HENT:      Head: Normocephalic.      Nose: Nose normal.      Mouth/Throat:      Mouth: Mucous membranes are moist.   Eyes:      General: No scleral icterus.     Pupils: Pupils are equal, round, and reactive to light.   Cardiovascular:      Rate and Rhythm: Normal rate and regular rhythm.      Pulses: Normal pulses.      Heart sounds: Normal heart sounds.   Pulmonary:      Effort: Pulmonary effort is normal.   Abdominal:      General: Abdomen is flat.   Neurological:      Mental Status: He is alert.         Results Review:    I reviewed the patient's new clinical results.    WBC WBC   Date Value Ref Range Status   09/13/2022 4.34 3.40 - 10.80 10*3/mm3 Final   09/12/2022 3.85 3.40 - 10.80 10*3/mm3 Final      HGB Hemoglobin   Date Value Ref Range Status   09/13/2022 8.2 (L) 13.0 - 17.7 g/dL Final   09/12/2022 8.3 (L) 13.0 - 17.7 g/dL Final   09/12/2022 6.9 (C) 13.0 - 17.7 g/dL Final      HCT Hematocrit   Date Value Ref Range Status   09/13/2022 24.7 (L) 37.5 - 51.0 % Final   09/12/2022 24.9 (L) 37.5 - 51.0 % Final   09/12/2022 20.9 (C) 37.5 - 51.0 % Final      Platlets No results found for: LABPLAT   MCV MCV   Date Value Ref Range Status   09/13/2022 92.5 79.0 - 97.0 fL Final   09/12/2022 95.9 79.0 - 97.0 fL Final          Sodium Sodium   Date Value Ref Range Status   09/13/2022 137 136 - 145 mmol/L Final   09/12/2022 138 136 - 145 mmol/L Final      Potassium Potassium   Date Value Ref Range Status   09/13/2022 5.1 3.5 - 5.2 mmol/L Final   09/12/2022 4.1 3.5 - 5.2 mmol/L Final      Chloride Chloride   Date Value Ref Range Status   09/13/2022 106 98 - 107 mmol/L Final   09/12/2022 105 98 - 107 mmol/L Final      CO2 CO2   Date Value Ref Range Status   09/13/2022 21.0 (L) 22.0 - 29.0 mmol/L Final   09/12/2022 24.0 22.0 - 29.0 mmol/L Final      BUN BUN   Date Value Ref Range Status   09/13/2022 36 (H) 8 - 23 mg/dL Final   09/12/2022 29 (H) 8 - 23 mg/dL  Final      Creatinine Creatinine   Date Value Ref Range Status   09/13/2022 1.98 (H) 0.76 - 1.27 mg/dL Final   09/12/2022 1.86 (H) 0.76 - 1.27 mg/dL Final      Calcium Calcium   Date Value Ref Range Status   09/13/2022 8.2 (L) 8.6 - 10.5 mg/dL Final   09/12/2022 8.5 (L) 8.6 - 10.5 mg/dL Final      PO4 No results found for: CAPO4   Albumin Albumin   Date Value Ref Range Status   09/13/2022 3.30 (L) 3.50 - 5.20 g/dL Final   09/12/2022 3.50 3.50 - 5.20 g/dL Final      Magnesium Magnesium   Date Value Ref Range Status   09/13/2022 1.6 1.6 - 2.4 mg/dL Final   09/12/2022 1.7 1.6 - 2.4 mg/dL Final      Uric Acid No results found for: URICACID         Assessment & Plan       Acute blood loss anemia    Hypotension    ESRD (end stage renal disease) ([facility])    Acute GI bleeding      Assessment & Plan  ESRD-  Normal dialysis m/w/f at [facility].  [facility] for access, has maturing LUE AVF now.  Had dialysis yesterday, will plan for dialysis again in AM.  Regular dialysis unit closed today, unsure why on xarelto for dialysis circuit but will hold with GI bleed now.  Could use low dose heparin of frequent flushes if not able to use heparin now.  GIB-  S/p PRBC's, hgb improved to 8.2.  Xarelto on hold.   Amyloidosis-  AL, treated previously  HTN-  BP on low side, meds on hold.    I discussed the patients findings and my recommendations with patient    Gregory Liriano MD  09/13/22  08:16 EDT

## 2022-09-13 NOTE — PROGRESS NOTES
[facility]     Progress Note    Patient Name: Rafael Gomez  : 1945  MRN: [id number]  Primary Care Physician:  Claude Hair MD  Date of admission: 2022      Subjective   Brief summary.  Patient admitted to ICU for hypotension and rectal bleed on Xarelto      HPI:  Had 2 episodes of rectal bleed yesterday, no further bleed since then.  Hemoglobin stable, EGD done no active issues noted other than some minor issues.  No plan for colonoscopy.  Patient's wife at bedside.  Blood pressure stable not on any vasopressors.  Denies any abdominal pain.    Review of Systems     No chest pain or shortness of breath  Anxious  No abdominal pain or discomfort  Denies nausea vomiting  No fever chills      Objective     Vitals:   Temp:  [97.4 °F (36.3 °C)-98.9 °F (37.2 °C)] 98.6 °F (37 °C)  Heart Rate:  [49-82] 70  Resp:  [12-24] 18  BP: ()/(37-68) 98/43  Flow (L/min):  [3] 3    Physical Exam :     Elderly male not in acute distress obese  Pallor noted  Heart regular  Lungs clear, diminished breath sounds  Abdomen obese and soft nontender  Extremities trace of edema  Neuro awake alert and anxious      Result Review:  I have personally reviewed the results from the time of this admission to 2022 15:06 EDT and agree with these findings:  [x]  Laboratory  [x]  Microbiology  [x]  Radiology  [x]  EKG/Telemetry   []  Cardiology/Vascular   []  Pathology  []  Old records  []  Other:       Hemoglobin above 8 g  Assessment / Plan       Active Hospital Problems:  Active Hospital Problems    Diagnosis    • **Acute blood loss anemia    • Acute GI bleeding    • ESRD (end stage renal disease) ([facility])      Added automatically from request for surgery [id number]     • Hypotension        Plan:   Stable and improving  No further bleed since last night  Hemoglobin stable around 8.2  Patient received 3 units of packed red blood cells  Continue Protonix  GI on board  Does not feel like colonoscopy needed at this time until  further bleed  Patient blood pressure improved  Discussed with intensivist, nephrologist.  We will transfer him out of ICU monitor labs       DVT prophylaxis:  Medical and mechanical DVT prophylaxis orders are present.    CODE STATUS:   Code Status (Patient has no pulse and is not breathing): CPR (Attempt to Resuscitate)  Medical Interventions (Patient has pulse or is breathing): Full Support            Electronically signed by Melchor Nichole MD, 09/13/22, 3:06 PM EDT.

## 2022-09-13 NOTE — PLAN OF CARE
No major changes this shift. Patient had 2 bowel movements that were very dark red. Dr. Nichole aware. Patient also had EGD this AM that showed no bleeds or findings. Plan is to monitor and transfer out of the ICU when a bed is available. Patient did have a few soft blood pressures but most were due to patient moving around. When held still and recycled, blood pressure was WNL. Wife at bedside. All needs met at this time.    Penelope Wilson RN

## 2022-09-13 NOTE — PAYOR COMM NOTE
"Shameka Damon (77 y.o. Male)             Date of Birth   1945    Social Security Number       Address   [address] [place] [address] [place] [address]    Home Phone   [phone]    MRN   [id number]       Hoahaoism   Religious    Marital Status                               Admission Date   9/12/22    Admission Type   Emergency    Admitting Provider   Melchor Nichole MD    Attending Provider   Melchor Nichole MD    Department, Room/Bed   Psychiatric INTENSIVE CARE UNIT, I07/1       Discharge Date       Discharge Disposition       Discharge Destination                               Attending Provider: Melchor Nichole MD    Allergies: Cephalexin, Morphine    Isolation: None   Infection: None   Code Status: CPR   Advance Care Planning Activity    Ht: 177.8 cm (70\")   Wt: 106 kg (234 lb 3.2 oz)    Admission Cmt: None   Principal Problem: Acute blood loss anemia [D62]                 Active Insurance as of 9/12/2022     Primary Coverage     Payor Plan Insurance Group Employer/Plan Group    [facility]AC[facility] WELLCARE MEDICARE REPLACEMENT [id number]     Payor Plan Address Payor Plan Phone Number Payor Plan Fax Number Effective Dates    [address] [phone]  1/1/2022 - None Entered    [facility] [id number]       Subscriber Name Subscriber Birth Date Member ID       Shameka Damon 1945 [id number]           Secondary Coverage     Payor Plan Insurance Group Employer/Plan Group     FOR LIFE  FOR LIFE MC SUP  [id number]     Payor Plan Address Payor Plan Phone Number Payor Plan Fax Number Effective Dates    [address] [phone]  6/27/2016 - None Entered    [facility] [address]       Subscriber Name Subscriber Birth Date Member ID       SHAMEKA DAMON 1945 [id number]                 Emergency Contacts      (Rel.) Home Phone Work Phone Mobile Phone    PatriciaLiliya (Spouse) [phone] -- [phone]        NEW ADMISSION    CONTACT   DERRELL S UTILIZATION " REVIEW    [facility]  [address] [address]  TAX ID [id number]  [facility]  [phone]       [phone]   [phone]       History & Physical      Melchor Nichole MD at 22 1716           [facility]   HISTORY AND PHYSICAL    Patient Name: Rafael Gomez  : 1945  MRN: [id number]  Primary Care Physician:  Claude Hair MD  Date of admission: 2022    Subjective   Subjective     Chief Complaint:   Rectal bleed      HPI:    Rafael Gomez is a 77 y.o. male presented to the emergency department with rectal bleeding starting x 4 days ago. Pt admits to diarrhea, headache and generalized weakness.  Pt had dialysis this morning at 06:15. Per wife, pt was put on Xarelto last week after for dialysis as patient's blood was clotting.  Patient had large amount of blood and clots in the stools.  Initially they thought it is from hemorrhoids.  Work-up in the ER revealed severe anemia and patient was started on Kcentra for reversal of anticoagulation.  As well as he was given blood transfusion.  When I saw him in ER he is awake alert, not in any distress heart rate is stable.  Blood pressure has improved systolic around 100.  Wife at bedside.  Denies any chest pain or shortness of breath.           Review of Systems:    Fatigue and weakness  No chest pain  No shortness of breath  Some abdominal discomfort  No nausea vomiting  Blood in the stools diarrhea  No fever chills      Personal History     Past Medical History:   Diagnosis Date   • AA (aortic aneurysm) (HCC)     normal diameter noted on scan 21   • AL amyloidosis ([facility]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   • Anemia     no current issues   • Anesthesia complication     STATES AFTER TOTAL HIP REPLACEMENT WITH SPINAL HAD SEVERE CONFUSION. HOSPITALZED 21 DAYS. AT [facility]   • Anxiety    • Arthritis     OA OF LEFT HIP   • Colitis     REPORTS CURRENTLY INCONTINENT OF BOWEL AND HAVING DIARRHEA   •  COPD (chronic obstructive pulmonary disease) (HCC)     inhalers, neb tx   • Coronary artery disease    • Depression    • Diverticulitis    • ED (erectile dysfunction)    • ESRD (end stage renal disease) on dialysis (HCC)     PORT RIGHT CHEST FOR DIALYSIS. ELVIRA KELLY. JAVAD FLORES   • GERD (gastroesophageal reflux disease)    • Gout    • Hemorrhoids    • History of diverticulosis    • History of PAT (paroxysmal atrial tachycardia)    • History of transfusion    • [place] (hard of hearing)     DOES HAVING HEARING AID HE CAN USE   • Hyperlipidemia    • Hypertension    • Leukopenia     no current  issues   • Macrocytic anemia    • Multiple renal cysts     no current issues   • Myocardial infarction (HCC)    • Neuropathy     HANDS AND FEET   • PTSD (post-traumatic stress disorder)    • PVD (peripheral vascular disease) ([facility])    • Requires supplemental oxygen     2L of O2 THROUGH CPAP MACHINE AT NIGHT   • RLS (restless legs syndrome)    • Seasonal allergies    • Short-term memory loss    • Sleep apnea     uses CPAP   • Stroke ([facility])     prior to diallo carotid endarterectomy, no residual issues AROUND EARLY 2000       Past Surgical History:   Procedure Laterality Date   • APPENDECTOMY N/A 2005   • ARTERIOVENOUS FISTULA/SHUNT SURGERY Left 8/30/2022    Procedure: LEFT BRACHIAL CEPALIC ARTERIOVENOUS FISTULA PLACEMENT;  Surgeon: Aba Freeman MD;  Location: [facility] OR;  Service: Vascular;  Laterality: Left;   • CAROTID ENDARTERECTOMY Bilateral 2007   • COLONOSCOPY N/A 1995    done in [facility]   • COLONOSCOPY N/A 5/2/2022    Procedure: COLONOSCOPY WITH APPLICATION OF APC;  Surgeon: Lew Mg MD;  Location: [facility] ENDOSCOPY;  Service: Gastroenterology;  Laterality: N/A;  AVMs IN COLON, DIVERTICULOSIS, HEMORRHOIDS   • CORONARY ARTERY BYPASS GRAFT  1997    4 vessel, no  c/o cp or soa, follows w/ [facility]   • HIATAL HERNIA REPAIR N/A    • INSERTION HEMODIALYSIS CATHETER N/A 6/23/2022    Procedure: Insertion of  tunneled catheter for hemodialysis;  Surgeon: Warren Rodríguez MD;  Location: [facility] OR;  Service: Vascular;  Laterality: N/A;   • REPLACEMENT TOTAL KNEE Left 09/08/2015   • ROTATOR CUFF REPAIR Right    • SKIN CANCER EXCISION      back   • THORACOSCOPY Left 6/2/2016    Procedure: LEFT VIDEO ASSISTED THORACOTOMY W/ LEFT UPPER LOBE WEDGE RESECTION X 2; EXPLORATORY BRONCHOSCOPY; PAIN PUMP PLACEMENT X 2;  Surgeon: Larry Hernandez MD;  Location: [facility] MAIN OR;  Service:    • TONSILLECTOMY Bilateral    • TOTAL HIP ARTHROPLASTY Right 1997   • TOTAL HIP ARTHROPLASTY Left 11/16/2021    Procedure: LEFT TOTAL HIP ARTHROPLASTY;  Surgeon: Man Francois MD;  Location: [facility] MAIN OR;  Service: Orthopedics;  Laterality: Left;       Family History: family history includes Cancer in his father; Heart disease in his father and mother; Leukemia in his brother; Multiple myeloma in his mother; Other in his brother. Otherwise pertinent FHx was reviewed and not pertinent to current issue.    Social History:  reports that he quit smoking about 25 years ago. His smoking use included cigarettes. He has a 30.00 pack-year smoking history. His smokeless tobacco use includes chew. He reports previous alcohol use. He reports that he does not use drugs.    Home Medications:  Fluticasone-Umeclidin-Vilant, Testosterone Cypionate, acetaminophen, albuterol, albuterol sulfate HFA, allopurinol, atorvastatin, cetirizine, citalopram, donepezil, finasteride, gabapentin, ketoconazole, metoprolol succinate XL, pantoprazole, pramipexole, and rivaroxaban      Allergies:  Allergies   Allergen Reactions   • Cephalexin Nausea And Vomiting   • Morphine Other (See Comments)     Pt had long recovery involving morphine administration post surgery in December 2021.         Objective   Objective     Vitals:   Temp:  [97.4 °F (36.3 °C)-97.7 °F (36.5 °C)] 97.4 °F (36.3 °C)  Heart Rate:  [51-60] 55  Resp:  [18-20] 18  BP: ()/(32-63) 109/51    Physical  Exam  Elderly male looks of his stated age,  Pallor noted  Neck supple  Heart regular  Lungs diminished breath sounds  Abdomen soft nontender, obese and somewhat distended  Neurologically awake alert and oriented   extremities no edema        I have personally reviewed the results from the time of this admission to 9/12/2022 17:22 EDT and agree with these findings:  [x]  Laboratory  []  Microbiology  [x]  Radiology  []  EKG/Telemetry   []  Cardiology/Vascular   []  Pathology  []  Old records  []  Other:    CBC:    WBC   Date Value Ref Range Status   09/12/2022 3.85 3.40 - 10.80 10*3/mm3 Final     RBC   Date Value Ref Range Status   09/12/2022 2.18 (L) 4.14 - 5.80 10*6/mm3 Final     Hemoglobin   Date Value Ref Range Status   09/12/2022 6.9 (C) 13.0 - 17.7 g/dL Final     Hematocrit   Date Value Ref Range Status   09/12/2022 20.9 (C) 37.5 - 51.0 % Final     MCV   Date Value Ref Range Status   09/12/2022 95.9 79.0 - 97.0 fL Final     MCH   Date Value Ref Range Status   09/12/2022 31.7 26.6 - 33.0 pg Final     MCHC   Date Value Ref Range Status   09/12/2022 33.0 31.5 - 35.7 g/dL Final     RDW   Date Value Ref Range Status   09/12/2022 18.6 (H) 12.3 - 15.4 % Final     RDW-SD   Date Value Ref Range Status   09/12/2022 63.5 (H) 37.0 - 54.0 fl Final     MPV   Date Value Ref Range Status   09/12/2022 12.4 (H) 6.0 - 12.0 fL Final     Platelets   Date Value Ref Range Status   09/12/2022 109 (L) 140 - 450 10*3/mm3 Final     Neutrophil %   Date Value Ref Range Status   09/12/2022 68.8 42.7 - 76.0 % Final     Lymphocyte %   Date Value Ref Range Status   09/12/2022 19.0 (L) 19.6 - 45.3 % Final     Monocyte %   Date Value Ref Range Status   09/12/2022 5.7 5.0 - 12.0 % Final     Eosinophil %   Date Value Ref Range Status   09/12/2022 5.2 0.3 - 6.2 % Final     Basophil %   Date Value Ref Range Status   09/12/2022 0.8 0.0 - 1.5 % Final     Immature Grans %   Date Value Ref Range Status   09/12/2022 0.5 0.0 - 0.5 % Final      Neutrophils, Absolute   Date Value Ref Range Status   09/12/2022 2.65 1.70 - 7.00 10*3/mm3 Final     Lymphocytes, Absolute   Date Value Ref Range Status   09/12/2022 0.73 0.70 - 3.10 10*3/mm3 Final     Monocytes, Absolute   Date Value Ref Range Status   09/12/2022 0.22 0.10 - 0.90 10*3/mm3 Final     Eosinophils, Absolute   Date Value Ref Range Status   09/12/2022 0.20 0.00 - 0.40 10*3/mm3 Final     Basophils, Absolute   Date Value Ref Range Status   09/12/2022 0.03 0.00 - 0.20 10*3/mm3 Final     Immature Grans, Absolute   Date Value Ref Range Status   09/12/2022 0.02 0.00 - 0.05 10*3/mm3 Final     nRBC   Date Value Ref Range Status   09/12/2022 0.0 0.0 - 0.2 /100 WBC Final        BMP:    Lab Results   Component Value Date    GLUCOSE 174 (H) 09/12/2022    BUN 29 (H) 09/12/2022    CREATININE 1.86 (H) 09/12/2022    EGFRIFNONA 45 (L) 12/03/2021    EGFRIFAFRI  11/21/2021      Comment:      <15 Indicative of kidney failure.    BCR 15.6 09/12/2022    K 4.1 09/12/2022    CO2 24.0 09/12/2022    CALCIUM 8.5 (L) 09/12/2022    PROTENTOTREF 6.0 08/03/2022    ALBUMIN 3.50 09/12/2022    LABIL2 1.6 08/03/2022    AST 13 09/12/2022    ALT 10 09/12/2022        No radiology results for the last day     CT abdomen pelvis no acute issues      Assessment & Plan   Assessment / Plan       Current Diagnosis:  Active Hospital Problems    Diagnosis    • **Acute blood loss anemia    • Acute GI bleeding    • ESRD (end stage renal disease) (HCC)    • Hypotension      Plan:   Admit to ICU  Monitor H&H acute 8  Transfused 2 unit  N.p.o. for now  IV Protonix  GI consult, Dr. Torres notified  Intensivist Dr. Torres notified of ICU admission  Dr. Reddy nephrologist who follows patient for end-stage renal disease and hemodialysis consulted  Hold all nonessential meds and antihypertensives at this time  Discussed with patient and wife in detail at bedside  Continue aggressive care, full code      DVT prophylaxis:  No DVT prophylaxis order  currently exists.    GI Prophylaxis:       IV Protonix    CODE STATUS:    Code Status (Patient has no pulse and is not breathing): CPR (Attempt to Resuscitate)  Medical Interventions (Patient has pulse or is breathing): Full Support    Admission Status:  I believe this patient meets inpatient status.             I have dictated this note utilizing Dragon Dictation.             Please note that portions of this note were completed with a voice recognition program.             Part of this note may be an electronic transcription/translation of spoken language to printed text         using the Dragon Dictation System.       Electronically signed by Melchor Nichole MD, 09/12/22, 5:16 PM EDT.    Total time spent with in evaluation and management: Approximately 55 minutes of intensive care with discussion with intensivist, gastroenterologist, ER physician, reviewing old records, reviewing current x-rays and lab work, admission orders as well as documentation.         Electronically signed by Melchor Nichole MD at 09/12/22 1721          Emergency Department Notes      Patrick Carrasco DO at 09/12/22 1419          Time: 2:19 PM EDT  Arrived by: private car  Chief Complaint: Rectal bleeding, generalized weakness  History provided by: patient  History is limited by: N/A     History of Present Illness:  Patient is a 77 y.o.  male that presents to the emergency department with rectal bleeding starting x 4 days ago. Pt admits to diarrhea, headache and generalized weakness. Pt believes this may be due to a hemmorrhoid. Pt had dialysis this morning at 06:15. Per wife, pt was put on Xarelto again last week after noticing blood clots from dialysis.    Wife provided a picture of pt's bowel movement at home showing gelateneous appearing black/maroon stool.      History provided by:  Patient and spouse   used: No        Patient Care Team  Primary Care Provider: Claude Hair MD    Past Medical History:     Allergies    Allergen Reactions   • Cephalexin Nausea And Vomiting   • Morphine Other (See Comments)     Pt had long recovery involving morphine administration post surgery in December 2021.       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 HOSPITALZED 21 DAYS. AT [facility]   • Anxiety    • Arthritis     OA OF LEFT HIP   • Colitis     REPORTS CURRENTLY INCONTINENT OF BOWEL AND HAVING DIARRHEA   • COPD (chronic obstructive pulmonary disease) ([facility])     inhalers, neb tx   • Coronary artery disease    • Depression    • Diverticulitis    • ED (erectile dysfunction)    • ESRD (end stage renal disease) on dialysis ([facility])     PORT RIGHT CHEST FOR DIALYSIS. ELVIRA KELLY. JAVAD FLORES   • GERD (gastroesophageal reflux disease)    • Gout    • Hemorrhoids    • History of diverticulosis    • History of PAT (paroxysmal atrial tachycardia)    • History of transfusion    • [place] (hard of hearing)     DOES HAVING HEARING AID HE CAN USE   • Hyperlipidemia    • Hypertension    • Leukopenia     no current  issues   • Macrocytic anemia    • Multiple renal cysts     no current issues   • Myocardial infarction ([facility])    • Neuropathy     HANDS AND FEET   • PTSD (post-traumatic stress disorder)    • PVD (peripheral vascular disease) ([facility])    • Requires supplemental oxygen     2L of O2 THROUGH CPAP MACHINE AT NIGHT   • RLS (restless legs syndrome)    • Seasonal allergies    • Short-term memory loss    • Sleep apnea     uses CPAP   • Stroke ([facility])     prior to diallo carotid endarterectomy, no residual issues AROUND EARLY 2000     Past Surgical History:   Procedure Laterality Date   • APPENDECTOMY N/A 2005   • ARTERIOVENOUS FISTULA/SHUNT SURGERY Left 8/30/2022    Procedure: LEFT  BRACHIAL CEPALIC ARTERIOVENOUS FISTULA PLACEMENT;  Surgeon: Aba Freeman MD;  Location: [facility] OR;  Service: Vascular;  Laterality: Left;   • CAROTID ENDARTERECTOMY Bilateral 2007   • COLONOSCOPY N/A 1995    done in [facility]   • COLONOSCOPY N/A 5/2/2022    Procedure: COLONOSCOPY WITH APPLICATION OF APC;  Surgeon: Lew Mg MD;  Location: [facility] ENDOSCOPY;  Service: Gastroenterology;  Laterality: N/A;  AVMs IN COLON, DIVERTICULOSIS, HEMORRHOIDS   • CORONARY ARTERY BYPASS GRAFT  1997    4 vessel, no  c/o cp or soa, follows w/ [facility]   • HIATAL HERNIA REPAIR N/A    • INSERTION HEMODIALYSIS CATHETER N/A 6/23/2022    Procedure: Insertion of tunneled catheter for hemodialysis;  Surgeon: Warren Rodríguez MD;  Location: [facility] MAIN OR;  Service: Vascular;  Laterality: N/A;   • REPLACEMENT TOTAL KNEE Left 09/08/2015   • ROTATOR CUFF REPAIR Right    • SKIN CANCER EXCISION      back   • THORACOSCOPY Left 6/2/2016    Procedure: LEFT VIDEO ASSISTED THORACOTOMY W/ LEFT UPPER LOBE WEDGE RESECTION X 2; EXPLORATORY BRONCHOSCOPY; PAIN PUMP PLACEMENT X 2;  Surgeon: Larry Hernandez MD;  Location: [facility] OR;  Service:    • TONSILLECTOMY Bilateral    • TOTAL HIP ARTHROPLASTY Right 1997   • TOTAL HIP ARTHROPLASTY Left 11/16/2021    Procedure: LEFT TOTAL HIP ARTHROPLASTY;  Surgeon: Man Francois MD;  Location: [facility] MAIN OR;  Service: Orthopedics;  Laterality: Left;     Family History   Problem Relation Age of Onset   • Heart disease Mother    • Multiple myeloma Mother    • Heart disease Father    • Cancer Father         Gallbladder Cancer   • Leukemia Brother         CLL   • Other Brother         Black Lung    • Colon cancer Neg Hx    • [place] Hyperthermia Neg Hx        Home Medications:  Prior to Admission medications    Medication Sig Start Date End Date Taking? Authorizing Provider   acetaminophen (TYLENOL) 325 MG tablet Take 650 mg by mouth Every 6 (Six) Hours As Needed for Mild Pain .    Provider,  MD Krissy   albuterol (PROVENTIL HFA;VENTOLIN HFA) 108 (90 Base) MCG/ACT inhaler Inhale 2 puffs Every 4 (Four) Hours As Needed for Wheezing.    Krissy Dubon MD   albuterol (PROVENTIL) (2.5 MG/3ML) 0.083% nebulizer solution Take 2.5 mg by nebulization Every 4 (Four) Hours As Needed for Wheezing or Shortness of Air.    Krissy Dubon MD   allopurinol (ZYLOPRIM) 300 MG tablet Take 300 mg by mouth daily.    Krissy Dubon MD   atorvastatin (LIPITOR) 40 MG tablet Take 40 mg by mouth Every Night.    Krissy Dubon MD   cetirizine (zyrTEC) 10 MG tablet Take 10 mg by mouth Daily.    Krissy Dubon MD   citalopram (CeleXA) 40 MG tablet Take 40 mg by mouth Every Morning. 7/9/19   Krissy Dubon MD   donepezil (ARICEPT) 5 MG tablet Take 1 tablet by mouth Daily for 30 days. 6/26/22 8/30/22  Lew Ladd MD   finasteride (PROSCAR) 5 MG tablet Take 5 mg by mouth Daily. 9/19/21   Krissy Dubon MD   gabapentin (NEURONTIN) 100 MG capsule Take 1 capsule by mouth 2 (Two) Times a Day for 30 days. 6/25/22 8/30/22  Lew Ladd MD   HYDROcodone-acetaminophen (NORCO) 5-325 MG per tablet Take 1-2 tablets by mouth Every 4 (Four) Hours As Needed (Pain). 8/30/22   Aba Freeman MD   ketoconazole (NIZORAL) 2 % shampoo Apply 1 application topically to the appropriate area as directed 2 (Two) Times a Week.    Krissy Dubon MD   metoprolol succinate XL (TOPROL-XL) 50 MG 24 hr tablet 1 tablet DAILY (route: oral) 7/1/22   Krissy Dubon MD   Omega-3 Fatty Acids (fish oil) 1000 MG capsule capsule Take 1,000 mg by mouth Daily. HOLD FOR SURGERY    Krissy uDbon MD   pantoprazole (PROTONIX) 20 MG EC tablet Take 20 mg by mouth Daily. 9/19/21   Krissy Dubon MD   pramipexole (MIRAPEX) 1 MG tablet Take 1 mg by mouth Every Night.    Krissy Dubon MD   rivaroxaban (XARELTO) 20 MG tablet 1 tablet DAILY (route: oral) 7/1/22   ProviderKrissy MD  "  Testosterone Cypionate 200 MG/ML solution Inject 200 mg as directed Every 14 (Fourteen) Days.    Provider, MD Krysta Rey Ellipta 200-62.5-25 MCG/INH inhaler Inhale 1 puff Daily. 21   Provider, MD Krissy        Social History:   Social History     Tobacco Use   • Smoking status: Former Smoker     Packs/day: 1.00     Years: 30.00     Pack years: 30.00     Types: Cigarettes     Quit date:      Years since quittin.7   • Smokeless tobacco: Current User     Types: Chew   • Tobacco comment: ONE HALF CAN DAILY    Vaping Use   • Vaping Use: Never used   Substance Use Topics   • Alcohol use: Not Currently   • Drug use: No       Review of Systems:  Review of Systems   Constitutional: Negative for chills and fever.   HENT: Negative for congestion, ear pain and sore throat.    Eyes: Negative for pain.   Respiratory: Negative for cough, chest tightness and shortness of breath.    Cardiovascular: Negative for chest pain.   Gastrointestinal: Positive for anal bleeding and diarrhea. Negative for abdominal pain, nausea and vomiting.   Genitourinary: Negative for flank pain and hematuria.   Musculoskeletal: Negative for joint swelling.   Skin: Negative for pallor.   Neurological: Positive for weakness and headaches. Negative for seizures.   All other systems reviewed and are negative.         Physical Exam:  /52   Pulse 53   Temp 97.6 °F (36.4 °C) (Oral)   Resp 18   Ht 177.8 cm (70\")   Wt 106 kg (234 lb 3.2 oz)   SpO2 94%   BMI 33.60 kg/m²     Physical Exam  Vitals and nursing note reviewed.   Constitutional:       General: He is not in acute distress.     Appearance: Normal appearance. He is not toxic-appearing.   HENT:      Head: Normocephalic and atraumatic.      Mouth/Throat:      Mouth: Mucous membranes are moist.   Eyes:      General: No scleral icterus.  Cardiovascular:      Rate and Rhythm: Bradycardia present. Rhythm irregular.      Pulses: Normal pulses.      Heart sounds: Normal " heart sounds.   Pulmonary:      Effort: Pulmonary effort is normal. No respiratory distress.      Breath sounds: Normal breath sounds.   Abdominal:      General: Abdomen is flat.      Palpations: Abdomen is soft. There is no mass or pulsatile mass.      Tenderness: There is no abdominal tenderness.   Musculoskeletal:         General: Normal range of motion.      Cervical back: Normal range of motion and neck supple.   Skin:     General: Skin is warm and dry.   Neurological:      Mental Status: He is alert and oriented to person, place, and time. Mental status is at baseline.     Rectal exam shows melanotic stools.          Medications in the Emergency Department:  Medications   sodium chloride 0.9 % flush 10 mL (has no administration in time range)   pantoprazole (PROTONIX) injection 80 mg (80 mg Intravenous Given 9/12/22 1454)     And   pantoprazole (PROTONIX) 40 mg in 100mL NS IVPB (8 mg/hr Intravenous New Bag 9/12/22 1550)   sodium chloride 0.9 % bolus 100 mL (0 mL Intravenous Stopped 9/12/22 1500)   prothrombin complex conc human (KCentra) IV solution 5,031 Units (5,031 Units Intravenous Given 9/12/22 1515)   iopamidol (ISOVUE-370) 76 % injection 100 mL (100 mL Intravenous Given 9/12/22 1542)        Labs  Lab Results (last 24 hours)     Procedure Component Value Units Date/Time    CBC & Differential [[id number]]  (Abnormal) Collected: 09/12/22 1247    Specimen: Blood Updated: 09/12/22 1409    Narrative:      The following orders were created for panel order CBC & Differential.  Procedure                               Abnormality         Status                     ---------                               -----------         ------                     CBC Auto Differential[[id number]]        Abnormal            Final result               Scan Slide[[id number]]                                       Final result                 Please view results for these tests on the individual orders.    Comprehensive Metabolic  Panel [[id number]]  (Abnormal) Collected: 09/12/22 1247    Specimen: Blood Updated: 09/12/22 1359     Glucose 174 mg/dL      BUN 29 mg/dL      Creatinine 1.86 mg/dL      Sodium 138 mmol/L      Potassium 4.1 mmol/L      Chloride 105 mmol/L      CO2 24.0 mmol/L      Calcium 8.5 mg/dL      Total Protein 5.5 g/dL      Albumin 3.50 g/dL      ALT (SGPT) 10 U/L      AST (SGOT) 13 U/L      Alkaline Phosphatase 90 U/L      Total Bilirubin 0.5 mg/dL      Globulin 2.0 gm/dL      A/G Ratio 1.8 g/dL      BUN/Creatinine Ratio 15.6     Anion Gap 9.0 mmol/L      eGFR 37.0 mL/min/1.73      Comment: National Kidney Foundation and American Society of Nephrology (ASN) Task Force recommended calculation based on the Chronic Kidney Disease Epidemiology Collaboration (CKD-EPI) equation refit without adjustment for race.       Narrative:      GFR Normal >60  Chronic Kidney Disease <60  Kidney Failure <15      Troponin [[id number]]  (Normal) Collected: 09/12/22 1247    Specimen: Blood Updated: 09/12/22 1359     Troponin T 0.024 ng/mL     Narrative:      Troponin T Reference Range:  <= 0.03 ng/mL-   Negative for AMI  >0.03 ng/mL-     Abnormal for myocardial necrosis.  Clinicians would have to utilize clinical acumen, EKG, Troponin and serial changes to determine if it is an Acute Myocardial Infarction or myocardial injury due to an underlying chronic condition.       Results may be falsely decreased if patient taking Biotin.      Magnesium [[id number]]  (Normal) Collected: 09/12/22 1247    Specimen: Blood Updated: 09/12/22 1359     Magnesium 1.7 mg/dL     CBC Auto Differential [[id number]]  (Abnormal) Collected: 09/12/22 1247    Specimen: Blood Updated: 09/12/22 1409     WBC 3.85 10*3/mm3      RBC 2.18 10*6/mm3      Hemoglobin 6.9 g/dL      Hematocrit 20.9 %      MCV 95.9 fL      MCH 31.7 pg      MCHC 33.0 g/dL      RDW 18.6 %      RDW-SD 63.5 fl      MPV 12.4 fL      Platelets 109 10*3/mm3      Neutrophil % 68.8 %      Lymphocyte % 19.0 %       Monocyte % 5.7 %      Eosinophil % 5.2 %      Basophil % 0.8 %      Immature Grans % 0.5 %      Neutrophils, Absolute 2.65 10*3/mm3      Lymphocytes, Absolute 0.73 10*3/mm3      Monocytes, Absolute 0.22 10*3/mm3      Eosinophils, Absolute 0.20 10*3/mm3      Basophils, Absolute 0.03 10*3/mm3      Immature Grans, Absolute 0.02 10*3/mm3      nRBC 0.0 /100 WBC     Narrative:      Appended report. These results have been appended to a previously verified report.    Scan Slide [[id number]] Collected: 09/12/22 1247    Specimen: Blood Updated: 09/12/22 1409     Acanthocytes Slight/1+     Anisocytosis Slight/1+     Elliptocytes Slight/1+     Hypochromia Slight/1+     Macrocytes Slight/1+     Poikilocytes Slight/1+     WBC Morphology Normal     Platelet Estimate Decreased    Protime-INR [[id number]]  (Abnormal) Collected: 09/12/22 1247    Specimen: Blood Updated: 09/12/22 1448     Protime 26.0 Seconds      INR 2.33    Narrative:      Suggested Therapeutic Ranges For Oral Anticoagulant Therapy:  Level of Therapy                      INR Target Range  Standard Dose                            2.0-3.0  High Dose                                2.5-3.5  Patients not receiving anticoagulant  Therapy Normal Range                     0.86-1.15    aPTT [[id number]]  (Abnormal) Collected: 09/12/22 1247    Specimen: Blood Updated: 09/12/22 1448     PTT 51.1 seconds     Occult Blood, Fecal By Immunoassay - Stool, Per Rectum [[id number]]  (Abnormal) Collected: 09/12/22 1413    Specimen: Stool from Per Rectum Updated: 09/12/22 1438     Occult Blood, Fecal by Immunoassay Positive           Imaging:  XR Chest 1 View    Result Date: 9/12/2022  PROCEDURE: XR CHEST 1 VW  COMPARISON: [facility], CT, CT CHEST WO CONTRAST DIAGNOSTIC, 6/15/2022, 16:01.  [facility], CR, XR CHEST 1 VW, 6/27/2022, 7:21.  INDICATIONS: Weak/Dizzy/AMS triage protocol/blood in stool  FINDINGS:   The lungs are well-expanded. The pulmonary vasculature  is within normal limits.  Stable mild cardiomegaly No pleural effusions are identified.  The previously noted nodular densities in the lung fields are better visualized on the recent CT scan of the chest.  Right IJ dialysis catheter tips in the right atrium.  No evidence of pneumothorax.  Prior median sternotomy and CABG procedure.  IMPRESSION:  Stable mild cardiomegaly.  Previously noted nodular densities in the lung fields are better visualized on the recent CT scan of the chest.  KIMBER GARDNER MD       Electronically Signed and Approved By: KIMBER GARDNER MD on 9/12/2022 at 14:13                EKG:      Procedures:  Procedures    Progress                      The patient was seen and evaluated the ED by me.  The above history and physical examination was performed as document.  Diagnostic data was obtained.  Results reviewed.  Patient was found to be hypotensive and profoundly anemic.  On reviewing the patient's records 2 weeks ago his hemoglobin was 10.5.  Patient was typed and crossed for 2 units of type-specific blood.  However his blood pressure was slowly dropping subsequently ordered 1 unit of O- trauma blood.  Patient is also on Xarelto and due to his vital signs were not felt this warranted emergency reversal.  I did discuss the risk of reversal with the patient and the wife.  They were verbally agreed to this.  It is felt the risk benefits were in favor of reversal.  Subsite have consult with Dr. Reddy who also agreed for IV contrast to be used for CT of the abdomen.  Dr. Nichole was consult for primary admission.  On-call gastroenterology was consulted as well.    70 minutes of critical care provided. This time excludes other billable procedures. Time does include preparation of documents, medical consultations, review of old records, and direct bedside care. Patient is at high risk for life-threatening deterioration due to acute GI bleed with hemorrhagic shock.         Medical Decision  Making:  MDM  Number of Diagnoses or Management Options     Amount and/or Complexity of Data Reviewed  Decide to obtain previous medical records or to obtain history from someone other than the patient: yes  Discuss the patient with other providers: yes (14:40 EDT - Consult with Dr. Reddy - Discussed patient's case, history, and current condition. Dr. Reddy unsure why paient is on why Xarelto. States he cannot comment on need to reverse, gave consent on obtaining CT scan of abdomen with contrast)    Patient Progress  Patient progress: (14:39 EDT nurse obtained a stool sample she described as being dark black in color)       Final diagnoses:   Acute GI bleeding   Hemorrhagic shock (HCC)   End-stage renal disease on hemodialysis (HCC)        Disposition:  ED Disposition     ED Disposition   Decision to Admit    Condition   --    Comment   --           I, Anna Cantu, am scribing for Dr. Patrick Carrasco. Information recorded by the scribe has been verified and validated by the provider.       Cantu, Anna C  09/12/22 1442       Patrick Carrasco DO  09/12/22 1607      Electronically signed by Patrick Carrasco DO at 09/12/22 1607     Nahomi Watson, RN at 09/12/22 1438        Patient received 1 unit of emergency blood per DR. Carrasco verbal order, the blood is O positive given in the right AC starting AT 75ML/HR, Geovani GUTIERREZ RN witnessed, BP at start of transfusion is 90/47, HR 53, o2 98% on room air, temp 97.2 orally    Electronically signed by Nahomi Watson RN at 09/12/22 1447     Nahomi Watson RN at 09/12/22 1501        Patient received emergency blood at 1438 at 75ml/hr, no suspected reaction at this time, patients emergency blood increased to 500mL/hr per GIOVANNI Mariscal, patient current VS at this time   bp 121/62, 02 99%, t.97.9, rr 16, pulse 53bpm    Electronically signed by Nahomi Watson, RN at 09/12/22 1503     Nahomi Watson, RN at 09/12/22 1554        The patients emergency blood was complete at approximately 1540   bp 113/79,  hr 54, o2 100% on room air, rr 18, temp 98.0    Electronically signed by Nahomi Watson, RN at 22 1555     Nahomi Watson RN at 22 1710        Report called to ICU 7    Electronically signed by Nahomi Watson RN at 22 1710       Physician Progress Notes (last 24 hours)  Notes from 22 0807 through 22 0807   No notes of this type exist for this encounter.            Consult Notes (last 24 hours)      Chivo Pickard MD at 22 1825      Consult Orders    1. Inpatient Pulmonology Consult [[id number]] ordered by Chivo Pickard MD at 22               Pulmonary / Critical Care Consult Note      Patient Name: Rafael Gomez  : 1945  MRN: [id number]  Primary Care Physician:  Claude Hair MD  Referring Physician: No ref. provider found  Date of admission: 2022    Subjective   Subjective     Reason for Consult/ Chief Complaint: Rectal bleeding    HPI:  Rafael Gomez is a 77 y.o.  male presented to the emergency room with rectal bleeding x4 days.  Patient was with additional symptoms of weakness, diarrhea and headache.  Initially patient felt like it was a hemorrhoid, he was with at least 2 bowel movements per day with noticeable blood.  Today he stated the blood in his stool was more jellylike secondary to having clots.  Patient is on Xarelto due to clots with his dialysis catheter.  Today patient was with weakness and worsening with blood in bowel movements and decided to come to the emergency room for further evaluation.  Work-up in the ER revealed hemoglobin of 6.9.  Patient was started on Kcentra and 3 units of blood were ordered.  Patient is admitted to ICU for monitoring overnight.  Pulmonary critical care has been consulted for further evaluation given symptomatic anemia requiring blood transfusions.       Review of Systems  Constitutional symptoms:   Weakness, fatigue; otherwise denied complaints   Ear, nose, throat: Denied  complaints  Cardiovascular:  Denied complaints  Respiratory: Denied complaints  Gastrointestinal: Bloody stools, diarrhea; otherwise denied complaints  Musculoskeletal: Denied complaints  Genitourinary: Denied complaints  Allergy / Immunology: Denied complaints  Hematologic: Denied complaints  Neurologic: Headache; otherwise denied complaints  Skin: Denied complaints  Endocrine: ESRD on HD; otherwise denied complaints  Psychiatric: Denied complaints      Personal History     Past Medical History:   Diagnosis Date   • AA (aortic aneurysm) (formerly [facility])     normal diameter noted on scan 03/18/21   • 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 HOSPITALZED 21 DAYS. AT [facility]   • Anxiety    • Arthritis     OA OF LEFT HIP   • Colitis     REPORTS CURRENTLY INCONTINENT OF BOWEL AND HAVING DIARRHEA   • COPD (chronic obstructive pulmonary disease) (formerly [facility])     inhalers, neb tx   • Coronary artery disease    • Depression    • Diverticulitis    • ED (erectile dysfunction)    • ESRD (end stage renal disease) on dialysis (formerly [facility])     PORT RIGHT CHEST FOR DIALYSIS. ELVIRA KELLY. JAVAD FLORES   • GERD (gastroesophageal reflux disease)    • Gout    • Hemorrhoids    • History of diverticulosis    • History of PAT (paroxysmal atrial tachycardia)    • History of transfusion    • [place] (hard of hearing)     DOES HAVING HEARING AID HE CAN USE   • Hyperlipidemia    • Hypertension    • Leukopenia     no current  issues   • Macrocytic anemia    • Multiple renal cysts     no current issues   • Myocardial infarction (formerly [facility])    • Neuropathy     HANDS AND FEET   • PTSD (post-traumatic stress disorder)    • PVD (peripheral vascular disease) (formerly [facility])    • Requires supplemental oxygen     2L of O2 THROUGH CPAP MACHINE AT NIGHT   • RLS (restless legs syndrome)    • Seasonal allergies    • Short-term  memory loss    • Sleep apnea     uses CPAP   • Stroke (HCC)     prior to diallo carotid endarterectomy, no residual issues AROUND EARLY 2000       Past Surgical History:   Procedure Laterality Date   • APPENDECTOMY N/A 2005   • ARTERIOVENOUS FISTULA/SHUNT SURGERY Left 8/30/2022    Procedure: LEFT BRACHIAL CEPALIC ARTERIOVENOUS FISTULA PLACEMENT;  Surgeon: Aba Freeman MD;  Location: [facility];  Service: Vascular;  Laterality: Left;   • CAROTID ENDARTERECTOMY Bilateral 2007   • COLONOSCOPY N/A 1995    done in [facility]   • COLONOSCOPY N/A 5/2/2022    Procedure: COLONOSCOPY WITH APPLICATION OF APC;  Surgeon: Lew Mg MD;  Location: [facility] ENDOSCOPY;  Service: Gastroenterology;  Laterality: N/A;  AVMs IN COLON, DIVERTICULOSIS, HEMORRHOIDS   • CORONARY ARTERY BYPASS GRAFT  1997    4 vessel, no  c/o cp or soa, follows w/ D. Sloane JHAVERI   • HIATAL HERNIA REPAIR N/A    • INSERTION HEMODIALYSIS CATHETER N/A 6/23/2022    Procedure: Insertion of tunneled catheter for hemodialysis;  Surgeon: Warren Rodríguez MD;  Location: [facility] OR;  Service: Vascular;  Laterality: N/A;   • REPLACEMENT TOTAL KNEE Left 09/08/2015   • ROTATOR CUFF REPAIR Right    • SKIN CANCER EXCISION      back   • THORACOSCOPY Left 6/2/2016    Procedure: LEFT VIDEO ASSISTED THORACOTOMY W/ LEFT UPPER LOBE WEDGE RESECTION X 2; EXPLORATORY BRONCHOSCOPY; PAIN PUMP PLACEMENT X 2;  Surgeon: Larry Hernandez MD;  Location: [facility] OR;  Service:    • TONSILLECTOMY Bilateral    • TOTAL HIP ARTHROPLASTY Right 1997   • TOTAL HIP ARTHROPLASTY Left 11/16/2021    Procedure: LEFT TOTAL HIP ARTHROPLASTY;  Surgeon: Man Francois MD;  Location: [facility] OR;  Service: Orthopedics;  Laterality: Left;       Family History: family history includes Cancer in his father; Heart disease in his father and mother; Leukemia in his brother; Multiple myeloma in his mother; Other in his brother. Otherwise pertinent FHx was reviewed and not pertinent to current  issue.    Social History:  reports that he quit smoking about 25 years ago. His smoking use included cigarettes. He has a 30.00 pack-year smoking history. His smokeless tobacco use includes chew. He reports previous alcohol use. He reports that he does not use drugs.    Home Medications:  Fluticasone-Umeclidin-Vilant, Testosterone Cypionate, acetaminophen, albuterol, albuterol sulfate HFA, allopurinol, atorvastatin, cetirizine, citalopram, donepezil, finasteride, gabapentin, ketoconazole, metoprolol succinate XL, pantoprazole, pramipexole, and rivaroxaban    Allergies:  Allergies   Allergen Reactions   • Cephalexin Nausea And Vomiting   • Morphine Other (See Comments)     Pt had long recovery involving morphine administration post surgery in December 2021.         Objective    Objective     Vitals:   Temp:  [97.4 °F (36.3 °C)-97.7 °F (36.5 °C)] 97.7 °F (36.5 °C)  Heart Rate:  [51-60] 56  Resp:  [18-20] 18  BP: ()/(32-63) 114/54    Physical Exam:  Vital Signs Reviewed   Pale appearing, WDWN, Alert, NAD.    HEENT:  PERRL, EOMI.  OP, nares clear, MMM  Neck:  Supple, no JVD, no thyromegaly  Chest:  good aeration, rhonchi bilaterally, tympanic to percussion bilaterally, no work of breathing noted  CV: RRR, no MGR, pulses 2+, equal.  Abd:  Soft, NT, ND, + BS, no HSM  EXT:  no clubbing, no cyanosis, trace BLE edema, no joint tenderness  Neuro:  A&Ox3, CN grossly intact, no focal deficits.  Skin: No rashes or lesions noted      Result Review    Result Review:  I have personally reviewed the results from the time of this admission to 9/12/2022 20:05 EDT and agree with these findings:  [x]  Laboratory  [x]  Microbiology  [x]  Radiology  [x]  EKG/Telemetry   [x]  Cardiology/Vascular   []  Pathology  [x]  Old records   []  Other:  Most notable findings include: CT of abdomen pelvis reviewed INR 2.33  PTT 51.1  Hgb 6.9  HCT 20.9      Sodium 138  K4.1  CR 1.86  CA 8.5      Assessment & Plan   Assessment / Plan      Active Hospital Problems:  Active Hospital Problems    Diagnosis    • **Acute blood loss anemia    • Acute GI bleeding    • ESRD (end stage renal disease) (HCC)    • Hypotension          Impression:   Acute GI bleed with bloody stools  Acute blood loss anemia secondary to above  Weakness secondary to above  Hypotension secondary to above  ESRD  Hypomagnesemia  COPD without exacerbation     Plan:    Patient is with 3 units PBRC's ordered  Agree with kcentra. Hold xarelto for now  Trend H&H.  Recommend transfusion for Hgb 8 or less  Home antihypertensive regimen placed on hold given hypotension  Levophed ordered if needed to maintain MAP of 65 or greater and SBP 90 or greater, currently not requiring  Current order for NPO status  GI consult in place  On Protonix drip  Add Brovana and Pulmicort, as needed DuoNeb  Bronchopulmonary hygiene protocol and bronchodilator protocol in place  Nephrology consulted secondary to ESRD on HD. Timing if dialysis per them  Bowel regimen in place  DVT prophylaxis: Xarelto on hold.  PCD's ordered      Labs, radiology, microbiology and provider notes were personally reviewed  Patient's case was discussed with the primary service as well as the bedside RN  Thank you for allowing us to participate in the care of your patient, we will follow him closely with you  Electronically signed by WATSON Villa, 09/12/22, 6:41 PM EDT.        Pt is critically ill in ICU with acute GI bleed with bloody stools, acute blood loss anemia, weakness and hypotension.  I have spent 31 minutes of critical care time reviewing previous documentation, reviewing all pertinent telemetry, labs, and imaging studies, examining the patient, modifying the care plan and discussing the patient´s condition and care plan with any available family, the primary service and during multidisciplinary rounds this morning at bedside. This does not include any procedures performed.    I, Dr. Chivo Pickard, have spent more  than 50% of the total time managing the patient in this encounter today.  This included personally reviewing all pertinent labs, imaging, microbiology and documentation. Also discussing the case with the patient and any available family, the admitting physician and any available ancillary staff.    Electronically signed by Chivo Pickard MD, 09/12/22, 8:07 PM EDT.        Electronically signed by Chivo Pickard MD at 09/12/22 2008

## 2022-09-13 NOTE — PROGRESS NOTES
Pulmonary / Critical Care Progress Note      Patient Name: Rafael Gomez  : 1945  MRN: [id number]  Attending:  Melchor Nichole MD   Date of admission: 2022    Subjective   Subjective   Follow-up for GI bleed    Over past 24 hours:  Doing well this morning  Hemoglobin stable after blood transfusion  No hypotension noted  No bleeding noted  Going down for EGD today  Weak and fatigued  No chest pain  No nausea, fevers or chills fatigue, otherwise    Review of Systems  Constitutional symptoms:  Denied complaints   Ear, nose, throat: Denied complaints  Cardiovascular:  Denied complaints  Respiratory: Denied complaints  Gastrointestinal: Denied complaints  Musculoskeletal: Weakness, otherwise denied complaints  Neurologic: Denied complaints  Skin: Denied complaints        Objective   Objective     Vitals:   Vital signs for last 24 hours:  Temp:  [97.4 °F (36.3 °C)-98.9 °F (37.2 °C)] 97.5 °F (36.4 °C)  Heart Rate:  [49-73] 55  Resp:  [12-24] 18  BP: ()/(32-63) 101/52    Intake/Output last 3 shifts:  I/O last 3 completed shifts:  In: 307.5 [Blood:307.5]  Out: -   Intake/Output this shift:  I/O this shift:  In: 75 [I.V.:75]  Out: -     Vent settings for last 24 hours:       Hemodynamic parameters for last 24 hours:       Physical Exam   Vital Signs Reviewed   General:  WDWN, Alert, NAD.    HEENT:  PERRL, EOMI.  OP, nares clear  Chest:  good aeration, clear to auscultation bilaterally, tympanic to percussion bilaterally, no work of breathing noted  CV: RRR, no MGR, pulses 2+, equal.  Abd:  Soft, NT, ND, + BS, no HSM  EXT:  no clubbing, no cyanosis, no edema  Neuro:  A&Ox3, CN grossly intact, no focal deficits.  Skin: No rashes or lesions noted        Result Review    Result Review:  I have personally reviewed the results from the time of this admission to 2022 10:58 EDT and agree with these findings:  [x]  Laboratory  [x]  Microbiology  [x]  Radiology  []  EKG/Telemetry   []  Cardiology/Vascular   []   Pathology  [x]  Old records  []  Other:  Most notable findings include:       Lab 09/13/22  0303 09/13/22  0302 09/12/22  1940 09/12/22  1247   WBC 4.34  --   --  3.85   HEMOGLOBIN 8.2*  --  8.3* 6.9*   HEMATOCRIT 24.7*  --  24.9* 20.9*   PLATELETS 95*  --   --  109*   SODIUM  --  137  --  138   POTASSIUM  --  5.1  --  4.1   CHLORIDE  --  106  --  105   CO2  --  21.0*  --  24.0   BUN  --  36*  --  29*   CREATININE  --  1.98*  --  1.86*   GLUCOSE  --  92  --  174*   CALCIUM  --  8.2*  --  8.5*   TOTAL PROTEIN  --  5.1*  --  5.5*   ALBUMIN  --  3.30*  --  3.50   GLOBULIN  --  1.8  --  2.0         Assessment & Plan   Assessment / Plan     Active Hospital Problems:  Active Hospital Problems    Diagnosis    • **Acute blood loss anemia    • Acute GI bleeding    • ESRD (end stage renal disease) (HCC)      Added automatically from request for surgery [id number]     • Hypotension        Impression:  Acute GI bleed  Acute blood loss anemia secondary to above  Weakness secondary to above  Hypotension secondary to above  ESRD  Hypomagnesemia  COPD without exacerbation  Thrombocytopenia     Plan:    Giving Kcentra yesterday.  Continue to hold Xarelto for now  Trend H&H.  Recommend transfusion for Hgb 8 or less  Hold antihypertensives for now  Continue PPI drip  EGD today per GI.  Appreciate assistance  Continue nebulizers and bronchopulmonary hygiene  Timing of dialysis per nephrology.  Appreciate assistance  Trend renal function and electrolytes    DVT prophylaxis:  Medical and mechanical DVT prophylaxis orders are present.    CODE STATUS:   Code Status (Patient has no pulse and is not breathing): CPR (Attempt to Resuscitate)  Medical Interventions (Patient has pulse or is breathing): Full Support      Labs, imaging, microbiology, notes and medications personally reviewed  Discussed with primary.  Discussed on multidisciplinary critical care rounds.     Electronically signed by Chivo Pickard MD, 09/13/22, 10:59 AM EDT.

## 2022-09-13 NOTE — ANESTHESIA PREPROCEDURE EVALUATION
Anesthesia Evaluation     Patient summary reviewed and Nursing notes reviewed   no history of anesthetic complications:  NPO Solid Status: > 8 hours  NPO Liquid Status: > 2 hours           Airway   Mallampati: II  TM distance: >3 FB  Neck ROM: full  No difficulty expected  Dental    (+) poor dentition    Pulmonary - normal exam    breath sounds clear to auscultation  (+) a smoker Former, COPD, home oxygen, sleep apnea,   Cardiovascular - normal exam  Exercise tolerance: poor (<4 METS)    Beta blocker given within 24 hours of surgery  Rhythm: regular  Rate: normal    (+) hypertension, past MI , CAD, CABG, CHF , PVD,     ROS comment:  HX of CABG 97', mild aortic stenosis, CAD, Chronic RBBB, Aortic Aneurym . Echo EF50%,   Previous Cardiac Clearance (HIGH RISK)    Neuro/Psych  (+) CVA, psychiatric history Anxiety and PTSD,    GI/Hepatic/Renal/Endo    (+) obesity,  GERD,  renal disease (last HD 9/12) dialysis and ESRD,     Musculoskeletal     Abdominal    Substance History      OB/GYN          Other        ROS/Med Hx Other: Transfused yesterday, Kcentra given                  Anesthesia Plan    ASA 4     general   total IV anesthesia    Anesthetic plan, risks, benefits, and alternatives have been provided, discussed and informed consent has been obtained with: patient.        CODE STATUS:    Code Status (Patient has no pulse and is not breathing): CPR (Attempt to Resuscitate)  Medical Interventions (Patient has pulse or is breathing): Full Support

## 2022-09-13 NOTE — ANESTHESIA POSTPROCEDURE EVALUATION
Patient: Rafael Gomez    Procedure Summary     Date: 09/13/22 Room / Location: [facility] ENDOSCOPY 2 / [facility] ENDOSCOPY    Anesthesia Start: 0850 Anesthesia Stop: 0903    Procedure: ESOPHAGOGASTRODUODENOSCOPY (N/A ) Diagnosis:       Acute GI bleeding      (Acute GI bleeding [K92.2])    Surgeons: Murray Torres MD Provider: Surendra Delcid MD    Anesthesia Type: general ASA Status: 4          Anesthesia Type: general    Vitals  Vitals Value Taken Time   /49 09/13/22 0915   Temp 36.6 °C (97.8 °F) 09/13/22 0905   Pulse 60 09/13/22 0915   Resp 13 09/13/22 0915   SpO2 98 % 09/13/22 0915           Post Anesthesia Care and Evaluation    Patient location during evaluation: bedside  Patient participation: complete - patient participated  Level of consciousness: awake  Pain management: adequate    Airway patency: patent  Anesthetic complications: No anesthetic complications  PONV Status: none  Cardiovascular status: acceptable and stable  Respiratory status: acceptable  Hydration status: acceptable    Comments: An Anesthesiologist personally participated in the most demanding procedures (including induction and emergence if applicable) in the anesthesia plan, monitored the course of anesthesia administration at frequent intervals and remained physically present and available for immediate diagnosis and treatment of emergencies.

## 2022-09-14 LAB
ALBUMIN SERPL-MCNC: 3.8 G/DL (ref 3.5–5.2)
ALBUMIN/GLOB SERPL: 2.1 G/DL
ALP SERPL-CCNC: 89 U/L (ref 39–117)
ALT SERPL W P-5'-P-CCNC: 10 U/L (ref 1–41)
ANION GAP SERPL CALCULATED.3IONS-SCNC: 12.1 MMOL/L (ref 5–15)
AST SERPL-CCNC: 14 U/L (ref 1–40)
BASOPHILS # BLD AUTO: 0.04 10*3/MM3 (ref 0–0.2)
BASOPHILS NFR BLD AUTO: 0.9 % (ref 0–1.5)
BH BB BLOOD EXPIRATION DATE: NORMAL
BH BB BLOOD EXPIRATION DATE: NORMAL
BH BB BLOOD TYPE BARCODE: 5100
BH BB BLOOD TYPE BARCODE: 5100
BH BB DISPENSE STATUS: NORMAL
BH BB DISPENSE STATUS: NORMAL
BH BB PRODUCT CODE: NORMAL
BH BB PRODUCT CODE: NORMAL
BH BB UNIT NUMBER: NORMAL
BH BB UNIT NUMBER: NORMAL
BILIRUB SERPL-MCNC: 0.4 MG/DL (ref 0–1.2)
BUN SERPL-MCNC: 40 MG/DL (ref 8–23)
BUN/CREAT SERPL: 17.1 (ref 7–25)
CALCIUM SPEC-SCNC: 8.4 MG/DL (ref 8.6–10.5)
CHLORIDE SERPL-SCNC: 103 MMOL/L (ref 98–107)
CO2 SERPL-SCNC: 20.9 MMOL/L (ref 22–29)
CREAT SERPL-MCNC: 2.34 MG/DL (ref 0.76–1.27)
CROSSMATCH INTERPRETATION: NORMAL
CROSSMATCH INTERPRETATION: NORMAL
D-LACTATE SERPL-SCNC: 1.2 MMOL/L (ref 0.5–2)
D-LACTATE SERPL-SCNC: 2.2 MMOL/L (ref 0.5–2)
DEPRECATED RDW RBC AUTO: 64.5 FL (ref 37–54)
EGFRCR SERPLBLD CKD-EPI 2021: 28.1 ML/MIN/1.73
EOSINOPHIL # BLD AUTO: 0.21 10*3/MM3 (ref 0–0.4)
EOSINOPHIL NFR BLD AUTO: 4.6 % (ref 0.3–6.2)
ERYTHROCYTE [DISTWIDTH] IN BLOOD BY AUTOMATED COUNT: 18.5 % (ref 12.3–15.4)
GLOBULIN UR ELPH-MCNC: 1.8 GM/DL
GLUCOSE SERPL-MCNC: 95 MG/DL (ref 65–99)
HCT VFR BLD AUTO: 25.2 % (ref 37.5–51)
HGB BLD-MCNC: 8.2 G/DL (ref 13–17.7)
IMM GRANULOCYTES # BLD AUTO: 0.01 10*3/MM3 (ref 0–0.05)
IMM GRANULOCYTES NFR BLD AUTO: 0.2 % (ref 0–0.5)
LYMPHOCYTES # BLD AUTO: 1.13 10*3/MM3 (ref 0.7–3.1)
LYMPHOCYTES NFR BLD AUTO: 24.6 % (ref 19.6–45.3)
MAGNESIUM SERPL-MCNC: 1.6 MG/DL (ref 1.6–2.4)
MCH RBC QN AUTO: 30.8 PG (ref 26.6–33)
MCHC RBC AUTO-ENTMCNC: 32.5 G/DL (ref 31.5–35.7)
MCV RBC AUTO: 94.7 FL (ref 79–97)
MONOCYTES # BLD AUTO: 0.33 10*3/MM3 (ref 0.1–0.9)
MONOCYTES NFR BLD AUTO: 7.2 % (ref 5–12)
NEUTROPHILS NFR BLD AUTO: 2.88 10*3/MM3 (ref 1.7–7)
NEUTROPHILS NFR BLD AUTO: 62.5 % (ref 42.7–76)
NRBC BLD AUTO-RTO: 0 /100 WBC (ref 0–0.2)
PHOSPHATE SERPL-MCNC: 4 MG/DL (ref 2.5–4.5)
PLATELET # BLD AUTO: 110 10*3/MM3 (ref 140–450)
PMV BLD AUTO: 11.1 FL (ref 6–12)
POTASSIUM SERPL-SCNC: 4.9 MMOL/L (ref 3.5–5.2)
PROT SERPL-MCNC: 5.6 G/DL (ref 6–8.5)
RBC # BLD AUTO: 2.66 10*6/MM3 (ref 4.14–5.8)
SODIUM SERPL-SCNC: 136 MMOL/L (ref 136–145)
UNIT  ABO: NORMAL
UNIT  ABO: NORMAL
UNIT  RH: NORMAL
UNIT  RH: NORMAL
WBC NRBC COR # BLD: 4.6 10*3/MM3 (ref 3.4–10.8)

## 2022-09-14 PROCEDURE — 83605 ASSAY OF LACTIC ACID: CPT | Performed by: INTERNAL MEDICINE

## 2022-09-14 PROCEDURE — 25010000002 MAGNESIUM SULFATE 2 GM/50ML SOLUTION: Performed by: INTERNAL MEDICINE

## 2022-09-14 PROCEDURE — 85025 COMPLETE CBC W/AUTO DIFF WBC: CPT | Performed by: INTERNAL MEDICINE

## 2022-09-14 PROCEDURE — 94799 UNLISTED PULMONARY SVC/PX: CPT

## 2022-09-14 PROCEDURE — 5A1D70Z PERFORMANCE OF URINARY FILTRATION, INTERMITTENT, LESS THAN 6 HOURS PER DAY: ICD-10-PCS | Performed by: INTERNAL MEDICINE

## 2022-09-14 PROCEDURE — 84100 ASSAY OF PHOSPHORUS: CPT | Performed by: PHYSICIAN ASSISTANT

## 2022-09-14 PROCEDURE — 80053 COMPREHEN METABOLIC PANEL: CPT | Performed by: INTERNAL MEDICINE

## 2022-09-14 PROCEDURE — 83735 ASSAY OF MAGNESIUM: CPT | Performed by: INTERNAL MEDICINE

## 2022-09-14 PROCEDURE — 99233 SBSQ HOSP IP/OBS HIGH 50: CPT | Performed by: INTERNAL MEDICINE

## 2022-09-14 RX ORDER — MAGNESIUM SULFATE 1 G/100ML
2 INJECTION INTRAVENOUS
Status: DISCONTINUED | OUTPATIENT
Start: 2022-09-14 | End: 2022-09-14

## 2022-09-14 RX ORDER — MAGNESIUM SULFATE HEPTAHYDRATE 40 MG/ML
2 INJECTION, SOLUTION INTRAVENOUS
Status: COMPLETED | OUTPATIENT
Start: 2022-09-14 | End: 2022-09-14

## 2022-09-14 RX ORDER — MAGNESIUM SULFATE 1 G/100ML
2 INJECTION INTRAVENOUS ONCE
Status: DISCONTINUED | OUTPATIENT
Start: 2022-09-14 | End: 2022-09-14

## 2022-09-14 RX ADMIN — PRAMIPEXOLE DIHYDROCHLORIDE 1 MG: 1 TABLET ORAL at 05:57

## 2022-09-14 RX ADMIN — TEMAZEPAM 15 MG: 7.5 CAPSULE ORAL at 20:47

## 2022-09-14 RX ADMIN — MAGNESIUM SULFATE HEPTAHYDRATE 2 G: 2 INJECTION, SOLUTION INTRAVENOUS at 09:19

## 2022-09-14 RX ADMIN — ARFORMOTEROL TARTRATE 15 MCG: 15 SOLUTION RESPIRATORY (INHALATION) at 10:41

## 2022-09-14 RX ADMIN — ACETAMINOPHEN 650 MG: 325 TABLET ORAL at 13:32

## 2022-09-14 RX ADMIN — MAGNESIUM SULFATE HEPTAHYDRATE 2 G: 2 INJECTION, SOLUTION INTRAVENOUS at 09:15

## 2022-09-14 RX ADMIN — PRAMIPEXOLE DIHYDROCHLORIDE 1 MG: 1 TABLET ORAL at 20:47

## 2022-09-14 RX ADMIN — BUDESONIDE 0.5 MG: 0.5 SUSPENSION RESPIRATORY (INHALATION) at 10:41

## 2022-09-14 RX ADMIN — ARFORMOTEROL TARTRATE 15 MCG: 15 SOLUTION RESPIRATORY (INHALATION) at 22:06

## 2022-09-14 RX ADMIN — BUDESONIDE 0.5 MG: 0.5 SUSPENSION RESPIRATORY (INHALATION) at 22:06

## 2022-09-14 RX ADMIN — ALPRAZOLAM 0.25 MG: 0.25 TABLET ORAL at 10:39

## 2022-09-14 NOTE — PROGRESS NOTES
Pulmonary / Critical Care Progress Note      Patient Name: Rafael Gomez  : 1945  MRN: [id number]  Attending:  Melchor Nichole MD   Date of admission: 2022    Subjective   Subjective   Follow-up for GI bleed    Over past 24 hours:  EGD with no obvious bleeding  No rectal bleeding noted overnight  No hypotension noted  Dialysis today  Remains weak and fatigued  No chest pain  No nausea, fevers or chills  Magnesium low this morning    Review of Systems  Constitutional symptoms:  Denied complaints   Ear, nose, throat: Denied complaints  Cardiovascular:  Denied complaints  Respiratory: Denied complaints  Gastrointestinal: Denied complaints  Musculoskeletal: Weakness, otherwise denied complaints  Neurologic: Denied complaints  Skin: Denied complaints        Objective   Objective     Vitals:   Vital signs for last 24 hours:  Temp:  [97.5 °F (36.4 °C)-99.2 °F (37.3 °C)] 99.2 °F (37.3 °C)  Heart Rate:  [59-96] 70  Resp:  [13-22] 17  BP: ()/() 119/36    Intake/Output last 3 shifts:  I/O last 3 completed shifts:  In: 4109.6 [I.V.:95; Blood:4014.6]  Out: 450 [Urine:450]  Intake/Output this shift:  I/O this shift:  In: 100 [I.V.:100]  Out: -     Vent settings for last 24 hours:       Hemodynamic parameters for last 24 hours:       Physical Exam   Vital Signs Reviewed   General:  WDWN, Alert, NAD.    HEENT:  PERRL, EOMI.  OP, nares clear  Chest:  good aeration, clear to auscultation bilaterally, tympanic to percussion bilaterally, no work of breathing noted  CV: RRR, no MGR, pulses 2+, equal.  Abd:  Soft, NT, ND, + BS, no HSM  EXT:  no clubbing, no cyanosis, no edema  Neuro:  A&Ox3, CN grossly intact, no focal deficits.  Skin: No rashes or lesions noted        Result Review    Result Review:  I have personally reviewed the results from the time of this admission to 2022 11:16 EDT and agree with these findings:  [x]  Laboratory  [x]  Microbiology  [x]  Radiology  []  EKG/Telemetry   []   Cardiology/Vascular   []  Pathology  [x]  Old records  []  Other:  Most notable findings include:       Lab 09/14/22  0317 09/14/22  0316 09/13/22  0303 09/13/22  0302 09/12/22  1940 09/12/22  1247   WBC  --  4.60 4.34  --   --  3.85   HEMOGLOBIN  --  8.2* 8.2*  --  8.3* 6.9*   HEMATOCRIT  --  25.2* 24.7*  --  24.9* 20.9*   PLATELETS  --  110* 95*  --   --  109*   SODIUM 136  --   --  137  --  138   POTASSIUM 4.9  --   --  5.1  --  4.1   CHLORIDE 103  --   --  106  --  105   CO2 20.9*  --   --  21.0*  --  24.0   BUN 40*  --   --  36*  --  29*   CREATININE 2.34*  --   --  1.98*  --  1.86*   GLUCOSE 95  --   --  92  --  174*   CALCIUM 8.4*  --   --  8.2*  --  8.5*   PHOSPHORUS 4.0  --   --   --   --   --    TOTAL PROTEIN 5.6*  --   --  5.1*  --  5.5*   ALBUMIN 3.80  --   --  3.30*  --  3.50   GLOBULIN 1.8  --   --  1.8  --  2.0     EGD with no obvious evidence of bleeding      Assessment & Plan   Assessment / Plan     Active Hospital Problems:  Active Hospital Problems    Diagnosis    • **Acute blood loss anemia    • Acute GI bleeding    • ESRD (end stage renal disease) ([facility])      Added automatically from request for surgery [id number]     • Hypotension        Impression:  Acute GI bleed  Acute blood loss anemia secondary to above  Weakness secondary to above  Hypotension secondary to above  ESRD  Hypomagnesemia  COPD without exacerbation  Thrombocytopenia     Plan:    Recommend holding Xarelto 7 to 14 days  Monitor CBC.  Transfuse for hemoglobin less than 8 or evidence of shock/active bleeding  Continue to hold antihypertensives for now  No obvious bleeding per EGD.  If rebleeds, will need colonoscopy.  Off off PPI.  Appreciate GI assistance  Continue nebulizers and bronchopulmonary hygiene  Dialysis today per nephrology.  Appreciate assistance  Trend renal function and electrolytes.  Replace magnesium IV  Lactic acid has cleared    DVT prophylaxis:  Medical and mechanical DVT prophylaxis orders are present.    CODE  STATUS:   Code Status (Patient has no pulse and is not breathing): CPR (Attempt to Resuscitate)  Medical Interventions (Patient has pulse or is breathing): Full Support    Okay to move out of ICU      Labs, imaging, microbiology, notes and medications personally reviewed  Discussed with primary.  Discussed on multidisciplinary critical care rounds.     Electronically signed by Chivo Pickard MD, 09/14/22, 11:17 AM EDT.

## 2022-09-14 NOTE — PROGRESS NOTES
" LOS: 2 days   Patient Care Team:  Claude Hair MD as PCP - General (Internal Medicine)  Rafael Heredia MD as Consulting Physician (Hematology and Oncology)  Galina Reed MD (Internal Medicine)  Cathleen Morocho MD as Consulting Physician (Hematology and Oncology)  Shagufta Peña MD as Surgeon (Thoracic Surgery)  System, Provider Not In    Chief Complaint: ESRD    Subjective     History of Present Illness  Pt sitting in chair.  Finished dialysis earlier today.  No problems reported    Subjective:  Symptoms:  No shortness of breath, chest pain, chest pressure or anxiety.    Diet:  No nausea or vomiting.    Activity level: Normal.    Pain:  He reports no pain.        History taken from: patient    Objective     Vital Sign Min/Max for last 24 hours  Temp  Min: 97.5 °F (36.4 °C)  Max: 99.2 °F (37.3 °C)   BP  Min: 75/37  Max: 154/136   Pulse  Min: 59  Max: 97   Resp  Min: 13  Max: 22   SpO2  Min: 87 %  Max: 100 %   Flow (L/min)  Min: 3  Max: 3   No data recorded     Flowsheet Rows    Flowsheet Row First Filed Value   Admission Height 177.8 cm (70\") Documented at 09/12/2022 1228   Admission Weight 106 kg (234 lb 3.2 oz) Documented at 09/12/2022 1228          I/O this shift:  In: 100 [I.V.:100]  Out: 2000 [Other:2000]  I/O last 3 completed shifts:  In: 4109.6 [I.V.:95; Blood:4014.6]  Out: 450 [Urine:450]    Objective:  General Appearance:  Comfortable.    Vital signs: (most recent): Blood pressure 130/76, pulse 97, temperature 99.2 °F (37.3 °C), temperature source Oral, resp. rate 18, height 177.8 cm (70\"), weight 106 kg (234 lb 3.2 oz), SpO2 100 %.  Vital signs are normal.    HEENT: Normal HEENT exam.    Lungs:  Normal effort and normal respiratory rate.    Heart: Normal rate.  Regular rhythm.    Abdomen: Abdomen is soft.  Bowel sounds are normal.   There is no abdominal tenderness.     Extremities: Normal range of motion.  There is no dependent edema.    Pulses: Distal pulses are intact.  "             Results Review:     I reviewed the patient's new clinical results.    WBC WBC   Date Value Ref Range Status   09/14/2022 4.60 3.40 - 10.80 10*3/mm3 Final   09/13/2022 4.34 3.40 - 10.80 10*3/mm3 Final   09/12/2022 3.85 3.40 - 10.80 10*3/mm3 Final      HGB Hemoglobin   Date Value Ref Range Status   09/14/2022 8.2 (L) 13.0 - 17.7 g/dL Final   09/13/2022 8.2 (L) 13.0 - 17.7 g/dL Final   09/12/2022 8.3 (L) 13.0 - 17.7 g/dL Final   09/12/2022 6.9 (C) 13.0 - 17.7 g/dL Final      HCT Hematocrit   Date Value Ref Range Status   09/14/2022 25.2 (L) 37.5 - 51.0 % Final   09/13/2022 24.7 (L) 37.5 - 51.0 % Final   09/12/2022 24.9 (L) 37.5 - 51.0 % Final   09/12/2022 20.9 (C) 37.5 - 51.0 % Final      Platlets No results found for: LABPLAT   MCV MCV   Date Value Ref Range Status   09/14/2022 94.7 79.0 - 97.0 fL Final   09/13/2022 92.5 79.0 - 97.0 fL Final   09/12/2022 95.9 79.0 - 97.0 fL Final          Sodium Sodium   Date Value Ref Range Status   09/14/2022 136 136 - 145 mmol/L Final   09/13/2022 137 136 - 145 mmol/L Final   09/12/2022 138 136 - 145 mmol/L Final      Potassium Potassium   Date Value Ref Range Status   09/14/2022 4.9 3.5 - 5.2 mmol/L Final   09/13/2022 5.1 3.5 - 5.2 mmol/L Final   09/12/2022 4.1 3.5 - 5.2 mmol/L Final      Chloride Chloride   Date Value Ref Range Status   09/14/2022 103 98 - 107 mmol/L Final   09/13/2022 106 98 - 107 mmol/L Final   09/12/2022 105 98 - 107 mmol/L Final      CO2 CO2   Date Value Ref Range Status   09/14/2022 20.9 (L) 22.0 - 29.0 mmol/L Final   09/13/2022 21.0 (L) 22.0 - 29.0 mmol/L Final   09/12/2022 24.0 22.0 - 29.0 mmol/L Final      BUN BUN   Date Value Ref Range Status   09/14/2022 40 (H) 8 - 23 mg/dL Final   09/13/2022 36 (H) 8 - 23 mg/dL Final   09/12/2022 29 (H) 8 - 23 mg/dL Final      Creatinine Creatinine   Date Value Ref Range Status   09/14/2022 2.34 (H) 0.76 - 1.27 mg/dL Final   09/13/2022 1.98 (H) 0.76 - 1.27 mg/dL Final   09/12/2022 1.86 (H) 0.76 - 1.27 mg/dL  Final      Calcium Calcium   Date Value Ref Range Status   09/14/2022 8.4 (L) 8.6 - 10.5 mg/dL Final   09/13/2022 8.2 (L) 8.6 - 10.5 mg/dL Final   09/12/2022 8.5 (L) 8.6 - 10.5 mg/dL Final      PO4 No results found for: CAPO4   Albumin Albumin   Date Value Ref Range Status   09/14/2022 3.80 3.50 - 5.20 g/dL Final   09/13/2022 3.30 (L) 3.50 - 5.20 g/dL Final   09/12/2022 3.50 3.50 - 5.20 g/dL Final      Magnesium Magnesium   Date Value Ref Range Status   09/14/2022 1.6 1.6 - 2.4 mg/dL Final   09/13/2022 1.6 1.6 - 2.4 mg/dL Final   09/12/2022 1.7 1.6 - 2.4 mg/dL Final      Uric Acid No results found for: URICACID     Medication Review:   !NOT ORDERED- rivaroxaban (Xarelto), , Does not apply, Daily With Dinner  arformoterol, 15 mcg, Nebulization, BID - RT  budesonide, 0.5 mg, Nebulization, BID - RT  senna-docusate sodium, 1 tablet, Oral, BID          Assessment & Plan       Acute blood loss anemia    Hypotension    ESRD (end stage renal disease) (HCC)    Acute GI bleeding      Assessment & Plan  ESRD-  Normal dialysis m/w/f at [facility].  [facility] for access, has maturing LUE AVF now.  Reported xarelto for catheter malfunction from vascular.  Would hold at discharge.  Continue same.  GIB-  S/p PRBC's, hgb improved to 8.2.  Xarelto on hold.   Amyloidosis-  AL, treated previously  HTN-  BP on low side, meds on hold.    Gregory Liriano MD  09/14/22  13:52 EDT

## 2022-09-14 NOTE — PROGRESS NOTES
[facility]     Progress Note    Patient Name: Rafael Gomez  : 1945  MRN: [id number]  Primary Care Physician:  Claude Hair MD  Date of admission: 2022      Subjective   Brief summary.  Patient admitted to ICU for hypotension and rectal bleed on Xarelto      HPI:  Patient seen this afternoon in ICU.  No further bleeding.  No hypotension.  Awake alert.  Anxious  Review of Systems       Anxious  No abdominal pain.  Denies nausea vomiting  No fever chills      Objective     Vitals:   Temp:  [97.5 °F (36.4 °C)-99.2 °F (37.3 °C)] 98.2 °F (36.8 °C)  Heart Rate:  [59-97] 69  Resp:  [13-22] 20  BP: ()/() 127/76  Flow (L/min):  [3] 3    Physical Exam :     Elderly male well-developed well-nourished obese  Neck supple  Heart regular  Lungs clear, diminished breath sounds  Abdomen obese and soft nontender  Extremities trace of edema  Neuro awake alert and anxious      Result Review:  I have personally reviewed the results from the time of this admission to 2022 19:11 EDT and agree with these findings:  [x]  Laboratory  [x]  Microbiology  [x]  Radiology  [x]  EKG/Telemetry   []  Cardiology/Vascular   []  Pathology  []  Old records  []  Other:       Hemoglobin above 8 g  Assessment / Plan       Active Hospital Problems:  Active Hospital Problems    Diagnosis    • **Acute blood loss anemia    • Acute GI bleeding    • ESRD (end stage renal disease) (H[facility])      Added automatically from request for surgery [id number]     • Hypotension        Plan:   Stable and improving  Discussed with patient's wife and patient, transfer out of ICU, waiting for bed.  Recheck labs in a.m.  If remains stable discharge home tomorrow postdialysis.       DVT prophylaxis:  Medical and mechanical DVT prophylaxis orders are present.    CODE STATUS:   Code Status (Patient has no pulse and is not breathing): CPR (Attempt to Resuscitate)  Medical Interventions (Patient has pulse or is breathing): Full  Support            Electronically signed by Melchor Nichole MD, 09/14/22, 6:06 PM EDT.

## 2022-09-14 NOTE — PLAN OF CARE
Goal Outcome Evaluation:  Plan of Care Reviewed With: patient        Progress: no change  Outcome Evaluation: Patient remains alert and oriented x4. No complaints of pain at this time. Transferred to [facility] today. Turned and repositioned throughout the day.   no precautions noted

## 2022-09-15 ENCOUNTER — READMISSION MANAGEMENT (OUTPATIENT)
Dept: CALL CENTER | Facility: HOSPITAL | Age: 77
End: 2022-09-15

## 2022-09-15 VITALS
WEIGHT: 234.2 LBS | RESPIRATION RATE: 20 BRPM | BODY MASS INDEX: 33.53 KG/M2 | HEART RATE: 64 BPM | TEMPERATURE: 98.1 F | HEIGHT: 70 IN | DIASTOLIC BLOOD PRESSURE: 63 MMHG | SYSTOLIC BLOOD PRESSURE: 156 MMHG | OXYGEN SATURATION: 93 %

## 2022-09-15 PROBLEM — I95.9 HYPOTENSION: Status: RESOLVED | Noted: 2021-11-16 | Resolved: 2022-09-15

## 2022-09-15 PROBLEM — K92.2 ACUTE GI BLEEDING: Status: RESOLVED | Noted: 2022-09-12 | Resolved: 2022-09-15

## 2022-09-15 LAB
ALBUMIN SERPL-MCNC: 3.7 G/DL (ref 3.5–5.2)
ALBUMIN/GLOB SERPL: 1.9 G/DL
ALP SERPL-CCNC: 97 U/L (ref 39–117)
ALT SERPL W P-5'-P-CCNC: 12 U/L (ref 1–41)
ANION GAP SERPL CALCULATED.3IONS-SCNC: 6.1 MMOL/L (ref 5–15)
AST SERPL-CCNC: 16 U/L (ref 1–40)
BASOPHILS # BLD AUTO: 0.05 10*3/MM3 (ref 0–0.2)
BASOPHILS NFR BLD AUTO: 1.1 % (ref 0–1.5)
BILIRUB SERPL-MCNC: 0.4 MG/DL (ref 0–1.2)
BUN SERPL-MCNC: 19 MG/DL (ref 8–23)
BUN/CREAT SERPL: 11.4 (ref 7–25)
CALCIUM SPEC-SCNC: 9.3 MG/DL (ref 8.6–10.5)
CHLORIDE SERPL-SCNC: 104 MMOL/L (ref 98–107)
CO2 SERPL-SCNC: 25.9 MMOL/L (ref 22–29)
CREAT SERPL-MCNC: 1.66 MG/DL (ref 0.76–1.27)
DEPRECATED RDW RBC AUTO: 63.4 FL (ref 37–54)
EGFRCR SERPLBLD CKD-EPI 2021: 42.5 ML/MIN/1.73
EOSINOPHIL # BLD AUTO: 0.23 10*3/MM3 (ref 0–0.4)
EOSINOPHIL NFR BLD AUTO: 5.3 % (ref 0.3–6.2)
ERYTHROCYTE [DISTWIDTH] IN BLOOD BY AUTOMATED COUNT: 18.2 % (ref 12.3–15.4)
GLOBULIN UR ELPH-MCNC: 2 GM/DL
GLUCOSE SERPL-MCNC: 109 MG/DL (ref 65–99)
HCT VFR BLD AUTO: 25.2 % (ref 37.5–51)
HGB BLD-MCNC: 8.3 G/DL (ref 13–17.7)
IMM GRANULOCYTES # BLD AUTO: 0.02 10*3/MM3 (ref 0–0.05)
IMM GRANULOCYTES NFR BLD AUTO: 0.5 % (ref 0–0.5)
LYMPHOCYTES # BLD AUTO: 0.93 10*3/MM3 (ref 0.7–3.1)
LYMPHOCYTES NFR BLD AUTO: 21.3 % (ref 19.6–45.3)
MAGNESIUM SERPL-MCNC: 2.1 MG/DL (ref 1.6–2.4)
MCH RBC QN AUTO: 31.4 PG (ref 26.6–33)
MCHC RBC AUTO-ENTMCNC: 32.9 G/DL (ref 31.5–35.7)
MCV RBC AUTO: 95.5 FL (ref 79–97)
MONOCYTES # BLD AUTO: 0.43 10*3/MM3 (ref 0.1–0.9)
MONOCYTES NFR BLD AUTO: 9.9 % (ref 5–12)
NEUTROPHILS NFR BLD AUTO: 2.7 10*3/MM3 (ref 1.7–7)
NEUTROPHILS NFR BLD AUTO: 61.9 % (ref 42.7–76)
NRBC BLD AUTO-RTO: 0 /100 WBC (ref 0–0.2)
PLATELET # BLD AUTO: 119 10*3/MM3 (ref 140–450)
PMV BLD AUTO: 11.8 FL (ref 6–12)
POTASSIUM SERPL-SCNC: 4.4 MMOL/L (ref 3.5–5.2)
PROT SERPL-MCNC: 5.7 G/DL (ref 6–8.5)
RBC # BLD AUTO: 2.64 10*6/MM3 (ref 4.14–5.8)
RBC MORPH BLD: NORMAL
SMALL PLATELETS BLD QL SMEAR: NORMAL
SODIUM SERPL-SCNC: 136 MMOL/L (ref 136–145)
WBC MORPH BLD: NORMAL
WBC NRBC COR # BLD: 4.36 10*3/MM3 (ref 3.4–10.8)

## 2022-09-15 PROCEDURE — 94799 UNLISTED PULMONARY SVC/PX: CPT

## 2022-09-15 PROCEDURE — 83735 ASSAY OF MAGNESIUM: CPT | Performed by: INTERNAL MEDICINE

## 2022-09-15 PROCEDURE — 80053 COMPREHEN METABOLIC PANEL: CPT | Performed by: INTERNAL MEDICINE

## 2022-09-15 PROCEDURE — 85007 BL SMEAR W/DIFF WBC COUNT: CPT | Performed by: INTERNAL MEDICINE

## 2022-09-15 PROCEDURE — 85025 COMPLETE CBC W/AUTO DIFF WBC: CPT | Performed by: INTERNAL MEDICINE

## 2022-09-15 PROCEDURE — 99232 SBSQ HOSP IP/OBS MODERATE 35: CPT | Performed by: INTERNAL MEDICINE

## 2022-09-15 PROCEDURE — 94664 DEMO&/EVAL PT USE INHALER: CPT

## 2022-09-15 RX ADMIN — ARFORMOTEROL TARTRATE 15 MCG: 15 SOLUTION RESPIRATORY (INHALATION) at 06:48

## 2022-09-15 RX ADMIN — BUDESONIDE 0.5 MG: 0.5 SUSPENSION RESPIRATORY (INHALATION) at 06:48

## 2022-09-15 NOTE — SIGNIFICANT NOTE
09/15/22 1130   Coping/Psychosocial   Observed Emotional State calm;cooperative   Verbalized Emotional State hopefulness   Trust Relationship/Rapport empathic listening provided   Family/Support Persons spouse   Involvement in Care interacting with patient   Additional Documentation Spiritual Care (Group)   Spiritual Care   Use of Spiritual Resources non-Orthodoxy use of spiritual care   Spiritual Care Source  initiative   Spiritual Care Follow-Up follow-up, none required as presently assessed   Response to Spiritual Care engaged in conversation;receptive of support;thanks expressed   Spiritual Care Interventions supportive conversation provided   Spiritual Care Visit Type initial   Receptivity to Spiritual Care visit welcomed

## 2022-09-15 NOTE — PROGRESS NOTES
" LOS: 3 days   Patient Care Team:  Claude Hair MD as PCP - General (Internal Medicine)  Rafael Heredia MD as Consulting Physician (Hematology and Oncology)  Galina Reed MD (Internal Medicine)  Cathleen Morocho MD as Consulting Physician (Hematology and Oncology)  Shagufta Peña MD as Surgeon (Thoracic Surgery)  System, Provider Not In    Chief Complaint: ESRD    Subjective     Rectal Bleeding  Associated symptoms include headaches. Pertinent negatives include no chest pain, nausea or vomiting.   Diarrhea   Associated symptoms include headaches. Pertinent negatives include no vomiting.   Headache  Weakness - Generalized  Associated symptoms include headaches. Pertinent negatives include no chest pain, nausea or vomiting.     Pt sitting in bed, no acute complaints  Tells me he is going home today      Subjective:  Symptoms:  He reports diarrhea.  No shortness of breath, chest pain, chest pressure or anxiety.    Diet:  No nausea or vomiting.    Activity level: Normal.    Pain:  He reports no pain.        History taken from: patient    Objective     Vital Sign Min/Max for last 24 hours  Temp  Min: 97.8 °F (36.6 °C)  Max: 98.4 °F (36.9 °C)   BP  Min: 127/76  Max: 149/89   Pulse  Min: 52  Max: 97   Resp  Min: 17  Max: 22   SpO2  Min: 93 %  Max: 100 %   Flow (L/min)  Min: 3  Max: 3   No data recorded     Flowsheet Rows    Flowsheet Row First Filed Value   Admission Height 177.8 cm (70\") Documented at 09/12/2022 1228   Admission Weight 106 kg (234 lb 3.2 oz) Documented at 09/12/2022 1228          I/O this shift:  In: 240 [P.O.:240]  Out: -   I/O last 3 completed shifts:  In: 3699.6 [P.O.:240; I.V.:120; Blood:3339.6]  Out: 2850 [Urine:850; Other:2000]    Objective:  General Appearance:  Comfortable.    Vital signs: (most recent): Blood pressure 149/89, pulse 77, temperature 98.4 °F (36.9 °C), temperature source Oral, resp. rate 20, height 177.8 cm (70\"), weight 106 kg (234 lb 3.2 oz), SpO2 95 %.  Vital " signs are normal.    HEENT: Normal HEENT exam.    Lungs:  Normal effort and normal respiratory rate.    Heart: Normal rate.  Regular rhythm.    Abdomen: Abdomen is soft.  Bowel sounds are normal.   There is no abdominal tenderness.     Extremities: Normal range of motion.  There is no dependent edema.    Pulses: Distal pulses are intact.              Results Review:     I reviewed the patient's new clinical results.    WBC WBC   Date Value Ref Range Status   09/15/2022 4.36 3.40 - 10.80 10*3/mm3 Final   09/14/2022 4.60 3.40 - 10.80 10*3/mm3 Final   09/13/2022 4.34 3.40 - 10.80 10*3/mm3 Final   09/12/2022 3.85 3.40 - 10.80 10*3/mm3 Final      HGB Hemoglobin   Date Value Ref Range Status   09/15/2022 8.3 (L) 13.0 - 17.7 g/dL Final   09/14/2022 8.2 (L) 13.0 - 17.7 g/dL Final   09/13/2022 8.2 (L) 13.0 - 17.7 g/dL Final   09/12/2022 8.3 (L) 13.0 - 17.7 g/dL Final   09/12/2022 6.9 (C) 13.0 - 17.7 g/dL Final      HCT Hematocrit   Date Value Ref Range Status   09/15/2022 25.2 (L) 37.5 - 51.0 % Final   09/14/2022 25.2 (L) 37.5 - 51.0 % Final   09/13/2022 24.7 (L) 37.5 - 51.0 % Final   09/12/2022 24.9 (L) 37.5 - 51.0 % Final   09/12/2022 20.9 (C) 37.5 - 51.0 % Final      Platlets No results found for: LABPLAT   MCV MCV   Date Value Ref Range Status   09/15/2022 95.5 79.0 - 97.0 fL Final   09/14/2022 94.7 79.0 - 97.0 fL Final   09/13/2022 92.5 79.0 - 97.0 fL Final   09/12/2022 95.9 79.0 - 97.0 fL Final          Sodium Sodium   Date Value Ref Range Status   09/15/2022 136 136 - 145 mmol/L Final   09/14/2022 136 136 - 145 mmol/L Final   09/13/2022 137 136 - 145 mmol/L Final   09/12/2022 138 136 - 145 mmol/L Final      Potassium Potassium   Date Value Ref Range Status   09/15/2022 4.4 3.5 - 5.2 mmol/L Final   09/14/2022 4.9 3.5 - 5.2 mmol/L Final   09/13/2022 5.1 3.5 - 5.2 mmol/L Final   09/12/2022 4.1 3.5 - 5.2 mmol/L Final      Chloride Chloride   Date Value Ref Range Status   09/15/2022 104 98 - 107 mmol/L Final   09/14/2022  103 98 - 107 mmol/L Final   09/13/2022 106 98 - 107 mmol/L Final   09/12/2022 105 98 - 107 mmol/L Final      CO2 CO2   Date Value Ref Range Status   09/15/2022 25.9 22.0 - 29.0 mmol/L Final   09/14/2022 20.9 (L) 22.0 - 29.0 mmol/L Final   09/13/2022 21.0 (L) 22.0 - 29.0 mmol/L Final   09/12/2022 24.0 22.0 - 29.0 mmol/L Final      BUN BUN   Date Value Ref Range Status   09/15/2022 19 8 - 23 mg/dL Final   09/14/2022 40 (H) 8 - 23 mg/dL Final   09/13/2022 36 (H) 8 - 23 mg/dL Final   09/12/2022 29 (H) 8 - 23 mg/dL Final      Creatinine Creatinine   Date Value Ref Range Status   09/15/2022 1.66 (H) 0.76 - 1.27 mg/dL Final   09/14/2022 2.34 (H) 0.76 - 1.27 mg/dL Final   09/13/2022 1.98 (H) 0.76 - 1.27 mg/dL Final   09/12/2022 1.86 (H) 0.76 - 1.27 mg/dL Final      Calcium Calcium   Date Value Ref Range Status   09/15/2022 9.3 8.6 - 10.5 mg/dL Final   09/14/2022 8.4 (L) 8.6 - 10.5 mg/dL Final   09/13/2022 8.2 (L) 8.6 - 10.5 mg/dL Final   09/12/2022 8.5 (L) 8.6 - 10.5 mg/dL Final      PO4 No results found for: CAPO4   Albumin Albumin   Date Value Ref Range Status   09/15/2022 3.70 3.50 - 5.20 g/dL Final   09/14/2022 3.80 3.50 - 5.20 g/dL Final   09/13/2022 3.30 (L) 3.50 - 5.20 g/dL Final   09/12/2022 3.50 3.50 - 5.20 g/dL Final      Magnesium Magnesium   Date Value Ref Range Status   09/15/2022 2.1 1.6 - 2.4 mg/dL Final   09/14/2022 1.6 1.6 - 2.4 mg/dL Final   09/13/2022 1.6 1.6 - 2.4 mg/dL Final   09/12/2022 1.7 1.6 - 2.4 mg/dL Final      Uric Acid No results found for: URICACID     Medication Review:   !NOT ORDERED- rivaroxaban (Xarelto), , Does not apply, Daily With Dinner  arformoterol, 15 mcg, Nebulization, BID - RT  budesonide, 0.5 mg, Nebulization, BID - RT  senna-docusate sodium, 1 tablet, Oral, BID          Assessment & Plan       Acute blood loss anemia    Hypotension    ESRD (end stage renal disease) (HCC)    Acute GI bleeding      Assessment & Plan  ESRD-  Normal dialysis m/w/f at [facility].  [facility]  for access, has maturing LUE AVF now.  Reported xarelto for catheter malfunction from vascular.  Would hold at discharge.  Continue same.  GIB-  S/p PRBC's, hgb improved to 8.2.  Xarelto on hold.   Amyloidosis-  AL, treated previously  HTN-  controlled.    Gregory Liriano MD  09/15/22  09:59 EDT

## 2022-09-15 NOTE — PLAN OF CARE
Goal Outcome Evaluation:  Plan of Care Reviewed With: patient, spouse        Progress: improving  Outcome Evaluation: Patient discharging, hgb remained stable   No

## 2022-09-15 NOTE — PAYOR COMM NOTE
"Shameka Gomez (77 y.o. Male)             Date of Birth   1945    Social Security Number       Address   [address] [place] [address]  [place] [address]    Home Phone   [phone]    MRN   [id number]       Hinduism   Yazidism    Marital Status                               Admission Date   9/12/22    Admission Type   Emergency    Admitting Provider   Melchor Nichole MD    Attending Provider   Melchor Nichole MD    Department, Room/Bed   [address], 4006/1       Discharge Date       Discharge Disposition       Discharge Destination                               Attending Provider: Melchor Nichole MD    Allergies: Cephalexin, Morphine    Isolation: None   Infection: None   Code Status: CPR   Advance Care Planning Activity    Ht: 177.8 cm (70\")   Wt: 106 kg (234 lb 3.2 oz)    Admission Cmt: None   Principal Problem: Acute blood loss anemia [D62]                 Active Insurance as of 9/12/2022     Primary Coverage     Payor Plan Insurance Group Employer/Plan Group    [facility] WELLCARE MEDICARE REPLACEMENT NGN     Payor Plan Address Payor Plan Phone Number Payor Plan Fax Number Effective Dates    [address] [phone]  1/1/2022 - None Entered    [facility] [id number]       Subscriber Name Subscriber Birth Date Member ID       Shameka Gomez 1945 [id number]           Secondary Coverage     Payor Plan Insurance Group Employer/Plan Group     FOR LIFE  FOR LIFE MC SUP  NGN     Payor Plan Address Payor Plan Phone Number Payor Plan Fax Number Effective Dates    [address] [phone]  6/27/2016 - None Entered    [facility] [address]       Subscriber Name Subscriber Birth Date Member ID       SHAMEKA GOMEZ 1945 [id number]                 Emergency Contacts      (Rel.) Home Phone Work Phone Mobile Phone    PatriciaLiliya cornell (Spouse) [phone] -- [phone]        #[id number] UPDATE       CONTACT   DERRELL S " "UTILIZATION REVIEW    [facility]  [address] [address]  TAX ID [id number]  [facility]  [id number]       [phone]   [phone]       Physician Progress Notes (last 24 hours)      Gregory Liriano MD at 09/15/22 0959           LOS: 3 days   Patient Care Team:  Claude Hair MD as PCP - General (Internal Medicine)  Rafael Heredia MD as Consulting Physician (Hematology and Oncology)  Galina Reed MD (Internal Medicine)  Cathleen Morocho MD as Consulting Physician (Hematology and Oncology)  Shagufta Peña MD as Surgeon (Thoracic Surgery)  System, Provider Not In    Chief Complaint: ESRD    Subjective     Rectal Bleeding  Associated symptoms include headaches. Pertinent negatives include no chest pain, nausea or vomiting.   Diarrhea   Associated symptoms include headaches. Pertinent negatives include no vomiting.   Headache  Weakness - Generalized  Associated symptoms include headaches. Pertinent negatives include no chest pain, nausea or vomiting.     Pt sitting in bed, no acute complaints  Tells me he is going home today      Subjective:  Symptoms:  He reports diarrhea.  No shortness of breath, chest pain, chest pressure or anxiety.    Diet:  No nausea or vomiting.    Activity level: Normal.    Pain:  He reports no pain.        History taken from: patient    Objective     Vital Sign Min/Max for last 24 hours  Temp  Min: 97.8 °F (36.6 °C)  Max: 98.4 °F (36.9 °C)   BP  Min: 127/76  Max: 149/89   Pulse  Min: 52  Max: 97   Resp  Min: 17  Max: 22   SpO2  Min: 93 %  Max: 100 %   Flow (L/min)  Min: 3  Max: 3   No data recorded     Flowsheet Rows    Flowsheet Row First Filed Value   Admission Height 177.8 cm (70\") Documented at 09/12/2022 1228   Admission Weight 106 kg (234 lb 3.2 oz) Documented at 09/12/2022 1228          I/O this shift:  In: 240 [P.O.:240]  Out: -   I/O last 3 completed shifts:  In: 3699.6 [P.O.:240; I.V.:120; Blood:3339.6]  Out: 2850 [Urine:850; " "Other:2000]    Objective:  General Appearance:  Comfortable.    Vital signs: (most recent): Blood pressure 149/89, pulse 77, temperature 98.4 °F (36.9 °C), temperature source Oral, resp. rate 20, height 177.8 cm (70\"), weight 106 kg (234 lb 3.2 oz), SpO2 95 %.  Vital signs are normal.    HEENT: Normal HEENT exam.    Lungs:  Normal effort and normal respiratory rate.    Heart: Normal rate.  Regular rhythm.    Abdomen: Abdomen is soft.  Bowel sounds are normal.   There is no abdominal tenderness.     Extremities: Normal range of motion.  There is no dependent edema.    Pulses: Distal pulses are intact.              Results Review:     I reviewed the patient's new clinical results.    WBC WBC   Date Value Ref Range Status   09/15/2022 4.36 3.40 - 10.80 10*3/mm3 Final   09/14/2022 4.60 3.40 - 10.80 10*3/mm3 Final   09/13/2022 4.34 3.40 - 10.80 10*3/mm3 Final   09/12/2022 3.85 3.40 - 10.80 10*3/mm3 Final      HGB Hemoglobin   Date Value Ref Range Status   09/15/2022 8.3 (L) 13.0 - 17.7 g/dL Final   09/14/2022 8.2 (L) 13.0 - 17.7 g/dL Final   09/13/2022 8.2 (L) 13.0 - 17.7 g/dL Final   09/12/2022 8.3 (L) 13.0 - 17.7 g/dL Final   09/12/2022 6.9 (C) 13.0 - 17.7 g/dL Final      HCT Hematocrit   Date Value Ref Range Status   09/15/2022 25.2 (L) 37.5 - 51.0 % Final   09/14/2022 25.2 (L) 37.5 - 51.0 % Final   09/13/2022 24.7 (L) 37.5 - 51.0 % Final   09/12/2022 24.9 (L) 37.5 - 51.0 % Final   09/12/2022 20.9 (C) 37.5 - 51.0 % Final      Platlets No results found for: LABPLAT   MCV MCV   Date Value Ref Range Status   09/15/2022 95.5 79.0 - 97.0 fL Final   09/14/2022 94.7 79.0 - 97.0 fL Final   09/13/2022 92.5 79.0 - 97.0 fL Final   09/12/2022 95.9 79.0 - 97.0 fL Final          Sodium Sodium   Date Value Ref Range Status   09/15/2022 136 136 - 145 mmol/L Final   09/14/2022 136 136 - 145 mmol/L Final   09/13/2022 137 136 - 145 mmol/L Final   09/12/2022 138 136 - 145 mmol/L Final      Potassium Potassium   Date Value Ref Range " Status   09/15/2022 4.4 3.5 - 5.2 mmol/L Final   09/14/2022 4.9 3.5 - 5.2 mmol/L Final   09/13/2022 5.1 3.5 - 5.2 mmol/L Final   09/12/2022 4.1 3.5 - 5.2 mmol/L Final      Chloride Chloride   Date Value Ref Range Status   09/15/2022 104 98 - 107 mmol/L Final   09/14/2022 103 98 - 107 mmol/L Final   09/13/2022 106 98 - 107 mmol/L Final   09/12/2022 105 98 - 107 mmol/L Final      CO2 CO2   Date Value Ref Range Status   09/15/2022 25.9 22.0 - 29.0 mmol/L Final   09/14/2022 20.9 (L) 22.0 - 29.0 mmol/L Final   09/13/2022 21.0 (L) 22.0 - 29.0 mmol/L Final   09/12/2022 24.0 22.0 - 29.0 mmol/L Final      BUN BUN   Date Value Ref Range Status   09/15/2022 19 8 - 23 mg/dL Final   09/14/2022 40 (H) 8 - 23 mg/dL Final   09/13/2022 36 (H) 8 - 23 mg/dL Final   09/12/2022 29 (H) 8 - 23 mg/dL Final      Creatinine Creatinine   Date Value Ref Range Status   09/15/2022 1.66 (H) 0.76 - 1.27 mg/dL Final   09/14/2022 2.34 (H) 0.76 - 1.27 mg/dL Final   09/13/2022 1.98 (H) 0.76 - 1.27 mg/dL Final   09/12/2022 1.86 (H) 0.76 - 1.27 mg/dL Final      Calcium Calcium   Date Value Ref Range Status   09/15/2022 9.3 8.6 - 10.5 mg/dL Final   09/14/2022 8.4 (L) 8.6 - 10.5 mg/dL Final   09/13/2022 8.2 (L) 8.6 - 10.5 mg/dL Final   09/12/2022 8.5 (L) 8.6 - 10.5 mg/dL Final      PO4 No results found for: CAPO4   Albumin Albumin   Date Value Ref Range Status   09/15/2022 3.70 3.50 - 5.20 g/dL Final   09/14/2022 3.80 3.50 - 5.20 g/dL Final   09/13/2022 3.30 (L) 3.50 - 5.20 g/dL Final   09/12/2022 3.50 3.50 - 5.20 g/dL Final      Magnesium Magnesium   Date Value Ref Range Status   09/15/2022 2.1 1.6 - 2.4 mg/dL Final   09/14/2022 1.6 1.6 - 2.4 mg/dL Final   09/13/2022 1.6 1.6 - 2.4 mg/dL Final   09/12/2022 1.7 1.6 - 2.4 mg/dL Final      Uric Acid No results found for: URICACID     Medication Review:   !NOT ORDERED- rivaroxaban (Xarelto), , Does not apply, Daily With Dinner  arformoterol, 15 mcg, Nebulization, BID - RT  budesonide, 0.5 mg, Nebulization,  BID - RT  senna-docusate sodium, 1 tablet, Oral, BID          Assessment & Plan       Acute blood loss anemia    Hypotension    ESRD (end stage renal disease) (HCC)    Acute GI bleeding      Assessment & Plan  ESRD-  Normal dialysis m/w/f at [facility].  [facility] for access, has maturing LUE AVF now.  Reported xarelto for catheter malfunction from vascular.  Would hold at discharge.  Continue same.  GIB-  S/p PRBC's, hgb improved to 8.2.  Xarelto on hold.   Amyloidosis-  AL, treated previously  HTN-  controlled.    Gregory Liriano MD  09/15/22  09:59 EDT          Electronically signed by Gregory Liriano MD at 09/15/22 1002     Prateek Talamantes MD at 09/15/22 0745          Pulmonary / Critical Care Progress Note      Patient Name: Rafael Gomez  : 1945  MRN: [id number]  Attending:  Melchor Nichole MD   Date of admission: 2022    Subjective   Subjective   Follow-up for GI bleed    Over past 24 hours:  EGD with no obvious bleeding  No rectal bleeding noted overnight  No hypotension noted  Dialysis today  Remains weak and fatigued  No chest pain  No nausea, fevers or chills  Magnesium low this morning    Review of Systems  Constitutional symptoms:  Denied complaints   Ear, nose, throat: Denied complaints  Cardiovascular:  Denied complaints  Respiratory: Denied complaints  Gastrointestinal: Denied complaints  Musculoskeletal: Weakness, otherwise denied complaints  Neurologic: Denied complaints  Skin: Denied complaints        Objective   Objective     Vitals:   Vital signs for last 24 hours:  Temp:  [97.8 °F (36.6 °C)-99.2 °F (37.3 °C)] 98.4 °F (36.9 °C)  Heart Rate:  [52-97] 77  Resp:  [17-22] 20  BP: (127-149)/(44-89) 149/89    Intake/Output last 3 shifts:  I/O last 3 completed shifts:  In: 3699.6 [P.O.:240; I.V.:120; Blood:3339.6]  Out: 2850 [Urine:850; Other:2000]  Intake/Output this shift:  No intake/output data recorded.    Vent settings for last 24 hours:       Hemodynamic parameters for last 24  hours:       Physical Exam   Vital Signs Reviewed   General:  WDWN, Alert, NAD.    HEENT:  PERRL, EOMI.  OP, nares clear  Chest:  good aeration, clear to auscultation bilaterally, tympanic to percussion bilaterally, no work of breathing noted  CV: RRR, no MGR, pulses 2+, equal.  Abd:  Soft, NT, ND, + BS, no HSM  EXT:  no clubbing, no cyanosis, no edema  Neuro:  A&Ox3, CN grossly intact, no focal deficits.  Skin: No rashes or lesions noted        Result Review    Result Review:  I have personally reviewed the results from the time of this admission to 9/15/2022 07:45 EDT and agree with these findings:  [x]  Laboratory  [x]  Microbiology  [x]  Radiology  []  EKG/Telemetry   []  Cardiology/Vascular   []  Pathology  [x]  Old records  []  Other:  Most notable findings include:       Lab 09/15/22  0536 09/14/22  0317 09/14/22  0316 09/13/22  0303 09/13/22  0302 09/12/22  1940 09/12/22  1247   WBC 4.36  --  4.60 4.34  --   --  3.85   HEMOGLOBIN 8.3*  --  8.2* 8.2*  --  8.3* 6.9*   HEMATOCRIT 25.2*  --  25.2* 24.7*  --  24.9* 20.9*   PLATELETS 119*  --  110* 95*  --   --  109*   SODIUM 136 136  --   --  137  --  138   POTASSIUM 4.4 4.9  --   --  5.1  --  4.1   CHLORIDE 104 103  --   --  106  --  105   CO2 25.9 20.9*  --   --  21.0*  --  24.0   BUN 19 40*  --   --  36*  --  29*   CREATININE 1.66* 2.34*  --   --  1.98*  --  1.86*   GLUCOSE 109* 95  --   --  92  --  174*   CALCIUM 9.3 8.4*  --   --  8.2*  --  8.5*   PHOSPHORUS  --  4.0  --   --   --   --   --    TOTAL PROTEIN 5.7* 5.6*  --   --  5.1*  --  5.5*   ALBUMIN 3.70 3.80  --   --  3.30*  --  3.50   GLOBULIN 2.0 1.8  --   --  1.8  --  2.0     EGD with no obvious evidence of bleeding      Assessment & Plan   Assessment / Plan     Active Hospital Problems:  Active Hospital Problems    Diagnosis    • **Acute blood loss anemia    • Acute GI bleeding    • ESRD (end stage renal disease) (HCC)      Added automatically from request for surgery [id number]     • Hypotension         Impression:  Acute GI bleed  Acute blood loss anemia secondary to above  Weakness secondary to above  Hypotension secondary to above  ESRD  Hypomagnesemia  COPD without exacerbation  Thrombocytopenia     Plan:    Recommend holding Xarelto 7 to 14 days  Monitor CBC.  Transfuse for hemoglobin less than 8 or evidence of shock/active bleeding  No obvious bleeding per EGD.  If rebleeds, will need colonoscopy.  Off off PPI.  Appreciate GI assistance  Continue nebulizers and bronchopulmonary hygiene  Dialysis per nephrology. Had dialysis yesterday. Appreciate assistance  Trend renal function and electrolytes.  Replace magnesium IV  Lactic acid has cleared    We will sign off now. Please call with questions.     DVT prophylaxis:  Medical and mechanical DVT prophylaxis orders are present.    CODE STATUS:   Code Status (Patient has no pulse and is not breathing): CPR (Attempt to Resuscitate)  Medical Interventions (Patient has pulse or is breathing): Full Support    Labs, imaging, microbiology, notes and medications personally reviewed  Discussed with primary.  Discussed on multidisciplinary critical care rounds.           Electronically signed by Prateek Talamantes MD at 09/15/22 1342     Melchor Nichole MD at 22 1911           [facility]     Progress Note    Patient Name: Rafael Gomez  : 1945  MRN: [id number]  Primary Care Physician:  Claude Hair MD  Date of admission: 2022      Subjective   Brief summary.  Patient admitted to ICU for hypotension and rectal bleed on Xarelto      HPI:  Patient seen this afternoon in ICU.  No further bleeding.  No hypotension.  Awake alert.  Anxious  Review of Systems       Anxious  No abdominal pain.  Denies nausea vomiting  No fever chills      Objective     Vitals:   Temp:  [97.5 °F (36.4 °C)-99.2 °F (37.3 °C)] 98.2 °F (36.8 °C)  Heart Rate:  [59-97] 69  Resp:  [13-22] 20  BP: ()/() 127/76  Flow (L/min):  [3] 3    Physical Exam :     Elderly  male well-developed well-nourished obese  Neck supple  Heart regular  Lungs clear, diminished breath sounds  Abdomen obese and soft nontender  Extremities trace of edema  Neuro awake alert and anxious      Result Review:  I have personally reviewed the results from the time of this admission to 9/14/2022 19:11 EDT and agree with these findings:  [x]  Laboratory  [x]  Microbiology  [x]  Radiology  [x]  EKG/Telemetry   []  Cardiology/Vascular   []  Pathology  []  Old records  []  Other:       Hemoglobin above 8 g  Assessment / Plan       Active Hospital Problems:  Active Hospital Problems    Diagnosis    • **Acute blood loss anemia    • Acute GI bleeding    • ESRD (end stage renal disease) (HCC)      Added automatically from request for surgery [id number]     • Hypotension        Plan:   Stable and improving  Discussed with patient's wife and patient, transfer out of ICU, waiting for bed.  Recheck labs in a.m.  If remains stable discharge home tomorrow postdialysis.       DVT prophylaxis:  Medical and mechanical DVT prophylaxis orders are present.    CODE STATUS:   Code Status (Patient has no pulse and is not breathing): CPR (Attempt to Resuscitate)  Medical Interventions (Patient has pulse or is breathing): Full Support            Electronically signed by Melchor Nichole MD, 09/14/22, 6:06 PM EDT.             Electronically signed by Melchor Nichole MD at 09/14/22 1912       Elle Schultz RN    Registered Nurse      Plan of Care      Signed   Date of Service:  09/14/22 1731   Creation Time:  09/14/22 1731              Signed              Show:Clear all  []Manual[x]Template[]Copied    Added by:  [x]Elle Schultz RN      []Juliet for details    Goal Outcome Evaluation:  Plan of Care Reviewed With: patient  Progress: no change  Outcome Evaluation: Patient remains alert and oriented x4. No complaints of pain at this time. Transferred to [facility] today. Turned and repositioned throughout the day.                  Gregory Liriano MD     Physician   Nephrology   Consults      Signed   Date of Service:  09/13/22 0816   Creation Time:  09/13/22 0816              Signed        Expand All Collapse All        Show:Clear all  [x]Manual[x]Template[]Copied    Added by:  [x]Gregory Liriano MD      []Juliet for details       Patient Care Team:  Claude Hair MD as PCP - General (Internal Medicine)  Rafael Heredia MD as Consulting Physician (Hematology and Oncology)  Galina Reed MD (Internal Medicine)  Cathleen Morocho MD as Consulting Physician (Hematology and Oncology)  Shagufta Peña MD as Surgeon (Thoracic Surgery)  System, Provider Not In     Chief complaint: ESRD        Subjective         History of Present Illness  76yo with h/o ESRD on dialysis m/w/f.  He had reported bloody BM over last several days with blood.  He is reportedly on xarelto for clotting dialysis machine per pt report.  We were asked to see him for dialysis needs.  He is awake and alert, no acute complaints.     Review of Systems   Constitutional: Positive for fatigue. Negative for chills and fever.   Respiratory: Negative for cough and shortness of breath.    Cardiovascular: Negative for chest pain and leg swelling.   Gastrointestinal: Positive for blood in stool and diarrhea. Negative for abdominal pain, nausea and vomiting.   Genitourinary: Negative for dysuria.   Musculoskeletal: Negative for back pain.   Neurological: Negative for headaches.         Medical History        Past Medical History:   Diagnosis Date   • AA (aortic aneurysm) ([facility])       normal diameter noted on scan 03/18/21   • AL amyloidosis ([facility])       AL amyloidosis protein plasma cell/agent orange   • Anemia       no current issues   • Anesthesia complication       STATES AFTER TOTAL HIP REPLACEMENT WITH SPINAL HAD SEVERE CONFUSION. HOSPITALZED 21 DAYS. AT [facility]   • Anxiety     • Arthritis       OA OF LEFT HIP   • Colitis       REPORTS CURRENTLY INCONTINENT OF BOWEL AND HAVING DIARRHEA   • COPD  (chronic obstructive pulmonary disease) (HCC)       inhalers, neb tx   • Coronary artery disease     • Depression     • Diverticulitis     • ED (erectile dysfunction)     • ESRD (end stage renal disease) on dialysis (HCC)       PORT RIGHT CHEST FOR DIALYSIS. [place]. JAVAD FLORES   • GERD (gastroesophageal reflux disease)     • Gout     • Hemorrhoids     • History of diverticulosis     • History of PAT (paroxysmal atrial tachycardia)     • History of transfusion     • [place] (hard of hearing)       DOES HAVING HEARING AID HE CAN USE   • Hyperlipidemia     • Hypertension     • Leukopenia       no current  issues   • Macrocytic anemia     • Multiple renal cysts       no current issues   • Myocardial infarction (HCC)     • Neuropathy       HANDS AND FEET   • PTSD (post-traumatic stress disorder)     • PVD (peripheral vascular disease) ([facility])     • Requires supplemental oxygen       2L of O2 THROUGH CPAP MACHINE AT NIGHT   • RLS (restless legs syndrome)     • Seasonal allergies     • Short-term memory loss     • Sleep apnea       uses CPAP   • Stroke ([facility])       prior to diallo carotid endarterectomy, no residual issues AROUND EARLY 2000      ,   Surgical History         Past Surgical History:   Procedure Laterality Date   • APPENDECTOMY N/A 2005   • ARTERIOVENOUS FISTULA/SHUNT SURGERY Left 8/30/2022     Procedure: LEFT BRACHIAL CEPALIC ARTERIOVENOUS FISTULA PLACEMENT;  Surgeon: Aba Freeman MD;  Location: [facility];  Service: Vascular;  Laterality: Left;   • CAROTID ENDARTERECTOMY Bilateral 2007   • COLONOSCOPY N/A 1995     done in [facility]   • COLONOSCOPY N/A 5/2/2022     Procedure: COLONOSCOPY WITH APPLICATION OF APC;  Surgeon: Lew Mg MD;  Location: [facility] ENDOSCOPY;  Service: Gastroenterology;  Laterality: N/A;  AVMs IN COLON, DIVERTICULOSIS, HEMORRHOIDS   • CORONARY ARTERY BYPASS GRAFT   1997     4 vessel, no  c/o cp or soa, follows w/ [facility]   • HIATAL HERNIA REPAIR N/A     •  INSERTION HEMODIALYSIS CATHETER N/A 2022     Procedure: Insertion of tunneled catheter for hemodialysis;  Surgeon: Warren Rodríguez MD;  Location: [facility] MAIN OR;  Service: Vascular;  Laterality: N/A;   • REPLACEMENT TOTAL KNEE Left 2015   • ROTATOR CUFF REPAIR Right     • SKIN CANCER EXCISION         back   • THORACOSCOPY Left 2016     Procedure: LEFT VIDEO ASSISTED THORACOTOMY W/ LEFT UPPER LOBE WEDGE RESECTION X 2; EXPLORATORY BRONCHOSCOPY; PAIN PUMP PLACEMENT X 2;  Surgeon: Larry Hernandez MD;  Location: [facility] MAIN OR;  Service:    • TONSILLECTOMY Bilateral     • TOTAL HIP ARTHROPLASTY Right    • TOTAL HIP ARTHROPLASTY Left 2021     Procedure: LEFT TOTAL HIP ARTHROPLASTY;  Surgeon: Man Francois MD;  Location: [facility] MAIN OR;  Service: Orthopedics;  Laterality: Left;      ,         Family History   Problem Relation Age of Onset   • Heart disease Mother     • Multiple myeloma Mother     • Heart disease Father     • Cancer Father           Gallbladder Cancer   • Leukemia Brother           CLL   • Other Brother           Black Lung    • Colon cancer Neg Hx     • Malig Hyperthermia Neg Hx     ,   Social History   Social History            Socioeconomic History   • Marital status:        Spouse name: Liliya   • Years of education: High school   Tobacco Use   • Smoking status: Former Smoker       Packs/day: 1.00       Years: 30.00       Pack years: 30.00       Types: Cigarettes       Quit date:        Years since quittin.7   • Smokeless tobacco: Current User       Types: Chew   • Tobacco comment: ONE HALF CAN DAILY    Vaping Use   • Vaping Use: Never used   Substance and Sexual Activity   • Alcohol use: Not Currently   • Drug use: No   • Sexual activity: Defer               E-cigarette/Vaping   • E-cigarette/Vaping Use Never User        E-cigarette/Vaping Substances      E-cigarette/Vaping Devices         ,   Prescriptions Prior to Admission           Medications Prior  to Admission   Medication Sig Dispense Refill Last Dose   • acetaminophen (TYLENOL) 325 MG tablet Take 650 mg by mouth Every 6 (Six) Hours As Needed for Mild Pain .         • albuterol (PROVENTIL HFA;VENTOLIN HFA) 108 (90 Base) MCG/ACT inhaler Inhale 2 puffs Every 4 (Four) Hours As Needed for Wheezing.         • albuterol (PROVENTIL) (2.5 MG/3ML) 0.083% nebulizer solution Take 2.5 mg by nebulization Every 4 (Four) Hours As Needed for Wheezing or Shortness of Air.         • allopurinol (ZYLOPRIM) 300 MG tablet Take 300 mg by mouth daily.         • atorvastatin (LIPITOR) 40 MG tablet Take 40 mg by mouth Every Night.         • cetirizine (zyrTEC) 10 MG tablet Take 10 mg by mouth Daily.         • citalopram (CeleXA) 40 MG tablet Take 40 mg by mouth Every Morning.         • donepezil (ARICEPT) 5 MG tablet Take 1 tablet by mouth Daily for 30 days. 30 tablet 0     • finasteride (PROSCAR) 5 MG tablet Take 5 mg by mouth Daily.         • gabapentin (NEURONTIN) 100 MG capsule Take 1 capsule by mouth 2 (Two) Times a Day for 30 days. 60 capsule 0     • ketoconazole (NIZORAL) 2 % shampoo Apply 1 application topically to the appropriate area as directed 2 (Two) Times a Week.         • metoprolol succinate XL (TOPROL-XL) 50 MG 24 hr tablet Take 50 mg by mouth Daily.         • pantoprazole (PROTONIX) 20 MG EC tablet Take 20 mg by mouth Daily.         • pramipexole (MIRAPEX) 0.5 MG tablet Take 0.5 mg by mouth Every Night.         • rivaroxaban (XARELTO) 20 MG tablet Take 20 mg by mouth Daily With Dinner.         • Testosterone Cypionate 200 MG/ML solution Inject 200 mg as directed Every 14 (Fourteen) Days.         • Trelegy Ellipta 200-62.5-25 MCG/INH inhaler Inhale 1 puff Daily.            , Scheduled Meds:  arformoterol, 15 mcg, Nebulization, BID - RT  budesonide, 0.5 mg, Nebulization, BID - RT  Pharmacy to Dose enoxaparin (LOVENOX), , Does not apply, Once  senna-docusate sodium, 1 tablet, Oral, BID     , Continuous Infusions:   norepinephrine, 0.02-0.3 mcg/kg/min  pantoprazole, 8 mg/hr, Last Rate: 8 mg/hr (09/13/22 0734)     , PRN Meds:  •  acetaminophen  •  ALPRAZolam  •  senna-docusate sodium **AND** polyethylene glycol **AND** bisacodyl **AND** bisacodyl  •  ipratropium-albuterol  •  nitroglycerin  •  ondansetron  •  sodium chloride  •  temazepam and Allergies:  Cephalexin and Morphine           Objective         Vital Signs  Temp:  [97.4 °F (36.3 °C)-98.9 °F (37.2 °C)] 98.8 °F (37.1 °C)  Heart Rate:  [49-60] 59  Resp:  [12-24] 17  BP: ()/(32-63) 118/51     No intake/output data recorded.  I/O last 3 completed shifts:  In: 307.5 [Blood:307.5]  Out: -      Physical Exam  HENT:      Head: Normocephalic.      Nose: Nose normal.      Mouth/Throat:      Mouth: Mucous membranes are moist.   Eyes:      General: No scleral icterus.     Pupils: Pupils are equal, round, and reactive to light.   Cardiovascular:      Rate and Rhythm: Normal rate and regular rhythm.      Pulses: Normal pulses.      Heart sounds: Normal heart sounds.   Pulmonary:      Effort: Pulmonary effort is normal.   Abdominal:      General: Abdomen is flat.   Neurological:      Mental Status: He is alert.          Results Review:               I reviewed the patient's new clinical results.     WBC       WBC   Date Value Ref Range Status   09/13/2022 4.34 3.40 - 10.80 10*3/mm3 Final   09/12/2022 3.85 3.40 - 10.80 10*3/mm3 Final       HGB       Hemoglobin   Date Value Ref Range Status   09/13/2022 8.2 (L) 13.0 - 17.7 g/dL Final   09/12/2022 8.3 (L) 13.0 - 17.7 g/dL Final   09/12/2022 6.9 (C) 13.0 - 17.7 g/dL Final       HCT       Hematocrit   Date Value Ref Range Status   09/13/2022 24.7 (L) 37.5 - 51.0 % Final   09/12/2022 24.9 (L) 37.5 - 51.0 % Final   09/12/2022 20.9 (C) 37.5 - 51.0 % Final       Platlets No results found for: LABPLAT   MCV       MCV   Date Value Ref Range Status   09/13/2022 92.5 79.0 - 97.0 fL Final   09/12/2022 95.9 79.0 - 97.0 fL Final              Sodium       Sodium   Date Value Ref Range Status   09/13/2022 137 136 - 145 mmol/L Final   09/12/2022 138 136 - 145 mmol/L Final       Potassium       Potassium   Date Value Ref Range Status   09/13/2022 5.1 3.5 - 5.2 mmol/L Final   09/12/2022 4.1 3.5 - 5.2 mmol/L Final       Chloride       Chloride   Date Value Ref Range Status   09/13/2022 106 98 - 107 mmol/L Final   09/12/2022 105 98 - 107 mmol/L Final       CO2       CO2   Date Value Ref Range Status   09/13/2022 21.0 (L) 22.0 - 29.0 mmol/L Final   09/12/2022 24.0 22.0 - 29.0 mmol/L Final       BUN       BUN   Date Value Ref Range Status   09/13/2022 36 (H) 8 - 23 mg/dL Final   09/12/2022 29 (H) 8 - 23 mg/dL Final       Creatinine       Creatinine   Date Value Ref Range Status   09/13/2022 1.98 (H) 0.76 - 1.27 mg/dL Final   09/12/2022 1.86 (H) 0.76 - 1.27 mg/dL Final       Calcium       Calcium   Date Value Ref Range Status   09/13/2022 8.2 (L) 8.6 - 10.5 mg/dL Final   09/12/2022 8.5 (L) 8.6 - 10.5 mg/dL Final       PO4 No results found for: CAPO4   Albumin       Albumin   Date Value Ref Range Status   09/13/2022 3.30 (L) 3.50 - 5.20 g/dL Final   09/12/2022 3.50 3.50 - 5.20 g/dL Final       Magnesium       Magnesium   Date Value Ref Range Status   09/13/2022 1.6 1.6 - 2.4 mg/dL Final   09/12/2022 1.7 1.6 - 2.4 mg/dL Final       Uric Acid No results found for: URICACID                  Assessment & Plan          Acute blood loss anemia    Hypotension    ESRD (end stage renal disease) (HCC)    Acute GI bleeding        Assessment & Plan  ESRD-  Normal dialysis m/w/f at [facility].  [facility] for access, has maturing LUE AVF now.  Had dialysis yesterday, will plan for dialysis again in AM.  Regular dialysis unit closed today, unsure why on xarelto for dialysis circuit but will hold with GI bleed now.  Could use low dose heparin of frequent flushes if not able to use heparin now.  GIB-  S/p PRBC's, hgb improved to 8.2.  Xarelto on hold.    Amyloidosis-  AL, treated previously  HTN-  BP on low side, meds on hold.     I discussed the patients findings and my recommendations with patient     Gregory Liriano MD  09/13/22  08:16 EDT                                 Routing History                        Murray Torres MD    Physician   Gastroenterology   Consults      Signed   Date of Service:  09/13/22 0819   Creation Time:  09/13/22 0819           Consult Orders   Gastroenterology (on-call MD unless specified) [[id number]] ordered by Melchor Nichole MD at 09/12/22 1603          Signed        Expand All Collapse All        Show:Clear all  [x]Manual[x]Template[]Copied    Added by:  [x]Murray Torres MD      []ver for details    [facility]  Initial Inpatient Consult Note     Referring Provider: Hospitalist     Reason for Consultation: GI bleeding, melena, acute blood loss anemia        Subjective         History of present illness:    77 y.o. male with multiple medical problems including end-stage renal disease on hemodialysis Monday Wednesday Friday, chronic anemia, recently started on Xarelto last week because of clotting with his dialysis catheter.  He was admitted now with 4 days of dark stools and rectal bleeding.  He denies significant abdominal pain.  On arrival to emergency department he was found to be hypotensive and he was given Kcentra and 3 units of blood overnight.  He remains normotensive currently but still continues to have episodes of rectal bleeding.  He had a colonoscopy in May of this year with Dr. Mg that showed small AVMs in the cecum and ascending colon which were treated with APC.  He also had diverticulosis of the sigmoid colon.  Hemoglobin on arrival here was 6.9, now 8.1 this morning     Past Medical History:  Medical History        Past Medical History:   Diagnosis Date   • AA (aortic aneurysm) (HCC)       normal diameter noted on scan 03/18/21   • AL amyloidosis (HCC)       AL  amyloidosis protein plasma cell/agent orange   • Anemia       no current issues   • Anesthesia complication       STATES AFTER TOTAL HIP REPLACEMENT WITH SPINAL HAD SEVERE CONFUSION. HOSPITALZED 21 DAYS. AT [facility]   • Anxiety     • Arthritis       OA OF LEFT HIP   • Colitis       REPORTS CURRENTLY INCONTINENT OF BOWEL AND HAVING DIARRHEA   • COPD (chronic obstructive pulmonary disease) ([facility]ly [facility])       inhalers, neb tx   • Coronary artery disease     • Depression     • Diverticulitis     • ED (erectile dysfunction)     • ESRD (end stage renal disease) on dialysis ([facility]ly [facility])       PORT RIGHT CHEST FOR DIALYSIS. ELVIRA KELLY. JAVAD FLORES   • GERD (gastroesophageal reflux disease)     • Gout     • Hemorrhoids     • History of diverticulosis     • History of PAT (paroxysmal atrial tachycardia)     • History of transfusion     • [place] (hard of hearing)       DOES HAVING HEARING AID HE CAN USE   • Hyperlipidemia     • Hypertension     • Leukopenia       no current  issues   • Macrocytic anemia     • Multiple renal cysts       no current issues   • Myocardial infarction ([facility])     • Neuropathy       HANDS AND FEET   • PTSD (post-traumatic stress disorder)     • PVD (peripheral vascular disease) ([facility])     • Requires supplemental oxygen       2L of O2 THROUGH CPAP MACHINE AT NIGHT   • RLS (restless legs syndrome)     • Seasonal allergies     • Short-term memory loss     • Sleep apnea       uses CPAP   • Stroke ([facility])       prior to diallo carotid endarterectomy, no residual issues AROUND EARLY 2000         Past Surgical History:  Surgical History         Past Surgical History:   Procedure Laterality Date   • APPENDECTOMY N/A 2005   • ARTERIOVENOUS FISTULA/SHUNT SURGERY Left 8/30/2022     Procedure: LEFT BRACHIAL CEPALIC ARTERIOVENOUS FISTULA PLACEMENT;  Surgeon: Aba Freeman MD;  Location: [facility];  Service: Vascular;  Laterality: Left;   • CAROTID ENDARTERECTOMY Bilateral 2007   • COLONOSCOPY N/A 1995     done in [facility]    • COLONOSCOPY N/A 2022     Procedure: COLONOSCOPY WITH APPLICATION OF APC;  Surgeon: Lew Mg MD;  Location: [facility] ENDOSCOPY;  Service: Gastroenterology;  Laterality: N/A;  AVMs IN COLON, DIVERTICULOSIS, HEMORRHOIDS   • CORONARY ARTERY BYPASS GRAFT        4 vessel, no  c/o cp or soa, follows w/ D. Sloane JHAVERI   • HIATAL HERNIA REPAIR N/A     • INSERTION HEMODIALYSIS CATHETER N/A 2022     Procedure: Insertion of tunneled catheter for hemodialysis;  Surgeon: Warren Rodríguez MD;  Location: [facility] MAIN OR;  Service: Vascular;  Laterality: N/A;   • REPLACEMENT TOTAL KNEE Left 2015   • ROTATOR CUFF REPAIR Right     • SKIN CANCER EXCISION         back   • THORACOSCOPY Left 2016     Procedure: LEFT VIDEO ASSISTED THORACOTOMY W/ LEFT UPPER LOBE WEDGE RESECTION X 2; EXPLORATORY BRONCHOSCOPY; PAIN PUMP PLACEMENT X 2;  Surgeon: Larry Hernandez MD;  Location: [facility] OR;  Service:    • TONSILLECTOMY Bilateral     • TOTAL HIP ARTHROPLASTY Right    • TOTAL HIP ARTHROPLASTY Left 2021     Procedure: LEFT TOTAL HIP ARTHROPLASTY;  Surgeon: Man Francois MD;  Location: [facility] MAIN OR;  Service: Orthopedics;  Laterality: Left;         Social History:   Social History            Tobacco Use   • Smoking status: Former Smoker       Packs/day: 1.00       Years: 30.00       Pack years: 30.00       Types: Cigarettes       Quit date:        Years since quittin.7   • Smokeless tobacco: Current User       Types: Chew   • Tobacco comment: ONE HALF CAN DAILY    Substance Use Topics   • Alcohol use: Not Currently      Family History:        Family History   Problem Relation Age of Onset   • Heart disease Mother     • Multiple myeloma Mother     • Heart disease Father     • Cancer Father           Gallbladder Cancer   • Leukemia Brother           CLL   • Other Brother           Black Lung    • Colon cancer Neg Hx     • Malig Hyperthermia Neg Hx           Home Meds:  Prescriptions  Prior to Admission           Medications Prior to Admission   Medication Sig Dispense Refill Last Dose   • acetaminophen (TYLENOL) 325 MG tablet Take 650 mg by mouth Every 6 (Six) Hours As Needed for Mild Pain .         • albuterol (PROVENTIL HFA;VENTOLIN HFA) 108 (90 Base) MCG/ACT inhaler Inhale 2 puffs Every 4 (Four) Hours As Needed for Wheezing.         • albuterol (PROVENTIL) (2.5 MG/3ML) 0.083% nebulizer solution Take 2.5 mg by nebulization Every 4 (Four) Hours As Needed for Wheezing or Shortness of Air.         • allopurinol (ZYLOPRIM) 300 MG tablet Take 300 mg by mouth daily.         • atorvastatin (LIPITOR) 40 MG tablet Take 40 mg by mouth Every Night.         • cetirizine (zyrTEC) 10 MG tablet Take 10 mg by mouth Daily.         • citalopram (CeleXA) 40 MG tablet Take 40 mg by mouth Every Morning.         • donepezil (ARICEPT) 5 MG tablet Take 1 tablet by mouth Daily for 30 days. 30 tablet 0     • finasteride (PROSCAR) 5 MG tablet Take 5 mg by mouth Daily.         • gabapentin (NEURONTIN) 100 MG capsule Take 1 capsule by mouth 2 (Two) Times a Day for 30 days. 60 capsule 0     • ketoconazole (NIZORAL) 2 % shampoo Apply 1 application topically to the appropriate area as directed 2 (Two) Times a Week.         • metoprolol succinate XL (TOPROL-XL) 50 MG 24 hr tablet Take 50 mg by mouth Daily.         • pantoprazole (PROTONIX) 20 MG EC tablet Take 20 mg by mouth Daily.         • pramipexole (MIRAPEX) 0.5 MG tablet Take 0.5 mg by mouth Every Night.         • rivaroxaban (XARELTO) 20 MG tablet Take 20 mg by mouth Daily With Dinner.         • Testosterone Cypionate 200 MG/ML solution Inject 200 mg as directed Every 14 (Fourteen) Days.         • Trelegy Ellipta 200-62.5-25 MCG/INH inhaler Inhale 1 puff Daily.               Current Meds:   arformoterol, 15 mcg, Nebulization, BID - RT  budesonide, 0.5 mg, Nebulization, BID - RT  Pharmacy to Dose enoxaparin (LOVENOX), , Does not apply, Once  senna-docusate sodium, 1  tablet, Oral, BID        Allergies:        Allergies   Allergen Reactions   • Cephalexin Nausea And Vomiting   • Morphine Other (See Comments)       Pt had long recovery involving morphine administration post surgery in December 2021.        Review of Systems  Pertinent items are noted in HPI, all other systems reviewed and negative            Vital Signs  Temp:  [97.4 °F (36.3 °C)-98.9 °F (37.2 °C)] 98.8 °F (37.1 °C)  Heart Rate:  [49-60] 59  Resp:  [12-24] 17  BP: ()/(32-63) 118/51  Physical Exam:  General Appearance:    Alert, cooperative, in no acute distress   Head:    Normocephalic, without obvious abnormality, atraumatic   Eyes:          conjunctivae and sclerae normal, no   icterus   Throat:   no thrush, oral mucosa moist   Neck:   Supple, no adenopathy   Lungs:     Clear to auscultation bilaterally    Heart:    Regular rhythm and normal rate    Chest Wall:    No abnormalities observed   Abdomen:     Soft, nondistended, nontender; normal bowel sounds   Extremities:   no edema, no redness   Skin:   No bruising or rash   Psychiatric:  normal mood and insight      Results Review:  [x]?  Laboratory   [x]?  Radiology  []?  Pathology        I reviewed the patient's new clinical results.            Results from last 7 days   Lab Units 09/13/22  0303 09/12/22  1940 09/12/22  1247   WBC 10*3/mm3 4.34  --  3.85   HEMOGLOBIN g/dL 8.2* 8.3* 6.9*   HEMATOCRIT % 24.7* 24.9* 20.9*   PLATELETS 10*3/mm3 95*  --  109*            Results from last 7 days   Lab Units 09/13/22  0302 09/12/22  1247   SODIUM mmol/L 137 138   POTASSIUM mmol/L 5.1 4.1   CHLORIDE mmol/L 106 105   CO2 mmol/L 21.0* 24.0   BUN mg/dL 36* 29*   CREATININE mg/dL 1.98* 1.86*   CALCIUM mg/dL 8.2* 8.5*   BILIRUBIN mg/dL 0.5 0.5   ALK PHOS U/L 80 90   ALT (SGPT) U/L 9 10   AST (SGOT) U/L 14 13   GLUCOSE mg/dL 92 174*           Results from last 7 days   Lab Units 09/12/22  1247   INR   2.33*            Lab Results   Lab Value Date/Time     LIPASE 57  06/11/2022 2320     LIPASE 80 (H) 01/23/2021 0017         Radiology:  CT Abdomen Pelvis With Contrast   Final Result       XR Chest 1 View   Final Result                   Assessment & Plan            Patient Active Problem List   Diagnosis   • Lung nodule, solitary   • Cough   • Wheeze   • Multiple lung nodules on CT   • Follow-up examination, following other surgery   • Renal mass   • Plasmacytoma (HCC)   • Macrocytic anemia   • Leukopenia   • Monoclonal gammopathy   • Thrombocytopenia (HCC)   • AL amyloidosis (HCC)   • Neck pain   • Chronic low back pain   • Cervical spondylosis without myelopathy   • Left hip pain   • Encounter for preadmission testing   • Hypotension   • CKD (chronic kidney disease) stage 3, GFR 30-59 ml/min (HCC)   • Acute blood loss anemia   • Acute renal failure superimposed on chronic kidney disease (HCC)   • Severe sepsis (HCC)   • Ileus (HCC)   • Metabolic encephalopathy   • Anemia due to stage 3 chronic kidney disease (HCC)   • Anasarca   • Acute on chronic diastolic CHF (congestive heart failure) (HCC)   • Pulmonary edema   • Hospital psychosis (HCC)   • Frequent PVCs   • ESRD (end stage renal disease) (HCC)   • Insomnia disorder   • Acute GI bleeding   • Acute blood loss anemia         Plan:  Patient recently started on Xarelto because of clotting issues with his dialysis catheter and now with GI bleeding.  He has a history of AVM seen in the ascending colon by his colonoscopy in May of this year as well as diverticulosis.  I will schedule him for an upper endoscopy to rule out peptic ulcer disease and upper GI bleeding this morning.  I discussed risk and benefits of the procedure with he and his wife and they are willing to proceed.  Xarelto has been held and Kcentra was given.  He received 3 units of blood.  He is currently on Protonix infusion.  He did receive dialysis yesterday        I discussed the patients findings and my recommendations with patient, family and nursing  staff.     Murary Torres MD

## 2022-09-15 NOTE — PROGRESS NOTES
[facility]  Inpatient Progress Note    Reason for Follow Up: Acute blood loss anemia    Subjective     Interval History:   No events overnight, no further bleeding, status post EGD, colonoscopy reviewed from May 2022.    Current Facility-Administered Medications:   •  !NOT ORDERED- rivaroxaban (Xarelto), , Does not apply, Daily With Dinner, Melchor Nichole MD  •  acetaminophen (TYLENOL) tablet 650 mg, 650 mg, Oral, Q4H PRN, Murray Torres MD, 650 mg at 09/14/22 1332  •  ALPRAZolam (XANAX) tablet 0.25 mg, 0.25 mg, Oral, Q6H PRN, uMrray Torres MD, 0.25 mg at 09/14/22 1039  •  arformoterol (BROVANA) nebulizer solution 15 mcg, 15 mcg, Nebulization, BID - RT, Murray Torres MD, 15 mcg at 09/15/22 0648  •  sennosides-docusate (PERICOLACE) 8.6-50 MG per tablet 1 tablet, 1 tablet, Oral, BID **AND** polyethylene glycol (MIRALAX) packet 17 g, 17 g, Oral, Daily PRN **AND** bisacodyl (DULCOLAX) EC tablet 5 mg, 5 mg, Oral, Daily PRN **AND** bisacodyl (DULCOLAX) suppository 10 mg, 10 mg, Rectal, Daily PRN, Murray Torres MD  •  budesonide (PULMICORT) nebulizer solution 0.5 mg, 0.5 mg, Nebulization, BID - RT, Murray Torres MD, 0.5 mg at 09/15/22 0648  •  ipratropium-albuterol (DUO-NEB) nebulizer solution 3 mL, 3 mL, Nebulization, Q2H PRN, Murray Torres MD  •  nitroglycerin (NITROSTAT) SL tablet 0.4 mg, 0.4 mg, Sublingual, Q5 Min PRN, Murray Torres MD  •  ondansetron (ZOFRAN) injection 4 mg, 4 mg, Intravenous, Q4H PRN, Murray Torres MD  •  pramipexole (MIRAPEX) tablet 1 mg, 1 mg, Oral, Q8H PRN, Melchor Nichole MD, 1 mg at 09/14/22 2047  •  sodium chloride 0.9 % flush 10 mL, 10 mL, Intravenous, PRN, Murray Torres MD  •  temazepam (RESTORIL) capsule 15 mg, 15 mg, Oral, Nightly PRN, Murray Torres MD, 15 mg at 09/14/22 2047  Review of Systems:    All systems were reviewed and negative except for that previously mentioned in the  HPI    Objective     Vital Signs  Temp:  [97.8 °F (36.6 °C)-98.4 °F (36.9 °C)] 98.1 °F (36.7 °C)  Heart Rate:  [52-77] 71  Resp:  [18-20] 20  BP: (127-149)/(44-89) 148/57  Body mass index is 33.6 kg/m².    Intake/Output Summary (Last 24 hours) at 9/15/2022 1523  Last data filed at 9/15/2022 1251  Gross per 24 hour   Intake 720 ml   Output 400 ml   Net 320 ml     I/O this shift:  In: 480 [P.O.:480]  Out: -      Physical Exam:   General: patient awake, alert and cooperative   Eyes: Normal lids and lashes, no scleral icterus   Neck: supple, normal ROM   Skin: warm and dry, not jaundiced   Cardiovascular: regular rhythm and rate, no murmurs auscultated   Pulm: clear to auscultation bilaterally, regular and unlabored   Abdomen: soft, nontender, nondistended; normal bowel sounds   Rectal: deferred   Extremities: no rash or edema   Psychiatric: Normal mood and behavior; memory intact     Results Review:     I reviewed the patient's new clinical results.    Results from last 7 days   Lab Units 09/15/22  0536 09/14/22  0316 09/13/22  0303   WBC 10*3/mm3 4.36 4.60 4.34   HEMOGLOBIN g/dL 8.3* 8.2* 8.2*   HEMATOCRIT % 25.2* 25.2* 24.7*   PLATELETS 10*3/mm3 119* 110* 95*     Results from last 7 days   Lab Units 09/15/22  0536 09/14/22 0317 09/13/22  0302   SODIUM mmol/L 136 136 137   POTASSIUM mmol/L 4.4 4.9 5.1   CHLORIDE mmol/L 104 103 106   CO2 mmol/L 25.9 20.9* 21.0*   BUN mg/dL 19 40* 36*   CREATININE mg/dL 1.66* 2.34* 1.98*   CALCIUM mg/dL 9.3 8.4* 8.2*   BILIRUBIN mg/dL 0.4 0.4 0.5   ALK PHOS U/L 97 89 80   ALT (SGPT) U/L 12 10 9   AST (SGOT) U/L 16 14 14   GLUCOSE mg/dL 109* 95 92     Results from last 7 days   Lab Units 09/12/22  1247   INR  2.33*     Lab Results   Lab Value Date/Time    LIPASE 57 06/11/2022 2320    LIPASE 80 (H) 01/23/2021 0017       Radiology:  [unfilled]        Assessment:     Patient Active Problem List   Diagnosis   • Lung nodule, solitary   • Cough   • Wheeze   • Multiple lung nodules on CT    • Follow-up examination, following other surgery   • Renal mass   • Plasmacytoma (HCC)   • Macrocytic anemia   • Leukopenia   • Monoclonal gammopathy   • Thrombocytopenia (HCC)   • AL amyloidosis (HCC)   • Neck pain   • Chronic low back pain   • Cervical spondylosis without myelopathy   • Left hip pain   • Encounter for preadmission testing   • Hypotension   • CKD (chronic kidney disease) stage 3, GFR 30-59 ml/min (HCC)   • Acute blood loss anemia   • Acute renal failure superimposed on chronic kidney disease (HCC)   • Severe sepsis (HCC)   • Ileus (HCC)   • Metabolic encephalopathy   • Anemia due to stage 3 chronic kidney disease (HCC)   • Anasarca   • Acute on chronic diastolic CHF (congestive heart failure) (HCC)   • Pulmonary edema   • Hospital psychosis (HCC)   • Frequent PVCs   • ESRD (end stage renal disease) (HCC)   • Insomnia disorder   • Acute GI bleeding   • Acute blood loss anemia        Plan:   No further bleeding after discontinuation of Xarelto.  Patient has had melena EGD and recent colonoscopy.  Colonoscopy notable for ascending colon AVMs treated with cautery and left-sided diverticulosis.  Would avoid Xarelto in the immediate near future.  Okay to discharge home from GI perspective.  Continue to monitor hemoglobin as an outpatient.    I discussed the patients findings and my recommendations with patient and family.    Murray Torres M.D.  Gastroenterology

## 2022-09-15 NOTE — PROGRESS NOTES
Pulmonary / Critical Care Progress Note      Patient Name: Rafael Gomez  : 1945  MRN: [id number]  Attending:  Melchor Nichole MD   Date of admission: 2022    Subjective   Subjective   Follow-up for GI bleed    Over past 24 hours:  EGD with no obvious bleeding  No rectal bleeding noted overnight  No hypotension noted  Dialysis today  Remains weak and fatigued  No chest pain  No nausea, fevers or chills  Magnesium low this morning    Review of Systems  Constitutional symptoms:  Denied complaints   Ear, nose, throat: Denied complaints  Cardiovascular:  Denied complaints  Respiratory: Denied complaints  Gastrointestinal: Denied complaints  Musculoskeletal: Weakness, otherwise denied complaints  Neurologic: Denied complaints  Skin: Denied complaints        Objective   Objective     Vitals:   Vital signs for last 24 hours:  Temp:  [97.8 °F (36.6 °C)-99.2 °F (37.3 °C)] 98.4 °F (36.9 °C)  Heart Rate:  [52-97] 77  Resp:  [17-22] 20  BP: (127-149)/(44-89) 149/89    Intake/Output last 3 shifts:  I/O last 3 completed shifts:  In: 3699.6 [P.O.:240; I.V.:120; Blood:3339.6]  Out: 2850 [Urine:850; Other:2000]  Intake/Output this shift:  No intake/output data recorded.    Vent settings for last 24 hours:       Hemodynamic parameters for last 24 hours:       Physical Exam   Vital Signs Reviewed   General:  WDWN, Alert, NAD.    HEENT:  PERRL, EOMI.  OP, nares clear  Chest:  good aeration, clear to auscultation bilaterally, tympanic to percussion bilaterally, no work of breathing noted  CV: RRR, no MGR, pulses 2+, equal.  Abd:  Soft, NT, ND, + BS, no HSM  EXT:  no clubbing, no cyanosis, no edema  Neuro:  A&Ox3, CN grossly intact, no focal deficits.  Skin: No rashes or lesions noted        Result Review    Result Review:  I have personally reviewed the results from the time of this admission to 9/15/2022 07:45 EDT and agree with these findings:  [x]  Laboratory  [x]  Microbiology  [x]  Radiology  []  EKG/Telemetry   []   Cardiology/Vascular   []  Pathology  [x]  Old records  []  Other:  Most notable findings include:       Lab 09/15/22  0536 09/14/22  0317 09/14/22  0316 09/13/22  0303 09/13/22 0302 09/12/22 1940 09/12/22  1247   WBC 4.36  --  4.60 4.34  --   --  3.85   HEMOGLOBIN 8.3*  --  8.2* 8.2*  --  8.3* 6.9*   HEMATOCRIT 25.2*  --  25.2* 24.7*  --  24.9* 20.9*   PLATELETS 119*  --  110* 95*  --   --  109*   SODIUM 136 136  --   --  137  --  138   POTASSIUM 4.4 4.9  --   --  5.1  --  4.1   CHLORIDE 104 103  --   --  106  --  105   CO2 25.9 20.9*  --   --  21.0*  --  24.0   BUN 19 40*  --   --  36*  --  29*   CREATININE 1.66* 2.34*  --   --  1.98*  --  1.86*   GLUCOSE 109* 95  --   --  92  --  174*   CALCIUM 9.3 8.4*  --   --  8.2*  --  8.5*   PHOSPHORUS  --  4.0  --   --   --   --   --    TOTAL PROTEIN 5.7* 5.6*  --   --  5.1*  --  5.5*   ALBUMIN 3.70 3.80  --   --  3.30*  --  3.50   GLOBULIN 2.0 1.8  --   --  1.8  --  2.0     EGD with no obvious evidence of bleeding      Assessment & Plan   Assessment / Plan     Active Hospital Problems:  Active Hospital Problems    Diagnosis    • **Acute blood loss anemia    • Acute GI bleeding    • ESRD (end stage renal disease) ([facility])      Added automatically from request for surgery [id number]     • Hypotension        Impression:  Acute GI bleed  Acute blood loss anemia secondary to above  Weakness secondary to above  Hypotension secondary to above  ESRD  Hypomagnesemia  COPD without exacerbation  Thrombocytopenia     Plan:    Recommend holding Xarelto 7 to 14 days  Monitor CBC.  Transfuse for hemoglobin less than 8 or evidence of shock/active bleeding  No obvious bleeding per EGD.  If rebleeds, will need colonoscopy.  Off off PPI.  Appreciate GI assistance  Continue nebulizers and bronchopulmonary hygiene  Dialysis per nephrology. Had dialysis yesterday. Appreciate assistance  Trend renal function and electrolytes.  Replace magnesium IV  Lactic acid has cleared    We will sign off now.  Please call with questions.     DVT prophylaxis:  Medical and mechanical DVT prophylaxis orders are present.    CODE STATUS:   Code Status (Patient has no pulse and is not breathing): CPR (Attempt to Resuscitate)  Medical Interventions (Patient has pulse or is breathing): Full Support    Labs, imaging, microbiology, notes and medications personally reviewed  Discussed with primary.  Discussed on multidisciplinary critical care rounds.

## 2022-09-15 NOTE — DISCHARGE SUMMARY
[facility]         DISCHARGE SUMMARY    Patient Name: Rafael Gomez  : 1945  MRN: [id number]    Date of Admission: 2022  Date of Discharge: September 15, 2022  Primary Care Physician: Claude Hair MD    Consults     Date and Time Order Name Status Description    2022  8:03 PM Inpatient Pulmonology Consult Completed     2022  4:03 PM Gastroenterology (on-call MD unless specified) Completed           Presenting Problem:   Hemorrhagic shock (HCC) [R57.8]  Acute GI bleeding [K92.2]  End-stage renal disease on hemodialysis (HCC) [N18.6, Z99.2]    Active and Resolved Hospital Problems:  Active Hospital Problems    Diagnosis POA   • **Acute blood loss anemia [D62] Yes   • ESRD (end stage renal disease) (HCC) [N18.6] Yes      Resolved Hospital Problems    Diagnosis POA   • Acute GI bleeding [K92.2] Yes   • Hypotension [I95.9] Yes         Hospital Course     Hospital Course:  Rafael Gomez is a 77 y.o. male admitted to hospital for rectal bleed and severe anemia.  Patient was started on Eliquis recently, according to patient he was started for clotting and during dialysis.  Its not clear whether it was started by nephrologist or vascular surgeon.  Patient had massive bleed and was bleeding per rectum.  Started on IV Protonix as well as transfusion of packed red bloods initiated admitted to ICU.  Patient was given Kcentra.  Further he was admitted to ICU.  He was monitored closely in ICU.  Gastroenterologist Dr. Torres saw patient and performed EGD, no active bleed from EGD noted.  He had colonoscopy in May so there was no repeat colonoscopy performed.  He recommended doing colonoscopy patient continues to bleed further.    Patient was seen by intensivist as well as nephrologist, he was continued on his dialysis as blood pressure improved posttransfusion.  Patient is doing much better he has no further bleeding.  No abdominal pain.  He is awake alert he was transferred out  of ICU 2 days ago.  He is feeling better his wife is bedside and wants to go home he was cleared by gastroenterologist for discharge.  His Xarelto is on hold and discontinued.    Patient's labs are stable no further bleeding noted today.  He will be discharged home        DISCHARGE Follow Up Recommendations for labs and diagnostics:   Discharge to home with outpatient follow-up.  To continue hemodialysis with nephrology group.      Day of Discharge     Vital Signs:  Temp:  [98 °F (36.7 °C)-98.4 °F (36.9 °C)] 98.1 °F (36.7 °C)  Heart Rate:  [52-77] 64  Resp:  [18-20] 20  BP: (127-156)/(44-89) 156/63  Flow (L/min):  [3] 3    Physical Exam:    Elderly male not in acute distress.  Anxious to go home.  Mild pallor.  Neck supple.  Heart regular.  Lungs clear.  Abdomen soft and obese nontender.  Extremities trace of edema.      Pertinent  and/or Most Recent Results     LAB RESULTS:      Lab 09/15/22  0536 09/14/22  0622 09/14/22  0316 09/13/22  0303 09/12/22  1940 09/12/22  1247   WBC 4.36  --  4.60 4.34  --  3.85   HEMOGLOBIN 8.3*  --  8.2* 8.2* 8.3* 6.9*   HEMATOCRIT 25.2*  --  25.2* 24.7* 24.9* 20.9*   PLATELETS 119*  --  110* 95*  --  109*   NEUTROS ABS 2.70  --  2.88 2.81  --  2.65   IMMATURE GRANS (ABS) 0.02  --  0.01 0.01  --  0.02   LYMPHS ABS 0.93  --  1.13 0.92  --  0.73   MONOS ABS 0.43  --  0.33 0.32  --  0.22   EOS ABS 0.23  --  0.21 0.23  --  0.20   MCV 95.5  --  94.7 92.5  --  95.9   LACTATE  --  1.2 2.2*  --   --   --    PROTIME  --   --   --   --   --  26.0*   APTT  --   --   --   --   --  51.1*         Lab 09/15/22  0536 09/14/22  0317 09/13/22  0302 09/12/22  1247   SODIUM 136 136 137 138   POTASSIUM 4.4 4.9 5.1 4.1   CHLORIDE 104 103 106 105   CO2 25.9 20.9* 21.0* 24.0   ANION GAP 6.1 12.1 10.0 9.0   BUN 19 40* 36* 29*   CREATININE 1.66* 2.34* 1.98* 1.86*   EGFR 42.5* 28.1* 34.4* 37.0*   GLUCOSE 109* 95 92 174*   CALCIUM 9.3 8.4* 8.2* 8.5*   MAGNESIUM 2.1 1.6 1.6 1.7   PHOSPHORUS  --  4.0  --   --           Lab 09/15/22  0536 09/14/22  0317 09/13/22  0302 09/12/22  1247   TOTAL PROTEIN 5.7* 5.6* 5.1* 5.5*   ALBUMIN 3.70 3.80 3.30* 3.50   GLOBULIN 2.0 1.8 1.8 2.0   ALT (SGPT) 12 10 9 10   AST (SGOT) 16 14 14 13   BILIRUBIN 0.4 0.4 0.5 0.5   ALK PHOS 97 89 80 90         Lab 09/12/22  1247   TROPONIN T 0.024   PROTIME 26.0*   INR 2.33*             Lab 09/12/22  1407   ABO TYPING O   RH TYPING Positive   ANTIBODY SCREEN Negative         Brief Urine Lab Results  (Last result in the past 365 days)      Color   Clarity   Blood   Leuk Est   Nitrite   Protein   CREAT   Urine HCG        09/12/22 1934 Yellow   Clear   Negative   Negative   Negative   Negative               Microbiology Results (last 10 days)     ** No results found for the last 240 hours. **          PROCEDURES:    [unfilled]         Results for orders placed during the hospital encounter of 07/28/22    Duplex Initial Vein Mapping Hemodialysis Access Bilateral CAR    Interpretation Summary  · The right cephalic vein is patent and of adequate size in the upper arm.  · The right radial artery is patent and of adequate size.  · The left radial artery is patent and of adequate size.  · The right basilic vein is patent and of adequate size in the upper arm.  · The right basilic vein is patent and of adequate size in the forearm.  · The left cephalic vein is patent and of adequate size in the upper arm.  · The left cephalic vein is patent and of adequate size in the forearm.  · The left basilic vein is patent and of adequate size in the upper arm.      Results for orders placed during the hospital encounter of 07/28/22    Duplex Initial Vein Mapping Hemodialysis Access Bilateral CAR    Interpretation Summary  · The right cephalic vein is patent and of adequate size in the upper arm.  · The right radial artery is patent and of adequate size.  · The left radial artery is patent and of adequate size.  · The right basilic vein is patent and of adequate size in the upper  arm.  · The right basilic vein is patent and of adequate size in the forearm.  · The left cephalic vein is patent and of adequate size in the upper arm.  · The left cephalic vein is patent and of adequate size in the forearm.  · The left basilic vein is patent and of adequate size in the upper arm.      Results for orders placed during the hospital encounter of 11/16/21    Adult Transthoracic Echo Complete w/ Color, Spectral and Contrast if necessary per protocol    Interpretation Summary  · Peak velocity of the flow distal to the aortic valve is 218 cm/s. Aortic valve maximum pressure gradient is 19 mmHg. Aortic valve dimensionless index is 0.5 .  · Calculated left ventricular EF = 65% Estimated left ventricular EF was in agreement with the calculated left ventricular EF.  · Left ventricular diastolic function is consistent with (grade II w/high LAP) pseudonormalization.  · Moderate mitral valve regurgitation is present.  · Mild aortic valve stenosis is present.      Labs Pending at Discharge:        Discharge Details        Discharge Medications      Continue These Medications      Instructions Start Date   acetaminophen 325 MG tablet  Commonly known as: TYLENOL   650 mg, Oral, Every 6 Hours PRN      albuterol sulfate  (90 Base) MCG/ACT inhaler  Commonly known as: PROVENTIL HFA;VENTOLIN HFA;PROAIR HFA   2 puffs, Inhalation, Every 4 Hours PRN      albuterol (2.5 MG/3ML) 0.083% nebulizer solution  Commonly known as: PROVENTIL   2.5 mg, Nebulization, Every 4 Hours PRN      allopurinol 300 MG tablet  Commonly known as: ZYLOPRIM   300 mg, Oral, Daily      atorvastatin 40 MG tablet  Commonly known as: LIPITOR   40 mg, Oral, Nightly      cetirizine 10 MG tablet  Commonly known as: zyrTEC   10 mg, Oral, Daily      citalopram 40 MG tablet  Commonly known as: CeleXA   40 mg, Oral, Every Morning      donepezil 5 MG tablet  Commonly known as: ARICEPT   5 mg, Oral, Daily      finasteride 5 MG tablet  Commonly known as:  PROSCAR   5 mg, Oral, Daily      gabapentin 100 MG capsule  Commonly known as: NEURONTIN   100 mg, Oral, 2 Times Daily      ketoconazole 2 % shampoo  Commonly known as: NIZORAL   1 application, Topical, 2 Times Weekly      metoprolol succinate XL 50 MG 24 hr tablet  Commonly known as: TOPROL-XL   50 mg, Oral, Daily      pantoprazole 20 MG EC tablet  Commonly known as: PROTONIX   20 mg, Oral, Daily      pramipexole 0.5 MG tablet  Commonly known as: MIRAPEX   0.5 mg, Oral, Nightly      Testosterone Cypionate 200 MG/ML solution   200 mg, Injection, Every 14 Days      Trelegy Ellipta 200-62.5-25 MCG/INH inhaler  Generic drug: Fluticasone-Umeclidin-Vilant   1 puff, Inhalation, Daily - RT         Stop These Medications    rivaroxaban 20 MG tablet  Commonly known as: XARELTO            Allergies   Allergen Reactions   • Cephalexin Nausea And Vomiting   • Morphine Other (See Comments)     Pt had long recovery involving morphine administration post surgery in December 2021.           Discharge Disposition:    Home or Self Care    Diet:    Heart healthy, renal diet    Discharge Activity:     Activity Instructions     Activity as Tolerated              Future Appointments   Date Time Provider Department Center   9/29/2022 10:50 AM LAB CHAIR 1 [facility]  [facility]   9/29/2022 11:20 AM Rafael Heredia MD [facility]       Additional Instructions for the Follow-ups that You Need to Schedule     Discharge Follow-up with PCP   As directed       Currently Documented PCP:    Claude Hair MD    PCP Phone Number:    [phone]     Follow Up Details: next week         Discharge Follow-up with Specified Provider: Dr Jerome for HD as recommended   As directed      To: Dr Jerome for HD as recommended               Time spent on Discharge including face to face service: 29 minutes.            I have dictated this note utilizing Dragon Dictation.             Please note that portions of this note were completed with a  voice recognition program.             Part of this note may be an electronic transcription/translation of spoken language to printed text         using the Dragon Dictation System.       Electronically signed by Melchor Nichole MD, 09/15/22, 4:23 PM EDT.

## 2022-09-16 NOTE — OUTREACH NOTE
Prep Survey    Flowsheet Row Responses   [facility] facility patient discharged from? [facility]   Is LACE score < 7 ? No   Emergency Room discharge w/ pulse ox? No   Eligibility Readm Mgmt   Discharge diagnosis Acute GI bleeding    Does the patient have one of the following disease processes/diagnoses(primary or secondary)? Other   Does the patient have Home health ordered? No   Is there a DME ordered? No   Prep survey completed? Yes          ES FRAZIER - Registered Nurse

## 2022-09-16 NOTE — PAYOR COMM NOTE
"Shameka Damon (77 y.o. Male)             Date of Birth   1945    Social Security Number       Address   [address] [place] [address] [address] [place] [address]    Home Phone   [phone]    MRN   [id number]       Anabaptism   Hoahaoism    Marital Status                               Admission Date   9/12/22    Admission Type   Emergency    Admitting Provider   Melchor Nichole MD    Attending Provider       Department, Room/Bed   [address], 4006/1       Discharge Date   9/15/2022    Discharge Disposition   Home or Self Care    Discharge Destination                               Attending Provider: (none)   Allergies: Cephalexin, Morphine    Isolation: None   Infection: None   Code Status: Prior   Advance Care Planning Activity    Ht: 177.8 cm (70\")   Wt: 106 kg (234 lb 3.2 oz)    Admission Cmt: None   Principal Problem: Acute blood loss anemia [D62]                 Active Insurance as of 9/12/2022     Primary Coverage     Payor Plan Insurance Group Employer/Plan Group    [facility] [employer] MEDICARE REPLACEMENT NGN     Payor Plan Address Payor Plan Phone Number Payor Plan Fax Number Effective Dates    [address] [phone]  1/1/2022 - None Entered    [facility] [address]       Subscriber Name Subscriber Birth Date Member ID       Shameka Damon 1945 [id number]           Secondary Coverage     Payor Plan Insurance Group Employer/Plan Group     FOR LIFE  FOR LIFE [employer] SUP  NGN     Payor Plan Address Payor Plan Phone Number Payor Plan Fax Number Effective Dates    [address] [phone]  6/27/2016 - None Entered    [facility] [address]       Subscriber Name Subscriber Birth Date Member ID       SHAMEKA DAMON 1945 [id number]                 Emergency Contacts      (Rel.) Home Phone Work Phone Mobile Phone    PatriciaLiliya (Spouse) [phone] -- [phone]        # [id number]   Discharged        CONTACT   DERRELL " S UTILIZATION REVIEW    [facility]  [address] [address]  TAX ID [id number]  [facility]  [phone]       [phone]   [phone]       Discharge Summary      Melchor Nichole MD at 09/15/22 1624                         [facility]         DISCHARGE SUMMARY    Patient Name: Rafael Gomez  : 1945  MRN: [id number]    Date of Admission: 2022  Date of Discharge: September 15, 2022  Primary Care Physician: Claude Hair MD    Consults     Date and Time Order Name Status Description    2022  8:03 PM Inpatient Pulmonology Consult Completed     2022  4:03 PM Gastroenterology (on-call MD unless specified) Completed           Presenting Problem:   Hemorrhagic shock (HCC) [R57.8]  Acute GI bleeding [K92.2]  End-stage renal disease on hemodialysis (HCC) [N18.6, Z99.2]    Active and Resolved Hospital Problems:  Active Hospital Problems    Diagnosis POA   • **Acute blood loss anemia [D62] Yes   • ESRD (end stage renal disease) (HCC) [N18.6] Yes      Resolved Hospital Problems    Diagnosis POA   • Acute GI bleeding [K92.2] Yes   • Hypotension [I95.9] Yes         Hospital Course     Hospital Course:  Rafael Gomez is a 77 y.o. male admitted to hospital for rectal bleed and severe anemia.  Patient was started on Eliquis recently, according to patient he was started for clotting and during dialysis.  Its not clear whether it was started by nephrologist or vascular surgeon.  Patient had massive bleed and was bleeding per rectum.  Started on IV Protonix as well as transfusion of packed red bloods initiated admitted to ICU.  Patient was given Kcentra.  Further he was admitted to ICU.  He was monitored closely in ICU.  Gastroenterologist Dr. Torres saw patient and performed EGD, no active bleed from EGD noted.  He had colonoscopy in May so there was no repeat colonoscopy performed.  He recommended doing colonoscopy patient continues to bleed further.    Patient was seen  by intensivist as well as nephrologist, he was continued on his dialysis as blood pressure improved posttransfusion.  Patient is doing much better he has no further bleeding.  No abdominal pain.  He is awake alert he was transferred out of ICU 2 days ago.  He is feeling better his wife is bedside and wants to go home he was cleared by gastroenterologist for discharge.  His Xarelto is on hold and discontinued.    Patient's labs are stable no further bleeding noted today.  He will be discharged home        DISCHARGE Follow Up Recommendations for labs and diagnostics:   Discharge to home with outpatient follow-up.  To continue hemodialysis with nephrology group.      Day of Discharge     Vital Signs:  Temp:  [98 °F (36.7 °C)-98.4 °F (36.9 °C)] 98.1 °F (36.7 °C)  Heart Rate:  [52-77] 64  Resp:  [18-20] 20  BP: (127-156)/(44-89) 156/63  Flow (L/min):  [3] 3    Physical Exam:    Elderly male not in acute distress.  Anxious to go home.  Mild pallor.  Neck supple.  Heart regular.  Lungs clear.  Abdomen soft and obese nontender.  Extremities trace of edema.      Pertinent  and/or Most Recent Results     LAB RESULTS:      Lab 09/15/22  0536 09/14/22  0622 09/14/22  0316 09/13/22  0303 09/12/22  1940 09/12/22  1247   WBC 4.36  --  4.60 4.34  --  3.85   HEMOGLOBIN 8.3*  --  8.2* 8.2* 8.3* 6.9*   HEMATOCRIT 25.2*  --  25.2* 24.7* 24.9* 20.9*   PLATELETS 119*  --  110* 95*  --  109*   NEUTROS ABS 2.70  --  2.88 2.81  --  2.65   IMMATURE GRANS (ABS) 0.02  --  0.01 0.01  --  0.02   LYMPHS ABS 0.93  --  1.13 0.92  --  0.73   MONOS ABS 0.43  --  0.33 0.32  --  0.22   EOS ABS 0.23  --  0.21 0.23  --  0.20   MCV 95.5  --  94.7 92.5  --  95.9   LACTATE  --  1.2 2.2*  --   --   --    PROTIME  --   --   --   --   --  26.0*   APTT  --   --   --   --   --  51.1*         Lab 09/15/22  0536 09/14/22  0317 09/13/22  0302 09/12/22  1247   SODIUM 136 136 137 138   POTASSIUM 4.4 4.9 5.1 4.1   CHLORIDE 104 103 106 105   CO2 25.9 20.9* 21.0* 24.0    ANION GAP 6.1 12.1 10.0 9.0   BUN 19 40* 36* 29*   CREATININE 1.66* 2.34* 1.98* 1.86*   EGFR 42.5* 28.1* 34.4* 37.0*   GLUCOSE 109* 95 92 174*   CALCIUM 9.3 8.4* 8.2* 8.5*   MAGNESIUM 2.1 1.6 1.6 1.7   PHOSPHORUS  --  4.0  --   --          Lab 09/15/22  0536 09/14/22  0317 09/13/22  0302 09/12/22  1247   TOTAL PROTEIN 5.7* 5.6* 5.1* 5.5*   ALBUMIN 3.70 3.80 3.30* 3.50   GLOBULIN 2.0 1.8 1.8 2.0   ALT (SGPT) 12 10 9 10   AST (SGOT) 16 14 14 13   BILIRUBIN 0.4 0.4 0.5 0.5   ALK PHOS 97 89 80 90         Lab 09/12/22  1247   TROPONIN T 0.024   PROTIME 26.0*   INR 2.33*             Lab 09/12/22  1407   ABO TYPING O   RH TYPING Positive   ANTIBODY SCREEN Negative         Brief Urine Lab Results  (Last result in the past 365 days)      Color   Clarity   Blood   Leuk Est   Nitrite   Protein   CREAT   Urine HCG        09/12/22 1934 Yellow   Clear   Negative   Negative   Negative   Negative               Microbiology Results (last 10 days)     ** No results found for the last 240 hours. **          PROCEDURES:    [unfilled]         Results for orders placed during the hospital encounter of 07/28/22    Duplex Initial Vein Mapping Hemodialysis Access Bilateral CAR    Interpretation Summary  · The right cephalic vein is patent and of adequate size in the upper arm.  · The right radial artery is patent and of adequate size.  · The left radial artery is patent and of adequate size.  · The right basilic vein is patent and of adequate size in the upper arm.  · The right basilic vein is patent and of adequate size in the forearm.  · The left cephalic vein is patent and of adequate size in the upper arm.  · The left cephalic vein is patent and of adequate size in the forearm.  · The left basilic vein is patent and of adequate size in the upper arm.      Results for orders placed during the hospital encounter of 07/28/22    Duplex Initial Vein Mapping Hemodialysis Access Bilateral CAR    Interpretation Summary  · The right cephalic  vein is patent and of adequate size in the upper arm.  · The right radial artery is patent and of adequate size.  · The left radial artery is patent and of adequate size.  · The right basilic vein is patent and of adequate size in the upper arm.  · The right basilic vein is patent and of adequate size in the forearm.  · The left cephalic vein is patent and of adequate size in the upper arm.  · The left cephalic vein is patent and of adequate size in the forearm.  · The left basilic vein is patent and of adequate size in the upper arm.      Results for orders placed during the hospital encounter of 11/16/21    Adult Transthoracic Echo Complete w/ Color, Spectral and Contrast if necessary per protocol    Interpretation Summary  · Peak velocity of the flow distal to the aortic valve is 218 cm/s. Aortic valve maximum pressure gradient is 19 mmHg. Aortic valve dimensionless index is 0.5 .  · Calculated left ventricular EF = 65% Estimated left ventricular EF was in agreement with the calculated left ventricular EF.  · Left ventricular diastolic function is consistent with (grade II w/high LAP) pseudonormalization.  · Moderate mitral valve regurgitation is present.  · Mild aortic valve stenosis is present.      Labs Pending at Discharge:        Discharge Details        Discharge Medications      Continue These Medications      Instructions Start Date   acetaminophen 325 MG tablet  Commonly known as: TYLENOL   650 mg, Oral, Every 6 Hours PRN      albuterol sulfate  (90 Base) MCG/ACT inhaler  Commonly known as: PROVENTIL HFA;VENTOLIN HFA;PROAIR HFA   2 puffs, Inhalation, Every 4 Hours PRN      albuterol (2.5 MG/3ML) 0.083% nebulizer solution  Commonly known as: PROVENTIL   2.5 mg, Nebulization, Every 4 Hours PRN      allopurinol 300 MG tablet  Commonly known as: ZYLOPRIM   300 mg, Oral, Daily      atorvastatin 40 MG tablet  Commonly known as: LIPITOR   40 mg, Oral, Nightly      cetirizine 10 MG tablet  Commonly known  as: zyrTEC   10 mg, Oral, Daily      citalopram 40 MG tablet  Commonly known as: CeleXA   40 mg, Oral, Every Morning      donepezil 5 MG tablet  Commonly known as: ARICEPT   5 mg, Oral, Daily      finasteride 5 MG tablet  Commonly known as: PROSCAR   5 mg, Oral, Daily      gabapentin 100 MG capsule  Commonly known as: NEURONTIN   100 mg, Oral, 2 Times Daily      ketoconazole 2 % shampoo  Commonly known as: NIZORAL   1 application, Topical, 2 Times Weekly      metoprolol succinate XL 50 MG 24 hr tablet  Commonly known as: TOPROL-XL   50 mg, Oral, Daily      pantoprazole 20 MG EC tablet  Commonly known as: PROTONIX   20 mg, Oral, Daily      pramipexole 0.5 MG tablet  Commonly known as: MIRAPEX   0.5 mg, Oral, Nightly      Testosterone Cypionate 200 MG/ML solution   200 mg, Injection, Every 14 Days      Trelegy Ellipta 200-62.5-25 MCG/INH inhaler  Generic drug: Fluticasone-Umeclidin-Vilant   1 puff, Inhalation, Daily - RT         Stop These Medications    rivaroxaban 20 MG tablet  Commonly known as: XARELTO            Allergies   Allergen Reactions   • Cephalexin Nausea And Vomiting   • Morphine Other (See Comments)     Pt had long recovery involving morphine administration post surgery in December 2021.           Discharge Disposition:    Home or Self Care    Diet:    Heart healthy, renal diet    Discharge Activity:     Activity Instructions     Activity as Tolerated              Future Appointments   Date Time Provider Department Center   9/29/2022 10:50 AM LAB CHAIR 1 [facility]  [facility]   9/29/2022 11:20 AM Rafael Heredia MD [facility]       Additional Instructions for the Follow-ups that You Need to Schedule     Discharge Follow-up with PCP   As directed       Currently Documented PCP:    Claude Hair MD    PCP Phone Number:    [phone]     Follow Up Details: next week         Discharge Follow-up with Specified Provider: Dr Jerome for HD as recommended   As directed      To: Dr Jerome  for HD as recommended               Time spent on Discharge including face to face service: 29 minutes.            I have dictated this note utilizing Dragon Dictation.             Please note that portions of this note were completed with a voice recognition program.             Part of this note may be an electronic transcription/translation of spoken language to printed text         using the Dragon Dictation System.       Electronically signed by Melcohr Nichole MD, 09/15/22, 4:23 PM EDT.      Electronically signed by Melchor Nichole MD at 09/15/22 4135

## 2022-09-19 ENCOUNTER — READMISSION MANAGEMENT (OUTPATIENT)
Dept: CALL CENTER | Facility: HOSPITAL | Age: 77
End: 2022-09-19

## 2022-09-19 NOTE — OUTREACH NOTE
Medical Week 1 Survey    Flowsheet Row Responses   [facility] patient discharged from? [facility]   Does the patient have one of the following disease processes/diagnoses(primary or secondary)? Other   Week 1 attempt successful? Yes   Call start time 1351   Call end time 1355   Discharge diagnosis Acute GI bleeding    Is patient permission given to speak with other caregiver? Yes   List who call center can speak with Wife   Person spoke with today (if not patient) and relationship wife and pt   Meds reviewed with patient/caregiver? Yes   Is the patient having any side effects they believe may be caused by any medication additions or changes? No   Does the patient have all medications ordered at discharge? N/A   Does the patient have a primary care provider?  Yes   Does the patient have an appointment with their PCP within 7 days of discharge? Yes   Comments regarding PCP PCP APPOINTMENT IS TOMORROW   Has the patient kept scheduled appointments due by today? N/A   Psychosocial issues? No   Comments Weakness remains, but denies melena. Monitoring BP at home, 117/54. Had HD today.    Did the patient receive a copy of their discharge instructions? Yes   Nursing interventions Reviewed instructions with patient   What is the patient's perception of their health status since discharge? Improving   Is the patient/caregiver able to teach back signs and symptoms related to disease process for when to call PCP? Yes   Is the patient/caregiver able to teach back the hierarchy of who to call/visit for symptoms/problems? PCP, Specialist, Home health nurse, Urgent Care, ED, 911 Yes   If the patient is a current smoker, are they able to teach back resources for cessation? --  [chewing tobacco, usually 1/2oz/day]   Week 1 call completed? Yes          CHASE PFEIFFER - Registered Nurse

## 2022-09-22 LAB
ABO GROUP BLD: NORMAL
BLD GP AB SCN SERPL QL: NEGATIVE
QT INTERVAL: 490 MS
RH BLD: POSITIVE
T&S EXPIRATION DATE: NORMAL

## 2022-09-28 ENCOUNTER — READMISSION MANAGEMENT (OUTPATIENT)
Dept: CALL CENTER | Facility: HOSPITAL | Age: 77
End: 2022-09-28

## 2022-09-28 NOTE — OUTREACH NOTE
Medical Week 2 Survey    Flowsheet Row Responses   [facility] patient discharged from? [facility]   Does the patient have one of the following disease processes/diagnoses(primary or secondary)? Other   Week 2 attempt successful? Yes   Call start time 1231   Call end time 1233   Is the patient taking all medications as directed (includes completed medication regime)? Yes   Does the patient have a primary care provider?  Yes   Has the patient kept scheduled appointments due by today? Yes   Psychosocial issues? No   What is the patient's perception of their health status since discharge? Improving   Is the patient/caregiver able to teach back signs and symptoms related to disease process for when to call PCP? Yes   Is the patient/caregiver able to teach back signs and symptoms related to disease process for when to call 911? Yes   Is the patient/caregiver able to teach back the hierarchy of who to call/visit for symptoms/problems? PCP, Specialist, Home health nurse, Urgent Care, ED, 911 Yes   Additional teach back comments States he is doing well and no noticeable bleeding that he is aware of.   Week 2 Call Completed? Yes   Graduated Yes   Graduated/Revoked comments Denies questions or needs at this time.          DARON HERNANDEZ - Licensed Nurse

## 2022-09-28 NOTE — PROGRESS NOTES
[facility] OUTPATIENT FOLLOW UP CLINIC VISIT    REASON FOR FOLLOW-UP:    1. 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  Macrocytic anemia  3.  Mild leukopenia  4.  Pulmonary nodules  5.  [facility] he can get service-connected for plasmacytoma/amyloidosis.  [facility] [place] [facility] and saw Dr. Reed there. His case was discussed further in [place] after review of his pathology.  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  A fat pad biopsy and cardiac MRI were recommended.  Treatment with CyBorD was recommended.    6.  Peripheral neuropathy in his hands and feet.  [facility] that was concerning for cervical myelopathy rather than a neuropathic process consistent with amyloidosis.  MRI of the cervical spine [facility] on 4[facility]2/2019 shows multilevel degenerative change with moderate spinal canal stenosis at C4-C5 and C5-C6 and mild spinal canal stenosis at C6-C7.  Severe foraminal narrowing at C4-C5 and C5-C6 with moderate neuroforaminal narrowing at C6-C7.  7.  Cardiac [facility] performed on 4/16/2019 at the [facility] with normal left ventricle wall thickness with right ventricular cavity enlargement.  No aneurysm.  No fibrosis or scarring to suggest an infiltrative process such as amyloidosis.  Left ventricular ejection fraction 47%.  8.  Negative fat pad biopsy on 6/4/2019  9.  It was recommended from [place] that he initiate therapy with Velcade, Cytoxan, and dexamethasone (CyBorD).  Treatment initiated 7/8/2019  10.  He received therapy through 9/16/2019.  As of 9/23/2019, treatment was held for cytopenias, fatigue, orthostasis.    HISTORY OF PRESENT ILLNESS:  Rafael Gomez is a 77 y.o. male with the above-mentioned history, who returns the office today for scheduled follow-up.      Admitted from 9/12 through 9/15/2022 with acute GI bleeding and  "hemorrhagic shock.  He had recently been started on Eliquis by nephrology for clotting during dialysis.    An EGD showed no source of blood loss.    He remains fatigued.  He does not sleep well at night and then is drowsy during the day.  He reports ongoing weakness.  His left upper extremity AV fistula seems to be maturing nicely and he follows up with Dr. Nura Freeman with vascular surgery later today to evaluate this.  No new respiratory issues and he continues to have some dyspnea on exertion.    He and his wife hope to travel to [place] as soon as he is cleared by Dr. Freeman and also by his nephrologist.  He will transition to dialysis in [place].      ROS:  As per the HPI    Vitals:    09/29/22 1118   BP: 141/61   Pulse: 75   Resp: 18   Temp: 98 °F (36.7 °C)   TempSrc: Temporal   SpO2: 99%   Weight: 112 kg (247 lb)   Height: 177.8 cm (70\")   PainSc:   6   PainLoc: Generalized       PHYSICAL EXAMINATION:  General:  No acute distress, awake, alert and oriented obese.  On a motorized scooter today.  Skin:  Warm and dry, no visible rash  HEENT:  Normocephalic/atraumatic.  Wearing a facemask.  Hard of hearing.  Chest:  Normal respiratory effort.  Lungs clear to auscultation bilaterally with good air movement today.  Right chest tunneled catheter remains in place.  Heart: Regular rate and rhythm  Extremities: Bilateral lower extremity compression stockings in place.  Trace bilateral lower extremity edema.  I left upper extremity AV fistula is in place with a palpable thrill.  Neuro/psych:  Grossly non-focal.  Normal mood and affect.    DIAGNOSTIC DATA:  CBC & Differential (09/29/2022 10:57)      IMAGING:  None reviewed    ASSESSMENT:  This is a 77 y.o. 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    · He had a wedge resection.    · Dr. Hernandez was  following a couple of other small pulmonary nodules on the left which initially increased in size.  His M spike " continues to be stable.  The PET scan did not show anything we didn't expect from the CT scan.  The left upper lobe lung nodule is too risky to biopsy base of its location.    · His case was presented at the multidisciplinary thoracic conference and recommendations included to follow him with serial CT scans with biopsy if it appears to be safer.    · CT imaging on 12/18/2017 was stable.    · CT imaging on 5/25/2018 showed that one of the pleural based nodules and enlarged slightly measuring 2.3 cm.    · CT imaging from 8/22/2018 shows some slight increase in size of the pulmonary nodules with the largest measuring now 2.0 cm 3 other tiny noncalcified nodules in the right lung are stable, only showing a slight increase since 2017.  If any do grow significantly in size, we could consider radiation.  He would likely not be a surgical candidate considering his oxygen dependence.  There is some question as to how much of this is amyloid.  There is also some question as to whether this is primary or reactive.    · [facility] [place] by Dr. Reed, and they requested his pathology for review.  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  · It was recommended treatment with FAM (inhaled fluticasone, azithromycin 250 mg on Mondays Wednesdays and Fridays, montelukast daily) plus doxycycline 100 mg twice daily for his lungs.  He did [facility] through [facility] in February 2019.  · [facility] cardiac [facility] at [facility].  This was not consistent with involvement by amyloidosis.  He had an EMG also at the [facility] that was concerning for the possibility of cervical myelopathy rather than a process consistent with amyloidosis.   · He did have a negative fat pad biopsy on 6/4/2019.  · It was recommended from [place] that he initiate therapy with Velcade, Cytoxan, and dexamethasone (CyBorD).  Treatment initiated 7/8/2019  · As of 9/23/2019 he was  progressively fatigued and struggling with constant pruritus  despite several skin creams for management.  Additionally, he was more anemic and is having issues with orthostatic hypotension.  This actually resulted in a fall with several abrasions.  He had acute kidney injury.  He was given IV fluids.  An anemia evaluation showed no evidence for iron, vitamin B12, or folic acid deficiency.    · Treatment was held.   · I also discussed his case with Dr. Roberson at [facility].  No further therapy suggested.  · CT imaging of the chest from 12/2/2019 stable.  · CT imaging 2/26/2020 stable  · CT imaging 3/8/2021 with multiple bilateral pulmonary nodules measuring up to 2.3 cm, unchanged.  He was supposed to follow-up with Dr. Peña with thoracic surgery    *IgM monoclonal gammopathy:   · We did repeat labs 7/1/2019 to service a baseline, and spike was stable at 0.2 gm.    · Repeat studies on 10/7/2019 showed an M spike that was too small to measure.  With a serum free kappa light chain of 23.0, lambda 18.9, ratio 1.22.  · Repeat studies 8/17/2020 stable.  · M spike 0.1 gm on 2/1/2020  · M spike 0.3 gm on 8/2/2021  · 8/3/2022: M spike 0.1 g with a normal serum free light chain kappa/lambda ratio    *Macrocytic anemia:  · Now managed by nephrology since he is on dialysis  · Recent GI bleeding after anticoagulation was initiated  · 9/29/2022: No current bleeding.  Hemoglobin improving at 9.3.    *Thrombocytopenia  · Platelets mildly low at 119,000    *Right renal mass which is chronic.  He is followed by urology.    *Claudication with peripheral vascular disease followed by Dr. Freeman.  Chronic bilateral lower extremity edema, this is unchanged.     *Low back pain with an MRI on 5/18/2017 showing degenerative changes at multiple levels he states that a fusion has been recommended but he has been reluctant to proceed.  He continues to have left-sided low back pain.    *COPD: He follows with pulmonology    *Peripheral neuropathy and left shoulder pain: EMG completed.  MRI  cervical spine complete.  There are some abnormalities in the cervical spine.  He was referred to Dr. Robins, neurosurgery.  While the report is available from the MRI performed at [place], these images are not available.  The patient was seen by Dr. Robins with CT of the neck performed. He had additional imaging performed.  He followed up with Dr. Aranda with further evaluation of his shoulder recommended as it was suspected that the pain is not neuropathic.  He saw orthopedics and ultimately had a left shoulder procedure done in January 2019 with improvement in his pain.  He continues to have neck pain with some injections and perhaps RFA planned.    *Chronic kidney disease, now with end-stage renal disease.    · Now with recent acute kidney injury now requiring dialysis through a tunneled catheter  · Left upper extremity AV fistula placed by Dr. Nura Freeman with vascular surgery  · This is maturing and may be ready for use in the near future.    *Fatigue and tiredness:  multifactorial    PLAN:  1. Follow-up serum protein studies from today.  These have been very low and stable.  2. He continues hemodialysis 3 days/week and is hoping to transition to dialysis through his recently placed left upper extremity AV fistula.  He will follow-up with Dr. Nura Freeman later today to see if this is nearing ready for use.    3. As soon as his dialysis catheter can be removed and dialysis is arranged in [place] he and his wife plan to travel to [place] for the winter until next May.  4. When he goes to [place] I would like for him to be seen at [facility] for further evaluation.  5. I will see him back in May when he returns from [place]    Rafael Heredia MD

## 2022-09-29 ENCOUNTER — LAB (OUTPATIENT)
Dept: LAB | Facility: HOSPITAL | Age: 77
End: 2022-09-29

## 2022-09-29 ENCOUNTER — OFFICE VISIT (OUTPATIENT)
Dept: ONCOLOGY | Facility: CLINIC | Age: 77
End: 2022-09-29

## 2022-09-29 VITALS
OXYGEN SATURATION: 99 % | HEART RATE: 75 BPM | WEIGHT: 247 LBS | BODY MASS INDEX: 35.36 KG/M2 | HEIGHT: 70 IN | RESPIRATION RATE: 18 BRPM | DIASTOLIC BLOOD PRESSURE: 61 MMHG | TEMPERATURE: 98 F | SYSTOLIC BLOOD PRESSURE: 141 MMHG

## 2022-09-29 DIAGNOSIS — C90.21 EXTRAMEDULLARY PLASMACYTOMA IN REMISSION: ICD-10-CM

## 2022-09-29 DIAGNOSIS — E85.81 AL AMYLOIDOSIS: ICD-10-CM

## 2022-09-29 DIAGNOSIS — C90.21 EXTRAMEDULLARY PLASMACYTOMA IN REMISSION: Primary | ICD-10-CM

## 2022-09-29 LAB
ALBUMIN SERPL-MCNC: 3.9 G/DL (ref 3.5–5.2)
ALBUMIN/GLOB SERPL: 1.7 G/DL (ref 1.1–2.4)
ALP SERPL-CCNC: 116 U/L (ref 38–116)
ALT SERPL W P-5'-P-CCNC: 11 U/L (ref 0–41)
ANION GAP SERPL CALCULATED.3IONS-SCNC: 10.3 MMOL/L (ref 5–15)
AST SERPL-CCNC: 13 U/L (ref 0–40)
BASOPHILS # BLD AUTO: 0.04 10*3/MM3 (ref 0–0.2)
BASOPHILS NFR BLD AUTO: 1 % (ref 0–1.5)
BILIRUB SERPL-MCNC: 0.3 MG/DL (ref 0.2–1.2)
BUN SERPL-MCNC: 14 MG/DL (ref 6–20)
BUN/CREAT SERPL: 6.2 (ref 7.3–30)
CALCIUM SPEC-SCNC: 9.3 MG/DL (ref 8.5–10.2)
CHLORIDE SERPL-SCNC: 108 MMOL/L (ref 98–107)
CO2 SERPL-SCNC: 23.7 MMOL/L (ref 22–29)
CREAT SERPL-MCNC: 2.26 MG/DL (ref 0.7–1.3)
DEPRECATED RDW RBC AUTO: 73.7 FL (ref 37–54)
EGFRCR SERPLBLD CKD-EPI 2021: 29.3 ML/MIN/1.73
EOSINOPHIL # BLD AUTO: 0.28 10*3/MM3 (ref 0–0.4)
EOSINOPHIL NFR BLD AUTO: 7 % (ref 0.3–6.2)
ERYTHROCYTE [DISTWIDTH] IN BLOOD BY AUTOMATED COUNT: 19.9 % (ref 12.3–15.4)
GLOBULIN UR ELPH-MCNC: 2.3 GM/DL (ref 1.8–3.5)
GLUCOSE SERPL-MCNC: 130 MG/DL (ref 74–124)
HCT VFR BLD AUTO: 31.4 % (ref 37.5–51)
HGB BLD-MCNC: 9.3 G/DL (ref 13–17.7)
IMM GRANULOCYTES # BLD AUTO: 0.01 10*3/MM3 (ref 0–0.05)
IMM GRANULOCYTES NFR BLD AUTO: 0.3 % (ref 0–0.5)
LYMPHOCYTES # BLD AUTO: 0.75 10*3/MM3 (ref 0.7–3.1)
LYMPHOCYTES NFR BLD AUTO: 18.8 % (ref 19.6–45.3)
MCH RBC QN AUTO: 30.4 PG (ref 26.6–33)
MCHC RBC AUTO-ENTMCNC: 29.6 G/DL (ref 31.5–35.7)
MCV RBC AUTO: 102.6 FL (ref 79–97)
MONOCYTES # BLD AUTO: 0.36 10*3/MM3 (ref 0.1–0.9)
MONOCYTES NFR BLD AUTO: 9 % (ref 5–12)
NEUTROPHILS NFR BLD AUTO: 2.55 10*3/MM3 (ref 1.7–7)
NEUTROPHILS NFR BLD AUTO: 63.9 % (ref 42.7–76)
NRBC BLD AUTO-RTO: 0 /100 WBC (ref 0–0.2)
PLATELET # BLD AUTO: 119 10*3/MM3 (ref 140–450)
PMV BLD AUTO: 10.3 FL (ref 6–12)
POTASSIUM SERPL-SCNC: 4.2 MMOL/L (ref 3.5–4.7)
PROT SERPL-MCNC: 6.2 G/DL (ref 6.3–8)
RBC # BLD AUTO: 3.06 10*6/MM3 (ref 4.14–5.8)
SODIUM SERPL-SCNC: 142 MMOL/L (ref 134–145)
WBC NRBC COR # BLD: 3.99 10*3/MM3 (ref 3.4–10.8)

## 2022-09-29 PROCEDURE — 99214 OFFICE O/P EST MOD 30 MIN: CPT | Performed by: INTERNAL MEDICINE

## 2022-09-29 PROCEDURE — 80053 COMPREHEN METABOLIC PANEL: CPT

## 2022-09-29 PROCEDURE — 85025 COMPLETE CBC W/AUTO DIFF WBC: CPT

## 2022-09-29 PROCEDURE — 36415 COLL VENOUS BLD VENIPUNCTURE: CPT

## 2022-10-03 LAB
ALBUMIN SERPL ELPH-MCNC: 3.5 G/DL (ref 2.9–4.4)
ALBUMIN/GLOB SERPL: 1.5 {RATIO} (ref 0.7–1.7)
ALPHA1 GLOB SERPL ELPH-MCNC: 0.3 G/DL (ref 0–0.4)
ALPHA2 GLOB SERPL ELPH-MCNC: 0.8 G/DL (ref 0.4–1)
B-GLOBULIN SERPL ELPH-MCNC: 0.9 G/DL (ref 0.7–1.3)
GAMMA GLOB SERPL ELPH-MCNC: 0.6 G/DL (ref 0.4–1.8)
GLOBULIN SER-MCNC: 2.5 G/DL (ref 2.2–3.9)
IGA SERPL-MCNC: 113 MG/DL (ref 61–437)
IGG SERPL-MCNC: 526 MG/DL (ref 603–1613)
IGM SERPL-MCNC: 201 MG/DL (ref 15–143)
INTERPRETATION SERPL IEP-IMP: ABNORMAL
KAPPA LC FREE SER-MCNC: 51.7 MG/L (ref 3.3–19.4)
KAPPA LC FREE/LAMBDA FREE SER: 1.25 {RATIO} (ref 0.26–1.65)
LABORATORY COMMENT REPORT: ABNORMAL
LAMBDA LC FREE SERPL-MCNC: 41.5 MG/L (ref 5.7–26.3)
M PROTEIN SERPL ELPH-MCNC: 0.1 G/DL
PROT SERPL-MCNC: 6 G/DL (ref 6–8.5)

## 2022-11-09 ENCOUNTER — OFFICE VISIT (OUTPATIENT)
Dept: ORTHOPEDIC SURGERY | Facility: CLINIC | Age: 77
End: 2022-11-09

## 2022-11-09 VITALS — HEIGHT: 70 IN | BODY MASS INDEX: 35.5 KG/M2 | OXYGEN SATURATION: 94 % | HEART RATE: 61 BPM | WEIGHT: 248 LBS

## 2022-11-09 DIAGNOSIS — M25.551 RIGHT HIP PAIN: ICD-10-CM

## 2022-11-09 DIAGNOSIS — M54.16 LUMBAR BACK PAIN WITH RADICULOPATHY AFFECTING LEFT LOWER EXTREMITY: ICD-10-CM

## 2022-11-09 DIAGNOSIS — M25.552 LEFT HIP PAIN: Primary | ICD-10-CM

## 2022-11-09 PROCEDURE — 99203 OFFICE O/P NEW LOW 30 MIN: CPT | Performed by: ORTHOPAEDIC SURGERY

## 2022-11-09 PROCEDURE — 96372 THER/PROPH/DIAG INJ SC/IM: CPT | Performed by: ORTHOPAEDIC SURGERY

## 2022-11-09 RX ORDER — DEXAMETHASONE SODIUM PHOSPHATE 4 MG/ML
8 INJECTION, SOLUTION INTRA-ARTICULAR; INTRALESIONAL; INTRAMUSCULAR; INTRAVENOUS; SOFT TISSUE ONCE
Status: COMPLETED | OUTPATIENT
Start: 2022-11-09 | End: 2022-11-09

## 2022-11-09 RX ORDER — HYDROCODONE BITARTRATE AND ACETAMINOPHEN 7.5; 325 MG/1; MG/1
1 TABLET ORAL EVERY 6 HOURS PRN
Qty: 35 TABLET | Refills: 0 | Status: SHIPPED | OUTPATIENT
Start: 2022-11-09

## 2022-11-09 RX ADMIN — DEXAMETHASONE SODIUM PHOSPHATE 8 MG: 4 INJECTION, SOLUTION INTRA-ARTICULAR; INTRALESIONAL; INTRAMUSCULAR; INTRAVENOUS; SOFT TISSUE at 14:33

## 2022-11-09 NOTE — PROGRESS NOTES
"Chief Complaint  Initial Evaluation of the Right Hip     Subjective      Rafael Gomez presents to [facility] for initial evaluation of the right hip. The patient is having pain on the lateral side of the hip and goes down to his knee to his foot.  The patient can't sleep, having a hard time walking and getting up and down from chairs.  Patient denies any groin pain.  The patient is leaving for [place] Friday and has dialysis scheduled down there and told if continues to flare up to see an orthopedist specialist there.     Allergies   Allergen Reactions   • Cephalexin Nausea And Vomiting   • Morphine Other (See Comments)     Pt had long recovery involving morphine administration post surgery in 2021.          Social History     Socioeconomic History   • Marital status:      Spouse name: Liliya   • Years of education: High school   Tobacco Use   • Smoking status: Former     Packs/day: 1.00     Years: 30.00     Pack years: 30.00     Types: Cigarettes     Quit date:      Years since quittin.8   • Smokeless tobacco: Current     Types: Chew   • Tobacco comments:     ONE HALF CAN DAILY    Vaping Use   • Vaping Use: Never used   Substance and Sexual Activity   • Alcohol use: Not Currently   • Drug use: No   • Sexual activity: Defer        Review of Systems     Objective   Vital Signs:   Pulse 61   Ht 177.8 cm (70\")   Wt 112 kg (248 lb)   SpO2 94%   BMI 35.58 kg/m²       Physical Exam  Constitutional:       Appearance: Normal appearance. Patient is well-developed and normal weight.   HENT:      Head: Normocephalic.      Right Ear: Hearing and external ear normal.      Left Ear: Hearing and external ear normal.      Nose: Nose normal.   Eyes:      Conjunctiva/sclera: Conjunctivae normal.   Cardiovascular:      Rate and Rhythm: Normal rate.   Pulmonary:      Effort: Pulmonary effort is normal.      Breath sounds: No wheezing or rales.   Abdominal:      " Palpations: Abdomen is soft.      Tenderness: There is no abdominal tenderness.   Musculoskeletal:      Cervical back: Normal range of motion.   Skin:     Findings: No rash.   Neurological:      Mental Status: Patient  is alert and oriented to person, place, and time.   Psychiatric:         Mood and Affect: Mood and affect normal.         Judgment: Judgment normal.       Ortho Exam      RIGHT HIP Dorsal pedal pulse 2+. Posterior tibialis pulse is 2+. Good strength to hamstrings, quadriceps, dorsiflexors, and plantar flexors. Good tone of hip flexors, hip extensors, and hip abductors. Tender palpation to the right hip.     Procedures        Imaging Results (Most Recent)     Procedure Component Value Units Date/Time    XR Hip With or Without Pelvis 2 - 3 View Right [[id number]] Resulted: 11/09/22 1317     Updated: 11/09/22 1325           Result Review :     X-Ray Report:  Right hip(s) X-Ray  Indication: Evaluation of of the right hip.   AP/Lateral view(s)  Findings: No signs of loosening, subsidence or periprosthetic fracture.   Prior studies available for comparison: No             Assessment and Plan     Diagnoses and all orders for this visit:    1. Left hip pain (Primary)    2. Right hip pain  -     XR Hip With or Without Pelvis 2 - 3 View Right    3. Lumbar back pain with radiculopathy affecting left lower extremity        Discussed the treatment plan with the patient. Discussed conservative measures as exercises, anti-inflammatory and injection. Patient wanted to proceed with IM injection and tolerated well.     Educated on risk of smoking. Discussed options for smoking cessation.  Call or return if worsening symptoms.    Follow Up     PRN    Patient was given instructions and counseling regarding his condition or for health maintenance advice. Please see specific information pulled into the AVS if appropriate.     Scribed for Man Francois MD by Nia Pollock MA.  11/09/22   13:19 EST    I have personally  performed the services described in this document as scribed by the above individual and it is both accurate and complete. Man Francois MD 11/11/22

## 2023-02-13 ENCOUNTER — DOCUMENTATION (OUTPATIENT)
Dept: ONCOLOGY | Facility: CLINIC | Age: 78
End: 2023-02-13
Payer: MEDICARE

## 2023-02-13 ENCOUNTER — TELEPHONE (OUTPATIENT)
Dept: ONCOLOGY | Facility: CLINIC | Age: 78
End: 2023-02-13

## 2023-02-13 DIAGNOSIS — E85.81 AL AMYLOIDOSIS: Primary | ICD-10-CM

## 2023-02-13 DIAGNOSIS — C90.21 EXTRAMEDULLARY PLASMACYTOMA IN REMISSION: ICD-10-CM

## 2023-02-13 NOTE — TELEPHONE ENCOUNTER
Caller: Liliya Gomez    Relationship: Emergency Contact    Best call back number: [phone]    What is the best time to reach you: ANYTIME    Who are you requesting to speak with (clinical staff, provider,  specific staff member): DR CASAS         What was the call regarding:     REMINDING DR CASAS THAT BECAUSE THERE STILL IN [place] DR CASAS WOULD WRITE REFERRAL FOR SH[place]KA TO BE CHECKED AND SEEN AT THE [facility]    THEY DO REQUIRE MEDICAL RECORDS FOR THEM TO BE SEEN PLEASE SEND ALSO.     PLEASE CALL TO DISCUSS ONCE REFERRAL SENT     Do you require a callback: YES

## 2023-10-02 NOTE — PLAN OF CARE
Problem: Adult Inpatient Plan of Care  Goal: Plan of Care Review  Outcome: Ongoing, Progressing  Flowsheets (Taken 5/1/2022 0350)  Progress: no change  Plan of Care Reviewed With: patient  Outcome Evaluation: Patient now having dark bloody stool. Test for occult stool and monitoring labs.  Goal: Patient-Specific Goal (Individualized)  Outcome: Ongoing, Progressing  Goal: Absence of Hospital-Acquired Illness or Injury  Outcome: Ongoing, Progressing  Intervention: Identify and Manage Fall Risk  Recent Flowsheet Documentation  Taken 5/1/2022 0243 by Mayra Tian RN  Safety Promotion/Fall Prevention:   assistive device/personal items within reach   clutter free environment maintained   safety round/check completed   room organization consistent  Taken 5/1/2022 0234 by Mayra Tian RN  Safety Promotion/Fall Prevention:   safety round/check completed   room organization consistent   assistive device/personal items within reach   clutter free environment maintained  Taken 5/1/2022 0006 by Mayra Tian RN  Safety Promotion/Fall Prevention:   safety round/check completed   room organization consistent   assistive device/personal items within reach   clutter free environment maintained  Taken 4/30/2022 2226 by Mayra Tian RN  Safety Promotion/Fall Prevention:   safety round/check completed   room organization consistent   assistive device/personal items within reach   clutter free environment maintained  Taken 4/30/2022 2013 by Mayra Tian RN  Safety Promotion/Fall Prevention:   assistive device/personal items within reach   clutter free environment maintained   safety round/check completed   room organization consistent  Taken 4/30/2022 1935 by Mayra Tian RN  Safety Promotion/Fall Prevention:   safety round/check completed   room organization consistent   assistive device/personal items within reach   clutter free environment maintained  Intervention: Prevent Infection  Recent Flowsheet Documentation  Taken 5/1/2022  no deformities, no chest wall non-tenderness, breathing is unlabored without accessory muscle use, normal breath sounds 0243 by Mayra Tian RN  Infection Prevention:   single patient room provided   rest/sleep promoted   environmental surveillance performed   equipment surfaces disinfected  Taken 5/1/2022 0234 by Mayra Tian RN  Infection Prevention:   single patient room provided   rest/sleep promoted   environmental surveillance performed   equipment surfaces disinfected  Taken 5/1/2022 0006 by Mayra Tian RN  Infection Prevention:   single patient room provided   rest/sleep promoted   environmental surveillance performed   equipment surfaces disinfected  Taken 4/30/2022 2226 by Mayra Tian RN  Infection Prevention:   single patient room provided   rest/sleep promoted   environmental surveillance performed   equipment surfaces disinfected  Taken 4/30/2022 2013 by Mayra Tian RN  Infection Prevention:   single patient room provided   rest/sleep promoted   environmental surveillance performed   equipment surfaces disinfected  Taken 4/30/2022 1935 by Mayra Tian RN  Infection Prevention:   single patient room provided   rest/sleep promoted   environmental surveillance performed   equipment surfaces disinfected  Goal: Optimal Comfort and Wellbeing  Outcome: Ongoing, Progressing  Intervention: Monitor Pain and Promote Comfort  Recent Flowsheet Documentation  Taken 4/30/2022 1935 by Mayra Tian RN  Pain Management Interventions:   quiet environment facilitated   relaxation techniques promoted  Intervention: Provide Person-Centered Care  Recent Flowsheet Documentation  Taken 4/30/2022 1935 by Mayra Tian RN  Trust Relationship/Rapport:   care explained   choices provided   emotional support provided   empathic listening provided   questions answered   questions encouraged   reassurance provided   thoughts/feelings acknowledged  Goal: Readiness for Transition of Care  Outcome: Ongoing, Progressing     Problem: Fall Injury Risk  Goal: Absence of Fall and Fall-Related Injury  Outcome: Ongoing, Progressing  Intervention: Identify and  Manage Contributors  Recent Flowsheet Documentation  Taken 5/1/2022 0243 by Mayra Tian RN  Medication Review/Management: medications reviewed  Taken 5/1/2022 0234 by Mayra Tian RN  Medication Review/Management: medications reviewed  Taken 5/1/2022 0006 by Mayra Tian RN  Medication Review/Management: medications reviewed  Taken 4/30/2022 2226 by Mayra Tian RN  Medication Review/Management: medications reviewed  Taken 4/30/2022 2013 by Mayra Tian RN  Medication Review/Management: medications reviewed  Taken 4/30/2022 1935 by Mayra Tian RN  Medication Review/Management: medications reviewed  Intervention: Promote Injury-Free Environment  Recent Flowsheet Documentation  Taken 5/1/2022 0243 by Mayra Tian RN  Safety Promotion/Fall Prevention:   assistive device/personal items within reach   clutter free environment maintained   safety round/check completed   room organization consistent  Taken 5/1/2022 0234 by Mayra Tian RN  Safety Promotion/Fall Prevention:   safety round/check completed   room organization consistent   assistive device/personal items within reach   clutter free environment maintained  Taken 5/1/2022 0006 by Mayra Tian RN  Safety Promotion/Fall Prevention:   safety round/check completed   room organization consistent   assistive device/personal items within reach   clutter free environment maintained  Taken 4/30/2022 2226 by Mayra Tian RN  Safety Promotion/Fall Prevention:   safety round/check completed   room organization consistent   assistive device/personal items within reach   clutter free environment maintained  Taken 4/30/2022 2013 by Mayra Tian RN  Safety Promotion/Fall Prevention:   assistive device/personal items within reach   clutter free environment maintained   safety round/check completed   room organization consistent  Taken 4/30/2022 1935 by Mayra Tian RN  Safety Promotion/Fall Prevention:   safety round/check completed   room organization consistent   assistive  device/personal items within reach   clutter free environment maintained     Problem: COPD (Chronic Obstructive Pulmonary Disease) Comorbidity  Goal: Maintenance of COPD Symptom Control  Outcome: Ongoing, Progressing  Intervention: Maintain COPD-Symptom Control  Recent Flowsheet Documentation  Taken 5/1/2022 0243 by Mayra Tian RN  Medication Review/Management: medications reviewed  Taken 5/1/2022 0234 by Mayra Tian RN  Medication Review/Management: medications reviewed  Taken 5/1/2022 0006 by Mayra Tian RN  Medication Review/Management: medications reviewed  Taken 4/30/2022 2226 by Mayra Tian RN  Medication Review/Management: medications reviewed  Taken 4/30/2022 2013 by Mayra Tian RN  Medication Review/Management: medications reviewed  Taken 4/30/2022 1935 by Mayra Tian RN  Medication Review/Management: medications reviewed     Problem: Hypertension Comorbidity  Goal: Blood Pressure in Desired Range  Outcome: Ongoing, Progressing  Intervention: Maintain Blood Pressure Management  Recent Flowsheet Documentation  Taken 5/1/2022 0243 by Mayra Tian RN  Medication Review/Management: medications reviewed  Taken 5/1/2022 0234 by Mayra Tian RN  Medication Review/Management: medications reviewed  Taken 5/1/2022 0006 by Mayra Tian RN  Medication Review/Management: medications reviewed  Taken 4/30/2022 2226 by Mayra Tian RN  Medication Review/Management: medications reviewed  Taken 4/30/2022 2013 by Mayra Tian RN  Medication Review/Management: medications reviewed  Taken 4/30/2022 1935 by Mayra Tian RN  Medication Review/Management: medications reviewed     Problem: Skin Injury Risk Increased  Goal: Skin Health and Integrity  Outcome: Ongoing, Progressing   Goal Outcome Evaluation:  Plan of Care Reviewed With: patient        Progress: no change  Outcome Evaluation: Patient now having dark bloody stool. Test for occult stool and monitoring labs.

## 2024-04-29 NOTE — OUTREACH NOTE
CHF Week 1 Survey    Flowsheet Row Responses   [facility] facility patient discharged from? [facility]   Does the patient have one of the following disease processes/diagnoses(primary or secondary)? CHF   CHF Week 1 attempt successful? No   Unsuccessful attempts Attempt 1          ABDI CAUSEY - Registered Nurse  
[Takes medication as prescribed] : takes

## 2025-07-03 NOTE — TELEPHONE ENCOUNTER
----- Message from Rafael Heredia MD sent at 3/3/2020  5:14 PM EST -----  Please call the patient regarding his result.  Please let him know that his blood protein levels all remain stable.  We will continue observation.  REMBERTO  
Called patient with results per Dr. Heredia.   Spoke with patient and informed of lab results.  
Acute respiratory failure with hypoxia

## (undated) DEVICE — SOL NS 500ML

## (undated) DEVICE — KT PT POSITION SUPINE HANA/PROFX TABL

## (undated) DEVICE — GLV SURG SENSICARE SLT PF LF 7 STRL

## (undated) DEVICE — MAT FLR ABS W/BLU/LINER 56X72IN WHT

## (undated) DEVICE — TBG PENCL TELESCP MEGADYNE SMOKE EVAC 10FT

## (undated) DEVICE — PULLOVER TOGA, 2X LARGE: Brand: FLYTE, SURGICOOL

## (undated) DEVICE — 450 ML BOTTLE OF 0.05% CHLORHEXIDINE GLUCONATE IN 99.95% STERILE WATER FOR IRRIGATION, USP AND APPLICATOR.: Brand: IRRISEPT ANTIMICROBIAL WOUND LAVAGE

## (undated) DEVICE — SLV SCD LEG COMFORT KENDALLSCD MD REPROC

## (undated) DEVICE — SUT PROLN 6/0 BV1 D/A 30IN 8709H

## (undated) DEVICE — SUT SILK 3/0 TIES 18IN A184H

## (undated) DEVICE — VASCULAR ACCESS-LF: Brand: MEDLINE INDUSTRIES, INC.

## (undated) DEVICE — PATIENT RETURN ELECTRODE, SINGLE-USE, CONTACT QUALITY MONITORING, ADULT, WITH 9FT CORD, FOR PATIENTS WEIGING OVER 33LBS. (15KG): Brand: MEGADYNE

## (undated) DEVICE — ANTIBACTERIAL UNDYED BRAIDED (POLYGLACTIN 910), SYNTHETIC ABSORBABLE SUTURE: Brand: COATED VICRYL

## (undated) DEVICE — STERILE POLYISOPRENE POWDER-FREE SURGICAL GLOVES: Brand: PROTEXIS

## (undated) DEVICE — SOL IRRG H2O PL/BG 1000ML STRL

## (undated) DEVICE — INTENDED FOR TISSUE SEPARATION, AND OTHER PROCEDURES THAT REQUIRE A SHARP SURGICAL BLADE TO PUNCTURE OR CUT.: Brand: BARD-PARKER ® CARBON RIB-BACK BLADES

## (undated) DEVICE — ELECTRD BLD EXT EDGE 1P COAT 6.5IN STRL

## (undated) DEVICE — BIOPATCH™ ANTIMICROBIAL DRESSING WITH CHLORHEXIDINE GLUCONATE IS A HYDROPHILLIC POLYURETHANE ABSORPTIVE FOAM WITH CHLORHEXIDINE GLUCONATE (CHG) WHICH INHIBITS BACTERIAL GROWTH UNDER THE DRESSING. THE DRESSING IS INTENDED TO BE USED TO ABSORB EXUDATE, COVER A WOUND CAUSED BY VASCULAR AND NONVASCULAR PERCUTANEOUS MEDICAL DEVICES DURING SURGERY, AS WELL AS REDUCE LOCAL INFECTION AND COLONIZATION OF MICROORGANISMS.: Brand: BIOPATCH

## (undated) DEVICE — EGD OR ERCP KIT: Brand: MEDLINE INDUSTRIES, INC.

## (undated) DEVICE — SUT VIC 3/0 SH 27IN J416H

## (undated) DEVICE — GLV SURG SENSICARE PI PF LF 7 GRN STRL

## (undated) DEVICE — TRAP FLD MINIVAC MEGADYNE 100ML

## (undated) DEVICE — SUT VIC UD BR COAT 0 CP2 27IN

## (undated) DEVICE — SUT POLY FORCEFIBER W/CUT/NDL NO5 38IN

## (undated) DEVICE — KT INTRO MIC VSI SMOTH REG 4F 40CM 7CM

## (undated) DEVICE — PK AV FISTL/SHNT 40

## (undated) DEVICE — GAUZE,SPONGE,4"X4",16PLY,STRL,LF,10/TRAY: Brand: MEDLINE

## (undated) DEVICE — PAD GRND E/S W/CORD SPLT A/

## (undated) DEVICE — TOWEL,OR,DSP,ST,BLUE,STD,4/PK,20PK/CS: Brand: MEDLINE

## (undated) DEVICE — APPL CHLORAPREP HI/LITE 26ML ORNG

## (undated) DEVICE — SUT PROLN 5/0 RB1 D/A 36IN 8556H

## (undated) DEVICE — SUT NLY 2/0 664G

## (undated) DEVICE — BIPOLAR SEALER 23-112-1 AQM 6.0: Brand: AQUAMANTYS™

## (undated) DEVICE — COLON KIT: Brand: MEDLINE INDUSTRIES, INC.

## (undated) DEVICE — GLV SURG SENSICARE PI ORTHO SZ7.5 LF STRL

## (undated) DEVICE — Device: Brand: DEFENDO AIR/WATER/SUCTION AND BIOPSY VALVE

## (undated) DEVICE — SUT SILK 3/0 SH CR5 18IN C0135

## (undated) DEVICE — TOTAL ANTERIOR HIP-LF: Brand: MEDLINE INDUSTRIES, INC.

## (undated) DEVICE — GLV SURG SENSICARE PI ORTHO SZ8 LF STRL

## (undated) DEVICE — FIAPC® PROBE W/ FILTER 2200 A OD 2.3MM/6.9FR; L 2.2M/7.2FT: Brand: ERBE

## (undated) DEVICE — ENCORE® LATEX ORTHO SIZE 8, STERILE LATEX POWDER-FREE SURGICAL GLOVE: Brand: ENCORE

## (undated) DEVICE — SUT SILK 2/0 TIES 18IN A185H

## (undated) DEVICE — STRYKER PERFORMANCE SERIES SAGITTAL BLADE: Brand: STRYKER PERFORMANCE SERIES

## (undated) DEVICE — PROXIMATE RH ROTATING HEAD SKIN STAPLERS (35 WIDE) CONTAINS 35 STAINLESS STEEL STAPLES: Brand: PROXIMATE

## (undated) DEVICE — UNDYED BRAIDED (POLYGLACTIN 910), SYNTHETIC ABSORBABLE SUTURE: Brand: COATED VICRYL

## (undated) DEVICE — BIT DRL RNGLC QC 3.2X30MM

## (undated) DEVICE — CVR LEG BOOTLEG F/R NOSKID 33IN

## (undated) DEVICE — Device

## (undated) DEVICE — HEMOSPLIT XK HEMODIALYSIS CATH, ST, 16 FR. 23CM: Brand: HEMOSPLIT XK LONG-TERM HEMODIALYSIS CATHETER

## (undated) DEVICE — SOL IRR NACL 0.9PCT BT 1000ML

## (undated) DEVICE — GLV SURG BIOGEL LTX PF 7 1/2

## (undated) DEVICE — ZIPPERED TOGA, PEEL-AWAY 2X LARGE: Brand: FLYTE, SURGICOOL

## (undated) DEVICE — DRSNG SURESITE WNDW 2.38X2.75

## (undated) DEVICE — ISOLATION BAG: Brand: CONVERTORS